# Patient Record
Sex: MALE | Race: WHITE | NOT HISPANIC OR LATINO
[De-identification: names, ages, dates, MRNs, and addresses within clinical notes are randomized per-mention and may not be internally consistent; named-entity substitution may affect disease eponyms.]

---

## 2017-02-04 ENCOUNTER — APPOINTMENT (OUTPATIENT)
Dept: MRI IMAGING | Facility: HOSPITAL | Age: 52
End: 2017-02-04

## 2017-02-04 ENCOUNTER — OUTPATIENT (OUTPATIENT)
Dept: OUTPATIENT SERVICES | Facility: HOSPITAL | Age: 52
LOS: 1 days | End: 2017-02-04
Payer: COMMERCIAL

## 2017-02-04 DIAGNOSIS — M75.122 COMPLETE ROTATOR CUFF TEAR OR RUPTURE OF LEFT SHOULDER, NOT SPECIFIED AS TRAUMATIC: ICD-10-CM

## 2017-02-04 PROCEDURE — 73221 MRI JOINT UPR EXTREM W/O DYE: CPT

## 2017-02-24 ENCOUNTER — OUTPATIENT (OUTPATIENT)
Dept: OUTPATIENT SERVICES | Facility: HOSPITAL | Age: 52
LOS: 1 days | End: 2017-02-24
Payer: COMMERCIAL

## 2017-02-24 ENCOUNTER — APPOINTMENT (OUTPATIENT)
Dept: ULTRASOUND IMAGING | Facility: HOSPITAL | Age: 52
End: 2017-02-24

## 2017-02-24 DIAGNOSIS — R16.2 HEPATOMEGALY WITH SPLENOMEGALY, NOT ELSEWHERE CLASSIFIED: ICD-10-CM

## 2017-02-24 DIAGNOSIS — K76.0 FATTY (CHANGE OF) LIVER, NOT ELSEWHERE CLASSIFIED: ICD-10-CM

## 2017-02-24 PROCEDURE — 76700 US EXAM ABDOM COMPLETE: CPT

## 2018-07-23 ENCOUNTER — APPOINTMENT (OUTPATIENT)
Dept: ORTHOPEDIC SURGERY | Facility: CLINIC | Age: 53
End: 2018-07-23
Payer: COMMERCIAL

## 2018-07-23 VITALS — HEIGHT: 68 IN | BODY MASS INDEX: 32.43 KG/M2 | WEIGHT: 214 LBS

## 2018-07-23 PROCEDURE — 99214 OFFICE O/P EST MOD 30 MIN: CPT | Mod: 25

## 2018-07-23 PROCEDURE — 20610 DRAIN/INJ JOINT/BURSA W/O US: CPT | Mod: LT

## 2018-09-18 ENCOUNTER — APPOINTMENT (OUTPATIENT)
Dept: ORTHOPEDIC SURGERY | Facility: CLINIC | Age: 53
End: 2018-09-18

## 2018-11-06 ENCOUNTER — APPOINTMENT (OUTPATIENT)
Dept: ORTHOPEDIC SURGERY | Facility: CLINIC | Age: 53
End: 2018-11-06
Payer: COMMERCIAL

## 2018-11-06 VITALS — BODY MASS INDEX: 32.43 KG/M2 | HEIGHT: 68 IN | WEIGHT: 214 LBS

## 2018-11-06 PROCEDURE — 20610 DRAIN/INJ JOINT/BURSA W/O US: CPT | Mod: LT

## 2019-03-23 ENCOUNTER — OUTPATIENT (OUTPATIENT)
Dept: OUTPATIENT SERVICES | Facility: HOSPITAL | Age: 54
LOS: 1 days | End: 2019-03-23
Payer: COMMERCIAL

## 2019-03-23 ENCOUNTER — APPOINTMENT (OUTPATIENT)
Dept: ULTRASOUND IMAGING | Facility: HOSPITAL | Age: 54
End: 2019-03-23
Payer: COMMERCIAL

## 2019-03-23 DIAGNOSIS — Z00.8 ENCOUNTER FOR OTHER GENERAL EXAMINATION: ICD-10-CM

## 2019-03-23 PROCEDURE — 76700 US EXAM ABDOM COMPLETE: CPT | Mod: 26

## 2019-03-23 PROCEDURE — 76700 US EXAM ABDOM COMPLETE: CPT

## 2019-04-09 ENCOUNTER — APPOINTMENT (OUTPATIENT)
Dept: ORTHOPEDIC SURGERY | Facility: CLINIC | Age: 54
End: 2019-04-09
Payer: COMMERCIAL

## 2019-04-09 VITALS — BODY MASS INDEX: 32.43 KG/M2 | WEIGHT: 214 LBS | HEIGHT: 68 IN

## 2019-04-09 PROCEDURE — 73564 X-RAY EXAM KNEE 4 OR MORE: CPT | Mod: LT

## 2019-04-09 PROCEDURE — 99213 OFFICE O/P EST LOW 20 MIN: CPT

## 2019-04-10 NOTE — END OF VISIT
[FreeTextEntry3] : All medical record entries made by the Isabel[facility]lenin were at my, Dr. Kolby Young, direction and personally dictated by me on 04/09/2019. I have reviewed the chart and agree that the record accurately reflects my personal performance of the history, physical exam, assessment and plan. I have also personally directed, reviewed, and agreed with the chart.

## 2019-04-10 NOTE — ADDENDUM
[FreeTextEntry1] : I, Abigail Beth, acted solely as a scribe for Dr. Kolby Young on this date 04/09/2019.

## 2019-04-10 NOTE — PHYSICAL EXAM
[Normal RLE] : Right Lower Extremity: No scars, rashes, lesions, ulcers, skin intact [Normal LLE] : Left Lower Extremity: No scars, rashes, lesions, ulcers, skin intact [Normal Touch] : sensation intact for touch [Normal] : No swelling, no edema, normal pedal pulses and normal temperature [Obese] : obese [Poor Appearance] : well-appearing [Acute Distress] : not in acute distress [de-identified] : Left Lower Extremity \par o Knee :\par ¦ Inspection/Palpation : medial joint line tenderness, no swelling, mild varus alignment\par ¦ Range of Motion : 0 - 120 degrees, no crepitus\par ¦ Stability : no valgus or varus instability present on provocative testing\par ¦ Strength : quadriceps strength 5-/5\par o Muscle Bulk : normal muscle bulk present \par o Skin : no erythema or ecchymosis present \par o Sensation : sensation to pin intact\par o Vascular Exam : no edema, no cyanosis, dorsalis pedis artery pulse 2+, posterior tibial artery pulse 2+ [de-identified] : o Left Knee : AP, lateral, sunrise, and Mcconnell views of the knee were obtained, there are no soft tissue abnormalities, no fractures, advanced medial compartment osteoarthritis, varus alignment

## 2019-04-10 NOTE — DISCUSSION/SUMMARY
[de-identified] : The underlying pathophysiology was reviewed in great detail with the patient as well as the various treatment options, including ice, analgesics, NSAIDs, Physical therapy, steroid injections.\par \par A prescription for a medial  brace was provided.\par \par A prescription was provided for Diclofenac. \par \par FU PRN.

## 2019-04-10 NOTE — HISTORY OF PRESENT ILLNESS
[de-identified] : 53 year old male presents for an evaluation of chronic left knee pain s/p left knee arthroscopy on 1/20/2016. At his last visit on 11/6/2018 he received a Monovisc injection of his left knee, he stated that it provided him with relief until February 2019. His pain has since returned and describes a pain that is constant in nature and located along the medial aspect of his left knee. His pain has caused him difficulty with walking, bending, and climbing stairs. He is interested in discussing treatment options for his pain.\par

## 2019-06-13 ENCOUNTER — INPATIENT (INPATIENT)
Facility: HOSPITAL | Age: 54
LOS: 14 days | Discharge: HOME | DRG: 885 | End: 2019-06-28
Attending: PSYCHIATRY & NEUROLOGY | Admitting: PSYCHIATRY & NEUROLOGY
Payer: COMMERCIAL

## 2019-06-13 VITALS
OXYGEN SATURATION: 96 % | HEART RATE: 91 BPM | RESPIRATION RATE: 18 BRPM | WEIGHT: 292.99 LBS | SYSTOLIC BLOOD PRESSURE: 162 MMHG | DIASTOLIC BLOOD PRESSURE: 87 MMHG | TEMPERATURE: 98 F | HEIGHT: 71 IN

## 2019-06-13 DIAGNOSIS — F32.9 MAJOR DEPRESSIVE DISORDER, SINGLE EPISODE, UNSPECIFIED: ICD-10-CM

## 2019-06-13 DIAGNOSIS — F33.2 MAJOR DEPRESSIVE DISORDER, RECURRENT SEVERE WITHOUT PSYCHOTIC FEATURES: ICD-10-CM

## 2019-06-13 LAB
ALBUMIN SERPL ELPH-MCNC: 4.1 G/DL — SIGNIFICANT CHANGE UP (ref 3.3–5)
ALBUMIN SERPL ELPH-MCNC: 4.2 G/DL — SIGNIFICANT CHANGE UP (ref 3.3–5)
ALP SERPL-CCNC: 63 U/L — SIGNIFICANT CHANGE UP (ref 30–120)
ALP SERPL-CCNC: 63 U/L — SIGNIFICANT CHANGE UP (ref 30–120)
ALT FLD-CCNC: 168 U/L DA — HIGH (ref 10–60)
ALT FLD-CCNC: 172 U/L DA — HIGH (ref 10–60)
ANION GAP SERPL CALC-SCNC: 9 MMOL/L — SIGNIFICANT CHANGE UP (ref 5–17)
APAP SERPL-MCNC: <1 UG/ML — LOW (ref 10–30)
APPEARANCE UR: CLEAR — SIGNIFICANT CHANGE UP
AST SERPL-CCNC: 66 U/L — HIGH (ref 10–40)
AST SERPL-CCNC: 81 U/L — HIGH (ref 10–40)
BASOPHILS # BLD AUTO: 0.02 K/UL — SIGNIFICANT CHANGE UP (ref 0–0.2)
BASOPHILS NFR BLD AUTO: 0.4 % — SIGNIFICANT CHANGE UP (ref 0–2)
BILIRUB DIRECT SERPL-MCNC: 0.1 MG/DL — SIGNIFICANT CHANGE UP (ref 0–0.2)
BILIRUB INDIRECT FLD-MCNC: 0.3 MG/DL — SIGNIFICANT CHANGE UP (ref 0.2–1)
BILIRUB SERPL-MCNC: 0.4 MG/DL — SIGNIFICANT CHANGE UP (ref 0.2–1.2)
BILIRUB SERPL-MCNC: 0.5 MG/DL — SIGNIFICANT CHANGE UP (ref 0.2–1.2)
BILIRUB UR-MCNC: ABNORMAL
BUN SERPL-MCNC: 13 MG/DL — SIGNIFICANT CHANGE UP (ref 7–23)
CALCIUM SERPL-MCNC: 9.6 MG/DL — SIGNIFICANT CHANGE UP (ref 8.4–10.5)
CHLORIDE SERPL-SCNC: 105 MMOL/L — SIGNIFICANT CHANGE UP (ref 96–108)
CO2 SERPL-SCNC: 24 MMOL/L — SIGNIFICANT CHANGE UP (ref 22–31)
COLOR SPEC: YELLOW — SIGNIFICANT CHANGE UP
CREAT SERPL-MCNC: 0.8 MG/DL — SIGNIFICANT CHANGE UP (ref 0.5–1.3)
DIFF PNL FLD: NEGATIVE — SIGNIFICANT CHANGE UP
EOSINOPHIL # BLD AUTO: 0.11 K/UL — SIGNIFICANT CHANGE UP (ref 0–0.5)
EOSINOPHIL NFR BLD AUTO: 1.9 % — SIGNIFICANT CHANGE UP (ref 0–6)
ETHANOL SERPL-MCNC: <3 MG/DL — SIGNIFICANT CHANGE UP (ref 0–3)
GLUCOSE SERPL-MCNC: 113 MG/DL — HIGH (ref 70–99)
GLUCOSE UR QL: NEGATIVE MG/DL — SIGNIFICANT CHANGE UP
HCT VFR BLD CALC: 43.5 % — SIGNIFICANT CHANGE UP (ref 39–50)
HGB BLD-MCNC: 15.3 G/DL — SIGNIFICANT CHANGE UP (ref 13–17)
IMM GRANULOCYTES NFR BLD AUTO: 0.7 % — SIGNIFICANT CHANGE UP (ref 0–1.5)
KETONES UR-MCNC: NEGATIVE — SIGNIFICANT CHANGE UP
LEUKOCYTE ESTERASE UR-ACNC: ABNORMAL
LIDOCAIN IGE QN: 143 U/L — SIGNIFICANT CHANGE UP (ref 73–393)
LYMPHOCYTES # BLD AUTO: 0.92 K/UL — LOW (ref 1–3.3)
LYMPHOCYTES # BLD AUTO: 16.1 % — SIGNIFICANT CHANGE UP (ref 13–44)
MCHC RBC-ENTMCNC: 32.9 PG — SIGNIFICANT CHANGE UP (ref 27–34)
MCHC RBC-ENTMCNC: 35.2 GM/DL — SIGNIFICANT CHANGE UP (ref 32–36)
MCV RBC AUTO: 93.5 FL — SIGNIFICANT CHANGE UP (ref 80–100)
MONOCYTES # BLD AUTO: 0.39 K/UL — SIGNIFICANT CHANGE UP (ref 0–0.9)
MONOCYTES NFR BLD AUTO: 6.8 % — SIGNIFICANT CHANGE UP (ref 2–14)
NEUTROPHILS # BLD AUTO: 4.23 K/UL — SIGNIFICANT CHANGE UP (ref 1.8–7.4)
NEUTROPHILS NFR BLD AUTO: 74.1 % — SIGNIFICANT CHANGE UP (ref 43–77)
NITRITE UR-MCNC: NEGATIVE — SIGNIFICANT CHANGE UP
NRBC # BLD: 0 /100 WBCS — SIGNIFICANT CHANGE UP (ref 0–0)
PCP SPEC-MCNC: SIGNIFICANT CHANGE UP
PH UR: 5 — SIGNIFICANT CHANGE UP (ref 5–8)
PLATELET # BLD AUTO: 186 K/UL — SIGNIFICANT CHANGE UP (ref 150–400)
POTASSIUM SERPL-MCNC: 4.4 MMOL/L — SIGNIFICANT CHANGE UP (ref 3.5–5.3)
POTASSIUM SERPL-SCNC: 4.4 MMOL/L — SIGNIFICANT CHANGE UP (ref 3.5–5.3)
PROT SERPL-MCNC: 7.1 G/DL — SIGNIFICANT CHANGE UP (ref 6–8.3)
PROT SERPL-MCNC: 7.4 G/DL — SIGNIFICANT CHANGE UP (ref 6–8.3)
PROT UR-MCNC: 15 MG/DL
RBC # BLD: 4.65 M/UL — SIGNIFICANT CHANGE UP (ref 4.2–5.8)
RBC # FLD: 12.4 % — SIGNIFICANT CHANGE UP (ref 10.3–14.5)
SALICYLATES SERPL-MCNC: <3 MG/DL — SIGNIFICANT CHANGE UP (ref 2.8–20)
SODIUM SERPL-SCNC: 138 MMOL/L — SIGNIFICANT CHANGE UP (ref 135–145)
SP GR SPEC: 1.02 — SIGNIFICANT CHANGE UP (ref 1.01–1.02)
UROBILINOGEN FLD QL: NEGATIVE MG/DL — SIGNIFICANT CHANGE UP
WBC # BLD: 5.71 K/UL — SIGNIFICANT CHANGE UP (ref 3.8–10.5)
WBC # FLD AUTO: 5.71 K/UL — SIGNIFICANT CHANGE UP (ref 3.8–10.5)

## 2019-06-13 PROCEDURE — 93010 ELECTROCARDIOGRAM REPORT: CPT

## 2019-06-13 PROCEDURE — 99285 EMERGENCY DEPT VISIT HI MDM: CPT

## 2019-06-13 RX ORDER — VENLAFAXINE HCL 75 MG
150 CAPSULE, EXT RELEASE 24 HR ORAL DAILY
Refills: 0 | Status: DISCONTINUED | OUTPATIENT
Start: 2019-06-14 | End: 2019-06-28

## 2019-06-13 RX ORDER — MIRTAZAPINE 45 MG/1
30 TABLET, ORALLY DISINTEGRATING ORAL AT BEDTIME
Refills: 0 | Status: DISCONTINUED | OUTPATIENT
Start: 2019-06-13 | End: 2019-06-18

## 2019-06-13 RX ORDER — QUETIAPINE FUMARATE 200 MG/1
400 TABLET, FILM COATED ORAL AT BEDTIME
Refills: 0 | Status: DISCONTINUED | OUTPATIENT
Start: 2019-06-13 | End: 2019-06-28

## 2019-06-13 RX ORDER — HALOPERIDOL DECANOATE 100 MG/ML
2 INJECTION INTRAMUSCULAR EVERY 6 HOURS
Refills: 0 | Status: DISCONTINUED | OUTPATIENT
Start: 2019-06-13 | End: 2019-06-28

## 2019-06-13 RX ORDER — CLONAZEPAM 1 MG
1 TABLET ORAL THREE TIMES A DAY
Refills: 0 | Status: DISCONTINUED | OUTPATIENT
Start: 2019-06-13 | End: 2019-06-19

## 2019-06-13 RX ORDER — ZOLPIDEM TARTRATE 10 MG/1
5 TABLET ORAL AT BEDTIME
Refills: 0 | Status: DISCONTINUED | OUTPATIENT
Start: 2019-06-13 | End: 2019-06-19

## 2019-06-13 RX ADMIN — Medication 1 MILLIGRAM(S): at 20:11

## 2019-06-13 RX ADMIN — QUETIAPINE FUMARATE 400 MILLIGRAM(S): 200 TABLET, FILM COATED ORAL at 20:11

## 2019-06-13 RX ADMIN — Medication 1 MILLIGRAM(S): at 14:46

## 2019-06-13 RX ADMIN — MIRTAZAPINE 30 MILLIGRAM(S): 45 TABLET, ORALLY DISINTEGRATING ORAL at 20:11

## 2019-06-13 NOTE — ED BEHAVIORAL HEALTH ASSESSMENT NOTE - SUMMARY
Patient is a 54 year old  Salvadorean male domiciled with spouse employed as a  non-caregiver with a history of depression and anxiety, 2 past hospitalizations most recent [facility] around 2007, no past suicide attempts, no known violence/arrests, no substance abuse, PMH of hepatitis in childhood and left knee pain brought in by self accompanied by spouse due to worsening depression.  Patient is severely depressed, not sleeping, anxious, with passive suicidal ideation, and significant symptoms that are greatly impairing his life and making him unable to care for himself. Patient requires inpatient hosptialization for safety and stabilization.

## 2019-06-13 NOTE — ED BEHAVIORAL HEALTH ASSESSMENT NOTE - HPI (INCLUDE ILLNESS QUALITY, SEVERITY, DURATION, TIMING, CONTEXT, MODIFYING FACTORS, ASSOCIATED SIGNS AND SYMPTOMS)
Patient is a 54 year old  Serbian male domiciled with spouse employed as a  non-caregiver with a history of depression and anxiety, 2 past hospitalizations most recent [facility] around 2007, no past suicide attempts, no known violence/arrests, no substance abuse, PMH of hepatitis in childhood and left knee pain brought in by self accompanied by spouse due to worsening depression.   Per collateral the HPI begins a few months ago. Per spouse at baseline patient resides with her in a home, they have a 28 year old daughter. Patient works as a  at a school district. Per collateral patient’s baseline symptoms consist of on and off depressed mood usually seasonal in winter and that patient is often worrying about things especially finances. Collateral states patient attends treatment with Dr. Cline and is compliant with medication, recently sessions with psychiatrist were increased to weekly or biweekly.   Collateral states a few months ago patient got his seasonal depression but it is not resolving. For a few months patient has decreased motivation, a loss of interest in activities, poor appetite, and poor sleep. Collateral states the past few days patient hasn’t slept at all. Collateral states outpatient psychiatrist has increased sessions and tried adjusting medications but it has not been successful. Collateral denies patient has voiced SI/HI, no past self-harm, no report of AH/VH, no deysi, no past violence, no access to guns. Spouse is concerned as patient is not functioning at his baseline and is not responding to outpatient treatment. Spouse feels patient would benefit from inpatient admission for stabilization and safety at this time.  On interview, pt appears depressed and endorses persistent sad, anhedonic depressed mood, crying, passive suicidal ideations, poor sleep for months, decreased appetite with 10 lb weight loss, decreased energy, helplessness and hopelessness. Is racked with anxiety at night over his job, and the fact that he owes people money. States he "is always suffering, always stressed out." denies manic/psychotic sx's , denies subst use.  has been seeing psychiatrist weekly who has made several changes to medication regimen with no improvement.

## 2019-06-13 NOTE — ED ADULT NURSE NOTE - ED STAT RN HANDOFF DETAILS
Patient admitted to psych voluntary ,A&OX3 assigned to [address] [place] [address] report given to IGOR Fowler . Patient to be transported via wheelchair stable in no acute distress saftey maintained.

## 2019-06-13 NOTE — ED BEHAVIORAL HEALTH ASSESSMENT NOTE - DESCRIPTION
:  Patient arrived to the ED approximately 3 hours prior to consultation. Per ED RN and documentation patient arrived alert and oriented x3, patient had normal grooming and hygiene, patient was cooperative with ED medical and safety protocols. Patient reported depressed mood and his affect appeared to be congruent. Patient denied any suicidal/homicidal ideation, patient did not report or exhibit symptoms of psychosis or deysi. Patient had linear thought process and normal speech. Patient remained in good behavioral control while in the ED, did not require PRN psychiatric medication prior to assessment. Patient not observed to eat or sleep at time of contact. Patient’s spouse is at bedside and appears supportive. none see hpi

## 2019-06-13 NOTE — ED PROVIDER NOTE - CLINICAL SUMMARY MEDICAL DECISION MAKING FREE TEXT BOX
Pt is a 53 yo male who presents to the ED with a cc of depression.  PMHx of hepatitis, depression.  Concern for worsening depression.

## 2019-06-13 NOTE — ED BEHAVIORAL HEALTH ASSESSMENT NOTE - CURRENT MEDICATION
remeron 30mg qhs, olanzapine 5mg qhs, klonopin 1mg qid, seroquel 400mg qhs, effexor 150mg qd, ambien 12mg qd

## 2019-06-13 NOTE — ED ADULT TRIAGE NOTE - BMI (KG/M2)
Patient returned voice mail left last week by Migdalia. Patient would rather wait until test results are available before she starts new medication (Metoprolol).     Patient did not want to wait longer to reach nurse to discuss, had a meeting.   40.9

## 2019-06-13 NOTE — ED ADULT NURSE NOTE - OBJECTIVE STATEMENT
Patient present to ED BIB wife with c/o generalized body pain, decreased appetite, N/V and shaking and inability to sleep for days. As per patient and wife he suffers from depression takes meds but hasn't been helping. Patient last saw psychiatrist on May22. as per wife and patient he has been suffering from depression since 2005 and has seen his MD meds adjusted but symptoms change.

## 2019-06-13 NOTE — ED BEHAVIORAL HEALTH ASSESSMENT NOTE - NS ED BHA HEROIN OPIATES
[email or web address] - \"Need for Clarification\" with note:    \"Notes to Prescriber:  Drug not covered $260 could we get a alternative. Thank you.\"   None known

## 2019-06-13 NOTE — ED PROVIDER NOTE - OBJECTIVE STATEMENT
Pt is a 53 yo male who presents to the ED with a cc of depression.  PMHx of hepatitis, depression.  Pt reports that he has suffered from depression for many years.  He was hospitalized on 2 previous occasions at [facility] once around 2005 and then 2 years later. He has had no hospitalizations since.  Pt reports that he had been doing well up until this year when his depression began to worsen again.  He reports that he has been following with his PMD Dr. Cline for this but symptoms have continued to worsen.  He reports that over the last several weeks he has been suffering from insomnia and has little to no appetite. He has been seeing his physician weekly with medication adjustments but symptoms continue to worsen.  Denies SI/HI.  Denies fever, chills, N/V, CP, SOB, abd pain.  Pt does report diffuse myalgias and states that his "whole body is burning."

## 2019-06-13 NOTE — ED BEHAVIORAL HEALTH ASSESSMENT NOTE - RISK ASSESSMENT
Acute Suicide Risk  (x  ) High   (  ) Moderate   (  ) Low   (  ) Unable to determine   Rationale _____severely depressed with passive suicidal ideation ______    Elevated Chronic Risk   ( x ) Yes ___________  Details ___________hx of depression and hospitalizations   (  ) No   ___________

## 2019-06-14 LAB
CHOLEST SERPL-MCNC: 269 MG/DL — HIGH (ref 10–199)
CULTURE RESULTS: SIGNIFICANT CHANGE UP
HAV IGM SER-ACNC: SIGNIFICANT CHANGE UP
HBA1C BLD-MCNC: 5.2 % — SIGNIFICANT CHANGE UP (ref 4–5.6)
HBV CORE IGM SER-ACNC: SIGNIFICANT CHANGE UP
HBV SURFACE AG SER-ACNC: SIGNIFICANT CHANGE UP
HCV AB S/CO SERPL IA: 0.04 S/CO — SIGNIFICANT CHANGE UP (ref 0–0.99)
HCV AB SERPL-IMP: SIGNIFICANT CHANGE UP
HDLC SERPL-MCNC: 77 MG/DL — SIGNIFICANT CHANGE UP
LIPID PNL WITH DIRECT LDL SERPL: 163 MG/DL — HIGH
SPECIMEN SOURCE: SIGNIFICANT CHANGE UP
T PALLIDUM AB TITR SER: NEGATIVE — SIGNIFICANT CHANGE UP
TOTAL CHOLESTEROL/HDL RATIO MEASUREMENT: 3.5 RATIO — SIGNIFICANT CHANGE UP (ref 3.4–9.6)
TRIGL SERPL-MCNC: 147 MG/DL — SIGNIFICANT CHANGE UP (ref 10–149)
TSH SERPL-MCNC: 1.18 UIU/ML — SIGNIFICANT CHANGE UP (ref 0.27–4.2)

## 2019-06-14 PROCEDURE — 99222 1ST HOSP IP/OBS MODERATE 55: CPT

## 2019-06-14 RX ORDER — METOPROLOL TARTRATE 50 MG
25 TABLET ORAL DAILY
Refills: 0 | Status: DISCONTINUED | OUTPATIENT
Start: 2019-06-14 | End: 2019-06-17

## 2019-06-14 RX ORDER — GABAPENTIN 400 MG/1
100 CAPSULE ORAL THREE TIMES A DAY
Refills: 0 | Status: DISCONTINUED | OUTPATIENT
Start: 2019-06-14 | End: 2019-06-18

## 2019-06-14 RX ORDER — ATORVASTATIN CALCIUM 80 MG/1
10 TABLET, FILM COATED ORAL AT BEDTIME
Refills: 0 | Status: DISCONTINUED | OUTPATIENT
Start: 2019-06-14 | End: 2019-06-28

## 2019-06-14 RX ORDER — TRAZODONE HCL 50 MG
100 TABLET ORAL AT BEDTIME
Refills: 0 | Status: DISCONTINUED | OUTPATIENT
Start: 2019-06-14 | End: 2019-06-28

## 2019-06-14 RX ADMIN — ATORVASTATIN CALCIUM 10 MILLIGRAM(S): 80 TABLET, FILM COATED ORAL at 20:19

## 2019-06-14 RX ADMIN — Medication 1 MILLIGRAM(S): at 20:19

## 2019-06-14 RX ADMIN — Medication 150 MILLIGRAM(S): at 08:30

## 2019-06-14 RX ADMIN — MIRTAZAPINE 30 MILLIGRAM(S): 45 TABLET, ORALLY DISINTEGRATING ORAL at 20:20

## 2019-06-14 RX ADMIN — Medication 1 MILLIGRAM(S): at 08:30

## 2019-06-14 RX ADMIN — Medication 100 MILLIGRAM(S): at 20:20

## 2019-06-14 RX ADMIN — Medication 0.5 MILLIGRAM(S): at 12:01

## 2019-06-14 RX ADMIN — ZOLPIDEM TARTRATE 5 MILLIGRAM(S): 10 TABLET ORAL at 22:09

## 2019-06-14 RX ADMIN — Medication 1 MILLIGRAM(S): at 12:58

## 2019-06-14 RX ADMIN — GABAPENTIN 100 MILLIGRAM(S): 400 CAPSULE ORAL at 20:19

## 2019-06-14 RX ADMIN — QUETIAPINE FUMARATE 400 MILLIGRAM(S): 200 TABLET, FILM COATED ORAL at 20:20

## 2019-06-14 RX ADMIN — ZOLPIDEM TARTRATE 5 MILLIGRAM(S): 10 TABLET ORAL at 20:20

## 2019-06-14 NOTE — PROGRESS NOTE BEHAVIORAL HEALTH - NSBHFUPIPCHARTREVFT_PSY_A_CORE
Patient is a 54 year old  Bhutanese male domiciled with spouse employed as a  non-caregiver with a history of depression and anxiety, 2 past hospitalizations most recent [facility] around 2007, no past suicide attempts, no known violence/arrests, no substance abuse, PMH of hepatitis in childhood and left knee pain brought in by self accompanied by spouse due to worsening depression.     Per collateral the HPI begins a few months ago. Per spouse at baseline patient resides with her in a home, they have a 28 year old daughter. Patient works as a  at a school district. Per collateral patient’s baseline symptoms consist of on and off depressed mood usually seasonal in winter and that patient is often worrying about things especially finances. Collateral states patient attends treatment with Dr. Cline and is compliant with medication, recently sessions with psychiatrist were increased to weekly or biweekly.   Collateral states a few months ago patient got his seasonal depression but it is not resolving. For a few months patient has decreased motivation, a loss of interest in activities, poor appetite, and poor sleep. Collateral states the past few days patient hasn’t slept at all. Collateral states outpatient psychiatrist has increased sessions and tried adjusting medications but it has not been successful. Collateral denies patient has voiced SI/HI, no past self-harm, no report of AH/VH, no deysi, no past violence, no access to guns. Spouse is concerned as patient is not functioning at his baseline and is not responding to outpatient treatment. Spouse feels patient would benefit from inpatient admission for stabilization and safety at this time.  On interview, pt appears depressed and endorses persistent sad, anhedonic depressed mood, crying, passive suicidal ideations, poor sleep for months, decreased appetite with 10 lb weight loss, decreased energy, helplessness and hopelessness. Is racked with anxiety at night over his job, and the fact that he owes people money. States he "is always suffering, always stressed out." denies manic/psychotic sx's , denies subst use.  has been seeing psychiatrist weekly who has made several changes to medication regimen with no improvement.

## 2019-06-14 NOTE — PROGRESS NOTE BEHAVIORAL HEALTH - SUMMARY
Patient is a 54 year old  Greenlandic male domiciled with spouse employed as a  non-caregiver with a history of depression and anxiety, 2 past hospitalizations most recent [facility] around 2007, no past suicide attempts, no known violence/arrests, no substance abuse, PMH of hepatitis in childhood and left knee pain brought in by self accompanied by spouse due to worsening depression.  Patient is severely depressed, not sleeping, anxious, with passive suicidal ideation, and significant symptoms that are greatly impairing his life and making him unable to care for himself. Patient requires inpatient hosptialization for safety and stabilization.

## 2019-06-14 NOTE — OCCUPATIONAL THERAPY INITIAL EVALUATION ADULT - PERTINENT HX OF CURRENT PROBLEM, REHAB EVAL
This is a 54 y.o. male who presents with worsening depression. He has had 2 prior psych admissions, complains of poor sleep, weight loss, financial issues, etc.

## 2019-06-15 LAB
HAV IGM SER-ACNC: SIGNIFICANT CHANGE UP
HBV CORE IGM SER-ACNC: SIGNIFICANT CHANGE UP
HBV SURFACE AG SER-ACNC: SIGNIFICANT CHANGE UP
HCV AB S/CO SERPL IA: 0.06 S/CO — SIGNIFICANT CHANGE UP (ref 0–0.99)
HCV AB SERPL-IMP: SIGNIFICANT CHANGE UP

## 2019-06-15 RX ADMIN — QUETIAPINE FUMARATE 400 MILLIGRAM(S): 200 TABLET, FILM COATED ORAL at 20:40

## 2019-06-15 RX ADMIN — GABAPENTIN 100 MILLIGRAM(S): 400 CAPSULE ORAL at 08:10

## 2019-06-15 RX ADMIN — MIRTAZAPINE 30 MILLIGRAM(S): 45 TABLET, ORALLY DISINTEGRATING ORAL at 20:40

## 2019-06-15 RX ADMIN — ATORVASTATIN CALCIUM 10 MILLIGRAM(S): 80 TABLET, FILM COATED ORAL at 20:40

## 2019-06-15 RX ADMIN — ZOLPIDEM TARTRATE 5 MILLIGRAM(S): 10 TABLET ORAL at 20:41

## 2019-06-15 RX ADMIN — Medication 25 MILLIGRAM(S): at 08:10

## 2019-06-15 RX ADMIN — GABAPENTIN 100 MILLIGRAM(S): 400 CAPSULE ORAL at 12:52

## 2019-06-15 RX ADMIN — Medication 1 MILLIGRAM(S): at 20:40

## 2019-06-15 RX ADMIN — Medication 100 MILLIGRAM(S): at 20:40

## 2019-06-15 RX ADMIN — Medication 1 MILLIGRAM(S): at 08:10

## 2019-06-15 RX ADMIN — Medication 1 MILLIGRAM(S): at 12:52

## 2019-06-15 RX ADMIN — GABAPENTIN 100 MILLIGRAM(S): 400 CAPSULE ORAL at 20:40

## 2019-06-15 RX ADMIN — Medication 150 MILLIGRAM(S): at 08:10

## 2019-06-15 NOTE — PROGRESS NOTE BEHAVIORAL HEALTH - SUMMARY
Patient is a 54 year old  [place]ank[place] male domiciled with spouse employed as a  non-caregiver with a history of depression and anxiety, 2 past hospitalizations most recent [facility] around 2007, no past suicide attempts, no known violence/arrests, no substance abuse, PMH of hepatitis in childhood and left knee pain brought in by self accompanied by spouse due to worsening depression.  Patient was admitted for MDD with psychosis. Today somewhat better in the context of sleeping better on meds last night. Ongoing burning sensation but no acute issues. Denies SI/HI/AVH Patient is a 54 year old  Kyrgy[place] male domiciled with spouse employed as a  non-caregiver with a history of depression and anxiety, 2 past hospitalizations most recent [facility] around 2007, no past suicide attempts, no known violence/arrests, no substance abuse, PMH of hepatitis in childhood and left knee pain brought in by self accompanied by spouse due to worsening depression.  Patient was admitted for MDD with psychosis. UTox positive for barbiturates and benzos upon admission. Today somewhat better in the context of sleeping better on meds last night. Ongoing burning sensation but no acute issues. Denies SI/HI/AVH. High cholesterol/triglycerides on labs

## 2019-06-16 RX ADMIN — Medication 1 MILLIGRAM(S): at 15:55

## 2019-06-16 RX ADMIN — MIRTAZAPINE 30 MILLIGRAM(S): 45 TABLET, ORALLY DISINTEGRATING ORAL at 20:35

## 2019-06-16 RX ADMIN — Medication 25 MILLIGRAM(S): at 10:17

## 2019-06-16 RX ADMIN — Medication 1 MILLIGRAM(S): at 08:20

## 2019-06-16 RX ADMIN — GABAPENTIN 100 MILLIGRAM(S): 400 CAPSULE ORAL at 12:57

## 2019-06-16 RX ADMIN — GABAPENTIN 100 MILLIGRAM(S): 400 CAPSULE ORAL at 20:35

## 2019-06-16 RX ADMIN — Medication 1 MILLIGRAM(S): at 20:35

## 2019-06-16 RX ADMIN — QUETIAPINE FUMARATE 400 MILLIGRAM(S): 200 TABLET, FILM COATED ORAL at 20:36

## 2019-06-16 RX ADMIN — Medication 100 MILLIGRAM(S): at 20:36

## 2019-06-16 RX ADMIN — GABAPENTIN 100 MILLIGRAM(S): 400 CAPSULE ORAL at 08:20

## 2019-06-16 RX ADMIN — Medication 1 MILLIGRAM(S): at 12:56

## 2019-06-16 RX ADMIN — Medication 150 MILLIGRAM(S): at 08:21

## 2019-06-16 RX ADMIN — ATORVASTATIN CALCIUM 10 MILLIGRAM(S): 80 TABLET, FILM COATED ORAL at 20:35

## 2019-06-16 RX ADMIN — ZOLPIDEM TARTRATE 5 MILLIGRAM(S): 10 TABLET ORAL at 20:36

## 2019-06-16 NOTE — PROGRESS NOTE BEHAVIORAL HEALTH - SUMMARY
Patient is a 54 year old  Tongan male domiciled with spouse employed as a  non-caregiver with a history of depression and anxiety, 2 past hospitalizations most recent [facility] around 2007, no past suicide attempts, no known violence/arrests, no substance abuse, PMH of hepatitis in childhood and left knee pain a/w worsening depression.  Patient was admitted for MDD with psychosis. UTox positive for barbiturates and benzos upon admission. High cholesterol/triglycerides on labs. Endorses improved mood - denies SI/HI/AVH. sleep continues to be disturbed. Needs full 10mg dose of Ambien

## 2019-06-17 PROCEDURE — 99231 SBSQ HOSP IP/OBS SF/LOW 25: CPT

## 2019-06-17 RX ORDER — METOPROLOL TARTRATE 50 MG
50 TABLET ORAL DAILY
Refills: 0 | Status: DISCONTINUED | OUTPATIENT
Start: 2019-06-17 | End: 2019-06-28

## 2019-06-17 RX ADMIN — GABAPENTIN 100 MILLIGRAM(S): 400 CAPSULE ORAL at 12:52

## 2019-06-17 RX ADMIN — Medication 1 MILLIGRAM(S): at 12:52

## 2019-06-17 RX ADMIN — GABAPENTIN 100 MILLIGRAM(S): 400 CAPSULE ORAL at 20:32

## 2019-06-17 RX ADMIN — GABAPENTIN 100 MILLIGRAM(S): 400 CAPSULE ORAL at 08:25

## 2019-06-17 RX ADMIN — Medication 25 MILLIGRAM(S): at 08:26

## 2019-06-17 RX ADMIN — ATORVASTATIN CALCIUM 10 MILLIGRAM(S): 80 TABLET, FILM COATED ORAL at 20:32

## 2019-06-17 RX ADMIN — Medication 1 MILLIGRAM(S): at 20:32

## 2019-06-17 RX ADMIN — Medication 1 MILLIGRAM(S): at 08:25

## 2019-06-17 RX ADMIN — Medication 150 MILLIGRAM(S): at 08:26

## 2019-06-17 RX ADMIN — MIRTAZAPINE 30 MILLIGRAM(S): 45 TABLET, ORALLY DISINTEGRATING ORAL at 20:33

## 2019-06-17 RX ADMIN — Medication 100 MILLIGRAM(S): at 20:32

## 2019-06-17 RX ADMIN — ZOLPIDEM TARTRATE 5 MILLIGRAM(S): 10 TABLET ORAL at 20:32

## 2019-06-17 RX ADMIN — QUETIAPINE FUMARATE 400 MILLIGRAM(S): 200 TABLET, FILM COATED ORAL at 20:32

## 2019-06-17 NOTE — PROGRESS NOTE BEHAVIORAL HEALTH - SUMMARY
Patient is a 54 year old  Montenegrin male domiciled with spouse employed as a  non-caregiver with a history of depression and anxiety, 2 past hospitalizations most recent [facility] around 2007, no past suicide attempts, no known violence/arrests, no substance abuse, PMH of hepatitis in childhood and left knee pain a/w worsening depression.  Patient was admitted for MDD with psychosis. UTox positive for barbiturates and benzos upon admission. High cholesterol/triglycerides on labs. Endorses improved mood - denies SI/HI/AVH. sleep continues to be disturbed. Needs full 10mg dose of Ambien

## 2019-06-18 PROCEDURE — 99231 SBSQ HOSP IP/OBS SF/LOW 25: CPT

## 2019-06-18 RX ORDER — MIRTAZAPINE 45 MG/1
30 TABLET, ORALLY DISINTEGRATING ORAL AT BEDTIME
Refills: 0 | Status: COMPLETED | OUTPATIENT
Start: 2019-06-18 | End: 2019-06-18

## 2019-06-18 RX ORDER — GABAPENTIN 400 MG/1
300 CAPSULE ORAL
Refills: 0 | Status: DISCONTINUED | OUTPATIENT
Start: 2019-06-18 | End: 2019-06-28

## 2019-06-18 RX ORDER — BUPROPION HYDROCHLORIDE 150 MG/1
75 TABLET, EXTENDED RELEASE ORAL ONCE
Refills: 0 | Status: COMPLETED | OUTPATIENT
Start: 2019-06-19 | End: 2019-06-18

## 2019-06-18 RX ADMIN — MIRTAZAPINE 30 MILLIGRAM(S): 45 TABLET, ORALLY DISINTEGRATING ORAL at 20:38

## 2019-06-18 RX ADMIN — Medication 1 MILLIGRAM(S): at 20:28

## 2019-06-18 RX ADMIN — Medication 100 MILLIGRAM(S): at 20:28

## 2019-06-18 RX ADMIN — Medication 1 MILLIGRAM(S): at 12:55

## 2019-06-18 RX ADMIN — Medication 150 MILLIGRAM(S): at 08:29

## 2019-06-18 RX ADMIN — GABAPENTIN 300 MILLIGRAM(S): 400 CAPSULE ORAL at 20:28

## 2019-06-18 RX ADMIN — QUETIAPINE FUMARATE 400 MILLIGRAM(S): 200 TABLET, FILM COATED ORAL at 20:28

## 2019-06-18 RX ADMIN — GABAPENTIN 100 MILLIGRAM(S): 400 CAPSULE ORAL at 08:29

## 2019-06-18 RX ADMIN — Medication 1 MILLIGRAM(S): at 12:04

## 2019-06-18 RX ADMIN — Medication 1 MILLIGRAM(S): at 08:29

## 2019-06-18 RX ADMIN — ZOLPIDEM TARTRATE 5 MILLIGRAM(S): 10 TABLET ORAL at 20:28

## 2019-06-18 RX ADMIN — ATORVASTATIN CALCIUM 10 MILLIGRAM(S): 80 TABLET, FILM COATED ORAL at 20:24

## 2019-06-18 RX ADMIN — Medication 50 MILLIGRAM(S): at 08:29

## 2019-06-18 RX ADMIN — BUPROPION HYDROCHLORIDE 75 MILLIGRAM(S): 150 TABLET, EXTENDED RELEASE ORAL at 22:21

## 2019-06-18 RX ADMIN — GABAPENTIN 100 MILLIGRAM(S): 400 CAPSULE ORAL at 12:55

## 2019-06-18 NOTE — PROGRESS NOTE BEHAVIORAL HEALTH - SUMMARY
Patient is a 54 year old  Malaysian male domiciled with spouse employed as a  non-caregiver with a history of depression and anxiety, 2 past hospitalizations most recent [facility] around 2007, no past suicide attempts, no known violence/arrests, no substance abuse, PMH of hepatitis in childhood and left knee pain a/w worsening depression.  Patient was admitted for MDD with psychosis. UTox positive for barbiturates and benzos upon admission. High cholesterol/triglycerides on labs. Endorses improved mood - denies SI/HI/AVH. sleep continues to be disturbed. Needs full 10mg dose of Ambien

## 2019-06-19 PROCEDURE — 90870 ELECTROCONVULSIVE THERAPY: CPT

## 2019-06-19 PROCEDURE — 99231 SBSQ HOSP IP/OBS SF/LOW 25: CPT

## 2019-06-19 RX ORDER — ZOLPIDEM TARTRATE 10 MG/1
5 TABLET ORAL AT BEDTIME
Refills: 0 | Status: DISCONTINUED | OUTPATIENT
Start: 2019-06-19 | End: 2019-06-25

## 2019-06-19 RX ORDER — CLONAZEPAM 1 MG
1 TABLET ORAL THREE TIMES A DAY
Refills: 0 | Status: DISCONTINUED | OUTPATIENT
Start: 2019-06-19 | End: 2019-06-25

## 2019-06-19 RX ORDER — BUPROPION HYDROCHLORIDE 150 MG/1
100 TABLET, EXTENDED RELEASE ORAL DAILY
Refills: 0 | Status: COMPLETED | OUTPATIENT
Start: 2019-06-19 | End: 2019-06-19

## 2019-06-19 RX ORDER — BUPROPION HYDROCHLORIDE 150 MG/1
150 TABLET, EXTENDED RELEASE ORAL DAILY
Refills: 0 | Status: DISCONTINUED | OUTPATIENT
Start: 2019-06-20 | End: 2019-06-21

## 2019-06-19 RX ADMIN — Medication 1 MILLIGRAM(S): at 13:51

## 2019-06-19 RX ADMIN — ATORVASTATIN CALCIUM 10 MILLIGRAM(S): 80 TABLET, FILM COATED ORAL at 20:36

## 2019-06-19 RX ADMIN — GABAPENTIN 300 MILLIGRAM(S): 400 CAPSULE ORAL at 20:36

## 2019-06-19 RX ADMIN — ZOLPIDEM TARTRATE 5 MILLIGRAM(S): 10 TABLET ORAL at 20:36

## 2019-06-19 RX ADMIN — QUETIAPINE FUMARATE 400 MILLIGRAM(S): 200 TABLET, FILM COATED ORAL at 20:36

## 2019-06-19 RX ADMIN — Medication 100 MILLIGRAM(S): at 20:36

## 2019-06-19 RX ADMIN — GABAPENTIN 300 MILLIGRAM(S): 400 CAPSULE ORAL at 13:51

## 2019-06-19 RX ADMIN — Medication 1 MILLIGRAM(S): at 20:36

## 2019-06-19 RX ADMIN — Medication 50 MILLIGRAM(S): at 08:48

## 2019-06-19 RX ADMIN — Medication 150 MILLIGRAM(S): at 13:51

## 2019-06-19 RX ADMIN — BUPROPION HYDROCHLORIDE 100 MILLIGRAM(S): 150 TABLET, EXTENDED RELEASE ORAL at 17:49

## 2019-06-19 NOTE — DIETITIAN INITIAL EVALUATION ADULT. - PERTINENT LABORATORY DATA
(6/13) Hgb 15.3 wnl, Hct 43.5 wnl, Na 138 wnl, K 4.4 wnl, Cl 105 wnl, CO2 24 wnl, BUN 13 wnl, Creat 0.80 wnl, Glu 113 H, HbA1c 5.2 wnl, Cholesterol 269 H,  H

## 2019-06-19 NOTE — PROGRESS NOTE BEHAVIORAL HEALTH - SUMMARY
Patient is a 54 year old  [place]ank[place] male domiciled with spouse employed as a  non-caregiver with a history of depression and anxiety, 2 past hospitalizations most recent [facility] around 2007, no past suicide attempts, no known violence/arrests, no substance abuse, PMH of hepatitis in childhood and left knee pain a/w worsening depression.  Patient was admitted for MDD with psychosis. UTox positive for barbiturates and benzos upon admission. High cholesterol/triglycerides on labs. Endorses improved mood - denies SI/HI/AVH. sleep continues to be disturbed. Needs full 10mg dose of Ambien

## 2019-06-19 NOTE — DIETITIAN INITIAL EVALUATION ADULT. - OTHER INFO
53 y/o M admitted for worsening depression, MDD w/ psychosis; w/ PMH of MDD, hepatitis in childhood. Pt tolerating regular diet w/ good appetite/PO intakes. Pt reported mild decreased appetite PTA; diet recall revealed pt consuming 3 meals/day (typically eggs for breakfast, soup/cabbage salad/chicken for lunch, pierogies & veg for dinner). Pt's admission wt was 293#; pt reports UBW of 285 lbs; wt today (6/19) was 289#. Discussed healthy eating w/ pt. Noted w/ elevated cholesterol upon admission (total cholesterol 269 H,  H). D/t pt' wt status & elevated lipids, recommend changing diet to DASH/TLC. Denies n/v/d/c. Skin intact of PU per chart review; no edema noted per chart review.

## 2019-06-19 NOTE — DIETITIAN INITIAL EVALUATION ADULT. - PERTINENT MEDS FT
Klonopin, haldol, ativan, seroquel, ambien, lipitor, destryl, effexor xr, gabapentin, atorvastatin, wellbutrin

## 2019-06-20 PROCEDURE — 99231 SBSQ HOSP IP/OBS SF/LOW 25: CPT

## 2019-06-20 RX ORDER — ACETAMINOPHEN 500 MG
650 TABLET ORAL EVERY 6 HOURS
Refills: 0 | Status: DISCONTINUED | OUTPATIENT
Start: 2019-06-20 | End: 2019-06-28

## 2019-06-20 RX ADMIN — Medication 50 MILLIGRAM(S): at 08:11

## 2019-06-20 RX ADMIN — QUETIAPINE FUMARATE 400 MILLIGRAM(S): 200 TABLET, FILM COATED ORAL at 21:13

## 2019-06-20 RX ADMIN — GABAPENTIN 300 MILLIGRAM(S): 400 CAPSULE ORAL at 21:14

## 2019-06-20 RX ADMIN — Medication 150 MILLIGRAM(S): at 08:11

## 2019-06-20 RX ADMIN — ZOLPIDEM TARTRATE 5 MILLIGRAM(S): 10 TABLET ORAL at 21:17

## 2019-06-20 RX ADMIN — Medication 1 MILLIGRAM(S): at 21:13

## 2019-06-20 RX ADMIN — Medication 1 MILLIGRAM(S): at 13:18

## 2019-06-20 RX ADMIN — ATORVASTATIN CALCIUM 10 MILLIGRAM(S): 80 TABLET, FILM COATED ORAL at 21:13

## 2019-06-20 RX ADMIN — BUPROPION HYDROCHLORIDE 150 MILLIGRAM(S): 150 TABLET, EXTENDED RELEASE ORAL at 08:11

## 2019-06-20 RX ADMIN — GABAPENTIN 300 MILLIGRAM(S): 400 CAPSULE ORAL at 08:11

## 2019-06-20 RX ADMIN — Medication 1 MILLIGRAM(S): at 08:11

## 2019-06-20 RX ADMIN — Medication 100 MILLIGRAM(S): at 21:14

## 2019-06-20 NOTE — PROGRESS NOTE BEHAVIORAL HEALTH - SUMMARY
Patient is a 54 year old  [place]otswanan male domiciled with spouse employed as a  non-caregiver with a history of depression and anxiety, 2 past hospitalizations most recent [facility] around 2007, no past suicide attempts, no known violence/arrests, no substance abuse, PMH of hepatitis in childhood and left knee pain a/w worsening depression.  Patient was admitted for MDD with psychosis. UTox positive for barbiturates and benzos upon admission. High cholesterol/triglycerides on labs. Endorses improved mood - denies SI/HI/AVH. sleep continues to be disturbed. Needs full 10mg dose of Ambien

## 2019-06-21 PROCEDURE — 99231 SBSQ HOSP IP/OBS SF/LOW 25: CPT

## 2019-06-21 PROCEDURE — 90870 ELECTROCONVULSIVE THERAPY: CPT

## 2019-06-21 RX ORDER — BUPROPION HYDROCHLORIDE 150 MG/1
150 TABLET, EXTENDED RELEASE ORAL ONCE
Refills: 0 | Status: COMPLETED | OUTPATIENT
Start: 2019-06-21 | End: 2019-06-21

## 2019-06-21 RX ORDER — BUPROPION HYDROCHLORIDE 150 MG/1
300 TABLET, EXTENDED RELEASE ORAL DAILY
Refills: 0 | Status: DISCONTINUED | OUTPATIENT
Start: 2019-06-22 | End: 2019-06-28

## 2019-06-21 RX ADMIN — Medication 150 MILLIGRAM(S): at 15:18

## 2019-06-21 RX ADMIN — QUETIAPINE FUMARATE 400 MILLIGRAM(S): 200 TABLET, FILM COATED ORAL at 20:53

## 2019-06-21 RX ADMIN — Medication 1 MILLIGRAM(S): at 15:18

## 2019-06-21 RX ADMIN — GABAPENTIN 300 MILLIGRAM(S): 400 CAPSULE ORAL at 15:17

## 2019-06-21 RX ADMIN — Medication 100 MILLIGRAM(S): at 20:52

## 2019-06-21 RX ADMIN — ZOLPIDEM TARTRATE 5 MILLIGRAM(S): 10 TABLET ORAL at 20:57

## 2019-06-21 RX ADMIN — GABAPENTIN 300 MILLIGRAM(S): 400 CAPSULE ORAL at 20:52

## 2019-06-21 RX ADMIN — BUPROPION HYDROCHLORIDE 150 MILLIGRAM(S): 150 TABLET, EXTENDED RELEASE ORAL at 15:18

## 2019-06-21 RX ADMIN — Medication 1 MILLIGRAM(S): at 20:52

## 2019-06-21 RX ADMIN — ATORVASTATIN CALCIUM 10 MILLIGRAM(S): 80 TABLET, FILM COATED ORAL at 20:52

## 2019-06-21 NOTE — PROGRESS NOTE BEHAVIORAL HEALTH - SUMMARY
Patient is a 54 year old  Macanese male domiciled with spouse employed as a  non-caregiver with a history of depression and anxiety, 2 past hospitalizations most recent [facility] around 2007, no past suicide attempts, no known violence/arrests, no substance abuse, PMH of hepatitis in childhood and left knee pain a/w worsening depression.  Patient was admitted for MDD with psychosis. UTox positive for barbiturates and benzos upon admission. High cholesterol/triglycerides on labs. Endorses improved mood - denies SI/HI/AVH. sleep continues to be disturbed. Needs full 10mg dose of Ambien

## 2019-06-22 RX ADMIN — Medication 1 MILLIGRAM(S): at 08:31

## 2019-06-22 RX ADMIN — Medication 1 MILLIGRAM(S): at 21:00

## 2019-06-22 RX ADMIN — QUETIAPINE FUMARATE 400 MILLIGRAM(S): 200 TABLET, FILM COATED ORAL at 21:00

## 2019-06-22 RX ADMIN — Medication 150 MILLIGRAM(S): at 08:31

## 2019-06-22 RX ADMIN — BUPROPION HYDROCHLORIDE 300 MILLIGRAM(S): 150 TABLET, EXTENDED RELEASE ORAL at 08:31

## 2019-06-22 RX ADMIN — Medication 1 MILLIGRAM(S): at 12:35

## 2019-06-22 RX ADMIN — ZOLPIDEM TARTRATE 5 MILLIGRAM(S): 10 TABLET ORAL at 21:00

## 2019-06-22 RX ADMIN — ATORVASTATIN CALCIUM 10 MILLIGRAM(S): 80 TABLET, FILM COATED ORAL at 20:56

## 2019-06-22 RX ADMIN — Medication 100 MILLIGRAM(S): at 20:56

## 2019-06-22 RX ADMIN — GABAPENTIN 300 MILLIGRAM(S): 400 CAPSULE ORAL at 08:31

## 2019-06-22 RX ADMIN — GABAPENTIN 300 MILLIGRAM(S): 400 CAPSULE ORAL at 21:00

## 2019-06-22 RX ADMIN — Medication 50 MILLIGRAM(S): at 08:31

## 2019-06-23 RX ADMIN — Medication 1 MILLIGRAM(S): at 08:46

## 2019-06-23 RX ADMIN — Medication 100 MILLIGRAM(S): at 20:37

## 2019-06-23 RX ADMIN — ZOLPIDEM TARTRATE 5 MILLIGRAM(S): 10 TABLET ORAL at 20:38

## 2019-06-23 RX ADMIN — Medication 1 MILLIGRAM(S): at 12:20

## 2019-06-23 RX ADMIN — ATORVASTATIN CALCIUM 10 MILLIGRAM(S): 80 TABLET, FILM COATED ORAL at 20:37

## 2019-06-23 RX ADMIN — GABAPENTIN 300 MILLIGRAM(S): 400 CAPSULE ORAL at 08:46

## 2019-06-23 RX ADMIN — Medication 150 MILLIGRAM(S): at 08:46

## 2019-06-23 RX ADMIN — QUETIAPINE FUMARATE 400 MILLIGRAM(S): 200 TABLET, FILM COATED ORAL at 20:38

## 2019-06-23 RX ADMIN — Medication 50 MILLIGRAM(S): at 08:46

## 2019-06-23 RX ADMIN — BUPROPION HYDROCHLORIDE 300 MILLIGRAM(S): 150 TABLET, EXTENDED RELEASE ORAL at 08:46

## 2019-06-23 RX ADMIN — GABAPENTIN 300 MILLIGRAM(S): 400 CAPSULE ORAL at 20:37

## 2019-06-24 PROCEDURE — 90870 ELECTROCONVULSIVE THERAPY: CPT

## 2019-06-24 PROCEDURE — 99231 SBSQ HOSP IP/OBS SF/LOW 25: CPT

## 2019-06-24 RX ADMIN — Medication 100 MILLIGRAM(S): at 20:32

## 2019-06-24 RX ADMIN — QUETIAPINE FUMARATE 400 MILLIGRAM(S): 200 TABLET, FILM COATED ORAL at 20:32

## 2019-06-24 RX ADMIN — Medication 150 MILLIGRAM(S): at 15:02

## 2019-06-24 RX ADMIN — ZOLPIDEM TARTRATE 5 MILLIGRAM(S): 10 TABLET ORAL at 20:32

## 2019-06-24 RX ADMIN — BUPROPION HYDROCHLORIDE 300 MILLIGRAM(S): 150 TABLET, EXTENDED RELEASE ORAL at 15:01

## 2019-06-24 RX ADMIN — ATORVASTATIN CALCIUM 10 MILLIGRAM(S): 80 TABLET, FILM COATED ORAL at 20:33

## 2019-06-24 RX ADMIN — GABAPENTIN 300 MILLIGRAM(S): 400 CAPSULE ORAL at 15:01

## 2019-06-24 RX ADMIN — Medication 50 MILLIGRAM(S): at 08:49

## 2019-06-24 RX ADMIN — Medication 1 MILLIGRAM(S): at 15:00

## 2019-06-24 RX ADMIN — Medication 1 MILLIGRAM(S): at 20:32

## 2019-06-24 RX ADMIN — GABAPENTIN 300 MILLIGRAM(S): 400 CAPSULE ORAL at 20:32

## 2019-06-24 NOTE — PROGRESS NOTE BEHAVIORAL HEALTH - SUMMARY
Patient is a 54 year old  Gibraltarian male domiciled with spouse employed as a  non-caregiver with a history of depression and anxiety, 2 past hospitalizations most recent [facility] around 2007, no past suicide attempts, no known violence/arrests, no substance abuse, PMH of hepatitis in childhood and left knee pain a/w worsening depression.  Patient was admitted for MDD with psychosis. UTox positive for barbiturates and benzos upon admission. High cholesterol/triglycerides on labs. Endorses improved mood - denies SI/HI/AVH. sleep continues to be disturbed. Needs full 10mg dose of Ambien

## 2019-06-25 PROCEDURE — 99231 SBSQ HOSP IP/OBS SF/LOW 25: CPT

## 2019-06-25 RX ORDER — ZOLPIDEM TARTRATE 10 MG/1
5 TABLET ORAL AT BEDTIME
Refills: 0 | Status: DISCONTINUED | OUTPATIENT
Start: 2019-06-25 | End: 2019-06-28

## 2019-06-25 RX ORDER — CLONAZEPAM 1 MG
1 TABLET ORAL THREE TIMES A DAY
Refills: 0 | Status: DISCONTINUED | OUTPATIENT
Start: 2019-06-25 | End: 2019-06-28

## 2019-06-25 RX ADMIN — QUETIAPINE FUMARATE 400 MILLIGRAM(S): 200 TABLET, FILM COATED ORAL at 20:30

## 2019-06-25 RX ADMIN — Medication 150 MILLIGRAM(S): at 08:11

## 2019-06-25 RX ADMIN — Medication 1 MILLIGRAM(S): at 08:11

## 2019-06-25 RX ADMIN — ZOLPIDEM TARTRATE 5 MILLIGRAM(S): 10 TABLET ORAL at 20:31

## 2019-06-25 RX ADMIN — BUPROPION HYDROCHLORIDE 300 MILLIGRAM(S): 150 TABLET, EXTENDED RELEASE ORAL at 08:12

## 2019-06-25 RX ADMIN — Medication 50 MILLIGRAM(S): at 08:11

## 2019-06-25 RX ADMIN — Medication 1 MILLIGRAM(S): at 12:46

## 2019-06-25 RX ADMIN — ATORVASTATIN CALCIUM 10 MILLIGRAM(S): 80 TABLET, FILM COATED ORAL at 20:31

## 2019-06-25 RX ADMIN — Medication 100 MILLIGRAM(S): at 20:31

## 2019-06-25 RX ADMIN — GABAPENTIN 300 MILLIGRAM(S): 400 CAPSULE ORAL at 08:11

## 2019-06-25 RX ADMIN — GABAPENTIN 300 MILLIGRAM(S): 400 CAPSULE ORAL at 20:31

## 2019-06-25 NOTE — PROGRESS NOTE BEHAVIORAL HEALTH - SUMMARY
Patient is a 54 year old  Indonesian male domiciled with spouse employed as a  non-caregiver with a history of depression and anxiety, 2 past hospitalizations most recent [facility] around 2007, no past suicide attempts, no known violence/arrests, no substance abuse, PMH of hepatitis in childhood and left knee pain a/w worsening depression.  Patient was admitted for MDD with psychosis. UTox positive for barbiturates and benzos upon admission. High cholesterol/triglycerides on labs. Endorses improved mood - denies SI/HI/AVH. sleep continues to be disturbed. Needs full 10mg dose of Ambien

## 2019-06-26 PROCEDURE — 90870 ELECTROCONVULSIVE THERAPY: CPT

## 2019-06-26 PROCEDURE — 99231 SBSQ HOSP IP/OBS SF/LOW 25: CPT

## 2019-06-26 RX ADMIN — Medication 100 MILLIGRAM(S): at 20:46

## 2019-06-26 RX ADMIN — Medication 50 MILLIGRAM(S): at 08:30

## 2019-06-26 RX ADMIN — GABAPENTIN 300 MILLIGRAM(S): 400 CAPSULE ORAL at 20:45

## 2019-06-26 RX ADMIN — Medication 1 MILLIGRAM(S): at 20:45

## 2019-06-26 RX ADMIN — GABAPENTIN 300 MILLIGRAM(S): 400 CAPSULE ORAL at 14:05

## 2019-06-26 RX ADMIN — BUPROPION HYDROCHLORIDE 300 MILLIGRAM(S): 150 TABLET, EXTENDED RELEASE ORAL at 14:04

## 2019-06-26 RX ADMIN — Medication 1 MILLIGRAM(S): at 14:04

## 2019-06-26 RX ADMIN — ZOLPIDEM TARTRATE 5 MILLIGRAM(S): 10 TABLET ORAL at 20:45

## 2019-06-26 RX ADMIN — Medication 150 MILLIGRAM(S): at 14:05

## 2019-06-26 RX ADMIN — QUETIAPINE FUMARATE 400 MILLIGRAM(S): 200 TABLET, FILM COATED ORAL at 20:46

## 2019-06-26 RX ADMIN — ATORVASTATIN CALCIUM 10 MILLIGRAM(S): 80 TABLET, FILM COATED ORAL at 20:46

## 2019-06-26 NOTE — PROGRESS NOTE BEHAVIORAL HEALTH - SUMMARY
Patient is a 54 year old  [place] Ver[place] male domiciled with spouse employed as a  non-caregiver with a history of depression and anxiety, 2 past hospitalizations most recent [facility] around 2007, no past suicide attempts, no known violence/arrests, no substance abuse, PMH of hepatitis in childhood and left knee pain a/w worsening depression.  Patient was admitted for MDD with psychosis. UTox positive for barbiturates and benzos upon admission. High cholesterol/triglycerides on labs. Endorses improved mood - denies SI/HI/AVH. sleep continues to be disturbed. Needs full 10mg dose of Ambien

## 2019-06-27 PROCEDURE — 99231 SBSQ HOSP IP/OBS SF/LOW 25: CPT

## 2019-06-27 RX ADMIN — Medication 1 MILLIGRAM(S): at 12:56

## 2019-06-27 RX ADMIN — ATORVASTATIN CALCIUM 10 MILLIGRAM(S): 80 TABLET, FILM COATED ORAL at 20:42

## 2019-06-27 RX ADMIN — GABAPENTIN 300 MILLIGRAM(S): 400 CAPSULE ORAL at 20:42

## 2019-06-27 RX ADMIN — GABAPENTIN 300 MILLIGRAM(S): 400 CAPSULE ORAL at 08:25

## 2019-06-27 RX ADMIN — BUPROPION HYDROCHLORIDE 300 MILLIGRAM(S): 150 TABLET, EXTENDED RELEASE ORAL at 08:25

## 2019-06-27 RX ADMIN — Medication 100 MILLIGRAM(S): at 20:42

## 2019-06-27 RX ADMIN — QUETIAPINE FUMARATE 400 MILLIGRAM(S): 200 TABLET, FILM COATED ORAL at 20:42

## 2019-06-27 RX ADMIN — Medication 150 MILLIGRAM(S): at 08:25

## 2019-06-27 RX ADMIN — Medication 50 MILLIGRAM(S): at 08:25

## 2019-06-27 RX ADMIN — Medication 1 MILLIGRAM(S): at 20:42

## 2019-06-27 RX ADMIN — Medication 1 MILLIGRAM(S): at 08:25

## 2019-06-27 NOTE — PROGRESS NOTE BEHAVIORAL HEALTH - NSBHADMITIPOBSFT_PSY_A_CORE
Routine checks

## 2019-06-27 NOTE — PROGRESS NOTE BEHAVIORAL HEALTH - RISK ASSESSMENT
Moderate
Acute Suicide Risk  (x  ) High   (  ) Moderate   (  ) Low   (  ) Unable to determine   Rationale _____severely depressed with passive suicidal ideation ______    Elevated Chronic Risk   ( x ) Yes ___________  Details ___________hx of depression and hospitalizations   (  ) No   ___________

## 2019-06-27 NOTE — PROGRESS NOTE BEHAVIORAL HEALTH - NSBHFUPTYPE_PSY_A_CORE
Inpatient
Inpatient-On Service Note
Inpatient

## 2019-06-27 NOTE — PROGRESS NOTE BEHAVIORAL HEALTH - AFFECT RANGE
Constricted

## 2019-06-27 NOTE — PROGRESS NOTE BEHAVIORAL HEALTH - NSBHCHARTREVIEWVS_PSY_A_CORE FT
ICU Vital Signs Last 24 Hrs  T(C): 36.4 (16 Jun 2019 08:09), Max: 36.4 (16 Jun 2019 08:09)  T(F): 97.5 (16 Jun 2019 08:09), Max: 97.5 (16 Jun 2019 08:09)  HR: 98 (16 Jun 2019 08:09) (88 - 98)  BP: 158/86 (16 Jun 2019 08:09) (140/95 - 158/86)  BP(mean): --  ABP: --  ABP(mean): --  RR: 18 (16 Jun 2019 08:09) (18 - 18)  SpO2: --
ICU Vital Signs Last 24 Hrs  T(C): 36.2 (15 Margarito 2019 08:35), Max: 36.6 (14 Jun 2019 09:20)  T(F): 97.2 (15 Margarito 2019 08:35), Max: 97.9 (14 Jun 2019 09:20)  HR: 91 (15 Margarito 2019 08:35) (91 - 94)  BP: 135/79 (15 Margarito 2019 08:35) (135/79 - 143/103)  BP(mean): --  ABP: --  ABP(mean): --  RR: 18 (15 Margarito 2019 08:35) (18 - 18)  SpO2: --
ICU Vital Signs Last 24 Hrs  T(C): 36.4 (16 Jun 2019 08:09), Max: 36.4 (16 Jun 2019 08:09)  T(F): 97.5 (16 Jun 2019 08:09), Max: 97.5 (16 Jun 2019 08:09)  HR: 98 (16 Jun 2019 08:09) (88 - 98)  BP: 158/86 (16 Jun 2019 08:09) (140/95 - 158/86)  BP(mean): --  ABP: --  ABP(mean): --  RR: 18 (16 Jun 2019 08:09) (18 - 18)  SpO2: --
Vital Signs Last 24 Hrs  T(C): 36.7 (13 Jun 2019 13:11), Max: 36.7 (13 Jun 2019 13:11)  T(F): 98.1 (13 Jun 2019 13:11), Max: 98.1 (13 Jun 2019 13:11)  HR: 90 (13 Jun 2019 13:11) (90 - 91)  BP: 150/95 (13 Jun 2019 13:11) (150/95 - 162/87)  BP(mean): --  RR: 18 (13 Jun 2019 13:11) (18 - 18)  SpO2: 95% (13 Jun 2019 13:11) (95% - 96%)
ICU Vital Signs Last 24 Hrs  T(C): 36.4 (16 Jun 2019 08:09), Max: 36.4 (16 Jun 2019 08:09)  T(F): 97.5 (16 Jun 2019 08:09), Max: 97.5 (16 Jun 2019 08:09)  HR: 98 (16 Jun 2019 08:09) (88 - 98)  BP: 158/86 (16 Jun 2019 08:09) (140/95 - 158/86)  BP(mean): --  ABP: --  ABP(mean): --  RR: 18 (16 Jun 2019 08:09) (18 - 18)  SpO2: --
ICU Vital Signs Last 24 Hrs  T(C): 36.4 (16 Jun 2019 08:09), Max: 36.4 (16 Jun 2019 08:09)  T(F): 97.5 (16 Jun 2019 08:09), Max: 97.5 (16 Jun 2019 08:09)  HR: 98 (16 Jun 2019 08:09) (88 - 98)  BP: 158/86 (16 Jun 2019 08:09) (140/95 - 158/86)  BP(mean): --  ABP: --  ABP(mean): --  RR: 18 (16 Jun 2019 08:09) (18 - 18)  SpO2: --
Vital Signs Last 24 Hrs  T(C): 36.6 (14 Jun 2019 09:20), Max: 36.6 (14 Jun 2019 09:20)  T(F): 97.9 (14 Jun 2019 09:20), Max: 97.9 (14 Jun 2019 09:20)  HR: 91 (14 Jun 2019 09:20) (91 - 105)  BP: 143/103 (14 Jun 2019 09:20) (134/92 - 143/103)  BP(mean): --  RR: 18 (14 Jun 2019 09:20) (18 - 18)  SpO2: --

## 2019-06-27 NOTE — PROGRESS NOTE BEHAVIORAL HEALTH - NS ED [facility] MED ROS CARDIOVASCULAR
Yes
No complaints
Yes
No complaints
No complaints
Yes
No complaints

## 2019-06-27 NOTE — PROGRESS NOTE BEHAVIORAL HEALTH - NSBHADMITMEDEDUDETAILS_A_CORE FT
Discussed risks/benefits s-e
Discussed risks/benefits/s-e

## 2019-06-27 NOTE — PROGRESS NOTE BEHAVIORAL HEALTH - DETAILS
HTN--Toprol XL 50 mg Daily

## 2019-06-27 NOTE — PROGRESS NOTE BEHAVIORAL HEALTH - THOUGHT CONTENT
Suicidality/Hopelessness
Hopelessness/Suicidality
Suicidality/Hopelessness
Suicidality/Hopelessness
Hopelessness/Suicidality

## 2019-06-27 NOTE — PROGRESS NOTE BEHAVIORAL HEALTH - NSBHCHARTREVIEWLAB_PSY_A_CORE FT
15.3   5.71  )-----------( 186      ( 13 Jun 2019 10:03 )             43.5   06-13    138  |  105  |  13  ----------------------------<  113<H>  4.4   |  24  |  0.80    Ca    9.6      13 Jun 2019 10:03    TPro  7.1  /  Alb  4.2  /  TBili  0.4  /  DBili  0.1  /  AST  66<H>  /  ALT  168<H>  /  AlkPhos  63  06-13
15.3   5.71  )-----------( 186      ( 13 Jun 2019 10:03 )             43.5   06-13    138  |  105  |  13  ----------------------------<  113<H>  4.4   |  24  |  0.80    Ca    9.6      13 Jun 2019 10:03    TPro  7.4  /  Alb  4.1  /  TBili  0.5  /  DBili  x   /  AST  81<H>  /  ALT  172<H>  /  AlkPhos  63  06-13
15.3   5.71  )-----------( 186      ( 13 Jun 2019 10:03 )             43.5   06-13    138  |  105  |  13  ----------------------------<  113<H>  4.4   |  24  |  0.80    Ca    9.6      13 Jun 2019 10:03    TPro  7.1  /  Alb  4.2  /  TBili  0.4  /  DBili  0.1  /  AST  66<H>  /  ALT  168<H>  /  AlkPhos  63  06-13

## 2019-06-27 NOTE — PROGRESS NOTE BEHAVIORAL HEALTH - SUMMARY
Patient is a 54 year old  [place] male domiciled with spouse employed as a  non-caregiver with a history of depression and anxiety, 2 past hospitalizations most recent [facility] around 2007, no past suicide attempts, no known violence/arrests, no substance abuse, PMH of hepatitis in childhood and left knee pain a/w worsening depression.  Patient was admitted for MDD with psychosis. UTox positive for barbiturates and benzos upon admission. High cholesterol/triglycerides on labs. Endorses improved mood - denies SI/HI/AVH. sleep continues to be disturbed. Needs full 10mg dose of Ambien

## 2019-06-27 NOTE — PROGRESS NOTE BEHAVIORAL HEALTH - PRN MEDS
Ambien 5mg
Ambien 5mgx2
Ambien 5mg

## 2019-06-27 NOTE — PROGRESS NOTE BEHAVIORAL HEALTH - PRIMARY DX
Severe episode of recurrent major depressive disorder, without psychotic features

## 2019-06-27 NOTE — PROGRESS NOTE BEHAVIORAL HEALTH - NSBHFUPINTERVALCCFT_PSY_A_CORE
" Patient is improving and to be discharged tomorrow"
" Patient is improving and to continue ECT as out-patient once discharged "
" To receive ECT # 3 today "
"I am not sleeping well, depressed"
"I didn't sleep"
"I had ECT # 1 only "
"I had ECT # 2 only "
"a little better today"
' I'm depressed and suicidal "
"I slept on 1-2 hours"
" Patient is improving and to continue ECT, discharge on Friday after ECT "
I'm not sleeping, needs help with sleeping "
" To receive ECT # 2 today "

## 2019-06-27 NOTE — PROGRESS NOTE BEHAVIORAL HEALTH - NSBHCHARTREVIEWINVESTIGATE_PSY_A_CORE FT
< from: 12 Lead ECG (06.13.19 @ 10:29) >    Ventricular Rate 82 BPM    Atrial Rate 82 BPM    P-R Interval 158 ms    QRS Duration 98 ms    Q-T Interval 374 ms    QTC Calculation(Bezet) 436 ms    P Axis 6 degrees    R Axis 17 degrees    T Axis 30 degrees    Diagnosis Line *** Poor data quality, interpretation may be adversely affected  Normal sinus rhythm  Normal ECG

## 2019-06-27 NOTE — PROGRESS NOTE BEHAVIORAL HEALTH - NSBHFUPINTERVALHXFT_PSY_A_CORE
Patient was seen today AM, endorsing that he has troubled sleep since he came in , but as per nursing he always has good to fair sleep. he continues to remain isolative, a quiet person, prefers to stay by himself, not on any chaos or confusion. Prefers to stay out of trouble and no participation in unit activities. Tolerating Wellbutrin  mg with Effexor  mg well. He also received ECT  # 1 on Wednesday, today 06/21/2019 to receive ECT # 2 , and to continue with meds as ordered. In view of his isolation and less involvement in activities, Wellbutrin  mg was increased to Wellbutrin  mg daily starting 06/22/2019. To continue with ECT next week and to consider discharge planning after 5th ECT or on Friday 06/28/2019. To have maintenance ECT from out-patient. Patient is still depressed, and symptomatic to have out-patient ECT at this time.
Patient a 55 y/o   male, domiciled with wife and only daughter, past psychiatric hx of M. Depressive Disorder, recurrent , no prior SI/SA, denied all drug abuse, no hx of aggression/agitation, no SIB, no legal issues, medically has HTN, was BIB/self due to worsening depression. Pt reports feeling somewhat better today. Denies AVH. Feels safe. Was able to sleep on meds - took PRN Ambien. Anxiety is improved, mood is improved. Burning sensation still there - he attributes it stress. Finds it boring to be here but participates in groups. Getting along with peers.
Patient was seen today AM, endorsing that he has troubled sleep since he came in , but as per nursing he always has good to fair sleep. He is improving, smiles on approach, limited socialization. Sleep improving with current meds and ECT. To continue ECT for now, with no meds changes
Patient was seen today AM, endorsing that he has troubled sleep since he came in , but as per nursing he always has good to fair sleep. He is improving, smiles on approach, limited socialization. Sleep improving with current meds and ECT. To continue ECT for now, with no meds changes.    Patient was seen, tolerating ECT well, endorsed that he feels better, and was advised to have 5 more ECT as out-patient. He is unsure as his wife works and his daughter may not be able to drop him and take him back for ECT. To continue meds for now, patient agreed to confirm later for out-patient ECT
Patient was seen today AM, endorsing that he has troubled sleep since he came in , but as per nursing he always has good to fair sleep. He is still isolative, superficial and limited. Mood is OK, limited conversation, sleep improving, no complaints about sleep. ECT # 3 received today. To continue with ECT and other meds for now. No meds changes for now.  Wellbutrin  mg with Effexor  mg and Trazodone 100 mg HS.
Patient was seen today AM, endorsing that he has troubled sleep, and was started on Wellbutrin  mg starting tomorrow 06/20/2019 and to start Wellbutrin  mg daily. Patient is compliant with meds, he is visible in unit, sort of isolative, limited cooperation with peers or staffs. Meds compliant ,and plans to increase Wellbutrin to  mg soon. He received ECT # 1 yesterday 06/19/2019 and to get ECT # 2 on Friday 06/21/2019.  To prepare ECT for 06/21/2019, Patient was informed. Discussed the option of Out-patient ECT, and to go for out-patient ECT once stable. No meds changes today. Tomorrow to have Wellbutrin  mg BID.
Patient was seen today AM, endorsing that he has troubled sleep, and was started on Wellbutrin Xl 150 mg starting tomorrow and to start Wellbutrin  mg daily. Hew as not sure what meds was tried in the past. He is compliant , isolative, and limited self care and thus started him on ECT rather than augment with different meds trial. Not S/H , but has passive SI, and is more fixated on ECT rather than meds management. To continue wituy further ECT before beid
Patient was seen today AM, endorsing that he has troubled sleep, slept for 1-2 hours, and is compliant with meds. He was seen and endorsed that he has been tried on different anti-[id number] in the past, but does not remember the previous meds trial. Mood depressed, isolative with limited participation in unit activities. Remeron discontinued after today PM, started him on Wellbutrin 75 mg daily starting 06/19/2019 and to titrate as needed the Wellbutrin. ECT # 2 is scheduled for Wednesday.
c/o not sleeping last night - partially due to hospital noises  took Ambien 5mg only  denies other acute issues - mood is improved, no SI/HI
Patient a 53 y/o   male, employed, domiciled with wife and daughter was BIB/Spouse due to ongoing depression.    Patient  is depressed, and has passive SI, with it responding to meds as ordered, and has been seeing his Psychiatrist /Therapist regularly. He feels that he needs higher level of care so he brought himself into ED for stability.  To continue meds as ordered. F/U in AM    meds includes..  Klonopin 1 mg QID  Seroquel 400 mg HS  Zyprexa 5 mg HS  Ambien 10 mg HS  Effexor  mg daily  Remeron 30 mg HS
Patient was seen today AM, endorsing that he has troubled sleep, slept for 1-2 hours, and is compliant with meds. He continues to have trouble and endorsed that he needs ECT for stability as he received ECT in the past at [facility]. Not S/h, but feels limited functioning due to lack of sleep and not responding to meds. He is medically cleared for ECT on Wednesday AM.
Patient was seen today AM, endorsing that he has troubled sleep since he came in , but as per nursing he always has good to fair sleep. He is improving, smiles on approach, limited socialization. Sleep improving with current meds and ECT. To continue ECT for now, with no meds changes.    Tolerating ECT well, and was asked to continued with out-patient ECT. Still unsure about his help to take him back to residence after ECT, will answer on Friday.
Patient a 55 y/o   male, domiciled with wife and only daughter, past psychiatric hx of M. Depressive Disorder, recurrent , no prior SI/SA, denied all drug abuse, no hx of aggression/agitation, no SIB, no legal issues, medically has HTN, was BIB/self due to worsening depression.     As per Patient he is under care of his Psychiatrist Dr. Villanueva and is compliant with meds. He feels that he is not responding to meds, he continues to remain depressed, with poor sleep/appetite, and have trouble with functioning. He knows some meds, endorses that he is depressed and anxious for many years now, and need help with sleep. he feels that he is not sleeping for months and definitely needs something to help him to function. he takes Ambien 12.5 mg HS for sleep, and has 3-4 hours sleep only, and is fatigued/tired in AM. he denied A/V/H or paranoid beliefs. He never tried to commit suicide, works as a  in a school for 19-20 years. He denied being helpless/hopeless, but feels guilty of his depression as he is not able to help the family.    He was hospitalized at [facility] in 2005 for 3 months, and was also hospitalized at [facility] in 2009 for 2 weeks. He added that he was given ECT during his first admission and willing to take ECT if needed for stability and help. He added that he feels burning inside    Plan: To start meds as ordered          Trazodone 100 mg HS          Neurontin 100 mg TID          F/U daily till discharge

## 2019-06-27 NOTE — PROGRESS NOTE BEHAVIORAL HEALTH - MOOD
Depressed

## 2019-06-28 VITALS — HEART RATE: 97 BPM | SYSTOLIC BLOOD PRESSURE: 119 MMHG | DIASTOLIC BLOOD PRESSURE: 79 MMHG | TEMPERATURE: 98 F

## 2019-06-28 PROCEDURE — 99239 HOSP IP/OBS DSCHRG MGMT >30: CPT

## 2019-06-28 PROCEDURE — 80074 ACUTE HEPATITIS PANEL: CPT

## 2019-06-28 PROCEDURE — 90870 ELECTROCONVULSIVE THERAPY: CPT

## 2019-06-28 PROCEDURE — 81001 URINALYSIS AUTO W/SCOPE: CPT

## 2019-06-28 PROCEDURE — 80053 COMPREHEN METABOLIC PANEL: CPT

## 2019-06-28 PROCEDURE — 83036 HEMOGLOBIN GLYCOSYLATED A1C: CPT

## 2019-06-28 PROCEDURE — 83690 ASSAY OF LIPASE: CPT

## 2019-06-28 PROCEDURE — 80076 HEPATIC FUNCTION PANEL: CPT

## 2019-06-28 PROCEDURE — 84443 ASSAY THYROID STIM HORMONE: CPT

## 2019-06-28 PROCEDURE — 85027 COMPLETE CBC AUTOMATED: CPT

## 2019-06-28 PROCEDURE — 87086 URINE CULTURE/COLONY COUNT: CPT

## 2019-06-28 PROCEDURE — 80061 LIPID PANEL: CPT

## 2019-06-28 PROCEDURE — 99285 EMERGENCY DEPT VISIT HI MDM: CPT | Mod: 25

## 2019-06-28 PROCEDURE — 86780 TREPONEMA PALLIDUM: CPT

## 2019-06-28 PROCEDURE — 93005 ELECTROCARDIOGRAM TRACING: CPT

## 2019-06-28 PROCEDURE — 36415 COLL VENOUS BLD VENIPUNCTURE: CPT

## 2019-06-28 PROCEDURE — 80307 DRUG TEST PRSMV CHEM ANLYZR: CPT

## 2019-06-28 RX ORDER — ATORVASTATIN CALCIUM 80 MG/1
1 TABLET, FILM COATED ORAL
Qty: 30 | Refills: 0
Start: 2019-06-28 | End: 2019-07-27

## 2019-06-28 RX ORDER — GABAPENTIN 400 MG/1
1 CAPSULE ORAL
Qty: 60 | Refills: 0
Start: 2019-06-28 | End: 2019-07-27

## 2019-06-28 RX ORDER — VENLAFAXINE HCL 75 MG
1 CAPSULE, EXT RELEASE 24 HR ORAL
Qty: 30 | Refills: 0
Start: 2019-06-28 | End: 2019-07-27

## 2019-06-28 RX ORDER — QUETIAPINE FUMARATE 200 MG/1
1 TABLET, FILM COATED ORAL
Qty: 30 | Refills: 0
Start: 2019-06-28 | End: 2019-07-27

## 2019-06-28 RX ORDER — CLONAZEPAM 1 MG
1 TABLET ORAL
Qty: 0 | Refills: 0 | DISCHARGE

## 2019-06-28 RX ORDER — BUPROPION HYDROCHLORIDE 150 MG/1
1 TABLET, EXTENDED RELEASE ORAL
Qty: 30 | Refills: 0
Start: 2019-06-28 | End: 2019-07-27

## 2019-06-28 RX ORDER — QUETIAPINE FUMARATE 200 MG/1
400 TABLET, FILM COATED ORAL
Qty: 0 | Refills: 0 | DISCHARGE

## 2019-06-28 RX ORDER — CLONAZEPAM 1 MG
1 TABLET ORAL
Qty: 45 | Refills: 0
Start: 2019-06-28 | End: 2019-07-12

## 2019-06-28 RX ORDER — OLANZAPINE 15 MG/1
1 TABLET, FILM COATED ORAL
Qty: 0 | Refills: 0 | DISCHARGE

## 2019-06-28 RX ORDER — ZOLPIDEM TARTRATE 10 MG/1
12 TABLET ORAL
Qty: 0 | Refills: 0 | DISCHARGE

## 2019-06-28 RX ORDER — TRAZODONE HCL 50 MG
1 TABLET ORAL
Qty: 30 | Refills: 0
Start: 2019-06-28 | End: 2019-07-27

## 2019-06-28 RX ORDER — MIRTAZAPINE 45 MG/1
1 TABLET, ORALLY DISINTEGRATING ORAL
Qty: 0 | Refills: 0 | DISCHARGE

## 2019-06-28 RX ORDER — VENLAFAXINE HCL 75 MG
1 CAPSULE, EXT RELEASE 24 HR ORAL
Qty: 0 | Refills: 0 | DISCHARGE

## 2019-06-28 RX ORDER — ZOLPIDEM TARTRATE 10 MG/1
1 TABLET ORAL
Qty: 30 | Refills: 0
Start: 2019-06-28 | End: 2019-07-27

## 2019-06-28 RX ADMIN — GABAPENTIN 300 MILLIGRAM(S): 400 CAPSULE ORAL at 15:10

## 2019-06-28 RX ADMIN — Medication 50 MILLIGRAM(S): at 08:13

## 2019-06-28 RX ADMIN — Medication 150 MILLIGRAM(S): at 15:10

## 2019-06-28 RX ADMIN — BUPROPION HYDROCHLORIDE 300 MILLIGRAM(S): 150 TABLET, EXTENDED RELEASE ORAL at 15:09

## 2019-06-28 RX ADMIN — Medication 1 MILLIGRAM(S): at 15:10

## 2019-06-28 NOTE — DISCHARGE NOTE BEHAVIORAL HEALTH - MEDICATION SUMMARY - MEDICATIONS TO TAKE
I will START or STAY ON the medications listed below when I get home from the hospital:    gabapentin 300 mg oral capsule  -- 1 cap(s) by mouth 2 times a day x 30 days   -- Indication: For Mood    clonazePAM 1 mg oral tablet  -- 1 tab(s) by mouth 3 times a day MDD:3 mg  -- Indication: For Anxiety    traZODone 100 mg oral tablet  -- 1 tab(s) by mouth once a day (at bedtime) x 30 days   -- Indication: For Sleep    venlafaxine 150 mg oral capsule, extended release  -- 1 cap(s) by mouth once a day x 30 days   -- Indication: For Mood    atorvastatin 10 mg oral tablet  -- 1 tab(s) by mouth once a day (at bedtime) x 30 days   -- Indication: For HLD    QUEtiapine 400 mg oral tablet  -- 1 tab(s) by mouth once a day (at bedtime) x 30 days   -- Indication: For Mood    Ambien 10 mg oral tablet  -- 1 tab(s) by mouth once a day (at bedtime) x 30 days MDD:10 mg   -- Caution federal law prohibits the transfer of this drug to any person other  than the person for whom it was prescribed.  May cause drowsiness.  Alcohol may intensify this effect.  Use care when operating dangerous machinery.    -- Indication: For Sleep    buPROPion 300 mg/24 hours (XL) oral tablet, extended release  -- 1 tab(s) by mouth once a day x 30 days   -- Indication: For Mood

## 2019-06-28 NOTE — DISCHARGE NOTE BEHAVIORAL HEALTH - NSBHDCMEDSFT_PSY_A_CORE
Wellbutrin  mg daily/Effexor  mg daily/Trazodone 100 mg HS/Seroquel 400 mg HS./Klonopin 1 mg TID/Ambien 10 mg HS--Good response to meds

## 2019-06-28 NOTE — DISCHARGE NOTE BEHAVIORAL HEALTH - MEDICATION SUMMARY - MEDICATIONS TO STOP TAKING
I will STOP taking the medications listed below when I get home from the hospital:    mirtazapine 30 mg oral tablet  -- 1 tab(s) by mouth once a day (at bedtime)    OLANZapine 5 mg oral tablet  -- 1 tab(s) by mouth once a day    clonazePAM 1 mg oral tablet  -- 1 milligram(s) by mouth 4 times a day    Ambien  -- 12 milligram(s) by mouth once a day (at bedtime)

## 2019-06-28 NOTE — DISCHARGE NOTE BEHAVIORAL HEALTH - HPI (INCLUDE ILLNESS QUALITY, SEVERITY, DURATION, TIMING, CONTEXT, MODIFYING FACTORS, ASSOCIATED SIGNS AND SYMPTOMS)
Chart Review Details	Patient is a 54 year old  [place] male domiciled with spouse employed as a  non-caregiver with a history of depression and anxiety, 2 past hospitalizations most recent [facility] around 2007, no past suicide attempts, no known violence/arrests, no substance abuse, PMH of hepatitis in childhood and left knee pain brought in by self accompanied by spouse due to worsening depression.     Per collateral the HPI begins a few months ago. Per spouse at baseline patient resides with her in a home, they have a 28 year old daughter. Patient works as a  at a school district. Per collateral patient’s baseline symptoms consist of on and off depressed mood usually seasonal in winter and that patient is often worrying about things especially finances. Collateral states patient attends treatment with Dr. Cline and is compliant with medication, recently sessions with psychiatrist were increased to weekly or biweekly.   Collateral states a few months ago patient got his seasonal depression but it is not resolving. For a few months patient has decreased motivation, a loss of interest in activities, poor appetite, and poor sleep. Collateral states the past few days patient hasn’t slept at all. Collateral states outpatient psychiatrist has increased sessions and tried adjusting medications but it has not been successful. Collateral denies patient has voiced SI/HI, no past self-harm, no report of AH/VH, no deysi, no past violence, no access to guns. Spouse is concerned as patient is not functioning at his baseline and is not responding to outpatient treatment. Spouse feels patient would benefit from inpatient admission for stabilization and safety at this time.  On interview, pt appears depressed and endorses persistent sad, anhedonic depressed mood, crying, passive suicidal ideations, poor sleep for months, decreased appetite with 10 lb weight loss, decreased energy, helplessness and hopelessness. Is racked with anxiety at night over his job, and the fact that he owes people money. States he "is always suffering, always stressed out." denies manic/psychotic sx's , denies substance use. He has been seeing psychiatrist weekly who has made several changes to medication regimen with no improvement.    Patient was started initially on the same meds as previously received. He was on Effexor  mg daily with Remeron 45 mg HS and Seroquel 400 mg HS and Klonopin 1  mg TID. He was unable to mention what meds were helpful in the past, and was not able to have a reasonable conversation why he was depressed and what are his triggers. Later I called his Psychiatrist Dr. Cline who was also not able to give a reasonable conversation for meds helpful in the past and present. He was more inclined to have ECT as ECT was helpful in the past. Remeron was discontinued and Wellbutrin started with titration to Wellbutrin  mg daily. He also received Ambien 5 mg with additional 5 mg if no sleep and was also on Klonopin 1 mg TID. ECT started and he received 5 ECT treatments till now and continued to improve. He feels that he is doing so better so he opted not to have out-patient ECT. He never tried to commit suicide, and there were no drug abuse hx. He was compliant with meds and meds were sent to Pharmacy for a 30 days supply.

## 2019-07-25 ENCOUNTER — APPOINTMENT (OUTPATIENT)
Dept: ORTHOPEDIC SURGERY | Facility: CLINIC | Age: 54
End: 2019-07-25
Payer: COMMERCIAL

## 2019-07-25 VITALS — WEIGHT: 214 LBS | BODY MASS INDEX: 32.43 KG/M2 | HEIGHT: 68 IN

## 2019-07-25 PROCEDURE — 20610 DRAIN/INJ JOINT/BURSA W/O US: CPT | Mod: LT

## 2019-07-25 PROCEDURE — 99213 OFFICE O/P EST LOW 20 MIN: CPT | Mod: 25

## 2019-07-25 NOTE — DISCUSSION/SUMMARY
[de-identified] : The underlying pathophysiology was reviewed in great detail with the patient as well as the various treatment options, including ice, analgesics, NSAIDs, Physical therapy, steroid injections.\par \par The patient wishes to proceed with an INJECTION of the left knee.\par \par I discussed the importance of weight loss to alleviate his knee pain. \par \par Monovisc injection was ordered for the left knee.\par \par FU after injection is obtained.

## 2019-07-25 NOTE — ADDENDUM
[FreeTextEntry1] : I, Abigail Beth, acted solely as a scribe for Dr. Kolby Young on this date 07/25/2019.

## 2019-07-25 NOTE — HISTORY OF PRESENT ILLNESS
[de-identified] : 54 year old male presents for an evaluation of chronic left knee pain s/p left knee arthroscopy on 1/20/2016 and has since been diagnosed with advanced medial compartment osteoarthritis of her left knee. Since his last visit he has obtained a medial  brace and reports that it has helped alleviate his pain while walking. Today he describes a moderate aching pain located along the medial aspect of his left knee and that is intermittent in nature. His pain is exacerbated with walking, bending, kneeling, and climbing stairs. His symptoms have been well managed with hyaluronic acid injections and corticosteroid injections, he would like to proceed with a corticosteroid injection at this time.

## 2019-07-25 NOTE — END OF VISIT
[FreeTextEntry3] : All medical record entries made by the Isabeliblenin were at my, Dr. Kolby Young, direction and personally dictated by me on 07/25/2019. I have reviewed the chart and agree that the record accurately reflects my personal performance of the history, physical exam, assessment and plan. I have also personally directed, reviewed, and agreed with the chart.

## 2019-07-25 NOTE — PROCEDURE
[de-identified] : At this point I recommended a therapeutic injection and under sterile precautions an injection of 5 cc 1% lidocaine with 0.5 cc of Kenalog and 0.5 cc of Dexamethasone - was placed into the joint of the left knee without complication, and after several minutes, the patient felt significant relief.

## 2019-07-25 NOTE — PHYSICAL EXAM
[Normal RLE] : Right Lower Extremity: No scars, rashes, lesions, ulcers, skin intact [Normal LLE] : Left Lower Extremity: No scars, rashes, lesions, ulcers, skin intact [Normal Touch] : sensation intact for touch [Normal] : No swelling, no edema, normal pedal pulses and normal temperature [Obese] : obese [Poor Appearance] : well-appearing [Acute Distress] : not in acute distress [de-identified] : Left Lower Extremity \par o Knee :\par ¦ Inspection/Palpation : no tenderness along the joint lines, no swelling, mild varus alignment\par ¦ Range of Motion : 0 - 120 degrees\par ¦ Stability : no valgus or varus instability present on provocative testing\par ¦ Strength : quadriceps strength 4+/5\par o Muscle Bulk : normal muscle bulk present \par o Skin : no erythema or ecchymosis present \par o Sensation : sensation to pin intact\par o Vascular Exam : no edema, no cyanosis, dorsalis pedis artery pulse 2+, posterior tibial artery pulse 2+

## 2019-08-21 NOTE — ED ADULT NURSE NOTE - PRIMARY CARE PROVIDER
Ammonia salts used to assess patient's unresponsiveness.  Pt able to flip himself over in bed, however continues to not follow commands.  Officers and EDMD remain at bedside.  Pt informed we were going to start an iv and draw his blood, patient sat straight up in bed, opened eyes, and stated, \"I don't give you permission to draw my blood.\"  Pt then closed eyes and layed back down on bed.  Multiple scabs noted on patient's bilateral sides of neck.    
Dr. Bill

## 2019-08-27 ENCOUNTER — RX RENEWAL (OUTPATIENT)
Age: 54
End: 2019-08-27

## 2019-10-27 ENCOUNTER — INPATIENT (INPATIENT)
Facility: HOSPITAL | Age: 54
LOS: 2 days | Discharge: ANOTHER HOSPITAL | DRG: 917 | End: 2019-10-30
Attending: INTERNAL MEDICINE | Admitting: INTERNAL MEDICINE
Payer: COMMERCIAL

## 2019-10-27 VITALS
WEIGHT: 274.92 LBS | DIASTOLIC BLOOD PRESSURE: 54 MMHG | SYSTOLIC BLOOD PRESSURE: 80 MMHG | RESPIRATION RATE: 29 BRPM | HEART RATE: 82 BPM | OXYGEN SATURATION: 87 %

## 2019-10-27 LAB
ALBUMIN SERPL ELPH-MCNC: 4 G/DL — SIGNIFICANT CHANGE UP (ref 3.3–5)
ALP SERPL-CCNC: 57 U/L — SIGNIFICANT CHANGE UP (ref 40–120)
ALT FLD-CCNC: 55 U/L — HIGH (ref 10–45)
ANION GAP SERPL CALC-SCNC: 13 MMOL/L — SIGNIFICANT CHANGE UP (ref 5–17)
APAP SERPL-MCNC: <1 UG/ML — LOW (ref 10–30)
APTT BLD: 28.2 SEC — SIGNIFICANT CHANGE UP (ref 27.5–36.3)
AST SERPL-CCNC: 26 U/L — SIGNIFICANT CHANGE UP (ref 10–40)
BILIRUB SERPL-MCNC: 0.2 MG/DL — SIGNIFICANT CHANGE UP (ref 0.2–1.2)
BUN SERPL-MCNC: 15 MG/DL — SIGNIFICANT CHANGE UP (ref 7–23)
CALCIUM SERPL-MCNC: 9.5 MG/DL — SIGNIFICANT CHANGE UP (ref 8.4–10.5)
CHLORIDE SERPL-SCNC: 106 MMOL/L — SIGNIFICANT CHANGE UP (ref 96–108)
CO2 SERPL-SCNC: 23 MMOL/L — SIGNIFICANT CHANGE UP (ref 22–31)
CREAT SERPL-MCNC: 1.55 MG/DL — HIGH (ref 0.5–1.3)
ETHANOL SERPL-MCNC: <3 MG/DL — SIGNIFICANT CHANGE UP (ref 0–3)
GLUCOSE SERPL-MCNC: 105 MG/DL — HIGH (ref 70–99)
HCT VFR BLD CALC: 43.2 % — SIGNIFICANT CHANGE UP (ref 39–50)
HGB BLD-MCNC: 14.5 G/DL — SIGNIFICANT CHANGE UP (ref 13–17)
INR BLD: 1.21 RATIO — HIGH (ref 0.88–1.16)
MAGNESIUM SERPL-MCNC: 2.1 MG/DL — SIGNIFICANT CHANGE UP (ref 1.6–2.6)
MCHC RBC-ENTMCNC: 31.9 PG — SIGNIFICANT CHANGE UP (ref 27–34)
MCHC RBC-ENTMCNC: 33.6 GM/DL — SIGNIFICANT CHANGE UP (ref 32–36)
MCV RBC AUTO: 95.2 FL — SIGNIFICANT CHANGE UP (ref 80–100)
NRBC # BLD: 0 /100 WBCS — SIGNIFICANT CHANGE UP (ref 0–0)
NT-PROBNP SERPL-SCNC: 25 PG/ML — SIGNIFICANT CHANGE UP (ref 0–300)
PLATELET # BLD AUTO: 209 K/UL — SIGNIFICANT CHANGE UP (ref 150–400)
POTASSIUM SERPL-MCNC: 4.5 MMOL/L — SIGNIFICANT CHANGE UP (ref 3.5–5.3)
POTASSIUM SERPL-SCNC: 4.5 MMOL/L — SIGNIFICANT CHANGE UP (ref 3.5–5.3)
PROT SERPL-MCNC: 7.5 G/DL — SIGNIFICANT CHANGE UP (ref 6–8.3)
PROTHROM AB SERPL-ACNC: 13.6 SEC — HIGH (ref 10–12.9)
RBC # BLD: 4.54 M/UL — SIGNIFICANT CHANGE UP (ref 4.2–5.8)
RBC # FLD: 12.4 % — SIGNIFICANT CHANGE UP (ref 10.3–14.5)
SALICYLATES SERPL-MCNC: 0.7 MG/DL — LOW (ref 3–30)
SODIUM SERPL-SCNC: 142 MMOL/L — SIGNIFICANT CHANGE UP (ref 135–145)
TROPONIN I SERPL-MCNC: <.017 NG/ML — LOW (ref 0.02–0.06)
WBC # BLD: 9.93 K/UL — SIGNIFICANT CHANGE UP (ref 3.8–10.5)
WBC # FLD AUTO: 9.93 K/UL — SIGNIFICANT CHANGE UP (ref 3.8–10.5)

## 2019-10-27 PROCEDURE — 71045 X-RAY EXAM CHEST 1 VIEW: CPT | Mod: 26

## 2019-10-27 PROCEDURE — 93010 ELECTROCARDIOGRAM REPORT: CPT

## 2019-10-27 PROCEDURE — 99291 CRITICAL CARE FIRST HOUR: CPT

## 2019-10-27 PROCEDURE — 70450 CT HEAD/BRAIN W/O DYE: CPT | Mod: 26

## 2019-10-27 RX ADMIN — SODIUM CHLORIDE 2000 MILLILITER(S): 9 INJECTION INTRAMUSCULAR; INTRAVENOUS; SUBCUTANEOUS at 22:35

## 2019-10-27 NOTE — ED PROVIDER NOTE - CRITICAL CARE PROVIDED
interpretation of diagnostic studies/documentation/direct patient care (not related to procedure)/consult w/ pt's family directly relating to pts condition/additional history taking/consultation with other physicians

## 2019-10-27 NOTE — ED ADULT NURSE NOTE - OBJECTIVE STATEMENT
Pt BIBA from home for overdose. As per pt wife, pt has PMH depression and has been in bed all day. Tonight she heard a loud noise approx 10pm in house, and found pt on floor next to bed and pt was unresponsive. Pt takes various psych medications, unknown what pt ingested or how much. Pt presents to ED unresponsive with a pulse, tachypneic with retractions, pupils dilated. Pt opened eyes to painful stimuli but does not respond to name. Airway is patent O2 sat between 92-96% on NRB. Pt BIBA from home for overdose. As per pt wife, pt has PMH depression and has been in bed all day. Tonight she heard a loud noise approx 10pm in house, and found pt on floor next to bed and pt was unresponsive. Pt takes various psych medications, unknown what pt ingested or how much. Pt presents to ED unresponsive with a pulse, hypotensive, tachypneic with retractions, pupils dilated. Pt opened eyes to painful stimuli but does not respond to name. Airway is patent O2 sat between 92-96% on NRB.

## 2019-10-27 NOTE — ED ADULT NURSE NOTE - NSIMPLEMENTINTERV_GEN_ALL_ED
Implemented All Fall with Harm Risk Interventions:  [place] to call system. Call bell, personal items and telephone within reach. Instruct patient to call for assistance. Room bathroom lighting operational. Non-slip footwear when patient is off stretcher. Physically safe environment: no spills, clutter or unnecessary equipment. Stretcher in lowest position, wheels locked, appropriate side rails in place. Provide visual cue, wrist band, yellow gown, etc. Monitor gait and stability. Monitor for mental status changes and reorient to person, place, and time. Review medications for side effects contributing to fall risk. Reinforce activity limits and safety measures with patient and family. Provide visual clues: red socks.

## 2019-10-27 NOTE — ED PROVIDER NOTE - CLINICAL SUMMARY MEDICAL DECISION MAKING FREE TEXT BOX
pt with ams and hypotension alleged overdose on zyprexa possible will get labs, hydrate, pt ox sat is 100 on 100% nrb airway intact no need to intubate at time, tba probably to icu will get critical care and toxicology consult

## 2019-10-27 NOTE — ED PROVIDER NOTE - OBJECTIVE STATEMENT
in bed since 10 pm last night, depressed, told wife he did not want to live anymore yesterday. Pt got up around 230 to go to bathroom today, then ronak santana went back to bed, slept all day, at 10 pm tonight wife was down stairs heard loud noise, wife went upstairs and found pt on the ground apparently had fallen out of bed, was unresponsive. she then called 911.

## 2019-10-27 NOTE — ED ADULT TRIAGE NOTE - CHIEF COMPLAINT QUOTE
pt BIBA for evaluation of unresponsiveness s/p falling out of bed at home. as per EMS, the pts wife said the pt takes several psych meds, and he took his meds tonight and this happen afterwards. Pt is on trazadone, ambien and other drugs.

## 2019-10-27 NOTE — ED ADULT NURSE REASSESSMENT NOTE - NS ED NURSE REASSESS COMMENT FT1
Pt remains hypotensive. MD Swenson aware. Pt remains on bedside monitor with family at bedside. Plan for admission. Will continue to monitor.

## 2019-10-27 NOTE — ED PROVIDER NOTE - PROGRESS NOTE DETAILS
pt is opening eyes to name. wife at bedside KAITLIN Swenson DO spoke with tox fellow, Dr. Rodriguez, continue to treat symtomatically, he will get back to me KAITLIN Swenson DO pt now obtunded, family at bedside, abg shows co2 55 and ph of 7.2 pt will be intubated by icu pa at bedside and taken to icu B Leela [facility]

## 2019-10-28 DIAGNOSIS — K75.9 INFLAMMATORY LIVER DISEASE, UNSPECIFIED: ICD-10-CM

## 2019-10-28 DIAGNOSIS — T50.901A POISONING BY UNSPECIFIED DRUGS, MEDICAMENTS AND BIOLOGICAL SUBSTANCES, ACCIDENTAL (UNINTENTIONAL), INITIAL ENCOUNTER: ICD-10-CM

## 2019-10-28 DIAGNOSIS — J96.11 CHRONIC RESPIRATORY FAILURE WITH HYPOXIA: ICD-10-CM

## 2019-10-28 DIAGNOSIS — J96.92 RESPIRATORY FAILURE, UNSPECIFIED WITH HYPERCAPNIA: ICD-10-CM

## 2019-10-28 DIAGNOSIS — F32.9 MAJOR DEPRESSIVE DISORDER, SINGLE EPISODE, UNSPECIFIED: ICD-10-CM

## 2019-10-28 LAB
AMMONIA BLD-MCNC: 25 UMOL/L — SIGNIFICANT CHANGE UP (ref 11–55)
AMPHET UR-MCNC: NEGATIVE — SIGNIFICANT CHANGE UP
ANION GAP SERPL CALC-SCNC: 7 MMOL/L — SIGNIFICANT CHANGE UP (ref 5–17)
APPEARANCE UR: CLEAR — SIGNIFICANT CHANGE UP
BACTERIA # UR AUTO: NEGATIVE /HPF — SIGNIFICANT CHANGE UP
BARBITURATES UR SCN-MCNC: POSITIVE
BENZODIAZ UR-MCNC: NEGATIVE — SIGNIFICANT CHANGE UP
BILIRUB UR-MCNC: NEGATIVE — SIGNIFICANT CHANGE UP
BUN SERPL-MCNC: 14 MG/DL — SIGNIFICANT CHANGE UP (ref 7–23)
CALCIUM SERPL-MCNC: 8.2 MG/DL — LOW (ref 8.4–10.5)
CHLORIDE SERPL-SCNC: 112 MMOL/L — HIGH (ref 96–108)
CK SERPL-CCNC: 507 U/L — HIGH (ref 30–200)
CO2 BLDA-SCNC: 22 MMOL/L — SIGNIFICANT CHANGE UP (ref 22–30)
CO2 BLDA-SCNC: 23 MMOL/L — SIGNIFICANT CHANGE UP (ref 22–30)
CO2 BLDA-SCNC: 24 MMOL/L — SIGNIFICANT CHANGE UP (ref 22–30)
CO2 BLDA-SCNC: 24 MMOL/L — SIGNIFICANT CHANGE UP (ref 22–30)
CO2 BLDA-SCNC: 27 MMOL/L — SIGNIFICANT CHANGE UP (ref 22–30)
CO2 SERPL-SCNC: 25 MMOL/L — SIGNIFICANT CHANGE UP (ref 22–31)
COCAINE METAB.OTHER UR-MCNC: NEGATIVE — SIGNIFICANT CHANGE UP
COLOR SPEC: YELLOW — SIGNIFICANT CHANGE UP
CREAT SERPL-MCNC: 1.12 MG/DL — SIGNIFICANT CHANGE UP (ref 0.5–1.3)
DIFF PNL FLD: NEGATIVE — SIGNIFICANT CHANGE UP
EPI CELLS # UR: SIGNIFICANT CHANGE UP
GAS PNL BLDA: SIGNIFICANT CHANGE UP
GLUCOSE SERPL-MCNC: 90 MG/DL — SIGNIFICANT CHANGE UP (ref 70–99)
GLUCOSE UR QL: NEGATIVE — SIGNIFICANT CHANGE UP
HCV AB S/CO SERPL IA: 0.07 S/CO — SIGNIFICANT CHANGE UP (ref 0–0.99)
HCV AB SERPL-IMP: SIGNIFICANT CHANGE UP
HOROWITZ INDEX BLDA+IHG-RTO: SIGNIFICANT CHANGE UP
KETONES UR-MCNC: NEGATIVE — SIGNIFICANT CHANGE UP
LACTATE SERPL-SCNC: 3.7 MMOL/L — HIGH (ref 0.7–2)
LEUKOCYTE ESTERASE UR-ACNC: NEGATIVE — SIGNIFICANT CHANGE UP
METHADONE UR-MCNC: NEGATIVE — SIGNIFICANT CHANGE UP
NITRITE UR-MCNC: NEGATIVE — SIGNIFICANT CHANGE UP
OPIATES UR-MCNC: NEGATIVE — SIGNIFICANT CHANGE UP
PCO2 BLDA: 36 MMHG — SIGNIFICANT CHANGE UP (ref 32–46)
PCO2 BLDA: 37 MMHG — SIGNIFICANT CHANGE UP (ref 32–46)
PCO2 BLDA: 39 MMHG — SIGNIFICANT CHANGE UP (ref 32–46)
PCO2 BLDA: 51 MMHG — HIGH (ref 32–46)
PCO2 BLDA: 55 MMHG — HIGH (ref 32–46)
PCP SPEC-MCNC: SIGNIFICANT CHANGE UP
PCP UR-MCNC: NEGATIVE — SIGNIFICANT CHANGE UP
PH BLDA: 7.24 — LOW (ref 7.35–7.45)
PH BLDA: 7.29 — LOW (ref 7.35–7.45)
PH BLDA: 7.37 — SIGNIFICANT CHANGE UP (ref 7.35–7.45)
PH BLDA: 7.41 — SIGNIFICANT CHANGE UP (ref 7.35–7.45)
PH BLDA: 7.42 — SIGNIFICANT CHANGE UP (ref 7.35–7.45)
PH UR: 6 — SIGNIFICANT CHANGE UP (ref 5–8)
PHENOBARB SERPL-MCNC: 18.6 UG/ML — SIGNIFICANT CHANGE UP (ref 15–40)
PO2 BLDA: 198 MMHG — HIGH (ref 74–108)
PO2 BLDA: 61 MMHG — LOW (ref 74–108)
PO2 BLDA: 73 MMHG — LOW (ref 74–108)
PO2 BLDA: 80 MMHG — SIGNIFICANT CHANGE UP (ref 74–108)
PO2 BLDA: 82 MMHG — SIGNIFICANT CHANGE UP (ref 74–108)
POTASSIUM SERPL-MCNC: 4.7 MMOL/L — SIGNIFICANT CHANGE UP (ref 3.5–5.3)
POTASSIUM SERPL-SCNC: 4.7 MMOL/L — SIGNIFICANT CHANGE UP (ref 3.5–5.3)
PROLACTIN SERPL-MCNC: 10 NG/ML — SIGNIFICANT CHANGE UP (ref 4.1–18.4)
PROT UR-MCNC: 15
RBC CASTS # UR COMP ASSIST: NEGATIVE /HPF — SIGNIFICANT CHANGE UP (ref 0–4)
SAO2 % BLDA: 90 % — LOW (ref 92–96)
SAO2 % BLDA: 93 % — SIGNIFICANT CHANGE UP (ref 92–96)
SAO2 % BLDA: 94 % — SIGNIFICANT CHANGE UP (ref 92–96)
SAO2 % BLDA: 95 % — SIGNIFICANT CHANGE UP (ref 92–96)
SAO2 % BLDA: 98 % — HIGH (ref 92–96)
SODIUM SERPL-SCNC: 144 MMOL/L — SIGNIFICANT CHANGE UP (ref 135–145)
SP GR SPEC: 1.01 — SIGNIFICANT CHANGE UP (ref 1.01–1.02)
THC UR QL: NEGATIVE — SIGNIFICANT CHANGE UP
TSH SERPL-MCNC: 2.15 UIU/ML — SIGNIFICANT CHANGE UP (ref 0.27–4.2)
UROBILINOGEN FLD QL: NEGATIVE — SIGNIFICANT CHANGE UP
VALPROATE SERPL-MCNC: <3 UG/ML — LOW (ref 50–100)
VALPROATE SERPL-MCNC: <3 UG/ML — LOW (ref 50–100)
WBC UR QL: NEGATIVE /HPF — SIGNIFICANT CHANGE UP (ref 0–5)

## 2019-10-28 PROCEDURE — 93970 EXTREMITY STUDY: CPT | Mod: 26

## 2019-10-28 PROCEDURE — 72125 CT NECK SPINE W/O DYE: CPT | Mod: 26

## 2019-10-28 PROCEDURE — 90792 PSYCH DIAG EVAL W/MED SRVCS: CPT

## 2019-10-28 PROCEDURE — 74176 CT ABD & PELVIS W/O CONTRAST: CPT | Mod: 26

## 2019-10-28 PROCEDURE — 71250 CT THORAX DX C-: CPT | Mod: 26

## 2019-10-28 RX ORDER — AZITHROMYCIN 500 MG/1
500 TABLET, FILM COATED ORAL ONCE
Refills: 0 | Status: COMPLETED | OUTPATIENT
Start: 2019-10-28 | End: 2019-10-28

## 2019-10-28 RX ORDER — SODIUM CHLORIDE 9 MG/ML
1000 INJECTION, SOLUTION INTRAVENOUS
Refills: 0 | Status: DISCONTINUED | OUTPATIENT
Start: 2019-10-28 | End: 2019-10-29

## 2019-10-28 RX ORDER — ETOMIDATE 2 MG/ML
40 INJECTION INTRAVENOUS ONCE
Refills: 0 | Status: COMPLETED | OUTPATIENT
Start: 2019-10-28 | End: 2019-10-28

## 2019-10-28 RX ORDER — PROPOFOL 10 MG/ML
20 INJECTION, EMULSION INTRAVENOUS
Qty: 1000 | Refills: 0 | Status: DISCONTINUED | OUTPATIENT
Start: 2019-10-28 | End: 2019-10-29

## 2019-10-28 RX ORDER — HEPARIN SODIUM 5000 [USP'U]/ML
5000 INJECTION INTRAVENOUS; SUBCUTANEOUS EVERY 8 HOURS
Refills: 0 | Status: DISCONTINUED | OUTPATIENT
Start: 2019-10-28 | End: 2019-10-30

## 2019-10-28 RX ORDER — SODIUM BICARBONATE 1 MEQ/ML
50 SYRINGE (ML) INTRAVENOUS ONCE
Refills: 0 | Status: COMPLETED | OUTPATIENT
Start: 2019-10-28 | End: 2019-10-28

## 2019-10-28 RX ORDER — CEFTRIAXONE 500 MG/1
1000 INJECTION, POWDER, FOR SOLUTION INTRAMUSCULAR; INTRAVENOUS EVERY 24 HOURS
Refills: 0 | Status: DISCONTINUED | OUTPATIENT
Start: 2019-10-28 | End: 2019-10-29

## 2019-10-28 RX ORDER — CHLORHEXIDINE GLUCONATE 213 G/1000ML
1 SOLUTION TOPICAL
Refills: 0 | Status: DISCONTINUED | OUTPATIENT
Start: 2019-10-28 | End: 2019-10-30

## 2019-10-28 RX ORDER — CHLORHEXIDINE GLUCONATE 213 G/1000ML
15 SOLUTION TOPICAL EVERY 12 HOURS
Refills: 0 | Status: DISCONTINUED | OUTPATIENT
Start: 2019-10-28 | End: 2019-10-28

## 2019-10-28 RX ORDER — CEFTRIAXONE 500 MG/1
1000 INJECTION, POWDER, FOR SOLUTION INTRAMUSCULAR; INTRAVENOUS ONCE
Refills: 0 | Status: COMPLETED | OUTPATIENT
Start: 2019-10-28 | End: 2019-10-28

## 2019-10-28 RX ORDER — PANTOPRAZOLE SODIUM 20 MG/1
40 TABLET, DELAYED RELEASE ORAL DAILY
Refills: 0 | Status: DISCONTINUED | OUTPATIENT
Start: 2019-10-28 | End: 2019-10-30

## 2019-10-28 RX ORDER — LEVETIRACETAM 250 MG/1
750 TABLET, FILM COATED ORAL EVERY 12 HOURS
Refills: 0 | Status: DISCONTINUED | OUTPATIENT
Start: 2019-10-28 | End: 2019-10-30

## 2019-10-28 RX ORDER — SODIUM CHLORIDE 9 MG/ML
2000 INJECTION INTRAMUSCULAR; INTRAVENOUS; SUBCUTANEOUS ONCE
Refills: 0 | Status: COMPLETED | OUTPATIENT
Start: 2019-10-28 | End: 2019-10-27

## 2019-10-28 RX ADMIN — LEVETIRACETAM 400 MILLIGRAM(S): 250 TABLET, FILM COATED ORAL at 06:47

## 2019-10-28 RX ADMIN — HEPARIN SODIUM 5000 UNIT(S): 5000 INJECTION INTRAVENOUS; SUBCUTANEOUS at 06:29

## 2019-10-28 RX ADMIN — AZITHROMYCIN 255 MILLIGRAM(S): 500 TABLET, FILM COATED ORAL at 02:08

## 2019-10-28 RX ADMIN — LEVETIRACETAM 400 MILLIGRAM(S): 250 TABLET, FILM COATED ORAL at 17:04

## 2019-10-28 RX ADMIN — PROPOFOL 14.96 MICROGRAM(S)/KG/MIN: 10 INJECTION, EMULSION INTRAVENOUS at 08:20

## 2019-10-28 RX ADMIN — HEPARIN SODIUM 5000 UNIT(S): 5000 INJECTION INTRAVENOUS; SUBCUTANEOUS at 14:33

## 2019-10-28 RX ADMIN — CHLORHEXIDINE GLUCONATE 15 MILLILITER(S): 213 SOLUTION TOPICAL at 06:28

## 2019-10-28 RX ADMIN — CEFTRIAXONE 100 MILLIGRAM(S): 500 INJECTION, POWDER, FOR SOLUTION INTRAMUSCULAR; INTRAVENOUS at 11:58

## 2019-10-28 RX ADMIN — Medication 2 MILLIGRAM(S): at 04:30

## 2019-10-28 RX ADMIN — Medication 50 MILLIEQUIVALENT(S): at 01:07

## 2019-10-28 RX ADMIN — PANTOPRAZOLE SODIUM 40 MILLIGRAM(S): 20 TABLET, DELAYED RELEASE ORAL at 11:58

## 2019-10-28 RX ADMIN — PROPOFOL 14.96 MICROGRAM(S)/KG/MIN: 10 INJECTION, EMULSION INTRAVENOUS at 04:30

## 2019-10-28 RX ADMIN — CEFTRIAXONE 100 MILLIGRAM(S): 500 INJECTION, POWDER, FOR SOLUTION INTRAMUSCULAR; INTRAVENOUS at 01:07

## 2019-10-28 RX ADMIN — CEFTRIAXONE 1000 MILLIGRAM(S): 500 INJECTION, POWDER, FOR SOLUTION INTRAMUSCULAR; INTRAVENOUS at 01:30

## 2019-10-28 RX ADMIN — Medication 1 MILLIGRAM(S): at 17:03

## 2019-10-28 RX ADMIN — PROPOFOL 14.96 MICROGRAM(S)/KG/MIN: 10 INJECTION, EMULSION INTRAVENOUS at 06:30

## 2019-10-28 RX ADMIN — SODIUM CHLORIDE 75 MILLILITER(S): 9 INJECTION, SOLUTION INTRAVENOUS at 07:28

## 2019-10-28 RX ADMIN — ETOMIDATE 40 MILLIGRAM(S): 2 INJECTION INTRAVENOUS at 01:38

## 2019-10-28 RX ADMIN — HEPARIN SODIUM 5000 UNIT(S): 5000 INJECTION INTRAVENOUS; SUBCUTANEOUS at 21:29

## 2019-10-28 RX ADMIN — SODIUM CHLORIDE 2000 MILLILITER(S): 9 INJECTION INTRAMUSCULAR; INTRAVENOUS; SUBCUTANEOUS at 00:00

## 2019-10-28 NOTE — PROGRESS NOTE ADULT - ASSESSMENT
Physical Examination:  GENERAL:                Intubated sedated  HEENT:                   Dilated Pupils equal, reactive to light.  Symmetric. No JVD, dry MM  PULM:                     Bilateral air entry, Clear to auscultation bilaterally, no significant sputum production, No Rales, No Rhonchi, No Wheezing  CVS:                         S1, S2,  No Murmur  ABD:                        Soft, distended, nontender, normoactive bowel sounds, Obese  EXT:                         + edema, nontender, No Cyanosis or Clubbing   Vascular:                 Warm Extremities, Normal Capillary refill, Normal Distal Pulses  SKIN:                       Warm and well perfused, no rashes noted.   NEURO:                  Alert, oriented, interactive, nonfocal, follows commands  PSYC:                      Calm, no Insight.        Assessment  Overdose  Possible seizure  Acute respiratory failure - hypoxic/hypercarbic  Aspiration pneumonitis  JESSIE  with normal baseline - Resolve  Underlying severe depression       Plan  CPAP, wean from mechanical ventilation  EEG  keppra  Neuro consult  empiric abx  psyc consult called  home psyc meds held at this time  may require 2 PC in patient psyc and 1:1         PMD:		Dr. Bill 		                   Notified(Date): 10/28/19                           Dr. Cline - Psyc  Family Updated: 	wife 	                                 Date: 10/28/19      Sedation & Analgesia:	as above  Diet/Nutrition:		advance as tolerated  GI PPx:			PPI    DVT Ppx:		Heparin sq  	RISK                                                          Points  	[ ] Previous VTE                                           	3  	[ ] Thrombophilia                                        	2  	[ ] Lower limb paralysis                              	2   	[ ] Current Cancer                                       	2   	[ ] Immobilization > 24 hrs                        	1  	[ x ICU/CCU stay > 24 hours                       	1  	[ ] Age > 60                                                   	1  		Total:[1]      Activity:		  as tolerated   Head of Bed:               35-45  Glycemic Control:        n/a    Lines: PIV   Restraints were deemed necessary to prevent removal of life-sustaining devices [x YES   [    ]  NO    Disposition: ICU monitoring    Goals of Care: Full code.

## 2019-10-28 NOTE — ED ADULT NURSE REASSESSMENT NOTE - NS ED NURSE REASSESS COMMENT FT1
Pt has been hypotensive since arrival to ED. Due to pt condition/deterioration, blood cultures obtained,  IV abx given as per MD order. ICU NARCISO Ventura called down to ED for evaluation.  Pt was no longer responding to painful stimuli upon evaluation by PA. As per ICU NARCISO Ventura, plan to intubate pt and bring to CT, then ICU. Family made aware as to plan of care and rationale and verbalized understanding.     Etomidate given @ 01:38   Pt intubated at 01:39   ET tube size 7.5, 23 @ upper lip line  OG inserted at 01:45

## 2019-10-28 NOTE — PROGRESS NOTE ADULT - SUBJECTIVE AND OBJECTIVE BOX
Episode of generalized tonic clonic sz activity of 30 seconds duration  No hx of sz disorder  On valproate for mood.      Ativan 2 mg IVP given  Starting propofol  EEG ordered  ABG ordered to assess for alkalemia   '  D/W ECIU suggested Keppra loading.  Did not feel that cEEG was needed at this point.

## 2019-10-28 NOTE — PROVIDER CONTACT NOTE (EICU) - BACKGROUND
54M hx HTN HLD severe anxiety and depression with previous psych hospitalizations Electroconvulsive therapy in the past  multiple psych meds adjustments with refractory depressive symptoms  presents with  AMS suspected Drug OD.  At this point I have a list of meds from his last discharge from [facility] in the summer as possible toxidromes including ambien/buspar/klonopin/neurontin/seroquel/trazadone/valproic acid/effexor  His Utox is + for barbiturates (no hx of barbiturate rx) he may be taking any number of sustances not prescribed to him.    Hypercapnic hypoxemic resp failure with obtundation requiring intubation   JESSIE from baseline.  Infiltrate on CXR  CT with B/L lower lobe infiltrates consistent with aspiration PNA. Seized x 1, started on propofol and loaded with keppra  Seen on camera in MICU, hemodynamically stable off pressors

## 2019-10-28 NOTE — H&P ADULT - PROBLEM SELECTOR PLAN 5
As a child mild elevation of LFT's as was seen in previous LFT's   fatty  liver notes on prior imaging

## 2019-10-28 NOTE — PROVIDER CONTACT NOTE (EICU) - ASSESSMENT
54 M extencive psych history Suicide attempt/barbiturate OD/Hypercapnic respiratory failure/aspiration PNA/ Sz  Head CT negative

## 2019-10-28 NOTE — CONSULT NOTE ADULT - ASSESSMENT
Suspect a toxic metabolic encephalopathy due to polydrug overdose.  R/O a seizure but this is not definite.  EEG is only mildly abnormal with diffuse dysfunction and no seizure activity.  Neuro exam is non focal.  Brain CT is negative.    REC:  observe neuro status in CCU, continue Keppra for the present but unlikely to require long term, monitor for sedative hypnotic drug withdrawal, ultimately psych consult.

## 2019-10-28 NOTE — ED ADULT NURSE REASSESSMENT NOTE - NS ED NURSE REASSESS COMMENT FT1
As per MD Swenson, plan to give bicarb prior to ABG draw. Order verified with MD. Will continue monitor.

## 2019-10-28 NOTE — H&P ADULT - ASSESSMENT
54M hx HTN HLD severe anxiety and depression with previous psych hospitalizations ECT multiple meds adjustments with refractory depressive symptoms  presents with suspected Drug OD.  At this point I have a list of meds from his last discharge from [facility] in the summer as possible toxidromes including ambien/buspar/klonopin/neurontin/seroquel/trazadone/valproic acid     His Utox is + for barbiturates (no hx of barbiturate rx)      Hypercapnic hypoxemic resp failure with obtundation requiring intubation   JESSIE from baseline.        54M hx HTN HLD severe anxiety and depression with previous psych hospitalizations Electroconvulsive therapy in the past  multiple psych meds adjustments with refractory depressive symptoms  presents with  AMS suspected Drug OD.  At this point I have a list of meds from his last discharge from [facility] in the summer as possible toxidromes including ambien/buspar/klonopin/neurontin/seroquel/trazadone/valproic acid/effexor    His Utox is + for barbiturates (no hx of barbiturate rx) he may be taking any number of sustances not prescribed to him.        Hypercapnic hypoxemic resp failure with obtundation requiring intubation   JESSIE from baseline.  Infiltrate on CXR CT  ? aspiration     Neuro   Keep off sedation to assess MS  Head CT neg awaiting off read of neck C  Cor   BP low given fluid bolus'  QRS was >  .12  sodium bicarb given by rec from poison control   Pulm Atelectasis/airspace process  on CT  peep inc to 10 LLTV   repeat ABG  Gi  PPI    Renal  JESSIE from baseline repeat now after hydration    Heme/DVT  S1 heparin   ID Rx as potential CAP.  Hospitalization at [place] > 4 months ago    Endo no issues  thyroid checked in June normal    Lines/tubes  PIV    Case discussed in detail with Dr Villalobos   E-ICU

## 2019-10-28 NOTE — H&P ADULT - ATTENDING COMMENTS
pt seen and examined on 10/28  this note has not been modified by me  please refer to MD addendum 10/28

## 2019-10-28 NOTE — PROVIDER CONTACT NOTE (EICU) - RECOMMENDATIONS
-c/w Ac ventilation for now,propofol for sedation  - ceftriaxone/zothromax for CAP/aspiration PNA, check Cx  -For Sz- on propofol, keppra 750 Q12 H  - trend Qtc  -d/w NARCISO mccann

## 2019-10-28 NOTE — H&P ADULT - HISTORY OF PRESENT ILLNESS
54M  hx HTN HLD not on meds, severe depression anxiety on multiple meds ECT in the past and a recent hospitalization over the summer for approx 2 weeks for med adjustments and ECT.  Still deals with terrible depressive symptoms and severe insomnia.  Over this past weekend, he told his wife that he was going to kill himself.  He has had suicidal ideation in the past.  All day Sunday he was sleeping not eating.  In the evening she heard a thump and he was on the floor.   His wife suspects that he took additional meds.     Bought to ED with hypotension hypoxemia and lethargy.  Intitally arousable, but became obtunded  ABG revealed hypercapnic resp failure and he was intubated .  There was an airspace infiltrate on CXR on the right.  He was given IVF and ABX.  Poison control suggested sodium bicarb owing to the number of psyche meds and a mildly widened QRS complex.     His wife can not give me a full account of which meds he is currently taking, she said she does not know.  I asked her to bring them to the hospital and I have asked the E-ICU to help with Rx's filled at the [facility] listed as his pharmacy. 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  Still deals with terrible depressive symptoms and severe insomnia.  Over this past weekend, he told his wife that he was going to kill himself.  He has had suicidal ideation in the past.  All day Sunday he was sleeping not eating.  In the evening she heard a thump and he was on the floor.   His wife suspects that he took additional meds.   Per the wife, he takes [place] supplements to help with sleep     Bought to ED with hypotension hypoxemia and lethargy.  Intitally arousable, but became obtunded  ABG revealed hypercapnic resp failure and he was intubated .  There was an airspace infiltrate on CXR on the right.  He was given IVF and ABX.  Poison control suggested sodium bicarb owing to the number of psyche meds and a mildly widened QRS complex.     His wife can not give me a full account of which meds he is currently taking, she said she does not know.  I asked her to bring them to the hospital and I have asked the E-ICU to help with Rx's filled at the [facility] listed as his pharmacy.

## 2019-10-28 NOTE — PROGRESS NOTE ADULT - SUBJECTIVE AND OBJECTIVE BOX
Addendum to H&P/ICU Consult note  - Patient seen and examined today    ICU CONSULT  Location of Patient :  [facility] 0010 06 ( [facility])  Attending requesting Consult:Cornel Rodriguez  Chief Complaint :  Overdose  Reason For consult :       Initial HPI on admission:  HPI:  54 year old male with h/o Severe depression on ECT with hospitalizations, HTN, High chol, who p/w increasing lethargy . with recent suicidal ideations as per wife.   Believed to take supplements to fall as sleep.    Bought to ED with hypotension hypoxemia and lethargy.  Intitally arousable, but became obtunded  ABG revealed hypercapnic resp failure and he was intubated .  There was an airspace infiltrate on CXR on the right.  He was given IVF and ABX.  Poison control suggested sodium bicarb owing to the number of psyche meds and a mildly widened QRS complex.     BRIEF HOSPITAL COURSE:     pt was intubated for airway protection for ? seizure event overnight.  pt was loaded with Keppra   EEG is ordered.   this am sedation vacation given and pt clinically arousable and starting to follow commands.         PAST MEDICAL & SURGICAL HISTORY:  Depression  Hepatitis: unsure of type  Left knee pain  No significant past surgical history    Allergies    No Known Allergies    Intolerances    Due to current medical status patient unable to provide medical, social, family history.  History obtained from available medical record.     Medications:  MEDICATIONS  (STANDING):  cefTRIAXone   IVPB 1000 milliGRAM(s) IV Intermittent every 24 hours  chlorhexidine 0.12% Liquid 15 milliLiter(s) Oral Mucosa every 12 hours  chlorhexidine 4% Liquid 1 Application(s) Topical <User Schedule>  heparin  Injectable 5000 Unit(s) SubCutaneous every 8 hours  lactated ringers. 1000 milliLiter(s) (75 mL/Hr) IV Continuous <Continuous>  levETIRAcetam  IVPB 750 milliGRAM(s) IV Intermittent every 12 hours  pantoprazole  Injectable 40 milliGRAM(s) IV Push daily  propofol Infusion 20 MICROgram(s)/kG/Min (14.963 mL/Hr) IV Continuous <Continuous>    MEDICATIONS  (PRN):      Home Medications:  Last Order Reconciliation Date: Not Done  Ambien 10 mg oral tablet: 1 tab(s) orally once a day (at bedtime) x 30 days MDD:10 mg  (10-28-)  atorvastatin 10 mg oral tablet: 1 tab(s) orally once a day (at bedtime) x 30 days  (10-28-19)  buPROPion 300 mg/24 hours (XL) oral tablet, extended release: 1 tab(s) orally once a day x 30 days  (10-28-19)  clonazePAM 1 mg oral tablet: 1 tab(s) orally 3 times a day MDD:3 mg (10-28-19)  gabapentin 300 mg oral capsule: 1 cap(s) orally 2 times a day x 30 days  (10-28-19)  OLANZapine 5 mg oral tablet: 1 tab(s) orally once a day (10-28-19)  QUEtiapine 400 mg oral tablet: 1 tab(s) orally once a day (at bedtime) x 30 days  (10-28-19)  traZODone 100 mg oral tablet: 1 tab(s) orally once a day (at bedtime) x 30 days  (10-28-19)  venlafaxine 150 mg oral capsule, extended release: 1 cap(s) orally once a day x 30 days  (10-28-19)        LABS:                        14.5   9.93  )-----------( 209      ( 27 Oct 2019 22:30 )             43.2     10-28    144  |  112<H>  |  14  ----------------------------<  90  4.7   |  25  |  1.12    Ca    8.2<L>      28 Oct 2019 05:40  Mg     2.1     10-27    TPro  7.5  /  Alb  4.0  /  TBili  0.2  /  DBili  x   /  AST  26  /  ALT  55<H>  /  AlkPhos  57  10-27      CARDIAC MARKERS ( 28 Oct 2019 05:40 )  x     / x     / 507 U/L / x     / x      CARDIAC MARKERS ( 27 Oct 2019 22:30 )  <.017 ng/mL / x     / x     / x     / x          Trend Bun/Cr  10-28-19 @ 05:40  BUN/CR -  14 / 1.12  10-27-19 @ 22:30  BUN/CR -  15 / 1.55<H>  19 @ 10:03  BUN/CR -  13 / 0.80    PT/INR - ( 27 Oct 2019 22:30 )   PT: 13.6 sec;   INR: 1.21 ratio         PTT - ( 27 Oct 2019 22:30 )  PTT:28.2 sec  Urinalysis Basic - ( 27 Oct 2019 23:52 )    Color: Yellow / Appearance: Clear / S.010 / pH: x  Gluc: x / Ketone: Negative  / Bili: Negative / Urobili: Negative   Blood: x / Protein: 15 / Nitrite: Negative   Leuk Esterase: Negative / RBC: Negative /HPF / WBC Negative /HPF   Sq Epi: x / Non Sq Epi: Neg.-Few / Bacteria: Negative /HPF        Serum Pro-Brain Natriuretic Peptide: 25 pg/mL (10-27-19 @ 22:30)    Phenobarbital Level, Serum: 18.6 ug/mL (10.28.19 @ 07:30)  Valproic Acid Level, Serum (10.27.19 @ 22:30)    Valproic Acid Level, Serum: <3: Test Repeated ug/mL    Ammonia, Serum (10.28.19 @ 07:30)    Ammonia, Serum: 25 umol/L          CULTURES: (if applicable)        ABG - ( 28 Oct 2019 04:06 )  pH, Arterial: 7.24  pH, Blood: x     /  pCO2: 51    /  pO2: 198   / HCO3: x     / Base Excess: x     /  SaO2: 98                CAPILLARY BLOOD GLUCOSE          RADIOLOGY  CXR:      CT:  < from: CT Head No Cont (10.27.19 @ 22:52) >  Impression: No gross intracranial abnormality.    < end of copied text >    < from: CT Chest No Cont (10.28.19 @ 02:52) >  IMPRESSION:     Lower lobe consolidations, which may represent atelectasis and/or pneumonia.  No findings suspicious for acute intrathoracic or intra-abdominal visceral injury.    < end of copied text >    < from: CT Abdomen and Pelvis No Cont (10.28.19 @ 02:53) >  IMPRESSION:     Lower lobe consolidations, which may represent atelectasis and/or pneumonia.  No findings suspicious for acute intrathoracic or intra-abdominal visceral injury.      < end of copied text >      ECHO:      VITALS:  T(C): 36.9 (10-28-19 @ 05:00), Max: 37.1 (10-27-19 @ 22:52)  T(F): 98.5 (10-28-19 @ 05:00), Max: 98.7 (10-27-19 @ 22:52)  HR: 61 (10-28-19 @ 09:00) (57 - 82)  BP: 93/68 (10-28-19 @ 09:00) (80/54 - 107/81)  BP(mean): 77 (10-28-19 @ 09:00) (65 - 90)  ABP: --  ABP(mean): --  RR: 20 (10-28-19 @ 09:00) (14 - 29)  SpO2: 100% (10-28-19 @ 09:00) (87% - 100%)  CVP(mm Hg): --  CVP(cm H2O): --    Ins and Outs     10-27-19 @ 07:01  -  10-28-19 @ 07:00  --------------------------------------------------------  IN: 128.3 mL / OUT: 950 mL / NET: -821.7 mL    10-28-19 @ 07:01  -  10-28-19 @ 09:53  --------------------------------------------------------  IN: 183.6 mL / OUT: 50 mL / NET: 133.6 mL          Weight (kg): 124.94781090507712 (10-27-19 @ 22:44)    Device: 840, Mode: AC/VC+, RR (machine): 20, RR (patient): 20, TV (machine): 450, TV (patient): 450, FiO2: 40, PEEP: 10, ITime: 1, PIP: 20    I&O's Detail    27 Oct 2019 07:01  -  28 Oct 2019 07:00  --------------------------------------------------------  IN:    lactated ringers.: 75 mL    propofol Infusion: 53.3 mL  Total IN: 128.3 mL    OUT:    Indwelling Catheter - Urethral: 950 mL  Total OUT: 950 mL    Total NET: -821.7 mL      28 Oct 2019 07:  -  28 Oct 2019 09:53  --------------------------------------------------------  IN:    lactated ringers.: 150 mL    propofol Infusion: 33.6 mL  Total IN: 183.6 mL    OUT:    Indwelling Catheter - Urethral: 50 mL  Total OUT: 50 mL    Total NET: 133.6 mL          Physical Examination:  GENERAL:               Alert, Oriented, No acute distress.    HEENT:                    Pupils equal, reactive to light.  Symmetric. No JVD, Moist MM  PULM:                     Bilateral air entry, Clear to auscultation bilaterally, no significant sputum production, No Rales, No Rhonchi, No Wheezing  CVS:                         S1, S2,  No Murmur  ABD:                        Soft, nondistended, nontender, normoactive bowel sounds,   EXT:                         No edema, nontender, No Cyanosis or Clubbing   Vascular:                Warm Extremities, Normal Capillary refill, Normal Distal Pulses  SKIN:                       Warm and well perfused, no rashes noted.   NEURO:                  Alert, oriented, interactive, nonfocal, follows commands  PSYC:                      Calm, + Insight. Addendum to H&P/ICU Consult note  - Patient seen and examined today    ICU CONSULT  Location of Patient :  [facility] [id number] 06 ( [facility])  Attending requesting Consult:Cornel Rodriguez  Chief Complaint :  Overdose  Reason For consult :       Initial HPI on admission:  HPI:  54 year old male with h/o Severe depression on ECT with hospitalizations, HTN, High chol, who p/w increasing lethargy . with recent suicidal ideations as per wife.   Believed to take supplements to fall as sleep.    Bought to ED with hypotension hypoxemia and lethargy.  Intitally arousable, but became obtunded  ABG revealed hypercapnic resp failure and he was intubated .  There was an airspace infiltrate on CXR on the right.  He was given IVF and ABX.  Poison control suggested sodium bicarb owing to the number of psyche meds and a mildly widened QRS complex.     BRIEF HOSPITAL COURSE:     pt was intubated for airway protection for ? seizure event overnight.  pt was loaded with Keppra   EEG is ordered.   this am sedation vacation given and pt clinically arousable and starting to follow commands.         PAST MEDICAL & SURGICAL HISTORY:  Depression  Hepatitis: unsure of type  Left knee pain  No significant past surgical history    Allergies    No Known Allergies    Intolerances    Due to current medical status patient unable to provide medical, social, family history.  History obtained from available medical record.     Medications:  MEDICATIONS  (STANDING):  cefTRIAXone   IVPB 1000 milliGRAM(s) IV Intermittent every 24 hours  chlorhexidine 0.12% Liquid 15 milliLiter(s) Oral Mucosa every 12 hours  chlorhexidine 4% Liquid 1 Application(s) Topical <User Schedule>  heparin  Injectable 5000 Unit(s) SubCutaneous every 8 hours  lactated ringers. 1000 milliLiter(s) (75 mL/Hr) IV Continuous <Continuous>  levETIRAcetam  IVPB 750 milliGRAM(s) IV Intermittent every 12 hours  pantoprazole  Injectable 40 milliGRAM(s) IV Push daily  propofol Infusion 20 MICROgram(s)/kG/Min (14.963 mL/Hr) IV Continuous <Continuous>    MEDICATIONS  (PRN):      Home Medications:  Last Order Reconciliation Date: Not Done  Ambien 10 mg oral tablet: 1 tab(s) orally once a day (at bedtime) x 30 days MDD:10 mg  (10-28-)  atorvastatin 10 mg oral tablet: 1 tab(s) orally once a day (at bedtime) x 30 days  (10-28-19)  buPROPion 300 mg/24 hours (XL) oral tablet, extended release: 1 tab(s) orally once a day x 30 days  (10-28-19)  clonazePAM 1 mg oral tablet: 1 tab(s) orally 3 times a day MDD:3 mg (10-28-19)  gabapentin 300 mg oral capsule: 1 cap(s) orally 2 times a day x 30 days  (10-28-19)  OLANZapine 5 mg oral tablet: 1 tab(s) orally once a day (10-28-19)  QUEtiapine 400 mg oral tablet: 1 tab(s) orally once a day (at bedtime) x 30 days  (10-28-19)  traZODone 100 mg oral tablet: 1 tab(s) orally once a day (at bedtime) x 30 days  (10-28-19)  venlafaxine 150 mg oral capsule, extended release: 1 cap(s) orally once a day x 30 days  (10-28-19)        LABS:                        14.5   9.93  )-----------( 209      ( 27 Oct 2019 22:30 )             43.2     10-28    144  |  112<H>  |  14  ----------------------------<  90  4.7   |  25  |  1.12    Ca    8.2<L>      28 Oct 2019 05:40  Mg     2.1     10-27    TPro  7.5  /  Alb  4.0  /  TBili  0.2  /  DBili  x   /  AST  26  /  ALT  55<H>  /  AlkPhos  57  10-27      CARDIAC MARKERS ( 28 Oct 2019 05:40 )  x     / x     / 507 U/L / x     / x      CARDIAC MARKERS ( 27 Oct 2019 22:30 )  <.017 ng/mL / x     / x     / x     / x          Trend Bun/Cr  10-28-19 @ 05:40  BUN/CR -  14 / 1.12  10-27-19 @ 22:30  BUN/CR -  15 / 1.55<H>  19 @ 10:03  BUN/CR -  13 / 0.80    PT/INR - ( 27 Oct 2019 22:30 )   PT: 13.6 sec;   INR: 1.21 ratio         PTT - ( 27 Oct 2019 22:30 )  PTT:28.2 sec  Urinalysis Basic - ( 27 Oct 2019 23:52 )    Color: Yellow / Appearance: Clear / S.010 / pH: x  Gluc: x / Ketone: Negative  / Bili: Negative / Urobili: Negative   Blood: x / Protein: 15 / Nitrite: Negative   Leuk Esterase: Negative / RBC: Negative /HPF / WBC Negative /HPF   Sq Epi: x / Non Sq Epi: Neg.-Few / Bacteria: Negative /HPF        Serum Pro-Brain Natriuretic Peptide: 25 pg/mL (10-27-19 @ 22:30)    Phenobarbital Level, Serum: 18.6 ug/mL (10.28.19 @ 07:30)  Valproic Acid Level, Serum (10.27.19 @ 22:30)    Valproic Acid Level, Serum: <3: Test Repeated ug/mL    Ammonia, Serum (10.28.19 @ 07:30)    Ammonia, Serum: 25 umol/L          CULTURES: (if applicable)        ABG - ( 28 Oct 2019 04:06 )  pH, Arterial: 7.24  pH, Blood: x     /  pCO2: 51    /  pO2: 198   / HCO3: x     / Base Excess: x     /  SaO2: 98                CAPILLARY BLOOD GLUCOSE          RADIOLOGY  CXR:      CT:  < from: CT Head No Cont (10.27.19 @ 22:52) >  Impression: No gross intracranial abnormality.    < end of copied text >    < from: CT Chest No Cont (10.28.19 @ 02:52) >  IMPRESSION:     Lower lobe consolidations, which may represent atelectasis and/or pneumonia.  No findings suspicious for acute intrathoracic or intra-abdominal visceral injury.    < end of copied text >    < from: CT Abdomen and Pelvis No Cont (10.28.19 @ 02:53) >  IMPRESSION:     Lower lobe consolidations, which may represent atelectasis and/or pneumonia.  No findings suspicious for acute intrathoracic or intra-abdominal visceral injury.      < end of copied text >      ECHO:      VITALS:  T(C): 36.9 (10-28-19 @ 05:00), Max: 37.1 (10-27-19 @ 22:52)  T(F): 98.5 (10-28-19 @ 05:00), Max: 98.7 (10-27-19 @ 22:52)  HR: 61 (10-28-19 @ 09:00) (57 - 82)  BP: 93/68 (10-28-19 @ 09:00) (80/54 - 107/81)  BP(mean): 77 (10-28-19 @ 09:00) (65 - 90)  ABP: --  ABP(mean): --  RR: 20 (10-28-19 @ 09:00) (14 - 29)  SpO2: 100% (10-28-19 @ 09:00) (87% - 100%)  CVP(mm Hg): --  CVP(cm H2O): --    Ins and Outs     10-27-19 @ 07:01  -  10-28-19 @ 07:00  --------------------------------------------------------  IN: 128.3 mL / OUT: 950 mL / NET: -821.7 mL    10-28-19 @ 07:01  -  10-28-19 @ 09:53  --------------------------------------------------------  IN: 183.6 mL / OUT: 50 mL / NET: 133.6 mL          Weight (kg): [phone] (10-27-19 @ 22:44)    Device: 840, Mode: AC/VC+, RR (machine): 20, RR (patient): 20, TV (machine): 450, TV (patient): 450, FiO2: 40, PEEP: 10, ITime: 1, PIP: 20    I&O's Detail    27 Oct 2019 07:01  -  28 Oct 2019 07:00  --------------------------------------------------------  IN:    lactated ringers.: 75 mL    propofol Infusion: 53.3 mL  Total IN: 128.3 mL    OUT:    Indwelling Catheter - Urethral: 950 mL  Total OUT: 950 mL    Total NET: -821.7 mL      28 Oct 2019 07:01  -  28 Oct 2019 09:53  --------------------------------------------------------  IN:    lactated ringers.: 150 mL    propofol Infusion: 33.6 mL  Total IN: 183.6 mL    OUT:    Indwelling Catheter - Urethral: 50 mL  Total OUT: 50 mL    Total NET: 133.6 mL

## 2019-10-28 NOTE — H&P ADULT - NSHPPHYSICALEXAM_GEN_ALL_CORE
obtunded    PERRL  4mm   bilateral BS rhonchi on the right  s1 s2 reg  abd soft  + BS  no edema     moved 4 to noxious stimuli only.

## 2019-10-29 ENCOUNTER — TRANSCRIPTION ENCOUNTER (OUTPATIENT)
Age: 54
End: 2019-10-29

## 2019-10-29 LAB
ANION GAP SERPL CALC-SCNC: 8 MMOL/L — SIGNIFICANT CHANGE UP (ref 5–17)
BLOOD GAS COMMENTS ARTERIAL: SIGNIFICANT CHANGE UP
BUN SERPL-MCNC: 9 MG/DL — SIGNIFICANT CHANGE UP (ref 7–23)
CALCIUM SERPL-MCNC: 9.1 MG/DL — SIGNIFICANT CHANGE UP (ref 8.4–10.5)
CHLORIDE SERPL-SCNC: 112 MMOL/L — HIGH (ref 96–108)
CO2 BLDA-SCNC: 20 MMOL/L — LOW (ref 22–30)
CO2 BLDA-SCNC: 23 MMOL/L — SIGNIFICANT CHANGE UP (ref 22–30)
CO2 SERPL-SCNC: 25 MMOL/L — SIGNIFICANT CHANGE UP (ref 22–31)
CREAT SERPL-MCNC: 0.77 MG/DL — SIGNIFICANT CHANGE UP (ref 0.5–1.3)
GAS PNL BLDA: SIGNIFICANT CHANGE UP
GAS PNL BLDA: SIGNIFICANT CHANGE UP
GLUCOSE BLDC GLUCOMTR-MCNC: 132 MG/DL — HIGH (ref 70–99)
GLUCOSE BLDC GLUCOMTR-MCNC: 52 MG/DL — LOW (ref 70–99)
GLUCOSE SERPL-MCNC: 48 MG/DL — LOW (ref 70–99)
HBA1C BLD-MCNC: 5.2 % — SIGNIFICANT CHANGE UP (ref 4–5.6)
HCT VFR BLD CALC: 36.3 % — LOW (ref 39–50)
HGB BLD-MCNC: 11.9 G/DL — LOW (ref 13–17)
HOROWITZ INDEX BLDA+IHG-RTO: SIGNIFICANT CHANGE UP
HOROWITZ INDEX BLDA+IHG-RTO: SIGNIFICANT CHANGE UP
MAGNESIUM SERPL-MCNC: 2 MG/DL — SIGNIFICANT CHANGE UP (ref 1.6–2.6)
MCHC RBC-ENTMCNC: 31.6 PG — SIGNIFICANT CHANGE UP (ref 27–34)
MCHC RBC-ENTMCNC: 32.8 GM/DL — SIGNIFICANT CHANGE UP (ref 32–36)
MCV RBC AUTO: 96.3 FL — SIGNIFICANT CHANGE UP (ref 80–100)
NRBC # BLD: 0 /100 WBCS — SIGNIFICANT CHANGE UP (ref 0–0)
PCO2 BLDA: 27 MMHG — LOW (ref 32–46)
PCO2 BLDA: 32 MMHG — SIGNIFICANT CHANGE UP (ref 32–46)
PH BLDA: 7.45 — SIGNIFICANT CHANGE UP (ref 7.35–7.45)
PH BLDA: 7.48 — HIGH (ref 7.35–7.45)
PHOSPHATE SERPL-MCNC: 3.5 MG/DL — SIGNIFICANT CHANGE UP (ref 2.5–4.5)
PLATELET # BLD AUTO: 151 K/UL — SIGNIFICANT CHANGE UP (ref 150–400)
PO2 BLDA: 55 MMHG — LOW (ref 74–108)
PO2 BLDA: 81 MMHG — SIGNIFICANT CHANGE UP (ref 74–108)
POTASSIUM SERPL-MCNC: 4.1 MMOL/L — SIGNIFICANT CHANGE UP (ref 3.5–5.3)
POTASSIUM SERPL-SCNC: 4.1 MMOL/L — SIGNIFICANT CHANGE UP (ref 3.5–5.3)
RBC # BLD: 3.77 M/UL — LOW (ref 4.2–5.8)
RBC # FLD: 12.9 % — SIGNIFICANT CHANGE UP (ref 10.3–14.5)
SAO2 % BLDA: 88 % — LOW (ref 92–96)
SAO2 % BLDA: 95 % — SIGNIFICANT CHANGE UP (ref 92–96)
SODIUM SERPL-SCNC: 145 MMOL/L — SIGNIFICANT CHANGE UP (ref 135–145)
WBC # BLD: 8.43 K/UL — SIGNIFICANT CHANGE UP (ref 3.8–10.5)
WBC # FLD AUTO: 8.43 K/UL — SIGNIFICANT CHANGE UP (ref 3.8–10.5)

## 2019-10-29 PROCEDURE — 99232 SBSQ HOSP IP/OBS MODERATE 35: CPT

## 2019-10-29 PROCEDURE — 71045 X-RAY EXAM CHEST 1 VIEW: CPT | Mod: 26

## 2019-10-29 PROCEDURE — 93306 TTE W/DOPPLER COMPLETE: CPT | Mod: 26

## 2019-10-29 RX ORDER — ACETAMINOPHEN 500 MG
1000 TABLET ORAL ONCE
Refills: 0 | Status: COMPLETED | OUTPATIENT
Start: 2019-10-29 | End: 2019-10-29

## 2019-10-29 RX ORDER — SODIUM CHLORIDE 9 MG/ML
1000 INJECTION, SOLUTION INTRAVENOUS
Refills: 0 | Status: DISCONTINUED | OUTPATIENT
Start: 2019-10-29 | End: 2019-10-30

## 2019-10-29 RX ORDER — IPRATROPIUM/ALBUTEROL SULFATE 18-103MCG
3 AEROSOL WITH ADAPTER (GRAM) INHALATION EVERY 6 HOURS
Refills: 0 | Status: DISCONTINUED | OUTPATIENT
Start: 2019-10-29 | End: 2019-10-30

## 2019-10-29 RX ORDER — DEXMEDETOMIDINE HYDROCHLORIDE IN 0.9% SODIUM CHLORIDE 4 UG/ML
0.3 INJECTION INTRAVENOUS
Qty: 200 | Refills: 0 | Status: DISCONTINUED | OUTPATIENT
Start: 2019-10-29 | End: 2019-10-30

## 2019-10-29 RX ORDER — CEFEPIME 1 G/1
1000 INJECTION, POWDER, FOR SOLUTION INTRAMUSCULAR; INTRAVENOUS EVERY 12 HOURS
Refills: 0 | Status: DISCONTINUED | OUTPATIENT
Start: 2019-10-29 | End: 2019-10-30

## 2019-10-29 RX ORDER — DEXTROSE 50 % IN WATER 50 %
50 SYRINGE (ML) INTRAVENOUS ONCE
Refills: 0 | Status: COMPLETED | OUTPATIENT
Start: 2019-10-29 | End: 2019-10-29

## 2019-10-29 RX ORDER — ACETAMINOPHEN 500 MG
650 TABLET ORAL ONCE
Refills: 0 | Status: COMPLETED | OUTPATIENT
Start: 2019-10-29 | End: 2019-10-29

## 2019-10-29 RX ADMIN — Medication 400 MILLIGRAM(S): at 21:46

## 2019-10-29 RX ADMIN — PANTOPRAZOLE SODIUM 40 MILLIGRAM(S): 20 TABLET, DELAYED RELEASE ORAL at 11:59

## 2019-10-29 RX ADMIN — LEVETIRACETAM 400 MILLIGRAM(S): 250 TABLET, FILM COATED ORAL at 06:04

## 2019-10-29 RX ADMIN — DEXMEDETOMIDINE HYDROCHLORIDE IN 0.9% SODIUM CHLORIDE 9.35 MICROGRAM(S)/KG/HR: 4 INJECTION INTRAVENOUS at 18:36

## 2019-10-29 RX ADMIN — Medication 3 MILLILITER(S): at 10:00

## 2019-10-29 RX ADMIN — DEXMEDETOMIDINE HYDROCHLORIDE IN 0.9% SODIUM CHLORIDE 9.35 MICROGRAM(S)/KG/HR: 4 INJECTION INTRAVENOUS at 06:36

## 2019-10-29 RX ADMIN — Medication 650 MILLIGRAM(S): at 20:11

## 2019-10-29 RX ADMIN — DEXMEDETOMIDINE HYDROCHLORIDE IN 0.9% SODIUM CHLORIDE 9.35 MICROGRAM(S)/KG/HR: 4 INJECTION INTRAVENOUS at 01:07

## 2019-10-29 RX ADMIN — DEXMEDETOMIDINE HYDROCHLORIDE IN 0.9% SODIUM CHLORIDE 9.35 MICROGRAM(S)/KG/HR: 4 INJECTION INTRAVENOUS at 21:46

## 2019-10-29 RX ADMIN — HEPARIN SODIUM 5000 UNIT(S): 5000 INJECTION INTRAVENOUS; SUBCUTANEOUS at 14:41

## 2019-10-29 RX ADMIN — CEFEPIME 100 MILLIGRAM(S): 1 INJECTION, POWDER, FOR SOLUTION INTRAMUSCULAR; INTRAVENOUS at 18:22

## 2019-10-29 RX ADMIN — HEPARIN SODIUM 5000 UNIT(S): 5000 INJECTION INTRAVENOUS; SUBCUTANEOUS at 06:04

## 2019-10-29 RX ADMIN — Medication 650 MILLIGRAM(S): at 18:31

## 2019-10-29 RX ADMIN — SODIUM CHLORIDE 75 MILLILITER(S): 9 INJECTION, SOLUTION INTRAVENOUS at 10:25

## 2019-10-29 RX ADMIN — DEXMEDETOMIDINE HYDROCHLORIDE IN 0.9% SODIUM CHLORIDE 9.35 MICROGRAM(S)/KG/HR: 4 INJECTION INTRAVENOUS at 09:43

## 2019-10-29 RX ADMIN — DEXMEDETOMIDINE HYDROCHLORIDE IN 0.9% SODIUM CHLORIDE 9.35 MICROGRAM(S)/KG/HR: 4 INJECTION INTRAVENOUS at 03:41

## 2019-10-29 RX ADMIN — HEPARIN SODIUM 5000 UNIT(S): 5000 INJECTION INTRAVENOUS; SUBCUTANEOUS at 21:46

## 2019-10-29 RX ADMIN — Medication 3 MILLILITER(S): at 21:43

## 2019-10-29 RX ADMIN — Medication 1000 MILLIGRAM(S): at 22:10

## 2019-10-29 RX ADMIN — LEVETIRACETAM 400 MILLIGRAM(S): 250 TABLET, FILM COATED ORAL at 18:22

## 2019-10-29 RX ADMIN — Medication 50 MILLILITER(S): at 06:43

## 2019-10-29 RX ADMIN — DEXMEDETOMIDINE HYDROCHLORIDE IN 0.9% SODIUM CHLORIDE 9.35 MICROGRAM(S)/KG/HR: 4 INJECTION INTRAVENOUS at 12:00

## 2019-10-29 RX ADMIN — DEXMEDETOMIDINE HYDROCHLORIDE IN 0.9% SODIUM CHLORIDE 9.35 MICROGRAM(S)/KG/HR: 4 INJECTION INTRAVENOUS at 14:41

## 2019-10-29 RX ADMIN — Medication 3 MILLILITER(S): at 16:00

## 2019-10-29 NOTE — DIETITIAN INITIAL EVALUATION ADULT. - PERTINENT MEDS FT
heparin, cefepime IVPB, lactated ringers, dextrose 5% + NaCl .45%, keppra IVPB, ativan, pantoprazole, precedex

## 2019-10-29 NOTE — PROVIDER CONTACT NOTE (CRITICAL VALUE NOTIFICATION) - ACTION/TREATMENT ORDERED:
Stat fingerstick done at 0622a result 52.  1 amp D50 ordered and given at 0643a.  Repeat fingerstick done at 0701 result 132.  Continue to monitor.  Report to oncoming shift

## 2019-10-29 NOTE — CONSULT NOTE ADULT - MINUTES
----- Message from Gayla Peraza MD sent at 3/28/2017 12:54 PM CDT -----  .  Will discuss at next office visit.bone density results show mild bone loss   45

## 2019-10-29 NOTE — PROGRESS NOTE ADULT - ASSESSMENT
Patient is s/p toxic metabolic encephalopathy due to polydrug overdose.  He is improving.    REC:  I'd discontinue Keppra.

## 2019-10-29 NOTE — DIETITIAN INITIAL EVALUATION ADULT. - PERTINENT LABORATORY DATA
Hgb 11.9 L, Hct 36 L, Na 145 wnl, K 4.1 wnl, Cl 112 H, CO2 25 wnl, BUN 9 wnl, Creat 0.77 wnl, Glu 48 L, eGFR 101 wnl, eGFR 132 H

## 2019-10-29 NOTE — PROGRESS NOTE ADULT - ASSESSMENT
Physical Examination:  GENERAL:               Alert Confused, on NIV  HEENT:                    No JVD, dry MM  PULM:                     Bilateral air entry, Clear to auscultation bilaterally, no significant sputum production, + Rales, No Rhonchi, + Wheezing  CVS:                         S1, S2,  No Murmur  ABD:                        Soft, distended, nontender, normoactive bowel sounds, Obese  EXT:                         + edema, nontender, No Cyanosis or Clubbing   Vascular:                 Warm Extremities, Normal Capillary refill, Normal Distal Pulses  SKIN:                       Warm and well perfused, no rashes noted.   NEURO:                  Alert, confused interactive, nonfocal, follows commands  PSYC:                      Calm, no Insight.        Assessment  Overdose  Acute hypoxic respiratory failure.   Possible seizure / Withdrawal   Acute respiratory failure - hypoxic/hypercarbic  Aspiration pneumonitis / PNA with worsening cxr    JESSIE  with normal baseline - Resolved  Underlying severe depression       Plan  NIV  Taper fio2 as tolerated  ABG  nebs  broaden abx from ceftriaxone to cefepime.   Precedex for comfort  keppra- neuro f/u   empiric abx  psyc f.u - d/w Dr Coleman today  home psyc meds held at this time  1:1         PMD:		Dr. Bill 		                   Notified(Date): 10/28/19                           Dr. Cline - Psyc  Family Updated: 	wife 	                                 Date: 10/29/19      Sedation & Analgesia:	as above  Diet/Nutrition:		advance as tolerated  GI PPx:			PPI    DVT Ppx:		Heparin sq  	RISK                                                          Points  	[ ] Previous VTE                                           	3  	[ ] Thrombophilia                                        	2  	[ ] Lower limb paralysis                              	2   	[ ] Current Cancer                                       	2   	[ ] Immobilization > 24 hrs                        	1  	[ x ICU/CCU stay > 24 hours                       	1  	[ ] Age > 60                                                   	1  		Total:[1]      Activity:		  as tolerated   Head of Bed:               35-45  Glycemic Control:        n/a    Lines: PIV   Restraints were deemed necessary to prevent removal of life-sustaining devices [x YES   [    ]  NO    Disposition: ICU monitoring    Goals of Care: Full code.

## 2019-10-29 NOTE — CONSULT NOTE ADULT - CONSULT REASON
Drug OD
Overdose
overdose
Gestational Age  26.3 (2018 22:15)            Current Age:  2d        Corrected Gestational Age:    ADMISSION DIAGNOSIS:  prematurity      INTERVAL HISTORY: Last 24 hours significant for decompensation    VITAL SIGNS:  T(C): 37 (07-15-18 @ 12:20), Max: 37 (07-15-18 @ 12:20)  HR: 162 (07-15-18 @ 12:20)  BP: 70/47 (07-15-18 @ 13:30)  BP(mean): 55 (07-15-18 @ 13:30)  RR: 75 (07-15-18 @ 13:30) (59 - 75)  SpO2: 85% (07-15-18 @ 15:00) (80% - 98%)  Wt(kg): 0.88  CAPILLARY BLOOD GLUCOSE      POCT Blood Glucose.: 144 mg/dL (15 Jul 2018 15:17)  POCT Blood Glucose.: 152 mg/dL (15 Jul 2018 13:51)  POCT Blood Glucose.: 78 mg/dL (15 Jul 2018 07:40)  POCT Blood Glucose.: 130 mg/dL (15 Jul 2018 03:24)  POCT Blood Glucose.: 171 mg/dL (15 Jul 2018 00:34)  POCT Blood Glucose.: 167 mg/dL (2018 21:54)  POCT Blood Glucose.: 162 mg/dL (2018 20:25)  POCT Blood Glucose.: 160 mg/dL (2018 18:49)      PHYSICAL EXAM:  General: Non-responsive, without spontaneous movment  Head: AF full  Eyes: Normal size, shape, slaant and placement  Ears: Patent bilaterally, no deformities  Nose: Nares patent  Neck: No masses, intact clavicles  Chest: Breath sounds equal to auscultation. Mild retractions  CV: No murmurs appreciated, normal pulses distally  Abdomen: Soft nontender nondistended, no masses, no bowel sounds appreciated  : Normal for gestational age  Spine: Intact, no sacral dimples or tags  Anus: Grossly patent  Extremities: FROM, no hip clicks  Skin: pink, bruising       RESPIRATORY:  Ventilatory Support:  Mode: SIMV (Synchronized Intermittent Mandatory Ventilation)  RR (machine): 45  FiO2: 98  PEEP: 5  PS: 5  PC: 30      Blood Gases:  Blood Gas Profile - Mixed (07.15.18 @ 15:19)    pH, Mixed: 7.15    pCO2, Mixed: 70 mmHg    pO2, Mixed: 34 mmHg    HCO3, Mixed: 24 mmol/L    Base Excess Mixed: -6.0 mmol/L    Oxygen Saturation, Mixed: 74 %    Blood Gas Source, Mixed: BLDC    Chest X-Ray results:     FINDINGS: The endotracheal tube and umbilical artery catheter probably in   location. The umbilical venous catheter is high overlying the right   atrium. There is severe respiratory distress syndrome. There is no   pneumothorax or pleural effusion.    There is suggestion pneumatosis in the right lower quadrant. There is   mild separation of bowel in the right lower hemiabdomen. There is no free   air.    IMPRESSION:   1.  Intubated with severe respiratory distress syndrome, but pulmonary   hemorrhage is also in the differential diagnosis.   2.  High umbilical venous catheter  3.  Pneumatosis in the right hemiabdomen with separation of bowel loops.      MEDICATIONS:    caffeine citrate 8 mg/kg/day  nitric oxide 20 ppm    INFECTIOUS DISEASE:                        8.1    15.2  )-----------( 253  N 35, B9     ( 15 Jul 2018 10:36 )             26.0       Cultures: Culture - Blood (18 @ 22:52)    Specimen Source: .Blood Blood-Catheter    Culture Results:   No growth at 1 day.          Medications:  ampicillin IV Intermittent - NICU IV Intermittent every 12 hours  clindamycin IV Intermittent - Peds IV Intermittent every 12 hours  gentamicin  IV Intermittent - Peds IV Intermittent every 48 hours            HEMATOLOGY:                        8.1    15.2  )-----------( 253      ( 15 Jul 2018 10:36 )             26.0     Bilirubin Total, Serum: 4.0 mg/dL (07-15 @ 06:21)  Bilirubin Direct, Serum: 0.4 mg/dL (07-15 @ 06:21)  Bilirubin Total, Serum: 2.9 mg/dL ( @ 05:58)  Bilirubin Direct, Serum: 0.2 mg/dL ( @ 05:58)  ABO Interpretation: B+ ( @ 21:06)  ABO Interpretation: B+ ( @ 20:58)    Blood products: PRBC 20 ml  FFP 15 ml  Platelets 15 ml    METABOLIC:  Total Fluid Goal: 100   mL/kG/day  I&O's Detail    2018 07:01  -  15 Jul 2018 07:00  --------------------------------------------------------  IN:    dextrose 5% with heparin 0.5 Unit(s)/mL - : 14.5 mL    fat emulsion (Fish Oil and Plant Based) 20% Infusion - Peds: 0.7 mL    sodium chloride 0.45% - : 10.5 mL    TPN (Total Parenteral Nutrition): 54.2 mL  Total IN: 79.9 mL    OUT:    Voided: 119 mL  Total OUT: 119 mL    Total NET: -39.1 mL      15 Jul 2018 07:01  -  15 2018 16:48  --------------------------------------------------------  IN:    fat emulsion (Fish Oil and Plant Based) 20% Infusion - Peds: 0.8 mL    Platelets - Single Donor - Pediatric - Partial Unit: 26.4 mL    PRBCs - Pediatric - Partial Unit: 15 mL    sodium chloride 0.45% - : 7.5 mL    TPN (Total Parenteral Nutrition): 16 mL  Total IN: 65.7 mL    OUT:    Voided: 32 mL  Total OUT: 32 mL    Total NET: 33.7 mL        Parenteral:    [] UVC: TPN and IL   [] UAC :  1/2 Ns but changing back to D5W        Medications:  fat emulsion (Fish Oil and Plant Based) 20% Infusion - Peds IV Continuous <Continuous>  Parenteral Nutrition -  TPN Continuous <Continuous>  sodium chloride 0.45% -  IV Continuous <Continuous>      07-15    142  |  107  |  33<H>  ----------------------------<  80  5.1   |  17<L>  |  0.83<H>    Ca    8.9      15 Jul 2018 06:21  Mg     2.3         TPro  x   /  Alb  x   /  TBili  4.0  /  DBili  0.4<H>  /  AST  x   /  ALT  x   /  AlkPhos  x   07-15        NEUROLOGY:  Test Results: HUS to be done      Medications:      OTHER ACTIVE MEDICAL ISSUES:  CONSULTS:  SURGERY:  Abdominal xray with pneumatosis, no free air.  Infant had blood coming up esophagus.  Penrose drain placed by Dr. Garcia       DISCHARGE PLANNING: in progress  Follow Up Program:  Other Follow Up Appointments:

## 2019-10-29 NOTE — DIETITIAN INITIAL EVALUATION ADULT. - ENTERAL
If pt requires enteral nutrition, recommend initiating Jevity 1.5 @ 30 ml/hr increase by 10 ml every 2 hours to reach goal of 80 ml/hr x 18 hours (provides 2160 kcal, 91.9 g protein).
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
## 2019-10-29 NOTE — DIETITIAN INITIAL EVALUATION ADULT. - OTHER INFO
54 M w/ PMHx of MDD (pt familiar to medardo yifan @ [facility] in June), hepatitis, HLD, admitted w/ overdose, acute hypoxic respiratory failure, possible seizure/withdrawal, aspiration pneumonitis/PNA w/ worsening cxr. PTA, had a good appetite per pt's wife, diet was high in saturated fat/cholesterol based on typical intake per pt's wife. Provided nutrition education on healthy eating/diet modifications to pt's wife as she cooks the majority of pt's meals. UBW approx. 290#. Weight today (10/29) 299 lbs. Skin intact; no edema per flowsheets. Last BM 10/28. Pt extubated, on bipap currently. Per discussion w/ MD + PA, pt likely to remain NPO today, no planned NGT placement for today. If pt requires enteral nutrition, recommend initiating Jevity 1.5 @ 30 ml/hr increase by 10 ml every 2 hours to reach goal of 80 ml/hr x 18 hours (provides 2160 kcal, 91.9 g protein). Will follow clinical course. If pt able to tolerate PO diet, recommend DASH/TLC.

## 2019-10-29 NOTE — PROVIDER CONTACT NOTE (OTHER) - RECOMMENDATIONS
NARCISO Samuels, attempted to be reached regarding new onset temp, but unable to reach sec to him being with ER pt, and unable to come to phone

## 2019-10-29 NOTE — PROGRESS NOTE ADULT - SUBJECTIVE AND OBJECTIVE BOX
Neurology Progress Note    Subjective & Objective:  Improving neuro status.  No seizures.  Psych note appreciated.    On exam he is more alert and attentive.  Cranial nerves intact.  No motor focality or abnormal movements.  DTRs equal and toes flexor.        Lab:  10-29    145  |  112<H>  |  9   ----------------------------<  48<L>  4.1   |  25  |  0.77    Ca    9.1      29 Oct 2019 05:30  Phos  3.5     10-29  Mg     2.0     10-29    TPro  7.5  /  Alb  4.0  /  TBili  0.2  /  DBili  x   /  AST  26  /  ALT  55<H>  /  AlkPhos  57  10-27    LIVER FUNCTIONS - ( 27 Oct 2019 22:30 )  Alb: 4.0 g/dL / Pro: 7.5 g/dL / ALK PHOS: 57 U/L / ALT: 55 U/L / AST: 26 U/L / GGT: x                                   11.9   8.43  )-----------( 151      ( 29 Oct 2019 05:30 )             36.3               MEDICATIONS  (STANDING):  albuterol/ipratropium for Nebulization 3 milliLiter(s) Nebulizer every 6 hours  cefepime   IVPB 1000 milliGRAM(s) IV Intermittent every 12 hours  chlorhexidine 4% Liquid 1 Application(s) Topical <User Schedule>  dexMEDEtomidine Infusion 0.3 MICROgram(s)/kG/Hr (9.352 mL/Hr) IV Continuous <Continuous>  dextrose 5% + sodium chloride 0.45%. 1000 milliLiter(s) (75 mL/Hr) IV Continuous <Continuous>  heparin  Injectable 5000 Unit(s) SubCutaneous every 8 hours  lactated ringers. 1000 milliLiter(s) (75 mL/Hr) IV Continuous <Continuous>  levETIRAcetam  IVPB 750 milliGRAM(s) IV Intermittent every 12 hours  pantoprazole  Injectable 40 milliGRAM(s) IV Push daily    MEDICATIONS  (PRN):  LORazepam   Injectable 1 milliGRAM(s) IV Push three times a day PRN Agitation

## 2019-10-29 NOTE — PROGRESS NOTE ADULT - SUBJECTIVE AND OBJECTIVE BOX
Follow-up Critical Care Progress Note  Chief Complaint : Poisoning by unspecified drugs, medicaments and biological substances, accidental (unintentional), initial encounter      overnight events reviewed  pt with increasing fio2 requirments  now on  %, tapered down to 60%  pt confused, does not know in hospital or able to state events leading to hospitalization  started on precedex for agitation.   noted hypoglycemia       Allergies :No Known Allergies      PAST MEDICAL & SURGICAL HISTORY:  Depression  Hepatitis:   Left knee pain  No significant past surgical history      Medications:  MEDICATIONS  (STANDING):  cefTRIAXone   IVPB 1000 milliGRAM(s) IV Intermittent every 24 hours  chlorhexidine 4% Liquid 1 Application(s) Topical <User Schedule>  dexMEDEtomidine Infusion 0.3 MICROgram(s)/kG/Hr (9.352 mL/Hr) IV Continuous <Continuous>  heparin  Injectable 5000 Unit(s) SubCutaneous every 8 hours  lactated ringers. 1000 milliLiter(s) (75 mL/Hr) IV Continuous <Continuous>  levETIRAcetam  IVPB 750 milliGRAM(s) IV Intermittent every 12 hours  pantoprazole  Injectable 40 milliGRAM(s) IV Push daily  propofol Infusion 20 MICROgram(s)/kG/Min (14.963 mL/Hr) IV Continuous <Continuous>    MEDICATIONS  (PRN):  LORazepam   Injectable 1 milliGRAM(s) IV Push three times a day PRN Agitation      LABS:                        11.9   8.43  )-----------( 151      ( 29 Oct 2019 05:30 )             36.3     10-    145  |  112<H>  |  9   ----------------------------<  48<L>  4.1   |  25  |  0.77    Ca    9.1      29 Oct 2019 05:30  Phos  3.5     10-  Mg     2.0     10-29    TPro  7.5  /  Alb  4.0  /  TBili  0.2  /  DBili  x   /  AST  26  /  ALT  55<H>  /  AlkPhos  57  10      CARDIAC MARKERS ( 28 Oct 2019 05:40 )  x     / x     / 507 U/L / x     / x      CARDIAC MARKERS ( 27 Oct 2019 22:30 )  <.017 ng/mL / x     / x     / x     / x            PT/INR - ( 27 Oct 2019 22:30 )   PT: 13.6 sec;   INR: 1.21 ratio         PTT - ( 27 Oct 2019 22:30 )  PTT:28.2 sec  Urinalysis Basic - ( 27 Oct 2019 23:52 )    Color: Yellow / Appearance: Clear / S.010 / pH: x  Gluc: x / Ketone: Negative  / Bili: Negative / Urobili: Negative   Blood: x / Protein: 15 / Nitrite: Negative   Leuk Esterase: Negative / RBC: Negative /HPF / WBC Negative /HPF   Sq Epi: x / Non Sq Epi: Neg.-Few / Bacteria: Negative /HPF        Serum Pro-Brain Natriuretic Peptide: 25 pg/mL (10-27-19 @ 22:30)  Trend Bun/Cr  10-29-19 @ 05:30  BUN/CR -  9 / 0.77  10-28-19 @ 05:40  BUN/CR -  14 / 1.12  10-27-19 @ 22:30  BUN/CR -  15 / 1.55<H>  19 @ 10:03  BUN/CR -  13 / 0.80    Glucose Trend  10-29-19 @ 07:01   -  -- -- 132<H>  10-29-19 @ 06:22   -  -- -- 52<L>  10-29-19 @ 05:30   -  -- 48<L> --  10-28-19 @ 05:40   -  -- 90 --  10-27-19 @ 22:30   -  -- 105<H> --          CULTURES: (if applicable)        ABG - ( 29 Oct 2019 06:10 )  pH, Arterial: 7.45  pH, Blood: x     /  pCO2: 32    /  pO2: 81    / HCO3: x     / Base Excess: x     /  SaO2: 95                CAPILLARY BLOOD GLUCOSE      POCT Blood Glucose.: 132 mg/dL (29 Oct 2019 07:01)      RADIOLOGY  CXR:    < from: Xray Chest 1 View AP/PA (10.29.19 @ 06:46) >  INTERPRETATION:  AP erect chest on 2019 at 6:16 AM. Patient   is short of breath.    Heart is magnified by technique.    Bilateral infiltrates left greater than right are noted. These   infiltrates are increased from .    Pneumonia versus CHF are possibilities.    IMPRESSION: Increasing infiltration, particularly on the left.    < end of copied text >    EEG  Impression: Mildly abnormal record on the basis of  generalized   background disorganization and bilateral slowing. Recording suggests a   nonspecific diffuse cerebral dysfunction.The record is without   epileptiform discharges. Suggest clinical correlation.      VITALS:  T(C): 36.7 (10-28-19 @ 23:00), Max: 36.9 (10-28-19 @ 19:00)  T(F): 98 (10-28-19 @ 23:00), Max: 98.4 (10-28-19 @ 19:00)  HR: 72 (10-29-19 @ 09:00) (68 - 85)  BP: 114/86 (10-29-19 @ 09:00) (68/56 - 117/90)  BP(mean): 96 (10-29-19 @ 09:) (61 - 99)  ABP: --  ABP(mean): --  RR: 30 (10-29-19 @ 09:00) (19 - 42)  SpO2: 98% (10-29-19 @ 09:) (89% - 99%)  CVP(mm Hg): --  CVP(cm H2O): --    Ins and Outs     10-28-19 @ 07:01  -  10-29-19 @ 07:00  --------------------------------------------------------  IN: 2106.1 mL / OUT: 1790 mL / NET: 316.1 mL    10-29-19 @ 07:01  -  10-29-19 @ 09:37  --------------------------------------------------------  IN: 183 mL / OUT: 0 mL / NET: 183 mL        Height (cm): 182.9 (10-29-19 @ 00:00)  Weight (kg): 135.7 (10-29-19 @ 00:00)  BMI (kg/m2): 40.6 (10-29-19 @ 00:00)    Device: 840, Mode: CPAP with PS, RR (patient): 16, FiO2: 40, PEEP: 5, PS: 5    I&O's Detail    28 Oct 2019 07:01  -  29 Oct 2019 07:00  --------------------------------------------------------  IN:    dexmedetomidine Infusion: 82.5 mL    lactated ringers.: 1725 mL    propofol Infusion: 48.6 mL    Solution: 50 mL    Solution: 200 mL  Total IN: 2106.1 mL    OUT:    Indwelling Catheter - Urethral: 1790 mL  Total OUT: 1790 mL    Total NET: 316.1 mL      29 Oct 2019 07:  -  29 Oct 2019 09:37  --------------------------------------------------------  IN:    dexmedetomidine Infusion: 33 mL    lactated ringers.: 150 mL  Total IN: 183 mL    OUT:  Total OUT: 0 mL    Total NET: 183 mL

## 2019-10-29 NOTE — CONSULT NOTE ADULT - REASON FOR ADMISSION
AMS hypoxemia possible drug overdose

## 2019-10-30 ENCOUNTER — INPATIENT (INPATIENT)
Facility: HOSPITAL | Age: 54
LOS: 18 days | Discharge: NURSING, REHAB OR LONG-TERM CARE | End: 2019-11-18
Attending: INTERNAL MEDICINE | Admitting: INTERNAL MEDICINE
Payer: COMMERCIAL

## 2019-10-30 ENCOUNTER — RESULT REVIEW (OUTPATIENT)
Age: 54
End: 2019-10-30

## 2019-10-30 VITALS
OXYGEN SATURATION: 92 % | HEART RATE: 86 BPM | SYSTOLIC BLOOD PRESSURE: 108 MMHG | DIASTOLIC BLOOD PRESSURE: 85 MMHG | RESPIRATION RATE: 28 BRPM

## 2019-10-30 VITALS
RESPIRATION RATE: 19 BRPM | SYSTOLIC BLOOD PRESSURE: 102 MMHG | OXYGEN SATURATION: 95 % | HEART RATE: 82 BPM | DIASTOLIC BLOOD PRESSURE: 77 MMHG | TEMPERATURE: 101 F

## 2019-10-30 DIAGNOSIS — R53.2 FUNCTIONAL QUADRIPLEGIA: ICD-10-CM

## 2019-10-30 DIAGNOSIS — Z51.5 ENCOUNTER FOR PALLIATIVE CARE: ICD-10-CM

## 2019-10-30 DIAGNOSIS — J96.90 RESPIRATORY FAILURE, UNSPECIFIED, UNSPECIFIED WHETHER WITH HYPOXIA OR HYPERCAPNIA: ICD-10-CM

## 2019-10-30 DIAGNOSIS — F32.9 MAJOR DEPRESSIVE DISORDER, SINGLE EPISODE, UNSPECIFIED: ICD-10-CM

## 2019-10-30 DIAGNOSIS — R06.03 ACUTE RESPIRATORY DISTRESS: ICD-10-CM

## 2019-10-30 LAB
ALBUMIN SERPL ELPH-MCNC: 3 G/DL — LOW (ref 3.3–5)
ALBUMIN SERPL ELPH-MCNC: 3.2 G/DL — LOW (ref 3.3–5)
ALP SERPL-CCNC: 38 U/L — LOW (ref 40–120)
ALP SERPL-CCNC: 39 U/L — LOW (ref 40–120)
ALT FLD-CCNC: 40 U/L — SIGNIFICANT CHANGE UP (ref 4–41)
ALT FLD-CCNC: 40 U/L — SIGNIFICANT CHANGE UP (ref 4–41)
ANION GAP SERPL CALC-SCNC: 10 MMO/L — SIGNIFICANT CHANGE UP (ref 7–14)
ANION GAP SERPL CALC-SCNC: 10 MMOL/L — SIGNIFICANT CHANGE UP (ref 5–17)
ANION GAP SERPL CALC-SCNC: 11 MMO/L — SIGNIFICANT CHANGE UP (ref 7–14)
ANION GAP SERPL CALC-SCNC: 9 MMO/L — SIGNIFICANT CHANGE UP (ref 7–14)
APTT BLD: 28.2 SEC — SIGNIFICANT CHANGE UP (ref 27.5–36.3)
APTT BLD: 33 SEC — SIGNIFICANT CHANGE UP (ref 27.5–36.3)
APTT BLD: 39.8 SEC — HIGH (ref 27.5–36.3)
APTT BLD: 97.5 SEC — HIGH (ref 27.5–36.3)
AST SERPL-CCNC: 31 U/L — SIGNIFICANT CHANGE UP (ref 4–40)
AST SERPL-CCNC: 39 U/L — SIGNIFICANT CHANGE UP (ref 4–40)
AT III ACT/NOR PPP CHRO: 80 % — SIGNIFICANT CHANGE UP (ref 76–140)
BASE EXCESS BLDA CALC-SCNC: -0.1 MMOL/L — SIGNIFICANT CHANGE UP
BASE EXCESS BLDA CALC-SCNC: -0.2 MMOL/L — SIGNIFICANT CHANGE UP
BASE EXCESS BLDA CALC-SCNC: -0.5 MMOL/L — SIGNIFICANT CHANGE UP
BASE EXCESS BLDA CALC-SCNC: -0.8 MMOL/L — SIGNIFICANT CHANGE UP
BASE EXCESS BLDA CALC-SCNC: -0.8 MMOL/L — SIGNIFICANT CHANGE UP
BASE EXCESS BLDA CALC-SCNC: -1.9 MMOL/L — SIGNIFICANT CHANGE UP
BASE EXCESS BLDA CALC-SCNC: 1 MMOL/L — SIGNIFICANT CHANGE UP
BASE EXCESS BLDCOA CALC-SCNC: -2 MMOL/L — SIGNIFICANT CHANGE UP
BASE EXCESS BLDCOA CALC-SCNC: -2.6 MMOL/L — SIGNIFICANT CHANGE UP
BASE EXCESS BLDCOV CALC-SCNC: -0.5 MMOL/L — SIGNIFICANT CHANGE UP
BASE EXCESS BLDCOV CALC-SCNC: -1.9 MMOL/L — SIGNIFICANT CHANGE UP
BASOPHILS # BLD AUTO: 0.02 K/UL — SIGNIFICANT CHANGE UP (ref 0–0.2)
BASOPHILS # BLD AUTO: 0.03 K/UL — SIGNIFICANT CHANGE UP (ref 0–0.2)
BASOPHILS NFR BLD AUTO: 0.1 % — SIGNIFICANT CHANGE UP (ref 0–2)
BASOPHILS NFR BLD AUTO: 0.3 % — SIGNIFICANT CHANGE UP (ref 0–2)
BILIRUB SERPL-MCNC: 0.7 MG/DL — SIGNIFICANT CHANGE UP (ref 0.2–1.2)
BILIRUB SERPL-MCNC: 0.8 MG/DL — SIGNIFICANT CHANGE UP (ref 0.2–1.2)
BLD GP AB SCN SERPL QL: NEGATIVE — SIGNIFICANT CHANGE UP
BLD GP AB SCN SERPL QL: SIGNIFICANT CHANGE UP
BLOOD GAS ARTERIAL - FIO2: 100 — SIGNIFICANT CHANGE UP
BLOOD GAS ARTERIAL - FIO2: 100 — SIGNIFICANT CHANGE UP
BLOOD GAS ARTERIAL - FIO2: 30 — SIGNIFICANT CHANGE UP
BLOOD GAS COMMENTS ARTERIAL: SIGNIFICANT CHANGE UP
BLOOD GAS POST MEMBRANE - GLUCOSE: 176 MG/DL — HIGH (ref 70–99)
BLOOD GAS POST MEMBRANE - GLUCOSE: 182 MG/DL — HIGH (ref 70–99)
BLOOD GAS POST MEMBRANE - ICALCIUM: 1.09 MMOL/L — SIGNIFICANT CHANGE UP (ref 1.03–1.23)
BLOOD GAS POST MEMBRANE - ICALCIUM: 1.11 MMOL/L — SIGNIFICANT CHANGE UP (ref 1.03–1.23)
BLOOD GAS POST MEMBRANE - POTASSIUM: 2.8 MMOL/L — LOW (ref 3.4–4.5)
BLOOD GAS POST MEMBRANE - POTASSIUM: 3.5 MMOL/L — SIGNIFICANT CHANGE UP (ref 3.4–4.5)
BLOOD GAS POST MEMBRANE - SODIUM: 141 MMOL/L — SIGNIFICANT CHANGE UP (ref 136–146)
BLOOD GAS POST MEMBRANE - SODIUM: 141 MMOL/L — SIGNIFICANT CHANGE UP (ref 136–146)
BLOOD GAS PRE MEMBRANE - GLUCOSE: 161 MG/DL — HIGH (ref 70–99)
BLOOD GAS PRE MEMBRANE - GLUCOSE: 172 MG/DL — HIGH (ref 70–99)
BLOOD GAS PRE MEMBRANE - ICALCIUM: 1.07 MMOL/L — SIGNIFICANT CHANGE UP (ref 1.03–1.23)
BLOOD GAS PRE MEMBRANE - ICALCIUM: 1.1 MMOL/L — SIGNIFICANT CHANGE UP (ref 1.03–1.23)
BLOOD GAS PRE MEMBRANE - POTASSIUM: 2.9 MMOL/L — LOW (ref 3.4–4.5)
BLOOD GAS PRE MEMBRANE - POTASSIUM: 3.4 MMOL/L — SIGNIFICANT CHANGE UP (ref 3.4–4.5)
BLOOD GAS PRE MEMBRANE - SODIUM: 141 MMOL/L — SIGNIFICANT CHANGE UP (ref 136–146)
BLOOD GAS PRE MEMBRANE - SODIUM: 141 MMOL/L — SIGNIFICANT CHANGE UP (ref 136–146)
BLOOD GAS VENOUS - FIO2: 21 — SIGNIFICANT CHANGE UP
BODY FLUID TYPE: SIGNIFICANT CHANGE UP
BUN SERPL-MCNC: 12 MG/DL — SIGNIFICANT CHANGE UP (ref 7–23)
BUN SERPL-MCNC: 12 MG/DL — SIGNIFICANT CHANGE UP (ref 7–23)
BUN SERPL-MCNC: 13 MG/DL — SIGNIFICANT CHANGE UP (ref 7–23)
BUN SERPL-MCNC: 9 MG/DL — SIGNIFICANT CHANGE UP (ref 7–23)
CALCIUM SERPL-MCNC: 8.1 MG/DL — LOW (ref 8.4–10.5)
CALCIUM SERPL-MCNC: 8.3 MG/DL — LOW (ref 8.4–10.5)
CALCIUM SERPL-MCNC: 8.3 MG/DL — LOW (ref 8.4–10.5)
CALCIUM SERPL-MCNC: 8.5 MG/DL — SIGNIFICANT CHANGE UP (ref 8.4–10.5)
CHLORIDE BLDA-SCNC: 115 MMOL/L — HIGH (ref 96–108)
CHLORIDE BLDA-SCNC: SIGNIFICANT CHANGE UP MMOL/L (ref 96–108)
CHLORIDE SERPL-SCNC: 108 MMOL/L — HIGH (ref 98–107)
CHLORIDE SERPL-SCNC: 108 MMOL/L — SIGNIFICANT CHANGE UP (ref 96–108)
CHLORIDE SERPL-SCNC: 111 MMOL/L — HIGH (ref 98–107)
CHLORIDE SERPL-SCNC: 111 MMOL/L — HIGH (ref 98–107)
CK MB BLD-MCNC: 0.7 — SIGNIFICANT CHANGE UP (ref 0–2.5)
CK MB BLD-MCNC: 4.19 NG/ML — SIGNIFICANT CHANGE UP (ref 1–6.6)
CK SERPL-CCNC: 628 U/L — HIGH (ref 30–200)
CK SERPL-CCNC: 639 U/L — HIGH (ref 30–200)
CLARITY SPEC: SIGNIFICANT CHANGE UP
CO2 BLDA-SCNC: 20 MMOL/L — LOW (ref 22–30)
CO2 BLDA-SCNC: 21 MMOL/L — LOW (ref 22–30)
CO2 BLDA-SCNC: 22 MMOL/L — SIGNIFICANT CHANGE UP (ref 22–30)
CO2 BLDA-SCNC: 25 MMOL/L — SIGNIFICANT CHANGE UP (ref 22–30)
CO2 SERPL-SCNC: 21 MMOL/L — LOW (ref 22–31)
CO2 SERPL-SCNC: 22 MMOL/L — SIGNIFICANT CHANGE UP (ref 22–31)
CO2 SERPL-SCNC: 22 MMOL/L — SIGNIFICANT CHANGE UP (ref 22–31)
CO2 SERPL-SCNC: 25 MMOL/L — SIGNIFICANT CHANGE UP (ref 22–31)
COHGB MFR BLDA: 1 % — SIGNIFICANT CHANGE UP (ref 0.5–1.5)
COHGB MFR BLDA: 1.3 % — SIGNIFICANT CHANGE UP (ref 0.5–1.5)
COLOR FLD: SIGNIFICANT CHANGE UP
CREAT SERPL-MCNC: 0.54 MG/DL — SIGNIFICANT CHANGE UP (ref 0.5–1.3)
CREAT SERPL-MCNC: 0.7 MG/DL — SIGNIFICANT CHANGE UP (ref 0.5–1.3)
CREAT SERPL-MCNC: 0.77 MG/DL — SIGNIFICANT CHANGE UP (ref 0.5–1.3)
CREAT SERPL-MCNC: 1.05 MG/DL — SIGNIFICANT CHANGE UP (ref 0.5–1.3)
EOSINOPHIL # BLD AUTO: 0.03 K/UL — SIGNIFICANT CHANGE UP (ref 0–0.5)
EOSINOPHIL # BLD AUTO: 0.09 K/UL — SIGNIFICANT CHANGE UP (ref 0–0.5)
EOSINOPHIL NFR BLD AUTO: 0.2 % — SIGNIFICANT CHANGE UP (ref 0–6)
EOSINOPHIL NFR BLD AUTO: 0.9 % — SIGNIFICANT CHANGE UP (ref 0–6)
GAS PNL BLDA: SIGNIFICANT CHANGE UP
GLUCOSE BLDA-MCNC: 173 MG/DL — HIGH (ref 70–99)
GLUCOSE BLDA-MCNC: 189 MG/DL — HIGH (ref 70–99)
GLUCOSE BLDA-MCNC: 195 MG/DL — HIGH (ref 70–99)
GLUCOSE BLDA-MCNC: 202 MG/DL — HIGH (ref 70–99)
GLUCOSE BLDA-MCNC: 205 MG/DL — HIGH (ref 70–99)
GLUCOSE BLDA-MCNC: 212 MG/DL — HIGH (ref 70–99)
GLUCOSE BLDA-MCNC: 248 MG/DL — HIGH (ref 70–99)
GLUCOSE BLDC GLUCOMTR-MCNC: 188 MG/DL — HIGH (ref 70–99)
GLUCOSE SERPL-MCNC: 161 MG/DL — HIGH (ref 70–99)
GLUCOSE SERPL-MCNC: 186 MG/DL — HIGH (ref 70–99)
GLUCOSE SERPL-MCNC: 200 MG/DL — HIGH (ref 70–99)
GLUCOSE SERPL-MCNC: 204 MG/DL — HIGH (ref 70–99)
GRAM STN SPT: SIGNIFICANT CHANGE UP
HAPTOGLOB SERPL-MCNC: 54 MG/DL — SIGNIFICANT CHANGE UP (ref 34–200)
HCO3 BLDA-SCNC: 23 MMOL/L — SIGNIFICANT CHANGE UP (ref 22–26)
HCO3 BLDA-SCNC: 24 MMOL/L — SIGNIFICANT CHANGE UP (ref 22–26)
HCO3 BLDA-SCNC: 24 MMOL/L — SIGNIFICANT CHANGE UP (ref 22–26)
HCO3 BLDA-SCNC: 25 MMOL/L — SIGNIFICANT CHANGE UP (ref 22–26)
HCO3 BLDA-SCNC: 26 MMOL/L — SIGNIFICANT CHANGE UP (ref 22–26)
HCO3, POST MEMBRANE ARTERIAL: 23 MMOL/L — SIGNIFICANT CHANGE UP
HCO3, POST MEMBRANE ARTERIAL: 23 MMOL/L — SIGNIFICANT CHANGE UP
HCO3, PRE MEMBRANE VENOUS: 23 MMOL/L — SIGNIFICANT CHANGE UP (ref 20–27)
HCO3, PRE MEMBRANE VENOUS: 24 MMOL/L — SIGNIFICANT CHANGE UP (ref 20–27)
HCT VFR BLD CALC: 32.1 % — LOW (ref 39–50)
HCT VFR BLD CALC: 35.6 % — LOW (ref 39–50)
HCT VFR BLD CALC: 39.7 % — SIGNIFICANT CHANGE UP (ref 39–50)
HCT VFR BLDA CALC: 31.7 % — LOW (ref 39–51)
HCT VFR BLDA CALC: 34.4 % — LOW (ref 39–51)
HCT VFR BLDA CALC: 34.5 % — LOW (ref 39–51)
HCT VFR BLDA CALC: 34.7 % — LOW (ref 39–51)
HCT VFR BLDA CALC: 34.9 % — LOW (ref 39–51)
HCT VFR BLDA CALC: 35.9 % — LOW (ref 39–51)
HCT VFR BLDA CALC: 36.5 % — LOW (ref 39–51)
HGB BLD-MCNC: 10.4 G/DL — LOW (ref 13–17)
HGB BLD-MCNC: 11.3 G/DL — LOW (ref 13–17)
HGB BLD-MCNC: 13.1 G/DL — SIGNIFICANT CHANGE UP (ref 13–17)
HGB BLDA-MCNC: 10.3 G/DL — LOW (ref 13–17)
HGB BLDA-MCNC: 11.2 G/DL — LOW (ref 13–17)
HGB BLDA-MCNC: 11.3 G/DL — LOW (ref 13–17)
HGB BLDA-MCNC: 11.7 G/DL — LOW (ref 13–17)
HGB BLDA-MCNC: 11.9 G/DL — LOW (ref 13–17)
HOROWITZ INDEX BLDA+IHG-RTO: SIGNIFICANT CHANGE UP
IMM GRANULOCYTES NFR BLD AUTO: 0.9 % — SIGNIFICANT CHANGE UP (ref 0–1.5)
IMM GRANULOCYTES NFR BLD AUTO: 1 % — SIGNIFICANT CHANGE UP (ref 0–1.5)
INR BLD: 1.43 RATIO — HIGH (ref 0.88–1.16)
INR BLD: 1.47 — HIGH (ref 0.88–1.17)
INR BLD: 1.48 — HIGH (ref 0.88–1.17)
LACTATE BLDA-SCNC: 1.3 MMOL/L — SIGNIFICANT CHANGE UP (ref 0.5–2)
LACTATE BLDA-SCNC: 1.4 MMOL/L — SIGNIFICANT CHANGE UP (ref 0.5–2)
LACTATE BLDA-SCNC: 1.7 MMOL/L — SIGNIFICANT CHANGE UP (ref 0.5–2)
LACTATE BLDA-SCNC: 1.7 MMOL/L — SIGNIFICANT CHANGE UP (ref 0.5–2)
LACTATE BLDA-SCNC: 1.8 MMOL/L — SIGNIFICANT CHANGE UP (ref 0.5–2)
LACTATE BLDA-SCNC: 1.9 MMOL/L — SIGNIFICANT CHANGE UP (ref 0.5–2)
LACTATE BLDA-SCNC: 2 MMOL/L — SIGNIFICANT CHANGE UP (ref 0.5–2)
LACTATE, POST MEMBRANE ARTERIAL: 1.2 MMOL/L — SIGNIFICANT CHANGE UP (ref 0.5–2.2)
LACTATE, POST MEMBRANE ARTERIAL: 1.7 MMOL/L — SIGNIFICANT CHANGE UP (ref 0.5–2.2)
LACTATE, PRE MEMBRANE VENOUS: 1.1 MMOL/L — SIGNIFICANT CHANGE UP (ref 0.5–2.2)
LACTATE, PRE MEMBRANE VENOUS: 1.6 MMOL/L — SIGNIFICANT CHANGE UP (ref 0.5–2.2)
LDH SERPL L TO P-CCNC: 248 U/L — HIGH (ref 135–225)
LYMPHOCYTES # BLD AUTO: 1.01 K/UL — SIGNIFICANT CHANGE UP (ref 1–3.3)
LYMPHOCYTES # BLD AUTO: 1.25 K/UL — SIGNIFICANT CHANGE UP (ref 1–3.3)
LYMPHOCYTES # BLD AUTO: 10.1 % — LOW (ref 13–44)
LYMPHOCYTES # BLD AUTO: 8.7 % — LOW (ref 13–44)
LYMPHOCYTES NFR FLD: 9 % — SIGNIFICANT CHANGE UP
MACROPHAGES # FLD: 2 % — SIGNIFICANT CHANGE UP
MAGNESIUM SERPL-MCNC: 1.6 MG/DL — SIGNIFICANT CHANGE UP (ref 1.6–2.6)
MAGNESIUM SERPL-MCNC: 1.8 MG/DL — SIGNIFICANT CHANGE UP (ref 1.6–2.6)
MAGNESIUM SERPL-MCNC: 1.9 MG/DL — SIGNIFICANT CHANGE UP (ref 1.6–2.6)
MCHC RBC-ENTMCNC: 30.8 PG — SIGNIFICANT CHANGE UP (ref 27–34)
MCHC RBC-ENTMCNC: 31 PG — SIGNIFICANT CHANGE UP (ref 27–34)
MCHC RBC-ENTMCNC: 31.7 % — LOW (ref 32–36)
MCHC RBC-ENTMCNC: 32.4 % — SIGNIFICANT CHANGE UP (ref 32–36)
MCHC RBC-ENTMCNC: 32.6 PG — SIGNIFICANT CHANGE UP (ref 27–34)
MCHC RBC-ENTMCNC: 33 GM/DL — SIGNIFICANT CHANGE UP (ref 32–36)
MCV RBC AUTO: 95.5 FL — SIGNIFICANT CHANGE UP (ref 80–100)
MCV RBC AUTO: 97 FL — SIGNIFICANT CHANGE UP (ref 80–100)
MCV RBC AUTO: 98.8 FL — SIGNIFICANT CHANGE UP (ref 80–100)
MESOTHL CELL # FLD: 1 % — SIGNIFICANT CHANGE UP
METHGB MFR BLDMV: 1.2 % — SIGNIFICANT CHANGE UP (ref 0–1.5)
METHGB MFR BLDMV: 1.6 % — HIGH (ref 0–1.5)
MONOCYTES # BLD AUTO: 0.51 K/UL — SIGNIFICANT CHANGE UP (ref 0–0.9)
MONOCYTES # BLD AUTO: 0.75 K/UL — SIGNIFICANT CHANGE UP (ref 0–0.9)
MONOCYTES # FLD: 5 % — SIGNIFICANT CHANGE UP
MONOCYTES NFR BLD AUTO: 5.1 % — SIGNIFICANT CHANGE UP (ref 2–14)
MONOCYTES NFR BLD AUTO: 5.2 % — SIGNIFICANT CHANGE UP (ref 2–14)
NEUTROPHILS # BLD AUTO: 12.23 K/UL — HIGH (ref 1.8–7.4)
NEUTROPHILS # BLD AUTO: 8.28 K/UL — HIGH (ref 1.8–7.4)
NEUTROPHILS NFR BLD AUTO: 82.7 % — HIGH (ref 43–77)
NEUTROPHILS NFR BLD AUTO: 84.8 % — HIGH (ref 43–77)
NEUTS SEG NFR FLD MANUAL: 70 % — SIGNIFICANT CHANGE UP
NRBC # BLD: 0 /100 WBCS — SIGNIFICANT CHANGE UP (ref 0–0)
NRBC # FLD: 0 K/UL — SIGNIFICANT CHANGE UP (ref 0–0)
NRBC # FLD: 0 K/UL — SIGNIFICANT CHANGE UP (ref 0–0)
NT-PROBNP SERPL-SCNC: 5944 PG/ML — HIGH (ref 0–300)
OSMOLALITY SERPL: 307 MOSMO/KG — HIGH (ref 275–295)
OTHER CELLS FLD MANUAL: 13 % — SIGNIFICANT CHANGE UP
OXYGEN SATURATION, PRE MEMBRANE VENOUS: 76 % — LOW (ref 95–99)
OXYGEN SATURATION, PRE MEMBRANE VENOUS: 78.5 % — LOW (ref 95–99)
OXYGEN SATURATION,POST MEMBRANE ARTERIAL: 98.5 % — SIGNIFICANT CHANGE UP (ref 95–99)
OXYGEN SATURATION,POST MEMBRANE ARTERIAL: 99.4 % — HIGH (ref 95–99)
OXYHEMOGLOBIN, PRE MEMBRANE VENOUS: 74.4 % — LOW (ref 94–98)
OXYHEMOGLOBIN, PRE MEMBRANE VENOUS: 76.2 % — LOW (ref 94–98)
PCO2 BLDA: 30 MMHG — LOW (ref 32–46)
PCO2 BLDA: 30 MMHG — LOW (ref 35–48)
PCO2 BLDA: 30 MMHG — LOW (ref 35–48)
PCO2 BLDA: 31 MMHG — LOW (ref 32–46)
PCO2 BLDA: 31 MMHG — LOW (ref 35–48)
PCO2 BLDA: 35 MMHG — SIGNIFICANT CHANGE UP (ref 35–48)
PCO2 BLDA: 36 MMHG — SIGNIFICANT CHANGE UP (ref 35–48)
PCO2 BLDA: 36 MMHG — SIGNIFICANT CHANGE UP (ref 35–48)
PCO2 BLDA: 41 MMHG — SIGNIFICANT CHANGE UP (ref 32–46)
PCO2 BLDA: 49 MMHG — HIGH (ref 35–48)
PCO2 BLDA: 60 MMHG — HIGH (ref 32–46)
PCO2, POST MEMBRANE ARTERIAL: 33 MMHG — LOW (ref 35–48)
PCO2, POST MEMBRANE ARTERIAL: 36 MMHG — SIGNIFICANT CHANGE UP (ref 35–48)
PCO2, PRE MEMBRANE VENOUS: 36 MMHG — SIGNIFICANT CHANGE UP (ref 32–48)
PCO2, PRE MEMBRANE VENOUS: 40 MMHG — SIGNIFICANT CHANGE UP (ref 32–48)
PH BLDA: 7.21 — LOW (ref 7.35–7.45)
PH BLDA: 7.31 PH — LOW (ref 7.35–7.45)
PH BLDA: 7.33 — LOW (ref 7.35–7.45)
PH BLDA: 7.4 — SIGNIFICANT CHANGE UP (ref 7.35–7.45)
PH BLDA: 7.42 PH — SIGNIFICANT CHANGE UP (ref 7.35–7.45)
PH BLDA: 7.43 PH — SIGNIFICANT CHANGE UP (ref 7.35–7.45)
PH BLDA: 7.44 — SIGNIFICANT CHANGE UP (ref 7.35–7.45)
PH BLDA: 7.47 PH — HIGH (ref 7.35–7.45)
PH BLDA: 7.48 PH — HIGH (ref 7.35–7.45)
PH BLDA: 7.49 PH — HIGH (ref 7.35–7.45)
PH BLDA: 7.49 PH — HIGH (ref 7.35–7.45)
PH, POST MEMBRANE ARTERIAL: 7.41 PH — SIGNIFICANT CHANGE UP (ref 7.35–7.45)
PH, POST MEMBRANE ARTERIAL: 7.42 PH — SIGNIFICANT CHANGE UP (ref 7.35–7.45)
PH, PRE MEMBRANE VENOUS: 7.37 PH — SIGNIFICANT CHANGE UP (ref 7.32–7.43)
PH, PRE MEMBRANE VENOUS: 7.43 PH — SIGNIFICANT CHANGE UP (ref 7.32–7.43)
PHOSPHATE SERPL-MCNC: 1.5 MG/DL — LOW (ref 2.5–4.5)
PHOSPHATE SERPL-MCNC: 2.2 MG/DL — LOW (ref 2.5–4.5)
PHOSPHATE SERPL-MCNC: 4.6 MG/DL — HIGH (ref 2.5–4.5)
PLATELET # BLD AUTO: 134 K/UL — LOW (ref 150–400)
PLATELET # BLD AUTO: 162 K/UL — SIGNIFICANT CHANGE UP (ref 150–400)
PLATELET # BLD AUTO: 172 K/UL — SIGNIFICANT CHANGE UP (ref 150–400)
PMV BLD: 9.7 FL — SIGNIFICANT CHANGE UP (ref 7–13)
PMV BLD: 9.9 FL — SIGNIFICANT CHANGE UP (ref 7–13)
PO2 BLDA: 100 MMHG — SIGNIFICANT CHANGE UP (ref 83–108)
PO2 BLDA: 119 MMHG — HIGH (ref 83–108)
PO2 BLDA: 119 MMHG — HIGH (ref 83–108)
PO2 BLDA: 127 MMHG — HIGH (ref 83–108)
PO2 BLDA: 257 MMHG — HIGH (ref 74–108)
PO2 BLDA: 55 MMHG — LOW (ref 74–108)
PO2 BLDA: 67 MMHG — LOW (ref 83–108)
PO2 BLDA: 69 MMHG — LOW (ref 74–108)
PO2 BLDA: 82 MMHG — SIGNIFICANT CHANGE UP (ref 74–108)
PO2 BLDA: 84 MMHG — SIGNIFICANT CHANGE UP (ref 83–108)
PO2 BLDA: 90 MMHG — SIGNIFICANT CHANGE UP (ref 83–108)
PO2, POST MEMBRANE ARTERIAL: 332 MMHG — HIGH (ref 83–108)
PO2, POST MEMBRANE ARTERIAL: 419 MMHG — HIGH (ref 83–108)
PO2, PRE MEMBRANE VENOUS: 40.3 MMHG — HIGH (ref 35–40)
PO2, PRE MEMBRANE VENOUS: 43.3 MMHG — HIGH (ref 35–40)
POTASSIUM BLDA-SCNC: 3.3 MMOL/L — LOW (ref 3.4–4.5)
POTASSIUM BLDA-SCNC: 3.6 MMOL/L — SIGNIFICANT CHANGE UP (ref 3.4–4.5)
POTASSIUM BLDA-SCNC: 3.7 MMOL/L — SIGNIFICANT CHANGE UP (ref 3.4–4.5)
POTASSIUM BLDA-SCNC: 3.8 MMOL/L — SIGNIFICANT CHANGE UP (ref 3.4–4.5)
POTASSIUM BLDA-SCNC: 3.8 MMOL/L — SIGNIFICANT CHANGE UP (ref 3.4–4.5)
POTASSIUM BLDA-SCNC: 3.9 MMOL/L — SIGNIFICANT CHANGE UP (ref 3.4–4.5)
POTASSIUM BLDA-SCNC: 4.2 MMOL/L — SIGNIFICANT CHANGE UP (ref 3.4–4.5)
POTASSIUM SERPL-MCNC: 3.5 MMOL/L — SIGNIFICANT CHANGE UP (ref 3.5–5.3)
POTASSIUM SERPL-MCNC: 4 MMOL/L — SIGNIFICANT CHANGE UP (ref 3.5–5.3)
POTASSIUM SERPL-MCNC: 4.2 MMOL/L — SIGNIFICANT CHANGE UP (ref 3.5–5.3)
POTASSIUM SERPL-MCNC: 4.3 MMOL/L — SIGNIFICANT CHANGE UP (ref 3.5–5.3)
POTASSIUM SERPL-SCNC: 3.5 MMOL/L — SIGNIFICANT CHANGE UP (ref 3.5–5.3)
POTASSIUM SERPL-SCNC: 4 MMOL/L — SIGNIFICANT CHANGE UP (ref 3.5–5.3)
POTASSIUM SERPL-SCNC: 4.2 MMOL/L — SIGNIFICANT CHANGE UP (ref 3.5–5.3)
POTASSIUM SERPL-SCNC: 4.3 MMOL/L — SIGNIFICANT CHANGE UP (ref 3.5–5.3)
PROCALCITONIN SERPL-MCNC: 0.1 NG/ML — HIGH
PROT SERPL-MCNC: 5.7 G/DL — LOW (ref 6–8.3)
PROT SERPL-MCNC: 5.8 G/DL — LOW (ref 6–8.3)
PROTHROM AB SERPL-ACNC: 16.2 SEC — HIGH (ref 10–12.9)
PROTHROM AB SERPL-ACNC: 16.9 SEC — HIGH (ref 9.8–13.1)
PROTHROM AB SERPL-ACNC: 17.1 SEC — HIGH (ref 9.8–13.1)
RBC # BLD: 3.36 M/UL — LOW (ref 4.2–5.8)
RBC # BLD: 3.67 M/UL — LOW (ref 4.2–5.8)
RBC # BLD: 4.02 M/UL — LOW (ref 4.2–5.8)
RBC # FLD: 12.6 % — SIGNIFICANT CHANGE UP (ref 10.3–14.5)
RCV VOL RI: SIGNIFICANT CHANGE UP CELL/UL (ref 0–5)
RH IG SCN BLD-IMP: POSITIVE — SIGNIFICANT CHANGE UP
RH IG SCN BLD-IMP: POSITIVE — SIGNIFICANT CHANGE UP
SAO2 % BLDA: 82 % — LOW (ref 92–96)
SAO2 % BLDA: 90.9 % — LOW (ref 95–99)
SAO2 % BLDA: 91 % — LOW (ref 92–96)
SAO2 % BLDA: 92 % — SIGNIFICANT CHANGE UP (ref 92–96)
SAO2 % BLDA: 96.8 % — SIGNIFICANT CHANGE UP (ref 95–99)
SAO2 % BLDA: 96.9 % — SIGNIFICANT CHANGE UP (ref 95–99)
SAO2 % BLDA: 97.8 % — SIGNIFICANT CHANGE UP (ref 95–99)
SAO2 % BLDA: 98 % — HIGH (ref 92–96)
SAO2 % BLDA: 98.2 % — SIGNIFICANT CHANGE UP (ref 95–99)
SAO2 % BLDA: 98.5 % — SIGNIFICANT CHANGE UP (ref 95–99)
SAO2 % BLDA: 98.6 % — SIGNIFICANT CHANGE UP (ref 95–99)
SODIUM BLDA-SCNC: 139 MMOL/L — SIGNIFICANT CHANGE UP (ref 136–146)
SODIUM BLDA-SCNC: 139 MMOL/L — SIGNIFICANT CHANGE UP (ref 136–146)
SODIUM BLDA-SCNC: 140 MMOL/L — SIGNIFICANT CHANGE UP (ref 136–146)
SODIUM BLDA-SCNC: 141 MMOL/L — SIGNIFICANT CHANGE UP (ref 136–146)
SODIUM BLDA-SCNC: 141 MMOL/L — SIGNIFICANT CHANGE UP (ref 136–146)
SODIUM BLDA-SCNC: 142 MMOL/L — SIGNIFICANT CHANGE UP (ref 136–146)
SODIUM BLDA-SCNC: 144 MMOL/L — SIGNIFICANT CHANGE UP (ref 136–146)
SODIUM SERPL-SCNC: 139 MMOL/L — SIGNIFICANT CHANGE UP (ref 135–145)
SODIUM SERPL-SCNC: 143 MMOL/L — SIGNIFICANT CHANGE UP (ref 135–145)
SPECIMEN SOURCE: SIGNIFICANT CHANGE UP
TOTAL CELLS COUNTED, BODY FLUID: 100 CELLS — SIGNIFICANT CHANGE UP
TOTAL HEMOGLOBIN, PRE MEMBRANE VENOUS: 11.1 G/DL — LOW (ref 13–17)
TOTAL HEMOGLOBIN, PRE MEMBRANE VENOUS: 11.8 G/DL — LOW (ref 13–17)
TOTAL NUCLEATED CELL COUNT, BODY FLUID: SIGNIFICANT CHANGE UP CELL/UL (ref 0–5)
TROPONIN T, HIGH SENSITIVITY: 119 NG/L — CRITICAL HIGH (ref ?–14)
TROPONIN T, HIGH SENSITIVITY: 77 NG/L — CRITICAL HIGH (ref ?–14)
VANCOMYCIN FLD-MCNC: < 1.3 UG/ML — SIGNIFICANT CHANGE UP
WBC # BLD: 10.01 K/UL — SIGNIFICANT CHANGE UP (ref 3.8–10.5)
WBC # BLD: 14.43 K/UL — HIGH (ref 3.8–10.5)
WBC # BLD: 15.11 K/UL — HIGH (ref 3.8–10.5)
WBC # FLD AUTO: 10.01 K/UL — SIGNIFICANT CHANGE UP (ref 3.8–10.5)
WBC # FLD AUTO: 14.43 K/UL — HIGH (ref 3.8–10.5)
WBC # FLD AUTO: 15.11 K/UL — HIGH (ref 3.8–10.5)

## 2019-10-30 PROCEDURE — 76604 US EXAM CHEST: CPT | Mod: 26

## 2019-10-30 PROCEDURE — 93312 ECHO TRANSESOPHAGEAL: CPT | Mod: 26

## 2019-10-30 PROCEDURE — 94002 VENT MGMT INPAT INIT DAY: CPT

## 2019-10-30 PROCEDURE — 51702 INSERT TEMP BLADDER CATH: CPT

## 2019-10-30 PROCEDURE — 86900 BLOOD TYPING SEROLOGIC ABO: CPT

## 2019-10-30 PROCEDURE — 36556 INSERT NON-TUNNEL CV CATH: CPT

## 2019-10-30 PROCEDURE — 36000 PLACE NEEDLE IN VEIN: CPT

## 2019-10-30 PROCEDURE — 83880 ASSAY OF NATRIURETIC PEPTIDE: CPT

## 2019-10-30 PROCEDURE — 94640 AIRWAY INHALATION TREATMENT: CPT

## 2019-10-30 PROCEDURE — 86901 BLOOD TYPING SEROLOGIC RH(D): CPT

## 2019-10-30 PROCEDURE — 83735 ASSAY OF MAGNESIUM: CPT

## 2019-10-30 PROCEDURE — 99285 EMERGENCY DEPT VISIT HI MDM: CPT | Mod: 25

## 2019-10-30 PROCEDURE — 93005 ELECTROCARDIOGRAM TRACING: CPT

## 2019-10-30 PROCEDURE — 74176 CT ABD & PELVIS W/O CONTRAST: CPT

## 2019-10-30 PROCEDURE — 99291 CRITICAL CARE FIRST HOUR: CPT | Mod: 25

## 2019-10-30 PROCEDURE — 80048 BASIC METABOLIC PNL TOTAL CA: CPT

## 2019-10-30 PROCEDURE — 93306 TTE W/DOPPLER COMPLETE: CPT

## 2019-10-30 PROCEDURE — 71045 X-RAY EXAM CHEST 1 VIEW: CPT

## 2019-10-30 PROCEDURE — 36600 WITHDRAWAL OF ARTERIAL BLOOD: CPT

## 2019-10-30 PROCEDURE — 84145 PROCALCITONIN (PCT): CPT

## 2019-10-30 PROCEDURE — 71250 CT THORAX DX C-: CPT

## 2019-10-30 PROCEDURE — 99292 CRITICAL CARE ADDL 30 MIN: CPT

## 2019-10-30 PROCEDURE — 94660 CPAP INITIATION&MGMT: CPT

## 2019-10-30 PROCEDURE — 85610 PROTHROMBIN TIME: CPT

## 2019-10-30 PROCEDURE — 99292 CRITICAL CARE ADDL 30 MIN: CPT | Mod: 25

## 2019-10-30 PROCEDURE — 88112 CYTOPATH CELL ENHANCE TECH: CPT | Mod: 26

## 2019-10-30 PROCEDURE — 82803 BLOOD GASES ANY COMBINATION: CPT

## 2019-10-30 PROCEDURE — 87040 BLOOD CULTURE FOR BACTERIA: CPT

## 2019-10-30 PROCEDURE — 31624 DX BRONCHOSCOPE/LAVAGE: CPT

## 2019-10-30 PROCEDURE — 36415 COLL VENOUS BLD VENIPUNCTURE: CPT

## 2019-10-30 PROCEDURE — 93970 EXTREMITY STUDY: CPT

## 2019-10-30 PROCEDURE — 88313 SPECIAL STAINS GROUP 2: CPT | Mod: 26

## 2019-10-30 PROCEDURE — 99223 1ST HOSP IP/OBS HIGH 75: CPT | Mod: GC

## 2019-10-30 PROCEDURE — 84443 ASSAY THYROID STIM HORMONE: CPT

## 2019-10-30 PROCEDURE — 71045 X-RAY EXAM CHEST 1 VIEW: CPT | Mod: 26,76

## 2019-10-30 PROCEDURE — 80184 ASSAY OF PHENOBARBITAL: CPT

## 2019-10-30 PROCEDURE — 83605 ASSAY OF LACTIC ACID: CPT

## 2019-10-30 PROCEDURE — 82550 ASSAY OF CK (CPK): CPT

## 2019-10-30 PROCEDURE — 33952 ECMO/ECLS INSJ PRPH CANNULA: CPT

## 2019-10-30 PROCEDURE — 95812 EEG 41-60 MINUTES: CPT

## 2019-10-30 PROCEDURE — 82962 GLUCOSE BLOOD TEST: CPT

## 2019-10-30 PROCEDURE — 84100 ASSAY OF PHOSPHORUS: CPT

## 2019-10-30 PROCEDURE — 80164 ASSAY DIPROPYLACETIC ACD TOT: CPT

## 2019-10-30 PROCEDURE — 84484 ASSAY OF TROPONIN QUANT: CPT

## 2019-10-30 PROCEDURE — 33946 ECMO/ECLS INITIATION VENOUS: CPT

## 2019-10-30 PROCEDURE — 88108 CYTOPATH CONCENTRATE TECH: CPT | Mod: 26

## 2019-10-30 PROCEDURE — 86850 RBC ANTIBODY SCREEN: CPT

## 2019-10-30 PROCEDURE — 72125 CT NECK SPINE W/O DYE: CPT

## 2019-10-30 PROCEDURE — 81001 URINALYSIS AUTO W/SCOPE: CPT

## 2019-10-30 PROCEDURE — 84146 ASSAY OF PROLACTIN: CPT

## 2019-10-30 PROCEDURE — 85730 THROMBOPLASTIN TIME PARTIAL: CPT

## 2019-10-30 PROCEDURE — 70450 CT HEAD/BRAIN W/O DYE: CPT

## 2019-10-30 PROCEDURE — 88305 TISSUE EXAM BY PATHOLOGIST: CPT | Mod: 26

## 2019-10-30 PROCEDURE — 80307 DRUG TEST PRSMV CHEM ANLYZR: CPT

## 2019-10-30 PROCEDURE — 85027 COMPLETE CBC AUTOMATED: CPT

## 2019-10-30 PROCEDURE — 80053 COMPREHEN METABOLIC PANEL: CPT

## 2019-10-30 PROCEDURE — 31500 INSERT EMERGENCY AIRWAY: CPT

## 2019-10-30 PROCEDURE — 83036 HEMOGLOBIN GLYCOSYLATED A1C: CPT

## 2019-10-30 PROCEDURE — 88312 SPECIAL STAINS GROUP 1: CPT | Mod: 26

## 2019-10-30 PROCEDURE — 86803 HEPATITIS C AB TEST: CPT

## 2019-10-30 PROCEDURE — 82140 ASSAY OF AMMONIA: CPT

## 2019-10-30 RX ORDER — ROCURONIUM BROMIDE 10 MG/ML
50 VIAL (ML) INTRAVENOUS ONCE
Refills: 0 | Status: DISCONTINUED | OUTPATIENT
Start: 2019-10-30 | End: 2019-10-30

## 2019-10-30 RX ORDER — SODIUM CHLORIDE 9 MG/ML
1000 INJECTION INTRAMUSCULAR; INTRAVENOUS; SUBCUTANEOUS ONCE
Refills: 0 | Status: DISCONTINUED | OUTPATIENT
Start: 2019-10-30 | End: 2019-10-30

## 2019-10-30 RX ORDER — NOREPINEPHRINE BITARTRATE/D5W 8 MG/250ML
0.05 PLASTIC BAG, INJECTION (ML) INTRAVENOUS
Qty: 16 | Refills: 0 | Status: DISCONTINUED | OUTPATIENT
Start: 2019-10-30 | End: 2019-11-03

## 2019-10-30 RX ORDER — VANCOMYCIN HCL 1 G
1500 VIAL (EA) INTRAVENOUS EVERY 8 HOURS
Refills: 0 | Status: DISCONTINUED | OUTPATIENT
Start: 2019-10-30 | End: 2019-11-05

## 2019-10-30 RX ORDER — POLYETHYLENE GLYCOL 3350 17 G/17G
17 POWDER, FOR SOLUTION ORAL DAILY
Refills: 0 | Status: DISCONTINUED | OUTPATIENT
Start: 2019-10-30 | End: 2019-10-31

## 2019-10-30 RX ORDER — CHLORHEXIDINE GLUCONATE 213 G/1000ML
15 SOLUTION TOPICAL EVERY 12 HOURS
Refills: 0 | Status: DISCONTINUED | OUTPATIENT
Start: 2019-10-30 | End: 2019-11-02

## 2019-10-30 RX ORDER — FUROSEMIDE 40 MG
20 TABLET ORAL ONCE
Refills: 0 | Status: COMPLETED | OUTPATIENT
Start: 2019-10-30 | End: 2019-10-30

## 2019-10-30 RX ORDER — HEPARIN SODIUM 5000 [USP'U]/ML
1100 INJECTION INTRAVENOUS; SUBCUTANEOUS
Qty: 25000 | Refills: 0 | Status: DISCONTINUED | OUTPATIENT
Start: 2019-10-30 | End: 2019-11-02

## 2019-10-30 RX ORDER — PROPOFOL 10 MG/ML
5 INJECTION, EMULSION INTRAVENOUS
Qty: 1000 | Refills: 0 | Status: DISCONTINUED | OUTPATIENT
Start: 2019-10-30 | End: 2019-10-30

## 2019-10-30 RX ORDER — SENNA PLUS 8.6 MG/1
15 TABLET ORAL
Refills: 0 | Status: DISCONTINUED | OUTPATIENT
Start: 2019-10-30 | End: 2019-11-07

## 2019-10-30 RX ORDER — POTASSIUM PHOSPHATE, MONOBASIC POTASSIUM PHOSPHATE, DIBASIC 236; 224 MG/ML; MG/ML
30 INJECTION, SOLUTION INTRAVENOUS ONCE
Refills: 0 | Status: COMPLETED | OUTPATIENT
Start: 2019-10-30 | End: 2019-10-30

## 2019-10-30 RX ORDER — CISATRACURIUM BESYLATE 2 MG/ML
3 INJECTION INTRAVENOUS
Qty: 200 | Refills: 0 | Status: DISCONTINUED | OUTPATIENT
Start: 2019-10-30 | End: 2019-10-31

## 2019-10-30 RX ORDER — PROPOFOL 10 MG/ML
10 INJECTION, EMULSION INTRAVENOUS
Qty: 1000 | Refills: 0 | Status: DISCONTINUED | OUTPATIENT
Start: 2019-10-30 | End: 2019-11-06

## 2019-10-30 RX ORDER — FUROSEMIDE 40 MG
40 TABLET ORAL ONCE
Refills: 0 | Status: COMPLETED | OUTPATIENT
Start: 2019-10-30 | End: 2019-10-30

## 2019-10-30 RX ORDER — FENTANYL CITRATE 50 UG/ML
50 INJECTION INTRAVENOUS ONCE
Refills: 0 | Status: DISCONTINUED | OUTPATIENT
Start: 2019-10-30 | End: 2019-10-30

## 2019-10-30 RX ORDER — HEPARIN SODIUM 5000 [USP'U]/ML
10 INJECTION INTRAVENOUS; SUBCUTANEOUS
Qty: 25000 | Refills: 0 | Status: DISCONTINUED | OUTPATIENT
Start: 2019-10-30 | End: 2019-10-30

## 2019-10-30 RX ORDER — VANCOMYCIN HCL 1 G
VIAL (EA) INTRAVENOUS
Refills: 0 | Status: DISCONTINUED | OUTPATIENT
Start: 2019-10-30 | End: 2019-10-30

## 2019-10-30 RX ORDER — POTASSIUM CHLORIDE 20 MEQ
40 PACKET (EA) ORAL ONCE
Refills: 0 | Status: COMPLETED | OUTPATIENT
Start: 2019-10-30 | End: 2019-10-30

## 2019-10-30 RX ORDER — NOREPINEPHRINE BITARTRATE/D5W 8 MG/250ML
0.04 PLASTIC BAG, INJECTION (ML) INTRAVENOUS
Qty: 8 | Refills: 0 | Status: DISCONTINUED | OUTPATIENT
Start: 2019-10-30 | End: 2019-10-30

## 2019-10-30 RX ORDER — ROCURONIUM BROMIDE 10 MG/ML
100 VIAL (ML) INTRAVENOUS ONCE
Refills: 0 | Status: COMPLETED | OUTPATIENT
Start: 2019-10-30 | End: 2019-10-30

## 2019-10-30 RX ORDER — ROCURONIUM BROMIDE 10 MG/ML
20 VIAL (ML) INTRAVENOUS ONCE
Refills: 0 | Status: DISCONTINUED | OUTPATIENT
Start: 2019-10-30 | End: 2019-10-30

## 2019-10-30 RX ORDER — MIDAZOLAM HYDROCHLORIDE 1 MG/ML
0.02 INJECTION, SOLUTION INTRAMUSCULAR; INTRAVENOUS
Qty: 100 | Refills: 0 | Status: DISCONTINUED | OUTPATIENT
Start: 2019-10-30 | End: 2019-11-06

## 2019-10-30 RX ORDER — VASOPRESSIN 20 [USP'U]/ML
0.04 INJECTION INTRAVENOUS
Qty: 50 | Refills: 0 | Status: DISCONTINUED | OUTPATIENT
Start: 2019-10-30 | End: 2019-10-31

## 2019-10-30 RX ORDER — PIPERACILLIN AND TAZOBACTAM 4; .5 G/20ML; G/20ML
4.5 INJECTION, POWDER, LYOPHILIZED, FOR SOLUTION INTRAVENOUS EVERY 8 HOURS
Refills: 0 | Status: DISCONTINUED | OUTPATIENT
Start: 2019-10-30 | End: 2019-11-05

## 2019-10-30 RX ORDER — HEPARIN SODIUM 5000 [USP'U]/ML
1000 INJECTION INTRAVENOUS; SUBCUTANEOUS
Qty: 25000 | Refills: 0 | Status: DISCONTINUED | OUTPATIENT
Start: 2019-10-30 | End: 2019-10-30

## 2019-10-30 RX ORDER — CISATRACURIUM BESYLATE 2 MG/ML
3 INJECTION INTRAVENOUS
Qty: 200 | Refills: 0 | Status: DISCONTINUED | OUTPATIENT
Start: 2019-10-30 | End: 2019-10-30

## 2019-10-30 RX ORDER — VANCOMYCIN HCL 1 G
VIAL (EA) INTRAVENOUS
Refills: 0 | Status: DISCONTINUED | OUTPATIENT
Start: 2019-10-30 | End: 2019-11-05

## 2019-10-30 RX ORDER — DEXMEDETOMIDINE HYDROCHLORIDE IN 0.9% SODIUM CHLORIDE 4 UG/ML
0.2 INJECTION INTRAVENOUS
Qty: 200 | Refills: 0 | Status: DISCONTINUED | OUTPATIENT
Start: 2019-10-30 | End: 2019-10-30

## 2019-10-30 RX ORDER — VANCOMYCIN HCL 1 G
1500 VIAL (EA) INTRAVENOUS ONCE
Refills: 0 | Status: COMPLETED | OUTPATIENT
Start: 2019-10-30 | End: 2019-10-30

## 2019-10-30 RX ORDER — ETOMIDATE 2 MG/ML
20 INJECTION INTRAVENOUS ONCE
Refills: 0 | Status: COMPLETED | OUTPATIENT
Start: 2019-10-30 | End: 2019-10-30

## 2019-10-30 RX ORDER — HEPARIN SODIUM 5000 [USP'U]/ML
5000 INJECTION INTRAVENOUS; SUBCUTANEOUS ONCE
Refills: 0 | Status: DISCONTINUED | OUTPATIENT
Start: 2019-10-30 | End: 2019-10-30

## 2019-10-30 RX ORDER — ACETAMINOPHEN 500 MG
1000 TABLET ORAL ONCE
Refills: 0 | Status: DISCONTINUED | OUTPATIENT
Start: 2019-10-30 | End: 2019-10-30

## 2019-10-30 RX ORDER — LEVETIRACETAM 250 MG/1
750 TABLET, FILM COATED ORAL EVERY 12 HOURS
Refills: 0 | Status: DISCONTINUED | OUTPATIENT
Start: 2019-10-30 | End: 2019-11-02

## 2019-10-30 RX ORDER — AZITHROMYCIN 500 MG/1
500 TABLET, FILM COATED ORAL EVERY 24 HOURS
Refills: 0 | Status: DISCONTINUED | OUTPATIENT
Start: 2019-10-30 | End: 2019-11-02

## 2019-10-30 RX ORDER — PIPERACILLIN AND TAZOBACTAM 4; .5 G/20ML; G/20ML
4.5 INJECTION, POWDER, LYOPHILIZED, FOR SOLUTION INTRAVENOUS ONCE
Refills: 0 | Status: COMPLETED | OUTPATIENT
Start: 2019-10-30 | End: 2019-10-30

## 2019-10-30 RX ORDER — IPRATROPIUM/ALBUTEROL SULFATE 18-103MCG
3 AEROSOL WITH ADAPTER (GRAM) INHALATION EVERY 6 HOURS
Refills: 0 | Status: DISCONTINUED | OUTPATIENT
Start: 2019-10-30 | End: 2019-11-02

## 2019-10-30 RX ORDER — MIDAZOLAM HYDROCHLORIDE 1 MG/ML
4 INJECTION, SOLUTION INTRAMUSCULAR; INTRAVENOUS ONCE
Refills: 0 | Status: DISCONTINUED | OUTPATIENT
Start: 2019-10-30 | End: 2019-10-30

## 2019-10-30 RX ORDER — HEPARIN SODIUM 5000 [USP'U]/ML
INJECTION INTRAVENOUS; SUBCUTANEOUS
Qty: 25000 | Refills: 0 | Status: DISCONTINUED | OUTPATIENT
Start: 2019-10-30 | End: 2019-10-30

## 2019-10-30 RX ORDER — NOREPINEPHRINE BITARTRATE/D5W 8 MG/250ML
0.05 PLASTIC BAG, INJECTION (ML) INTRAVENOUS
Qty: 8 | Refills: 0 | Status: DISCONTINUED | OUTPATIENT
Start: 2019-10-30 | End: 2019-10-30

## 2019-10-30 RX ORDER — VANCOMYCIN HCL 1 G
1000 VIAL (EA) INTRAVENOUS ONCE
Refills: 0 | Status: DISCONTINUED | OUTPATIENT
Start: 2019-10-30 | End: 2019-10-30

## 2019-10-30 RX ORDER — FENTANYL CITRATE 50 UG/ML
100 INJECTION INTRAVENOUS ONCE
Refills: 0 | Status: DISCONTINUED | OUTPATIENT
Start: 2019-10-30 | End: 2019-10-30

## 2019-10-30 RX ORDER — CHLORHEXIDINE GLUCONATE 213 G/1000ML
15 SOLUTION TOPICAL EVERY 12 HOURS
Refills: 0 | Status: DISCONTINUED | OUTPATIENT
Start: 2019-10-30 | End: 2019-10-30

## 2019-10-30 RX ADMIN — Medication 300 MILLIGRAM(S): at 16:43

## 2019-10-30 RX ADMIN — POLYETHYLENE GLYCOL 3350 17 GRAM(S): 17 POWDER, FOR SOLUTION ORAL at 18:06

## 2019-10-30 RX ADMIN — HEPARIN SODIUM 10 UNIT(S)/HR: 5000 INJECTION INTRAVENOUS; SUBCUTANEOUS at 12:49

## 2019-10-30 RX ADMIN — Medication 6.33 MICROGRAM(S)/KG/MIN: at 19:35

## 2019-10-30 RX ADMIN — Medication 6.33 MICROGRAM(S)/KG/MIN: at 10:33

## 2019-10-30 RX ADMIN — PIPERACILLIN AND TAZOBACTAM 25 GRAM(S): 4; .5 INJECTION, POWDER, LYOPHILIZED, FOR SOLUTION INTRAVENOUS at 21:32

## 2019-10-30 RX ADMIN — ETOMIDATE 20 MILLIGRAM(S): 2 INJECTION INTRAVENOUS at 02:30

## 2019-10-30 RX ADMIN — HEPARIN SODIUM 11 UNIT(S)/HR: 5000 INJECTION INTRAVENOUS; SUBCUTANEOUS at 19:34

## 2019-10-30 RX ADMIN — PROPOFOL 8.1 MICROGRAM(S)/KG/MIN: 10 INJECTION, EMULSION INTRAVENOUS at 09:47

## 2019-10-30 RX ADMIN — LEVETIRACETAM 400 MILLIGRAM(S): 250 TABLET, FILM COATED ORAL at 18:07

## 2019-10-30 RX ADMIN — MIDAZOLAM HYDROCHLORIDE 2.7 MG/KG/HR: 1 INJECTION, SOLUTION INTRAMUSCULAR; INTRAVENOUS at 10:32

## 2019-10-30 RX ADMIN — Medication 100 MILLIGRAM(S): at 02:50

## 2019-10-30 RX ADMIN — CISATRACURIUM BESYLATE 24.3 MICROGRAM(S)/KG/MIN: 2 INJECTION INTRAVENOUS at 09:48

## 2019-10-30 RX ADMIN — CISATRACURIUM BESYLATE 24.3 MICROGRAM(S)/KG/MIN: 2 INJECTION INTRAVENOUS at 19:35

## 2019-10-30 RX ADMIN — AZITHROMYCIN 255 MILLIGRAM(S): 500 TABLET, FILM COATED ORAL at 15:33

## 2019-10-30 RX ADMIN — DEXMEDETOMIDINE HYDROCHLORIDE IN 0.9% SODIUM CHLORIDE 6.75 MICROGRAM(S)/KG/HR: 4 INJECTION INTRAVENOUS at 09:49

## 2019-10-30 RX ADMIN — CHLORHEXIDINE GLUCONATE 15 MILLILITER(S): 213 SOLUTION TOPICAL at 18:05

## 2019-10-30 RX ADMIN — PROPOFOL 8.1 MICROGRAM(S)/KG/MIN: 10 INJECTION, EMULSION INTRAVENOUS at 19:35

## 2019-10-30 RX ADMIN — CISATRACURIUM BESYLATE 24.43 MICROGRAM(S)/KG/MIN: 2 INJECTION INTRAVENOUS at 03:05

## 2019-10-30 RX ADMIN — MIDAZOLAM HYDROCHLORIDE 4 MILLIGRAM(S): 1 INJECTION, SOLUTION INTRAMUSCULAR; INTRAVENOUS at 12:47

## 2019-10-30 RX ADMIN — MIDAZOLAM HYDROCHLORIDE 2.7 MG/KG/HR: 1 INJECTION, SOLUTION INTRAMUSCULAR; INTRAVENOUS at 19:35

## 2019-10-30 RX ADMIN — POTASSIUM PHOSPHATE, MONOBASIC POTASSIUM PHOSPHATE, DIBASIC 83.33 MILLIMOLE(S): 236; 224 INJECTION, SOLUTION INTRAVENOUS at 23:48

## 2019-10-30 RX ADMIN — Medication 40 MILLIEQUIVALENT(S): at 23:36

## 2019-10-30 RX ADMIN — Medication 1 APPLICATION(S): at 18:46

## 2019-10-30 RX ADMIN — Medication 125 MILLIGRAM(S): at 03:12

## 2019-10-30 RX ADMIN — Medication 40 MILLIGRAM(S): at 03:11

## 2019-10-30 RX ADMIN — Medication 20 MILLIGRAM(S): at 03:12

## 2019-10-30 RX ADMIN — Medication 3 MILLILITER(S): at 15:20

## 2019-10-30 RX ADMIN — Medication 3 MILLILITER(S): at 21:25

## 2019-10-30 RX ADMIN — SENNA PLUS 15 MILLILITER(S): 8.6 TABLET ORAL at 18:07

## 2019-10-30 RX ADMIN — HEPARIN SODIUM 11 UNIT(S)/HR: 5000 INJECTION INTRAVENOUS; SUBCUTANEOUS at 18:05

## 2019-10-30 RX ADMIN — PIPERACILLIN AND TAZOBACTAM 200 GRAM(S): 4; .5 INJECTION, POWDER, LYOPHILIZED, FOR SOLUTION INTRAVENOUS at 14:38

## 2019-10-30 NOTE — PROGRESS NOTE ADULT - SUBJECTIVE AND OBJECTIVE BOX
Follow-up Critical Care Progress Note  Chief Complaint : Poisoning by unspecified drugs, medicaments and biological substances, accidental (unintentional), initial encounter      Was contacted by eICU team patient with worsening hypoxia and respiratory distress  required re intubation  post intubation pt on high peep/fio2 difficult to increase fio2 sat.  Paralytics, sedatives, lasix, steroids given.  CXR showing diffuse congestive changes  they have contacted ECMO from [facility] to come canulate patient bedside.    Came bedside and evaluated patient.  currently pt on peep 18 fio2 100%, sedated sat 88-89%  Vent settings changed to VC+ Same peep/fio2 and sat slowly improving.  post lasix pt has drained over 1 L of urine.    Wife called [phone] no answer , unable to leave message    PA currently bedside placing Emergent CVP/A line for Transfer.    of note pt becomes hypoxic when laying flat.     Allergies :No Known Allergies      PAST MEDICAL & SURGICAL HISTORY:  Depression  Hepatitis: unsure of type  Left knee pain  No significant past surgical history      Medications:  MEDICATIONS  (STANDING):  albuterol/ipratropium for Nebulization 3 milliLiter(s) Nebulizer every 6 hours  cefepime   IVPB 1000 milliGRAM(s) IV Intermittent every 12 hours  chlorhexidine 0.12% Liquid 15 milliLiter(s) Oral Mucosa every 12 hours  chlorhexidine 4% Liquid 1 Application(s) Topical <User Schedule>  cisatracurium Infusion 3 MICROgram(s)/kG/Min (24.426 mL/Hr) IV Continuous <Continuous>  dexMEDEtomidine Infusion 0.3 MICROgram(s)/kG/Hr (9.352 mL/Hr) IV Continuous <Continuous>  dextrose 5% + sodium chloride 0.45%. 1000 milliLiter(s) (75 mL/Hr) IV Continuous <Continuous>  etomidate Injectable 20 milliGRAM(s) IV Push once  fentaNYL    Injectable 50 MICROGram(s) IV Push once  fentaNYL    Injectable 100 MICROGram(s) IV Push once  furosemide   Injectable 40 milliGRAM(s) IV Push once  furosemide   Injectable 20 milliGRAM(s) IV Push once  heparin  Injectable 5000 Unit(s) IV Push once  heparin  Injectable 5000 Unit(s) SubCutaneous every 8 hours  levETIRAcetam  IVPB 750 milliGRAM(s) IV Intermittent every 12 hours  methylPREDNISolone sodium succinate Injectable 125 milliGRAM(s) IV Push once  methylPREDNISolone sodium succinate Injectable 60 milliGRAM(s) IV Push every 6 hours  norepinephrine Infusion 0.05 MICROgram(s)/kG/Min (12.722 mL/Hr) IV Continuous <Continuous>  pantoprazole  Injectable 40 milliGRAM(s) IV Push daily  petrolatum Ophthalmic Ointment 1 Application(s) Both EYES every 6 hours  propofol Infusion 5 MICROgram(s)/kG/Min (4.071 mL/Hr) IV Continuous <Continuous>  rocuronium Injectable 50 milliGRAM(s) IV Push once  rocuronium Injectable 100 milliGRAM(s) IV Push once  rocuronium Injectable 20 milliGRAM(s) IV Push once  vancomycin  IVPB 1000 milliGRAM(s) IV Intermittent once    MEDICATIONS  (PRN):  LORazepam   Injectable 1 milliGRAM(s) IV Push three times a day PRN Agitation      LABS:                        11.9   8.43  )-----------( 151      ( 29 Oct 2019 05:30 )             36.3     10-29    145  |  112<H>  |  9   ----------------------------<  48<L>  4.1   |  25  |  0.77    Ca    9.1      29 Oct 2019 05:30  Phos  3.5     10-29  Mg     2.0     10-29        CARDIAC MARKERS ( 28 Oct 2019 05:40 )  x     / x     / 507 U/L / x     / x              Serum Pro-Brain Natriuretic Peptide: 25 pg/mL (10-27-19 @ 22:30)        CULTURES: (if applicable)    Culture - Blood (collected 10-27-19 @ 22:32)  Source: .Blood Blood-Peripheral  Preliminary Report (10-29-19 @ 10:01):    No growth to date.    Culture - Blood (collected 10-27-19 @ 22:32)  Source: .Blood Blood-Peripheral  Preliminary Report (10-29-19 @ 10:01):    No growth to date.          ABG - ( 30 Oct 2019 02:35 )  pH, Arterial: 7.33  pH, Blood: x     /  pCO2: 41    /  pO2: 55    / HCO3: x     / Base Excess: x     /  SaO2: 82                CAPILLARY BLOOD GLUCOSE      POCT Blood Glucose.: 132 mg/dL (29 Oct 2019 07:01)      RADIOLOGY  CXR:  b/l worsening intersitial markings       ECHO:  < from: TTE Echo Complete w/Doppler (10.29.19 @ 11:41) >  Summary:   1. Left ventricular ejection fraction, by visual estimation, is 40%.   2. Mildly decreased global left ventricular systolic function.   3. Spectral Doppler shows restrictive pattern of left ventricular myocardial filling (Grade III diastolic dysfunction).   4. There is no evidence of pericardial effusion.   5. Sclerotic aortic valve with normal opening.   6. Mild to moderate pulmonic valve regurgitation.   7. Estimated pulmonary artery systolic pressure is 38.7 mmHg assuming a right atrial pressure of 10 mmHg, which is consistent with borderline pulmonary hypertension.   8. LA volume Index is 30.8 ml/m² ml/m2.    < end of copied text >      VITALS:  T(C): 39.1 (10-29-19 @ 23:00), Max: 39.2 (10-29-19 @ 20:00)  T(F): 102.4 (10-29-19 @ 23:00), Max: 102.6 (10-29-19 @ 20:00)  HR: 90 (10-30-19 @ 03:00) (68 - 107)  BP: 106/84 (10-30-19 @ 03:00) (106/81 - 135/102)  BP(mean): 93 (10-30-19 @ 03:00) (90 - 113)  ABP: --  ABP(mean): --  RR: 33 (10-30-19 @ 03:00) (17 - 39)  SpO2: 94% (10-30-19 @ 03:00) (61% - 99%)  CVP(mm Hg): --  CVP(cm H2O): --    Ins and Outs     10-28-19 @ 07:01  -  10-29-19 @ 07:00  --------------------------------------------------------  IN: 2106.6 mL / OUT: 1820 mL / NET: 286.6 mL    10-29-19 @ 07:01  -  10-30-19 @ 04:08  --------------------------------------------------------  IN: 1897 mL / OUT: 1100 mL / NET: 797 mL        Height (cm): 182.9 (10-29-19 @ 00:00)  Weight (kg): 135.7 (10-29-19 @ 00:00)  BMI (kg/m2): 40.6 (10-29-19 @ 00:00)        I&O's Detail    28 Oct 2019 07:01  -  29 Oct 2019 07:00  --------------------------------------------------------  IN:    dexmedetomidine Infusion: 83 mL    lactated ringers.: 1725 mL    propofol Infusion: 48.6 mL    Solution: 50 mL    Solution: 200 mL  Total IN: 2106.6 mL    OUT:    Indwelling Catheter - Urethral: 1820 mL  Total OUT: 1820 mL    Total NET: 286.6 mL      29 Oct 2019 07:01  -  30 Oct 2019 04:08  --------------------------------------------------------  IN:    cisatracurium Infusion: 24.4 mL    dexmedetomidine Infusion: 187 mL    dextrose 5% + sodium chloride 0.45%.: 1275 mL    lactated ringers.: 225 mL    propofol Infusion: 35.6 mL    Solution: 50 mL    Solution: 100 mL  Total IN: 1897 mL    OUT:    Indwelling Catheter - Urethral: 650 mL    Voided: 450 mL  Total OUT: 1100 mL    Total NET: 797 mL Follow-up Critical Care Progress Note  Chief Complaint : Poisoning by unspecified drugs, medicaments and biological substances, accidental (unintentional), initial encounter      Was contacted by eICU team patient with worsening hypoxia and respiratory distress  required re intubation  post intubation pt on high peep/fio2 difficult to increase fio2 sat.  Paralytics, sedatives, lasix, steroids given.  CXR showing diffuse congestive changes  they have contacted ECMO from [facility] to come canulate patient bedside.    Came bedside and evaluated patient.  currently pt on peep 18 fio2 100%, sedated sat 88-89%  Vent settings changed to VC+ Same peep/fio2 and sat slowly improving.  post lasix pt has drained over 1 L of urine.    Wife called [phone] no answer , unable to leave message    PA currently bedside placing Emergent CVP/A line for Transfer.    of note pt becomes hypoxic when laying flat.     Allergies :No Known Allergies      PAST MEDICAL & SURGICAL HISTORY:  Depression  Hepatitis: unsure of type  Left knee pain  No significant past surgical history      Medications:  MEDICATIONS  (STANDING):  albuterol/ipratropium for Nebulization 3 milliLiter(s) Nebulizer every 6 hours  cefepime   IVPB 1000 milliGRAM(s) IV Intermittent every 12 hours  chlorhexidine 0.12% Liquid 15 milliLiter(s) Oral Mucosa every 12 hours  chlorhexidine 4% Liquid 1 Application(s) Topical <User Schedule>  cisatracurium Infusion 3 MICROgram(s)/kG/Min (24.426 mL/Hr) IV Continuous <Continuous>  dexMEDEtomidine Infusion 0.3 MICROgram(s)/kG/Hr (9.352 mL/Hr) IV Continuous <Continuous>  dextrose 5% + sodium chloride 0.45%. 1000 milliLiter(s) (75 mL/Hr) IV Continuous <Continuous>  etomidate Injectable 20 milliGRAM(s) IV Push once  fentaNYL    Injectable 50 MICROGram(s) IV Push once  fentaNYL    Injectable 100 MICROGram(s) IV Push once  furosemide   Injectable 40 milliGRAM(s) IV Push once  furosemide   Injectable 20 milliGRAM(s) IV Push once  heparin  Injectable 5000 Unit(s) IV Push once  heparin  Injectable 5000 Unit(s) SubCutaneous every 8 hours  levETIRAcetam  IVPB 750 milliGRAM(s) IV Intermittent every 12 hours  methylPREDNISolone sodium succinate Injectable 125 milliGRAM(s) IV Push once  methylPREDNISolone sodium succinate Injectable 60 milliGRAM(s) IV Push every 6 hours  norepinephrine Infusion 0.05 MICROgram(s)/kG/Min (12.722 mL/Hr) IV Continuous <Continuous>  pantoprazole  Injectable 40 milliGRAM(s) IV Push daily  petrolatum Ophthalmic Ointment 1 Application(s) Both EYES every 6 hours  propofol Infusion 5 MICROgram(s)/kG/Min (4.071 mL/Hr) IV Continuous <Continuous>  rocuronium Injectable 50 milliGRAM(s) IV Push once  rocuronium Injectable 100 milliGRAM(s) IV Push once  rocuronium Injectable 20 milliGRAM(s) IV Push once  vancomycin  IVPB 1000 milliGRAM(s) IV Intermittent once    MEDICATIONS  (PRN):  LORazepam   Injectable 1 milliGRAM(s) IV Push three times a day PRN Agitation      LABS:                        11.9   8.43  )-----------( 151      ( 29 Oct 2019 05:30 )             36.3     10-29    145  |  112<H>  |  9   ----------------------------<  48<L>  4.1   |  25  |  0.77    Ca    9.1      29 Oct 2019 05:30  Phos  3.5     10-29  Mg     2.0     10-29        CARDIAC MARKERS ( 28 Oct 2019 05:40 )  x     / x     / 507 U/L / x     / x              Serum Pro-Brain Natriuretic Peptide: 25 pg/mL (10-27-19 @ 22:30)    Trend Bun/Cr  10-29-19 @ 05:30  BUN/CR -  9 / 0.77  10-28-19 @ 05:40  BUN/CR -  14 / 1.12  10-27-19 @ 22:30  BUN/CR -  15 / 1.55<H>  06-13-19 @ 10:03  BUN/CR -  13 / 0.80    ABG Trend  10-30-19 @ 02:35   - 7.33<L>/41/55<L>/82<L>  10-30-19 @ 00:25   - 7.44/30<L>/69<L>/92  10-29-19 @ 14:15   - 7.48<H>/27<L>/55<L>/88<L>  10-29-19 @ 06:10   - 7.45/32/81/95  10-28-19 @ 21:01   - 7.42/37/73<L>/94  10-28-19 @ 18:40   - 7.41/36/61<L>/90<L>      CULTURES: (if applicable)    Culture - Blood (collected 10-27-19 @ 22:32)  Source: .Blood Blood-Peripheral  Preliminary Report (10-29-19 @ 10:01):    No growth to date.    Culture - Blood (collected 10-27-19 @ 22:32)  Source: .Blood Blood-Peripheral  Preliminary Report (10-29-19 @ 10:01):    No growth to date.          ABG - ( 30 Oct 2019 02:35 )  pH, Arterial: 7.33  pH, Blood: x     /  pCO2: 41    /  pO2: 55    / HCO3: x     / Base Excess: x     /  SaO2: 82                CAPILLARY BLOOD GLUCOSE      POCT Blood Glucose.: 132 mg/dL (29 Oct 2019 07:01)      RADIOLOGY  CXR:  b/l worsening intersitial markings       ECHO:  < from: TTE Echo Complete w/Doppler (10.29.19 @ 11:41) >  Summary:   1. Left ventricular ejection fraction, by visual estimation, is 40%.   2. Mildly decreased global left ventricular systolic function.   3. Spectral Doppler shows restrictive pattern of left ventricular myocardial filling (Grade III diastolic dysfunction).   4. There is no evidence of pericardial effusion.   5. Sclerotic aortic valve with normal opening.   6. Mild to moderate pulmonic valve regurgitation.   7. Estimated pulmonary artery systolic pressure is 38.7 mmHg assuming a right atrial pressure of 10 mmHg, which is consistent with borderline pulmonary hypertension.   8. LA volume Index is 30.8 ml/m² ml/m2.    < end of copied text >      VITALS:  T(C): 39.1 (10-29-19 @ 23:00), Max: 39.2 (10-29-19 @ 20:00)  T(F): 102.4 (10-29-19 @ 23:00), Max: 102.6 (10-29-19 @ 20:00)  HR: 90 (10-30-19 @ 03:00) (68 - 107)  BP: 106/84 (10-30-19 @ 03:00) (106/81 - 135/102)  BP(mean): 93 (10-30-19 @ 03:00) (90 - 113)  ABP: --  ABP(mean): --  RR: 33 (10-30-19 @ 03:00) (17 - 39)  SpO2: 94% (10-30-19 @ 03:00) (61% - 99%)  CVP(mm Hg): --  CVP(cm H2O): --    Ins and Outs     10-28-19 @ 07:01  -  10-29-19 @ 07:00  --------------------------------------------------------  IN: 2106.6 mL / OUT: 1820 mL / NET: 286.6 mL    10-29-19 @ 07:01  -  10-30-19 @ 04:08  --------------------------------------------------------  IN: 1897 mL / OUT: 1100 mL / NET: 797 mL        Height (cm): 182.9 (10-29-19 @ 00:00)  Weight (kg): 135.7 (10-29-19 @ 00:00)  BMI (kg/m2): 40.6 (10-29-19 @ 00:00)        I&O's Detail    28 Oct 2019 07:01  -  29 Oct 2019 07:00  --------------------------------------------------------  IN:    dexmedetomidine Infusion: 83 mL    lactated ringers.: 1725 mL    propofol Infusion: 48.6 mL    Solution: 50 mL    Solution: 200 mL  Total IN: 2106.6 mL    OUT:    Indwelling Catheter - Urethral: 1820 mL  Total OUT: 1820 mL    Total NET: 286.6 mL      29 Oct 2019 07:01  -  30 Oct 2019 04:08  --------------------------------------------------------  IN:    cisatracurium Infusion: 24.4 mL    dexmedetomidine Infusion: 187 mL    dextrose 5% + sodium chloride 0.45%.: 1275 mL    lactated ringers.: 225 mL    propofol Infusion: 35.6 mL    Solution: 50 mL    Solution: 100 mL  Total IN: 1897 mL    OUT:    Indwelling Catheter - Urethral: 650 mL    Voided: 450 mL  Total OUT: 1100 mL    Total NET: 797 mL

## 2019-10-30 NOTE — CONSULT NOTE ADULT - ASSESSMENT
54 year old man with Respiratory failure, fucntional quadriplegia, Depression and encounter for palliative care.

## 2019-10-30 NOTE — PROCEDURE NOTE - NSPOSTPRCRAD_GEN_A_CORE
post procedure radiography not performed
central line located in the/post procedure radiography not performed

## 2019-10-30 NOTE — DIETITIAN INITIAL EVALUATION ADULT. - OTHER INFO
53 y/o male brought from  with dx of ARDS/PNA/Sepsis, placed on VV-ECMO and sedated with midazolam and propofol. Family unavailable to interview for nutrition hx. Spoke with RN who stated that pt is tolerating feeding at ordered rate. Jevity 1.2 @ 60 ml/hr x 24 hrs providing 1728 kcals, 80 gms protein, 1162 ml free H2O in a total volume of 1440 ml/day. Enteral feeding at ordered rate is meeting pt's caloric needs based on Critical Care Guidelines for Enteral Feeding, however is deficient in protein. Recommend adding Prosource protein pkg x 4/day (15 gms protein/pkg) to provide an additional 60 gms protein, for a daily amount of 140 gms/day. Propofol is providing an additional 214 kcals/day. No edema or pressure injuries noted.

## 2019-10-30 NOTE — DIETITIAN INITIAL EVALUATION ADULT. - PERTINENT LABORATORY DATA
10-30 Na 143 mmol/L Glu 186 mg/dL<H> K+ 4.0 mmol/L Cr 0.77 mg/dL BUN 12 mg/dL Phos 2.2 mg/dL<L>  10-29-19 HbA1c 5.2 %

## 2019-10-30 NOTE — PROCEDURE NOTE - NSINFORMCONSENT_GEN_A_CORE
This was an emergent procedure.

## 2019-10-30 NOTE — CONSULT NOTE ADULT - PROBLEM SELECTOR RECOMMENDATION 3
Patient has a long history of depression and was on antidepressants, and as per chart review stated that he wanted to commit suicide. Appreciate psychiatry involvement.

## 2019-10-30 NOTE — PROGRESS NOTE ADULT - ATTENDING COMMENTS
Critical ill patient on VV-ECMO with ARDS/PNA/SEPSIS  Patient with severe LV systolic dysfunction on FREDI with reduced VTI and bilateral dense trans-lobar consolidation.  ECMO flow rate adjusted to account for reduced cardiac index.  Critical care time spent at patient bedside was 50 minutes on 10/30/2019.

## 2019-10-30 NOTE — PATIENT PROFILE ADULT - LANGUAGE ASSISTANCE NEEDED
No-Patient/Caregiver offered and refused free interpretation services./Wife is source of information as patient is intubated and sedated. Wife speaks English

## 2019-10-30 NOTE — H&P ADULT - NSHPPHYSICALEXAM_GEN_ALL_CORE
GENERAL: Sedated, paralyzed  HEAD:  Atraumatic, Normocephalic  EYES: Pupils reactive to light  ENT: + ET tube  NECK: RIJ, No JVD  CHEST/LUNG: Decreased breath sounds; clear anteriorly  HEART: Regular rate and rhythm; No murmurs, rubs, or gallops  ABDOMEN: Hypoactive bowel sounds; Soft, Nontender, Nondistended  EXTREMITIES:  1+ Peripheral Pulses, brisk capillary refill. No clubbing,or edema  NERVOUS SYSTEM:  Sedated, paralyzed, limited exam  SKIN: Intact  Lines: Right radial A-line, Femoral TLC, RFV and RIJ ECMO cannula

## 2019-10-30 NOTE — PROGRESS NOTE ADULT - SUBJECTIVE AND OBJECTIVE BOX
This is a follow up progress note to earlier with Attending at bedside.    critical ill patient on VV-ECMO on VDR with ARDS/PNA/SEPSIS  Patient with severe LV systolic dysfunction on FREDI with reduced VTI and bilateral dense trans-lobar consolidation.  ECMO flow rate adjusted to account for reduced cardiac index.  Critical care time spent at patient bedside was 50 minutes on 10/30/2019. This is a follow up progress note to earlier with Attending at bedside.    critical ill patient on VV-ECMO with ARDS/PNA/SEPSIS  Patient with severe LV systolic dysfunction on FREDI with reduced VTI and bilateral dense trans-lobar consolidation.  ECMO flow rate adjusted to account for reduced cardiac index.  Critical care time spent at patient bedside was 50 minutes on 10/30/2019.

## 2019-10-30 NOTE — CONSULT NOTE ADULT - SUBJECTIVE AND OBJECTIVE BOX
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  Still deals with terrible depressive symptoms and severe insomnia.  Over this past weekend, he told his wife that he was going to kill himself.  He has had suicidal ideation in the past.  All day Sunday he was sleeping not eating.  In the evening she heard a thump and he was on the floor.   His wife suspects that he took additional meds.   Per the wife, he takes [place] supplements to help with sleep.    Bought to ED with hypotension hypoxemia and lethargy.  Initially arousable, but became obtunded  ABG revealed hypercapnic resp failure and he was intubated .  There was an airspace infiltrate on CXR on the right.  He was given IVF and ABX.  Poison control suggested sodium bicarb owing to the number of psyche meds and a mildly widened QRS complex.     Patient tranferred to the [facility] for cannulation of ECMO. Wife not present at bedside at this time.     PERTINENT PM/SXH:   Depression  Hepatitis  Left knee pain    No significant past surgical history    FAMILY HISTORY:      SOCIAL HISTORY:   Significant other/partner: Yes [x ]  No [ ] Children:  Yes [ ]  No [ ] Hindu/Spirituality:  Substance hx: Yes[ ]  No [ ]   Tobacco hx:  Yes [ ] No [x ]   Alcohol hx: Yes [ ] No [x ]   Home Opioid hx:  [ ] I-Stop Reference No:  Living Situation: [x ]Home  [ ]Long term care  [ ]Rehab [ ]Other    ADVANCE DIRECTIVES:    Full code]   DECISION MAKER(s):  [ ] Health Care Proxy(s)  [ x] Surrogate(s)  [ ] Guardian           Name(s): Phone Number(s): Coleen Barth [phone]     BASELINE (I)ADL(s) (prior to admission):  [place]: [ x]Total  [ ] Moderate [ ]Dependent    Allergies    No Known Allergies    Intolerances    MEDICATIONS  (STANDING):  chlorhexidine 0.12% Liquid 15 milliLiter(s) Oral Mucosa every 12 hours  cisatracurium Infusion 3 MICROgram(s)/kG/Min (24.3 mL/Hr) IV Continuous <Continuous>  dexMEDEtomidine Infusion 0.2 MICROgram(s)/kG/Hr (6.75 mL/Hr) IV Continuous <Continuous>  heparin  Infusion.  Unit(s)/Hr (24 mL/Hr) IV Continuous <Continuous>  midazolam Infusion 0.02 mG/kG/Hr (2.7 mL/Hr) IV Continuous <Continuous>  midazolam Injectable 4 milliGRAM(s) IV Push once  norepinephrine Infusion 0.05 MICROgram(s)/kG/Min (6.328 mL/Hr) IV Continuous <Continuous>  propofol Infusion 10 MICROgram(s)/kG/Min (8.1 mL/Hr) IV Continuous <Continuous>  vasopressin Infusion 0.04 Unit(s)/Min (2.4 mL/Hr) IV Continuous <Continuous>    MEDICATIONS  (PRN):    PRESENT SYMPTOMS: [x ]Unable to obtain due to sedation and paralytics   Source if other than patient:  [ ]Family   [ ]Team     Pain (Impact on QOL):    Location -   Severity -        Minimal acceptable level (0-10 scale):  Quality:   Onset:   Duration:                 Aggravating factors -  Relieving factors -  Radiation -    PAIN AD Score:     [email or web address] (press ctrl +  left click to view)    Dyspnea:  Yes [ ] No [ ] - [ ]Mild [ ]Moderate [ ]Severe  Anxiety:    Yes [ ] No [ ] - [ ]Mild [ ]Moderate [ ]Severe  Fatigue:    Yes [ ] No [ ] - [ ]Mild [ ]Moderate [ ]Severe  Nausea:    Yes [ ] No [ ] - [ ]Mild [ ]Moderate [ ]Severe                         Loss of appetite: Yes [ ] No [ ] - [ ]Mild [ ]Moderate [ ]Severe             Constipation:  Yes [ ] No [ ] - [ ]Mild [ ]Moderate [ ]Severe  Grief:  Yes [ ] No [ ]     Other Symptoms:  [ ]All other review of systems negative     Karnofsky Performance Score/Palliative Performance Status Version 2:       10  %    http://palliative.info/resource_material/[email or web address].pdf  PHYSICAL EXAM:  Vital Signs Last 24 Hrs  T(C): 38.5 (30 Oct 2019 09:00), Max: 39.2 (29 Oct 2019 20:00)  T(F): 101.3 (30 Oct 2019 09:00), Max: 102.6 (29 Oct 2019 20:00)  HR: 85 (30 Oct 2019 11:00) (70 - 107)  BP: 102/77 (30 Oct 2019 09:00) (102/77 - 135/102)  BP(mean): 83 (30 Oct 2019 09:00) (83 - 113)  RR: 10 (30 Oct 2019 11:00) (10 - 39)  SpO2: 100% (30 Oct 2019 11:00) (61% - 100%) I&O's Summary    30 Oct 2019 07:01  -  30 Oct 2019 12:14  --------------------------------------------------------  IN: 266.3 mL / OUT: 365 mL / NET: -98.7 mL    GENERAL:  [ ]Alert  [ ]Oriented x   [ ]Lethargic  [ ]Cachexia  [x ]Unarousable  [ ]Verbal  [ ]Non-Verbal  Behavioral:   [ ] Anxiety  [ ] Delirium [ ] Agitation [ ] Other  HEENT:  [ ]Normal   [ ]Dry mouth   [ x]ET Tube/Trach  [ ]Oral lesions  PULMONARY:   [ ]Clear  [ ]Tachypnea  [ ]Audible excessive secretions   [x ]Rhonchi        [ ]Right [ ]Left [x ]Bilateral  [ ]Crackles        [ ]Right [ ]Left [ ]Bilateral  [ ]Wheezing     [ ]Right [ ]Left [ ]Bilateral  CARDIOVASCULAR:    [x ]Regular [ ]Irregular [ ]Tachy  [ ]Luis Fernando [ ]Murmur [ ]Other  GASTROINTESTINAL:  [x ]Soft  [ ]Distended   [ ]+BS  [x ]Non tender [ ]Tender  [ ]PEG [ ]OGT/ NGT  Last BM:     GENITOURINARY:  [ ]Normal [ ] Incontinent   [ ]Oliguria/Anuria   [ x]Meredith  MUSCULOSKELETAL:   [ ]Normal   [x ]Weakness  [ ]Bed/Wheelchair bound [ ]Edema  NEUROLOGIC:   [ ]No focal deficits  [ ] Cognitive impairment  [ ] Dysphagia [ ]Dysarthria [ ] Paresis [x ]Other - sedated and paralyzed   SKIN:   [ x]Normal   [ ]Pressure ulcer(s)  [ ]Rash    LABS:                        11.3   14.43 )-----------( 162      ( 30 Oct 2019 09:30 )             35.6   10-30    143  |  111<H>  |  12  ----------------------------<  186<H>  4.0   |  21<L>  |  0.77    Ca    8.3<L>      30 Oct 2019 09:30  Phos  2.2     10-30  Mg     1.8     10-30    TPro  5.8<L>  /  Alb  3.2<L>  /  TBili  0.7  /  DBili  x   /  AST  39  /  ALT  40  /  AlkPhos  39<L>  10-30  PT/INR - ( 30 Oct 2019 09:30 )   PT: 17.1 SEC;   INR: 1.48          PTT - ( 30 Oct 2019 09:30 )  PTT:97.5 SEC      RADIOLOGY & ADDITIONAL STUDIES:    < from: CT Cervical Spine No Cont (10.28.19 @ 02:55) >  EXAM:  CT CERVICAL SPINE      PROCEDURE DATE:  10/28/2019    IMPRESSION:   1. Cervical spondylosis without acute fracture cervical spine.   2. Endotracheal tube in place with tip above lg. Abnormal opacity is   noted   throughout the partially visualized left upper lobe as well as bilateral   pleural effusions could be further evaluated with CT scan thorax.   3. Partial visualization nasogastric tube.    < end of copied text >    < from: Xray Chest 1 View- PORTABLE-Urgent (10.30.19 @ 06:19) >  EXAM:  XR CHEST PORTABLE URGENT 1V      PROCEDURE DATE:  10/30/2019  IMPRESSION: No evidence of pneumothorax status post ECMO line placement.   See above discussion. Results discussed with Dr. Rodriguez by telephone   (patient has been transferred to another acute care setting).    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  Still deals with terrible depressive symptoms and severe insomnia.  Over this past weekend, he told his wife that he was going to kill himself.  He has had suicidal ideation in the past.  All day Sunday he was sleeping not eating.  In the evening she heard a thump and he was on the floor.   His wife suspects that he took additional meds.   Per the wife, he takes [place] supplements to help with sleep.    Bought to ED with hypotension hypoxemia and lethargy.  Initially arousable, but became obtunded  ABG revealed hypercapnic resp failure and he was intubated .  There was an airspace infiltrate on CXR on the right.  He was given IVF and ABX.  Poison control suggested sodium bicarb owing to the number of psyche meds and a mildly widened QRS complex.     Patient tranferred to the [facility] for cannulation of ECMO. Wife not present at bedside at this time.     PERTINENT PM/SXH:   Depression  Hepatitis  Left knee pain    No significant past surgical history    FAMILY HISTORY:      SOCIAL HISTORY:   Significant other/partner: Yes [x ]  No [ ] Children:  Yes [ ]  No [ ] Sabianism/Spirituality:  Substance hx: Yes[ ]  No [x ]   Tobacco hx:  Yes [ ] No [x ]   Alcohol hx: Yes [ ] No [x ]   Home Opioid hx:  [ ] I-Stop Reference No:  Living Situation: [x ]Home  [ ]Long term care  [ ]Rehab [ ]Other    ADVANCE DIRECTIVES:    Full code]   DECISION MAKER(s):  [ ] Health Care Proxy(s)  [ x] Surrogate(s)  [ ] Guardian           Name(s): Phone Number(s): Coleen Barth [phone]     BASELINE (I)ADL(s) (prior to admission):  [place]: [ x]Total  [ ] Moderate [ ]Dependent    Allergies    No Known Allergies    Intolerances    MEDICATIONS  (STANDING):  chlorhexidine 0.12% Liquid 15 milliLiter(s) Oral Mucosa every 12 hours  cisatracurium Infusion 3 MICROgram(s)/kG/Min (24.3 mL/Hr) IV Continuous <Continuous>  dexMEDEtomidine Infusion 0.2 MICROgram(s)/kG/Hr (6.75 mL/Hr) IV Continuous <Continuous>  heparin  Infusion.  Unit(s)/Hr (24 mL/Hr) IV Continuous <Continuous>  midazolam Infusion 0.02 mG/kG/Hr (2.7 mL/Hr) IV Continuous <Continuous>  midazolam Injectable 4 milliGRAM(s) IV Push once  norepinephrine Infusion 0.05 MICROgram(s)/kG/Min (6.328 mL/Hr) IV Continuous <Continuous>  propofol Infusion 10 MICROgram(s)/kG/Min (8.1 mL/Hr) IV Continuous <Continuous>  vasopressin Infusion 0.04 Unit(s)/Min (2.4 mL/Hr) IV Continuous <Continuous>    MEDICATIONS  (PRN):    PRESENT SYMPTOMS: [x ]Unable to obtain due to sedation and paralytics   Source if other than patient:  [ ]Family   [ ]Team     Pain (Impact on QOL):    Location -   Severity -        Minimal acceptable level (0-10 scale):  Quality:   Onset:   Duration:                 Aggravating factors -  Relieving factors -  Radiation -    PAIN AD Score:     [email or web address]/[email or web address].pdf (press ctrl +  left click to view)    Dyspnea:  Yes [ ] No [ ] - [ ]Mild [ ]Moderate [ ]Severe  Anxiety:    Yes [ ] No [ ] - [ ]Mild [ ]Moderate [ ]Severe  Fatigue:    Yes [ ] No [ ] - [ ]Mild [ ]Moderate [ ]Severe  Nausea:    Yes [ ] No [ ] - [ ]Mild [ ]Moderate [ ]Severe                         Loss of appetite: Yes [ ] No [ ] - [ ]Mild [ ]Moderate [ ]Severe             Constipation:  Yes [ ] No [ ] - [ ]Mild [ ]Moderate [ ]Severe  Grief:  Yes [ ] No [ ]     Other Symptoms:  [ ]All other review of systems negative     Karnofsky Performance Score/Palliative Performance Status Version 2:       10  %    http://palliative.info/resource_material/PPSv2.pdf  PHYSICAL EXAM:  Vital Signs Last 24 Hrs  T(C): 38.5 (30 Oct 2019 09:00), Max: 39.2 (29 Oct 2019 20:00)  T(F): 101.3 (30 Oct 2019 09:00), Max: 102.6 (29 Oct 2019 20:00)  HR: 85 (30 Oct 2019 11:00) (70 - 107)  BP: 102/77 (30 Oct 2019 09:00) (102/77 - 135/102)  BP(mean): 83 (30 Oct 2019 09:00) (83 - 113)  RR: 10 (30 Oct 2019 11:00) (10 - 39)  SpO2: 100% (30 Oct 2019 11:00) (61% - 100%) I&O's Summary    30 Oct 2019 07:01  -  30 Oct 2019 12:14  --------------------------------------------------------  IN: 266.3 mL / OUT: 365 mL / NET: -98.7 mL    GENERAL:  [ ]Alert  [ ]Oriented x   [ ]Lethargic  [ ]Cachexia  [x ]Unarousable  [ ]Verbal  [ ]Non-Verbal  Behavioral:   [ ] Anxiety  [ ] Delirium [ ] Agitation [ x] Other- paralyzed   HEENT:  [ ]Normal   [ ]Dry mouth   [ x]ET Tube/Trach  [ ]Oral lesions  PULMONARY:   [ ]Clear  [ ]Tachypnea  [ ]Audible excessive secretions   [x ]Rhonchi        [ ]Right [ ]Left [x ]Bilateral  [ ]Crackles        [ ]Right [ ]Left [ ]Bilateral  [ ]Wheezing     [ ]Right [ ]Left [ ]Bilateral  CARDIOVASCULAR:    [x ]Regular [ ]Irregular [ ]Tachy  [ ]Luis Fernando [ ]Murmur [ ]Other  GASTROINTESTINAL:  [x ]Soft  [ ]Distended   [ ]+BS  [x ]Non tender [ ]Tender  [ ]PEG [ ]OGT/ NGT  Last BM:   GENITOURINARY:  [ ]Normal [ ] Incontinent   [ ]Oliguria/Anuria   [ x]Meredith  MUSCULOSKELETAL:   [ ]Normal   [x ]Weakness  [ ]Bed/Wheelchair bound [ ]Edema  NEUROLOGIC:   [ ]No focal deficits  [ ] Cognitive impairment  [ ] Dysphagia [ ]Dysarthria [ ] Paresis [x ]Other - sedated and paralyzed   SKIN:   [ x]Normal   [ ]Pressure ulcer(s)  [ ]Rash    LABS:                        11.3   14.43 )-----------( 162      ( 30 Oct 2019 09:30 )             35.6   10-30    143  |  111<H>  |  12  ----------------------------<  186<H>  4.0   |  21<L>  |  0.77    Ca    8.3<L>      30 Oct 2019 09:30  Phos  2.2     10-30  Mg     1.8     10-30    TPro  5.8<L>  /  Alb  3.2<L>  /  TBili  0.7  /  DBili  x   /  AST  39  /  ALT  40  /  AlkPhos  39<L>  10-30  PT/INR - ( 30 Oct 2019 09:30 )   PT: 17.1 SEC;   INR: 1.48          PTT - ( 30 Oct 2019 09:30 )  PTT:97.5 SEC      RADIOLOGY & ADDITIONAL STUDIES:    < from: CT Cervical Spine No Cont (10.28.19 @ 02:55) >  EXAM:  CT CERVICAL SPINE      PROCEDURE DATE:  10/28/2019    IMPRESSION:   1. Cervical spondylosis without acute fracture cervical spine.   2. Endotracheal tube in place with tip above lg. Abnormal opacity is   noted   throughout the partially visualized left upper lobe as well as bilateral   pleural effusions could be further evaluated with CT scan thorax.   3. Partial visualization nasogastric tube.    < end of copied text >    < from: Xray Chest 1 View- PORTABLE-Urgent (10.30.19 @ 06:19) >  EXAM:  XR CHEST PORTABLE URGENT 1V      PROCEDURE DATE:  10/30/2019  IMPRESSION: No evidence of pneumothorax status post ECMO line placement.   See above discussion. Results discussed with Dr. Rodriguez by telephone   (patient has been transferred to another acute care setting).    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  Still deals with terrible depressive symptoms and severe insomnia.  Over this past weekend, he told his wife that he was going to kill himself.  He has had suicidal ideation in the past.  All day Sunday he was sleeping not eating.  In the evening she heard a thump and he was on the floor.   His wife suspects that he took additional meds.   Per the wife, he takes [place] supplements to help with sleep.    Bought to ED with hypotension hypoxemia and lethargy.  Initially arousable, but became obtunded  ABG revealed hypercapnic resp failure and he was intubated .  There was an airspace infiltrate on CXR on the right.  He was given IVF and ABX.  Poison control suggested sodium bicarb owing to the number of psyche meds and a mildly widened QRS complex.     Patient tranferred to the [facility] for cannulation of ECMO. Wife not present at bedside at this time.     PERTINENT PM/SXH:   Depression  Hepatitis  Left knee pain    No significant past surgical history    FAMILY HISTORY: no known history in first degree relatives.     SOCIAL HISTORY:   Significant other/partner: Yes [x ]  No [ ] Children:  Yes [ ]  No [ ] Samaritan/Spirituality:  Substance hx: Yes[ ]  No [x ]   Tobacco hx:  Yes [ ] No [x ]   Alcohol hx: Yes [ ] No [x ]   Home Opioid hx:  [ ] I-Stop Reference No:  Living Situation: [x ]Home  [ ]Long term care  [ ]Rehab [ ]Other    ADVANCE DIRECTIVES:    Full code]     DECISION MAKER(s):  [ ] Health Care Proxy(s)  [ x] Surrogate(s)  [ ] Guardian           Name(s): Phone Number(s): Coleen Barth (wife) [phone]     BASELINE (I)ADL(s) (prior to admission):  [place]: [ x]Total  [ ] Moderate [ ]Dependent    Allergies    No Known Allergies    Intolerances    MEDICATIONS  (STANDING):  chlorhexidine 0.12% Liquid 15 milliLiter(s) Oral Mucosa every 12 hours  cisatracurium Infusion 3 MICROgram(s)/kG/Min (24.3 mL/Hr) IV Continuous <Continuous>  dexMEDEtomidine Infusion 0.2 MICROgram(s)/kG/Hr (6.75 mL/Hr) IV Continuous <Continuous>  heparin  Infusion.  Unit(s)/Hr (24 mL/Hr) IV Continuous <Continuous>  midazolam Infusion 0.02 mG/kG/Hr (2.7 mL/Hr) IV Continuous <Continuous>  midazolam Injectable 4 milliGRAM(s) IV Push once  norepinephrine Infusion 0.05 MICROgram(s)/kG/Min (6.328 mL/Hr) IV Continuous <Continuous>  propofol Infusion 10 MICROgram(s)/kG/Min (8.1 mL/Hr) IV Continuous <Continuous>  vasopressin Infusion 0.04 Unit(s)/Min (2.4 mL/Hr) IV Continuous <Continuous>    MEDICATIONS  (PRN):    PRESENT SYMPTOMS: [x ]Unable to obtain due to sedation and paralytics   Source if other than patient:  [ ]Family   [ ]Team     Pain (Impact on QOL):    Location -   Severity -        Minimal acceptable level (0-10 scale):  Quality:   Onset:   Duration:                 Aggravating factors -  Relieving factors -  Radiation -    PAIN AD Score: 0    [email or web address]/[email or web address].pdf (press ctrl +  left click to view)    Dyspnea:  Yes [ ] No [ ] - [ ]Mild [ ]Moderate [ ]Severe  Anxiety:    Yes [ ] No [ ] - [ ]Mild [ ]Moderate [ ]Severe  Fatigue:    Yes [ ] No [ ] - [ ]Mild [ ]Moderate [ ]Severe  Nausea:    Yes [ ] No [ ] - [ ]Mild [ ]Moderate [ ]Severe                         Loss of appetite: Yes [ ] No [ ] - [ ]Mild [ ]Moderate [ ]Severe             Constipation:  Yes [ ] No [ ] - [ ]Mild [ ]Moderate [ ]Severe  Grief:  Yes [ ] No [ ]     Other Symptoms:  [ ]All other review of systems negative     Karnofsky Performance Score/Palliative Performance Status Version 2:       10  %    http://palliative.info/resource_material/[email or web address].pdf    PHYSICAL EXAM:  Vital Signs Last 24 Hrs  T(C): 38.5 (30 Oct 2019 09:00), Max: 39.2 (29 Oct 2019 20:00)  T(F): 101.3 (30 Oct 2019 09:00), Max: 102.6 (29 Oct 2019 20:00)  HR: 85 (30 Oct 2019 11:00) (70 - 107)  BP: 102/77 (30 Oct 2019 09:00) (102/77 - 135/102)  BP(mean): 83 (30 Oct 2019 09:00) (83 - 113)  RR: 10 (30 Oct 2019 11:00) (10 - 39)  SpO2: 100% (30 Oct 2019 11:00) (61% - 100%) I&O's Summary    30 Oct 2019 07:01  -  30 Oct 2019 12:14  --------------------------------------------------------  IN: 266.3 mL / OUT: 365 mL / NET: -98.7 mL    GENERAL:  [ ]Alert  [ ]Oriented x   [ ]Lethargic  [ ]Cachexia  [x ]Unarousable  [ ]Verbal  [ ]Non-Verbal  Behavioral:   [ ] Anxiety  [ ] Delirium [ ] Agitation [ x] Other- paralyzed   HEENT:  [ ]Normal   [ ]Dry mouth   [ x]ET Tube/Trach  [ ]Oral lesions  PULMONARY:   [ ]Clear  [ ]Tachypnea  [ ]Audible excessive secretions   [x ]Rhonchi        [ ]Right [ ]Left [x ]Bilateral  [ ]Crackles        [ ]Right [ ]Left [ ]Bilateral  [ ]Wheezing     [ ]Right [ ]Left [ ]Bilateral  CARDIOVASCULAR:    [x ]Regular [ ]Irregular [ ]Tachy  [ ]Luis Fernando [ ]Murmur [ ]Other  GASTROINTESTINAL:  [x ]Soft  [ ]Distended   [x ]+BS  [x ]Non tender [ ]Tender  [ ]PEG [ ]OGT/ NGT  Last BM:   GENITOURINARY:  [ ]Normal [ ] Incontinent   [ ]Oliguria/Anuria   [ x]Meredith  MUSCULOSKELETAL:   [ ]Normal   [x ]Weakness  [ ]Bed/Wheelchair bound [ ]Edema  NEUROLOGIC:   [ ]No focal deficits  [ ] Cognitive impairment  [ ] Dysphagia [ ]Dysarthria [ ] Paresis [x ]Other - sedated and paralyzed   SKIN:   [ x]Normal   [ ]Pressure ulcer(s)  [ ]Rash    LABS:                        11.3   14.43 )-----------( 162      ( 30 Oct 2019 09:30 )             35.6   10-30    143  |  111<H>  |  12  ----------------------------<  186<H>  4.0   |  21<L>  |  0.77    Ca    8.3<L>      30 Oct 2019 09:30  Phos  2.2     10-30  Mg     1.8     10-30    TPro  5.8<L>  /  Alb  3.2<L>  /  TBili  0.7  /  DBili  x   /  AST  39  /  ALT  40  /  AlkPhos  39<L>  10-30  PT/INR - ( 30 Oct 2019 09:30 )   PT: 17.1 SEC;   INR: 1.48        PTT - ( 30 Oct 2019 09:30 )  PTT:97.5 SEC    RADIOLOGY & ADDITIONAL STUDIES:    < from: CT Cervical Spine No Cont (10.28.19 @ 02:55) >    EXAM:  CT CERVICAL SPINE      PROCEDURE DATE:  10/28/2019      IMPRESSION:   1. Cervical spondylosis without acute fracture cervical spine.   2. Endotracheal tube in place with tip above lg. Abnormal opacity is   noted   throughout the partially visualized left upper lobe as well as bilateral   pleural effusions could be further evaluated with CT scan thorax.   3. Partial visualization nasogastric tube.    < from: Xray Chest 1 View- PORTABLE-Urgent (10.30.19 @ 06:19) >    EXAM:  XR CHEST PORTABLE URGENT 1V      PROCEDURE DATE:  10/30/2019  IMPRESSION: No evidence of pneumothorax status post ECMO line placement.   See above discussion. Results discussed with Dr. Rodriguez by telephone   (patient has been transferred to another acute care setting).    PROTEIN CALORIE MALNUTRITION PRESENT: [ ] Yes [x ] No  [x ] PPSV2 < or = to 30% [ ] significant weight loss  [ ] poor nutritional intake [ ] catabolic state [ ] anasarca     Albumin, Serum: 3.2 g/dL (10-30-19 @ 09:30)    REFERRALS:   [ ]Chaplaincy  [ ] Hospice  [ ]Child Life  [ ]Social Work  [ ]Case management [ ]Holistic Therapy   Goals of Care Discussion Document:

## 2019-10-30 NOTE — AIRWAY PLACEMENT NOTE ADULT - AIRWAY COMMENTS:
Pt intubated by NARCISO Rivas.
pt intubated by NARCISO Armenta with no complications x 1 attempt placed on mechanical ventilation and transferred to CCU 6 from TR 1

## 2019-10-30 NOTE — PROVIDER CONTACT NOTE (EICU) - BACKGROUND
Discussed again with Dr Corbin.  Patient will be cannulated at , Dr Scott and perfusionist are on the way.

## 2019-10-30 NOTE — PROGRESS NOTE ADULT - SUBJECTIVE AND OBJECTIVE BOX
EBONI CARRASCO            MRN-[id number]         No Known Allergies                 HPI:  54M with severe MDD with prior psych admissions/ECT who was initially admitted to  10/28 after presenting with lethargy and hypoxemia. Patient reportedly was dealing with severe depression and expressed SI to his wife. His wife reported he was sleeping throughout the day and in the evening she heard a thump and found him on the floor. At  he became obtunded with hypercapnic respiratory failure and was intubated and admitted to the CCU. Initial imaging showed lower lobe consolidations and he was treated with CTX/Azithromycin. Poison control was contacted and he was received NaHCO3 in the setting of mild QRS widening on ECG and suspected overdose. His Utox returned positive for barbiturates; he reportedly had positive testing for barbiturates in the past thought secondary to OTC supplementations he takes from a [employer] for sleep. On the morning of admission, he was noted to have a 30 second tonic clonic seizure and received IV benzo and keppra load. Subsequent EEG did not reveal epileptiform discharges. He was extubated in the morning of 10/28 but required BiPAP in the setting of increased oxygenation needs. On 10/29 he was agitated and was started on Precedex. He developed fevers and was broadened to cefepime. Overnight (10/30), he had a Tmax of 102.6 and worsening hypoxemia requiring reintubation. Was started on propofol, precedex and paralyzed but remained difficult to oxygenate with traditional mechanical ventilation requiring frequent bagging and ALI consult for ECMO was placed. He was steroids, nimbex gtt, vancomycin and received Lasix 40mg IVP. His P/F was 60 at time of consult; improved somewhat to 82 at 4:30 this morning on AC/28/460/18/100. The patient was cannulated for VV-ECMO and transfer to [facility] (30 Oct 2019 12:10)      Procedure:  Percutaneous insertion of cannula for extracorporeal membrane oxygenation (ECMO) / R-IJ 20F & R-Femoral 25F       Issues:   ARDS / ECMO support                Acute hypoxemic and hypercarbic respiratory failure  Aspiration pneumonia  Hypotension                         Home Medications:  OLANZapine 5 mg oral tablet: 1 tab(s) orally once a day (28 Oct 2019 01:19)      PAST MEDICAL & SURGICAL HISTORY:  Depression  Hepatitis: &quot;as a child&quot; unsure of type  Left knee pain  No significant past surgical history        ICU Vital Signs Last 24 Hrs  T(C): 36.6 (30 Oct 2019 20:00), Max: 39.1 (29 Oct 2019 23:00)  T(F): 97.9 (30 Oct 2019 20:00), Max: 102.4 (29 Oct 2019 23:00)  HR: 80 (30 Oct 2019 20:00) (70 - 107)  BP: 102/77 (30 Oct 2019 09:00) (102/77 - 135/102)  BP(mean): 83 (30 Oct 2019 09:00) (83 - 113)  ABP: 96/59 (30 Oct 2019 20:00) (83/56 - 142/90)  ABP(mean): 69 (30 Oct 2019 20:00) (64 - 106)  RR: 14 (30 Oct 2019 20:00) (10 - 38)  SpO2: 100% (30 Oct 2019 20:00) (61% - 100%)    I&O's Detail    30 Oct 2019 07:01  -  30 Oct 2019 20:33  --------------------------------------------------------  IN:    cisatracurium Infusion: 178.7 mL    dexmedetomidine Infusion: 170 mL    Enteral Tube Flush: 90 mL    heparin  Infusion.: 20 mL    heparin Infusion: 61 mL    heparin Infusion: 22 mL    IV PiggyBack: 950 mL    midazolam Infusion: 29.7 mL    norepinephrine Infusion: 33.7 mL    ns in tub fed  sdqtrd23: 40 mL    propofol Infusion: 402.6 mL  Total IN: 1997.7 mL    OUT:    Indwelling Catheter - Urethral: 965 mL  Total OUT: 965 mL    Total NET: 1032.7 mL        CAPILLARY BLOOD GLUCOSE      POCT Blood Glucose.: 188 mg/dL (30 Oct 2019 17:26)        MEDICATIONS  (STANDING):  albuterol/ipratropium for Nebulization 3 milliLiter(s) Nebulizer every 6 hours  azithromycin  IVPB 500 milliGRAM(s) IV Intermittent every 24 hours  chlorhexidine 0.12% Liquid 15 milliLiter(s) Oral Mucosa every 12 hours  cisatracurium Infusion 3 MICROgram(s)/kG/Min (24.3 mL/Hr) IV Continuous <Continuous>  heparin  Infusion 1100 Unit(s)/Hr (11 mL/Hr) IV Continuous <Continuous>  levETIRAcetam  IVPB 750 milliGRAM(s) IV Intermittent every 12 hours  midazolam Infusion 0.02 mG/kG/Hr (2.7 mL/Hr) IV Continuous <Continuous>  norepinephrine Infusion 0.05 MICROgram(s)/kG/Min (6.328 mL/Hr) IV Continuous <Continuous>  petrolatum Ophthalmic Ointment 1 Application(s) Both EYES two times a day  piperacillin/tazobactam IVPB.. 4.5 Gram(s) IV Intermittent every 8 hours  polyethylene glycol 3350 17 Gram(s) Oral daily  propofol Infusion 10 MICROgram(s)/kG/Min (8.1 mL/Hr) IV Continuous <Continuous>  senna Syrup 15 milliLiter(s) Oral two times a day  vancomycin  IVPB 1500 milliGRAM(s) IV Intermittent every 8 hours  vancomycin  IVPB      vasopressin Infusion 0.04 Unit(s)/Min (2.4 mL/Hr) IV Continuous <Continuous>    MEDICATIONS  (PRN):      Mode: VDR4  RR (machine): 15  FiO2: 40  PEEP: 14  ITime: 1.5  MAP: 19  PC: 26  PIP: 26  Frequency: 500    ECMO Settings:  Flow 3.5 RPM 2700 Fio2 100%, Sweep 0.5     Physical exam:                             General:               Pt is sedated                                                  Neuro:                  Sedated                            Cardiovascular:   S1 & S2, regular                           Respiratory:         Air entry is decreased on right side, bilateral conducted sounds                          GI:                          Soft, nondistended, Bowel sounds active                            Ext:                        No cyanosis or edema     Labs:                                                                           11.3   14.43 )-----------( 162      ( 30 Oct 2019 09:30 )             35.6             10-30    143  |  111<H>  |  12  ----------------------------<  200<H>  4.2   |  22  |  0.70    Ca    8.1<L>      30 Oct 2019 13:00  Phos  2.2     10-30  Mg     1.8     10-30    TPro  5.8<L>  /  Alb  3.2<L>  /  TBili  0.7  /  DBili  x   /  AST  39  /  ALT  40  /  AlkPhos  39<L>  10-30                  PT/INR - ( 30 Oct 2019 09:30 )   PT: 17.1 SEC;   INR: 1.48          PTT - ( 30 Oct 2019 16:30 )  PTT:39.8 SEC  LIVER FUNCTIONS - ( 30 Oct 2019 09:30 )  Alb: 3.2 g/dL / Pro: 5.8 g/dL / ALK PHOS: 39 u/L / ALT: 40 u/L / AST: 39 u/L / GGT: x               Culture - Respiratory with Gram Stain (collected 10-30-19 @ 18:48)  Source: BRONCHIAL LAVAGE        Radiology:    < from: Xray Chest 1 View- PORTABLE-Urgent (10.30.19 @ 15:23) >  Theendotracheal and enteric tube in addition to the right IJ ECMO   catheter are unchanged. Increasing opacity in the left midlung field may   represent aspiration pneumonia. The heart is not enlarged. Right lung   shows no focal consolidations. No effusions or pneumothorax. Left IJ line   with its tip in the SVC unchanged.      COMPARISON:  October 30      IMPRESSION:  Follow-up study with increasing left lung opacity may   represent worsening pneumonia.      Plan:    General: 54yMale with acute respiratory failure s/p  POD#  .  Remains on ECMO / ventilator support, sedated                             Neuro:                                         Pain control with Fentanyl drip                                        Continue Propofol / Versed drips for sedation                                        Keep patient paralyzed with Nimbex    Seizures ? - Continue Keppra    Psych and Neurology eval                             Cardiovascular:                                          Hemodynamic monitoring.                                        Hypotension most likely due to sedatives vs  sepsis.  Continue Levophed, titrate to MAP>65    Preserved RV but severe LV dysfunction                            Respiratory:                                         Pt is  on VDR 15-40-     ECMO: Flow 3.5 RPM 2700 Fio2 100%, Sweep 0.5                                          Bronched today     Pulmonary toilet                                                                  Continue Heparin drip, titrate to PTT 50-60                            GI                                         NGT feeds                                                                                                        Renal:                                         Normal renal function with good urine output                                         Monitor electrolyte                                                 Hem/ Onc:                                         Continue Heparin drip for systemic anticoagulation - Target PTT 50-60                                         Monitor for signs of bleeding.                                          Monitor Hb /Hct                                         Transfuse PRN to maintain Hb>8 and Platelets>50k     Monitor LDH /  Haptoglobin                           Infectious disease:                                          Aspiration pneumonia - Continue antibiotics Vanco / Zosyn / Azithro                                          Follow cultures                            Endocrine                                             Glycemic monitoring        Pertinent clinical, laboratory, radiographic, hemodynamic, echocardiographic, respiratory data, microbiologic data and chart were reviewed and analyzed frequently throughout the course of the day and night  Patient seen, examined and plan discussed with CT Surgeon / CTICU / MICU team during rounds.    Pt's status discussed with family at bedside, updated status    I have spent  45   minutes of critical care time with this pt between  7 pm   and  11.59pm         Mario Headley MD

## 2019-10-30 NOTE — PROGRESS NOTE ADULT - ASSESSMENT
Physical Examination:  GENERAL:                Intubated, sedated.   HEENT:                    No JVD, dry MM  PULM:                     Bilateral air entry,  no significant sputum production, + Rales, No Rhonchi, no  Wheezing  CVS:                         S1, S2,  No Murmur  ABD:                        Soft, distended, nontender, normoactive bowel sounds, Obese  EXT:                         + edema, nontender, No Cyanosis or Clubbing   Vascular:                 Warm Extremities, Normal Capillary refill, Normal Distal Pulses   NEURO:                  intubated, sedated, paralyzed   PSYC:                      Calm, no Insight.        Assessment  Acute hypoxic respiratory failure s/p reintubation overnight. with severe hypoxia - DDX includes ? ARDS  ? CHF exacerbation ? PNA ? Drug reaction   s/p Overdose  Possible seizure / Withdrawal   Aspiration pneumonitis / PNA with worsening cxr  JESSIE  with normal baseline - Resolved  Underlying severe depression       Plan  Mechanical ventialtion high peep/fio2  PRVC   ECMO eval, possible bedside Canluation  Emergent CVP line / A line being placed now.  Continue broad spectrum abx  keppra- neuro f/u   when stable will need Psyc f/u for  likely 2 PC         PMD:		Dr. Bill 		                   Notified(Date): 10/28/19                           Dr. Cline - Psyc  Family Updated: 	wife 	                                 Date: 10/30 Called no answer.       Sedation & Analgesia:	as above  Diet/Nutrition:		advance as tolerated  GI PPx:			PPI    DVT Ppx:		Heparin sq  	  Activity:		  bedrest  Head of Bed:               35-45  Glycemic Control:        n/a    Lines: PIV   Restraints were deemed necessary to prevent removal of life-sustaining devices [x] YES   [    ]  NO    Disposition: ICU monitoring, Transfer to [facility]    Goals of Care: Full code. Physical Examination:  GENERAL:                Intubated, sedated.   HEENT:                    No JVD, dry MM  PULM:                     Bilateral air entry,  no significant sputum production, + Rales, No Rhonchi, no  Wheezing  CVS:                         S1, S2,  No Murmur  ABD:                        Soft, distended, nontender, normoactive bowel sounds, Obese  EXT:                         + edema, nontender, No Cyanosis or Clubbing   Vascular:                 Warm Extremities, Normal Capillary refill, Normal Distal Pulses   NEURO:                  intubated, sedated, paralyzed   PSYC:                      Calm, no Insight.        Assessment  Acute hypoxic respiratory failure s/p reintubation overnight. with severe hypoxia - DDX includes ? ARDS  ? CHF exacerbation ? PNA ? Drug reaction   s/p Overdose  Possible seizure / Withdrawal   Aspiration pneumonitis / PNA with worsening cxr  JESSIE  with normal baseline - Resolved  Underlying severe depression       Plan  Mechanical ventialtion high peep/fio2  PRVC   ECMO eval, possible bedside Canluation  Emergent CVP line / A line being placed now.  Continue broad spectrum abx  stat labs  keppra- neuro f/u   when stable will need Psyc f/u for  likely 2 PC         PMD:		Dr. Bill 		                   Notified(Date): 10/28/19                           Dr. Cline - Psyc  Family Updated: 	wife 	                                 Date: 10/30 Called no answer.       Sedation & Analgesia:	as above  Diet/Nutrition:		advance as tolerated  GI PPx:			PPI    DVT Ppx:		Heparin sq  	  Activity:		  bedrest  Head of Bed:               35-45  Glycemic Control:        n/a    Lines: PIV   Restraints were deemed necessary to prevent removal of life-sustaining devices [x] YES   [    ]  NO    Disposition: ICU monitoring, Transfer to [facility]    Goals of Care: Full code.

## 2019-10-30 NOTE — H&P ADULT - NSHPLABSRESULTS_GEN_ALL_CORE
LABS:                        11.3   14.43 )-----------( 162      ( 30 Oct 2019 09:30 )             35.6     10-30    143  |  111<H>  |  12  ----------------------------<  186<H>  4.0   |  21<L>  |  0.77    Ca    8.3<L>      30 Oct 2019 09:30  Phos  2.2     10-30  Mg     1.8     10-30    TPro  5.8<L>  /  Alb  3.2<L>  /  TBili  0.7  /  DBili  x   /  AST  39  /  ALT  40  /  AlkPhos  39<L>  10-30    PT/INR - ( 30 Oct 2019 09:30 )   PT: 17.1 SEC;   INR: 1.48          PTT - ( 30 Oct 2019 09:30 )  PTT:97.5 SEC    Arterial Blood Gas:  10-30 @ 13:26  7.31/49/67/23/90.9/-1.9  ABG lactate: 1.4  Arterial Blood Gas:  10-30 @ 09:30  7.42/36/90/24/96.8/-0.8  ABG lactate: 2.0  Arterial Blood Gas:  10-30 @ 06:47  7.40/31/257/--/98/--  ABG lactate: --  Arterial Blood Gas:  10-30 @ 04:35  7.21/60/82/--/91/--  ABG lactate: --  Arterial Blood Gas:  10-30 @ 02:35  7.33/41/55/--/82/--  ABG lactate: --  Arterial Blood Gas:  10-30 @ 00:25  7.44/30/69/--/92/--  ABG lactate: --  Arterial Blood Gas:  10-29 @ 14:15  7.48/27/55/--/88/--  ABG lactate: --  Arterial Blood Gas:  10-29 @ 06:10  7.45/32/81/--/95/--  ABG lactate: --  Arterial Blood Gas:  10-28 @ 21:01  7.42/37/73/--/94/--  ABG lactate: --  Arterial Blood Gas:  10-28 @ 18:40  7.41/36/61/--/90/--  ABG lactate: --        MICROBIOLOGY:     RADIOLOGY:  [x ] Reviewed and interpreted by me  FINDINGS: Since prior evaluation there is no significant interval change   in position of the indwelling ETT. The patient is status post ECMO line   placement with the right IJ component terminating at the expected level   of the SVC; the distal tip of the second component advanced from   inferiorly also overlies the expected region of the SVC. Clinical   correlation is suggested. Bilateral lung parenchymal airspace opacities   are redemonstrated, unchanged. There is no evidence for pneumothorax. No   mediastinal shift is noted. No osseous fractures are identified.    IMPRESSION: No evidence of pneumothorax status post ECMO line placement.   See above discussion. Results discussed with Dr. Rodriguez by telephone   (patient has been transferred to another acute care setting).    < end of copied text >

## 2019-10-30 NOTE — PATIENT PROFILE ADULT - OVER THE PAST TWO WEEKS HAVE YOU FELT DOWN, DEPRESSED OR HOPELESS?
unable to ask patient as he is intubated and sedated. Patient was being treated as an outpatient for depression

## 2019-10-30 NOTE — CHART NOTE - NSCHARTNOTEFT_GEN_A_CORE
Pre-Bronchoscopy Tuberculosis Risk Screening Tool  To reduce the risk for airborne transmission of Mycobacterium tuberculosis, this assessment form must be completed prior to bronchoscopy procedures being performed outside of a negative pressure environment.    Procedure Date: 10/30/19  Health Care Provider Name: Tessy Guerin  Reason for the Bronchoscopy: hypoxia, secretions    Planned Location for the Procedure:  [ ] Emergency Department     [ x] Intensive Care Unit     [ ] Operating Room     Other: ___________________    Risk Assessment  I. I have personally evaluated this patient for Mycobacterium tuberculosis and I determined the following risk:  [x ] Low risk for TB     [ ] Significant risk for TB    II. Additional Findings: n/a    III. Based on the Determined Risk for TB the following Action(s) are Suggested:  If there is a significant risk for tuberculosis infection, the following recommendations should be followed:            a. Perform the procedure in a negative pressure room, with N95 respirator.            b. If not feasible to move the patient or defer the procedure:                    i. Use a single-bedded room low traffic area to perform the bronchoscopy procedure.                   ii. Place a portable high-efficiency particulate air (HEPA) filter in the space prior to starting the procedure and keep closed.                       Refer to the INF.1132 titled “Tuberculosis Control Strategy Plan” for additional information.                  iii. All Health Care Providers within the procedure room shall wear an N95 respirator.            c. Documentation of the tuberculosis risk assessment should be included within the patient’s medical record.      Bronchoscopy Procedure Note:  Indication: hypoxia, secretions    Operators: Tessy Guerin    Medications: propofol, versed drips, nimbex     Findings:  Bronchoscope inserted through ETT. ETT noted to be in good position above main lg.  No endobronchial lesions noted in trachea and right and left bronchial trees.  Airway evaluation revealed thin, bilious secretions bilaterally, most prominent in RML, RLL, and LLL.  Secretions were aspirated with BAL from RML and RLL and sent for testing below.  Bronchoscope then withdrawn from ETT.    Specimens:  cell count  cultures  legionella pcr  cytology    Attending Attestation:  I was present during the key portions of the procedure and immediately available during the entire procedure.  Tessy Corbin MD  Attending  Pulmonary & Critical Care Medicine

## 2019-10-30 NOTE — AIRWAY PLACEMENT NOTE ADULT - POST AIRWAY PLACEMENT ASSESSMENT:
breath sounds bilateral/positive end tidal CO2 noted/CXR pending
CXR pending/breath sounds bilateral/breath sounds equal/positive end tidal CO2 noted/chest excursion noted

## 2019-10-30 NOTE — CHART NOTE - NSCHARTNOTEFT_GEN_A_CORE
Procedure: Transesophageal Echocardiogram  Indication:  ARDS, HFrEF, VV-ECMO  Consent: Verbal from family  Operators: Jostin Alfonso MD. Paddy Butler MD  Anesthesia:  General--versed, propofol, nimbex  Preparation: Patient positioned supine in bed  Technique: FREDI probe advanced into esophagus under direct visualization with Glidescope  Findings:     AV:  Normal trileaflet valve, Normal morphology    MV: Normal morphology    PV:  Normal     TV: Normal morphology, trace regurgitation    LV: Severe global LV dysfunction    RV:  Normal size and function    LA:  Normal    RA: Normal    SVC:  Normal; ECMO pull catheter noted traversing from the RA into the SVC (subsequently withdrawn under FREDI guidance)     Aorta: Normal    PA: Poorly visualized    Doppler:  Note:   Grade I diastolic dysfunction (E 41.1; A 25; e' 6.39)  TRmax 84.1  LVOT VTI 10.1    Other:     TELAS: Right upper and lower lobe consolidations with small pleural effusion. Left lower lobe consolidation      Assessment and Plan:   Severe LV dysfunction with grade I diastolic dysfunction  Probable mild pulmonary hypertension  Bilateral lobe consolidations    Jostin Alfonso MD, PGY5  Pulmonary and Critical Care Fellow  [id number]/[phone]      Images stored in [employer] 23-Feb-2018 10:21 23-Feb-2018 10:24

## 2019-10-30 NOTE — PROVIDER CONTACT NOTE (EICU) - BACKGROUND
Oxygenation worsening, on bipap, decided to intubate patient. etomidate, prop, Rancho, intubated on first attempt. Extremely difficult to oxygenate, on prop/dex paralyzed with Rancho placed on 460/22/18/100 ABG is 7.33/41/55/22/82, requiring freqquent bagging. Added vanco to cefipime, solumedrol 125, solumedrol 60 Q 6 H.  Will add nimbex. Gave lasix 40 mg x 1 stat , 300 out of guajardo but no improvement in oxygenation.   Called Dr Corbin of [facility].  In my opinion he cannot be transported needs to be cannulated at .  D.W bedside attending as well.

## 2019-10-30 NOTE — PROGRESS NOTE ADULT - REASON FOR ADMISSION
AMS hypoxemia possible drug overdose

## 2019-10-30 NOTE — PROCEDURE NOTE - NSTRACHPOSTINTU_RESP_A_CORE
Appropriate capnography/Breath sounds bilateral/Breath sounds equal/Chest X-Ray/Positive end tidal Co2 noted
Breath sounds bilateral/Appropriate capnography

## 2019-10-30 NOTE — CHART NOTE - NSCHARTNOTEFT_GEN_A_CORE
ISTOP  [id number]    08/13/2019	10/23/2019	zolpidem tart er 12.5 mg tab	30	30	Marlyn, Hugh ALCARAZ MD  10/10/2019	10/12/2019	clonazepam 1 mg tablet	120	30	Marlyn, Hugh ALCARAZ MD  08/13/2019	09/24/2019	zolpidem tart er 12.5 mg tab	30	30	Marlyn, Hugh ALCARAZ MD  09/12/2019	09/13/2019	clonazepam 1 mg tablet	120	30	Marlyn, Hugh ALCARAZ MD  08/13/2019	08/25/2019	zolpidem tart er 12.5 mg tab	30	30	Marlyn, Hugh ALCARAZ MD  08/13/2019	08/14/2019	clonazepam 1 mg tablet	120	30	Marlyn, Hugh ALCARAZ MD  02/14/2019	07/28/2019	zolpidem tart er 12.5 mg tab	30	30	Marlyn, Hugh ALCARAZ MD  07/17/2019	07/18/2019	clonazepam 1 mg tablet	120	30	Marlyn, Hugh ALCARAZ MD  06/28/2019	06/28/2019	clonazepam 1 mg tablet	45	15	RowanAlina MD  06/28/2019	06/28/2019	zolpidem tartrate 10 mg tablet	30	30	RowanAlina MD  02/14/2019	06/16/2019	zolpidem tart er 12.5 mg tab	30	30	Marlyn, Hugh ALCARAZ MD  02/14/2019	05/15/2019	zolpidem tart er 12.5 mg tab	30	30	Marlyn, Hugh ALCARAZ MD  05/14/2019	05/14/2019	clonazepam 1 mg tablet	120	30	Marlyn, Hugh ALCARAZ MD  02/14/2019	04/15/2019	zolpidem tart er 12.5 mg tab	30	30	Marlyn, Hugh ALCARAZ MD  04/13/2019	04/14/2019	clonazepam 1 mg tablet	120	30	Marlyn, Hugh ALCARAZ MD  02/14/2019	03/16/2019	zolpidem tart er 12.5 mg tab	30	30	Marlyn, Hugh ALCARAZ MD  03/13/2019	03/16/2019	clonazepam 1 mg tablet	120	30	Marlyn, Hugh ALCARAZ MD  02/14/2019	02/15/2019	clonazepam 1 mg tablet	120	30	Marlyn, Hugh ALCARAZ MD  12/17/2018	02/14/2019	zolpidem tart er 12.5 mg tab	30	30	Marlyn, Hugh L MD  12/17/2018	01/18/2019	zolpidem tart er 12.5 mg tab	30	30	Hugh Cline MD  01/14/2019	01/15/2019	clonazepam 1 mg tablet	120	30	Hugh Cline MD  12/17/2018	12/19/2018	zolpidem tart er 12.5 mg tab	30	30	Hugh Cline MD  12/17/2018	12/17/2018	clonazepam 1 mg tablet	120	30	Hugh Cline MD  11/14/2018	11/20/2018	zolpidem tart er 12.5 mg tab	30	30	MarlynHugh MD  11/14/2018	11/14/2018	clonazepam 1 mg tablet	120	30	Hugh Cline MD

## 2019-10-30 NOTE — DIETITIAN INITIAL EVALUATION ADULT. - ENERGY NEEDS
IBW: 77 kg  Estimated Calorie Need (11-14kcals/kg ABW): 1485 - 1890/day  Estimated Protein Need (2.0 gms/kg IBW): 154 gms/day

## 2019-10-30 NOTE — PROCEDURE NOTE - NSPROCNAME_GEN_A_CORE
Arterial Puncture/Cannulation
Arterial Puncture/Cannulation
Central Line Insertion
Feeding Tube Placement
Tracheal Intubation
Tracheal Intubation

## 2019-10-30 NOTE — PROGRESS NOTE ADULT - SUBJECTIVE AND OBJECTIVE BOX
55 y/o male pmhx HTN, HLD severe depression and anxiety admitted on 10/28 53 y/o male pmhx HTN, HLD severe depression and anxiety admitted on 10/28 w/ acute hypoxic hypercapnic respiratory failure related to suspected drug overdose. Was able to be extubated on the morning of 10/28. Hospital course complicated by developing aspiration pneumonia. Received patient on BiPAP 18/6 60%.   Overnight patient w/ increasing FiO2 requirements, titrated him up to 80%. STAT ABG showed 7.33/41/55/22.   Patient then becoming agitated/ diaphoretic w/  RR >40 and O2 sat 90%. . Decision made to emergently intubate. FiO2 Increased to 100% kate intubation. RSI done, intubated on first attempt.  Post intubation difficult to oxygenate patient. 100% FiO2 w/ PEEP 18. O2 sat remained 80-85% w/ good pleth. STAT ABG w/ PO2 55. Given additional push of 100mg rocc and sedated w/ propofol and Precedex. ARDS low TV ventilation strategy.   Started on nimbex infusion. Train of 4 at bedside.   Patient continuing to have periods of hypoxia w/ O2 sat 80's, require ambu bag ventilation to maintain sat 90%.   Given 40mg IV lasix w/ >1.3L urine output. Antibiotics broadened  to Vanc and cefepime. Given 1 dose 60mg  solumedrol   Laying patient down flat to place CVC line he quickly became hypoxic. Sat back up and placed L Femoral CVC and R Radial a line,   [facility] ALI center was contacted. They are en route to cannulate at bedside for ECMO.     Case discussed in detail w/ eICU Dr. Villalobos  and Dr. Rodriguez 53 y/o male pmhx HTN, HLD severe depression and anxiety admitted on 10/28 w/ acute hypoxic hypercapnic respiratory failure related to suspected drug overdose. Was able to be extubated on the morning of 10/28. Hospital course complicated by developing aspiration pneumonia. Received patient on BiPAP 18/6 60%.   Overnight patient w/ increasing FiO2 requirements, titrated him up to 80%. STAT ABG showed 7.33/41/55/22.   Patient then becoming agitated/ diaphoretic w/  RR >40 and O2 sat 90%. . Decision made to emergently intubate. FiO2 Increased to 100% kate intubation. RSI done, intubated on first attempt.  Post intubation difficult to oxygenate patient. 100% FiO2 w/ PEEP 18. O2 sat remained 80-85% w/ good pleth. STAT ABG w/ PO2 55. Given additional push of 100mg rocc and sedated w/ propofol and Precedex. ARDS low TV ventilation strategy.   Started on nimbex infusion. Train of 4 at bedside.   Patient continuing to have periods of hypoxia w/ O2 sat 80's, require ambu bag ventilation to maintain sat 90%.   Given 40mg IV lasix w/ >1.3L urine output. Antibiotics broadened  to Vanc and cefepime. Given 1 dose 60mg  solumedrol   Laying patient down flat to place CVC line he quickly became hypoxic. Sat back up and placed L Femoral CVC and R Radial a line,   [facility] acute lung injury  center was contacted. They are en route to cannulate at bedside for ECMO.     Case discussed in detail w/ eICU Dr. Villalobos  and Dr. Rodriguez    Critical Care time: 110 mins assessing presenting problems of acute illness that poses high probability of life threatening deterioration or end organ damage/dysfunction.  Medical decision making including Initiating plan of care, reviewing data, reviewing radiology, direct patient bedside evaluation and interpretation of vital signs, any necessary ventilator management , discussion with multidisciplinary team, discussing goals of care with patient/family, all non inclusive of procedures

## 2019-10-30 NOTE — CHART NOTE - NSCHARTNOTEFT_GEN_A_CORE
: Tessy Guerin    INDICATION: shock    PROCEDURE:  [ ] LIMITED ECHO  [x ] LIMITED CHEST  [ ] LIMITED RETROPERITONEAL  [ ] LIMITED ABDOMINAL  [ ] LIMITED DVT  [ ] NEEDLE GUIDANCE VASCULAR  [ ] NEEDLE GUIDANCE THORACENTESIS  [ ] NEEDLE GUIDANCE PARACENTESIS  [ ] NEEDLE GUIDANCE PERICARDIOCENTESIS  [ ] OTHER    FINDINGS:  a line pattern bilaterally anterior fields  bibasilar consolidations with no associated pleural effusions    INTERPRETATION:  alveolar consolidations bilaterally    Images stored on [employer].

## 2019-10-30 NOTE — H&P ADULT - ASSESSMENT
54M with severe depression who presented to  10/28 with lethargy and hypoxemic/hypercapnic respiratory failure possibly related to overdose +/- pneumonia extubated but required reintubation 10/30 but with persistent hypoxemia refractory to conventional management and ultimately cannulated for VV ECMO 10/30/2019 for hypoxemic respiratory failure with ARDS and transferred to [facility] for further management    NEUROLOGY  Baseline AOX3. Has severe depression refractory to multiple medications/ECT with prior psych hospitalizations. Had a tonic-clonic seizure at   - Will keep well sedated with propofol, versed  to decrease VO2. Will DC precedex  - Medically paralyzed with cisatracurium, will continue. Train of 4 monitoring  - Neuro checks per ICU routine  - Psych evaluation; was being considered for inpatient admission at ; will also contact his outpatient psychiatrist  - Need to discuss with family current medications to look for possible overdoses  - Seizure like episode at ; ? related overdose vs withdrawal. EEG unremarkable there. Will need repeat EEG but will stabilize today prior to obtaining. CTH unrevealing at   - Continue Keppra started at  for now   -PT and OT evaluation, will continue as tolerated    CARDIOVASCULAR  TTE at  with HFrEF, MR, [place]. Unclear if new diagnosis of HFrEF. Bedside FREDI performed with severe LV dysfunction and grade I diastolic dysfunction, mild MR.   -On NE likely related to sedation; maintain MAP>65 (had very labile BPs on precedex which improved after precedex was discontinued)  -No significant right heart dysfunction on echo  -Maintain net even today  -ECG without ischemic changes; trend troponins/CK    PULMONARY  Patient with severe hypoxemic respiratory failure likely severe ARDS with P/F 82 and now cannulated for VV-ECMO possibly related to aspiration pneumonia.    #VENTILATOR  NEUROLOGY  Baseline AOX3. Has severe depression refractory to multiple medications/ECT with prior psych hospitalizations. Had a tonic-clonic seizure at   - Will keep well sedated with propofol, versed and fentanyl to decrease VO2  - Medically paralyzed with cisatracurium, will continue. Train of 4 monitoring  - Neuro checks per ICU routine  - Will need psych evaluation; was being considered for inpatient admission at   - Seizure like episode at ; ? related overdose vs withdrawal. EEG unremarkable there  - Continue Keppra for now  - Will consider repeat EEG   -PT and OT evaluation, will continue as tolerated  - Toxidrome? Monitor for withdrawal symptoms    CARDIOVASCULAR  TTE at  with HFrEF, MR, [place]. Unclear if new diagnosis of HFrEF  -On NE likely related to sedation; maintain MAP>65  -Right heart?  -Diuresis?  -ECG now (had QRS prolongation initially)  -FREDI?    PULMONARY  Patient with severe hypoxemic respiratory failure likely severe ARDS with P/F 82 and now cannulated for VV-ECMO possibly related to aspiration pneumonia. Bronchoscopy performed on arrival with billious secretions predominantly in the lower lobes    #VENTILATOR  - Vent settings on arrival AC 10/150/15/30; Ppeak 26, Pplat 21, DP 6  - Will transition to VDR to help with secretions and hopefully recruit  -Emergency settings: AC/20/400/15/100    #ECMO  -VV ECMO		Day #1  -Cannulation sites/dates  RIJ 20F return cannula  RFV 25F pull cannula    -Flow		P1		Delta P  -RPM		P2		SVO2:  -Oxygenator	Sweep:   4     L/min		FIO2 100%    Physiologic parameters  CaO2 (pt):  CaO2 (pre-membrane)  CaO2 (post-membrane)  Circuit Flow   Hb     Calculated CO  DO2/VO2    -Clinically perfusing. Lactate is within normal limits  -ECMO sweep sighing per perfusion  - Adjust circuit flow as tolerated with DO2:VO2 goal at least >2. Adjust sweep flow as tolerated for goal PaCO2 ~35-45  -Monitor for hemolysis, chatter, evidence of recirculation, mixing    #ARDS  -ECMO and vent management as above  -Bronch today; Follow up BAL studies  -Nebs to help with secretion mobilization on VDR    INFECTIOUS DISEASE  Patient with fevers/leukocytosis and high suspicion for aspiration pneumonia  -Send UA, panculture including bronch cultures  -Received 1 dose of vanco this morning and was broadened to cefepime yesterday  -Continue vancomycin, zosyn and azithro  -BAL fluid sent for legionella PCR as well  -Follow fever curve/WBCs    RENAL/  No active issues   -Meredith in place for critically ill patient   -Goal net even today    GASTROINTESTINAL  -OGT/Start TFs  -Bowel regimen    HEME  -Leukocytosis noted; Hb slightly downtrending and platelets wnl  -Heparin GTT while on ECMO; trend PTT  -Transfuse PRBCs PRN  -Monitor for hemolysis; follow LDH/haptoglobin  -Follow platelets    ENDOCRINE  Hypoglycemic at GC  -Monitor FS    PROPHYLAXIS  VTE: Systemic AC while on circuit    DISPO/ETHICS/GOC  FULL CODE 54M with severe depression who presented to  10/28 with lethargy and hypoxemic/hypercapnic respiratory failure possibly related to overdose +/- pneumonia extubated but required reintubation 10/30 but with persistent hypoxemia refractory to conventional management and ultimately cannulated for VV ECMO 10/30/2019 for hypoxemic respiratory failure with ARDS and transferred to [facility] for further management    NEUROLOGY  Baseline AOX3. Has severe depression refractory to multiple medications/ECT with prior psych hospitalizations. Had a tonic-clonic seizure at   - Will keep well sedated with propofol, versed  to decrease VO2. Will DC precedex  - Medically paralyzed with cisatracurium, will continue. Train of 4 monitoring  - Neuro checks per ICU routine  - Psych evaluation; was being considered for inpatient admission at ; will also contact his outpatient psychiatrist  - Need to discuss with family current medications to look for possible overdoses  - Seizure like episode at ; ? related overdose vs withdrawal. EEG unremarkable there. Will need repeat EEG but will stabilize today prior to obtaining. CTH unrevealing at   - Continue Keppra started at  for now   -PT and OT evaluation, will continue as tolerated    CARDIOVASCULAR  TTE at  with HFrEF, MR, [place]. Unclear if new diagnosis of HFrEF. Bedside FREDI performed with severe LV dysfunction and grade I diastolic dysfunction, mild MR.   -On NE likely related to sedation; maintain MAP>65 (had very labile BPs on precedex which improved after precedex was discontinued)  -No significant right heart dysfunction on echo  -Maintain net even today  -ECG without ischemic changes; trend troponins/CK    PULMONARY  Patient with severe hypoxemic respiratory failure likely severe ARDS with P/F 82 and now cannulated for VV-ECMO possibly related to aspiration pneumonia.    #VENTILATOR  NEUROLOGY  Baseline AOX3. Has severe depression refractory to multiple medications/ECT with prior psych hospitalizations. Had a tonic-clonic seizure at   - Will keep well sedated with propofol, versed and fentanyl to decrease VO2  - Medically paralyzed with cisatracurium, will continue. Train of 4 monitoring  - Neuro checks per ICU routine  - Will need psych evaluation; was being considered for inpatient admission at   - Seizure like episode at ; ? related overdose vs withdrawal. EEG unremarkable there  - Continue Keppra for now  - Will consider repeat EEG   -PT and OT evaluation, will continue as tolerated  - Toxidrome? Monitor for withdrawal symptoms    CARDIOVASCULAR  TTE at  with HFrEF, MR, [place]. Unclear if new diagnosis of HFrEF  -On NE likely related to sedation; maintain MAP>65  -Right heart?  -Diuresis?  -ECG now (had QRS prolongation initially)  -FREDI?    PULMONARY  Patient with severe hypoxemic respiratory failure likely severe ARDS with P/F 82 and now cannulated for VV-ECMO possibly related to aspiration pneumonia.   Bronchoscopy performed on arrival with billious secretions predominantly in the lower lobes; BAL sent for cyto (with red oil stain), micro, cell count, Legionella PCR    #VENTILATOR  - Vent settings on arrival AC 10/150/15/30; Ppeak 26, Pplat 21, DP 6  - Will transition to VDR to help with secretions and hopefully recruit   -Emergency settings: AC/20/400/15/100    #ECMO  -VV ECMO		Day #1  -Cannulation sites/dates  RIJ 20F return cannula  RFV 25F pull cannula    -Flow    3.5 	                P1  148		Delta P   17        P3 -40  -RPM    2700		P2  131		SVO2:    70  -Oxygenator	Sweep:   4     L/min		FIO2 100%    -Clinically perfusing. Lactate is within normal limits  -ECMO sweep sighing per perfusion  - Adjust circuit flow as tolerated with DO2:VO2 goal at least >2. Adjust sweep flow as tolerated for goal PaCO2 ~35-45  -Monitor for hemolysis, chatter, evidence of recirculation, mixing    #ARDS  -ECMO and vent management as above  -Bronch today; Follow up BAL studies  -Nebs to help with secretion mobilization on VDR    INFECTIOUS DISEASE  Patient with fevers/leukocytosis and high suspicion for aspiration pneumonia  -Send UA, panculture including bronch cultures  -Received 1 dose of vanco this morning and was broadened to cefepime yesterday  -Continue vancomycin, zosyn and azithro  -BAL fluid sent for legionella PCR as well  -Follow fever curve/WBCs    RENAL/  No active issues   -Meredith in place for critically ill patient   -Goal net even today    GASTROINTESTINAL  -OGT/Start TFs  -Bowel regimen    HEME  -Leukocytosis noted; Hb slightly downtrending and platelets wnl  -Heparin GTT while on ECMO; trend PTT  -Transfuse PRBCs PRN  -Monitor for hemolysis; follow LDH/haptoglobin  -Follow platelets    ENDOCRINE  Hypoglycemic at GC  -Monitor FS    PROPHYLAXIS  VTE: Systemic AC while on circuit    DISPO/ETHICS/GOC  FULL CODE 54M with severe depression who presented to  10/28 with lethargy and hypoxemic/hypercapnic respiratory failure possibly related to overdose +/- pneumonia extubated but required reintubation 10/30 but with persistent hypoxemia refractory to conventional management and ultimately cannulated for VV ECMO 10/30/2019 for hypoxemic respiratory failure with ARDS and transferred to [facility] for further management    NEUROLOGY  Baseline AOX3. Has severe depression refractory to multiple medications/ECT with prior psych hospitalizations. Had a tonic-clonic seizure at   - Will keep well sedated with propofol, versed  to decrease VO2. Will DC precedex  - Medically paralyzed with cisatracurium, will continue. Train of 4 monitoring  - Neuro checks per ICU routine  - Psych evaluation; was being considered for inpatient admission at ; will also contact his outpatient psychiatrist  - Need to discuss with family current medications to look for possible overdoses  - Seizure like episode at ; ? related overdose vs withdrawal. EEG unremarkable there. Will need repeat EEG but will stabilize today prior to obtaining. CTH unrevealing at   - Continue Keppra started at  for now   -PT and OT evaluation, will continue as tolerated    CARDIOVASCULAR  TTE at  with HFrEF, MR, [place]. Unclear if new diagnosis of HFrEF. Bedside FREDI performed with severe LV dysfunction and grade I diastolic dysfunction, mild MR.   -On NE likely related to sedation; maintain MAP>65 (had very labile BPs on precedex which improved after precedex was discontinued)  -No significant right heart dysfunction on echo  -Maintain net even today  -ECG without ischemic changes; trend troponins/CK    PULMONARY  Patient with severe hypoxemic respiratory failure likely severe ARDS with P/F 82 and now cannulated for VV-ECMO possibly related to aspiration pneumonia.   Bronchoscopy performed on arrival with billious secretions predominantly in the lower lobes; BAL sent for cyto (with red oil stain), micro, cell count, Legionella PCR    #VENTILATOR  - Vent settings on arrival AC 10/150/15/30; Ppeak 26, Pplat 21, DP 6  - Will transition to VDR to help with secretions and hopefully recruit   -Emergency settings: AC/20/400/15/100    #ECMO  -VV ECMO		Day #1  -Cannulation sites/dates  RIJ 20F return cannula  RFV 25F pull cannula    -Flow    3.5 	                P1  148		Delta P   17        P3 -40  -RPM    2700		P2  131		SVO2:    70  -Oxygenator	Sweep:   4     L/min		FIO2 100%    -Clinically perfusing. Lactate is within normal limits  -ECMO sweep sighing per perfusion  - Adjust circuit flow as tolerated with DO2:VO2 goal at least >2. Adjust sweep flow as tolerated for goal PaCO2 ~35-45  -Monitor for hemolysis, chatter, evidence of recirculation, mixing    #ARDS  -ECMO and vent management as above  -Bronch today; Follow up BAL studies  -Nebs to help with secretion mobilization on VDR    INFECTIOUS DISEASE  Patient with fevers/leukocytosis and high suspicion for aspiration pneumonia  -Send UA, panculture including bronch cultures  -Received 1 dose of vanco this morning and was broadened to cefepime yesterday  -Continue vancomycin, zosyn and azithro  -BAL fluid sent for legionella PCR as well  -Follow fever curve/WBCs    RENAL/  No active issues   -Meredith in place for critically ill patient   -Goal net even today    GASTROINTESTINAL  -OGT/Start TFs  -Bowel regimen    HEME  -Leukocytosis noted; Hb slightly downtrending and platelets wnl  -Heparin GTT while on ECMO; trend PTT  -Transfuse PRBCs PRN  -Monitor for hemolysis; follow LDH/haptoglobin  -Follow platelets    ENDOCRINE  Hypoglycemic at GC  -Monitor FS    PROPHYLAXIS  VTE: Systemic AC while on circuit    DISPO/ETHICS/GOC  FULL CODE

## 2019-10-30 NOTE — PROCEDURE NOTE - ADDITIONAL PROCEDURE DETAILS
utilized US fro artery localization
Getting CT of chest anyway so will use that  to confirm ETT and OGT
Performed independent of critical care time
Attempted L Femoral artery first. Poor anatomy w/ femoral artery laying underneath femoral vein.   Switched to R Radial artery  Procedure performed independently of CC time
Ind Of CC time
Performed independent of CC time

## 2019-10-30 NOTE — BRIEF OPERATIVE NOTE - NSICDXBRIEFPROCEDURE_GEN_ALL_CORE_FT
PROCEDURES:  Percutaneous insertion of cannula for extracorporeal membrane oxygenation (ECMO) in patient 6 years of age or older 30-Oct-2019 09:13:31 VV ECMO Dariela Del Real

## 2019-10-30 NOTE — CHART NOTE - NSCHARTNOTEFT_GEN_A_CORE
10/30/19- [id number]- met with wife Sarai in waiting room, introduced and explained role of ethics. Answered her questions and provided emotional support. Ethics to remain available.         Martha Mario  [phone]

## 2019-10-30 NOTE — PROCEDURE NOTE - PROCEDURE DATE TIME, MLM
28-Oct-2019 02:02
28-Oct-2019 02:32
28-Oct-2019 02:34
30-Oct-2019 02:30
30-Oct-2019 05:56
30-Oct-2019 02:00

## 2019-10-30 NOTE — CONSULT NOTE ADULT - PROBLEM SELECTOR RECOMMENDATION 4
Patient new on ECMO. Will continue to provide support. Patient new on ECMO.  Introduced palliative care to wife. Spoke about family and support system. Daughter lives at home with wife and patient. Emotional support provided. Will continue to follow for support.

## 2019-10-30 NOTE — PROGRESS NOTE ADULT - SUBJECTIVE AND OBJECTIVE BOX
This is a further follow up progress note to earlier with Attending at bedside.    critical ill patient on VV-ECMO on VDR with ARDS/PNA/SEPSIS  Patient with severe LV systolic dysfunction on FREDI with reduced VTI and bilateral dense trans-lobar consolidation.  ECMO flow rate adjusted to account for reduced cardiac index.  Critical care time spent at patient bedside was 35 minutes on 10/30/2019.

## 2019-10-30 NOTE — DIETITIAN INITIAL EVALUATION ADULT. - ADD RECOMMEND
1). Continue with enteral feeding of OG Jevity 1.2 @ 60 ml/hr x 24 hrs plus Prosource protein pkg x 4/day. 2). Monitor tolerance to enteral feeding regimen. 3). Monitor weights, labs, BM's, skin integrity and edema. 4). Follow pt as per protocol.

## 2019-10-30 NOTE — DIETITIAN INITIAL EVALUATION ADULT. - PERTINENT MEDS FT
MEDICATIONS  (STANDING):  albuterol/ipratropium for Nebulization 3 milliLiter(s) Nebulizer every 6 hours  azithromycin  IVPB 500 milliGRAM(s) IV Intermittent every 24 hours  chlorhexidine 0.12% Liquid 15 milliLiter(s) Oral Mucosa every 12 hours  cisatracurium Infusion 3 MICROgram(s)/kG/Min (24.3 mL/Hr) IV Continuous <Continuous>  heparin  Infusion 1000 Unit(s)/Hr (10 mL/Hr) IV Continuous <Continuous>  levETIRAcetam  IVPB 750 milliGRAM(s) IV Intermittent every 12 hours  midazolam Infusion 0.02 mG/kG/Hr (2.7 mL/Hr) IV Continuous <Continuous>  norepinephrine Infusion 0.05 MICROgram(s)/kG/Min (6.328 mL/Hr) IV Continuous <Continuous>  piperacillin/tazobactam IVPB.. 4.5 Gram(s) IV Intermittent every 8 hours  polyethylene glycol 3350 17 Gram(s) Oral daily  propofol Infusion 10 MICROgram(s)/kG/Min (8.1 mL/Hr) IV Continuous <Continuous>  senna Syrup 15 milliLiter(s) Oral two times a day  vancomycin  IVPB 1500 milliGRAM(s) IV Intermittent once  vancomycin  IVPB 1500 milliGRAM(s) IV Intermittent every 8 hours  vancomycin  IVPB      vasopressin Infusion 0.04 Unit(s)/Min (2.4 mL/Hr) IV Continuous <Continuous>

## 2019-10-30 NOTE — H&P ADULT - HISTORY OF PRESENT ILLNESS
54M with severe MDD with prior psych admissions/ECT who was initially admitted to  10/28 after presenting with lethargy and hypoxemia. Patient reportedly was dealing with severe depression and expressed SI to his wife. His wife reported he was sleeping throughout the day and in the evening she heard a thump and found him on the floor. At  he became obtunded with hypercapnic respiratory failure and was intubated and admitted to the CCU. Initial imaging showed lower lobe consolidations and he was treated with CTX/Azithromycin. Poison control was contacted and he was received NaHCO3 in the setting of mild QRS widening on ECG and suspected overdose. His Utox returned positive for barbiturates; he reportedly had positive testing for barbiturates in the past thought secondary to OTC supplementations he takes from a [employer] for sleep. On the morning of admission, he was noted to have a 30 second tonic clonic seizure and received IV benzo and keppra load. Subsequent EEG did not reveal epileptiform discharges. He was extubated in the morning of 10/28 but required BiPAP in the setting of increased oxygenation needs. On 10/29 he was agitated and was started on Precedex. He developed fevers and was broadened to cefepime. Overnight (10/30), he had a Tmax of 102.6 and worsening hypoxemia requiring reintubation. Was started on propofol, precedex and paralyzed but remained difficult to oxygenate with traditional mechanical ventilation requiring frequent bagging and ALI consult for ECMO was placed. He was steroids, nimbex gtt, vancomycin and received Lasix 40mg IVP. His P/F was 60 at time of consult; improved somewhat to 82 at 4:30 this morning on AC/28/460/18/100. The patient was cannulated for VV-ECMO and transfer to [facility]

## 2019-10-30 NOTE — H&P ADULT - ATTENDING COMMENTS
Acute hypoxic respiratory failure and septic shock due to aspiration pneumonia, ARDS due to aspiration pneumonia, requiring VVECMO.  Patient was admitted to [facility] a few days ago with hypoxic respiratory failure, presumably due to aspiration pneumonia after possible drug overdose.  He was extubated in 1 day later, but subsequently became more hypoxic in the interim.  Overnight last night, he was reintubated for worsening hypoxia and wob.  His P/F ratio at the time was less than 80 despite adequate vent settings and paralysis.  Decision was made to cannulate for VVECMO.  Bronchoscopy done this AM shows thin, bilious secretions.  Ultrasound shows bilateral consolidations.  MICU FREDI shows severely reduced LV function, which is a change from prior TTE.  ABGs reviewed, DO2 adequate (especially given normal lactate and creatinine).  Given concern for possible toxic ingestion, will get EKG, toxicology follow up, check osmolar gap.  Continue with keppra for now for possible seizure seen at , continue with propofol/versd/nimbex for now.  Check EKG, cardiac enzymes.  No overt need for dobutamine now.  Will try VDR ventilator to help with secretion mobilization, lung protective strategy.  Nebs atc.  On empiric abx with vanco/zosyn/azithromycin for now, check legionella PCR.  Will continue with diuresis, f/u BMP throughout the day.  Check finger stick glucose.  Will start feeds, bowel regimen.    I spoke to patient's wife at length about his current disease process, what ECMO is, and what it's purpose is.  I explained that his lungs need time to rest and that ECMO allows for that healing to occur.  I also explained that there may come a point when he is not recovering well and that the ECMO circuit will need to be turned off.  She expressed understanding of this.    Palliative, PT/OT, SW, psych and neuro consults appreciated.    This patient is critically ill and required frequent bedside visits with therapy change. I have personally provided 70 minutes of critical care time concurrently with the resident/fellow. This time excludes time spent on separate procedures and time spent teaching. I have reviewed the resident/fellow’s documentation and I agree with the assessment and plan of care.

## 2019-10-30 NOTE — PROGRESS NOTE ADULT - ATTENDING COMMENTS
This is a further follow up progress note to earlier with Attending at bedside.    Critically ill patient on VV-ECMO on VDR with ARDS/PNA/SEPSIS  Patient with severe LV systolic dysfunction on FREDI with reduced VTI and bilateral dense trans-lobar consolidation.  ECMO flow rate adjusted to account for reduced cardiac index.  Critical care time spent at patient bedside was 35 minutes on 10/30/2019.

## 2019-10-30 NOTE — PROCEDURE NOTE - NSPROCDETAILS_GEN_ALL_CORE
patient pre-oxygenated, tube inserted, placement confirmed
patient pre-oxygenated, tube inserted, placement confirmed
sutured in place/hemostasis with direct pressure, dressing applied/Seldinger technique/ultrasound guidance/positive blood return obtained via catheter/connected to a pressurized flush line/location identified, draped/prepped, sterile technique used, needle inserted/introduced/all materials/supplies accounted for at end of procedure
lumen(s) aspirated and flushed/sterile dressing applied/ultrasound guidance/sterile technique, catheter placed/guidewire recovered

## 2019-10-30 NOTE — PROCEDURE NOTE - NSPOSTCAREGUIDE_GEN_A_CORE
Care for catheter as per unit/ICU protocols/Verbal/written post procedure instructions were given to patient/caregiver
Verbal/written post procedure instructions were given to patient/caregiver/Care for catheter as per unit/ICU protocols

## 2019-10-30 NOTE — AIRWAY PLACEMENT NOTE ADULT - AIRWAY COMMENTS:
Patient transferred here with 7.5 ETT while on VVECMO support.  Patient oxygen saturation was 100%.  7.5 ETT was removed under glidescopic guidance and patient was reintubated with 8.5 ETT.  Appropriate placement was confirmed with bronchoscopy immediately thereafter.  Patient's oxygen saturation remained above 95% throughout the procedure.

## 2019-10-30 NOTE — PROCEDURE NOTE - NSINDICATIONS_GEN_A_CORE
arterial puncture to obtain ABG's
airway protection/respiratory failure
feeding tube replacement
critical illness/emergency venous access
monitoring purposes/critical patient/arterial puncture to obtain ABG's
critical patient/airway protection/respiratory failure

## 2019-10-30 NOTE — CONSULT NOTE ADULT - PROBLEM SELECTOR RECOMMENDATION 9
Patient was intubated at [facility], then extubated and then reintubated. Decision was made to start him on ECMO and was brought to [facility] for cannulation. Continue care as per MICU team. Patient was intubated at [place], then extubated and reintubated. Decision was made to start him on ECMO and was brought to [facility] for cannulation. Continue care as per MICU team.

## 2019-10-31 LAB
ALBUMIN SERPL ELPH-MCNC: 2.5 G/DL — LOW (ref 3.3–5)
ALBUMIN SERPL ELPH-MCNC: 2.8 G/DL — LOW (ref 3.3–5)
ALBUMIN SERPL ELPH-MCNC: 3 G/DL — LOW (ref 3.3–5)
ALP SERPL-CCNC: 36 U/L — LOW (ref 40–120)
ALP SERPL-CCNC: 38 U/L — LOW (ref 40–120)
ALP SERPL-CCNC: 38 U/L — LOW (ref 40–120)
ALT FLD-CCNC: 35 U/L — SIGNIFICANT CHANGE UP (ref 4–41)
ALT FLD-CCNC: 41 U/L — SIGNIFICANT CHANGE UP (ref 4–41)
ALT FLD-CCNC: 47 U/L — HIGH (ref 4–41)
ANION GAP SERPL CALC-SCNC: 10 MMO/L — SIGNIFICANT CHANGE UP (ref 7–14)
ANION GAP SERPL CALC-SCNC: 11 MMO/L — SIGNIFICANT CHANGE UP (ref 7–14)
ANION GAP SERPL CALC-SCNC: 11 MMO/L — SIGNIFICANT CHANGE UP (ref 7–14)
APTT BLD: 33.9 SEC — SIGNIFICANT CHANGE UP (ref 27.5–36.3)
APTT BLD: 35.3 SEC — SIGNIFICANT CHANGE UP (ref 27.5–36.3)
APTT BLD: 35.7 SEC — SIGNIFICANT CHANGE UP (ref 27.5–36.3)
APTT BLD: 44.3 SEC — HIGH (ref 27.5–36.3)
AST SERPL-CCNC: 23 U/L — SIGNIFICANT CHANGE UP (ref 4–40)
AST SERPL-CCNC: 34 U/L — SIGNIFICANT CHANGE UP (ref 4–40)
AST SERPL-CCNC: 48 U/L — HIGH (ref 4–40)
AT III ACT/NOR PPP CHRO: 68 % — LOW (ref 76–140)
BASE EXCESS BLDA CALC-SCNC: 0.7 MMOL/L — SIGNIFICANT CHANGE UP
BASE EXCESS BLDA CALC-SCNC: 0.8 MMOL/L — SIGNIFICANT CHANGE UP
BASE EXCESS BLDA CALC-SCNC: 1.8 MMOL/L — SIGNIFICANT CHANGE UP
BASE EXCESS BLDA CALC-SCNC: 1.8 MMOL/L — SIGNIFICANT CHANGE UP
BASE EXCESS BLDA CALC-SCNC: 2 MMOL/L — SIGNIFICANT CHANGE UP
BASE EXCESS BLDA CALC-SCNC: 2.2 MMOL/L — SIGNIFICANT CHANGE UP
BASE EXCESS BLDCOA CALC-SCNC: -1.7 MMOL/L — SIGNIFICANT CHANGE UP
BASE EXCESS BLDCOV CALC-SCNC: -0.9 MMOL/L — SIGNIFICANT CHANGE UP
BASOPHILS # BLD AUTO: 0.02 K/UL — SIGNIFICANT CHANGE UP (ref 0–0.2)
BASOPHILS # BLD AUTO: 0.02 K/UL — SIGNIFICANT CHANGE UP (ref 0–0.2)
BASOPHILS # BLD AUTO: 0.03 K/UL — SIGNIFICANT CHANGE UP (ref 0–0.2)
BASOPHILS NFR BLD AUTO: 0.3 % — SIGNIFICANT CHANGE UP (ref 0–2)
BILIRUB SERPL-MCNC: 0.5 MG/DL — SIGNIFICANT CHANGE UP (ref 0.2–1.2)
BILIRUB SERPL-MCNC: 0.5 MG/DL — SIGNIFICANT CHANGE UP (ref 0.2–1.2)
BILIRUB SERPL-MCNC: 0.6 MG/DL — SIGNIFICANT CHANGE UP (ref 0.2–1.2)
BLOOD GAS ARTERIAL - FIO2: 40 — SIGNIFICANT CHANGE UP
BLOOD GAS POST MEMBRANE - GLUCOSE: 203 MG/DL — HIGH (ref 70–99)
BLOOD GAS POST MEMBRANE - ICALCIUM: 1.07 MMOL/L — SIGNIFICANT CHANGE UP (ref 1.03–1.23)
BLOOD GAS POST MEMBRANE - POTASSIUM: 3 MMOL/L — LOW (ref 3.4–4.5)
BLOOD GAS POST MEMBRANE - SODIUM: 139 MMOL/L — SIGNIFICANT CHANGE UP (ref 136–146)
BLOOD GAS PRE MEMBRANE - GLUCOSE: 202 MG/DL — HIGH (ref 70–99)
BLOOD GAS PRE MEMBRANE - ICALCIUM: 1.08 MMOL/L — SIGNIFICANT CHANGE UP (ref 1.03–1.23)
BLOOD GAS PRE MEMBRANE - POTASSIUM: 3 MMOL/L — LOW (ref 3.4–4.5)
BLOOD GAS PRE MEMBRANE - SODIUM: 140 MMOL/L — SIGNIFICANT CHANGE UP (ref 136–146)
BUN SERPL-MCNC: 7 MG/DL — SIGNIFICANT CHANGE UP (ref 7–23)
CA-I BLDA-SCNC: 1.15 MMOL/L — SIGNIFICANT CHANGE UP (ref 1.15–1.29)
CA-I BLDA-SCNC: 1.17 MMOL/L — SIGNIFICANT CHANGE UP (ref 1.15–1.29)
CA-I BLDA-SCNC: 1.18 MMOL/L — SIGNIFICANT CHANGE UP (ref 1.15–1.29)
CA-I BLDA-SCNC: 1.18 MMOL/L — SIGNIFICANT CHANGE UP (ref 1.15–1.29)
CALCIUM SERPL-MCNC: 7.4 MG/DL — LOW (ref 8.4–10.5)
CALCIUM SERPL-MCNC: 8.1 MG/DL — LOW (ref 8.4–10.5)
CALCIUM SERPL-MCNC: 8.2 MG/DL — LOW (ref 8.4–10.5)
CHLORIDE SERPL-SCNC: 104 MMOL/L — SIGNIFICANT CHANGE UP (ref 98–107)
CHLORIDE SERPL-SCNC: 104 MMOL/L — SIGNIFICANT CHANGE UP (ref 98–107)
CHLORIDE SERPL-SCNC: 108 MMOL/L — HIGH (ref 98–107)
CO2 SERPL-SCNC: 20 MMOL/L — LOW (ref 22–31)
CO2 SERPL-SCNC: 22 MMOL/L — SIGNIFICANT CHANGE UP (ref 22–31)
CO2 SERPL-SCNC: 23 MMOL/L — SIGNIFICANT CHANGE UP (ref 22–31)
COHGB MFR BLDA: 1 % — SIGNIFICANT CHANGE UP (ref 0.5–1.5)
CREAT SERPL-MCNC: 0.56 MG/DL — SIGNIFICANT CHANGE UP (ref 0.5–1.3)
CREAT SERPL-MCNC: 0.64 MG/DL — SIGNIFICANT CHANGE UP (ref 0.5–1.3)
CREAT SERPL-MCNC: 0.69 MG/DL — SIGNIFICANT CHANGE UP (ref 0.5–1.3)
EOSINOPHIL # BLD AUTO: 0.11 K/UL — SIGNIFICANT CHANGE UP (ref 0–0.5)
EOSINOPHIL # BLD AUTO: 0.13 K/UL — SIGNIFICANT CHANGE UP (ref 0–0.5)
EOSINOPHIL # BLD AUTO: 0.13 K/UL — SIGNIFICANT CHANGE UP (ref 0–0.5)
EOSINOPHIL NFR BLD AUTO: 1.4 % — SIGNIFICANT CHANGE UP (ref 0–6)
EOSINOPHIL NFR BLD AUTO: 1.6 % — SIGNIFICANT CHANGE UP (ref 0–6)
EOSINOPHIL NFR BLD AUTO: 1.8 % — SIGNIFICANT CHANGE UP (ref 0–6)
GLUCOSE BLDA-MCNC: 185 MG/DL — HIGH (ref 70–99)
GLUCOSE BLDA-MCNC: 209 MG/DL — HIGH (ref 70–99)
GLUCOSE BLDA-MCNC: 212 MG/DL — HIGH (ref 70–99)
GLUCOSE BLDA-MCNC: 214 MG/DL — HIGH (ref 70–99)
GLUCOSE BLDA-MCNC: 227 MG/DL — HIGH (ref 70–99)
GLUCOSE BLDA-MCNC: 237 MG/DL — HIGH (ref 70–99)
GLUCOSE BLDC GLUCOMTR-MCNC: 118 MG/DL — HIGH (ref 70–99)
GLUCOSE BLDC GLUCOMTR-MCNC: 140 MG/DL — HIGH (ref 70–99)
GLUCOSE BLDC GLUCOMTR-MCNC: 231 MG/DL — HIGH (ref 70–99)
GLUCOSE SERPL-MCNC: 208 MG/DL — HIGH (ref 70–99)
GLUCOSE SERPL-MCNC: 211 MG/DL — HIGH (ref 70–99)
GLUCOSE SERPL-MCNC: 239 MG/DL — HIGH (ref 70–99)
HAPTOGLOB SERPL-MCNC: < 20 MG/DL — LOW (ref 34–200)
HCO3 BLDA-SCNC: 25 MMOL/L — SIGNIFICANT CHANGE UP (ref 22–26)
HCO3 BLDA-SCNC: 26 MMOL/L — SIGNIFICANT CHANGE UP (ref 22–26)
HCO3 BLDA-SCNC: 27 MMOL/L — HIGH (ref 22–26)
HCO3, POST MEMBRANE ARTERIAL: 23 MMOL/L — SIGNIFICANT CHANGE UP
HCO3, PRE MEMBRANE VENOUS: 24 MMOL/L — SIGNIFICANT CHANGE UP (ref 20–27)
HCT VFR BLD CALC: 28.4 % — LOW (ref 39–50)
HCT VFR BLD CALC: 28.5 % — LOW (ref 39–50)
HCT VFR BLD CALC: 29.6 % — LOW (ref 39–50)
HCT VFR BLDA CALC: 18.8 % — CRITICAL LOW (ref 39–51)
HCT VFR BLDA CALC: 30.3 % — LOW (ref 39–51)
HCT VFR BLDA CALC: 31 % — LOW (ref 39–51)
HCT VFR BLDA CALC: 31.1 % — LOW (ref 39–51)
HCT VFR BLDA CALC: 31.7 % — LOW (ref 39–51)
HCT VFR BLDA CALC: 33 % — LOW (ref 39–51)
HGB BLD-MCNC: 10 G/DL — LOW (ref 13–17)
HGB BLD-MCNC: 9.4 G/DL — LOW (ref 13–17)
HGB BLD-MCNC: 9.6 G/DL — LOW (ref 13–17)
HGB BLDA-MCNC: 10.1 G/DL — LOW (ref 13–17)
HGB BLDA-MCNC: 10.1 G/DL — LOW (ref 13–17)
HGB BLDA-MCNC: 10.3 G/DL — LOW (ref 13–17)
HGB BLDA-MCNC: 10.8 G/DL — LOW (ref 13–17)
HGB BLDA-MCNC: 6.1 G/DL — CRITICAL LOW (ref 13–17)
HGB BLDA-MCNC: 9.9 G/DL — LOW (ref 13–17)
IMM GRANULOCYTES NFR BLD AUTO: 1.1 % — SIGNIFICANT CHANGE UP (ref 0–1.5)
IMM GRANULOCYTES NFR BLD AUTO: 1.2 % — SIGNIFICANT CHANGE UP (ref 0–1.5)
IMM GRANULOCYTES NFR BLD AUTO: 1.5 % — SIGNIFICANT CHANGE UP (ref 0–1.5)
INR BLD: 1.41 — HIGH (ref 0.88–1.17)
LACTATE BLDA-SCNC: 1.5 MMOL/L — SIGNIFICANT CHANGE UP (ref 0.5–2)
LACTATE BLDA-SCNC: 1.7 MMOL/L — SIGNIFICANT CHANGE UP (ref 0.5–2)
LACTATE BLDA-SCNC: 2.1 MMOL/L — HIGH (ref 0.5–2)
LACTATE BLDA-SCNC: 2.1 MMOL/L — HIGH (ref 0.5–2)
LACTATE, POST MEMBRANE ARTERIAL: 1.8 MMOL/L — SIGNIFICANT CHANGE UP (ref 0.5–2.2)
LACTATE, PRE MEMBRANE VENOUS: 1.9 MMOL/L — SIGNIFICANT CHANGE UP (ref 0.5–2.2)
LDH SERPL L TO P-CCNC: 268 U/L — HIGH (ref 135–225)
LYMPHOCYTES # BLD AUTO: 0.72 K/UL — LOW (ref 1–3.3)
LYMPHOCYTES # BLD AUTO: 0.88 K/UL — LOW (ref 1–3.3)
LYMPHOCYTES # BLD AUTO: 1.09 K/UL — SIGNIFICANT CHANGE UP (ref 1–3.3)
LYMPHOCYTES # BLD AUTO: 10.2 % — LOW (ref 13–44)
LYMPHOCYTES # BLD AUTO: 12 % — LOW (ref 13–44)
LYMPHOCYTES # BLD AUTO: 12.3 % — LOW (ref 13–44)
MAGNESIUM SERPL-MCNC: 1.8 MG/DL — SIGNIFICANT CHANGE UP (ref 1.6–2.6)
MAGNESIUM SERPL-MCNC: 1.9 MG/DL — SIGNIFICANT CHANGE UP (ref 1.6–2.6)
MAGNESIUM SERPL-MCNC: 2 MG/DL — SIGNIFICANT CHANGE UP (ref 1.6–2.6)
MCHC RBC-ENTMCNC: 31 PG — SIGNIFICANT CHANGE UP (ref 27–34)
MCHC RBC-ENTMCNC: 31.7 PG — SIGNIFICANT CHANGE UP (ref 27–34)
MCHC RBC-ENTMCNC: 31.9 PG — SIGNIFICANT CHANGE UP (ref 27–34)
MCHC RBC-ENTMCNC: 33 % — SIGNIFICANT CHANGE UP (ref 32–36)
MCHC RBC-ENTMCNC: 33.8 % — SIGNIFICANT CHANGE UP (ref 32–36)
MCHC RBC-ENTMCNC: 33.8 % — SIGNIFICANT CHANGE UP (ref 32–36)
MCV RBC AUTO: 94 FL — SIGNIFICANT CHANGE UP (ref 80–100)
MCV RBC AUTO: 94.1 FL — SIGNIFICANT CHANGE UP (ref 80–100)
MCV RBC AUTO: 94.4 FL — SIGNIFICANT CHANGE UP (ref 80–100)
METHGB MFR BLDMV: 1.6 % — HIGH (ref 0–1.5)
METHOD TYPE: SIGNIFICANT CHANGE UP
MONOCYTES # BLD AUTO: 0.36 K/UL — SIGNIFICANT CHANGE UP (ref 0–0.9)
MONOCYTES # BLD AUTO: 0.36 K/UL — SIGNIFICANT CHANGE UP (ref 0–0.9)
MONOCYTES # BLD AUTO: 0.59 K/UL — SIGNIFICANT CHANGE UP (ref 0–0.9)
MONOCYTES NFR BLD AUTO: 5 % — SIGNIFICANT CHANGE UP (ref 2–14)
MONOCYTES NFR BLD AUTO: 5.1 % — SIGNIFICANT CHANGE UP (ref 2–14)
MONOCYTES NFR BLD AUTO: 6.5 % — SIGNIFICANT CHANGE UP (ref 2–14)
NEUTROPHILS # BLD AUTO: 5.63 K/UL — SIGNIFICANT CHANGE UP (ref 1.8–7.4)
NEUTROPHILS # BLD AUTO: 5.74 K/UL — SIGNIFICANT CHANGE UP (ref 1.8–7.4)
NEUTROPHILS # BLD AUTO: 7.14 K/UL — SIGNIFICANT CHANGE UP (ref 1.8–7.4)
NEUTROPHILS NFR BLD AUTO: 78.6 % — HIGH (ref 43–77)
NEUTROPHILS NFR BLD AUTO: 79.1 % — HIGH (ref 43–77)
NEUTROPHILS NFR BLD AUTO: 81.7 % — HIGH (ref 43–77)
NRBC # FLD: 0 K/UL — SIGNIFICANT CHANGE UP (ref 0–0)
ORGANISM # SPEC MICROSCOPIC CNT: SIGNIFICANT CHANGE UP
ORGANISM # SPEC MICROSCOPIC CNT: SIGNIFICANT CHANGE UP
OXYGEN SATURATION, PRE MEMBRANE VENOUS: 79.3 % — LOW (ref 95–99)
OXYGEN SATURATION,POST MEMBRANE ARTERIAL: 99.3 % — HIGH (ref 95–99)
OXYHEMOGLOBIN, PRE MEMBRANE VENOUS: 77.2 % — LOW (ref 94–98)
PCO2 BLDA: 32 MMHG — LOW (ref 35–48)
PCO2 BLDA: 34 MMHG — LOW (ref 35–48)
PCO2 BLDA: 35 MMHG — SIGNIFICANT CHANGE UP (ref 35–48)
PCO2 BLDA: 40 MMHG — SIGNIFICANT CHANGE UP (ref 35–48)
PCO2, POST MEMBRANE ARTERIAL: 37 MMHG — SIGNIFICANT CHANGE UP (ref 35–48)
PCO2, PRE MEMBRANE VENOUS: 36 MMHG — SIGNIFICANT CHANGE UP (ref 32–48)
PH BLDA: 7.42 PH — SIGNIFICANT CHANGE UP (ref 7.35–7.45)
PH BLDA: 7.46 PH — HIGH (ref 7.35–7.45)
PH BLDA: 7.48 PH — HIGH (ref 7.35–7.45)
PH BLDA: 7.49 PH — HIGH (ref 7.35–7.45)
PH, POST MEMBRANE ARTERIAL: 7.4 PH — SIGNIFICANT CHANGE UP (ref 7.35–7.45)
PH, PRE MEMBRANE VENOUS: 7.43 PH — SIGNIFICANT CHANGE UP (ref 7.32–7.43)
PHOSPHATE SERPL-MCNC: 1.7 MG/DL — LOW (ref 2.5–4.5)
PHOSPHATE SERPL-MCNC: 2.1 MG/DL — LOW (ref 2.5–4.5)
PHOSPHATE SERPL-MCNC: 3 MG/DL — SIGNIFICANT CHANGE UP (ref 2.5–4.5)
PLATELET # BLD AUTO: 116 K/UL — LOW (ref 150–400)
PLATELET # BLD AUTO: 123 K/UL — LOW (ref 150–400)
PLATELET # BLD AUTO: 137 K/UL — LOW (ref 150–400)
PMV BLD: 10.2 FL — SIGNIFICANT CHANGE UP (ref 7–13)
PMV BLD: 9.8 FL — SIGNIFICANT CHANGE UP (ref 7–13)
PMV BLD: 9.8 FL — SIGNIFICANT CHANGE UP (ref 7–13)
PO2 BLDA: 101 MMHG — SIGNIFICANT CHANGE UP (ref 83–108)
PO2 BLDA: 117 MMHG — HIGH (ref 83–108)
PO2 BLDA: 118 MMHG — HIGH (ref 83–108)
PO2 BLDA: 125 MMHG — HIGH (ref 83–108)
PO2 BLDA: 127 MMHG — HIGH (ref 83–108)
PO2 BLDA: 90 MMHG — SIGNIFICANT CHANGE UP (ref 83–108)
PO2, POST MEMBRANE ARTERIAL: 431 MMHG — HIGH (ref 83–108)
PO2, PRE MEMBRANE VENOUS: 43.1 MMHG — HIGH (ref 35–40)
POTASSIUM BLDA-SCNC: 3.3 MMOL/L — LOW (ref 3.4–4.5)
POTASSIUM BLDA-SCNC: 3.4 MMOL/L — SIGNIFICANT CHANGE UP (ref 3.4–4.5)
POTASSIUM BLDA-SCNC: 3.5 MMOL/L — SIGNIFICANT CHANGE UP (ref 3.4–4.5)
POTASSIUM BLDA-SCNC: 4 MMOL/L — SIGNIFICANT CHANGE UP (ref 3.4–4.5)
POTASSIUM SERPL-MCNC: 3.3 MMOL/L — LOW (ref 3.5–5.3)
POTASSIUM SERPL-MCNC: 3.5 MMOL/L — SIGNIFICANT CHANGE UP (ref 3.5–5.3)
POTASSIUM SERPL-MCNC: 3.5 MMOL/L — SIGNIFICANT CHANGE UP (ref 3.5–5.3)
POTASSIUM SERPL-SCNC: 3.3 MMOL/L — LOW (ref 3.5–5.3)
POTASSIUM SERPL-SCNC: 3.5 MMOL/L — SIGNIFICANT CHANGE UP (ref 3.5–5.3)
POTASSIUM SERPL-SCNC: 3.5 MMOL/L — SIGNIFICANT CHANGE UP (ref 3.5–5.3)
PROT SERPL-MCNC: 5.2 G/DL — LOW (ref 6–8.3)
PROT SERPL-MCNC: 5.4 G/DL — LOW (ref 6–8.3)
PROT SERPL-MCNC: 5.6 G/DL — LOW (ref 6–8.3)
PROTHROM AB SERPL-ACNC: 16.2 SEC — HIGH (ref 9.8–13.1)
RBC # BLD: 3.01 M/UL — LOW (ref 4.2–5.8)
RBC # BLD: 3.03 M/UL — LOW (ref 4.2–5.8)
RBC # BLD: 3.15 M/UL — LOW (ref 4.2–5.8)
RBC # FLD: 12.7 % — SIGNIFICANT CHANGE UP (ref 10.3–14.5)
RBC # FLD: 12.8 % — SIGNIFICANT CHANGE UP (ref 10.3–14.5)
RBC # FLD: 12.8 % — SIGNIFICANT CHANGE UP (ref 10.3–14.5)
SAO2 % BLDA: 96.8 % — SIGNIFICANT CHANGE UP (ref 95–99)
SAO2 % BLDA: 98.3 % — SIGNIFICANT CHANGE UP (ref 95–99)
SAO2 % BLDA: 98.3 % — SIGNIFICANT CHANGE UP (ref 95–99)
SAO2 % BLDA: 98.6 % — SIGNIFICANT CHANGE UP (ref 95–99)
SODIUM BLDA-SCNC: 137 MMOL/L — SIGNIFICANT CHANGE UP (ref 136–146)
SODIUM BLDA-SCNC: 138 MMOL/L — SIGNIFICANT CHANGE UP (ref 136–146)
SODIUM SERPL-SCNC: 137 MMOL/L — SIGNIFICANT CHANGE UP (ref 135–145)
SODIUM SERPL-SCNC: 137 MMOL/L — SIGNIFICANT CHANGE UP (ref 135–145)
SODIUM SERPL-SCNC: 139 MMOL/L — SIGNIFICANT CHANGE UP (ref 135–145)
SPECIMEN SOURCE: SIGNIFICANT CHANGE UP
SPECIMEN SOURCE: SIGNIFICANT CHANGE UP
TOTAL HEMOGLOBIN, PRE MEMBRANE VENOUS: 7.3 G/DL — LOW (ref 13–17)
VANCOMYCIN TROUGH SERPL-MCNC: 11.3 UG/ML — SIGNIFICANT CHANGE UP (ref 10–20)
WBC # BLD: 7.03 K/UL — SIGNIFICANT CHANGE UP (ref 3.8–10.5)
WBC # BLD: 7.13 K/UL — SIGNIFICANT CHANGE UP (ref 3.8–10.5)
WBC # BLD: 9.09 K/UL — SIGNIFICANT CHANGE UP (ref 3.8–10.5)
WBC # FLD AUTO: 7.03 K/UL — SIGNIFICANT CHANGE UP (ref 3.8–10.5)
WBC # FLD AUTO: 7.13 K/UL — SIGNIFICANT CHANGE UP (ref 3.8–10.5)
WBC # FLD AUTO: 9.09 K/UL — SIGNIFICANT CHANGE UP (ref 3.8–10.5)

## 2019-10-31 PROCEDURE — 93308 TTE F-UP OR LMTD: CPT | Mod: 26

## 2019-10-31 PROCEDURE — 76604 US EXAM CHEST: CPT | Mod: 26

## 2019-10-31 PROCEDURE — 99291 CRITICAL CARE FIRST HOUR: CPT

## 2019-10-31 PROCEDURE — 71045 X-RAY EXAM CHEST 1 VIEW: CPT | Mod: 26

## 2019-10-31 PROCEDURE — 99292 CRITICAL CARE ADDL 30 MIN: CPT | Mod: 25

## 2019-10-31 PROCEDURE — 99291 CRITICAL CARE FIRST HOUR: CPT | Mod: 25

## 2019-10-31 PROCEDURE — 33948 ECMO/ECLS DAILY MGMT-VENOUS: CPT

## 2019-10-31 RX ORDER — FUROSEMIDE 40 MG
40 TABLET ORAL ONCE
Refills: 0 | Status: COMPLETED | OUTPATIENT
Start: 2019-10-31 | End: 2019-10-31

## 2019-10-31 RX ORDER — SODIUM CHLORIDE 9 MG/ML
1000 INJECTION, SOLUTION INTRAVENOUS
Refills: 0 | Status: DISCONTINUED | OUTPATIENT
Start: 2019-10-31 | End: 2019-11-14

## 2019-10-31 RX ORDER — FENTANYL CITRATE 50 UG/ML
400 INJECTION INTRAVENOUS ONCE
Refills: 0 | Status: DISCONTINUED | OUTPATIENT
Start: 2019-10-31 | End: 2019-10-31

## 2019-10-31 RX ORDER — INSULIN LISPRO 100/ML
VIAL (ML) SUBCUTANEOUS EVERY 6 HOURS
Refills: 0 | Status: DISCONTINUED | OUTPATIENT
Start: 2019-10-31 | End: 2019-11-07

## 2019-10-31 RX ORDER — MAGNESIUM SULFATE 500 MG/ML
1 VIAL (ML) INJECTION ONCE
Refills: 0 | Status: COMPLETED | OUTPATIENT
Start: 2019-10-31 | End: 2019-10-31

## 2019-10-31 RX ORDER — HYDROMORPHONE HYDROCHLORIDE 2 MG/ML
2 INJECTION INTRAMUSCULAR; INTRAVENOUS; SUBCUTANEOUS ONCE
Refills: 0 | Status: DISCONTINUED | OUTPATIENT
Start: 2019-10-31 | End: 2019-10-31

## 2019-10-31 RX ORDER — DEXMEDETOMIDINE HYDROCHLORIDE IN 0.9% SODIUM CHLORIDE 4 UG/ML
0.2 INJECTION INTRAVENOUS
Qty: 200 | Refills: 0 | Status: DISCONTINUED | OUTPATIENT
Start: 2019-10-31 | End: 2019-10-31

## 2019-10-31 RX ORDER — POLYETHYLENE GLYCOL 3350 17 G/17G
17 POWDER, FOR SOLUTION ORAL
Refills: 0 | Status: DISCONTINUED | OUTPATIENT
Start: 2019-10-31 | End: 2019-11-07

## 2019-10-31 RX ORDER — POTASSIUM CHLORIDE 20 MEQ
20 PACKET (EA) ORAL ONCE
Refills: 0 | Status: COMPLETED | OUTPATIENT
Start: 2019-10-31 | End: 2019-10-31

## 2019-10-31 RX ORDER — POTASSIUM CHLORIDE 20 MEQ
40 PACKET (EA) ORAL ONCE
Refills: 0 | Status: COMPLETED | OUTPATIENT
Start: 2019-10-31 | End: 2019-10-31

## 2019-10-31 RX ORDER — FENTANYL CITRATE 50 UG/ML
100 INJECTION INTRAVENOUS ONCE
Refills: 0 | Status: DISCONTINUED | OUTPATIENT
Start: 2019-10-31 | End: 2019-10-31

## 2019-10-31 RX ORDER — FENTANYL CITRATE 50 UG/ML
50 INJECTION INTRAVENOUS ONCE
Refills: 0 | Status: DISCONTINUED | OUTPATIENT
Start: 2019-10-31 | End: 2019-10-31

## 2019-10-31 RX ORDER — FENTANYL CITRATE 50 UG/ML
0.5 INJECTION INTRAVENOUS
Qty: 2500 | Refills: 0 | Status: DISCONTINUED | OUTPATIENT
Start: 2019-10-31 | End: 2019-11-06

## 2019-10-31 RX ORDER — POTASSIUM PHOSPHATE, MONOBASIC POTASSIUM PHOSPHATE, DIBASIC 236; 224 MG/ML; MG/ML
30 INJECTION, SOLUTION INTRAVENOUS ONCE
Refills: 0 | Status: COMPLETED | OUTPATIENT
Start: 2019-10-31 | End: 2019-10-31

## 2019-10-31 RX ORDER — MIDAZOLAM HYDROCHLORIDE 1 MG/ML
4 INJECTION, SOLUTION INTRAMUSCULAR; INTRAVENOUS ONCE
Refills: 0 | Status: DISCONTINUED | OUTPATIENT
Start: 2019-10-31 | End: 2019-11-01

## 2019-10-31 RX ADMIN — POTASSIUM PHOSPHATE, MONOBASIC POTASSIUM PHOSPHATE, DIBASIC 83.33 MILLIMOLE(S): 236; 224 INJECTION, SOLUTION INTRAVENOUS at 13:04

## 2019-10-31 RX ADMIN — Medication 40 MILLIEQUIVALENT(S): at 21:54

## 2019-10-31 RX ADMIN — CHLORHEXIDINE GLUCONATE 15 MILLILITER(S): 213 SOLUTION TOPICAL at 17:33

## 2019-10-31 RX ADMIN — HEPARIN SODIUM 20 UNIT(S)/HR: 5000 INJECTION INTRAVENOUS; SUBCUTANEOUS at 20:17

## 2019-10-31 RX ADMIN — LEVETIRACETAM 400 MILLIGRAM(S): 250 TABLET, FILM COATED ORAL at 05:02

## 2019-10-31 RX ADMIN — PIPERACILLIN AND TAZOBACTAM 25 GRAM(S): 4; .5 INJECTION, POWDER, LYOPHILIZED, FOR SOLUTION INTRAVENOUS at 23:53

## 2019-10-31 RX ADMIN — CHLORHEXIDINE GLUCONATE 15 MILLILITER(S): 213 SOLUTION TOPICAL at 05:06

## 2019-10-31 RX ADMIN — Medication 300 MILLIGRAM(S): at 00:35

## 2019-10-31 RX ADMIN — FENTANYL CITRATE 50 MICROGRAM(S): 50 INJECTION INTRAVENOUS at 16:00

## 2019-10-31 RX ADMIN — FENTANYL CITRATE 50 MICROGRAM(S): 50 INJECTION INTRAVENOUS at 14:30

## 2019-10-31 RX ADMIN — FENTANYL CITRATE 50 MICROGRAM(S): 50 INJECTION INTRAVENOUS at 15:30

## 2019-10-31 RX ADMIN — Medication 1 APPLICATION(S): at 05:02

## 2019-10-31 RX ADMIN — POLYETHYLENE GLYCOL 3350 17 GRAM(S): 17 POWDER, FOR SOLUTION ORAL at 18:00

## 2019-10-31 RX ADMIN — MIDAZOLAM HYDROCHLORIDE 2.7 MG/KG/HR: 1 INJECTION, SOLUTION INTRAMUSCULAR; INTRAVENOUS at 08:41

## 2019-10-31 RX ADMIN — HEPARIN SODIUM 20 UNIT(S)/HR: 5000 INJECTION INTRAVENOUS; SUBCUTANEOUS at 12:15

## 2019-10-31 RX ADMIN — FENTANYL CITRATE 100 MICROGRAM(S): 50 INJECTION INTRAVENOUS at 23:50

## 2019-10-31 RX ADMIN — FENTANYL CITRATE 6.75 MICROGRAM(S)/KG/HR: 50 INJECTION INTRAVENOUS at 20:17

## 2019-10-31 RX ADMIN — Medication 2 MILLIGRAM(S): at 23:50

## 2019-10-31 RX ADMIN — PROPOFOL 8.1 MICROGRAM(S)/KG/MIN: 10 INJECTION, EMULSION INTRAVENOUS at 20:17

## 2019-10-31 RX ADMIN — AZITHROMYCIN 255 MILLIGRAM(S): 500 TABLET, FILM COATED ORAL at 14:33

## 2019-10-31 RX ADMIN — Medication 3 MILLILITER(S): at 03:15

## 2019-10-31 RX ADMIN — LEVETIRACETAM 400 MILLIGRAM(S): 250 TABLET, FILM COATED ORAL at 17:41

## 2019-10-31 RX ADMIN — Medication 40 MILLIGRAM(S): at 23:53

## 2019-10-31 RX ADMIN — Medication 100 GRAM(S): at 00:35

## 2019-10-31 RX ADMIN — PROPOFOL 8.1 MICROGRAM(S)/KG/MIN: 10 INJECTION, EMULSION INTRAVENOUS at 08:40

## 2019-10-31 RX ADMIN — HEPARIN SODIUM 15 UNIT(S)/HR: 5000 INJECTION INTRAVENOUS; SUBCUTANEOUS at 08:40

## 2019-10-31 RX ADMIN — CISATRACURIUM BESYLATE 24.3 MICROGRAM(S)/KG/MIN: 2 INJECTION INTRAVENOUS at 08:00

## 2019-10-31 RX ADMIN — PIPERACILLIN AND TAZOBACTAM 25 GRAM(S): 4; .5 INJECTION, POWDER, LYOPHILIZED, FOR SOLUTION INTRAVENOUS at 16:00

## 2019-10-31 RX ADMIN — HYDROMORPHONE HYDROCHLORIDE 2 MILLIGRAM(S): 2 INJECTION INTRAMUSCULAR; INTRAVENOUS; SUBCUTANEOUS at 23:50

## 2019-10-31 RX ADMIN — Medication 6.33 MICROGRAM(S)/KG/MIN: at 11:00

## 2019-10-31 RX ADMIN — SENNA PLUS 15 MILLILITER(S): 8.6 TABLET ORAL at 18:01

## 2019-10-31 RX ADMIN — Medication 100 MILLIEQUIVALENT(S): at 06:25

## 2019-10-31 RX ADMIN — Medication 3 MILLILITER(S): at 15:23

## 2019-10-31 RX ADMIN — Medication 2: at 12:44

## 2019-10-31 RX ADMIN — MIDAZOLAM HYDROCHLORIDE 2.7 MG/KG/HR: 1 INJECTION, SOLUTION INTRAMUSCULAR; INTRAVENOUS at 20:17

## 2019-10-31 RX ADMIN — FENTANYL CITRATE 50 MICROGRAM(S): 50 INJECTION INTRAVENOUS at 14:00

## 2019-10-31 RX ADMIN — SENNA PLUS 15 MILLILITER(S): 8.6 TABLET ORAL at 05:03

## 2019-10-31 RX ADMIN — FENTANYL CITRATE 6.75 MICROGRAM(S)/KG/HR: 50 INJECTION INTRAVENOUS at 15:50

## 2019-10-31 RX ADMIN — Medication 300 MILLIGRAM(S): at 09:49

## 2019-10-31 RX ADMIN — Medication 300 MILLIGRAM(S): at 18:21

## 2019-10-31 RX ADMIN — DEXMEDETOMIDINE HYDROCHLORIDE IN 0.9% SODIUM CHLORIDE 6.75 MICROGRAM(S)/KG/HR: 4 INJECTION INTRAVENOUS at 10:23

## 2019-10-31 RX ADMIN — Medication 3 MILLILITER(S): at 09:26

## 2019-10-31 RX ADMIN — Medication 1 APPLICATION(S): at 17:33

## 2019-10-31 RX ADMIN — Medication 3 MILLILITER(S): at 21:25

## 2019-10-31 RX ADMIN — PIPERACILLIN AND TAZOBACTAM 25 GRAM(S): 4; .5 INJECTION, POWDER, LYOPHILIZED, FOR SOLUTION INTRAVENOUS at 05:03

## 2019-10-31 RX ADMIN — Medication 40 MILLIGRAM(S): at 00:35

## 2019-10-31 RX ADMIN — HEPARIN SODIUM 15 UNIT(S)/HR: 5000 INJECTION INTRAVENOUS; SUBCUTANEOUS at 06:26

## 2019-10-31 NOTE — PROGRESS NOTE ADULT - ATTENDING COMMENTS
ARDS and Acute hypoxic respiratory failure due to aspiration pneumonia, septic shock due to aspiration pneumonia, on VVECMO for ARDS.  Now with Gram positive cocci bacteremia, awaiting speciation.  He is improving overall from ARDS standpoint - able to come down to 0.5 sweep on ECMO, FIO2 of 40% on circuit, VDR requirements improving too based on ABGs.  Off pressors, waking up now that he is off paralytics.  Will slowly wean sedation.  Psych consulted, awaiting reccs.  His wife spoke to his psychiatrist yesterday; they went through his meds and noticed that he has a bottle of Klonopin at home, but that he had more pills in the bottle than he should have, if he was taking it as prescribed.  He also had a utox that was negative for benzos on admission at [place].  This leads them and us to believe that he stopped taking it, and perhaps he had a withdrawal seizure at , and may have aspirated from that.  Currently on keppra, will f/u neurology and psychiatry reccs.    Wean sedation as tolerated.  Will continue to wean ecmo, possibly decannulate in next 1-2 days.  C/w broad abx for now (f/u urine legionella).  Check vanco level, f/u speciation from blood cultures, f/u bronch cultures.  Troponin were elevated but have since come down, no EKG changes. f/u formal TTE as POCUS shows reduced LV function still, RV smaller than LV.  Will use diuretics to keep net even.

## 2019-10-31 NOTE — PROGRESS NOTE ADULT - ASSESSMENT
54M with severe depression who presented to  10/28 with lethargy and hypoxemic/hypercapnic respiratory failure possibly related to overdose +/- pneumonia extubated but required reintubation 10/30 but with persistent hypoxemia refractory to conventional management and ultimately cannulated for VV ECMO 10/30/2019 for hypoxemic respiratory failure with ARDS and transferred to [facility] for further management    NEUROLOGY  Baseline AOX3. Has severe depression refractory to multiple medications/ECT with prior psych hospitalizations. Had a tonic-clonic seizure at . Was concern for overdose at . Per discussion with wife this morning, strong consideration for initial benzo withdrawal related seizures	  - Sedatedwith propofol, versed  to decrease VO2  - Medically paralyzed with cisatracurium, will try to discontinue Nimbex today.  - Neuro checks per ICU routine  - Psych evaluation; was being considered for inpatient admission at   - Concern for abrupt cessation of Clonazepam causing seizures; initial utox negative for benzos  - Seizure like episode at ; ? related overdose vs withdrawal. EEG unremarkable there. CTH unremarkable at . Consider repeat EEG  - Continue Keppra for now; will monitor closely when able to wean down cessation; will likely need to add back PO clonazepam   -PT and OT evaluation, will continue as tolerated    CARDIOVASCULAR  TTE at  with HFrEF, MR, MN. Unclear if new diagnosis of HFrEF. Bedside FREDI performed with severe LV dysfunction and grade I diastolic dysfunction, mild MR.   -On NE likely related to sedation; maintain MAP>65 (had very labile BPs on precedex which improved after precedex was discontinued)  -No significant right heart dysfunction on echo  -Maintain net even today; received lasix overnight as net positive  -ECG without ischemic changes; troponins peaked; will need to repeat echo. Ischemic cardiomyopathy vs sepsis cardiomyopathy    PULMONARY  Patient with severe hypoxemic respiratory failure likely severe ARDS with P/F 82 and now cannulated for VV-ECMO possibly related to aspiration pneumonia.   Bronchoscopy performed on arrival with billious secretions predominantly in the lower lobes; BAL sent for cyto (with red oil stain), micro, cell count, Legionella PCR    #VENTILATOR  - Vent settings on arrival AC 10/150/15/30; Ppeak 26, Pplat 21, DP 6  - VDR settings PIP 26, PEP 14, , FIO2 40, CR 15, I:E 1:1  -Emergency settings: AC/20/400/15/100    #ECMO  ECMO SETTINGS:  Type:		[x ] Venovenous		[ ] Venoarterial  Cannulation Site(s):   RIJ 20F return cannula  RFV 25F pull cannula    Flow:  	 3.57         P1:      127            Delta P: 15       P3: -43  RPM: 	2700	  P2:         142         SVO2: 85    Oxygenator:	Sweep:  0.5 L/min	FiO2:   100%      Physiologic parameters  CaO2 (pt): 11.5  CaO2 (pre-membrane): 10.7  CaO2 (post-membrane): 14.7  Circuit Flow: 3.6  Hb: 10    Calculated CO: 5.16  DO2/VO2: 4.87    -Clinically perfusing. Lactate is within normal limits  -ECMO sweep sighing per perfusion  -Adjust circuit flow as tolerated with DO2:VO2 goal at least >2. Adjust sweep flow as tolerated for goal PaCO2 ~35-45  -Monitor for hemolysis, chatter, evidence of recirculation, mixing    #ARDS  -ECMO and vent management as above  -Bronch today; Follow up BAL studies  -Nebs to help with secretion mobilization on VDR    INFECTIOUS DISEASE  Patient with fevers/leukocytosis and high suspicion for aspiration pneumonia  -Follow up blood cultures and BAL cultures   -Continue vancomycin, zosyn and azithro  -BAL fluid sent for legionella PCR as well  -Follow fever curve/WBCs    RENAL/  No active issues   -Meredith in place for critically ill patient   -Goal net even today  -May need lasix    GASTROINTESTINAL  -OGT/TFs  -Bowel regimen    HEME  -Heparin GTT while on ECMO; trend PTT  -Transfuse PRBCs PRN  -Monitor for hemolysis; follow LDH/haptoglobin--hapto low but LDH only minimally elevated and Hb relatively stable  -Follow platelets    ENDOCRINE  Hypoglycemic at GC  -Monitor FS    PROPHYLAXIS  VTE: Systemic AC while on circuit    DISPO/ETHICS/GOC  FULL CODE

## 2019-10-31 NOTE — PHYSICAL THERAPY INITIAL EVALUATION ADULT - PERTINENT HX OF CURRENT PROBLEM, REHAB EVAL
54M with severe depression who presented to  10/28 with lethargy and hypoxemic/hypercapnic respiratory failure possibly related to overdose +/- pneumonia extubated but required reintubation 10/30 but with persistent hypoxemia refractory to conventional management and ultimately cannulated for VV ECMO 10/30/2019 for hypoxemic respiratory failure with ARDS and transferred to [facility] for further management

## 2019-10-31 NOTE — PROGRESS NOTE ADULT - SUBJECTIVE AND OBJECTIVE BOX
EBONI CARRASCO                     MRN-[id number]    HPI:  54M with severe MDD with prior psych admissions/ECT who was initially admitted to  10/28 after presenting with lethargy and hypoxemia. Patient reportedly was dealing with severe depression and expressed SI to his wife. His wife reported he was sleeping throughout the day and in the evening she heard a thump and found him on the floor. At  he became obtunded with hypercapnic respiratory failure and was intubated and admitted to the CCU. Initial imaging showed lower lobe consolidations and he was treated with CTX/Azithromycin. Poison control was contacted and he was received NaHCO3 in the setting of mild QRS widening on ECG and suspected overdose. His Utox returned positive for barbiturates; he reportedly had positive testing for barbiturates in the past thought secondary to OTC supplementations he takes from a [employer] for sleep. On the morning of admission, he was noted to have a 30 second tonic clonic seizure and received IV benzo and keppra load. Subsequent EEG did not reveal epileptiform discharges. He was extubated in the morning of 10/28 but required BiPAP in the setting of increased oxygenation needs. On 10/29 he was agitated and was started on Precedex. He developed fevers and was broadened to cefepime. Overnight (10/30), he had a Tmax of 102.6 and worsening hypoxemia requiring reintubation. Was started on propofol, precedex and paralyzed but remained difficult to oxygenate with traditional mechanical ventilation requiring frequent bagging and ALI consult for ECMO was placed. He was steroids, nimbex gtt, vancomycin and received Lasix 40mg IVP. His P/F was 60 at time of consult; improved somewhat to 82 at 4:30 this morning on AC/28/460/18/100. The patient was cannulated for VV-ECMO and transfer to [facility] (30 Oct 2019 12:10)    Procedure:  Percutaneous insertion of cannula for extracorporeal membrane oxygenation (ECMO) / R-IJ 20F & R-Femoral 25F       Issues:   ARDS / ECMO support                Acute hypoxemic and hypercarbic respiratory failure  Aspiration pneumonia  Hypotension                         Home Medications:  OLANZapine 5 mg oral tablet: 1 tab(s) orally once a day (28 Oct 2019 01:19)      PAST MEDICAL & SURGICAL HISTORY:  Depression  Hepatitis: &quot;as a child&quot; unsure of type  Left knee pain  No significant past surgical history            VITAL SIGNS:  Vital Signs Last 24 Hrs  T(C): 37.4 (31 Oct 2019 20:00), Max: 37.4 (31 Oct 2019 20:00)  T(F): 99.4 (31 Oct 2019 20:00), Max: 99.4 (31 Oct 2019 20:00)  HR: 96 (31 Oct 2019 20:00) (72 - 105)  BP: 102/60 (31 Oct 2019 16:00) (102/60 - 107/70)  BP(mean): 70 (31 Oct 2019 16:00) (70 - 76)  RR: 20 (31 Oct 2019 20:00) (13 - 24)  SpO2: 100% (31 Oct 2019 20:00) (100% - 100%)    I/Os:   I&O's Detail    30 Oct 2019 07:01  -  31 Oct 2019 07:00  --------------------------------------------------------  IN:    cisatracurium Infusion: 358 mL    dexmedetomidine Infusion: 170 mL    Enteral Tube Flush: 150 mL    heparin  Infusion.: 20 mL    heparin Infusion: 163 mL    heparin Infusion: 61 mL    IV PiggyBack: 2450 mL    midazolam Infusion: 59.4 mL    norepinephrine Infusion: 41.2 mL    ns in tub fed  [id number]: 360 mL    propofol Infusion: 846.2 mL  Total IN: 4678.8 mL    OUT:    Indwelling Catheter - Urethral: 2850 mL  Total OUT: 2850 mL    Total NET: 1828.8 mL      31 Oct 2019 07:01  -  31 Oct 2019 20:22  --------------------------------------------------------  IN:    cisatracurium Infusion: 8.1 mL    dexmedetomidine Infusion: 67.6 mL    Enteral Tube Flush: 120 mL    fentaNYL Infusion.: 54.4 mL    heparin Infusion: 240 mL    IV PiggyBack: 1950 mL    midazolam Infusion: 55.4 mL    norepinephrine Infusion: 5.1 mL    ns in tub fed  [id number]: 780 mL    propofol Infusion: 529.1 mL  Total IN: 3809.7 mL    OUT:    Indwelling Catheter - Urethral: 1255 mL  Total OUT: 1255 mL    Total NET: 2554.7 mL          CAPILLARY BLOOD GLUCOSE      POCT Blood Glucose.: 140 mg/dL (31 Oct 2019 18:14)  POCT Blood Glucose.: 231 mg/dL (31 Oct 2019 12:18)      =======================MEDICATIONS===================  MEDICATIONS  (STANDING):  albuterol/ipratropium for Nebulization 3 milliLiter(s) Nebulizer every 6 hours  azithromycin  IVPB 500 milliGRAM(s) IV Intermittent every 24 hours  chlorhexidine 0.12% Liquid 15 milliLiter(s) Oral Mucosa every 12 hours  dextrose 5%. 1000 milliLiter(s) (50 mL/Hr) IV Continuous <Continuous>  fentaNYL   Infusion. 0.5 MICROgram(s)/kG/Hr (6.75 mL/Hr) IV Continuous <Continuous>  heparin  Infusion 1100 Unit(s)/Hr (20 mL/Hr) IV Continuous <Continuous>  insulin lispro (HumaLOG) corrective regimen sliding scale   SubCutaneous every 6 hours  levETIRAcetam  IVPB 750 milliGRAM(s) IV Intermittent every 12 hours  midazolam Infusion 0.02 mG/kG/Hr (2.7 mL/Hr) IV Continuous <Continuous>  norepinephrine Infusion 0.05 MICROgram(s)/kG/Min (6.328 mL/Hr) IV Continuous <Continuous>  petrolatum Ophthalmic Ointment 1 Application(s) Both EYES two times a day  piperacillin/tazobactam IVPB.. 4.5 Gram(s) IV Intermittent every 8 hours  polyethylene glycol 3350 17 Gram(s) Oral two times a day  propofol Infusion 10 MICROgram(s)/kG/Min (8.1 mL/Hr) IV Continuous <Continuous>  senna Syrup 15 milliLiter(s) Oral two times a day  vancomycin  IVPB 1500 milliGRAM(s) IV Intermittent every 8 hours  vancomycin  IVPB        MEDICATIONS  (PRN):      =======================VENTILATOR SETTINGS===================  Mode: VDR4  RR (machine): 12  FiO2: 50  PEEP: 14  ITime: 1.3  MAP: 14  PC: 26  PIP: 26  Frequency: 600        PHYSICAL EXAM============================                        General:               Pt is sedated  , vented                                                Neuro:                  Sedated                            Cardiovascular:   S1 & S2, regular                           Respiratory:         Air entry is decreased on right side, bilateral conducted sounds                          GI:                          Soft, nondistended, Bowel sounds active                            Ext:                        No cyanosis or edema     ============================LABS=========================                        9.4    7.13  )-----------( 116      ( 31 Oct 2019 18:45 )             28.5     10-31    137  |  104  |  7   ----------------------------<  211<H>  3.5   |  23  |  0.69    Ca    8.1<L>      31 Oct 2019 18:45  Phos  3.0     10-31  Mg     1.9     10-31    TPro  5.6<L>  /  Alb  3.0<L>  /  TBili  0.5  /  DBili  x   /  AST  48<H>  /  ALT  47<H>  /  AlkPhos  38<L>  10-31    LIVER FUNCTIONS - ( 31 Oct 2019 18:45 )  Alb: 3.0 g/dL / Pro: 5.6 g/dL / ALK PHOS: 38 u/L / ALT: 47 u/L / AST: 48 u/L / GGT: x           PT/INR - ( 31 Oct 2019 05:10 )   PT: 16.2 SEC;   INR: 1.41          PTT - ( 31 Oct 2019 18:45 )  PTT:44.3 SEC  ABG - ( 31 Oct 2019 18:45 )  pH, Arterial: 7.42  pH, Blood: x     /  pCO2: 40    /  pO2: 90    / HCO3: 26    / Base Excess: 1.8   /  SaO2: 96.8        ============================IMAGING STUDIES=========================  < from: Xray Chest 1 View- PORTABLE-Urgent (10.31.19 @ 10:39) >  INTERPRETATION:     Endotracheal and enteric tube in addition to left IJ line and right IJ   ECMO catheter are present. Lungs are free of focal consolidations with   improvement in what appear to be pulmonary edema  on the last exam. The   heart is not enlarged on this rotated image. Small left effusion may be   present.      COMPARISON:  October 30      IMPRESSION:  Follow-up with resolution of pulmonary edema.      54yMale with acute respiratory failure s/p  POD#  .  Remains on ECMO / ventilator support, sedated                             Neuro:                                         Pain control with Fentanyl drip                                        Continue Propofol / Versed drips for sedation, wean sedation as tolerated                                        Keep patient paralyzed with Nimbex    Seizures ? - Continue Keppra    Psych and Neurology eval                                         Medically paralyzed with cisatracurium, will try to discontinue Nimbex today.                            Cardiovascular:                                          Hemodynamic monitoring.                                        Hypotension most likely due to sedatives vs  sepsis.  Continue Levophed, titrate to MAP>65    Preserved RV but severe LV dysfunction                            Respiratory:                                        ARDS, Resp failure, on VV ECMO                                         Pt is Vent settings on arrival AC 10/150/15/30; Ppeak 26, Pplat 21, DP 6                                        VDR settings PIP 26, PEP 14, , FIO2 40, CR 15, I:E 1:1     ECMO: Flow 3.57 RPM 2700 Fio2 100%, Sweep 0.5                                          Bronched today     Pulmonary toilet                                                                  Continue Heparin drip, titrate to PTT 50-60                            GI                                         NGT feeds                                                                                                        Renal:                                         Normal renal function with good urine output                                         Monitor electrolyte                                                 Hem/ Onc:                                         Continue Heparin drip for systemic anticoagulation - Target PTT 50-60                                         Monitor for signs of bleeding.                                          Monitor Hb /Hct                                         Transfuse PRN to maintain Hb>8 and Platelets>50k     Monitor LDH /  Haptoglobin                           Infectious disease:                                          Aspiration pneumonia - Continue antibiotics Vanco / Zosyn / Azithro                                          Follow cultures                            Endocrine                                             Glycemic monitoring        Pertinent clinical, laboratory, radiographic, hemodynamic, echocardiographic, respiratory data, microbiologic data and chart were reviewed and analyzed frequently throughout the course of the day and night  Patient seen, examined and plan discussed with CT Surgeon / CTICU / MICU team during rounds.    Pt's status discussed with family at bedside, updated status    I have spent 40 minutes of critical care time with this pt between  7 pm   and  11.59pm         Dallas Abdalla DO, FACEP

## 2019-10-31 NOTE — PROGRESS NOTE ADULT - SUBJECTIVE AND OBJECTIVE BOX
CHIEF COMPLAINT:    Interval Events:    ======================REVIEW OF SYSTEMS=======================  [] All other systems negative  [x ] Unable to assess ROS because sedation/paralysis    OBJECTIVE:  =======================VITALS=================================  ICU Vital Signs Last 24 Hrs  T(C): 36.6 (31 Oct 2019 04:00), Max: 38.5 (30 Oct 2019 09:00)  T(F): 97.8 (31 Oct 2019 04:00), Max: 101.3 (30 Oct 2019 09:00)  HR: 102 (31 Oct 2019 07:00) (70 - 105)  BP: 102/77 (30 Oct 2019 09:00) (102/77 - 102/77)  BP(mean): 83 (30 Oct 2019 09:00) (83 - 83)  ABP: 124/61 (31 Oct 2019 07:00) (83/56 - 142/90)  ABP(mean): 77 (31 Oct 2019 07:00) (64 - 106)  RR: 14 (31 Oct 2019 07:00) (10 - 19)  SpO2: 100% (31 Oct 2019 07:00) (95% - 100%)    CAPILLARY BLOOD GLUCOSE      POCT Blood Glucose.: 188 mg/dL (30 Oct 2019 17:26)      10-30-19 @ 07:01  -  10-31-19 @ 07:00  --------------------------------------------------------  IN: 4678.8 mL / OUT: 2800 mL / NET: 1878.8 mL      =======================VENT=================================  Mode: VDR4, RR (machine): 15, FiO2: 40, PEEP: 14, ITime: 1.5, MAP: 18, PC: 26, PIP: 26, Frequency: 550      ======================ECMO=================================  ECMO Day# 2    ECMO SETTINGS:  Type:		[x ] Venovenous		[ ] Venoarterial  Cannulation Site(s):   RIJ 20F return cannula  RFV 25F pull cannula    Flow:  	          P1:                  Delta P:  RPM: 		  P2:                  SVO2:    Oxygenator:	Sweep:  0.5 L/min	FiO2:   100%    ======================PHYSICAL EXAM==========================      =======================HOSPITAL MEDICATIONS=======================  MEDICATIONS  (STANDING):  albuterol/ipratropium for Nebulization 3 milliLiter(s) Nebulizer every 6 hours  azithromycin  IVPB 500 milliGRAM(s) IV Intermittent every 24 hours  chlorhexidine 0.12% Liquid 15 milliLiter(s) Oral Mucosa every 12 hours  cisatracurium Infusion 3 MICROgram(s)/kG/Min (24.3 mL/Hr) IV Continuous <Continuous>  heparin  Infusion 1100 Unit(s)/Hr (15 mL/Hr) IV Continuous <Continuous>  levETIRAcetam  IVPB 750 milliGRAM(s) IV Intermittent every 12 hours  midazolam Infusion 0.02 mG/kG/Hr (2.7 mL/Hr) IV Continuous <Continuous>  norepinephrine Infusion 0.05 MICROgram(s)/kG/Min (6.328 mL/Hr) IV Continuous <Continuous>  petrolatum Ophthalmic Ointment 1 Application(s) Both EYES two times a day  piperacillin/tazobactam IVPB.. 4.5 Gram(s) IV Intermittent every 8 hours  polyethylene glycol 3350 17 Gram(s) Oral daily  propofol Infusion 10 MICROgram(s)/kG/Min (8.1 mL/Hr) IV Continuous <Continuous>  senna Syrup 15 milliLiter(s) Oral two times a day  vancomycin  IVPB 1500 milliGRAM(s) IV Intermittent every 8 hours  vancomycin  IVPB      vasopressin Infusion 0.04 Unit(s)/Min (2.4 mL/Hr) IV Continuous <Continuous>    MEDICATIONS  (PRN):      =======================LABS=======================================                        10.0   9.09  )-----------( 137      ( 31 Oct 2019 05:10 )             29.6     10-31    139  |  108<H>  |  7   ----------------------------<  208<H>  3.3<L>   |  20<L>  |  0.56    Ca    7.4<L>      31 Oct 2019 05:10  Phos  2.1     10-31  Mg     1.8     10-31    TPro  5.2<L>  /  Alb  2.5<L>  /  TBili  0.6  /  DBili  x   /  AST  23  /  ALT  35  /  AlkPhos  36<L>  10-31    PT/INR - ( 31 Oct 2019 05:10 )   PT: 16.2 SEC;   INR: 1.41          PTT - ( 31 Oct 2019 05:10 )  PTT:33.9 SEC    Arterial Blood Gas:  10-31 @ 05:15  7.46/35/118/25/98.3/0.7  ABG lactate: --  Arterial Blood Gas:  10-31 @ 01:10  7.49/32/127/26/98.6/0.8  ABG lactate: --  Arterial Blood Gas:  10-30 @ 22:32  7.47/35/127/26/98.6/1.0  ABG lactate: 1.3  Arterial Blood Gas:  10-30 @ 16:09  7.43/36/119/25/98.2/-0.1  ABG lactate: 1.7  Arterial Blood Gas:  10-30 @ 15:41  7.49/30/119/25/98.5/-0.2  ABG lactate: 1.8  Arterial Blood Gas:  10-30 @ 14:47  7.48/31/100/25/97.8/-0.5  ABG lactate: 1.7  Arterial Blood Gas:  10-30 @ 14:17  7.49/30/84/24/96.9/-0.8  ABG lactate: 1.9  Arterial Blood Gas:  10-30 @ 13:26  7.31/49/67/23/90.9/-1.9  ABG lactate: 1.4  Arterial Blood Gas:  10-30 @ 09:30  7.42/36/90/24/96.8/-0.8  ABG lactate: 2.0  Arterial Blood Gas:  10-30 @ 06:47  7.40/31/257/--/98/--  ABG lactate: --  Arterial Blood Gas:  10-30 @ 04:35  7.21/60/82/--/91/--  ABG lactate: --  Arterial Blood Gas:  10-30 @ 02:35  7.33/41/55/--/82/--  ABG lactate: --  Arterial Blood Gas:  10-30 @ 00:25  7.44/30/69/--/92/--  ABG lactate: --  Arterial Blood Gas:  10-29 @ 14:15  7.48/27/55/--/88/--  ABG lactate: --        ======================MICROBIOLOGY===================================    Culture - Respiratory with Gram Stain (collected 30 Oct 2019 18:48)  Source: BRONCHIAL LAVAGE        ======================RADIOLOGY======================================  [ ] Reviewed and interpreted by me    ======================Point of Care Ultrasound Findings=====================  EKGs CHIEF COMPLAINT: VV-ECMO/ARDS    Interval Events:  -Received lasix overnight  -On VDR; ME increased overnight  -No other acute events overnight    -This morning wife reports reviewing all medications with the patient's psychiatrist last night. The empty bottle which was mentioned in prior notes was likely supposed to be empty. They are more concerned that he stopped taking clonazepam abruply    ======================REVIEW OF SYSTEMS=======================  [] All other systems negative  [x ] Unable to assess ROS because sedation/paralysis    OBJECTIVE:  =======================VITALS=================================  ICU Vital Signs Last 24 Hrs  T(C): 36.6 (31 Oct 2019 04:00), Max: 38.5 (30 Oct 2019 09:00)  T(F): 97.8 (31 Oct 2019 04:00), Max: 101.3 (30 Oct 2019 09:00)  HR: 102 (31 Oct 2019 07:00) (70 - 105)  BP: 102/77 (30 Oct 2019 09:00) (102/77 - 102/77)  BP(mean): 83 (30 Oct 2019 09:00) (83 - 83)  ABP: 124/61 (31 Oct 2019 07:00) (83/56 - 142/90)  ABP(mean): 77 (31 Oct 2019 07:00) (64 - 106)  RR: 14 (31 Oct 2019 07:00) (10 - 19)  SpO2: 100% (31 Oct 2019 07:00) (95% - 100%)    CAPILLARY BLOOD GLUCOSE      POCT Blood Glucose.: 188 mg/dL (30 Oct 2019 17:26)      10-30-19 @ 07:01  -  10-31-19 @ 07:00  --------------------------------------------------------  IN: 4678.8 mL / OUT: 2800 mL / NET: 1878.8 mL      =======================VENT=================================  Mode: VDR4, RR (machine): 15, FiO2: 40, PEEP: 14, ITime: 1.5, MAP: 18, PC: 26, PIP: 26, Frequency: 550      ======================ECMO=================================  ECMO Day# 2    ECMO SETTINGS:  Type:		[x ] Venovenous		[ ] Venoarterial  Cannulation Site(s):   RIJ 20F return cannula  RFV 25F pull cannula    Flow:  	 3.57         P1:      127            Delta P: 15       P3: -43  RPM: 	2700	  P2:         142         SVO2: 85    Oxygenator:	Sweep:  0.5 L/min	FiO2:   100%    ======================PHYSICAL EXAM==========================  GENERAL: Sedated, paralyzed  	HEAD:  Atraumatic, Normocephalic  	EYES: Pupils reactive to light  	ENT: + ET tube  	NECK: RIJ ECMO cannula, No JVD  	CHEST/LUNG: Decreased breath sounds; clear anteriorly  	HEART: Regular rate and rhythm; No murmurs, rubs, or gallops  	ABDOMEN: Hypoactive bowel sounds; Soft, Nontender, Nondistended  	EXTREMITIES:  1+ Peripheral Pulses, brisk capillary refill. No clubbing,or edema  	NERVOUS SYSTEM:  Sedated, paralyzed, limited exam  	SKIN: Intact  Lines: Right radial A-line, Left IJ TLC (10/30), RFV and RIJ ECMO cannula (10/30)    =======================HOSPITAL MEDICATIONS=======================  MEDICATIONS  (STANDING):  albuterol/ipratropium for Nebulization 3 milliLiter(s) Nebulizer every 6 hours  azithromycin  IVPB 500 milliGRAM(s) IV Intermittent every 24 hours  chlorhexidine 0.12% Liquid 15 milliLiter(s) Oral Mucosa every 12 hours  cisatracurium Infusion 3 MICROgram(s)/kG/Min (24.3 mL/Hr) IV Continuous <Continuous>  heparin  Infusion 1100 Unit(s)/Hr (15 mL/Hr) IV Continuous <Continuous>  levETIRAcetam  IVPB 750 milliGRAM(s) IV Intermittent every 12 hours  midazolam Infusion 0.02 mG/kG/Hr (2.7 mL/Hr) IV Continuous <Continuous>  norepinephrine Infusion 0.05 MICROgram(s)/kG/Min (6.328 mL/Hr) IV Continuous <Continuous>  petrolatum Ophthalmic Ointment 1 Application(s) Both EYES two times a day  piperacillin/tazobactam IVPB.. 4.5 Gram(s) IV Intermittent every 8 hours  polyethylene glycol 3350 17 Gram(s) Oral daily  propofol Infusion 10 MICROgram(s)/kG/Min (8.1 mL/Hr) IV Continuous <Continuous>  senna Syrup 15 milliLiter(s) Oral two times a day  vancomycin  IVPB 1500 milliGRAM(s) IV Intermittent every 8 hours  vancomycin  IVPB      vasopressin Infusion 0.04 Unit(s)/Min (2.4 mL/Hr) IV Continuous <Continuous>    MEDICATIONS  (PRN):      =======================LABS=======================================                        10.0   9.09  )-----------( 137      ( 31 Oct 2019 05:10 )             29.6     10-31    139  |  108<H>  |  7   ----------------------------<  208<H>  3.3<L>   |  20<L>  |  0.56    Ca    7.4<L>      31 Oct 2019 05:10  Phos  2.1     10-31  Mg     1.8     10-31    TPro  5.2<L>  /  Alb  2.5<L>  /  TBili  0.6  /  DBili  x   /  AST  23  /  ALT  35  /  AlkPhos  36<L>  10-31    PT/INR - ( 31 Oct 2019 05:10 )   PT: 16.2 SEC;   INR: 1.41          PTT - ( 31 Oct 2019 05:10 )  PTT:33.9 SEC    Arterial Blood Gas:  10-31 @ 05:15  7.46/35/118/25/98.3/0.7  ABG lactate: --  Arterial Blood Gas:  10-31 @ 01:10  7.49/32/127/26/98.6/0.8  ABG lactate: --  Arterial Blood Gas:  10-30 @ 22:32  7.47/35/127/26/98.6/1.0  ABG lactate: 1.3  Arterial Blood Gas:  10-30 @ 16:09  7.43/36/119/25/98.2/-0.1  ABG lactate: 1.7  Arterial Blood Gas:  10-30 @ 15:41  7.49/30/119/25/98.5/-0.2  ABG lactate: 1.8  Arterial Blood Gas:  10-30 @ 14:47  7.48/31/100/25/97.8/-0.5  ABG lactate: 1.7  Arterial Blood Gas:  10-30 @ 14:17  7.49/30/84/24/96.9/-0.8  ABG lactate: 1.9  Arterial Blood Gas:  10-30 @ 13:26  7.31/49/67/23/90.9/-1.9  ABG lactate: 1.4  Arterial Blood Gas:  10-30 @ 09:30  7.42/36/90/24/96.8/-0.8  ABG lactate: 2.0  Arterial Blood Gas:  10-30 @ 06:47  7.40/31/257/--/98/--  ABG lactate: --  Arterial Blood Gas:  10-30 @ 04:35  7.21/60/82/--/91/--  ABG lactate: --  Arterial Blood Gas:  10-30 @ 02:35  7.33/41/55/--/82/--  ABG lactate: --  Arterial Blood Gas:  10-30 @ 00:25  7.44/30/69/--/92/--  ABG lactate: --  Arterial Blood Gas:  10-29 @ 14:15  7.48/27/55/--/88/--  ABG lactate: --        ======================MICROBIOLOGY===================================    Culture - Respiratory with Gram Stain (collected 30 Oct 2019 18:48)  Source: BRONCHIAL LAVAGE        ======================RADIOLOGY======================================  [X ] Reviewed and interpreted by me    ======================Point of Care Ultrasound Findings=====================  EKGs

## 2019-10-31 NOTE — OCCUPATIONAL THERAPY INITIAL EVALUATION ADULT - PLANNED THERAPY INTERVENTIONS, OT EVAL
balance training/parent/caregiver training.../motor coordination training/transfer training/cognitive, visual perceptual/fine motor coordination training/neuromuscular re-education/ADL retraining/bed mobility training/ROM/strengthening

## 2019-10-31 NOTE — CHART NOTE - NSCHARTNOTEFT_GEN_A_CORE
: Jostin Alfonso MD    INDICATION: ARDS, HFrEF, Pneumonia    PROCEDURE:  [x ] LIMITED ECHO  [x ] LIMITED CHEST  [ ] LIMITED RETROPERITONEAL  [ ] LIMITED ABDOMINAL  [ ] LIMITED DVT  [ ] NEEDLE GUIDANCE VASCULAR  [ ] NEEDLE GUIDANCE THORACENTESIS  [ ] NEEDLE GUIDANCE PARACENTESIS  [ ] NEEDLE GUIDANCE PERICARDIOCENTESIS  [ ] OTHER    FINDINGS:  ECHO: suboptimal windows. RV<LV, severe LV dysfunction, no pericardial effusion, IVC indeterminate with ECMO cannulae in place  Chest: A-line predominant anteriorly. Bibasilar translobar consolidations with small pleural effusions      INTERPRETATION:  Severe LV dysfunction, bilateral consolidations consistent with pneumonia     Images stored in [employer]    Jostin Alfonso MD, PGY5  Pulmonary and Critical Care Fellow  [id number]/[phone] : Jostin Alfonso MD, Tessy Corbin    INDICATION: ARDS, HFrEF, Pneumonia    PROCEDURE:  [x ] LIMITED ECHO  [x ] LIMITED CHEST  [ ] LIMITED RETROPERITONEAL  [ ] LIMITED ABDOMINAL  [ ] LIMITED DVT  [ ] NEEDLE GUIDANCE VASCULAR  [ ] NEEDLE GUIDANCE THORACENTESIS  [ ] NEEDLE GUIDANCE PARACENTESIS  [ ] NEEDLE GUIDANCE PERICARDIOCENTESIS  [ ] OTHER    FINDINGS:  ECHO: suboptimal windows. RV<LV, severe LV systolic dysfunction, no pericardial effusion, IVC indeterminate with ECMO cannulae in place  Chest: A-line predominant anteriorly. Bibasilar translobar consolidations with small pleural effusions      INTERPRETATION:  Severe LV systolic dysfunction, bilateral consolidations consistent with pneumonia     Images stored in [employer]    Jostin Alfonso MD, PGY5  Pulmonary and Critical Care Fellow  [id number]/[phone]

## 2019-10-31 NOTE — OCCUPATIONAL THERAPY INITIAL EVALUATION ADULT - PRECAUTIONS/LIMITATIONS, REHAB EVAL
aspiration precautions/surgical precautions/cardiac precautions/obesity precautions/oxygen therapy device and L/min

## 2019-11-01 LAB
-  COAGULASE NEGATIVE STAPHYLOCOCCUS: SIGNIFICANT CHANGE UP
ALBUMIN SERPL ELPH-MCNC: 2.9 G/DL — LOW (ref 3.3–5)
ALBUMIN SERPL ELPH-MCNC: 2.9 G/DL — LOW (ref 3.3–5)
ALBUMIN SERPL ELPH-MCNC: 3 G/DL — LOW (ref 3.3–5)
ALP SERPL-CCNC: 37 U/L — LOW (ref 40–120)
ALP SERPL-CCNC: 39 U/L — LOW (ref 40–120)
ALP SERPL-CCNC: 41 U/L — SIGNIFICANT CHANGE UP (ref 40–120)
ALT FLD-CCNC: 45 U/L — HIGH (ref 4–41)
ALT FLD-CCNC: 46 U/L — HIGH (ref 4–41)
ALT FLD-CCNC: 48 U/L — HIGH (ref 4–41)
ANION GAP SERPL CALC-SCNC: 10 MMO/L — SIGNIFICANT CHANGE UP (ref 7–14)
ANION GAP SERPL CALC-SCNC: 12 MMO/L — SIGNIFICANT CHANGE UP (ref 7–14)
ANION GAP SERPL CALC-SCNC: 9 MMO/L — SIGNIFICANT CHANGE UP (ref 7–14)
APTT BLD: 46.4 SEC — HIGH (ref 27.5–36.3)
APTT BLD: 54.1 SEC — HIGH (ref 27.5–36.3)
APTT BLD: 62.9 SEC — HIGH (ref 27.5–36.3)
AST SERPL-CCNC: 49 U/L — HIGH (ref 4–40)
AST SERPL-CCNC: 49 U/L — HIGH (ref 4–40)
AST SERPL-CCNC: 52 U/L — HIGH (ref 4–40)
BACTERIA BLD CULT: SIGNIFICANT CHANGE UP
BASE EXCESS BLDA CALC-SCNC: 1.4 MMOL/L — SIGNIFICANT CHANGE UP
BASE EXCESS BLDA CALC-SCNC: 1.8 MMOL/L — SIGNIFICANT CHANGE UP
BASE EXCESS BLDA CALC-SCNC: 2 MMOL/L — SIGNIFICANT CHANGE UP
BASE EXCESS BLDA CALC-SCNC: 2.2 MMOL/L — SIGNIFICANT CHANGE UP
BASE EXCESS BLDA CALC-SCNC: 2.2 MMOL/L — SIGNIFICANT CHANGE UP
BASE EXCESS BLDA CALC-SCNC: 3.1 MMOL/L — SIGNIFICANT CHANGE UP
BASE EXCESS BLDCOA CALC-SCNC: 0.3 MMOL/L — SIGNIFICANT CHANGE UP
BASE EXCESS BLDCOV CALC-SCNC: 0.1 MMOL/L — SIGNIFICANT CHANGE UP
BASOPHILS # BLD AUTO: 0.02 K/UL — SIGNIFICANT CHANGE UP (ref 0–0.2)
BASOPHILS # BLD AUTO: 0.02 K/UL — SIGNIFICANT CHANGE UP (ref 0–0.2)
BASOPHILS # BLD AUTO: 0.03 K/UL — SIGNIFICANT CHANGE UP (ref 0–0.2)
BASOPHILS NFR BLD AUTO: 0.3 % — SIGNIFICANT CHANGE UP (ref 0–2)
BASOPHILS NFR BLD AUTO: 0.4 % — SIGNIFICANT CHANGE UP (ref 0–2)
BASOPHILS NFR BLD AUTO: 0.6 % — SIGNIFICANT CHANGE UP (ref 0–2)
BILIRUB SERPL-MCNC: 0.5 MG/DL — SIGNIFICANT CHANGE UP (ref 0.2–1.2)
BILIRUB SERPL-MCNC: 0.8 MG/DL — SIGNIFICANT CHANGE UP (ref 0.2–1.2)
BILIRUB SERPL-MCNC: 0.8 MG/DL — SIGNIFICANT CHANGE UP (ref 0.2–1.2)
BLOOD GAS ARTERIAL - FIO2: 40 — SIGNIFICANT CHANGE UP
BLOOD GAS POST MEMBRANE - GLUCOSE: 124 MG/DL — HIGH (ref 70–99)
BLOOD GAS POST MEMBRANE - ICALCIUM: 1.09 MMOL/L — SIGNIFICANT CHANGE UP (ref 1.03–1.23)
BLOOD GAS POST MEMBRANE - POTASSIUM: 3.1 MMOL/L — LOW (ref 3.4–4.5)
BLOOD GAS POST MEMBRANE - SODIUM: 141 MMOL/L — SIGNIFICANT CHANGE UP (ref 136–146)
BLOOD GAS PRE MEMBRANE - GLUCOSE: 122 MG/DL — HIGH (ref 70–99)
BLOOD GAS PRE MEMBRANE - ICALCIUM: 1.09 MMOL/L — SIGNIFICANT CHANGE UP (ref 1.03–1.23)
BLOOD GAS PRE MEMBRANE - POTASSIUM: 3.1 MMOL/L — LOW (ref 3.4–4.5)
BLOOD GAS PRE MEMBRANE - SODIUM: 141 MMOL/L — SIGNIFICANT CHANGE UP (ref 136–146)
BUN SERPL-MCNC: 7 MG/DL — SIGNIFICANT CHANGE UP (ref 7–23)
BUN SERPL-MCNC: 7 MG/DL — SIGNIFICANT CHANGE UP (ref 7–23)
BUN SERPL-MCNC: 8 MG/DL — SIGNIFICANT CHANGE UP (ref 7–23)
CA-I BLDA-SCNC: 1.15 MMOL/L — SIGNIFICANT CHANGE UP (ref 1.15–1.29)
CA-I BLDA-SCNC: 1.17 MMOL/L — SIGNIFICANT CHANGE UP (ref 1.15–1.29)
CA-I BLDA-SCNC: 1.21 MMOL/L — SIGNIFICANT CHANGE UP (ref 1.15–1.29)
CALCIUM SERPL-MCNC: 8.2 MG/DL — LOW (ref 8.4–10.5)
CALCIUM SERPL-MCNC: 8.3 MG/DL — LOW (ref 8.4–10.5)
CALCIUM SERPL-MCNC: 8.5 MG/DL — SIGNIFICANT CHANGE UP (ref 8.4–10.5)
CHLORIDE BLDA-SCNC: 108 MMOL/L — SIGNIFICANT CHANGE UP (ref 96–108)
CHLORIDE SERPL-SCNC: 103 MMOL/L — SIGNIFICANT CHANGE UP (ref 98–107)
CHLORIDE SERPL-SCNC: 104 MMOL/L — SIGNIFICANT CHANGE UP (ref 98–107)
CHLORIDE SERPL-SCNC: 105 MMOL/L — SIGNIFICANT CHANGE UP (ref 98–107)
CO2 SERPL-SCNC: 22 MMOL/L — SIGNIFICANT CHANGE UP (ref 22–31)
CO2 SERPL-SCNC: 24 MMOL/L — SIGNIFICANT CHANGE UP (ref 22–31)
CO2 SERPL-SCNC: 25 MMOL/L — SIGNIFICANT CHANGE UP (ref 22–31)
COHGB MFR BLDA: 0.1 % — LOW (ref 0.5–1.5)
CREAT SERPL-MCNC: 0.74 MG/DL — SIGNIFICANT CHANGE UP (ref 0.5–1.3)
CREAT SERPL-MCNC: 0.78 MG/DL — SIGNIFICANT CHANGE UP (ref 0.5–1.3)
CREAT SERPL-MCNC: 0.78 MG/DL — SIGNIFICANT CHANGE UP (ref 0.5–1.3)
EOSINOPHIL # BLD AUTO: 0.1 K/UL — SIGNIFICANT CHANGE UP (ref 0–0.5)
EOSINOPHIL # BLD AUTO: 0.11 K/UL — SIGNIFICANT CHANGE UP (ref 0–0.5)
EOSINOPHIL # BLD AUTO: 0.14 K/UL — SIGNIFICANT CHANGE UP (ref 0–0.5)
EOSINOPHIL NFR BLD AUTO: 1.7 % — SIGNIFICANT CHANGE UP (ref 0–6)
EOSINOPHIL NFR BLD AUTO: 2.1 % — SIGNIFICANT CHANGE UP (ref 0–6)
EOSINOPHIL NFR BLD AUTO: 2.8 % — SIGNIFICANT CHANGE UP (ref 0–6)
GLUCOSE BLDA-MCNC: 133 MG/DL — HIGH (ref 70–99)
GLUCOSE BLDA-MCNC: 133 MG/DL — HIGH (ref 70–99)
GLUCOSE BLDA-MCNC: 135 MG/DL — HIGH (ref 70–99)
GLUCOSE BLDA-MCNC: 144 MG/DL — HIGH (ref 70–99)
GLUCOSE BLDA-MCNC: 171 MG/DL — HIGH (ref 70–99)
GLUCOSE BLDA-MCNC: 185 MG/DL — HIGH (ref 70–99)
GLUCOSE BLDC GLUCOMTR-MCNC: 114 MG/DL — HIGH (ref 70–99)
GLUCOSE BLDC GLUCOMTR-MCNC: 117 MG/DL — HIGH (ref 70–99)
GLUCOSE BLDC GLUCOMTR-MCNC: 137 MG/DL — HIGH (ref 70–99)
GLUCOSE SERPL-MCNC: 144 MG/DL — HIGH (ref 70–99)
GLUCOSE SERPL-MCNC: 184 MG/DL — HIGH (ref 70–99)
GLUCOSE SERPL-MCNC: 189 MG/DL — HIGH (ref 70–99)
HCO3 BLDA-SCNC: 26 MMOL/L — SIGNIFICANT CHANGE UP (ref 22–26)
HCO3 BLDA-SCNC: 27 MMOL/L — HIGH (ref 22–26)
HCO3, POST MEMBRANE ARTERIAL: 24 MMOL/L — SIGNIFICANT CHANGE UP
HCO3, PRE MEMBRANE VENOUS: 24 MMOL/L — SIGNIFICANT CHANGE UP (ref 20–27)
HCT VFR BLD CALC: 27.4 % — LOW (ref 39–50)
HCT VFR BLD CALC: 28.2 % — LOW (ref 39–50)
HCT VFR BLD CALC: 28.9 % — LOW (ref 39–50)
HCT VFR BLDA CALC: 29 % — LOW (ref 39–51)
HCT VFR BLDA CALC: 29 % — LOW (ref 39–51)
HCT VFR BLDA CALC: 29.2 % — LOW (ref 39–51)
HCT VFR BLDA CALC: 29.3 % — LOW (ref 39–51)
HCT VFR BLDA CALC: 32.2 % — LOW (ref 39–51)
HCT VFR BLDA CALC: 32.2 % — LOW (ref 39–51)
HGB BLD-MCNC: 9.3 G/DL — LOW (ref 13–17)
HGB BLD-MCNC: 9.3 G/DL — LOW (ref 13–17)
HGB BLD-MCNC: 9.4 G/DL — LOW (ref 13–17)
HGB BLDA-MCNC: 10.5 G/DL — LOW (ref 13–17)
HGB BLDA-MCNC: 10.5 G/DL — LOW (ref 13–17)
HGB BLDA-MCNC: 9.3 G/DL — LOW (ref 13–17)
HGB BLDA-MCNC: 9.4 G/DL — LOW (ref 13–17)
HGB BLDA-MCNC: 9.5 G/DL — LOW (ref 13–17)
HGB BLDA-MCNC: 9.6 G/DL — LOW (ref 13–17)
IMM GRANULOCYTES NFR BLD AUTO: 1.5 % — SIGNIFICANT CHANGE UP (ref 0–1.5)
IMM GRANULOCYTES NFR BLD AUTO: 2.1 % — HIGH (ref 0–1.5)
IMM GRANULOCYTES NFR BLD AUTO: 3.4 % — HIGH (ref 0–1.5)
INR BLD: 1.35 — HIGH (ref 0.88–1.17)
L PNEUMO AG UR QL: NEGATIVE — SIGNIFICANT CHANGE UP
LACTATE BLDA-SCNC: 0.9 MMOL/L — SIGNIFICANT CHANGE UP (ref 0.5–2)
LACTATE BLDA-SCNC: 1 MMOL/L — SIGNIFICANT CHANGE UP (ref 0.5–2)
LACTATE BLDA-SCNC: 1.4 MMOL/L — SIGNIFICANT CHANGE UP (ref 0.5–2)
LACTATE BLDA-SCNC: 1.5 MMOL/L — SIGNIFICANT CHANGE UP (ref 0.5–2)
LACTATE, POST MEMBRANE ARTERIAL: 0.8 MMOL/L — SIGNIFICANT CHANGE UP (ref 0.5–2.2)
LACTATE, PRE MEMBRANE VENOUS: 0.8 MMOL/L — SIGNIFICANT CHANGE UP (ref 0.5–2.2)
LYMPHOCYTES # BLD AUTO: 0.7 K/UL — LOW (ref 1–3.3)
LYMPHOCYTES # BLD AUTO: 0.74 K/UL — LOW (ref 1–3.3)
LYMPHOCYTES # BLD AUTO: 0.77 K/UL — LOW (ref 1–3.3)
LYMPHOCYTES # BLD AUTO: 12.3 % — LOW (ref 13–44)
LYMPHOCYTES # BLD AUTO: 14 % — SIGNIFICANT CHANGE UP (ref 13–44)
LYMPHOCYTES # BLD AUTO: 14.4 % — SIGNIFICANT CHANGE UP (ref 13–44)
MAGNESIUM SERPL-MCNC: 2 MG/DL — SIGNIFICANT CHANGE UP (ref 1.6–2.6)
MCHC RBC-ENTMCNC: 31 PG — SIGNIFICANT CHANGE UP (ref 27–34)
MCHC RBC-ENTMCNC: 31.6 PG — SIGNIFICANT CHANGE UP (ref 27–34)
MCHC RBC-ENTMCNC: 32.2 % — SIGNIFICANT CHANGE UP (ref 32–36)
MCHC RBC-ENTMCNC: 32.4 PG — SIGNIFICANT CHANGE UP (ref 27–34)
MCHC RBC-ENTMCNC: 33 % — SIGNIFICANT CHANGE UP (ref 32–36)
MCHC RBC-ENTMCNC: 34.3 % — SIGNIFICANT CHANGE UP (ref 32–36)
MCV RBC AUTO: 94.5 FL — SIGNIFICANT CHANGE UP (ref 80–100)
MCV RBC AUTO: 95.9 FL — SIGNIFICANT CHANGE UP (ref 80–100)
MCV RBC AUTO: 96.3 FL — SIGNIFICANT CHANGE UP (ref 80–100)
METHGB MFR BLDMV: 0.8 % — SIGNIFICANT CHANGE UP (ref 0–1.5)
MONOCYTES # BLD AUTO: 0.35 K/UL — SIGNIFICANT CHANGE UP (ref 0–0.9)
MONOCYTES # BLD AUTO: 0.36 K/UL — SIGNIFICANT CHANGE UP (ref 0–0.9)
MONOCYTES # BLD AUTO: 0.41 K/UL — SIGNIFICANT CHANGE UP (ref 0–0.9)
MONOCYTES NFR BLD AUTO: 6 % — SIGNIFICANT CHANGE UP (ref 2–14)
MONOCYTES NFR BLD AUTO: 6.6 % — SIGNIFICANT CHANGE UP (ref 2–14)
MONOCYTES NFR BLD AUTO: 8.2 % — SIGNIFICANT CHANGE UP (ref 2–14)
NEUTROPHILS # BLD AUTO: 3.57 K/UL — SIGNIFICANT CHANGE UP (ref 1.8–7.4)
NEUTROPHILS # BLD AUTO: 3.96 K/UL — SIGNIFICANT CHANGE UP (ref 1.8–7.4)
NEUTROPHILS # BLD AUTO: 4.71 K/UL — SIGNIFICANT CHANGE UP (ref 1.8–7.4)
NEUTROPHILS NFR BLD AUTO: 71.2 % — SIGNIFICANT CHANGE UP (ref 43–77)
NEUTROPHILS NFR BLD AUTO: 74.2 % — SIGNIFICANT CHANGE UP (ref 43–77)
NEUTROPHILS NFR BLD AUTO: 78.2 % — HIGH (ref 43–77)
NRBC # FLD: 0 K/UL — SIGNIFICANT CHANGE UP (ref 0–0)
NRBC # FLD: 0 K/UL — SIGNIFICANT CHANGE UP (ref 0–0)
NRBC # FLD: 0.02 K/UL — SIGNIFICANT CHANGE UP (ref 0–0)
ORGANISM # SPEC MICROSCOPIC CNT: SIGNIFICANT CHANGE UP
OXYGEN SATURATION, PRE MEMBRANE VENOUS: 73.7 % — LOW (ref 95–99)
OXYGEN SATURATION,POST MEMBRANE ARTERIAL: 78.9 % — LOW (ref 95–99)
OXYHEMOGLOBIN, PRE MEMBRANE VENOUS: 73.1 % — LOW (ref 94–98)
PCO2 BLDA: 40 MMHG — SIGNIFICANT CHANGE UP (ref 35–48)
PCO2 BLDA: 40 MMHG — SIGNIFICANT CHANGE UP (ref 35–48)
PCO2 BLDA: 42 MMHG — SIGNIFICANT CHANGE UP (ref 35–48)
PCO2 BLDA: 42 MMHG — SIGNIFICANT CHANGE UP (ref 35–48)
PCO2 BLDA: 43 MMHG — SIGNIFICANT CHANGE UP (ref 35–48)
PCO2 BLDA: 44 MMHG — SIGNIFICANT CHANGE UP (ref 35–48)
PCO2, POST MEMBRANE ARTERIAL: 43 MMHG — SIGNIFICANT CHANGE UP (ref 35–48)
PCO2, PRE MEMBRANE VENOUS: 44 MMHG — SIGNIFICANT CHANGE UP (ref 32–48)
PH BLDA: 7.39 PH — SIGNIFICANT CHANGE UP (ref 7.35–7.45)
PH BLDA: 7.41 PH — SIGNIFICANT CHANGE UP (ref 7.35–7.45)
PH BLDA: 7.41 PH — SIGNIFICANT CHANGE UP (ref 7.35–7.45)
PH BLDA: 7.42 PH — SIGNIFICANT CHANGE UP (ref 7.35–7.45)
PH BLDA: 7.42 PH — SIGNIFICANT CHANGE UP (ref 7.35–7.45)
PH BLDA: 7.43 PH — SIGNIFICANT CHANGE UP (ref 7.35–7.45)
PH, POST MEMBRANE ARTERIAL: 7.38 PH — SIGNIFICANT CHANGE UP (ref 7.35–7.45)
PH, PRE MEMBRANE VENOUS: 7.37 PH — SIGNIFICANT CHANGE UP (ref 7.32–7.43)
PHOSPHATE SERPL-MCNC: 1.8 MG/DL — LOW (ref 2.5–4.5)
PHOSPHATE SERPL-MCNC: 2.3 MG/DL — LOW (ref 2.5–4.5)
PHOSPHATE SERPL-MCNC: 2.9 MG/DL — SIGNIFICANT CHANGE UP (ref 2.5–4.5)
PLATELET # BLD AUTO: 116 K/UL — LOW (ref 150–400)
PLATELET # BLD AUTO: 116 K/UL — LOW (ref 150–400)
PLATELET # BLD AUTO: 121 K/UL — LOW (ref 150–400)
PMV BLD: 10 FL — SIGNIFICANT CHANGE UP (ref 7–13)
PMV BLD: 10.2 FL — SIGNIFICANT CHANGE UP (ref 7–13)
PMV BLD: 10.4 FL — SIGNIFICANT CHANGE UP (ref 7–13)
PO2 BLDA: 112 MMHG — HIGH (ref 83–108)
PO2 BLDA: 113 MMHG — HIGH (ref 83–108)
PO2 BLDA: 124 MMHG — HIGH (ref 83–108)
PO2 BLDA: 95 MMHG — SIGNIFICANT CHANGE UP (ref 83–108)
PO2, POST MEMBRANE ARTERIAL: 45.4 MMHG — CRITICAL LOW (ref 83–108)
PO2, PRE MEMBRANE VENOUS: 41.4 MMHG — HIGH (ref 35–40)
POTASSIUM BLDA-SCNC: 3.3 MMOL/L — LOW (ref 3.4–4.5)
POTASSIUM BLDA-SCNC: 3.4 MMOL/L — SIGNIFICANT CHANGE UP (ref 3.4–4.5)
POTASSIUM BLDA-SCNC: 3.5 MMOL/L — SIGNIFICANT CHANGE UP (ref 3.4–4.5)
POTASSIUM BLDA-SCNC: 3.6 MMOL/L — SIGNIFICANT CHANGE UP (ref 3.4–4.5)
POTASSIUM SERPL-MCNC: 3.5 MMOL/L — SIGNIFICANT CHANGE UP (ref 3.5–5.3)
POTASSIUM SERPL-MCNC: 3.7 MMOL/L — SIGNIFICANT CHANGE UP (ref 3.5–5.3)
POTASSIUM SERPL-MCNC: 3.8 MMOL/L — SIGNIFICANT CHANGE UP (ref 3.5–5.3)
POTASSIUM SERPL-SCNC: 3.5 MMOL/L — SIGNIFICANT CHANGE UP (ref 3.5–5.3)
POTASSIUM SERPL-SCNC: 3.7 MMOL/L — SIGNIFICANT CHANGE UP (ref 3.5–5.3)
POTASSIUM SERPL-SCNC: 3.8 MMOL/L — SIGNIFICANT CHANGE UP (ref 3.5–5.3)
PROT SERPL-MCNC: 5.7 G/DL — LOW (ref 6–8.3)
PROT SERPL-MCNC: 5.9 G/DL — LOW (ref 6–8.3)
PROT SERPL-MCNC: 5.9 G/DL — LOW (ref 6–8.3)
PROTHROM AB SERPL-ACNC: 15.1 SEC — HIGH (ref 9.8–13.1)
RBC # BLD: 2.9 M/UL — LOW (ref 4.2–5.8)
RBC # BLD: 2.94 M/UL — LOW (ref 4.2–5.8)
RBC # BLD: 3 M/UL — LOW (ref 4.2–5.8)
RBC # FLD: 12.9 % — SIGNIFICANT CHANGE UP (ref 10.3–14.5)
RBC # FLD: 13.1 % — SIGNIFICANT CHANGE UP (ref 10.3–14.5)
RBC # FLD: 13.2 % — SIGNIFICANT CHANGE UP (ref 10.3–14.5)
SAO2 % BLDA: 97.3 % — SIGNIFICANT CHANGE UP (ref 95–99)
SAO2 % BLDA: 97.5 % — SIGNIFICANT CHANGE UP (ref 95–99)
SAO2 % BLDA: 97.9 % — SIGNIFICANT CHANGE UP (ref 95–99)
SAO2 % BLDA: 98.1 % — SIGNIFICANT CHANGE UP (ref 95–99)
SAO2 % BLDA: 98.1 % — SIGNIFICANT CHANGE UP (ref 95–99)
SAO2 % BLDA: 98.5 % — SIGNIFICANT CHANGE UP (ref 95–99)
SODIUM BLDA-SCNC: 136 MMOL/L — SIGNIFICANT CHANGE UP (ref 136–146)
SODIUM BLDA-SCNC: 138 MMOL/L — SIGNIFICANT CHANGE UP (ref 136–146)
SODIUM BLDA-SCNC: 139 MMOL/L — SIGNIFICANT CHANGE UP (ref 136–146)
SODIUM BLDA-SCNC: 140 MMOL/L — SIGNIFICANT CHANGE UP (ref 136–146)
SODIUM SERPL-SCNC: 137 MMOL/L — SIGNIFICANT CHANGE UP (ref 135–145)
SODIUM SERPL-SCNC: 138 MMOL/L — SIGNIFICANT CHANGE UP (ref 135–145)
SODIUM SERPL-SCNC: 139 MMOL/L — SIGNIFICANT CHANGE UP (ref 135–145)
TOTAL HEMOGLOBIN, PRE MEMBRANE VENOUS: 9.5 G/DL — LOW (ref 13–17)
WBC # BLD: 5.01 K/UL — SIGNIFICANT CHANGE UP (ref 3.8–10.5)
WBC # BLD: 5.33 K/UL — SIGNIFICANT CHANGE UP (ref 3.8–10.5)
WBC # BLD: 6.02 K/UL — SIGNIFICANT CHANGE UP (ref 3.8–10.5)
WBC # FLD AUTO: 5.01 K/UL — SIGNIFICANT CHANGE UP (ref 3.8–10.5)
WBC # FLD AUTO: 5.33 K/UL — SIGNIFICANT CHANGE UP (ref 3.8–10.5)
WBC # FLD AUTO: 6.02 K/UL — SIGNIFICANT CHANGE UP (ref 3.8–10.5)

## 2019-11-01 PROCEDURE — 99223 1ST HOSP IP/OBS HIGH 75: CPT | Mod: GC

## 2019-11-01 PROCEDURE — 99291 CRITICAL CARE FIRST HOUR: CPT | Mod: 25

## 2019-11-01 PROCEDURE — 71045 X-RAY EXAM CHEST 1 VIEW: CPT | Mod: 26

## 2019-11-01 PROCEDURE — 93308 TTE F-UP OR LMTD: CPT | Mod: 26,GC

## 2019-11-01 PROCEDURE — 76604 US EXAM CHEST: CPT | Mod: 26,GC

## 2019-11-01 PROCEDURE — 76937 US GUIDE VASCULAR ACCESS: CPT | Mod: 26,59

## 2019-11-01 PROCEDURE — 99292 CRITICAL CARE ADDL 30 MIN: CPT

## 2019-11-01 PROCEDURE — ZZZZZ: CPT

## 2019-11-01 PROCEDURE — 36569 INSJ PICC 5 YR+ W/O IMAGING: CPT

## 2019-11-01 PROCEDURE — 33948 ECMO/ECLS DAILY MGMT-VENOUS: CPT | Mod: GC

## 2019-11-01 RX ORDER — SODIUM,POTASSIUM PHOSPHATES 278-250MG
2 POWDER IN PACKET (EA) ORAL THREE TIMES A DAY
Refills: 0 | Status: COMPLETED | OUTPATIENT
Start: 2019-11-01 | End: 2019-11-02

## 2019-11-01 RX ORDER — POTASSIUM CHLORIDE 20 MEQ
40 PACKET (EA) ORAL ONCE
Refills: 0 | Status: COMPLETED | OUTPATIENT
Start: 2019-11-01 | End: 2019-11-01

## 2019-11-01 RX ORDER — FUROSEMIDE 40 MG
40 TABLET ORAL ONCE
Refills: 0 | Status: COMPLETED | OUTPATIENT
Start: 2019-11-01 | End: 2019-11-01

## 2019-11-01 RX ADMIN — PROPOFOL 8.1 MICROGRAM(S)/KG/MIN: 10 INJECTION, EMULSION INTRAVENOUS at 07:41

## 2019-11-01 RX ADMIN — POLYETHYLENE GLYCOL 3350 17 GRAM(S): 17 POWDER, FOR SOLUTION ORAL at 05:31

## 2019-11-01 RX ADMIN — PROPOFOL 8.1 MICROGRAM(S)/KG/MIN: 10 INJECTION, EMULSION INTRAVENOUS at 19:20

## 2019-11-01 RX ADMIN — CHLORHEXIDINE GLUCONATE 15 MILLILITER(S): 213 SOLUTION TOPICAL at 05:31

## 2019-11-01 RX ADMIN — Medication 40 MILLIEQUIVALENT(S): at 05:57

## 2019-11-01 RX ADMIN — MIDAZOLAM HYDROCHLORIDE 4 MILLIGRAM(S): 1 INJECTION, SOLUTION INTRAMUSCULAR; INTRAVENOUS at 23:15

## 2019-11-01 RX ADMIN — Medication 3 MILLILITER(S): at 09:34

## 2019-11-01 RX ADMIN — MIDAZOLAM HYDROCHLORIDE 2.7 MG/KG/HR: 1 INJECTION, SOLUTION INTRAMUSCULAR; INTRAVENOUS at 07:42

## 2019-11-01 RX ADMIN — MIDAZOLAM HYDROCHLORIDE 4 MILLIGRAM(S): 1 INJECTION, SOLUTION INTRAMUSCULAR; INTRAVENOUS at 00:16

## 2019-11-01 RX ADMIN — CISATRACURIUM BESYLATE 20 MILLIGRAM(S): 2 INJECTION INTRAVENOUS at 23:15

## 2019-11-01 RX ADMIN — Medication 300 MILLIGRAM(S): at 17:43

## 2019-11-01 RX ADMIN — Medication 300 MILLIGRAM(S): at 01:15

## 2019-11-01 RX ADMIN — Medication 1 APPLICATION(S): at 05:31

## 2019-11-01 RX ADMIN — FENTANYL CITRATE 6.75 MICROGRAM(S)/KG/HR: 50 INJECTION INTRAVENOUS at 07:42

## 2019-11-01 RX ADMIN — Medication 3 MILLILITER(S): at 15:38

## 2019-11-01 RX ADMIN — Medication 2 PACKET(S): at 21:37

## 2019-11-01 RX ADMIN — Medication 40 MILLIGRAM(S): at 06:43

## 2019-11-01 RX ADMIN — HEPARIN SODIUM 20 UNIT(S)/HR: 5000 INJECTION INTRAVENOUS; SUBCUTANEOUS at 19:20

## 2019-11-01 RX ADMIN — SENNA PLUS 15 MILLILITER(S): 8.6 TABLET ORAL at 05:31

## 2019-11-01 RX ADMIN — Medication 300 MILLIGRAM(S): at 09:08

## 2019-11-01 RX ADMIN — FENTANYL CITRATE 6.75 MICROGRAM(S)/KG/HR: 50 INJECTION INTRAVENOUS at 19:20

## 2019-11-01 RX ADMIN — HEPARIN SODIUM 20 UNIT(S)/HR: 5000 INJECTION INTRAVENOUS; SUBCUTANEOUS at 07:43

## 2019-11-01 RX ADMIN — MIDAZOLAM HYDROCHLORIDE 2.7 MG/KG/HR: 1 INJECTION, SOLUTION INTRAMUSCULAR; INTRAVENOUS at 19:20

## 2019-11-01 RX ADMIN — SENNA PLUS 15 MILLILITER(S): 8.6 TABLET ORAL at 17:43

## 2019-11-01 RX ADMIN — FENTANYL CITRATE 100 MICROGRAM(S): 50 INJECTION INTRAVENOUS at 00:30

## 2019-11-01 RX ADMIN — AZITHROMYCIN 255 MILLIGRAM(S): 500 TABLET, FILM COATED ORAL at 14:40

## 2019-11-01 RX ADMIN — Medication 3 MILLILITER(S): at 21:29

## 2019-11-01 RX ADMIN — FENTANYL CITRATE 200 MICROGRAM(S): 50 INJECTION INTRAVENOUS at 23:15

## 2019-11-01 RX ADMIN — HYDROMORPHONE HYDROCHLORIDE 2 MILLIGRAM(S): 2 INJECTION INTRAMUSCULAR; INTRAVENOUS; SUBCUTANEOUS at 23:15

## 2019-11-01 RX ADMIN — Medication 3 MILLILITER(S): at 04:44

## 2019-11-01 RX ADMIN — LEVETIRACETAM 400 MILLIGRAM(S): 250 TABLET, FILM COATED ORAL at 17:43

## 2019-11-01 RX ADMIN — POLYETHYLENE GLYCOL 3350 17 GRAM(S): 17 POWDER, FOR SOLUTION ORAL at 17:15

## 2019-11-01 RX ADMIN — LEVETIRACETAM 400 MILLIGRAM(S): 250 TABLET, FILM COATED ORAL at 05:32

## 2019-11-01 RX ADMIN — CHLORHEXIDINE GLUCONATE 15 MILLILITER(S): 213 SOLUTION TOPICAL at 17:15

## 2019-11-01 RX ADMIN — HYDROMORPHONE HYDROCHLORIDE 2 MILLIGRAM(S): 2 INJECTION INTRAMUSCULAR; INTRAVENOUS; SUBCUTANEOUS at 00:30

## 2019-11-01 RX ADMIN — Medication 40 MILLIEQUIVALENT(S): at 05:31

## 2019-11-01 RX ADMIN — PIPERACILLIN AND TAZOBACTAM 25 GRAM(S): 4; .5 INJECTION, POWDER, LYOPHILIZED, FOR SOLUTION INTRAVENOUS at 17:15

## 2019-11-01 RX ADMIN — Medication 2 PACKET(S): at 14:10

## 2019-11-01 RX ADMIN — PIPERACILLIN AND TAZOBACTAM 25 GRAM(S): 4; .5 INJECTION, POWDER, LYOPHILIZED, FOR SOLUTION INTRAVENOUS at 07:42

## 2019-11-01 RX ADMIN — Medication 1 APPLICATION(S): at 17:44

## 2019-11-01 NOTE — PROGRESS NOTE ADULT - ASSESSMENT
54M with severe depression who presented to  10/28 with lethargy and hypoxemic/hypercapnic respiratory failure possibly related to overdose +/- pneumonia extubated but required reintubation 10/30 but with persistent hypoxemia refractory to conventional management and ultimately cannulated for VV ECMO 10/30/2019 for hypoxemic respiratory failure with ARDS and transferred to [facility] for further management    NEUROLOGY  Baseline AOX3. Has severe depression refractory to multiple medications/ECT with prior psych hospitalizations. Had a tonic-clonic seizure at . Was concern for overdose at . Per discussion with wife this morning, strong consideration for initial benzo withdrawal related seizures	  - Sedated with propofol, versed; required Nimbex push overnight.  - Off Nimbex gtt but received push overnight. Will try to avoid paralytics if possible. May require for decannulation  - Neuro checks per ICU routine  - Psych evaluation; was being considered for inpatient admission at   - Concern for abrupt cessation of Clonazepam causing seizures; initial utox negative for benzos. Likely will restart PO benzo as take off clonazepam  - Seizure like episode at ; ? related overdose vs withdrawal. EEG unremarkable there. CTH unremarkable at . Consider repeat EEG  - On Kera, neuro consulted.   -PT and OT evaluation, will continue as tolerated    CARDIOVASCULAR  TTE at  with HFrEF, MR, IL. Unclear if new diagnosis of HFrEF. Bedside FREDI performed with severe LV dysfunction and grade I diastolic dysfunction, mild MR.   -On NE likely related to sedation; maintain MAP>65 (had very labile BPs on precedex which improved after precedex was discontinued)  -No significant right heart dysfunction on echo  -Maintain net even today; will most likely require additional lasix today  -ECG without ischemic changes; troponins peaked; will need to repeat echo. Ischemic cardiomyopathy vs sepsis cardiomyopathy    PULMONARY  Patient with severe hypoxemic respiratory failure likely severe ARDS with P/F 82 and now cannulated for VV-ECMO possibly related to aspiration pneumonia.   Bronchoscopy performed on arrival with billious secretions predominantly in the lower lobes; BAL sent for cyto (with red oil stain), micro, cell count, Legionella PCR    #VENTILATOR  - Vent settings on arrival AC 10/150/15/30; Ppeak 26, Pplat 21, DP 6  - VDR settings PIP 26, PEP 14, , FIO2 40, CR 15, I:E 3:1. Will attempt to wean down today    #ECMO  ECMO SETTINGS: Day# 3  Type:		[ x] Venovenous		[ ] Venoarterial  Cannulation Site(s):   RIJ 20F return cannula  RFV 25F pull cannula    Flow: 3.4 	          P1:    120              Delta P: 28	P3: -42  RPM: 2700		  P2:     148             SVO2: 71.4    Oxygenator:	Sweep: 0.5    L/min	FiO2:   21%    -Clinically perfusing. Lactate is within normal limits  - Will aim to decannulate today    #ARDS  -ECMO and vent management as above  - Follow up BAL studies  -Nebs to help with secretion mobilization on VDR    INFECTIOUS DISEASE  High suspicion for aspiration pneumonia  -Follow up blood cultures and BAL cultures   -Continue vancomycin, zosyn and azithro  -BAL fluid sent for legionella PCR as well  -Follow fever curve/WBCs    RENAL/  No active issues   -Meredith in place for critically ill patient   -Goal net even today  -Lasix as above    GASTROINTESTINAL  -OGT/TFs  -Bowel regimen; needs to be increased    HEME  -Heparin GTT while on ECMO; trend PTT  -Transfuse PRBCs PRN  -Monitor for hemolysis; follow LDH/haptoglobin--hapto low but LDH only minimally elevated and Hb relatively stable  -Follow platelets    ENDOCRINE  -Monitor FS/low dose SS    PROPHYLAXIS  VTE: Systemic AC while on circuit    DISPO/ETHICS/GOC  FULL CODE 54M with severe depression who presented to  10/28 with lethargy and hypoxemic/hypercapnic respiratory failure possibly related to overdose +/- pneumonia extubated but required reintubation 10/30 but with persistent hypoxemia refractory to conventional management and ultimately cannulated for VV ECMO 10/30/2019 for hypoxemic respiratory failure with ARDS and transferred to [facility] for further management    NEUROLOGY  Baseline AOX3. Has severe depression refractory to multiple medications/ECT with prior psych hospitalizations. Had a tonic-clonic seizure at . Was concern for overdose at . Per discussion with wife this morning, strong consideration for initial benzo withdrawal related seizures	  - Sedated with propofol, versed; required Nimbex push overnight.  - Off Nimbex gtt but received push overnight. Will try to avoid paralytics if possible. May require for decannulation  - Neuro checks per ICU routine  - Psych evaluation; was being considered for inpatient admission at   - Concern for abrupt cessation of Clonazepam causing seizures; initial utox negative for benzos. Likely will restart PO benzo as take off clonazepam  - Seizure like episode at ; ? related overdose vs withdrawal. EEG unremarkable there. CTH unremarkable at . Consider repeat EEG  - On Kera, neuro consulted.   -PT and OT evaluation, will continue as tolerated    CARDIOVASCULAR  TTE at  with HFrEF, MR, HI. Unclear if new diagnosis of HFrEF. Bedside FREDI performed initially with severe LV dysfunction and grade I diastolic dysfunction, mild MR.   -On NE likely related to sedation; maintain MAP>65 (had very labile BPs on precedex which improved after precedex was discontinued)  -No significant right heart dysfunction on echo  -Maintain net even today; will most likely require additional lasix today  -ECG without ischemic changes; troponins peaked; will need to repeat echo. Ischemic cardiomyopathy vs sepsis cardiomyopathy    PULMONARY  Patient with severe hypoxemic respiratory failure likely severe ARDS with P/F 82 and now cannulated for VV-ECMO possibly related to aspiration pneumonia.   Bronchoscopy performed on arrival with billious secretions predominantly in the lower lobes; BAL sent for cyto (with red oil stain), micro, cell count, Legionella PCR    #VENTILATOR  - Vent settings on arrival AC 10/150/15/30; Ppeak 26, Pplat 21, DP 6  - VDR settings PIP 26, PEP 14, , FIO2 40, CR 15, I:E 3:1. Will attempt to wean down today    #ECMO  ECMO SETTINGS: Day# 3  Type:		[ x] Venovenous		[ ] Venoarterial  Cannulation Site(s):   RIJ 20F return cannula  RFV 25F pull cannula    Flow: 3.4 	          P1:    120              Delta P: 28	P3: -42  RPM: 2700		  P2:     148             SVO2: 71.4    Oxygenator:	Sweep: 0.5    L/min	FiO2:   21%    -Clinically perfusing. Lactate is within normal limits  - Will aim to decannulate today    #ARDS  -ECMO and vent management as above  - Follow up BAL studies  -Nebs to help with secretion mobilization on VDR    INFECTIOUS DISEASE  High suspicion for aspiration pneumonia  -Follow up blood cultures and BAL cultures   -Continue vancomycin, zosyn and azithro  -BAL fluid sent for legionella PCR as well  -Follow fever curve/WBCs    RENAL/  No active issues   -Meredith in place for critically ill patient   -Goal net even today  -Lasix as above    GASTROINTESTINAL  -OGT/TFs  -Bowel regimen; needs to be increased    HEME  -Heparin GTT while on ECMO; trend PTT  -Transfuse PRBCs PRN  -Monitor for hemolysis; follow LDH/haptoglobin--hapto low but LDH only minimally elevated and Hb relatively stable  -Follow platelets    ENDOCRINE  -Monitor FS/low dose SS    PROPHYLAXIS  VTE: Systemic AC while on circuit    DISPO/ETHICS/GOC  FULL CODE 54M with severe depression who presented to  10/28 with lethargy and hypoxemic/hypercapnic respiratory failure possibly related to overdose +/- pneumonia extubated but required reintubation 10/30 but with persistent hypoxemia refractory to conventional management and ultimately cannulated for VV ECMO 10/30/2019 for hypoxemic respiratory failure with ARDS and transferred to [facility] for further management    NEUROLOGY  Baseline AOX3. Has severe depression refractory to multiple medications/ECT with prior psych hospitalizations. Had a tonic-clonic seizure at . Was concern for overdose at . Per discussion with wife this morning, strong consideration for initial benzo withdrawal related seizures	  - Sedated with propofol, versed; required Nimbex push overnight.  - Off Nimbex gtt but received push overnight. Will try to avoid paralytics if possible. May require for decannulation  - Neuro checks per ICU routine  - Psych evaluation; was being considered for inpatient admission at   - Concern for abrupt cessation of Clonazepam causing seizures; initial utox negative for benzos. Likely will restart PO benzo as take off clonazepam  - Seizure like episode at ; ? related overdose vs withdrawal. EEG unremarkable there. CTH unremarkable at . Consider repeat EEG  - On Keppra, neuro consulted.   -PT and OT evaluation, will continue as tolerated    CARDIOVASCULAR  TTE at  with HFrEF, MR, MI. Unclear if new diagnosis of HFrEF. Bedside FREDI performed initially with severe LV dysfunction and grade I diastolic dysfunction, mild MR.   -Previously on NE likely related to sedation; maintain MAP>65 (had very labile BPs on precedex which improved after precedex was discontinued)  -No significant right heart dysfunction on echo  -Maintain net even today; will most likely require additional lasix today  -ECG without ischemic changes; troponins peaked; will need to repeat echo. Ischemic cardiomyopathy vs sepsis cardiomyopathy    PULMONARY  Patient with severe hypoxemic respiratory failure likely severe ARDS with P/F 82 and now cannulated for VV-ECMO possibly related to aspiration pneumonia.   Bronchoscopy performed on arrival with billious secretions predominantly in the lower lobes; BAL sent for cyto (with red oil stain), micro, cell count, Legionella PCR    #VENTILATOR  - Vent settings on arrival AC 10/150/15/30; Ppeak 26, Pplat 21, DP 6  - VDR settings PIP 26, PEP 14, , FIO2 40, CR 15, I:E 3:1. Will attempt to wean down today    #ECMO  ECMO SETTINGS: Day# 3  Type:		[ x] Venovenous		[ ] Venoarterial  Cannulation Site(s):   RIJ 20F return cannula  RFV 25F pull cannula    Flow: 3.4 	          P1:    120              Delta P: 28	P3: -42  RPM: 2700		  P2:     148             SVO2: 71.4    Oxygenator:	Sweep: 0.5    L/min	FiO2:   21%    -Clinically perfusing. Lactate is within normal limits  - Will aim to decannulate today    #ARDS  -ECMO and vent management as above  - Follow up BAL studies  -Nebs to help with secretion mobilization on VDR    INFECTIOUS DISEASE  High suspicion for aspiration pneumonia  -Follow up blood cultures and BAL cultures   -Continue vancomycin, zosyn and azithro  -BAL fluid sent for legionella PCR as well  -Follow fever curve/WBCs    RENAL/  No active issues   -Meredith in place for critically ill patient   -Goal net even today  -Lasix as above    GASTROINTESTINAL  -OGT/TFs  -Bowel regimen; needs to be increased    HEME  -Heparin GTT while on ECMO; trend PTT  -Transfuse PRBCs PRN  -Monitor for hemolysis; follow LDH/haptoglobin--hapto low but LDH only minimally elevated and Hb relatively stable  -Follow platelets    ENDOCRINE  -Monitor FS/low dose SS    PROPHYLAXIS  VTE: Systemic AC while on circuit    DISPO/ETHICS/GOC  FULL CODE

## 2019-11-01 NOTE — CONSULT NOTE ADULT - ATTENDING COMMENTS
55 y/o man with hx of Major depression on ECT and long term Klonopin treatment. Pt intubated for hypoxia at [facility], subsequently extubated, then reintubated for respiratory failure and possible aspiration, developing into ARDS and pt placed on ECMO. Reported GTC at [facility] fro 30 seconds and started on keppra. No EEG findings other than slowing. Family went through his medications and found that he likely hd not been taking his medications, including Klonopin as of recent.      Impression: seizure in setting of suspected cessation of Klonopin use, possible benzo withdrawal, and episodes of hypoxia.   Seizure most likely provoked and not due to underlying epilepsy.   - Would taper down keppra starting with 500mg BID for 2 days, then 250mg BID for 2 days then off.
Pt seen with NP.  Agree with above plan.

## 2019-11-01 NOTE — CHART NOTE - NSCHARTNOTEFT_GEN_A_CORE
Removal of VV ECMO    Patient was prepped and draped in sterile manner. The ECMO cannulation sites where identified and the sutures were removed. ECMO machine was turned off, cannulas clamped and coordinated with perfusion at bedside as well a MICU team. 0-prolene purse string used on both right IJ and right femoral vein to close off skin after cannulas were removed. Patient tolerated decannulation well. No significant blood loss. No hematoma, bleeding after procedure.           Mikala Mckeon, PAC  Thoracic Surgery

## 2019-11-01 NOTE — CONSULT NOTE ADULT - SUBJECTIVE AND OBJECTIVE BOX
HPI:  54M with severe MDD with prior psych admissions/ECT who was initially admitted to  10/28 after presenting with lethargy and hypoxemia. Patient reportedly was dealing with severe depression and expressed SI to his wife. His wife reported he was sleeping throughout the day and in the evening she heard a thump and found him on the floor. At  he became obtunded with hypercapnic respiratory failure and was intubated and admitted to the CCU. Initial imaging showed lower lobe consolidations and he was treated with CTX/Azithromycin. Poison control was contacted and he was received NaHCO3 in the setting of mild QRS widening on ECG and suspected overdose. His Utox returned positive for barbiturates; he reportedly had positive testing for barbiturates in the past thought secondary to OTC supplementations he takes from a [employer] for sleep. On the morning of admission, he was noted to have a 30 second tonic clonic seizure and received IV benzo and keppra load. Subsequent EEG did not reveal epileptiform discharges. He was extubated in the morning of 10/28 but required BiPAP in the setting of increased oxygenation needs. On 10/29 he was agitated and was started on Precedex. He developed fevers and was broadened to cefepime. Overnight (10/30), he had a Tmax of 102.6 and worsening hypoxemia requiring reintubation. Was started on propofol, precedex and paralyzed but remained difficult to oxygenate with traditional mechanical ventilation requiring frequent bagging and ALI consult for ECMO was placed. He was steroids, nimbex gtt, vancomycin and received Lasix 40mg IVP. His P/F was 60 at time of consult; improved somewhat to 82 at 4:30 this morning on AC/28/460/18/100. The patient was cannulated for VV-ECMO and transfer to [facility]. Neurology consulted for hx GTC. Extended monitoring EEG with diffuse dysfunction, no epileptiform activity.     ROS: as above    PAST MEDICAL & SURGICAL HISTORY:  Depression  Hepatitis: &quot;as a child&quot; unsure of type  Left knee pain  No significant past surgical history    FAMILY HISTORY:    SOCIAL HISTORY:   T/E/D:   Occupation:   Lives with:     MEDICATIONS (HOME):  Home Medications:  OLANZapine 5 mg oral tablet: 1 tab(s) orally once a day (28 Oct 2019 01:19)    MEDICATIONS  (STANDING):  albuterol/ipratropium for Nebulization 3 milliLiter(s) Nebulizer every 6 hours  azithromycin  IVPB 500 milliGRAM(s) IV Intermittent every 24 hours  chlorhexidine 0.12% Liquid 15 milliLiter(s) Oral Mucosa every 12 hours  dextrose 5%. 1000 milliLiter(s) (50 mL/Hr) IV Continuous <Continuous>  fentaNYL   Infusion. 0.5 MICROgram(s)/kG/Hr (6.75 mL/Hr) IV Continuous <Continuous>  heparin  Infusion 1100 Unit(s)/Hr (20 mL/Hr) IV Continuous <Continuous>  insulin lispro (HumaLOG) corrective regimen sliding scale   SubCutaneous every 6 hours  levETIRAcetam  IVPB 750 milliGRAM(s) IV Intermittent every 12 hours  midazolam Infusion 0.02 mG/kG/Hr (2.7 mL/Hr) IV Continuous <Continuous>  norepinephrine Infusion 0.05 MICROgram(s)/kG/Min (6.328 mL/Hr) IV Continuous <Continuous>  petrolatum Ophthalmic Ointment 1 Application(s) Both EYES two times a day  piperacillin/tazobactam IVPB.. 4.5 Gram(s) IV Intermittent every 8 hours  polyethylene glycol 3350 17 Gram(s) Oral two times a day  propofol Infusion 10 MICROgram(s)/kG/Min (8.1 mL/Hr) IV Continuous <Continuous>  senna Syrup 15 milliLiter(s) Oral two times a day  vancomycin  IVPB 1500 milliGRAM(s) IV Intermittent every 8 hours  vancomycin  IVPB        MEDICATIONS  (PRN):    ALLERGIES/INTOLERANCES:  Allergies  No Known Allergies    Intolerances    VITALS & EXAMINATION:  Vital Signs Last 24 Hrs  T(C): 37.2 (01 Nov 2019 08:00), Max: 37.6 (01 Nov 2019 00:00)  T(F): 99 (01 Nov 2019 08:00), Max: 99.7 (01 Nov 2019 00:00)  HR: 84 (01 Nov 2019 07:19) (72 - 106)  BP: 102/60 (31 Oct 2019 16:00) (102/60 - 102/60)  BP(mean): 70 (31 Oct 2019 16:00) (70 - 70)  RR: 12 (01 Nov 2019 07:00) (12 - 25)  SpO2: 100% (01 Nov 2019 07:19) (92% - 100%)    Mental Status: Patient intubated, sedated    Cranial Nerves: PERRL, eyes moving spontaneously in all directions crossing midline, -BTT, no gross facial asymmetry, tongue/uvula/palate midline    Motor Exam:  Withdrawing x4 extremities to noxious stimuli    Sensory: No grimace to noxious stimuli x4    Reflexes: Bilateral 2+ Biceps, Brachial, Patellar, Ankle  Plantar: Down bilateral    LABORATORY:  CBC                       9.4    6.02  )-----------( 116      ( 01 Nov 2019 03:40 )             27.4     Chem 11-01    138  |  104  |  8   ----------------------------<  189<H>  3.5   |  22  |  0.78    Ca    8.2<L>      01 Nov 2019 03:40  Phos  2.3     11-01  Mg     2.0     11-01    TPro  5.9<L>  /  Alb  2.9<L>  /  TBili  0.5  /  DBili  x   /  AST  52<H>  /  ALT  48<H>  /  AlkPhos  37<L>  11-01    LFTs LIVER FUNCTIONS - ( 01 Nov 2019 03:40 )  Alb: 2.9 g/dL / Pro: 5.9 g/dL / ALK PHOS: 37 u/L / ALT: 48 u/L / AST: 52 u/L / GGT: x           Coagulopathy PT/INR - ( 31 Oct 2019 05:10 )   PT: 16.2 SEC;   INR: 1.41          PTT - ( 01 Nov 2019 03:40 )  PTT:46.4 SEC  Lipid Panel   A1c 10-29 [id number] 5.2    Cardiac enzymes CARDIAC MARKERS ( 30 Oct 2019 13:00 )  x     / x     / 639 u/L / 4.19 ng/mL / x      CARDIAC MARKERS ( 30 Oct 2019 09:30 )  x     / x     / 628 u/L / x     / x          U/A   CSF  Immunological  Other    STUDIES & IMAGING:  Studies (EKG, EEG, EMG, etc):     Radiology (XR, CT, MR, U/S, TTE/FREDI): HPI:  54M with severe MDD with prior psych admissions/ECT who was initially admitted to  10/28 after presenting with lethargy and hypoxemia. Patient reportedly was dealing with severe depression and expressed SI to his wife. His wife reported he was sleeping throughout the day and in the evening she heard a thump and found him on the floor. At  he became obtunded with hypercapnic respiratory failure and was intubated and admitted to the CCU. Initial imaging showed lower lobe consolidations and he was treated with CTX/Azithromycin. Poison control was contacted and he was received NaHCO3 in the setting of mild QRS widening on ECG and suspected overdose. His Utox returned positive for barbiturates; he reportedly had positive testing for barbiturates in the past thought secondary to OTC supplementations he takes from a [employer] for sleep. On the morning of admission, he was noted to have a 30 second tonic clonic seizure and received IV benzo and keppra load. Subsequent EEG did not reveal epileptiform discharges. He was extubated in the morning of 10/28 but required BiPAP in the setting of increased oxygenation needs. On 10/29 he was agitated and was started on Precedex. He developed fevers and was broadened to cefepime. Overnight (10/30), he had a Tmax of 102.6 and worsening hypoxemia requiring reintubation. Was started on propofol, precedex and paralyzed but remained difficult to oxygenate with traditional mechanical ventilation requiring frequent bagging and ALI consult for ECMO was placed. He was steroids, nimbex gtt, vancomycin and received Lasix 40mg IVP. His P/F was 60 at time of consult; improved somewhat to 82 at 4:30 this morning on AC/28/460/18/100. The patient was cannulated for VV-ECMO and transfer to [facility]. Neurology consulted for hx GTC. Extended monitoring EEG with diffuse dysfunction, no epileptiform activity.     ROS: as above    PAST MEDICAL & SURGICAL HISTORY:  Depression  Hepatitis: &quot;as a child&quot; unsure of type  Left knee pain  No significant past surgical history    FAMILY HISTORY:    SOCIAL HISTORY:   T/E/D:   Occupation:   Lives with:     MEDICATIONS (HOME):  Home Medications:  OLANZapine 5 mg oral tablet: 1 tab(s) orally once a day (28 Oct 2019 01:19)    MEDICATIONS  (STANDING):  albuterol/ipratropium for Nebulization 3 milliLiter(s) Nebulizer every 6 hours  azithromycin  IVPB 500 milliGRAM(s) IV Intermittent every 24 hours  chlorhexidine 0.12% Liquid 15 milliLiter(s) Oral Mucosa every 12 hours  dextrose 5%. 1000 milliLiter(s) (50 mL/Hr) IV Continuous <Continuous>  fentaNYL   Infusion. 0.5 MICROgram(s)/kG/Hr (6.75 mL/Hr) IV Continuous <Continuous>  heparin  Infusion 1100 Unit(s)/Hr (20 mL/Hr) IV Continuous <Continuous>  insulin lispro (HumaLOG) corrective regimen sliding scale   SubCutaneous every 6 hours  levETIRAcetam  IVPB 750 milliGRAM(s) IV Intermittent every 12 hours  midazolam Infusion 0.02 mG/kG/Hr (2.7 mL/Hr) IV Continuous <Continuous>  norepinephrine Infusion 0.05 MICROgram(s)/kG/Min (6.328 mL/Hr) IV Continuous <Continuous>  petrolatum Ophthalmic Ointment 1 Application(s) Both EYES two times a day  piperacillin/tazobactam IVPB.. 4.5 Gram(s) IV Intermittent every 8 hours  polyethylene glycol 3350 17 Gram(s) Oral two times a day  propofol Infusion 10 MICROgram(s)/kG/Min (8.1 mL/Hr) IV Continuous <Continuous>  senna Syrup 15 milliLiter(s) Oral two times a day  vancomycin  IVPB 1500 milliGRAM(s) IV Intermittent every 8 hours  vancomycin  IVPB        MEDICATIONS  (PRN):    ALLERGIES/INTOLERANCES:  Allergies  No Known Allergies    Intolerances    VITALS & EXAMINATION:  Vital Signs Last 24 Hrs  T(C): 37.2 (01 Nov 2019 08:00), Max: 37.6 (01 Nov 2019 00:00)  T(F): 99 (01 Nov 2019 08:00), Max: 99.7 (01 Nov 2019 00:00)  HR: 84 (01 Nov 2019 07:19) (72 - 106)  BP: 102/60 (31 Oct 2019 16:00) (102/60 - 102/60)  BP(mean): 70 (31 Oct 2019 16:00) (70 - 70)  RR: 12 (01 Nov 2019 07:00) (12 - 25)  SpO2: 100% (01 Nov 2019 07:19) (92% - 100%)    Mental Status: Patient intubated, sedated    Cranial Nerves: PERRL, -BTT, no gross facial asymmetry, tongue/uvula/palate midline    Motor Exam:  Tone flaccid    Sensory: No grimace to noxious stimuli x4    Reflexes: Bilateral 2+ Biceps, Brachial, +0 Patellar, +1 Ankle  Plantar: upgoing bilateral    LABORATORY:  CBC                       9.4    6.02  )-----------( 116      ( 01 Nov 2019 03:40 )             27.4     Chem 11-01    138  |  104  |  8   ----------------------------<  189<H>  3.5   |  22  |  0.78    Ca    8.2<L>      01 Nov 2019 03:40  Phos  2.3     11-01  Mg     2.0     11-01    TPro  5.9<L>  /  Alb  2.9<L>  /  TBili  0.5  /  DBili  x   /  AST  52<H>  /  ALT  48<H>  /  AlkPhos  37<L>  11-01    LFTs LIVER FUNCTIONS - ( 01 Nov 2019 03:40 )  Alb: 2.9 g/dL / Pro: 5.9 g/dL / ALK PHOS: 37 u/L / ALT: 48 u/L / AST: 52 u/L / GGT: x           Coagulopathy PT/INR - ( 31 Oct 2019 05:10 )   PT: 16.2 SEC;   INR: 1.41          PTT - ( 01 Nov 2019 03:40 )  PTT:46.4 SEC  Lipid Panel   A1c 10-29 [id number] 5.2    Cardiac enzymes CARDIAC MARKERS ( 30 Oct 2019 13:00 )  x     / x     / 639 u/L / 4.19 ng/mL / x      CARDIAC MARKERS ( 30 Oct 2019 09:30 )  x     / x     / 628 u/L / x     / x          U/A   CSF  Immunological  Other    STUDIES & IMAGING:  Studies (EKG, EEG, EMG, etc):     Radiology (XR, CT, MR, U/S, TTE/FREDI):

## 2019-11-01 NOTE — PROCEDURE NOTE - NSSITEPREP_SKIN_A_CORE
Adherence to aseptic technique: hand hygiene prior to donning barriers (gown, gloves), don cap and mask, sterile drape over patient
chlorhexidine/Adherence to aseptic technique: hand hygiene prior to donning barriers (gown, gloves), don cap and mask, sterile drape over patient

## 2019-11-01 NOTE — PROGRESS NOTE ADULT - SUBJECTIVE AND OBJECTIVE BOX
CHIEF COMPLAINT: ARDS/VV-ECMO    Interval Events:  -Agitated overnight not responsive to IV opiods/versed  -Received IV push of Nimbex  -Received lasix overnight x2    ======================REVIEW OF SYSTEMS=======================  [] All other systems negative  [x ] Unable to assess ROS because sedation    OBJECTIVE:  =======================VITALS=================================  ICU Vital Signs Last 24 Hrs  T(C): 37.2 (01 Nov 2019 08:00), Max: 37.6 (01 Nov 2019 00:00)  T(F): 99 (01 Nov 2019 08:00), Max: 99.7 (01 Nov 2019 00:00)  HR: 82 (01 Nov 2019 08:00) (72 - 106)  BP: 102/60 (31 Oct 2019 16:00) (102/60 - 102/60)  BP(mean): 70 (31 Oct 2019 16:00) (70 - 70)  ABP: 113/53 (01 Nov 2019 08:00) (84/49 - 145/71)  ABP(mean): 69 (01 Nov 2019 08:00) (59 - 95)  RR: 12 (01 Nov 2019 08:00) (12 - 25)  SpO2: 100% (01 Nov 2019 08:00) (92% - 100%)    CAPILLARY BLOOD GLUCOSE      POCT Blood Glucose.: 117 mg/dL (01 Nov 2019 05:52)      10-31-19 @ 07:01  -  11-01-19 @ 07:00  --------------------------------------------------------  IN: 6272.6 mL / OUT: 3000 mL / NET: 3272.6 mL    11-01-19 @ 07:01  - 11-01-19 @ 08:56  --------------------------------------------------------  IN: 241.2 mL / OUT: 900 mL / NET: -658.8 mL      =======================VENT=================================  Mode: VDR4, RR (machine): 12, FiO2: 40, PEEP: 14, ITime: 1, MAP: 17, PC: 26, PIP: 26, Frequency: 600      ======================ECMO=================================  ECMO Day# 3    ECMO SETTINGS:  Type:		[ x] Venovenous		[ ] Venoarterial  Cannulation Site(s):   RIJ 20F return cannula  RFV 25F pull cannula    Flow: 3.4 	          P1:    120              Delta P: 28	P3: -42  RPM: 2700		  P2:     148             SVO2: 71.4    Oxygenator:	Sweep: 0.5    L/min	FiO2:   21%    ======================PHYSICAL EXAM==========================  GENERAL: Sedated, paralyzed  	HEAD:  Atraumatic, Normocephalic  	EYES: Pupils reactive to light  	ENT: + ET tube  	NECK: RIJ ECMO cannula, No JVD  	CHEST/LUNG: Decreased breath sounds; clear anteriorly  	HEART: Regular rate and rhythm; No murmurs, rubs, or gallops  	ABDOMEN: Hypoactive bowel sounds; Soft, Nontender, Nondistended  	EXTREMITIES:  1+ Peripheral Pulses, brisk capillary refill. No clubbing,or edema  	NERVOUS SYSTEM:  Sedated, paralyzed, limited exam  	SKIN: Intact  Lines: Right radial A-line, Left IJ TLC (10/30), RFV and RIJ ECMO cannula (10/30)    =======================HOSPITAL MEDICATIONS=======================  MEDICATIONS  (STANDING):  albuterol/ipratropium for Nebulization 3 milliLiter(s) Nebulizer every 6 hours  azithromycin  IVPB 500 milliGRAM(s) IV Intermittent every 24 hours  chlorhexidine 0.12% Liquid 15 milliLiter(s) Oral Mucosa every 12 hours  dextrose 5%. 1000 milliLiter(s) (50 mL/Hr) IV Continuous <Continuous>  fentaNYL   Infusion. 0.5 MICROgram(s)/kG/Hr (6.75 mL/Hr) IV Continuous <Continuous>  heparin  Infusion 1100 Unit(s)/Hr (20 mL/Hr) IV Continuous <Continuous>  insulin lispro (HumaLOG) corrective regimen sliding scale   SubCutaneous every 6 hours  levETIRAcetam  IVPB 750 milliGRAM(s) IV Intermittent every 12 hours  midazolam Infusion 0.02 mG/kG/Hr (2.7 mL/Hr) IV Continuous <Continuous>  norepinephrine Infusion 0.05 MICROgram(s)/kG/Min (6.328 mL/Hr) IV Continuous <Continuous>  petrolatum Ophthalmic Ointment 1 Application(s) Both EYES two times a day  piperacillin/tazobactam IVPB.. 4.5 Gram(s) IV Intermittent every 8 hours  polyethylene glycol 3350 17 Gram(s) Oral two times a day  propofol Infusion 10 MICROgram(s)/kG/Min (8.1 mL/Hr) IV Continuous <Continuous>  senna Syrup 15 milliLiter(s) Oral two times a day  vancomycin  IVPB 1500 milliGRAM(s) IV Intermittent every 8 hours  vancomycin  IVPB        MEDICATIONS  (PRN):      =======================LABS=======================================                        9.4    6.02  )-----------( 116      ( 01 Nov 2019 03:40 )             27.4     11-01    138  |  104  |  8   ----------------------------<  189<H>  3.5   |  22  |  0.78    Ca    8.2<L>      01 Nov 2019 03:40  Phos  2.3     11-01  Mg     2.0     11-01    TPro  5.9<L>  /  Alb  2.9<L>  /  TBili  0.5  /  DBili  x   /  AST  52<H>  /  ALT  48<H>  /  AlkPhos  37<L>  11-01    PT/INR - ( 31 Oct 2019 05:10 )   PT: 16.2 SEC;   INR: 1.41          PTT - ( 01 Nov 2019 03:40 )  PTT:46.4 SEC    Arterial Blood Gas:  11-01 @ 03:40  7.43/40/124/26/98.5/2.0  ABG lactate: 1.5  Arterial Blood Gas:  11-01 @ 00:15  7.41/42/113/26/98.1/2.2  ABG lactate: 0.9  Arterial Blood Gas:  10-31 @ 18:45  7.42/40/90/26/96.8/1.8  ABG lactate: 1.5  Arterial Blood Gas:  10-31 @ 14:39  7.48/35/117/27/98.3/2.2  ABG lactate: 1.7  Arterial Blood Gas:  10-31 @ 10:46  7.48/35/101/26/98.6/2.0  ABG lactate: 2.1  Arterial Blood Gas:  10-31 @ 08:57  7.48/34/125/26/98.6/1.8  ABG lactate: 2.1  Arterial Blood Gas:  10-31 @ 05:15  7.46/35/118/25/98.3/0.7  ABG lactate: --  Arterial Blood Gas:  10-31 @ 01:10  7.49/32/127/26/98.6/0.8  ABG lactate: --  Arterial Blood Gas:  10-31 @ 00:15  7.41/42/113/26/98.1/2.2  ABG lactate: 0.9  Arterial Blood Gas:  10-30 @ 22:32  7.47/35/127/26/98.6/1.0  ABG lactate: 1.3  Arterial Blood Gas:  10-30 @ 16:09  7.43/36/119/25/98.2/-0.1  ABG lactate: 1.7  Arterial Blood Gas:  10-30 @ 15:41  7.49/30/119/25/98.5/-0.2  ABG lactate: 1.8  Arterial Blood Gas:  10-30 @ 14:47  7.48/31/100/25/97.8/-0.5  ABG lactate: 1.7  Arterial Blood Gas:  10-30 @ 14:17  7.49/30/84/24/96.9/-0.8  ABG lactate: 1.9  Arterial Blood Gas:  10-30 @ 13:26  7.31/49/67/23/90.9/-1.9  ABG lactate: 1.4  Arterial Blood Gas:  10-30 @ 09:30  7.42/36/90/24/96.8/-0.8  ABG lactate: 2.0        ======================MICROBIOLOGY===================================    Culture - Blood (collected 30 Oct 2019 22:14)  Source: BLOOD VENOUS  Preliminary Report (31 Oct 2019 22:15):    NO ORGANISMS ISOLATED    NO ORGANISMS ISOLATED AT 24 HOURS    Culture - Respiratory with Gram Stain (collected 30 Oct 2019 18:48)  Source: BRONCHIAL LAVAGE  Preliminary Report (31 Oct 2019 09:48):    NO GROWTH - PRELIMINARY RESULTS    Culture - Blood (collected 30 Oct 2019 13:56)  Source: BLOOD PERIPHERAL  Preliminary Report (31 Oct 2019 13:40):    ***Blood Panel PCR results on this specimen are available    approximately 3 hours after the Gram stain result***    Gram stain, PCR, and/or culture results may not always    correspond due to difference in methodologies    ------------------------------------------------------------    This PCR assay was performed using [employer].  The    following targets are tested for:  Enterococcus, vancomycin    resistant enterococci, Listeria monocytogenes,  coagulase    negative staphylococci, S. aureus, methicillin resistant S.    aureus, Streptococcus agalactiae (Group B), S. pneumoniae,    S. pyogenes (Group A), Acinetobacter baumannii, Enterobacter    cloacae, E. coli, Klebsiella oxytoca, K. pneumoniae, Proteus    sp., Serratia marcescens, Haemophilus influenzae, Neisseria    meningitidis, Pseudomonas aeruginosa, Candida albicans, C.    glabrata, C. krusei, C. parapsilosis, C. tropicalis and the    KPC resistance gene.    **NOTE: Due to technical problems, Proteus sp. will NOT be    reported as part of the BCID paneluntil further notice.  Organism: BLOOD CULTURE PCR (31 Oct 2019 13:40)  Organism: BLOOD CULTURE PCR (31 Oct 2019 13:40)    Culture - Blood (collected 29 Oct 2019 21:20)  Source: .Blood Blood  Preliminary Report (31 Oct 2019 10:00):    No growth to date.    Culture - Blood (collected 29 Oct 2019 21:20)  Source: .Blood Blood  Preliminary Report (31 Oct 2019 10:00):    No growth to date.        ======================RADIOLOGY======================================  [ ] Reviewed and interpreted by me    ======================Point of Care Ultrasound Findings=====================    =======================EKG============================================ CHIEF COMPLAINT: ARDS/VV-ECMO    Interval Events:  -Agitated overnight not responsive to IV opiods/versed  -Received IV push of Nimbex  -Received lasix overnight x2    ======================REVIEW OF SYSTEMS=======================  [] All other systems negative  [x ] Unable to assess ROS because sedation    OBJECTIVE:  =======================VITALS=================================  ICU Vital Signs Last 24 Hrs  T(C): 37.2 (01 Nov 2019 08:00), Max: 37.6 (01 Nov 2019 00:00)  T(F): 99 (01 Nov 2019 08:00), Max: 99.7 (01 Nov 2019 00:00)  HR: 82 (01 Nov 2019 08:00) (72 - 106)  BP: 102/60 (31 Oct 2019 16:00) (102/60 - 102/60)  BP(mean): 70 (31 Oct 2019 16:00) (70 - 70)  ABP: 113/53 (01 Nov 2019 08:00) (84/49 - 145/71)  ABP(mean): 69 (01 Nov 2019 08:00) (59 - 95)  RR: 12 (01 Nov 2019 08:00) (12 - 25)  SpO2: 100% (01 Nov 2019 08:00) (92% - 100%)    CAPILLARY BLOOD GLUCOSE      POCT Blood Glucose.: 117 mg/dL (01 Nov 2019 05:52)      10-31-19 @ 07:01  -  11-01-19 @ 07:00  --------------------------------------------------------  IN: 6272.6 mL / OUT: 3000 mL / NET: 3272.6 mL    11-01-19 @ 07:01  - 11-01-19 @ 08:56  --------------------------------------------------------  IN: 241.2 mL / OUT: 900 mL / NET: -658.8 mL      =======================VENT=================================  Mode: VDR4, RR (machine): 12, FiO2: 40, PEEP: 14, ITime: 1, MAP: 17, PC: 26, PIP: 26, Frequency: 600      ======================ECMO=================================  ECMO Day# 3    ECMO SETTINGS:  Type:		[ x] Venovenous		[ ] Venoarterial  Cannulation Site(s):   RIJ 20F return cannula  RFV 25F pull cannula    Flow: 3.4 	          P1:    120              Delta P: 28	P3: -42  RPM: 2700		  P2:     148             SVO2: 71.4    Oxygenator:	Sweep: 0.5    L/min	FiO2:   21%    ======================PHYSICAL EXAM==========================  GENERAL: Sedated, paralyzed  	HEAD:  Atraumatic, Normocephalic  	EYES: Pupils reactive to light  	ENT: + ET tube  	NECK: RIJ ECMO cannula, No JVD  	CHEST/LUNG: Decreased breath sounds; clear anteriorly  	HEART: Regular rate and rhythm; No murmurs, rubs, or gallops  	ABDOMEN: Hypoactive bowel sounds; Soft, Nontender, Nondistended  	EXTREMITIES:  1+ Peripheral Pulses, brisk capillary refill. No clubbing,or edema  	NERVOUS SYSTEM:  Sedated, paralyzed, limited exam  	SKIN: Intact  Lines: Right radial A-line, Left IJ TLC (10/30), RFV and RIJ ECMO cannula (10/30)    =======================HOSPITAL MEDICATIONS=======================  MEDICATIONS  (STANDING):  albuterol/ipratropium for Nebulization 3 milliLiter(s) Nebulizer every 6 hours  azithromycin  IVPB 500 milliGRAM(s) IV Intermittent every 24 hours  chlorhexidine 0.12% Liquid 15 milliLiter(s) Oral Mucosa every 12 hours  dextrose 5%. 1000 milliLiter(s) (50 mL/Hr) IV Continuous <Continuous>  fentaNYL   Infusion. 0.5 MICROgram(s)/kG/Hr (6.75 mL/Hr) IV Continuous <Continuous>  heparin  Infusion 1100 Unit(s)/Hr (20 mL/Hr) IV Continuous <Continuous>  insulin lispro (HumaLOG) corrective regimen sliding scale   SubCutaneous every 6 hours  levETIRAcetam  IVPB 750 milliGRAM(s) IV Intermittent every 12 hours  midazolam Infusion 0.02 mG/kG/Hr (2.7 mL/Hr) IV Continuous <Continuous>  norepinephrine Infusion 0.05 MICROgram(s)/kG/Min (6.328 mL/Hr) IV Continuous <Continuous>  petrolatum Ophthalmic Ointment 1 Application(s) Both EYES two times a day  piperacillin/tazobactam IVPB.. 4.5 Gram(s) IV Intermittent every 8 hours  polyethylene glycol 3350 17 Gram(s) Oral two times a day  propofol Infusion 10 MICROgram(s)/kG/Min (8.1 mL/Hr) IV Continuous <Continuous>  senna Syrup 15 milliLiter(s) Oral two times a day  vancomycin  IVPB 1500 milliGRAM(s) IV Intermittent every 8 hours  vancomycin  IVPB        MEDICATIONS  (PRN):      =======================LABS=======================================                        9.4    6.02  )-----------( 116      ( 01 Nov 2019 03:40 )             27.4     11-01    138  |  104  |  8   ----------------------------<  189<H>  3.5   |  22  |  0.78    Ca    8.2<L>      01 Nov 2019 03:40  Phos  2.3     11-01  Mg     2.0     11-01    TPro  5.9<L>  /  Alb  2.9<L>  /  TBili  0.5  /  DBili  x   /  AST  52<H>  /  ALT  48<H>  /  AlkPhos  37<L>  11-01    PT/INR - ( 31 Oct 2019 05:10 )   PT: 16.2 SEC;   INR: 1.41          PTT - ( 01 Nov 2019 03:40 )  PTT:46.4 SEC    Arterial Blood Gas:  11-01 @ 03:40  7.43/40/124/26/98.5/2.0  ABG lactate: 1.5  Arterial Blood Gas:  11-01 @ 00:15  7.41/42/113/26/98.1/2.2  ABG lactate: 0.9  Arterial Blood Gas:  10-31 @ 18:45  7.42/40/90/26/96.8/1.8  ABG lactate: 1.5  Arterial Blood Gas:  10-31 @ 14:39  7.48/35/117/27/98.3/2.2  ABG lactate: 1.7  Arterial Blood Gas:  10-31 @ 10:46  7.48/35/101/26/98.6/2.0  ABG lactate: 2.1  Arterial Blood Gas:  10-31 @ 08:57  7.48/34/125/26/98.6/1.8  ABG lactate: 2.1  Arterial Blood Gas:  10-31 @ 05:15  7.46/35/118/25/98.3/0.7  ABG lactate: --  Arterial Blood Gas:  10-31 @ 01:10  7.49/32/127/26/98.6/0.8  ABG lactate: --  Arterial Blood Gas:  10-31 @ 00:15  7.41/42/113/26/98.1/2.2  ABG lactate: 0.9  Arterial Blood Gas:  10-30 @ 22:32  7.47/35/127/26/98.6/1.0  ABG lactate: 1.3  Arterial Blood Gas:  10-30 @ 16:09  7.43/36/119/25/98.2/-0.1  ABG lactate: 1.7  Arterial Blood Gas:  10-30 @ 15:41  7.49/30/119/25/98.5/-0.2  ABG lactate: 1.8  Arterial Blood Gas:  10-30 @ 14:47  7.48/31/100/25/97.8/-0.5  ABG lactate: 1.7  Arterial Blood Gas:  10-30 @ 14:17  7.49/30/84/24/96.9/-0.8  ABG lactate: 1.9  Arterial Blood Gas:  10-30 @ 13:26  7.31/49/67/23/90.9/-1.9  ABG lactate: 1.4  Arterial Blood Gas:  10-30 @ 09:30  7.42/36/90/24/96.8/-0.8  ABG lactate: 2.0        ======================MICROBIOLOGY===================================    Culture - Blood (collected 30 Oct 2019 22:14)  Source: BLOOD VENOUS  Preliminary Report (31 Oct 2019 22:15):    NO ORGANISMS ISOLATED    NO ORGANISMS ISOLATED AT 24 HOURS    Culture - Respiratory with Gram Stain (collected 30 Oct 2019 18:48)  Source: BRONCHIAL LAVAGE  Preliminary Report (31 Oct 2019 09:48):    NO GROWTH - PRELIMINARY RESULTS    Culture - Blood (collected 30 Oct 2019 13:56)  Source: BLOOD PERIPHERAL  Preliminary Report (31 Oct 2019 13:40):    ***Blood Panel PCR results on this specimen are available    approximately 3 hours after the Gram stain result***    Gram stain, PCR, and/or culture results may not always    correspond due to difference in methodologies    ------------------------------------------------------------    This PCR assay was performed using [employer].  The    following targets are tested for:  Enterococcus, vancomycin    resistant enterococci, Listeria monocytogenes,  coagulase    negative staphylococci, S. aureus, methicillin resistant S.    aureus, Streptococcus agalactiae (Group B), S. pneumoniae,    S. pyogenes (Group A), Acinetobacter baumannii, Enterobacter    cloacae, E. coli, Klebsiella oxytoca, K. pneumoniae, Proteus    sp., Serratia marcescens, Haemophilus influenzae, Neisseria    meningitidis, Pseudomonas aeruginosa, Candida albicans, C.    glabrata, C. krusei, C. parapsilosis, C. tropicalis and the    KPC resistance gene.    **NOTE: Due to technical problems, Proteus sp. will NOT be    reported as part of the BCID paneluntil further notice.  Organism: BLOOD CULTURE PCR (31 Oct 2019 13:40)  Organism: BLOOD CULTURE PCR (31 Oct 2019 13:40)    Culture - Blood (collected 29 Oct 2019 21:20)  Source: .Blood Blood  Preliminary Report (31 Oct 2019 10:00):    No growth to date.    Culture - Blood (collected 29 Oct 2019 21:20)  Source: .Blood Blood  Preliminary Report (31 Oct 2019 10:00):    No growth to date.        ======================RADIOLOGY======================================  [ ] Reviewed and interpreted by me    ======================Point of Care Ultrasound Findings=====================    =======================EKG============================================

## 2019-11-01 NOTE — CHART NOTE - NSCHARTNOTEFT_GEN_A_CORE
: Jostin Alfonso MD, Tessy Corbin    INDICATION: ARDS, HFrEF, Pneumonia    PROCEDURE:  [x ] LIMITED ECHO  [x ] LIMITED CHEST  [ ] LIMITED RETROPERITONEAL  [ ] LIMITED ABDOMINAL  [ ] LIMITED DVT  [ ] NEEDLE GUIDANCE VASCULAR  [ ] NEEDLE GUIDANCE THORACENTESIS  [ ] NEEDLE GUIDANCE PARACENTESIS  [ ] NEEDLE GUIDANCE PERICARDIOCENTESIS  [ ] OTHER    FINDINGS:  ECHO: suboptimal windows. RV<LV, at least moderate LV systolic dysfunction (looks to be improved from prior), no pericardial effusion, IVC indeterminate with ECMO cannulae in place partially in RA  Chest: A-line predominant anteriorly. Bibasilar translobar consolidations with small pleural effusions      INTERPRETATION:  Severe LV systolic dysfunction, bilateral consolidations consistent with pneumonia     Images stored in [employer]    Jostin Alfonso MD, PGY5  Pulmonary and Critical Care Fellow  [id number]/[phone].

## 2019-11-01 NOTE — PROGRESS NOTE ADULT - ATTENDING COMMENTS
ARDS and Acute hypoxic respiratory failure due to aspiration pneumonia, septic shock due to aspiration pneumonia, on VVECMO for ARDS.  Now with Gram positive cocci bacteremia, due to coag negative staph.  He is improving overall from ARDS standpoint - able to come down to 0.5 sweep on ECMO, FIO2 of 40% on circuit, VDR requirements improving too based on ABGs.  Off pressors, waking up now that he is off paralytics.  Will slowly wean sedation.  Psych consulted, awaiting reccs.  His wife spoke to his psychiatrist yesterday; they went through his meds and noticed that he has a bottle of Klonopin at home, but that he had more pills in the bottle than he should have, if he was taking it as prescribed.  He also had a utox that was negative for benzos on admission at [place].  This leads them and us to believe that he stopped taking it, and perhaps he had a withdrawal seizure at , and may have aspirated from that.  Currently on keppra, will f/u neurology and psychiatry reccs.    Wean sedation as tolerated.  Will continue to wean ecmo, possibly decannulate in next 1-2 days.  C/w broad abx for now (f/u urine legionella).  Check vanco level, f/u speciation from blood cultures, f/u bronch cultures.  Troponin were elevated but have since come down, no EKG changes. f/u formal TTE as POCUS shows reduced LV function still, RV smaller than LV.  Will use diuretics to keep net even. ARDS and Acute hypoxic respiratory failure due to aspiration pneumonia, septic shock due to aspiration pneumonia, on VVECMO for ARDS. He is improving overall from ARDS standpoint - able to come down to 0.5 sweep on ECMO, FIO2 of 40% on circuit, VDR requirements improving too based on ABGs with plan for decannulation today.  Off pressors, slowly waking up and remains off paralytics.  Will slowly wean sedation.  Psych consulted, awaiting reccs.  His wife spoke to his psychiatrist previously and after going through his meds and noticed that he has a bottle of Klonopin at home, but that he had more pills in the bottle than he should have, if he was taking it as prescribed.  He also had a utox that was negative for benzos on admission at [place].  This raises the possibility that he stopped taking it, and perhaps he had a withdrawal seizure at , and may have aspirated from that.  Currently on keppra, will f/u neurology and psychiatry recs.    Wean sedation as tolerated.  Plan to decannulate today.  C/w broad abx for now (f/u urine legionella).  Check vanco level, f/u speciation from blood cultures, f/u bronch cultures.  Troponin were elevated but have since come down, no EKG changes. f/u formal TTE as POCUS shows reduced LV function still, RV smaller than LV.  Will continue diuretics with goal net negative.

## 2019-11-01 NOTE — CONSULT NOTE ADULT - ASSESSMENT
54M with severe MDD with prior psych admissions here for VV-ECMO, neurology consulted for GTC in context of benzo withdrawal, EEG with mild diffuse dysfunction. Given single GTC with known provoking factors, in my judgment no indication for AEDs at this time.     Recs:  Will defer to attending in AM as per stopping AEDs 54M with severe MDD with prior psych admissions here for VV-ECMO, neurology consulted for GTCx1 in context of benzo withdrawal, EEG with mild diffuse dysfunction. No prior hx of seizure as per wife. Given single GTC with known provoking factors, in my judgment no indication for AEDs at this time.     Recs:  Will defer to attending in AM as per stopping AEDs 54M with severe MDD with prior psych admissions here for VV-ECMO, neurology consulted for GTCx1 in context of benzo withdrawal, EEG with mild diffuse dysfunction. No prior hx of seizure as per wife. Given single GTC with known provoking factors, in my judgment no indication for AEDs at this time.

## 2019-11-02 LAB
ALBUMIN SERPL ELPH-MCNC: 2.9 G/DL — LOW (ref 3.3–5)
ALBUMIN SERPL ELPH-MCNC: 3 G/DL — LOW (ref 3.3–5)
ALP SERPL-CCNC: 47 U/L — SIGNIFICANT CHANGE UP (ref 40–120)
ALP SERPL-CCNC: 47 U/L — SIGNIFICANT CHANGE UP (ref 40–120)
ALT FLD-CCNC: 48 U/L — HIGH (ref 4–41)
ALT FLD-CCNC: 48 U/L — HIGH (ref 4–41)
ANION GAP SERPL CALC-SCNC: 10 MMO/L — SIGNIFICANT CHANGE UP (ref 7–14)
ANION GAP SERPL CALC-SCNC: 11 MMO/L — SIGNIFICANT CHANGE UP (ref 7–14)
APTT BLD: 61 SEC — HIGH (ref 27.5–36.3)
APTT BLD: 64.1 SEC — HIGH (ref 27.5–36.3)
APTT BLD: 67.3 SEC — HIGH (ref 27.5–36.3)
AST SERPL-CCNC: 49 U/L — HIGH (ref 4–40)
AST SERPL-CCNC: 50 U/L — HIGH (ref 4–40)
BASE EXCESS BLDA CALC-SCNC: 2.1 MMOL/L — SIGNIFICANT CHANGE UP
BASE EXCESS BLDA CALC-SCNC: 2.1 MMOL/L — SIGNIFICANT CHANGE UP
BASE EXCESS BLDA CALC-SCNC: 2.8 MMOL/L — SIGNIFICANT CHANGE UP
BASE EXCESS BLDA CALC-SCNC: 3 MMOL/L — SIGNIFICANT CHANGE UP
BASE EXCESS BLDA CALC-SCNC: 3.5 MMOL/L — SIGNIFICANT CHANGE UP
BASE EXCESS BLDA CALC-SCNC: 3.9 MMOL/L — SIGNIFICANT CHANGE UP
BASE EXCESS BLDA CALC-SCNC: 4.9 MMOL/L — SIGNIFICANT CHANGE UP
BASOPHILS # BLD AUTO: 0.03 K/UL — SIGNIFICANT CHANGE UP (ref 0–0.2)
BASOPHILS # BLD AUTO: 0.04 K/UL — SIGNIFICANT CHANGE UP (ref 0–0.2)
BASOPHILS NFR BLD AUTO: 0.5 % — SIGNIFICANT CHANGE UP (ref 0–2)
BASOPHILS NFR BLD AUTO: 0.7 % — SIGNIFICANT CHANGE UP (ref 0–2)
BILIRUB SERPL-MCNC: 0.5 MG/DL — SIGNIFICANT CHANGE UP (ref 0.2–1.2)
BILIRUB SERPL-MCNC: 0.7 MG/DL — SIGNIFICANT CHANGE UP (ref 0.2–1.2)
BLOOD GAS ARTERIAL - FIO2: 100 — SIGNIFICANT CHANGE UP
BLOOD GAS ARTERIAL - FIO2: 40 — SIGNIFICANT CHANGE UP
BLOOD GAS ARTERIAL - FIO2: 40 — SIGNIFICANT CHANGE UP
BUN SERPL-MCNC: 6 MG/DL — LOW (ref 7–23)
BUN SERPL-MCNC: 7 MG/DL — SIGNIFICANT CHANGE UP (ref 7–23)
CA-I BLDA-SCNC: 1.17 MMOL/L — SIGNIFICANT CHANGE UP (ref 1.15–1.29)
CA-I BLDA-SCNC: 1.19 MMOL/L — SIGNIFICANT CHANGE UP (ref 1.15–1.29)
CA-I BLDA-SCNC: 1.2 MMOL/L — SIGNIFICANT CHANGE UP (ref 1.15–1.29)
CA-I BLDA-SCNC: 1.22 MMOL/L — SIGNIFICANT CHANGE UP (ref 1.15–1.29)
CA-I BLDA-SCNC: 1.22 MMOL/L — SIGNIFICANT CHANGE UP (ref 1.15–1.29)
CA-I BLDA-SCNC: 1.26 MMOL/L — SIGNIFICANT CHANGE UP (ref 1.15–1.29)
CALCIUM SERPL-MCNC: 8.6 MG/DL — SIGNIFICANT CHANGE UP (ref 8.4–10.5)
CALCIUM SERPL-MCNC: 8.7 MG/DL — SIGNIFICANT CHANGE UP (ref 8.4–10.5)
CHLORIDE BLDA-SCNC: 108 MMOL/L — SIGNIFICANT CHANGE UP (ref 96–108)
CHLORIDE SERPL-SCNC: 104 MMOL/L — SIGNIFICANT CHANGE UP (ref 98–107)
CHLORIDE SERPL-SCNC: 105 MMOL/L — SIGNIFICANT CHANGE UP (ref 98–107)
CO2 SERPL-SCNC: 24 MMOL/L — SIGNIFICANT CHANGE UP (ref 22–31)
CO2 SERPL-SCNC: 26 MMOL/L — SIGNIFICANT CHANGE UP (ref 22–31)
CREAT SERPL-MCNC: 0.64 MG/DL — SIGNIFICANT CHANGE UP (ref 0.5–1.3)
CREAT SERPL-MCNC: 0.72 MG/DL — SIGNIFICANT CHANGE UP (ref 0.5–1.3)
CULTURE RESULTS: SIGNIFICANT CHANGE UP
CULTURE RESULTS: SIGNIFICANT CHANGE UP
EOSINOPHIL # BLD AUTO: 0.13 K/UL — SIGNIFICANT CHANGE UP (ref 0–0.5)
EOSINOPHIL # BLD AUTO: 0.14 K/UL — SIGNIFICANT CHANGE UP (ref 0–0.5)
EOSINOPHIL NFR BLD AUTO: 2.4 % — SIGNIFICANT CHANGE UP (ref 0–6)
EOSINOPHIL NFR BLD AUTO: 2.5 % — SIGNIFICANT CHANGE UP (ref 0–6)
GLUCOSE BLDA-MCNC: 130 MG/DL — HIGH (ref 70–99)
GLUCOSE BLDA-MCNC: 133 MG/DL — HIGH (ref 70–99)
GLUCOSE BLDA-MCNC: 141 MG/DL — HIGH (ref 70–99)
GLUCOSE BLDA-MCNC: 143 MG/DL — HIGH (ref 70–99)
GLUCOSE BLDA-MCNC: 147 MG/DL — HIGH (ref 70–99)
GLUCOSE BLDA-MCNC: 153 MG/DL — HIGH (ref 70–99)
GLUCOSE BLDA-MCNC: 171 MG/DL — HIGH (ref 70–99)
GLUCOSE BLDC GLUCOMTR-MCNC: 115 MG/DL — HIGH (ref 70–99)
GLUCOSE BLDC GLUCOMTR-MCNC: 131 MG/DL — HIGH (ref 70–99)
GLUCOSE BLDC GLUCOMTR-MCNC: 133 MG/DL — HIGH (ref 70–99)
GLUCOSE BLDC GLUCOMTR-MCNC: 142 MG/DL — HIGH (ref 70–99)
GLUCOSE BLDC GLUCOMTR-MCNC: 150 MG/DL — HIGH (ref 70–99)
GLUCOSE SERPL-MCNC: 135 MG/DL — HIGH (ref 70–99)
GLUCOSE SERPL-MCNC: 153 MG/DL — HIGH (ref 70–99)
HCO3 BLDA-SCNC: 24 MMOL/L — SIGNIFICANT CHANGE UP (ref 22–26)
HCO3 BLDA-SCNC: 26 MMOL/L — SIGNIFICANT CHANGE UP (ref 22–26)
HCO3 BLDA-SCNC: 27 MMOL/L — HIGH (ref 22–26)
HCO3 BLDA-SCNC: 28 MMOL/L — HIGH (ref 22–26)
HCO3 BLDA-SCNC: 28 MMOL/L — HIGH (ref 22–26)
HCT VFR BLD CALC: 27.1 % — LOW (ref 39–50)
HCT VFR BLD CALC: 30.5 % — LOW (ref 39–50)
HCT VFR BLDA CALC: 26 % — LOW (ref 39–51)
HCT VFR BLDA CALC: 27.1 % — LOW (ref 39–51)
HCT VFR BLDA CALC: 28 % — LOW (ref 39–51)
HCT VFR BLDA CALC: 29.1 % — LOW (ref 39–51)
HCT VFR BLDA CALC: 29.1 % — LOW (ref 39–51)
HCT VFR BLDA CALC: 30.9 % — LOW (ref 39–51)
HCT VFR BLDA CALC: 33.6 % — LOW (ref 39–51)
HGB BLD-MCNC: 8.7 G/DL — LOW (ref 13–17)
HGB BLD-MCNC: 9.5 G/DL — LOW (ref 13–17)
HGB BLDA-MCNC: 10 G/DL — LOW (ref 13–17)
HGB BLDA-MCNC: 11 G/DL — LOW (ref 13–17)
HGB BLDA-MCNC: 8.4 G/DL — LOW (ref 13–17)
HGB BLDA-MCNC: 8.7 G/DL — LOW (ref 13–17)
HGB BLDA-MCNC: 9 G/DL — LOW (ref 13–17)
HGB BLDA-MCNC: 9.4 G/DL — LOW (ref 13–17)
HGB BLDA-MCNC: 9.5 G/DL — LOW (ref 13–17)
IMM GRANULOCYTES NFR BLD AUTO: 4.9 % — HIGH (ref 0–1.5)
IMM GRANULOCYTES NFR BLD AUTO: 5.9 % — HIGH (ref 0–1.5)
INR BLD: 1.31 — HIGH (ref 0.88–1.17)
LACTATE BLDA-SCNC: 0.6 MMOL/L — SIGNIFICANT CHANGE UP (ref 0.5–2)
LACTATE BLDA-SCNC: 0.8 MMOL/L — SIGNIFICANT CHANGE UP (ref 0.5–2)
LACTATE BLDA-SCNC: 0.8 MMOL/L — SIGNIFICANT CHANGE UP (ref 0.5–2)
LACTATE BLDA-SCNC: 0.9 MMOL/L — SIGNIFICANT CHANGE UP (ref 0.5–2)
LACTATE BLDA-SCNC: 1.1 MMOL/L — SIGNIFICANT CHANGE UP (ref 0.5–2)
LACTATE BLDA-SCNC: 1.2 MMOL/L — SIGNIFICANT CHANGE UP (ref 0.5–2)
LACTATE BLDA-SCNC: 1.2 MMOL/L — SIGNIFICANT CHANGE UP (ref 0.5–2)
LYMPHOCYTES # BLD AUTO: 0.7 K/UL — LOW (ref 1–3.3)
LYMPHOCYTES # BLD AUTO: 0.72 K/UL — LOW (ref 1–3.3)
LYMPHOCYTES # BLD AUTO: 12.8 % — LOW (ref 13–44)
LYMPHOCYTES # BLD AUTO: 12.9 % — LOW (ref 13–44)
MAGNESIUM SERPL-MCNC: 2.2 MG/DL — SIGNIFICANT CHANGE UP (ref 1.6–2.6)
MAGNESIUM SERPL-MCNC: 2.2 MG/DL — SIGNIFICANT CHANGE UP (ref 1.6–2.6)
MCHC RBC-ENTMCNC: 30.6 PG — SIGNIFICANT CHANGE UP (ref 27–34)
MCHC RBC-ENTMCNC: 31.1 % — LOW (ref 32–36)
MCHC RBC-ENTMCNC: 31.2 PG — SIGNIFICANT CHANGE UP (ref 27–34)
MCHC RBC-ENTMCNC: 32.1 % — SIGNIFICANT CHANGE UP (ref 32–36)
MCV RBC AUTO: 97.1 FL — SIGNIFICANT CHANGE UP (ref 80–100)
MCV RBC AUTO: 98.4 FL — SIGNIFICANT CHANGE UP (ref 80–100)
MONOCYTES # BLD AUTO: 0.48 K/UL — SIGNIFICANT CHANGE UP (ref 0–0.9)
MONOCYTES # BLD AUTO: 0.5 K/UL — SIGNIFICANT CHANGE UP (ref 0–0.9)
MONOCYTES NFR BLD AUTO: 8.7 % — SIGNIFICANT CHANGE UP (ref 2–14)
MONOCYTES NFR BLD AUTO: 9 % — SIGNIFICANT CHANGE UP (ref 2–14)
NEUTROPHILS # BLD AUTO: 3.85 K/UL — SIGNIFICANT CHANGE UP (ref 1.8–7.4)
NEUTROPHILS # BLD AUTO: 3.88 K/UL — SIGNIFICANT CHANGE UP (ref 1.8–7.4)
NEUTROPHILS NFR BLD AUTO: 69 % — SIGNIFICANT CHANGE UP (ref 43–77)
NEUTROPHILS NFR BLD AUTO: 70.7 % — SIGNIFICANT CHANGE UP (ref 43–77)
NRBC # FLD: 0 K/UL — SIGNIFICANT CHANGE UP (ref 0–0)
NRBC # FLD: 0.03 K/UL — SIGNIFICANT CHANGE UP (ref 0–0)
PCO2 BLDA: 37 MMHG — SIGNIFICANT CHANGE UP (ref 35–48)
PCO2 BLDA: 40 MMHG — SIGNIFICANT CHANGE UP (ref 35–48)
PCO2 BLDA: 46 MMHG — SIGNIFICANT CHANGE UP (ref 35–48)
PCO2 BLDA: 48 MMHG — SIGNIFICANT CHANGE UP (ref 35–48)
PCO2 BLDA: 51 MMHG — HIGH (ref 35–48)
PCO2 BLDA: 53 MMHG — HIGH (ref 35–48)
PCO2 BLDA: 81 MMHG — CRITICAL HIGH (ref 35–48)
PH BLDA: 7.18 PH — CRITICAL LOW (ref 7.35–7.45)
PH BLDA: 7.35 PH — SIGNIFICANT CHANGE UP (ref 7.35–7.45)
PH BLDA: 7.37 PH — SIGNIFICANT CHANGE UP (ref 7.35–7.45)
PH BLDA: 7.38 PH — SIGNIFICANT CHANGE UP (ref 7.35–7.45)
PH BLDA: 7.4 PH — SIGNIFICANT CHANGE UP (ref 7.35–7.45)
PH BLDA: 7.46 PH — HIGH (ref 7.35–7.45)
PH BLDA: 7.47 PH — HIGH (ref 7.35–7.45)
PHOSPHATE SERPL-MCNC: 3.5 MG/DL — SIGNIFICANT CHANGE UP (ref 2.5–4.5)
PHOSPHATE SERPL-MCNC: 4 MG/DL — SIGNIFICANT CHANGE UP (ref 2.5–4.5)
PLATELET # BLD AUTO: 117 K/UL — LOW (ref 150–400)
PLATELET # BLD AUTO: 129 K/UL — LOW (ref 150–400)
PMV BLD: 10.3 FL — SIGNIFICANT CHANGE UP (ref 7–13)
PMV BLD: 9.6 FL — SIGNIFICANT CHANGE UP (ref 7–13)
PO2 BLDA: 50 MMHG — CRITICAL LOW (ref 83–108)
PO2 BLDA: 68 MMHG — LOW (ref 83–108)
PO2 BLDA: 74 MMHG — LOW (ref 83–108)
PO2 BLDA: 78 MMHG — LOW (ref 83–108)
PO2 BLDA: 86 MMHG — SIGNIFICANT CHANGE UP (ref 83–108)
PO2 BLDA: 94 MMHG — SIGNIFICANT CHANGE UP (ref 83–108)
PO2 BLDA: 98 MMHG — SIGNIFICANT CHANGE UP (ref 83–108)
POTASSIUM BLDA-SCNC: 3.3 MMOL/L — LOW (ref 3.4–4.5)
POTASSIUM BLDA-SCNC: 3.7 MMOL/L — SIGNIFICANT CHANGE UP (ref 3.4–4.5)
POTASSIUM BLDA-SCNC: 3.7 MMOL/L — SIGNIFICANT CHANGE UP (ref 3.4–4.5)
POTASSIUM BLDA-SCNC: 3.8 MMOL/L — SIGNIFICANT CHANGE UP (ref 3.4–4.5)
POTASSIUM BLDA-SCNC: 3.8 MMOL/L — SIGNIFICANT CHANGE UP (ref 3.4–4.5)
POTASSIUM BLDA-SCNC: 4 MMOL/L — SIGNIFICANT CHANGE UP (ref 3.4–4.5)
POTASSIUM BLDA-SCNC: 4.2 MMOL/L — SIGNIFICANT CHANGE UP (ref 3.4–4.5)
POTASSIUM SERPL-MCNC: 3.8 MMOL/L — SIGNIFICANT CHANGE UP (ref 3.5–5.3)
POTASSIUM SERPL-MCNC: 3.9 MMOL/L — SIGNIFICANT CHANGE UP (ref 3.5–5.3)
POTASSIUM SERPL-SCNC: 3.8 MMOL/L — SIGNIFICANT CHANGE UP (ref 3.5–5.3)
POTASSIUM SERPL-SCNC: 3.9 MMOL/L — SIGNIFICANT CHANGE UP (ref 3.5–5.3)
PROT SERPL-MCNC: 5.9 G/DL — LOW (ref 6–8.3)
PROT SERPL-MCNC: 6.1 G/DL — SIGNIFICANT CHANGE UP (ref 6–8.3)
PROTHROM AB SERPL-ACNC: 14.7 SEC — HIGH (ref 9.8–13.1)
RBC # BLD: 2.79 M/UL — LOW (ref 4.2–5.8)
RBC # BLD: 3.1 M/UL — LOW (ref 4.2–5.8)
RBC # FLD: 13.4 % — SIGNIFICANT CHANGE UP (ref 10.3–14.5)
RBC # FLD: 13.6 % — SIGNIFICANT CHANGE UP (ref 10.3–14.5)
SAO2 % BLDA: 71.9 % — LOW (ref 95–99)
SAO2 % BLDA: 93.7 % — LOW (ref 95–99)
SAO2 % BLDA: 94.7 % — LOW (ref 95–99)
SAO2 % BLDA: 95 % — SIGNIFICANT CHANGE UP (ref 95–99)
SAO2 % BLDA: 95.5 % — SIGNIFICANT CHANGE UP (ref 95–99)
SAO2 % BLDA: 96.8 % — SIGNIFICANT CHANGE UP (ref 95–99)
SAO2 % BLDA: 97.4 % — SIGNIFICANT CHANGE UP (ref 95–99)
SODIUM BLDA-SCNC: 137 MMOL/L — SIGNIFICANT CHANGE UP (ref 136–146)
SODIUM BLDA-SCNC: 138 MMOL/L — SIGNIFICANT CHANGE UP (ref 136–146)
SODIUM BLDA-SCNC: 138 MMOL/L — SIGNIFICANT CHANGE UP (ref 136–146)
SODIUM BLDA-SCNC: 139 MMOL/L — SIGNIFICANT CHANGE UP (ref 136–146)
SODIUM BLDA-SCNC: 139 MMOL/L — SIGNIFICANT CHANGE UP (ref 136–146)
SODIUM BLDA-SCNC: 142 MMOL/L — SIGNIFICANT CHANGE UP (ref 136–146)
SODIUM BLDA-SCNC: 143 MMOL/L — SIGNIFICANT CHANGE UP (ref 136–146)
SODIUM SERPL-SCNC: 139 MMOL/L — SIGNIFICANT CHANGE UP (ref 135–145)
SODIUM SERPL-SCNC: 141 MMOL/L — SIGNIFICANT CHANGE UP (ref 135–145)
SPECIMEN SOURCE: SIGNIFICANT CHANGE UP
SPECIMEN SOURCE: SIGNIFICANT CHANGE UP
VANCOMYCIN TROUGH SERPL-MCNC: 14.1 UG/ML — SIGNIFICANT CHANGE UP (ref 10–20)
WBC # BLD: 5.49 K/UL — SIGNIFICANT CHANGE UP (ref 3.8–10.5)
WBC # BLD: 5.58 K/UL — SIGNIFICANT CHANGE UP (ref 3.8–10.5)
WBC # FLD AUTO: 5.49 K/UL — SIGNIFICANT CHANGE UP (ref 3.8–10.5)
WBC # FLD AUTO: 5.58 K/UL — SIGNIFICANT CHANGE UP (ref 3.8–10.5)

## 2019-11-02 PROCEDURE — 99291 CRITICAL CARE FIRST HOUR: CPT

## 2019-11-02 PROCEDURE — 71045 X-RAY EXAM CHEST 1 VIEW: CPT | Mod: 26

## 2019-11-02 RX ORDER — FENTANYL CITRATE 50 UG/ML
200 INJECTION INTRAVENOUS ONCE
Refills: 0 | Status: DISCONTINUED | OUTPATIENT
Start: 2019-11-02 | End: 2019-11-01

## 2019-11-02 RX ORDER — HEPARIN SODIUM 5000 [USP'U]/ML
INJECTION INTRAVENOUS; SUBCUTANEOUS
Qty: 25000 | Refills: 0 | Status: DISCONTINUED | OUTPATIENT
Start: 2019-11-02 | End: 2019-11-04

## 2019-11-02 RX ORDER — MIDAZOLAM HYDROCHLORIDE 1 MG/ML
4 INJECTION, SOLUTION INTRAMUSCULAR; INTRAVENOUS ONCE
Refills: 0 | Status: DISCONTINUED | OUTPATIENT
Start: 2019-11-02 | End: 2019-11-01

## 2019-11-02 RX ORDER — CISATRACURIUM BESYLATE 2 MG/ML
20 INJECTION INTRAVENOUS ONCE
Refills: 0 | Status: COMPLETED | OUTPATIENT
Start: 2019-11-02 | End: 2019-11-01

## 2019-11-02 RX ORDER — CISATRACURIUM BESYLATE 2 MG/ML
2 INJECTION INTRAVENOUS
Qty: 200 | Refills: 0 | Status: DISCONTINUED | OUTPATIENT
Start: 2019-11-02 | End: 2019-11-04

## 2019-11-02 RX ORDER — LEVETIRACETAM 250 MG/1
500 TABLET, FILM COATED ORAL EVERY 12 HOURS
Refills: 0 | Status: DISCONTINUED | OUTPATIENT
Start: 2019-11-02 | End: 2019-11-04

## 2019-11-02 RX ORDER — HYDROMORPHONE HYDROCHLORIDE 2 MG/ML
2 INJECTION INTRAMUSCULAR; INTRAVENOUS; SUBCUTANEOUS ONCE
Refills: 0 | Status: DISCONTINUED | OUTPATIENT
Start: 2019-11-02 | End: 2019-11-01

## 2019-11-02 RX ORDER — CHLORHEXIDINE GLUCONATE 213 G/1000ML
15 SOLUTION TOPICAL EVERY 12 HOURS
Refills: 0 | Status: DISCONTINUED | OUTPATIENT
Start: 2019-11-02 | End: 2019-11-04

## 2019-11-02 RX ORDER — CISATRACURIUM BESYLATE 2 MG/ML
20 INJECTION INTRAVENOUS ONCE
Refills: 0 | Status: COMPLETED | OUTPATIENT
Start: 2019-11-02 | End: 2019-11-02

## 2019-11-02 RX ADMIN — Medication 300 MILLIGRAM(S): at 02:06

## 2019-11-02 RX ADMIN — CISATRACURIUM BESYLATE 16.2 MICROGRAM(S)/KG/MIN: 2 INJECTION INTRAVENOUS at 20:02

## 2019-11-02 RX ADMIN — Medication 300 MILLIGRAM(S): at 18:00

## 2019-11-02 RX ADMIN — CISATRACURIUM BESYLATE 16.2 MICROGRAM(S)/KG/MIN: 2 INJECTION INTRAVENOUS at 01:31

## 2019-11-02 RX ADMIN — FENTANYL CITRATE 6.75 MICROGRAM(S)/KG/HR: 50 INJECTION INTRAVENOUS at 20:01

## 2019-11-02 RX ADMIN — Medication 3 MILLILITER(S): at 15:33

## 2019-11-02 RX ADMIN — Medication 300 MILLIGRAM(S): at 08:31

## 2019-11-02 RX ADMIN — Medication 3 MILLILITER(S): at 10:07

## 2019-11-02 RX ADMIN — FENTANYL CITRATE 6.75 MICROGRAM(S)/KG/HR: 50 INJECTION INTRAVENOUS at 07:39

## 2019-11-02 RX ADMIN — CHLORHEXIDINE GLUCONATE 15 MILLILITER(S): 213 SOLUTION TOPICAL at 17:52

## 2019-11-02 RX ADMIN — MIDAZOLAM HYDROCHLORIDE 2.7 MG/KG/HR: 1 INJECTION, SOLUTION INTRAMUSCULAR; INTRAVENOUS at 20:03

## 2019-11-02 RX ADMIN — CHLORHEXIDINE GLUCONATE 15 MILLILITER(S): 213 SOLUTION TOPICAL at 05:50

## 2019-11-02 RX ADMIN — Medication 2 PACKET(S): at 05:50

## 2019-11-02 RX ADMIN — SENNA PLUS 15 MILLILITER(S): 8.6 TABLET ORAL at 17:31

## 2019-11-02 RX ADMIN — Medication 2 MILLIGRAM(S): at 00:58

## 2019-11-02 RX ADMIN — PROPOFOL 8.1 MICROGRAM(S)/KG/MIN: 10 INJECTION, EMULSION INTRAVENOUS at 20:02

## 2019-11-02 RX ADMIN — LEVETIRACETAM 400 MILLIGRAM(S): 250 TABLET, FILM COATED ORAL at 17:29

## 2019-11-02 RX ADMIN — PIPERACILLIN AND TAZOBACTAM 25 GRAM(S): 4; .5 INJECTION, POWDER, LYOPHILIZED, FOR SOLUTION INTRAVENOUS at 23:29

## 2019-11-02 RX ADMIN — PROPOFOL 8.1 MICROGRAM(S)/KG/MIN: 10 INJECTION, EMULSION INTRAVENOUS at 07:38

## 2019-11-02 RX ADMIN — PIPERACILLIN AND TAZOBACTAM 25 GRAM(S): 4; .5 INJECTION, POWDER, LYOPHILIZED, FOR SOLUTION INTRAVENOUS at 16:00

## 2019-11-02 RX ADMIN — Medication 1 APPLICATION(S): at 05:50

## 2019-11-02 RX ADMIN — CISATRACURIUM BESYLATE 20 MILLIGRAM(S): 2 INJECTION INTRAVENOUS at 00:30

## 2019-11-02 RX ADMIN — LEVETIRACETAM 400 MILLIGRAM(S): 250 TABLET, FILM COATED ORAL at 05:49

## 2019-11-02 RX ADMIN — PIPERACILLIN AND TAZOBACTAM 25 GRAM(S): 4; .5 INJECTION, POWDER, LYOPHILIZED, FOR SOLUTION INTRAVENOUS at 01:37

## 2019-11-02 RX ADMIN — Medication 1 APPLICATION(S): at 17:31

## 2019-11-02 RX ADMIN — MIDAZOLAM HYDROCHLORIDE 2.7 MG/KG/HR: 1 INJECTION, SOLUTION INTRAMUSCULAR; INTRAVENOUS at 07:38

## 2019-11-02 RX ADMIN — PIPERACILLIN AND TAZOBACTAM 25 GRAM(S): 4; .5 INJECTION, POWDER, LYOPHILIZED, FOR SOLUTION INTRAVENOUS at 07:40

## 2019-11-02 RX ADMIN — POLYETHYLENE GLYCOL 3350 17 GRAM(S): 17 POWDER, FOR SOLUTION ORAL at 17:31

## 2019-11-02 RX ADMIN — CISATRACURIUM BESYLATE 16.2 MICROGRAM(S)/KG/MIN: 2 INJECTION INTRAVENOUS at 07:39

## 2019-11-02 RX ADMIN — Medication 3 MILLILITER(S): at 03:35

## 2019-11-02 NOTE — PROGRESS NOTE ADULT - SUBJECTIVE AND OBJECTIVE BOX
PATIENT: EBONI CARRASCO   AGE: Mode: VDR-4, RR (machine): 12, FiO2: 30, ITime: 1.5, MAP: 15, PC: 12, PIP: 24, Frequency: 600o     CHIEF COMPLAINT:  Patient is a 54y old  Male who presents with a chief complaint of ARDS (01 Nov 2019 08:03)      OVERNIGHT EVENTS:    Desaturated. Lavages. Desynchronizes with the vent. Placed on paralytics. Did not get bronched due to improvement of O2. ABG- showed improvement.       SUBJECTIVE: Patient seen and examined at bedside.     REVIEW OF SYSTEM:  CONSTITUTIONAL: No weakness, fevers or chills  EYES/ENT: No visual changes;  No vertigo or throat pain   NECK: No pain or stiffness  RESPIRATORY: No cough, wheezing, hemoptysis; No shortness of breath  CARDIOVASCULAR: No chest pain or palpitations  GASTROINTESTINAL: No abdominal or epigastric pain. No nausea, vomiting, or hematemesis; No diarrhea or constipation. No melena or hematochezia.  GENITOURINARY: No dysuria, frequency or hematuria  NEUROLOGICAL: No numbness or weakness  SKIN: No itching, rashes    OBJECTIVE:    VITAL SIGNS:  ICU Vital Signs Last 24 Hrs  T(C): 36.1 (02 Nov 2019 04:00), Max: 37.3 (01 Nov 2019 20:00)  T(F): 97 (02 Nov 2019 04:00), Max: 99.2 (01 Nov 2019 20:00)  HR: 91 (02 Nov 2019 07:00) (72 - 108)  BP: --  BP(mean): --  ABP: 107/49 (02 Nov 2019 07:00) (107/49 - 146/77)  ABP(mean): 68 (02 Nov 2019 07:00) (66 - 96)  RR: 12 (02 Nov 2019 07:00) (12 - 21)  SpO2: 99% (02 Nov 2019 07:00) (83% - 100%)    Mode: VDR-4, RR (machine): 12, FiO2: 30, ITime: 1.5, MAP: 15, PC: 12, PIP: 24, Frequency: 600    11-01 @ 07:01  -  11-02 @ 07:00  --------------------------------------------------------  IN: 5178.5 mL / OUT: 6225 mL / NET: -1046.5 mL    11-02 @ 08:01 - 11-02 @ 07:58  --------------------------------------------------------  IN: 240.1 mL / OUT: 125 mL / NET: 115.1 mL      CAPILLARY BLOOD GLUCOSE      POCT Blood Glucose.: 142 mg/dL (02 Nov 2019 06:09)      I/O SUMMARY:    11-01-19 @ 07:01  -  11-02-19 @ 07:00  --------------------------------------------------------  IN: 5178.5 mL / OUT: 6225 mL / NET: -1046.5 mL    11-02-19 @ 08:01  -  11-02-19 @ 07:58  --------------------------------------------------------  IN: 240.1 mL / OUT: 125 mL / NET: 115.1 mL      PHYSICAL EXAM:    General: NAD  HEENT: NC/AT; PERRL, clear conjunctiva  Neck: supple  Respiratory: CTA b/l  Cardiovascular: +S1/S2; RRR  Abdomen: soft, NT/ND; +BS x4  Extremities: 2+ peripheral pulses b/l; no LE edema  Skin: normal color and turgor; no rash  Neurological:    MEDICATIONS:  MEDICATIONS  (STANDING):  albuterol/ipratropium for Nebulization 3 milliLiter(s) Nebulizer every 6 hours  azithromycin  IVPB 500 milliGRAM(s) IV Intermittent every 24 hours  bisacodyl Suppository 10 milliGRAM(s) Rectal once  chlorhexidine 0.12% Liquid 15 milliLiter(s) Oral Mucosa every 12 hours  cisatracurium Infusion 2 MICROgram(s)/kG/Min (16.2 mL/Hr) IV Continuous <Continuous>  dextrose 5%. 1000 milliLiter(s) (50 mL/Hr) IV Continuous <Continuous>  fentaNYL   Infusion. 0.5 MICROgram(s)/kG/Hr (6.75 mL/Hr) IV Continuous <Continuous>  heparin  Infusion 1100 Unit(s)/Hr (20 mL/Hr) IV Continuous <Continuous>  insulin lispro (HumaLOG) corrective regimen sliding scale   SubCutaneous every 6 hours  levETIRAcetam  IVPB 750 milliGRAM(s) IV Intermittent every 12 hours  midazolam Infusion 0.02 mG/kG/Hr (2.7 mL/Hr) IV Continuous <Continuous>  norepinephrine Infusion 0.05 MICROgram(s)/kG/Min (6.328 mL/Hr) IV Continuous <Continuous>  petrolatum Ophthalmic Ointment 1 Application(s) Both EYES two times a day  piperacillin/tazobactam IVPB.. 4.5 Gram(s) IV Intermittent every 8 hours  polyethylene glycol 3350 17 Gram(s) Oral two times a day  propofol Infusion 10 MICROgram(s)/kG/Min (8.1 mL/Hr) IV Continuous <Continuous>  senna Syrup 15 milliLiter(s) Oral two times a day  vancomycin  IVPB 1500 milliGRAM(s) IV Intermittent every 8 hours  vancomycin  IVPB        MEDICATIONS  (PRN):      ALLERGIES:  Allergies    No Known Allergies    Intolerances        LABS:                        9.5    5.49  )-----------( 129      ( 02 Nov 2019 00:00 )             30.5     11-02    139  |  104  |  7   ----------------------------<  135<H>  3.9   |  24  |  0.64    Ca    8.7      02 Nov 2019 00:00  Phos  4.0     11-02  Mg     2.2     11-02    TPro  6.1  /  Alb  2.9<L>  /  TBili  0.7  /  DBili  x   /  AST  50<H>  /  ALT  48<H>  /  AlkPhos  47  11-02    LIVER FUNCTIONS - ( 02 Nov 2019 00:00 )  Alb: 2.9 g/dL / Pro: 6.1 g/dL / ALK PHOS: 47 u/L / ALT: 48 u/L / AST: 50 u/L / GGT: x           COAGULATION STUDIES:     aPTT  61.0 SEC    (11-02-19 @ 06:10)     PT     x        (11-02-19 @ 06:10)     INR    x             (11-02-19 @ 06:10), COAGULATION STUDIES:     aPTT  64.1 SEC    (11-02-19 @ 00:00)     PT     14.7 SEC    (11-02-19 @ 00:00)     INR    1.31          (11-02-19 @ 00:00), COAGULATION STUDIES:     aPTT  62.9 SEC    (11-01-19 @ 17:45)     PT     15.1 SEC    (11-01-19 @ 17:45)     INR    1.35          (11-01-19 @ 17:45), COAGULATION STUDIES:     aPTT  54.1 SEC    (11-01-19 @ 11:00)     PT     x        (11-01-19 @ 11:00)     INR    x             (11-01-19 @ 11:00), COAGULATION STUDIES:     aPTT  46.4 SEC    (11-01-19 @ 03:40)     PT     x        (11-01-19 @ 03:40)     INR    x             (11-01-19 @ 03:40), COAGULATION STUDIES:     aPTT  44.3 SEC    (10-31-19 @ 18:45)     PT     x        (10-31-19 @ 18:45)     INR    x             (10-31-19 @ 18:45), COAGULATION STUDIES:     aPTT  35.7 SEC    (10-31-19 @ 10:50)     PT     x        (10-31-19 @ 10:50)     INR    x             (10-31-19 @ 10:50)   PT/INR - ( 02 Nov 2019 00:00 )   PT: 14.7 SEC;   INR: 1.31          PTT - ( 02 Nov 2019 06:10 )  PTT:61.0 SEC    Mode: VDR-4, RR (machine): 12, FiO2: 30, ITime: 1.5, MAP: 15, PC: 12, PIP: 24, Frequency: 600  ABG - ( 02 Nov 2019 06:10 )  pH, Arterial: 7.40  pH, Blood: x     /  pCO2: 46    /  pO2: 78    / HCO3: 27    / Base Excess: 3.5   /  SaO2: 95.0              POCT Blood Glucose.: 142 mg/dL (11-02-19 @ 06:09)  POCT Blood Glucose.: 133 mg/dL (11-01-19 @ 23:57)  POCT Blood Glucose.: 137 mg/dL (11-01-19 @ 17:53)  POCT Blood Glucose.: 114 mg/dL (11-01-19 @ 12:51)        MICROBIOLOGY:    Culture - Blood (collected 10-30-19 @ 22:14)  Source: BLOOD VENOUS  Preliminary Report (11-01-19 @ 22:15):    NO ORGANISMS ISOLATED    NO ORGANISMS ISOLATED AT 48 HRS.    Culture - Respiratory with Gram Stain (collected 10-30-19 @ 18:48)  Source: BRONCHIAL LAVAGE  Preliminary Report (10-31-19 @ 09:48):    NO GROWTH - PRELIMINARY RESULTS    Culture - Blood (collected 10-30-19 @ 13:56)  Source: BLOOD PERIPHERAL  Preliminary Report (10-31-19 @ 13:40):    ***Blood Panel PCR results on this specimen are available    approximately 3 hours after the Gram stain result***    Gram stain, PCR, and/or culture results may not always    correspond due to difference in methodologies    ------------------------------------------------------------    This PCR assay was performed using [employer].  The    following targets are tested for:  Enterococcus, vancomycin    resistant enterococci, Listeria monocytogenes,  coagulase    negative staphylococci, S. aureus, methicillin resistant S.    aureus, Streptococcus agalactiae (Group B), S. pneumoniae,    S. pyogenes (Group A), Acinetobacter baumannii, Enterobacter    cloacae, E. coli, Klebsiella oxytoca, K. pneumoniae, Proteus    sp., Serratia marcescens, Haemophilus influenzae, Neisseria    meningitidis, Pseudomonas aeruginosa, Candida albicans, C.    glabrata, C. krusei, C. parapsilosis, C. tropicalis and the    KPC resistance gene.    **NOTE: Due to technical problems, Proteus sp. will NOT be    reported as part of the BCID paneluntil further notice.  Final Report (11-01-19 @ 11:11):    Single set isolate, possible contaminant.    Contact microbiology if susceptibility testing is clinically    indicated.  Organism: BLOOD CULTURE PCR  Staphylococcus sp.,coag neg (11-01-19 @ 11:11)  Organism: Staphylococcus sp.,coag neg (11-01-19 @ 11:11)  Organism: BLOOD CULTURE PCR  ***Blood Panel PCR results on this specimen are available  approximately 3 hours after the Gram stain result***  Gram stain, PCR, and/or culture results may not always  correspond due to difference in methodologies  ------------------------------------------------------------  This PCR assay was performed using [employer].  The  following targets are tested for:  Enterococcus, vancomycin  resistant enterococci, Listeria monocytogenes,  coagulase  negative staphylococci, S. aureus, methicillin resistant S.  aureus, Streptococcus agalactiae (Group B), S. pneumoniae,  S. pyogenes (Group A), Acinetobacter baumannii, Enterobacter  cloacae, E. coli, Klebsiella oxytoca, K. pneumoniae, Proteus  sp., Serratia marcescens, Haemophilus influenzae, Neisseria  meningitidis, Pseudomonas aeruginosa, Candida albicans, C.  glabrata, C. krusei, C. parapsilosis, C. tropicalis and the  KPC resistance gene.  **NOTE: Due to technical problems, Proteus sp. will NOT be  reported as part of the BCID panel until further notice. (11-01-19 @ 11:11)        RADIOLOGY & ADDITIONAL TESTS: Reviewed. PATIENT: EBONI CARRASCO   AGE: Mode: VDR-4, RR (machine): 12, FiO2: 30, ITime: 1.5, MAP: 15, PC: 12, PIP: 24, Frequency: 600o     CHIEF COMPLAINT:  Patient is a 54y old  Male who presents with a chief complaint of ARDS (01 Nov 2019 08:03)      OVERNIGHT EVENTS:  Overnight, patient desaturated. Team performed lavages with copious secretions. He was desynchronous with the vent. Received paralytics. Initial ABG was abnormal, repeat showed improvement.       SUBJECTIVE: Patient seen and examined at bedside.     REVIEW OF SYSTEM:  Unable to assess to patient and intubated status.     OBJECTIVE:    VITAL SIGNS:  ICU Vital Signs Last 24 Hrs  T(C): 36.1 (02 Nov 2019 04:00), Max: 37.3 (01 Nov 2019 20:00)  T(F): 97 (02 Nov 2019 04:00), Max: 99.2 (01 Nov 2019 20:00)  HR: 91 (02 Nov 2019 07:00) (72 - 108)  ABP: 107/49 (02 Nov 2019 07:00) (107/49 - 146/77)  ABP(mean): 68 (02 Nov 2019 07:00) (66 - 96)  RR: 12 (02 Nov 2019 07:00) (12 - 21)  SpO2: 99% (02 Nov 2019 07:00) (83% - 100%)    Mode: VDR-4, RR (machine): 12, FiO2: 30, ITime: 1.5, MAP: 15, PC: 12, PIP: 24, Frequency: 600    11-01 @ 07:01 - 11-02 @ 07:00  --------------------------------------------------------  IN: 5178.5 mL / OUT: 6225 mL / NET: -1046.5 mL    11-02 @ 08:01  -  11-02 @ 07:58  --------------------------------------------------------  IN: 240.1 mL / OUT: 125 mL / NET: 115.1 mL      CAPILLARY BLOOD GLUCOSE      POCT Blood Glucose.: 142 mg/dL (02 Nov 2019 06:09)      I/O SUMMARY:    11-01-19 @ 07:01  -  11-02-19 @ 07:00  --------------------------------------------------------  IN: 5178.5 mL / OUT: 6225 mL / NET: -1046.5 mL    11-02-19 @ 08:01  -  11-02-19 @ 07:58  --------------------------------------------------------  IN: 240.1 mL / OUT: 125 mL / NET: 115.1 mL      PHYSICAL EXAM:    General: NAD  HEENT: NC/AT; PERRL, clear conjunctiva  Neck: supple  Respiratory: CTA b/l  Cardiovascular: +S1/S2; RRR  Abdomen: soft, NT/ND; +BS x4  Extremities: 2+ peripheral pulses b/l; no LE edema  Skin: normal color and turgor; no rash  Neurological: unable to assess due to patient's mental status.  Lines: Right arm arterial line.     MEDICATIONS:  MEDICATIONS  (STANDING):  albuterol/ipratropium for Nebulization 3 milliLiter(s) Nebulizer every 6 hours  azithromycin  IVPB 500 milliGRAM(s) IV Intermittent every 24 hours  bisacodyl Suppository 10 milliGRAM(s) Rectal once  chlorhexidine 0.12% Liquid 15 milliLiter(s) Oral Mucosa every 12 hours  cisatracurium Infusion 2 MICROgram(s)/kG/Min (16.2 mL/Hr) IV Continuous <Continuous>  dextrose 5%. 1000 milliLiter(s) (50 mL/Hr) IV Continuous <Continuous>  fentaNYL   Infusion. 0.5 MICROgram(s)/kG/Hr (6.75 mL/Hr) IV Continuous <Continuous>  heparin  Infusion 1100 Unit(s)/Hr (20 mL/Hr) IV Continuous <Continuous>  insulin lispro (HumaLOG) corrective regimen sliding scale   SubCutaneous every 6 hours  levETIRAcetam  IVPB 750 milliGRAM(s) IV Intermittent every 12 hours  midazolam Infusion 0.02 mG/kG/Hr (2.7 mL/Hr) IV Continuous <Continuous>  norepinephrine Infusion 0.05 MICROgram(s)/kG/Min (6.328 mL/Hr) IV Continuous <Continuous>  petrolatum Ophthalmic Ointment 1 Application(s) Both EYES two times a day  piperacillin/tazobactam IVPB.. 4.5 Gram(s) IV Intermittent every 8 hours  polyethylene glycol 3350 17 Gram(s) Oral two times a day  propofol Infusion 10 MICROgram(s)/kG/Min (8.1 mL/Hr) IV Continuous <Continuous>  senna Syrup 15 milliLiter(s) Oral two times a day  vancomycin  IVPB 1500 milliGRAM(s) IV Intermittent every 8 hours  vancomycin  IVPB        MEDICATIONS  (PRN):      ALLERGIES:  Allergies    No Known Allergies    Intolerances        LABS:                        9.5    5.49  )-----------( 129      ( 02 Nov 2019 00:00 )             30.5     11-02    139  |  104  |  7   ----------------------------<  135<H>  3.9   |  24  |  0.64    Ca    8.7      02 Nov 2019 00:00  Phos  4.0     11-02  Mg     2.2     11-02    TPro  6.1  /  Alb  2.9<L>  /  TBili  0.7  /  DBili  x   /  AST  50<H>  /  ALT  48<H>  /  AlkPhos  47  11-02    LIVER FUNCTIONS - ( 02 Nov 2019 00:00 )  Alb: 2.9 g/dL / Pro: 6.1 g/dL / ALK PHOS: 47 u/L / ALT: 48 u/L / AST: 50 u/L / GGT: x           COAGULATION STUDIES:     aPTT  61.0 SEC    (11-02-19 @ 06:10)     PT     x        (11-02-19 @ 06:10)     INR    x             (11-02-19 @ 06:10), COAGULATION STUDIES:     aPTT  64.1 SEC    (11-02-19 @ 00:00)     PT     14.7 SEC    (11-02-19 @ 00:00)     INR    1.31          (11-02-19 @ 00:00), COAGULATION STUDIES:     aPTT  62.9 SEC    (11-01-19 @ 17:45)     PT     15.1 SEC    (11-01-19 @ 17:45)     INR    1.35          (11-01-19 @ 17:45), COAGULATION STUDIES:     aPTT  54.1 SEC    (11-01-19 @ 11:00)     PT     x        (11-01-19 @ 11:00)     INR    x             (11-01-19 @ 11:00), COAGULATION STUDIES:     aPTT  46.4 SEC    (11-01-19 @ 03:40)     PT     x        (11-01-19 @ 03:40)     INR    x             (11-01-19 @ 03:40), COAGULATION STUDIES:     aPTT  44.3 SEC    (10-31-19 @ 18:45)     PT     x        (10-31-19 @ 18:45)     INR    x             (10-31-19 @ 18:45), COAGULATION STUDIES:     aPTT  35.7 SEC    (10-31-19 @ 10:50)     PT     x        (10-31-19 @ 10:50)     INR    x             (10-31-19 @ 10:50)   PT/INR - ( 02 Nov 2019 00:00 )   PT: 14.7 SEC;   INR: 1.31          PTT - ( 02 Nov 2019 06:10 )  PTT:61.0 SEC    Mode: VDR-4, RR (machine): 12, FiO2: 30, ITime: 1.5, MAP: 15, PC: 12, PIP: 24, Frequency: 600  ABG - ( 02 Nov 2019 06:10 )  pH, Arterial: 7.40  pH, Blood: x     /  pCO2: 46    /  pO2: 78    / HCO3: 27    / Base Excess: 3.5   /  SaO2: 95.0              POCT Blood Glucose.: 142 mg/dL (11-02-19 @ 06:09)  POCT Blood Glucose.: 133 mg/dL (11-01-19 @ 23:57)  POCT Blood Glucose.: 137 mg/dL (11-01-19 @ 17:53)  POCT Blood Glucose.: 114 mg/dL (11-01-19 @ 12:51)        MICROBIOLOGY:    Culture - Blood (collected 10-30-19 @ 22:14)  Source: BLOOD VENOUS  Preliminary Report (11-01-19 @ 22:15):    NO ORGANISMS ISOLATED    NO ORGANISMS ISOLATED AT 48 HRS.    Culture - Respiratory with Gram Stain (collected 10-30-19 @ 18:48)  Source: BRONCHIAL LAVAGE  Preliminary Report (10-31-19 @ 09:48):    NO GROWTH - PRELIMINARY RESULTS    Culture - Blood (collected 10-30-19 @ 13:56)  Source: BLOOD PERIPHERAL  Preliminary Report (10-31-19 @ 13:40):    ***Blood Panel PCR results on this specimen are available    approximately 3 hours after the Gram stain result***    Gram stain, PCR, and/or culture results may not always    correspond due to difference in methodologies    ------------------------------------------------------------    This PCR assay was performed using [employer].  The    following targets are tested for:  Enterococcus, vancomycin    resistant enterococci, Listeria monocytogenes,  coagulase    negative staphylococci, S. aureus, methicillin resistant S.    aureus, Streptococcus agalactiae (Group B), S. pneumoniae,    S. pyogenes (Group A), Acinetobacter baumannii, Enterobacter    cloacae, E. coli, Klebsiella oxytoca, K. pneumoniae, Proteus    sp., Serratia marcescens, Haemophilus influenzae, Neisseria    meningitidis, Pseudomonas aeruginosa, Candida albicans, C.    glabrata, C. krusei, C. parapsilosis, C. tropicalis and the    KPC resistance gene.    **NOTE: Due to technical problems, Proteus sp. will NOT be    reported as part of the BCID paneluntil further notice.  Final Report (11-01-19 @ 11:11):    Single set isolate, possible contaminant.    Contact microbiology if susceptibility testing is clinically    indicated.  Organism: BLOOD CULTURE PCR  Staphylococcus sp.,coag neg (11-01-19 @ 11:11)  Organism: Staphylococcus sp.,coag neg (11-01-19 @ 11:11)  Organism: BLOOD CULTURE PCR  ***Blood Panel PCR results on this specimen are available  approximately 3 hours after the Gram stain result***  Gram stain, PCR, and/or culture results may not always  correspond due to difference in methodologies  ------------------------------------------------------------  This PCR assay was performed using [employer].  The  following targets are tested for:  Enterococcus, vancomycin  resistant enterococci, Listeria monocytogenes,  coagulase  negative staphylococci, S. aureus, methicillin resistant S.  aureus, Streptococcus agalactiae (Group B), S. pneumoniae,  S. pyogenes (Group A), Acinetobacter baumannii, Enterobacter  cloacae, E. coli, Klebsiella oxytoca, K. pneumoniae, Proteus  sp., Serratia marcescens, Haemophilus influenzae, Neisseria  meningitidis, Pseudomonas aeruginosa, Candida albicans, C.  glabrata, C. krusei, C. parapsilosis, C. tropicalis and the  KPC resistance gene.  **NOTE: Due to technical problems, Proteus sp. will NOT be  reported as part of the BCID panel until further notice. (11-01-19 @ 11:11)        RADIOLOGY & ADDITIONAL TESTS: Reviewed.

## 2019-11-02 NOTE — PROGRESS NOTE ADULT - ASSESSMENT
54M with severe depression who presented to  10/28 with lethargy and hypoxemic/hypercapnic respiratory failure possibly related to overdose +/- pneumonia extubated but required reintubation 10/30 but with persistent hypoxemia refractory to conventional management and ultimately cannulated for VV ECMO 10/30/2019 for hypoxemic respiratory failure with ARDS and transferred to [facility] for further management    NEUROLOGY  Baseline AOX3. Has severe depression refractory to multiple medications/ECT with prior psych hospitalizations. Had a tonic-clonic seizure at . Was concern for overdose at . Per discussion with wife this morning, strong consideration for initial benzo withdrawal related seizures	  - Sedated with propofol, versed; required Nimbex push overnight.  - Off Nimbex gtt but received push overnight. Will try to avoid paralytics if possible. May require for decannulation  - Neuro checks per ICU routine  - Psych evaluation; was being considered for inpatient admission at   - Concern for abrupt cessation of Clonazepam causing seizures; initial utox negative for benzos. Likely will restart PO benzo as take off clonazepam  - Seizure like episode at ; ? related overdose vs withdrawal. EEG unremarkable there. CTH unremarkable at . Consider repeat EEG  - On Keppra, neuro consulted.   -PT and OT evaluation, will continue as tolerated    CARDIOVASCULAR  TTE at  with HFrEF, [place], [place]. Unclear if new diagnosis of HFrEF. Bedside FREDI performed initially with severe LV dysfunction and grade I diastolic dysfunction, mild MR.   -Previously on NE likely related to sedation; maintain MAP>65 (had very labile BPs on precedex which improved after precedex was discontinued)  -No significant right heart dysfunction on echo  -Maintain net even today; will most likely require additional lasix today  -ECG without ischemic changes; troponins peaked; will need to repeat echo. Ischemic cardiomyopathy vs sepsis cardiomyopathy    PULMONARY  Patient with severe hypoxemic respiratory failure likely severe ARDS with P/F 82 and now cannulated for VV-ECMO possibly related to aspiration pneumonia.   Bronchoscopy performed on arrival with billious secretions predominantly in the lower lobes; BAL sent for cyto (with red oil stain), micro, cell count, Legionella PCR    #VENTILATOR  - Vent settings on arrival AC 10/150/15/30; Ppeak 26, Pplat 21, DP 6  - VDR settings PIP 26, PEP 14, , FIO2 40, CR 15, I:E 3:1. Will attempt to wean down today    #ECMO  ECMO SETTINGS: Day# 3  Type:		[ x] Venovenous		[ ] Venoarterial  Cannulation Site(s):   RIJ 20F return cannula  RFV 25F pull cannula    Flow: 3.4 	          P1:    120              Delta P: 28	P3: -42  RPM: 2700		  P2:     148             SVO2: 71.4    Oxygenator:	Sweep: 0.5    L/min	FiO2:   21%    -Clinically perfusing. Lactate is within normal limits  - Will aim to decannulate today    #ARDS  -ECMO and vent management as above  - Follow up BAL studies  -Nebs to help with secretion mobilization on VDR    INFECTIOUS DISEASE  High suspicion for aspiration pneumonia  -Follow up blood cultures and BAL cultures   -Continue vancomycin, zosyn and azithro  -BAL fluid sent for legionella PCR as well  -Follow fever curve/WBCs    RENAL/  No active issues   -Meredith in place for critically ill patient   -Goal net even today  -Lasix as above    GASTROINTESTINAL  -OGT/TFs  -Bowel regimen; needs to be increased    HEME  -Heparin GTT while on ECMO; trend PTT  -Transfuse PRBCs PRN  -Monitor for hemolysis; follow LDH/haptoglobin--hapto low but LDH only minimally elevated and Hb relatively stable  -Follow platelets    ENDOCRINE  -Monitor FS/low dose SS    PROPHYLAXIS  VTE: Systemic AC while on circuit    DISPO/ETHICS/GOC  FULL CODE 54M with severe depression who presented to  10/28 with lethargy and hypoxemic/hypercapnic respiratory failure possibly related to overdose +/- pneumonia extubated but required reintubation 10/30 but with persistent hypoxemia refractory to conventional management and ultimately cannulated for VV ECMO 10/30/2019 for hypoxemic respiratory failure with ARDS and transferred to [facility] for further management    NEUROLOGY  Baseline AOX3. Has severe depression refractory to multiple medications/ECT with prior psych hospitalizations. Had a tonic-clonic seizure at . Was concern for overdose at . Per discussion with wife this morning, strong consideration for initial benzo withdrawal related seizures	  - Sedated with propofol, versed; required Nimbex push overnight.  - Off Nimbex gtt but received push overnight. Will try to avoid paralytics if possible. May require for decannulation  - Neuro checks per ICU routine  - Psych evaluation; was being considered for inpatient admission at   - Concern for abrupt cessation of Clonazepam causing seizures; initial utox negative for benzos. Likely will restart PO benzo as take off clonazepam  - Seizure like episode at ; ? related overdose vs withdrawal. EEG unremarkable there. CTH unremarkable at . Consider repeat EEG  - On Keppra, neuro recs appreciated, taper keppra.   -PT and OT evaluation, will continue as tolerated    CARDIOVASCULAR  TTE at  with HFrEF, [place], [place]. Unclear if new diagnosis of HFrEF. Bedside FREDI performed initially with severe LV dysfunction and grade I diastolic dysfunction, mild MR.   -Previously on NE likely related to sedation; maintain MAP>65 (had very labile BPs on precedex which improved after precedex was discontinued)  -No significant right heart dysfunction on echo  -Maintain net even ; will most likely require additional lasix today  -ECG without ischemic changes; troponins peaked; will need to repeat echo. Ischemic cardiomyopathy vs sepsis cardiomyopathy    PULMONARY  Patient with severe hypoxemic respiratory failure likely severe ARDS with P/F 82 and now cannulated for VV-ECMO possibly related to aspiration pneumonia.   Bronchoscopy performed on arrival with billious secretions predominantly in the lower lobes; BAL sent for cyto (with red oil stain), micro, cell count, Legionella PCR    #VENTILATOR  - Vent settings on arrival AC 10/150/15/30; Ppeak 26, Pplat 21, DP 6  - VDR settings PIP 26, PEP 14, , FIO2 40, CR 15, I:E 3:1. Will attempt to wean down     #ARDS  - Follow up BAL studies  -Nebs to help with secretion mobilization on VDR    INFECTIOUS DISEASE  High suspicion for aspiration pneumonia  -Follow up blood cultures and BAL cultures   -Continue vancomycin, zosyn and azithro  -BAL fluid sent for legionella PCR as well  -Follow fever curve/WBCs    RENAL/  No active issues   -Meredith in place for critically ill patient   -Goal net even today  -Lasix as above    GASTROINTESTINAL  -OGT/TFs  -Bowel regimen; needs to be increased    HEME  -Heparin GTT while on ECMO; trend PTT  -Transfuse PRBCs PRN  -Monitor for hemolysis; follow LDH/haptoglobin--hapto low but LDH only minimally elevated and Hb relatively stable  -Follow platelets    ENDOCRINE  -Monitor FS/low dose SS    PROPHYLAXIS  VTE: Systemic AC while on circuit    DISPO/ETHICS/GOC  FULL CODE 54M with severe depression who presented to  10/28 with lethargy and hypoxemic/hypercapnic respiratory failure possibly related to overdose +/- pneumonia extubated but required reintubation 10/30 but with persistent hypoxemia refractory to conventional management and ultimately cannulated for VV ECMO 10/30/2019 for hypoxemic respiratory failure with ARDS and transferred to [facility] for further management    NEUROLOGY  Baseline AOX3. Has severe depression refractory to multiple medications/ECT with prior psych hospitalizations. Had a tonic-clonic seizure at . Was concern for overdose at . Per discussion with wife this morning, strong consideration for initial benzo withdrawal related seizures	  - Sedated with propofol, versed; required Nimbex push overnight.  - Off Nimbex gtt but received push overnight. Will try to avoid paralytics if possible. May require for decannulation  - Neuro checks per ICU routine  - Psych evaluation; was being considered for inpatient admission at   - Concern for abrupt cessation of Clonazepam causing seizures; initial utox negative for benzos. Likely will restart PO benzo as take off clonazepam  - Seizure like episode at ; ? related overdose vs withdrawal. EEG unremarkable there. CTH unremarkable at . Consider repeat EEG  - On Keppra, neuro recs appreciated, taper keppra 500mg BID, switch on monday to 250mg BID.    -PT and OT evaluation, will continue as tolerated    CARDIOVASCULAR  TTE at  with HFrEF, [place], [place]. Unclear if new diagnosis of HFrEF. Bedside FREDI performed initially with severe LV dysfunction and grade I diastolic dysfunction, mild MR.   -Previously on NE likely related to sedation; maintain MAP>65 (had very labile BPs on precedex which improved after precedex was discontinued)  -No significant right heart dysfunction on echo  -Maintain net even ; will most likely require additional lasix today  -ECG without ischemic changes; troponins peaked; will need to repeat echo. Ischemic cardiomyopathy vs sepsis cardiomyopathy    PULMONARY  Patient with severe hypoxemic respiratory failure likely severe ARDS with P/F 82 and now cannulated for VV-ECMO possibly related to aspiration pneumonia.   Bronchoscopy performed on arrival with billious secretions predominantly in the lower lobes; BAL sent for cyto (with red oil stain), micro, cell count, Legionella PCR    #VENTILATOR  - Vent settings on arrival AC 10/150/15/30; Ppeak 26, Pplat 21, DP 6  -on VDR currently, will wean off and transition to Volume A/C    #ARDS  - Follow up BAL studies  -Nebs to help with secretion mobilization on VDR    INFECTIOUS DISEASE  High suspicion for aspiration pneumonia  -Follow up blood cultures and BAL cultures  -Legionella negative. DC azithromycin on 11/2.    -Continue vancomycin, zosyn   -BAL fluid sent for legionella PCR as well  -Follow fever curve/WBCs    RENAL/  No active issues   -Meredith in place for critically ill patient   -Goal net even today    GASTROINTESTINAL  -OGT/TFs  -Bowel regimen; needs to be increased    HEME  -Heparin GTT while on ECMO; trend PTT  -Transfuse PRBCs PRN  -Monitor for hemolysis; follow LDH/haptoglobin--hapto low but LDH only minimally elevated and Hb relatively stable  -Follow platelets    ENDOCRINE  -Monitor FS/low dose SS    PROPHYLAXIS  VTE: Systemic AC while on circuit for 48hr     DISPO/ETHICS/GOC  FULL CODE 54M with severe depression who presented to  10/28 with lethargy and hypoxemic/hypercapnic respiratory failure possibly related to overdose +/- pneumonia extubated but required reintubation 10/30 but with persistent hypoxemia refractory to conventional management and ultimately cannulated for VV ECMO 10/30/2019 for hypoxemic respiratory failure with ARDS and transferred to [facility] for further management    NEUROLOGY  Baseline AOX3. Has severe depression refractory to multiple medications/ECT with prior psych hospitalizations. Had a tonic-clonic seizure at . Was concern for overdose at . Per discussion with wife this morning, strong consideration for initial benzo withdrawal related seizures	  - Sedated with propofol, versed; required Nimbex push overnight.  - Off Nimbex gtt but received push overnight. Will try to avoid paralytics if possible. May require for decannulation  - Neuro checks per ICU routine  - Psych evaluation; was being considered for inpatient admission at   - Concern for abrupt cessation of Clonazepam causing seizures; initial utox negative for benzos. Likely will restart PO benzo as take off clonazepam  - Seizure like episode at ; ? related overdose vs withdrawal. EEG unremarkable there. CTH unremarkable at . Consider repeat EEG  - On Keppra, neuro recs appreciated, taper keppra 500mg BID, switch on monday to 250mg BID.    -PT and OT evaluation, will continue as tolerated    CARDIOVASCULAR  TTE at  with HFrEF, [place], [place]. Unclear if new diagnosis of HFrEF. Bedside FREDI performed initially with severe LV dysfunction and grade I diastolic dysfunction, mild MR.   -Previously on NE likely related to sedation; maintain MAP>65 (had very labile BPs on precedex which improved after precedex was discontinued)  -No significant right heart dysfunction on echo  -Maintain net even ; will most likely require additional lasix today  -ECG without ischemic changes; troponins peaked; will need to repeat echo. Ischemic cardiomyopathy vs sepsis cardiomyopathy    PULMONARY  Patient with severe hypoxemic respiratory failure likely severe ARDS with P/F 82 and now cannulated for VV-ECMO possibly related to aspiration pneumonia.   Bronchoscopy performed on arrival with billious secretions predominantly in the lower lobes; BAL sent for cyto (with red oil stain), micro, cell count, Legionella PCR    #VENTILATOR  - Vent settings on arrival AC 10/150/15/30; Ppeak 26, Pplat 21, DP 6  -on VDR currently, will wean off and transition to Volume A/C  - on nimbex gtt, will wean off when on the ventilator     #ARDS  - Follow up BAL studies  -Nebs to help with secretion mobilization on VDR    INFECTIOUS DISEASE  High suspicion for aspiration pneumonia  -Follow up blood cultures and BAL cultures  -Legionella negative. DC azithromycin on 11/2.    -Continue vancomycin, zosyn   -BAL fluid sent for legionella PCR as well  -Follow fever curve/WBCs    RENAL/  No active issues   -Meredith in place for critically ill patient   -Goal net even today    GASTROINTESTINAL  -OGT/TFs  -Bowel regimen; needs to be increased    HEME  -Heparin GTT while on ECMO; trend PTT  -Transfuse PRBCs PRN  -Monitor for hemolysis; follow LDH/haptoglobin--hapto low but LDH only minimally elevated and Hb relatively stable  -Follow platelets    ENDOCRINE  -Monitor FS/low dose SS    PROPHYLAXIS  VTE: Systemic AC while on circuit for 48hr     DISPO/ETHICS/GOC  FULL CODE

## 2019-11-02 NOTE — PROGRESS NOTE ADULT - ATTENDING COMMENTS
ARDS and Acute hypoxic respiratory failure due to aspiration pneumonia, on VVECMO for ARDS, decannulated 24 hrs ago.   Transitioning from VDR to PC if stable, once tolerating PC will stop neuromuscular blockers.  Continue abx.  ETT adjusted.

## 2019-11-02 NOTE — CHART NOTE - NSCHARTNOTEFT_GEN_A_CORE
Contacted by IGOR Irvin that patient is desaturating to 40's. Pt noted to be desynchronous on ventilator, despite Versed/Propofol/Fentanyl gtts. Able to be ambu lavaged with return of purulent secretions and subsequent  improvement in oxygen saturation Contacted by IGOR Irvin that patient is desaturating to 40's. Pt noted to be desynchronous on ventilator, despite Versed/Propofol/Fentanyl gtts. Able to be ambu lavaged with return of purulent secretions and subsequent  improvement in oxygen saturation, however once placed back on ventilator found to be dyschronous and discordant despite Fentantyl/Propofol/Versed gtts. ABG 7.18/81/50/71.9. Decision made to give Nimbex 20mg in preparation for bedside bronchoscopy- once patient re-paralyzed saturation immediately improved. Pt placed back on Nimbex gtt and bronch deferred at that time given resolution of hypoxemia. Repeat ABG 7.40/46/78/95. Will maintain on paralytics and appropriate sedation and discuss with day team on plan to wean at later time.  Otherwise HD stable at this time.

## 2019-11-03 LAB
-  CEFAZOLIN: SIGNIFICANT CHANGE UP
-  CIPROFLOXACIN: SIGNIFICANT CHANGE UP
-  CLINDAMYCIN: SIGNIFICANT CHANGE UP
-  GENTAMICIN: SIGNIFICANT CHANGE UP
-  LEVOFLOXACIN: SIGNIFICANT CHANGE UP
-  MOXIFLOXACIN(AEROBIC): SIGNIFICANT CHANGE UP
-  OXACILLIN: SIGNIFICANT CHANGE UP
-  PENICILLIN: SIGNIFICANT CHANGE UP
-  RIFAMPIN.: SIGNIFICANT CHANGE UP
-  TETRACYCLINE: SIGNIFICANT CHANGE UP
-  TRIMETHOPRIM/SULFAMETHOXAZOLE: SIGNIFICANT CHANGE UP
-  VANCOMYCIN: SIGNIFICANT CHANGE UP
ALBUMIN SERPL ELPH-MCNC: 2.6 G/DL — LOW (ref 3.3–5)
ALP SERPL-CCNC: 45 U/L — SIGNIFICANT CHANGE UP (ref 40–120)
ALT FLD-CCNC: 44 U/L — HIGH (ref 4–41)
ANION GAP SERPL CALC-SCNC: 10 MMO/L — SIGNIFICANT CHANGE UP (ref 7–14)
APTT BLD: 65.2 SEC — HIGH (ref 27.5–36.3)
APTT BLD: 71.5 SEC — HIGH (ref 27.5–36.3)
AST SERPL-CCNC: 40 U/L — SIGNIFICANT CHANGE UP (ref 4–40)
BACTERIA SPT RESP CULT: SIGNIFICANT CHANGE UP
BASE EXCESS BLDA CALC-SCNC: 3.4 MMOL/L — SIGNIFICANT CHANGE UP
BASOPHILS # BLD AUTO: 0.03 K/UL — SIGNIFICANT CHANGE UP (ref 0–0.2)
BASOPHILS NFR BLD AUTO: 0.6 % — SIGNIFICANT CHANGE UP (ref 0–2)
BILIRUB SERPL-MCNC: 0.6 MG/DL — SIGNIFICANT CHANGE UP (ref 0.2–1.2)
BUN SERPL-MCNC: 7 MG/DL — SIGNIFICANT CHANGE UP (ref 7–23)
CALCIUM SERPL-MCNC: 8.7 MG/DL — SIGNIFICANT CHANGE UP (ref 8.4–10.5)
CHLORIDE BLDA-SCNC: 107 MMOL/L — SIGNIFICANT CHANGE UP (ref 96–108)
CHLORIDE SERPL-SCNC: 103 MMOL/L — SIGNIFICANT CHANGE UP (ref 98–107)
CO2 SERPL-SCNC: 25 MMOL/L — SIGNIFICANT CHANGE UP (ref 22–31)
CREAT SERPL-MCNC: 0.74 MG/DL — SIGNIFICANT CHANGE UP (ref 0.5–1.3)
EOSINOPHIL # BLD AUTO: 0.11 K/UL — SIGNIFICANT CHANGE UP (ref 0–0.5)
EOSINOPHIL NFR BLD AUTO: 2.1 % — SIGNIFICANT CHANGE UP (ref 0–6)
GLUCOSE BLDA-MCNC: 127 MG/DL — HIGH (ref 70–99)
GLUCOSE BLDC GLUCOMTR-MCNC: 124 MG/DL — HIGH (ref 70–99)
GLUCOSE BLDC GLUCOMTR-MCNC: 127 MG/DL — HIGH (ref 70–99)
GLUCOSE BLDC GLUCOMTR-MCNC: 157 MG/DL — HIGH (ref 70–99)
GLUCOSE BLDC GLUCOMTR-MCNC: 163 MG/DL — HIGH (ref 70–99)
GLUCOSE SERPL-MCNC: 133 MG/DL — HIGH (ref 70–99)
GRAM STN SPT: SIGNIFICANT CHANGE UP
HCO3 BLDA-SCNC: 28 MMOL/L — HIGH (ref 22–26)
HCT VFR BLD CALC: 24.6 % — LOW (ref 39–50)
HCT VFR BLD CALC: 26.1 % — LOW (ref 39–50)
HCT VFR BLDA CALC: 25.9 % — LOW (ref 39–51)
HGB BLD-MCNC: 8.1 G/DL — LOW (ref 13–17)
HGB BLD-MCNC: 8.5 G/DL — LOW (ref 13–17)
HGB BLDA-MCNC: 8.3 G/DL — LOW (ref 13–17)
IMM GRANULOCYTES NFR BLD AUTO: 8.6 % — HIGH (ref 0–1.5)
LACTATE BLDA-SCNC: 1 MMOL/L — SIGNIFICANT CHANGE UP (ref 0.5–2)
LYMPHOCYTES # BLD AUTO: 0.59 K/UL — LOW (ref 1–3.3)
LYMPHOCYTES # BLD AUTO: 11.3 % — LOW (ref 13–44)
MAGNESIUM SERPL-MCNC: 2.2 MG/DL — SIGNIFICANT CHANGE UP (ref 1.6–2.6)
MCHC RBC-ENTMCNC: 31.5 PG — SIGNIFICANT CHANGE UP (ref 27–34)
MCHC RBC-ENTMCNC: 31.8 PG — SIGNIFICANT CHANGE UP (ref 27–34)
MCHC RBC-ENTMCNC: 32.6 % — SIGNIFICANT CHANGE UP (ref 32–36)
MCHC RBC-ENTMCNC: 32.9 % — SIGNIFICANT CHANGE UP (ref 32–36)
MCV RBC AUTO: 96.5 FL — SIGNIFICANT CHANGE UP (ref 80–100)
MCV RBC AUTO: 96.7 FL — SIGNIFICANT CHANGE UP (ref 80–100)
METHOD TYPE: SIGNIFICANT CHANGE UP
MONOCYTES # BLD AUTO: 0.43 K/UL — SIGNIFICANT CHANGE UP (ref 0–0.9)
MONOCYTES NFR BLD AUTO: 8.3 % — SIGNIFICANT CHANGE UP (ref 2–14)
NEUTROPHILS # BLD AUTO: 3.6 K/UL — SIGNIFICANT CHANGE UP (ref 1.8–7.4)
NEUTROPHILS NFR BLD AUTO: 69.1 % — SIGNIFICANT CHANGE UP (ref 43–77)
NRBC # FLD: 0.02 K/UL — SIGNIFICANT CHANGE UP (ref 0–0)
NRBC # FLD: 0.03 K/UL — SIGNIFICANT CHANGE UP (ref 0–0)
ORGANISM # SPEC MICROSCOPIC CNT: SIGNIFICANT CHANGE UP
ORGANISM # SPEC MICROSCOPIC CNT: SIGNIFICANT CHANGE UP
PCO2 BLDA: 40 MMHG — SIGNIFICANT CHANGE UP (ref 35–48)
PH BLDA: 7.45 PH — SIGNIFICANT CHANGE UP (ref 7.35–7.45)
PHOSPHATE SERPL-MCNC: 3 MG/DL — SIGNIFICANT CHANGE UP (ref 2.5–4.5)
PLATELET # BLD AUTO: 127 K/UL — LOW (ref 150–400)
PLATELET # BLD AUTO: 135 K/UL — LOW (ref 150–400)
PMV BLD: 9.5 FL — SIGNIFICANT CHANGE UP (ref 7–13)
PMV BLD: 9.7 FL — SIGNIFICANT CHANGE UP (ref 7–13)
PO2 BLDA: 73 MMHG — LOW (ref 83–108)
POTASSIUM BLDA-SCNC: 3.4 MMOL/L — SIGNIFICANT CHANGE UP (ref 3.4–4.5)
POTASSIUM SERPL-MCNC: 3.5 MMOL/L — SIGNIFICANT CHANGE UP (ref 3.5–5.3)
POTASSIUM SERPL-SCNC: 3.5 MMOL/L — SIGNIFICANT CHANGE UP (ref 3.5–5.3)
PROT SERPL-MCNC: 5.6 G/DL — LOW (ref 6–8.3)
RBC # BLD: 2.55 M/UL — LOW (ref 4.2–5.8)
RBC # BLD: 2.7 M/UL — LOW (ref 4.2–5.8)
RBC # FLD: 13.5 % — SIGNIFICANT CHANGE UP (ref 10.3–14.5)
RBC # FLD: 13.7 % — SIGNIFICANT CHANGE UP (ref 10.3–14.5)
SAO2 % BLDA: 94.6 % — LOW (ref 95–99)
SODIUM BLDA-SCNC: 139 MMOL/L — SIGNIFICANT CHANGE UP (ref 136–146)
SODIUM SERPL-SCNC: 138 MMOL/L — SIGNIFICANT CHANGE UP (ref 135–145)
VANCOMYCIN TROUGH SERPL-MCNC: 14.6 UG/ML — SIGNIFICANT CHANGE UP (ref 10–20)
WBC # BLD: 5.21 K/UL — SIGNIFICANT CHANGE UP (ref 3.8–10.5)
WBC # BLD: 6.13 K/UL — SIGNIFICANT CHANGE UP (ref 3.8–10.5)
WBC # FLD AUTO: 5.21 K/UL — SIGNIFICANT CHANGE UP (ref 3.8–10.5)
WBC # FLD AUTO: 6.13 K/UL — SIGNIFICANT CHANGE UP (ref 3.8–10.5)

## 2019-11-03 PROCEDURE — 99291 CRITICAL CARE FIRST HOUR: CPT

## 2019-11-03 PROCEDURE — 71045 X-RAY EXAM CHEST 1 VIEW: CPT | Mod: 26

## 2019-11-03 RX ORDER — FENTANYL CITRATE 50 UG/ML
100 INJECTION INTRAVENOUS ONCE
Refills: 0 | Status: DISCONTINUED | OUTPATIENT
Start: 2019-11-03 | End: 2019-11-03

## 2019-11-03 RX ORDER — CISATRACURIUM BESYLATE 2 MG/ML
20 INJECTION INTRAVENOUS ONCE
Refills: 0 | Status: COMPLETED | OUTPATIENT
Start: 2019-11-03 | End: 2019-11-03

## 2019-11-03 RX ORDER — POTASSIUM CHLORIDE 20 MEQ
40 PACKET (EA) ORAL ONCE
Refills: 0 | Status: COMPLETED | OUTPATIENT
Start: 2019-11-03 | End: 2019-11-04

## 2019-11-03 RX ADMIN — CHLORHEXIDINE GLUCONATE 15 MILLILITER(S): 213 SOLUTION TOPICAL at 17:07

## 2019-11-03 RX ADMIN — FENTANYL CITRATE 6.75 MICROGRAM(S)/KG/HR: 50 INJECTION INTRAVENOUS at 08:09

## 2019-11-03 RX ADMIN — FENTANYL CITRATE 6.75 MICROGRAM(S)/KG/HR: 50 INJECTION INTRAVENOUS at 19:25

## 2019-11-03 RX ADMIN — CISATRACURIUM BESYLATE 16.2 MICROGRAM(S)/KG/MIN: 2 INJECTION INTRAVENOUS at 19:23

## 2019-11-03 RX ADMIN — Medication 1 APPLICATION(S): at 18:07

## 2019-11-03 RX ADMIN — PIPERACILLIN AND TAZOBACTAM 25 GRAM(S): 4; .5 INJECTION, POWDER, LYOPHILIZED, FOR SOLUTION INTRAVENOUS at 08:49

## 2019-11-03 RX ADMIN — Medication 1: at 12:08

## 2019-11-03 RX ADMIN — MIDAZOLAM HYDROCHLORIDE 2.7 MG/KG/HR: 1 INJECTION, SOLUTION INTRAMUSCULAR; INTRAVENOUS at 19:24

## 2019-11-03 RX ADMIN — HEPARIN SODIUM 2400 UNIT(S)/HR: 5000 INJECTION INTRAVENOUS; SUBCUTANEOUS at 11:11

## 2019-11-03 RX ADMIN — MIDAZOLAM HYDROCHLORIDE 2.7 MG/KG/HR: 1 INJECTION, SOLUTION INTRAMUSCULAR; INTRAVENOUS at 08:09

## 2019-11-03 RX ADMIN — LEVETIRACETAM 400 MILLIGRAM(S): 250 TABLET, FILM COATED ORAL at 05:05

## 2019-11-03 RX ADMIN — HEPARIN SODIUM 2400 UNIT(S)/HR: 5000 INJECTION INTRAVENOUS; SUBCUTANEOUS at 04:07

## 2019-11-03 RX ADMIN — FENTANYL CITRATE 100 MICROGRAM(S): 50 INJECTION INTRAVENOUS at 01:50

## 2019-11-03 RX ADMIN — Medication 300 MILLIGRAM(S): at 12:04

## 2019-11-03 RX ADMIN — PROPOFOL 8.1 MICROGRAM(S)/KG/MIN: 10 INJECTION, EMULSION INTRAVENOUS at 19:24

## 2019-11-03 RX ADMIN — CHLORHEXIDINE GLUCONATE 15 MILLILITER(S): 213 SOLUTION TOPICAL at 05:06

## 2019-11-03 RX ADMIN — FENTANYL CITRATE 100 MICROGRAM(S): 50 INJECTION INTRAVENOUS at 23:30

## 2019-11-03 RX ADMIN — CISATRACURIUM BESYLATE 20 MILLIGRAM(S): 2 INJECTION INTRAVENOUS at 01:57

## 2019-11-03 RX ADMIN — PIPERACILLIN AND TAZOBACTAM 25 GRAM(S): 4; .5 INJECTION, POWDER, LYOPHILIZED, FOR SOLUTION INTRAVENOUS at 16:24

## 2019-11-03 RX ADMIN — Medication 300 MILLIGRAM(S): at 20:37

## 2019-11-03 RX ADMIN — CISATRACURIUM BESYLATE 16.2 MICROGRAM(S)/KG/MIN: 2 INJECTION INTRAVENOUS at 08:08

## 2019-11-03 RX ADMIN — Medication 1 APPLICATION(S): at 05:06

## 2019-11-03 RX ADMIN — PROPOFOL 8.1 MICROGRAM(S)/KG/MIN: 10 INJECTION, EMULSION INTRAVENOUS at 08:09

## 2019-11-03 RX ADMIN — LEVETIRACETAM 400 MILLIGRAM(S): 250 TABLET, FILM COATED ORAL at 18:05

## 2019-11-03 RX ADMIN — FENTANYL CITRATE 100 MICROGRAM(S): 50 INJECTION INTRAVENOUS at 02:20

## 2019-11-03 RX ADMIN — Medication 300 MILLIGRAM(S): at 00:27

## 2019-11-03 NOTE — PROGRESS NOTE ADULT - ATTENDING COMMENTS
ARDS and Acute hypoxic respiratory failure due to aspiration pneumonia, on VVECMO for ARDS, decannulated 24 hrs ago.  Doing well on PC, but unable to get off neuromuscular blockers.   Continue abx. monthly or less

## 2019-11-03 NOTE — PROGRESS NOTE ADULT - SUBJECTIVE AND OBJECTIVE BOX
Interval Events: Overnight, the patient was weaned off nimbex while on pressure support. He became hypoxic, so nimbex was restarted.    REVIEW OF SYSTEMS:  Unable to assess ROS because intubated and sedated.    OBJECTIVE:  ICU Vital Signs Last 24 Hrs  T(C): 37.3 (03 Nov 2019 12:00), Max: 37.9 (03 Nov 2019 09:00)  T(F): 99.2 (03 Nov 2019 12:00), Max: 100.2 (03 Nov 2019 09:00)  HR: 75 (03 Nov 2019 12:00) (75 - 99)  BP: 111/55 (03 Nov 2019 12:00) (111/55 - 131/85)  BP(mean): 68 (03 Nov 2019 12:00) (68 - 92)  ABP: 104/45 (03 Nov 2019 12:00) (100/45 - 171/70)  ABP(mean): 63 (03 Nov 2019 12:00) (62 - 105)  RR: 20 (03 Nov 2019 12:00) (12 - 22)  SpO2: 99% (03 Nov 2019 12:00) (98% - 100%)    Mode: AC/ CMV (Assist Control/ Continuous Mandatory Ventilation), RR (machine): 20, FiO2: 40, PEEP: 12, ITime: 1, MAP: 16, PC: 12, PIP: 24    11-02 @ 08:01 - 11-03 @ 07:00  --------------------------------------------------------  IN: 5152.1 mL / OUT: 4225 mL / NET: 927.1 mL    11-03 @ 07:01 - 11-03 @ 12:32  --------------------------------------------------------  IN: 892.2 mL / OUT: 900 mL / NET: -7.8 mL      CAPILLARY BLOOD GLUCOSE      POCT Blood Glucose.: 163 mg/dL (03 Nov 2019 12:00)      PHYSICAL EXAM:  General: NAD  HEENT: NC/AT; PERRL, clear conjunctiva  Neck: supple  Respiratory: CTA b/l  Cardiovascular: +S1/S2; RRR  Abdomen: soft, NT/ND; +BS x4  Extremities: 2+ peripheral pulses b/l; no LE edema  Skin: normal color and turgor; no rash  Neurological: unable to assess due to patient's mental status.  Lines: Right arm arterial line.     HOSPITAL MEDICATIONS:  Standing Meds:  chlorhexidine 0.12% Liquid 15 milliLiter(s) Oral Mucosa every 12 hours  cisatracurium Infusion 2 MICROgram(s)/kG/Min IV Continuous <Continuous>  dextrose 5%. 1000 milliLiter(s) IV Continuous <Continuous>  fentaNYL   Infusion. 0.5 MICROgram(s)/kG/Hr IV Continuous <Continuous>  heparin  Infusion.  Unit(s)/Hr IV Continuous <Continuous>  insulin lispro (HumaLOG) corrective regimen sliding scale   SubCutaneous every 6 hours  levETIRAcetam  IVPB 500 milliGRAM(s) IV Intermittent every 12 hours  midazolam Infusion 0.02 mG/kG/Hr IV Continuous <Continuous>  norepinephrine Infusion 0.05 MICROgram(s)/kG/Min IV Continuous <Continuous>  petrolatum Ophthalmic Ointment 1 Application(s) Both EYES two times a day  piperacillin/tazobactam IVPB.. 4.5 Gram(s) IV Intermittent every 8 hours  polyethylene glycol 3350 17 Gram(s) Oral two times a day  propofol Infusion 10 MICROgram(s)/kG/Min IV Continuous <Continuous>  senna Syrup 15 milliLiter(s) Oral two times a day  vancomycin  IVPB 1500 milliGRAM(s) IV Intermittent every 8 hours  vancomycin  IVPB          PRN Meds:      LABS:                        8.5    6.13  )-----------( 135      ( 03 Nov 2019 09:55 )             26.1     Hgb Trend: 8.5<--, 8.1<--, 8.7<--, 9.5<--, 9.3<--  11-03    138  |  103  |  7   ----------------------------<  133<H>  3.5   |  25  |  0.74    Ca    8.7      03 Nov 2019 02:50  Phos  3.0     11-03  Mg     2.2     11-03    TPro  5.6<L>  /  Alb  2.6<L>  /  TBili  0.6  /  DBili  x   /  AST  40  /  ALT  44<H>  /  AlkPhos  45  11-03    Creatinine Trend: 0.74<--, 0.72<--, 0.64<--, 0.74<--, 0.78<--, 0.78<--  PT/INR - ( 02 Nov 2019 00:00 )   PT: 14.7 SEC;   INR: 1.31          PTT - ( 03 Nov 2019 09:55 )  PTT:71.5 SEC    Arterial Blood Gas:  11-03 @ 02:50  7.45/40/73/28/94.6/3.4  ABG lactate: 1.0  Arterial Blood Gas:  11-02 @ 23:06  7.46/40/68/28/93.7/3.9  ABG lactate: 1.1  Arterial Blood Gas:  11-02 @ 19:00  7.47/37/98/27/97.4/2.8  ABG lactate: 1.2  Arterial Blood Gas:  11-02 @ 16:45  7.37/53/94/28/96.8/4.9  ABG lactate: 0.6  Arterial Blood Gas:  11-02 @ 11:50  7.38/48/74/27/94.7/3.0  ABG lactate: 1.2  Arterial Blood Gas:  11-02 @ 06:10  7.40/46/78/27/95.0/3.5  ABG lactate: 0.8  Arterial Blood Gas:  11-02 @ 00:30  7.18/81/50/24/71.9/2.1  ABG lactate: 0.9  Arterial Blood Gas:  11-02 @ 00:00  7.35/51/86/26/95.5/2.1  ABG lactate: 0.8  Arterial Blood Gas:  11-01 @ 17:45  7.39/44/113/26/97.5/1.8  ABG lactate: 0.9  Arterial Blood Gas:  11-01 @ 12:47  7.42/43/95/27/97.3/3.1  ABG lactate: 1.0        MICROBIOLOGY:     RADIOLOGY:  [ ] Reviewed and interpreted by me    EKG:

## 2019-11-03 NOTE — PROGRESS NOTE ADULT - ASSESSMENT
54M with severe depression who presented to  10/28 with lethargy and hypoxemic/hypercapnic respiratory failure possibly related to overdose +/- pneumonia extubated but required reintubation 10/30 but with persistent hypoxemia refractory to conventional management and ultimately cannulated for VV ECMO 10/30/2019 for hypoxemic respiratory failure with ARDS and transferred to [facility] for further management    NEUROLOGY  Baseline AOX3. Has severe depression refractory to multiple medications/ECT with prior psych hospitalizations. Had a tonic-clonic seizure at . Was concern for overdose at . Per discussion with wife this morning, strong consideration for initial benzo withdrawal related seizures	  - Sedated with propofol, versed; requires Nimbex for now  - Neuro checks per ICU routine  - Psych evaluation; was being considered for inpatient admission at   - Concern for abrupt cessation of Clonazepam causing seizures; initial utox negative for benzos. Likely will restart PO benzo as take off clonazepam  - Seizure like episode at ; ? related overdose vs withdrawal. EEG unremarkable there. CTH unremarkable at . Consider repeat EEG  - On Keppra, neuro recs appreciated, taper keppra 500mg BID, switch on monday to 250mg BID.    -PT and OT evaluation, will continue as tolerated    CARDIOVASCULAR  TTE at  with HFrEF, [place], [place]. Unclear if new diagnosis of HFrEF. Bedside FREDI performed initially with severe LV dysfunction and grade I diastolic dysfunction, mild MR.   -Previously on NE likely related to sedation; maintain MAP>65 (had very labile BPs on precedex which improved after precedex was discontinued)  -No significant right heart dysfunction on echo  -Maintain net even; will most likely require additional lasix today  -ECG without ischemic changes; troponins peaked; will need to repeat echo. Ischemic cardiomyopathy vs sepsis cardiomyopathy    PULMONARY  Patient with severe hypoxemic respiratory failure likely severe ARDS with P/F 82 and now cannulated for VV-ECMO possibly related to aspiration pneumonia.   Bronchoscopy performed on arrival with billious secretions predominantly in the lower lobes; BAL sent for cyto (with red oil stain), micro, cell count, Legionella PCR    #VENTILATOR  -saturating well on Volume A/C  -on nimbex gtt, wean as tolerated    #ARDS  -Follow up BAL studies  -Nebs to help with secretion mobilization on VDR    INFECTIOUS DISEASE  High suspicion for aspiration pneumonia  -Follow up blood cultures and BAL cultures  -Legionella negative. DC azithromycin on 11/2.    -Continue vancomycin, zosyn   -BAL fluid sent for legionella PCR as well  -Follow fever curve/WBCs    RENAL/  No active issues   -Meredith in place for critically ill patient   -Goal net even today    GASTROINTESTINAL  -OGT/TFs  -Bowel regimen    HEME  -Heparin GTT while on ECMO; trend PTT  -Transfuse PRBCs PRN  -Monitor for hemolysis; follow LDH/haptoglobin--hapto low but LDH only minimally elevated and Hb relatively stable  -Follow platelets    ENDOCRINE  -Monitor FS/low dose SS    PROPHYLAXIS  VTE: Systemic AC while on circuit for 48hr     DISPO/ETHICS/GOC  FULL CODE

## 2019-11-04 DIAGNOSIS — G93.40 ENCEPHALOPATHY, UNSPECIFIED: ICD-10-CM

## 2019-11-04 DIAGNOSIS — T42.3X4A: ICD-10-CM

## 2019-11-04 DIAGNOSIS — F32.9 MAJOR DEPRESSIVE DISORDER, SINGLE EPISODE, UNSPECIFIED: ICD-10-CM

## 2019-11-04 LAB
ALBUMIN SERPL ELPH-MCNC: 2.8 G/DL — LOW (ref 3.3–5)
ALBUMIN SERPL ELPH-MCNC: 3 G/DL — LOW (ref 3.3–5)
ALP SERPL-CCNC: 45 U/L — SIGNIFICANT CHANGE UP (ref 40–120)
ALP SERPL-CCNC: 47 U/L — SIGNIFICANT CHANGE UP (ref 40–120)
ALT FLD-CCNC: 39 U/L — SIGNIFICANT CHANGE UP (ref 4–41)
ALT FLD-CCNC: 40 U/L — SIGNIFICANT CHANGE UP (ref 4–41)
ANION GAP SERPL CALC-SCNC: 12 MMO/L — SIGNIFICANT CHANGE UP (ref 7–14)
ANION GAP SERPL CALC-SCNC: 9 MMO/L — SIGNIFICANT CHANGE UP (ref 7–14)
ANISOCYTOSIS BLD QL: SLIGHT — SIGNIFICANT CHANGE UP
APTT BLD: 76.7 SEC — HIGH (ref 27.5–36.3)
AST SERPL-CCNC: 30 U/L — SIGNIFICANT CHANGE UP (ref 4–40)
AST SERPL-CCNC: 32 U/L — SIGNIFICANT CHANGE UP (ref 4–40)
BACTERIA BLD CULT: SIGNIFICANT CHANGE UP
BASE EXCESS BLDA CALC-SCNC: 1.4 MMOL/L — SIGNIFICANT CHANGE UP
BASE EXCESS BLDA CALC-SCNC: 3.3 MMOL/L — SIGNIFICANT CHANGE UP
BASOPHILS # BLD AUTO: 0.03 K/UL — SIGNIFICANT CHANGE UP (ref 0–0.2)
BASOPHILS # BLD AUTO: 0.05 K/UL — SIGNIFICANT CHANGE UP (ref 0–0.2)
BASOPHILS NFR BLD AUTO: 0.4 % — SIGNIFICANT CHANGE UP (ref 0–2)
BASOPHILS NFR BLD AUTO: 0.8 % — SIGNIFICANT CHANGE UP (ref 0–2)
BASOPHILS NFR SPEC: 0 % — SIGNIFICANT CHANGE UP (ref 0–2)
BILIRUB SERPL-MCNC: 0.3 MG/DL — SIGNIFICANT CHANGE UP (ref 0.2–1.2)
BILIRUB SERPL-MCNC: 0.4 MG/DL — SIGNIFICANT CHANGE UP (ref 0.2–1.2)
BLASTS # FLD: 0 % — SIGNIFICANT CHANGE UP (ref 0–0)
BLOOD GAS ARTERIAL - FIO2: 40 — SIGNIFICANT CHANGE UP
BUN SERPL-MCNC: 8 MG/DL — SIGNIFICANT CHANGE UP (ref 7–23)
BUN SERPL-MCNC: 9 MG/DL — SIGNIFICANT CHANGE UP (ref 7–23)
CALCIUM SERPL-MCNC: 8.9 MG/DL — SIGNIFICANT CHANGE UP (ref 8.4–10.5)
CALCIUM SERPL-MCNC: 9 MG/DL — SIGNIFICANT CHANGE UP (ref 8.4–10.5)
CHLORIDE BLDA-SCNC: 109 MMOL/L — HIGH (ref 96–108)
CHLORIDE BLDA-SCNC: 111 MMOL/L — HIGH (ref 96–108)
CHLORIDE SERPL-SCNC: 107 MMOL/L — SIGNIFICANT CHANGE UP (ref 98–107)
CHLORIDE SERPL-SCNC: 107 MMOL/L — SIGNIFICANT CHANGE UP (ref 98–107)
CO2 SERPL-SCNC: 23 MMOL/L — SIGNIFICANT CHANGE UP (ref 22–31)
CO2 SERPL-SCNC: 27 MMOL/L — SIGNIFICANT CHANGE UP (ref 22–31)
CREAT BLDA-MCNC: 0.66 MG/DL — SIGNIFICANT CHANGE UP (ref 0.5–1.3)
CREAT SERPL-MCNC: 0.7 MG/DL — SIGNIFICANT CHANGE UP (ref 0.5–1.3)
CREAT SERPL-MCNC: 0.83 MG/DL — SIGNIFICANT CHANGE UP (ref 0.5–1.3)
CULTURE RESULTS: SIGNIFICANT CHANGE UP
CULTURE RESULTS: SIGNIFICANT CHANGE UP
EOSINOPHIL # BLD AUTO: 0.11 K/UL — SIGNIFICANT CHANGE UP (ref 0–0.5)
EOSINOPHIL # BLD AUTO: 0.11 K/UL — SIGNIFICANT CHANGE UP (ref 0–0.5)
EOSINOPHIL NFR BLD AUTO: 1.6 % — SIGNIFICANT CHANGE UP (ref 0–6)
EOSINOPHIL NFR BLD AUTO: 1.7 % — SIGNIFICANT CHANGE UP (ref 0–6)
EOSINOPHIL NFR FLD: 0.9 % — SIGNIFICANT CHANGE UP (ref 0–6)
GLUCOSE BLDA-MCNC: 122 MG/DL — HIGH (ref 70–99)
GLUCOSE BLDA-MCNC: 146 MG/DL — HIGH (ref 70–99)
GLUCOSE BLDC GLUCOMTR-MCNC: 129 MG/DL — HIGH (ref 70–99)
GLUCOSE BLDC GLUCOMTR-MCNC: 133 MG/DL — HIGH (ref 70–99)
GLUCOSE BLDC GLUCOMTR-MCNC: 142 MG/DL — HIGH (ref 70–99)
GLUCOSE BLDC GLUCOMTR-MCNC: 165 MG/DL — HIGH (ref 70–99)
GLUCOSE SERPL-MCNC: 138 MG/DL — HIGH (ref 70–99)
GLUCOSE SERPL-MCNC: 157 MG/DL — HIGH (ref 70–99)
HCO3 BLDA-SCNC: 25 MMOL/L — SIGNIFICANT CHANGE UP (ref 22–26)
HCO3 BLDA-SCNC: 27 MMOL/L — HIGH (ref 22–26)
HCT VFR BLD CALC: 25.8 % — LOW (ref 39–50)
HCT VFR BLD CALC: 25.8 % — LOW (ref 39–50)
HCT VFR BLD CALC: 26.3 % — LOW (ref 39–50)
HCT VFR BLDA CALC: 27 % — LOW (ref 39–51)
HCT VFR BLDA CALC: 27.1 % — LOW (ref 39–51)
HGB BLD-MCNC: 8.2 G/DL — LOW (ref 13–17)
HGB BLD-MCNC: 8.2 G/DL — LOW (ref 13–17)
HGB BLD-MCNC: 8.6 G/DL — LOW (ref 13–17)
HGB BLDA-MCNC: 8.7 G/DL — LOW (ref 13–17)
HGB BLDA-MCNC: 8.7 G/DL — LOW (ref 13–17)
HYPOCHROMIA BLD QL: SLIGHT — SIGNIFICANT CHANGE UP
IMM GRANULOCYTES NFR BLD AUTO: 9 % — HIGH (ref 0–1.5)
IMM GRANULOCYTES NFR BLD AUTO: 9.4 % — HIGH (ref 0–1.5)
LACTATE BLDA-SCNC: 0.8 MMOL/L — SIGNIFICANT CHANGE UP (ref 0.5–2)
LACTATE BLDA-SCNC: 0.9 MMOL/L — SIGNIFICANT CHANGE UP (ref 0.5–2)
LYMPHOCYTES # BLD AUTO: 0.59 K/UL — LOW (ref 1–3.3)
LYMPHOCYTES # BLD AUTO: 0.66 K/UL — LOW (ref 1–3.3)
LYMPHOCYTES # BLD AUTO: 9.3 % — LOW (ref 13–44)
LYMPHOCYTES # BLD AUTO: 9.8 % — LOW (ref 13–44)
LYMPHOCYTES NFR SPEC AUTO: 7.9 % — LOW (ref 13–44)
MACROCYTES BLD QL: SLIGHT — SIGNIFICANT CHANGE UP
MAGNESIUM SERPL-MCNC: 2.3 MG/DL — SIGNIFICANT CHANGE UP (ref 1.6–2.6)
MAGNESIUM SERPL-MCNC: 2.3 MG/DL — SIGNIFICANT CHANGE UP (ref 1.6–2.6)
MCHC RBC-ENTMCNC: 30.7 PG — SIGNIFICANT CHANGE UP (ref 27–34)
MCHC RBC-ENTMCNC: 30.7 PG — SIGNIFICANT CHANGE UP (ref 27–34)
MCHC RBC-ENTMCNC: 31.8 % — LOW (ref 32–36)
MCHC RBC-ENTMCNC: 31.8 % — LOW (ref 32–36)
MCHC RBC-ENTMCNC: 31.9 PG — SIGNIFICANT CHANGE UP (ref 27–34)
MCHC RBC-ENTMCNC: 32.7 % — SIGNIFICANT CHANGE UP (ref 32–36)
MCV RBC AUTO: 96.6 FL — SIGNIFICANT CHANGE UP (ref 80–100)
MCV RBC AUTO: 96.6 FL — SIGNIFICANT CHANGE UP (ref 80–100)
MCV RBC AUTO: 97.4 FL — SIGNIFICANT CHANGE UP (ref 80–100)
METAMYELOCYTES # FLD: 1.8 % — HIGH (ref 0–1)
MONOCYTES # BLD AUTO: 0.5 K/UL — SIGNIFICANT CHANGE UP (ref 0–0.9)
MONOCYTES # BLD AUTO: 0.52 K/UL — SIGNIFICANT CHANGE UP (ref 0–0.9)
MONOCYTES NFR BLD AUTO: 7.4 % — SIGNIFICANT CHANGE UP (ref 2–14)
MONOCYTES NFR BLD AUTO: 8.2 % — SIGNIFICANT CHANGE UP (ref 2–14)
MONOCYTES NFR BLD: 6.1 % — SIGNIFICANT CHANGE UP (ref 2–9)
MYELOCYTES NFR BLD: 4.4 % — HIGH (ref 0–0)
NEUTROPHIL AB SER-ACNC: 78.9 % — HIGH (ref 43–77)
NEUTROPHILS # BLD AUTO: 4.52 K/UL — SIGNIFICANT CHANGE UP (ref 1.8–7.4)
NEUTROPHILS # BLD AUTO: 4.8 K/UL — SIGNIFICANT CHANGE UP (ref 1.8–7.4)
NEUTROPHILS NFR BLD AUTO: 71 % — SIGNIFICANT CHANGE UP (ref 43–77)
NEUTROPHILS NFR BLD AUTO: 71.4 % — SIGNIFICANT CHANGE UP (ref 43–77)
NEUTS BAND # BLD: 0 % — SIGNIFICANT CHANGE UP (ref 0–6)
NON-GYNECOLOGICAL CYTOLOGY STUDY: SIGNIFICANT CHANGE UP
NRBC # BLD: 1 /100WBC — SIGNIFICANT CHANGE UP
NRBC # FLD: 0.03 K/UL — SIGNIFICANT CHANGE UP (ref 0–0)
OTHER - HEMATOLOGY %: 0 — SIGNIFICANT CHANGE UP
PCO2 BLDA: 40 MMHG — SIGNIFICANT CHANGE UP (ref 35–48)
PCO2 BLDA: 46 MMHG — SIGNIFICANT CHANGE UP (ref 35–48)
PH BLDA: 7.37 PH — SIGNIFICANT CHANGE UP (ref 7.35–7.45)
PH BLDA: 7.45 PH — SIGNIFICANT CHANGE UP (ref 7.35–7.45)
PHOSPHATE SERPL-MCNC: 3.6 MG/DL — SIGNIFICANT CHANGE UP (ref 2.5–4.5)
PHOSPHATE SERPL-MCNC: 4.5 MG/DL — SIGNIFICANT CHANGE UP (ref 2.5–4.5)
PLATELET # BLD AUTO: 132 K/UL — LOW (ref 150–400)
PLATELET # BLD AUTO: 132 K/UL — LOW (ref 150–400)
PLATELET # BLD AUTO: 148 K/UL — LOW (ref 150–400)
PLATELET COUNT - ESTIMATE: NORMAL — SIGNIFICANT CHANGE UP
PMV BLD: 9.2 FL — SIGNIFICANT CHANGE UP (ref 7–13)
PO2 BLDA: 103 MMHG — SIGNIFICANT CHANGE UP (ref 83–108)
PO2 BLDA: 196 MMHG — HIGH (ref 83–108)
POLYCHROMASIA BLD QL SMEAR: SLIGHT — SIGNIFICANT CHANGE UP
POTASSIUM BLDA-SCNC: 3.8 MMOL/L — SIGNIFICANT CHANGE UP (ref 3.4–4.5)
POTASSIUM BLDA-SCNC: 4 MMOL/L — SIGNIFICANT CHANGE UP (ref 3.4–4.5)
POTASSIUM SERPL-MCNC: 4 MMOL/L — SIGNIFICANT CHANGE UP (ref 3.5–5.3)
POTASSIUM SERPL-MCNC: 4.3 MMOL/L — SIGNIFICANT CHANGE UP (ref 3.5–5.3)
POTASSIUM SERPL-SCNC: 4 MMOL/L — SIGNIFICANT CHANGE UP (ref 3.5–5.3)
POTASSIUM SERPL-SCNC: 4.3 MMOL/L — SIGNIFICANT CHANGE UP (ref 3.5–5.3)
PROMYELOCYTES # FLD: 0 % — SIGNIFICANT CHANGE UP (ref 0–0)
PROT SERPL-MCNC: 5.8 G/DL — LOW (ref 6–8.3)
PROT SERPL-MCNC: 6.1 G/DL — SIGNIFICANT CHANGE UP (ref 6–8.3)
RBC # BLD: 2.67 M/UL — LOW (ref 4.2–5.8)
RBC # BLD: 2.67 M/UL — LOW (ref 4.2–5.8)
RBC # BLD: 2.7 M/UL — LOW (ref 4.2–5.8)
RBC # FLD: 13.4 % — SIGNIFICANT CHANGE UP (ref 10.3–14.5)
RBC # FLD: 13.4 % — SIGNIFICANT CHANGE UP (ref 10.3–14.5)
RBC # FLD: 13.6 % — SIGNIFICANT CHANGE UP (ref 10.3–14.5)
REVIEW TO FOLLOW: YES — SIGNIFICANT CHANGE UP
SAO2 % BLDA: 97 % — SIGNIFICANT CHANGE UP (ref 95–99)
SAO2 % BLDA: 98.8 % — SIGNIFICANT CHANGE UP (ref 95–99)
SODIUM BLDA-SCNC: 140 MMOL/L — SIGNIFICANT CHANGE UP (ref 136–146)
SODIUM BLDA-SCNC: 140 MMOL/L — SIGNIFICANT CHANGE UP (ref 136–146)
SODIUM SERPL-SCNC: 142 MMOL/L — SIGNIFICANT CHANGE UP (ref 135–145)
SODIUM SERPL-SCNC: 143 MMOL/L — SIGNIFICANT CHANGE UP (ref 135–145)
SPECIMEN SOURCE: SIGNIFICANT CHANGE UP
SPECIMEN SOURCE: SIGNIFICANT CHANGE UP
VANCOMYCIN TROUGH SERPL-MCNC: 15.2 UG/ML — SIGNIFICANT CHANGE UP (ref 10–20)
VARIANT LYMPHS # BLD: 0 % — SIGNIFICANT CHANGE UP
WBC # BLD: 6.36 K/UL — SIGNIFICANT CHANGE UP (ref 3.8–10.5)
WBC # BLD: 6.36 K/UL — SIGNIFICANT CHANGE UP (ref 3.8–10.5)
WBC # BLD: 6.73 K/UL — SIGNIFICANT CHANGE UP (ref 3.8–10.5)
WBC # FLD AUTO: 6.36 K/UL — SIGNIFICANT CHANGE UP (ref 3.8–10.5)
WBC # FLD AUTO: 6.36 K/UL — SIGNIFICANT CHANGE UP (ref 3.8–10.5)
WBC # FLD AUTO: 6.73 K/UL — SIGNIFICANT CHANGE UP (ref 3.8–10.5)

## 2019-11-04 PROCEDURE — 90792 PSYCH DIAG EVAL W/MED SRVCS: CPT

## 2019-11-04 PROCEDURE — 71045 X-RAY EXAM CHEST 1 VIEW: CPT | Mod: 26

## 2019-11-04 PROCEDURE — 93971 EXTREMITY STUDY: CPT | Mod: 26

## 2019-11-04 PROCEDURE — 31500 INSERT EMERGENCY AIRWAY: CPT

## 2019-11-04 PROCEDURE — 99291 CRITICAL CARE FIRST HOUR: CPT | Mod: 25

## 2019-11-04 PROCEDURE — 99291 CRITICAL CARE FIRST HOUR: CPT

## 2019-11-04 PROCEDURE — 99233 SBSQ HOSP IP/OBS HIGH 50: CPT | Mod: GC

## 2019-11-04 RX ORDER — CHLORHEXIDINE GLUCONATE 213 G/1000ML
15 SOLUTION TOPICAL EVERY 12 HOURS
Refills: 0 | Status: DISCONTINUED | OUTPATIENT
Start: 2019-11-04 | End: 2019-11-06

## 2019-11-04 RX ORDER — FENTANYL CITRATE 50 UG/ML
100 INJECTION INTRAVENOUS ONCE
Refills: 0 | Status: DISCONTINUED | OUTPATIENT
Start: 2019-11-04 | End: 2019-11-04

## 2019-11-04 RX ORDER — FENTANYL CITRATE 50 UG/ML
100 INJECTION INTRAVENOUS ONCE
Refills: 0 | Status: DISCONTINUED | OUTPATIENT
Start: 2019-11-04 | End: 2019-11-03

## 2019-11-04 RX ORDER — MIDAZOLAM HYDROCHLORIDE 1 MG/ML
4 INJECTION, SOLUTION INTRAMUSCULAR; INTRAVENOUS ONCE
Refills: 0 | Status: DISCONTINUED | OUTPATIENT
Start: 2019-11-04 | End: 2019-11-04

## 2019-11-04 RX ORDER — LEVETIRACETAM 250 MG/1
250 TABLET, FILM COATED ORAL EVERY 12 HOURS
Refills: 0 | Status: COMPLETED | OUTPATIENT
Start: 2019-11-04 | End: 2019-11-06

## 2019-11-04 RX ORDER — HEPARIN SODIUM 5000 [USP'U]/ML
5000 INJECTION INTRAVENOUS; SUBCUTANEOUS EVERY 8 HOURS
Refills: 0 | Status: DISCONTINUED | OUTPATIENT
Start: 2019-11-04 | End: 2019-11-05

## 2019-11-04 RX ORDER — DEXMEDETOMIDINE HYDROCHLORIDE IN 0.9% SODIUM CHLORIDE 4 UG/ML
0.5 INJECTION INTRAVENOUS
Qty: 200 | Refills: 0 | Status: DISCONTINUED | OUTPATIENT
Start: 2019-11-04 | End: 2019-11-06

## 2019-11-04 RX ORDER — FUROSEMIDE 40 MG
40 TABLET ORAL ONCE
Refills: 0 | Status: COMPLETED | OUTPATIENT
Start: 2019-11-04 | End: 2019-11-04

## 2019-11-04 RX ORDER — METOCLOPRAMIDE HCL 10 MG
10 TABLET ORAL EVERY 8 HOURS
Refills: 0 | Status: COMPLETED | OUTPATIENT
Start: 2019-11-04 | End: 2019-11-05

## 2019-11-04 RX ADMIN — Medication 1 APPLICATION(S): at 05:15

## 2019-11-04 RX ADMIN — PROPOFOL 8.1 MICROGRAM(S)/KG/MIN: 10 INJECTION, EMULSION INTRAVENOUS at 08:09

## 2019-11-04 RX ADMIN — HEPARIN SODIUM 2400 UNIT(S)/HR: 5000 INJECTION INTRAVENOUS; SUBCUTANEOUS at 08:10

## 2019-11-04 RX ADMIN — LEVETIRACETAM 400 MILLIGRAM(S): 250 TABLET, FILM COATED ORAL at 05:14

## 2019-11-04 RX ADMIN — MIDAZOLAM HYDROCHLORIDE 2.7 MG/KG/HR: 1 INJECTION, SOLUTION INTRAMUSCULAR; INTRAVENOUS at 07:46

## 2019-11-04 RX ADMIN — Medication 1: at 18:08

## 2019-11-04 RX ADMIN — FENTANYL CITRATE 100 MICROGRAM(S): 50 INJECTION INTRAVENOUS at 21:50

## 2019-11-04 RX ADMIN — MIDAZOLAM HYDROCHLORIDE 4 MILLIGRAM(S): 1 INJECTION, SOLUTION INTRAMUSCULAR; INTRAVENOUS at 03:04

## 2019-11-04 RX ADMIN — Medication 40 MILLIGRAM(S): at 14:38

## 2019-11-04 RX ADMIN — PROPOFOL 8.1 MICROGRAM(S)/KG/MIN: 10 INJECTION, EMULSION INTRAVENOUS at 19:51

## 2019-11-04 RX ADMIN — Medication 1: at 00:08

## 2019-11-04 RX ADMIN — Medication 1 APPLICATION(S): at 18:28

## 2019-11-04 RX ADMIN — FENTANYL CITRATE 100 MICROGRAM(S): 50 INJECTION INTRAVENOUS at 03:04

## 2019-11-04 RX ADMIN — FENTANYL CITRATE 100 MICROGRAM(S): 50 INJECTION INTRAVENOUS at 21:35

## 2019-11-04 RX ADMIN — Medication 10 MILLIGRAM(S): at 05:14

## 2019-11-04 RX ADMIN — CHLORHEXIDINE GLUCONATE 15 MILLILITER(S): 213 SOLUTION TOPICAL at 05:14

## 2019-11-04 RX ADMIN — PIPERACILLIN AND TAZOBACTAM 25 GRAM(S): 4; .5 INJECTION, POWDER, LYOPHILIZED, FOR SOLUTION INTRAVENOUS at 08:30

## 2019-11-04 RX ADMIN — DEXMEDETOMIDINE HYDROCHLORIDE IN 0.9% SODIUM CHLORIDE 16.88 MICROGRAM(S)/KG/HR: 4 INJECTION INTRAVENOUS at 19:52

## 2019-11-04 RX ADMIN — Medication 300 MILLIGRAM(S): at 05:14

## 2019-11-04 RX ADMIN — FENTANYL CITRATE 6.75 MICROGRAM(S)/KG/HR: 50 INJECTION INTRAVENOUS at 08:10

## 2019-11-04 RX ADMIN — PIPERACILLIN AND TAZOBACTAM 25 GRAM(S): 4; .5 INJECTION, POWDER, LYOPHILIZED, FOR SOLUTION INTRAVENOUS at 00:08

## 2019-11-04 RX ADMIN — HEPARIN SODIUM 5000 UNIT(S): 5000 INJECTION INTRAVENOUS; SUBCUTANEOUS at 22:29

## 2019-11-04 RX ADMIN — DEXMEDETOMIDINE HYDROCHLORIDE IN 0.9% SODIUM CHLORIDE 16.88 MICROGRAM(S)/KG/HR: 4 INJECTION INTRAVENOUS at 10:35

## 2019-11-04 RX ADMIN — FENTANYL CITRATE 100 MICROGRAM(S): 50 INJECTION INTRAVENOUS at 11:00

## 2019-11-04 RX ADMIN — CHLORHEXIDINE GLUCONATE 15 MILLILITER(S): 213 SOLUTION TOPICAL at 18:28

## 2019-11-04 RX ADMIN — Medication 10 MILLIGRAM(S): at 19:56

## 2019-11-04 RX ADMIN — FENTANYL CITRATE 6.75 MICROGRAM(S)/KG/HR: 50 INJECTION INTRAVENOUS at 19:53

## 2019-11-04 RX ADMIN — MIDAZOLAM HYDROCHLORIDE 2.7 MG/KG/HR: 1 INJECTION, SOLUTION INTRAMUSCULAR; INTRAVENOUS at 08:09

## 2019-11-04 RX ADMIN — LEVETIRACETAM 400 MILLIGRAM(S): 250 TABLET, FILM COATED ORAL at 18:19

## 2019-11-04 RX ADMIN — MIDAZOLAM HYDROCHLORIDE 2.7 MG/KG/HR: 1 INJECTION, SOLUTION INTRAMUSCULAR; INTRAVENOUS at 19:52

## 2019-11-04 RX ADMIN — Medication 40 MILLIEQUIVALENT(S): at 00:08

## 2019-11-04 RX ADMIN — FENTANYL CITRATE 100 MICROGRAM(S): 50 INJECTION INTRAVENOUS at 11:15

## 2019-11-04 RX ADMIN — PIPERACILLIN AND TAZOBACTAM 25 GRAM(S): 4; .5 INJECTION, POWDER, LYOPHILIZED, FOR SOLUTION INTRAVENOUS at 18:07

## 2019-11-04 RX ADMIN — Medication 300 MILLIGRAM(S): at 16:00

## 2019-11-04 NOTE — BEHAVIORAL HEALTH ASSESSMENT NOTE - NSBHCHARTREVIEWVS_PSY_A_CORE FT
Vital Signs Last 24 Hrs  T(C): 37.8 (04 Nov 2019 13:00), Max: 37.9 (03 Nov 2019 16:00)  T(F): 100 (04 Nov 2019 13:00), Max: 100.2 (03 Nov 2019 16:00)  HR: 85 (04 Nov 2019 14:00) (75 - 110)  BP: 103/60 (04 Nov 2019 14:00) (103/60 - 155/80)  BP(mean): 70 (04 Nov 2019 14:00) (67 - 112)  RR: 20 (04 Nov 2019 14:00) (18 - 29)  SpO2: 100% (04 Nov 2019 14:00) (93% - 100%)

## 2019-11-04 NOTE — PROCEDURE NOTE - NSTRACHPOSTINTU_RESP_A_CORE
Appropriate capnography/Positive end tidal Co2 noted/Breath sounds bilateral/Breath sounds equal/Chest excursion noted/Chest X-Ray

## 2019-11-04 NOTE — PROGRESS NOTE ADULT - SUBJECTIVE AND OBJECTIVE BOX
SUBJECTIVE / OVERNIGHT EVENTS: Patient had no acute events overnight. Patient seen and examined at bedside this morning.     ROS: Unable to obtain       OBJECTIVE:  ICU Vital Signs Last 24 Hrs  T(C): 37.7 (04 Nov 2019 04:00), Max: 37.9 (03 Nov 2019 09:00)  T(F): 99.9 (04 Nov 2019 04:00), Max: 100.2 (03 Nov 2019 09:00)  HR: 85 (04 Nov 2019 07:24) (75 - 110)  BP: 111/57 (04 Nov 2019 07:00) (104/55 - 155/80)  BP(mean): 69 (04 Nov 2019 07:00) (67 - 112)  ABP: 109/50 (04 Nov 2019 07:00) (102/46 - 191/89)  ABP(mean): 69 (04 Nov 2019 07:00) (63 - 123)  RR: 20 (04 Nov 2019 07:00) (18 - 29)  SpO2: 99% (04 Nov 2019 07:24) (96% - 100%)    Mode: AC/ CMV (Assist Control/ Continuous Mandatory Ventilation), RR (machine): 20, FiO2: 40, PEEP: 12, ITime: 1, MAP: 17, PC: 12, PIP: 24    11-03 @ 07:01  -  11-04 @ 07:00  --------------------------------------------------------  IN: 4729.5 mL / OUT: 3800 mL / NET: 929.5 mL      CAPILLARY BLOOD GLUCOSE      POCT Blood Glucose.: 142 mg/dL (04 Nov 2019 05:13)      PHYSICAL EXAM:    General: NAD  HEENT: NC/AT; PERRL, clear conjunctiva  Neck: supple  Respiratory: CTA b/l  Cardiovascular: +S1/S2; RRR  Abdomen: soft, NT/ND; +BS x4  Extremities: 2+ peripheral pulses b/l; no LE edema  Skin: normal color and turgor; no rash  Neurological: unable to assess due to patient's mental status.  Lines: Right arm arterial line.       LINES:    HOSPITAL MEDICATIONS:  Standing Meds:  chlorhexidine 0.12% Liquid 15 milliLiter(s) Oral Mucosa every 12 hours  cisatracurium Infusion 2 MICROgram(s)/kG/Min IV Continuous <Continuous>  dextrose 5%. 1000 milliLiter(s) IV Continuous <Continuous>  fentaNYL   Infusion. 0.5 MICROgram(s)/kG/Hr IV Continuous <Continuous>  heparin  Infusion.  Unit(s)/Hr IV Continuous <Continuous>  insulin lispro (HumaLOG) corrective regimen sliding scale   SubCutaneous every 6 hours  levETIRAcetam  IVPB 500 milliGRAM(s) IV Intermittent every 12 hours  metoclopramide Injectable 10 milliGRAM(s) IV Push every 8 hours  midazolam Infusion 0.02 mG/kG/Hr IV Continuous <Continuous>  petrolatum Ophthalmic Ointment 1 Application(s) Both EYES two times a day  piperacillin/tazobactam IVPB.. 4.5 Gram(s) IV Intermittent every 8 hours  polyethylene glycol 3350 17 Gram(s) Oral two times a day  propofol Infusion 10 MICROgram(s)/kG/Min IV Continuous <Continuous>  senna Syrup 15 milliLiter(s) Oral two times a day  vancomycin  IVPB 1500 milliGRAM(s) IV Intermittent every 8 hours  vancomycin  IVPB          PRN Meds:      LABS:                        8.2    6.36  )-----------( 132      ( 04 Nov 2019 02:25 )             25.8     Hgb Trend: 8.2<--, 8.5<--, 8.1<--, 8.7<--, 9.5<--  11-04    143  |  107  |  8   ----------------------------<  157<H>  4.0   |  27  |  0.70    Ca    8.9      04 Nov 2019 02:25  Phos  3.6     11-04  Mg     2.3     11-04    TPro  5.8<L>  /  Alb  2.8<L>  /  TBili  0.3  /  DBili  x   /  AST  32  /  ALT  39  /  AlkPhos  47  11-04    Creatinine Trend: 0.70<--, 0.74<--, 0.72<--, 0.64<--, 0.74<--, 0.78<--  PTT - ( 04 Nov 2019 02:25 )  PTT:76.7 SEC    Arterial Blood Gas:  11-04 @ 02:25  7.45/40/196/27/98.8/3.3  ABG lactate: 0.9  Arterial Blood Gas:  11-03 @ 02:50  7.45/40/73/28/94.6/3.4  ABG lactate: 1.0  Arterial Blood Gas:  11-02 @ 23:06  7.46/40/68/28/93.7/3.9  ABG lactate: 1.1  Arterial Blood Gas:  11-02 @ 19:00  7.47/37/98/27/97.4/2.8  ABG lactate: 1.2  Arterial Blood Gas:  11-02 @ 16:45  7.37/53/94/28/96.8/4.9  ABG lactate: 0.6  Arterial Blood Gas:  11-02 @ 11:50  7.38/48/74/27/94.7/3.0  ABG lactate: 1.2 SUBJECTIVE / OVERNIGHT EVENTS: Patient had no acute events overnight. Nimbex off this morning     HPI:  54M with severe MDD with prior psych admissions/ECT who was initially admitted to  10/28 after presenting with lethargy and hypoxemia. Patient reportedly was dealing with severe depression and expressed SI to his wife. His wife reported he was sleeping throughout the day and in the evening she heard a thump and found him on the floor. At  he became obtunded with hypercapnic respiratory failure and was intubated and admitted to the CCU. Initial imaging showed lower lobe consolidations and he was treated with CTX/Azithromycin. Poison control was contacted and he was received NaHCO3 in the setting of mild QRS widening on ECG and suspected overdose. His Utox returned positive for barbiturates; he reportedly had positive testing for barbiturates in the past thought secondary to OTC supplementations he takes from a [employer] for sleep. On the morning of admission, he was noted to have a 30 second tonic clonic seizure and received IV benzo and keppra load. Subsequent EEG did not reveal epileptiform discharges. He was extubated in the morning of 10/28 but required BiPAP in the setting of increased oxygenation needs. On 10/29 he was agitated and was started on Precedex. He developed fevers and was broadened to cefepime. Overnight (10/30), he had a Tmax of 102.6 and worsening hypoxemia requiring reintubation. Was started on propofol, precedex and paralyzed but remained difficult to oxygenate with traditional mechanical ventilation requiring frequent bagging and ALI consult for ECMO was placed. He was steroids, nimbex gtt, vancomycin and received Lasix 40mg IVP. His P/F was 60 at time of consult; improved somewhat to 82 at 4:30 this morning on AC/28/460/18/100. The patient was cannulated for VV-ECMO and transfer to [facility] (30 Oct 2019 12:10)      ROS: Unable to obtain patient is orally intubated/sedated @this time       OBJECTIVE:  ICU Vital Signs Last 24 Hrs  T(C): 37.7 (04 Nov 2019 04:00), Max: 37.9 (03 Nov 2019 09:00)  T(F): 99.9 (04 Nov 2019 04:00), Max: 100.2 (03 Nov 2019 09:00)  HR: 85 (04 Nov 2019 07:24) (75 - 110)  BP: 111/57 (04 Nov 2019 07:00) (104/55 - 155/80)  BP(mean): 69 (04 Nov 2019 07:00) (67 - 112)  ABP: 109/50 (04 Nov 2019 07:00) (102/46 - 191/89)  ABP(mean): 69 (04 Nov 2019 07:00) (63 - 123)  RR: 20 (04 Nov 2019 07:00) (18 - 29)  SpO2: 99% (04 Nov 2019 07:24) (96% - 100%)    Mode: AC/ CMV (Assist Control/ Continuous Mandatory Ventilation), RR (machine): 20, FiO2: 40, PEEP: 12, ITime: 1, MAP: 17, PC: 12, PIP: 24    11-03 @ 07:01  -  11-04 @ 07:00  --------------------------------------------------------  IN: 4729.5 mL / OUT: 3800 mL / NET: 929.5 mL      CAPILLARY BLOOD GLUCOSE      POCT Blood Glucose.: 142 mg/dL (04 Nov 2019 05:13)      PHYSICAL EXAM:    General: NAD  HEENT: NC/AT; PERRLA, clear conjunctiva  Neck: supple  Respiratory: ventilator assisted breath sounds, rhonchi B/L    Cardiovascular: +S1/S2; RRR  Abdomen: obese, soft, NT/ND; +BS x4  Extremities: 2+ peripheral pulses b/l; no LE edema  Skin: normal color and turgor; no rash  Neurological: patient currently intubated/sedated unable to assess       HOSPITAL MEDICATIONS:  Standing Meds:  chlorhexidine 0.12% Liquid 15 milliLiter(s) Oral Mucosa every 12 hours  cisatracurium Infusion 2 MICROgram(s)/kG/Min IV Continuous <Continuous>  dextrose 5%. 1000 milliLiter(s) IV Continuous <Continuous>  fentaNYL   Infusion. 0.5 MICROgram(s)/kG/Hr IV Continuous <Continuous>  heparin  Infusion.  Unit(s)/Hr IV Continuous <Continuous>  insulin lispro (HumaLOG) corrective regimen sliding scale   SubCutaneous every 6 hours  levETIRAcetam  IVPB 500 milliGRAM(s) IV Intermittent every 12 hours  metoclopramide Injectable 10 milliGRAM(s) IV Push every 8 hours  midazolam Infusion 0.02 mG/kG/Hr IV Continuous <Continuous>  petrolatum Ophthalmic Ointment 1 Application(s) Both EYES two times a day  piperacillin/tazobactam IVPB.. 4.5 Gram(s) IV Intermittent every 8 hours  polyethylene glycol 3350 17 Gram(s) Oral two times a day  propofol Infusion 10 MICROgram(s)/kG/Min IV Continuous <Continuous>  senna Syrup 15 milliLiter(s) Oral two times a day  vancomycin  IVPB 1500 milliGRAM(s) IV Intermittent every 8 hours  vancomycin  IVPB          PRN Meds:      LABS:                        8.2    6.36  )-----------( 132      ( 04 Nov 2019 02:25 )             25.8     Hgb Trend: 8.2<--, 8.5<--, 8.1<--, 8.7<--, 9.5<--  11-04    143  |  107  |  8   ----------------------------<  157<H>  4.0   |  27  |  0.70    Ca    8.9      04 Nov 2019 02:25  Phos  3.6     11-04  Mg     2.3     11-04    TPro  5.8<L>  /  Alb  2.8<L>  /  TBili  0.3  /  DBili  x   /  AST  32  /  ALT  39  /  AlkPhos  47  11-04    Creatinine Trend: 0.70<--, 0.74<--, 0.72<--, 0.64<--, 0.74<--, 0.78<--  PTT - ( 04 Nov 2019 02:25 )  PTT:76.7 SEC    Arterial Blood Gas:  11-04 @ 02:25  7.45/40/196/27/98.8/3.3  ABG lactate: 0.9  Arterial Blood Gas:  11-03 @ 02:50  7.45/40/73/28/94.6/3.4  ABG lactate: 1.0  Arterial Blood Gas:  11-02 @ 23:06  7.46/40/68/28/93.7/3.9  ABG lactate: 1.1  Arterial Blood Gas:  11-02 @ 19:00  7.47/37/98/27/97.4/2.8  ABG lactate: 1.2  Arterial Blood Gas:  11-02 @ 16:45  7.37/53/94/28/96.8/4.9  ABG lactate: 0.6  Arterial Blood Gas:  11-02 @ 11:50  7.38/48/74/27/94.7/3.0  ABG lactate: 1.2

## 2019-11-04 NOTE — BEHAVIORAL HEALTH ASSESSMENT NOTE - NSBHCHARTREVIEWLAB_PSY_A_CORE FT
11-04    143  |  107  |  8   ----------------------------<  157<H>  4.0   |  27  |  0.70    Ca    8.9      04 Nov 2019 02:25  Phos  3.6     11-04  Mg     2.3     11-04    TPro  5.8<L>  /  Alb  2.8<L>  /  TBili  0.3  /  DBili  x   /  AST  32  /  ALT  39  /  AlkPhos  47  11-04                        8.6    6.73  )-----------( 148      ( 04 Nov 2019 14:20 )             26.3

## 2019-11-04 NOTE — BEHAVIORAL HEALTH ASSESSMENT NOTE - SUMMARY
Patient is a 53 y/o [place] male that is employed, , lives with his wife, has a supportive family, hx of severe depression and anxiety, multiple hx of psychiatry admissions (last in June 2019), tx refractory to many antidepressant meds, no hx of prior SA, hx of alcohol use- none recently, no hx of psychosis, received 5 tx of ECT then stopped, PMHx of hepatitis presenting with increased lethargy and hypoxemia. Found to have kieran overdose on urine toxicology and admitted to MICU because of respiratory depression c/b seizure requiring intubation and sedation. Psychiatry consulted for management of psychiatry medication because patient is being weaned off sedation.     11/4- On exam, patient is sedated.    Recommendations-  - Recheck EKG  - If qtc < 500, start haldol 25 mg q 6 hours PRN IV/IM for agitation Patient is a 55 y/o [place] male that is employed, , lives with his wife, has a supportive family, hx of severe depression and anxiety, multiple hx of psychiatry admissions (last in June 2019), tx refractory to many antidepressant meds, no hx of prior SA, hx of alcohol use- none recently, no hx of psychosis, received 5 tx of ECT (while in inpatient psych unit at [facility]), then stopped with no outpatient treatments in June 2019, PMHx of hepatitis presenting with increased lethargy and hypoxemia. As per records- patient was s/p fall at home. In ed was obtunded, noted to be in hypercarbic RF- requiring intubation. LL consolidation on CXray- started on antibiotics. Found to have barbiturates+ on urine toxicology. Course complicated by seizure. Extubated on 10/28- placed on BIPAP. Course continued to be complicated by fevers, worsening hypoxemia requiring reintubation. Was started on propofol, precedex and paralyzed but remained difficult to oxygenate with traditional mechanical ventilation requiring frequent bagging and ALI consult for ECMO was placed. The patient was cannulated for VV-ECMO and transfer to [facility]. Decannulated on Friday last week. Remained intubated on multiple sedative- Midazolam, Precedex, Propofol, Fentanyl. Psychiatry has been consulted as sedatives will slowly be weaned off- agitation management and also for baseline depression (was on multiple psychotropics)    11/4- On exam, patient is sedated.    Recommendations-  - Recheck EKG to ensure qtc<500  - Not starting a standing home psychotropic at this time considering his mentation. Aware that there is an increased risk for delirium- agitation as sedation is weaned, and patient becomes more awake. Will monitor symptoms for today. PRN meds as below for now.   - If qtc < 500, start haldol 2.5 mg q 6 hours PRN IV/IM for agitation

## 2019-11-04 NOTE — BEHAVIORAL HEALTH ASSESSMENT NOTE - HPI (INCLUDE ILLNESS QUALITY, SEVERITY, DURATION, TIMING, CONTEXT, MODIFYING FACTORS, ASSOCIATED SIGNS AND SYMPTOMS)
Patient is a 53 y/o [place] male that is employed, , lives with his wife, has a supportive family, hx of severe depression and anxiety, multiple hx of psychiatry admissions (last in June 2019), tx refractory to many antidepressant meds, no hx of prior SA, hx of alcohol use- none recently, no hx of psychosis, received 5 tx of ECT then stopped, PMHx of hepatitis presenting with increased lethargy and hypoxemia. Found to have kieran overdose on urine toxicology and admitted to MICU because of respiratory depression c/b seizure requiring intubation and sedation. Psychiatry consulted for management of psychiatry medication because patient is being weaned off sedation.   On exam, patient is sedated.  Collateral- wife at bedside. She explains over the past month the patient has had a lot of difficulty sleeping and his depression has worsened. She says that lately he has been feeling hopeless. He stopped the ECT therapy because he did not want to burden his wife and daughter with taking him to the treatments. His daughter is a teacher and comes by their house in the evening to spend time with the patient. However, he is alone for about 3-4 hours each day during the week. She says he has been able to work despite his depression and anxiety. At this time we are not sure if the kieran overdose was intentional.  Spoke with [facility] pharmacy ([phone]) for home meds. He was prescribed ambien ER 12.5 mg q hs, Seroquel 400 mg q hs, trazodone 150 mg q hs PO, Klonopin 1 mg q ID PO, and venlafaxine  mg QD.   Current psychiatrist is Dr. Cobian [phone] Patient is a 53 y/o Sudanese male that is employed, , lives with his wife, has a supportive family, hx of severe depression and anxiety, multiple hx of psychiatry admissions (last in June 2019), tx refractory to many antidepressant meds, no hx of prior SA, hx of alcohol use- none recently, no hx of psychosis, received 5 [facility] then stopped with no outpatient treatments in June 2019, PMHx of hepatitis presenting with increased lethargy and hypoxemia. As per records- patient was s/p fall at home. In ed was obtunded, noted to be in hypercarbic RF- requiring intubation. LL consolidation on CXray- started on antibiotics. Found to have barbiturates+ on urine toxicology. Course complicated by seizure. Extubated on 10/28- placed on BIPAP. Course continued to be complicated by fevers, worsening hypoxemia requiring reintubation. Was started on propofol, precedex and paralyzed but remained difficult to oxygenate with traditional mechanical ventilation requiring frequent bagging and ALI consult for ECMO was placed. The patient was cannulated for VV-ECMO and transfer to [facility]. Decannulated on Friday last week. Remained intubated on multiple sedative- Midazolam, Precedex, Propofol, Fentanyl. Psychiatry has been consulted as sedatives will slowly be weaned off- agitation management and also for baseline depression (was on multiple psychotropics)    On exam, patient is sedated.    Collateral- wife at bedside. She explains over the past month the patient has had a lot of difficulty sleeping and his depression has worsened. She says that lately he has been feeling hopeless. He stopped the ECT therapy because he did not want to burden his wife and daughter with taking him to the treatments. His daughter is a teacher and comes by their house in the evening to spend time with the patient. However, he is alone for about 3-4 hours each day during the week. She says he has been able to work despite his depression and anxiety. Wife states that patient has been taking over the counter Vallerian root for anxiety, and sleep.   Spoke with [facility] pharmacy ([phone]) for home meds. He was prescribed ambien ER 12.5 mg q hs, Seroquel 400 mg q hs, trazodone 150 mg q hs PO, Klonopin 1 mg q ID PO, and venlafaxine  mg QD.   Current psychiatrist is Dr. Cobian [phone]

## 2019-11-04 NOTE — PROCEDURE NOTE - ADDITIONAL PROCEDURE DETAILS
Critically ill pt requiring PIV access for medical management and/or pressor support. Under US guidance identified Rt UE vein and successfully placed 20g x 60cm angiocath into vessel. Placement confirmed s/p with ultrasound and catheter determined to be in patent lumen of vein. Pt tolerated well w/o complication.
Significant cuff leak with ETT. Removed ETT and re-intubated.

## 2019-11-04 NOTE — PROGRESS NOTE ADULT - ASSESSMENT
54 year old man with Respiratory failure, functional quadriplegia, Depression and encounter for palliative care

## 2019-11-04 NOTE — PROGRESS NOTE ADULT - ATTENDING COMMENTS
Agree with above. Seen and examined with team. Still intubated, on nimbex and sedatives. Hold nimbex and assess. Psych eval appreciated.

## 2019-11-04 NOTE — BEHAVIORAL HEALTH ASSESSMENT NOTE - RISK ASSESSMENT
Unable to determine Suicide Risk Patient's risk factors include psych hx of severe depression with anxiety, prior psych hospital admissions, and polypharmacy use. He is also an older male. Protective factors include being employed, supportive family, , and lives with wife.

## 2019-11-04 NOTE — PROCEDURE NOTE - NSPROCDETAILS_GEN_ALL_CORE
patient pre-oxygenated, tube inserted, placement confirmed
blood seen on insertion/secured in place/location identified, draped/prepped, sterile technique used/sterile technique, catheter placed/ultrasound utilization/dressing applied/flushes easily
sterile technique, catheter placed/sterile dressing applied/lumen(s) aspirated and flushed/ultrasound guidance/guidewire recovered

## 2019-11-04 NOTE — PROGRESS NOTE ADULT - ASSESSMENT
54M with severe depression who presented to  10/28 with lethargy and hypoxemic/hypercapnic respiratory failure possibly related to overdose +/- pneumonia extubated but required reintubation 10/30 but with persistent hypoxemia refractory to conventional management and ultimately cannulated for VV ECMO 10/30/2019 for hypoxemic respiratory failure with ARDS and transferred to [facility] for further management.     NEUROLOGY  Baseline AOX3. Has severe depression refractory to multiple medications/ECT with prior psych hospitalizations. Had a tonic-clonic seizure at . Was concern for overdose at . Per discussion with wife,strong consideration for initial benzo withdrawal related seizures	  - Sedated with propofol, versed; requires Nimbex for now  - Neuro checks per ICU routine  - Psych evaluation; was being considered for inpatient admission at   - Concern for abrupt cessation of Clonazepam causing seizures; initial utox negative for benzos. Likely will restart PO benzo as take off clonazepam  - Seizure like episode at ; ? related overdose vs withdrawal. EEG unremarkable there. CTH unremarkable at . Consider repeat EEG  - On Keppra, neuro recs appreciated, taper keppra 500mg BID, switch on monday to 250mg BID.     CARDIOVASCULAR  TTE at  with HFrEF, [place], [place]. Unclear if new diagnosis of HFrEF. Bedside FREDI performed initially with severe LV dysfunction and grade I diastolic dysfunction, mild MR.   -Previously on NE likely related to sedation; maintain MAP>65 (had very labile BPs on precedex which improved after precedex was discontinued)  -No significant right heart dysfunction on echo  -Maintain net even; will most likely require additional lasix today  -ECG without ischemic changes; troponins peaked; will need to repeat echo. Ischemic cardiomyopathy vs sepsis cardiomyopathy    PULMONARY  Patient with severe hypoxemic respiratory failure likely severe ARDS with P/F 82 and now cannulated for VV-ECMO possibly related to aspiration pneumonia.   Bronchoscopy performed on arrival with billious secretions predominantly in the lower lobes; BAL sent for cyto (with red oil stain), micro, cell count, Legionella PCR    #VENTILATOR  -saturating well on Volume A/C  -on nimbex gtt, wean as tolerated    #ARDS  -Follow up BAL studies  -Nebs to help with secretion mobilization on VDR    INFECTIOUS DISEASE  High suspicion for aspiration pneumonia  -Follow up blood cultures and BAL cultures  -Legionella negative. DC azithromycin on 11/2.    -Continue vancomycin, zosyn   -BAL fluid sent for legionella PCR as well  -Follow fever curve/WBCs    RENAL/  No active issues   -Meredith in place for critically ill patient   -Goal net even today    GASTROINTESTINAL  -OGT/TFs  -Bowel regimen    HEME  -Heparin GTT while on ECMO; trend PTT  -Transfuse PRBCs PRN  -Monitor for hemolysis; follow LDH/haptoglobin--hapto low but LDH only minimally elevated and Hb relatively stable  -Follow platelets    ENDOCRINE  -Monitor FS/low dose SS    PROPHYLAXIS  VTE: Systemic AC while on circuit for 48hr     DISPO/ETHICS/GOC: FULL CODE 54M with severe depression who presented to  10/28 with lethargy and hypoxemic/hypercapnic respiratory failure possibly related to overdose +/- pneumonia extubated but required reintubation 10/30 but with persistent hypoxemia refractory to conventional management and ultimately cannulated for VV ECMO 10/30/2019 for hypoxemic respiratory failure with ARDS and transferred to [facility] for further management. Decannulated 11/1, remains intubated, nimbex D/C'd this morning, will start to lower sedation.     NEUROLOGY  Baseline AOX3. Has Hx of severe depression refractory to multiple medications/ECT with prior psych hospitalizations. Had a tonic-clonic seizure at . Was concern for overdose at . Per discussion with wife, Strong consideration for initial benzo withdrawal related seizures	  - Sedated with propofol, versed, and fentanyl, required nimbex gtt over the weekened, now currently off   - Neuro checks per ICU routine  - Psych evaluation; was being considered for inpatient admission at   - Concern for abrupt cessation of Clonazepam causing seizures; initial utox negative for benzos. Likely will restart PO benzo as take off clonazepam  - Seizure like episode at ; ? related overdose vs withdrawal. EEG unremarkable there. CTH unremarkable at . Consider repeat EEG  - On Keppra, neuro recs appreciated, would like to taper keppra, currently on keppra 500mg BID, will taper today to 250mg BID x 2 days then D/C      PULMONARY  Patient with severe hypoxemic respiratory failure likely severe ARDS with P/F cannulated for VV-ECMO on 10/30 possibly related to aspiration pneumonia.   Bronchoscopy performed on arrival with billious secretions predominantly in the lower lobes; BAL sent for cyto (with red oil stain), micro, cell count, Legionella PCR  -tolerating current ventilator settings on Pressure control   -required nimbex for dyssynchrony now off without any issues    -Nebs to help with secretion mobilization and will continue with IPV     CARDIOVASCULAR  TTE at  with HFrEF, MR, [place]. Unclear if new diagnosis of HFrEF. Bedside FREDI performed initially with severe LV dysfunction and grade I diastolic dysfunction, mild MR.   -Previously on NE likely related to sedation; maintain MAP>65 (had very labile BPs on precedex which improved after precedex was discontinued)  -No significant right heart dysfunction on echo  -Maintain net even  -ECG without ischemic changes; troponins peaked; will need to repeat echo. Ischemic cardiomyopathy vs sepsis cardiomyopathy    SKIN/LINES:  -Left IJ TLC 10/30  -right radial katherin 10/30  -PIV/arrow x1   -Right IJ/Right fem site with intact sutures, open to air, clean and dry     GASTROINTESTINAL  -OGT in place tolerating tube feeds   -c/w Bowel regimen    RENAL/  No active issues   -Meredith in place for critically ill patient good UO    -Goal is to remain net even today    INFECTIOUS DISEASE  High suspicion for aspiration pneumonia  -blood cultures grew coag negative staph. most likely decontaminate, BAL cultures negative   -Legionella negative. DC azithromycin on 11/2.    -day 6 of vanco and zosyn, will D/C today    -BAL fluid sent for legionella PCR as well    HEME  -was on Heparin GTT while on ECMO PTT therapeutic   -official [facility] duplex ordered and to be completed today, will f/u and D/C heparin       ENDOCRINE  -Monitor FS/low dose SS    PROPHYLAXIS  -on heparin gtt, will D/C after official DVT study     PSYCH:  Patient with significant psych history, severe depression, on multiple medications, required ECT in the past.   -psych consult in place for recommendations     DISPO/ETHICS/GOC: FULL CODE 54M with severe depression who presented to  10/28 with lethargy and hypoxemic/hypercapnic respiratory failure possibly related to overdose +/- pneumonia extubated but required reintubation 10/30 but with persistent hypoxemia refractory to conventional management and ultimately cannulated for VV ECMO 10/30/2019 for hypoxemic respiratory failure with ARDS and transferred to [facility] for further management. Decannulated 11/1, remains intubated, nimbex D/C'd this morning, will start to lower sedation.     NEUROLOGY  Baseline AOX3. Has Hx of severe depression refractory to multiple medications/ECT with prior psych hospitalizations. Had a tonic-clonic seizure at . Was concern for overdose at . Per discussion with wife, Strong consideration for initial benzo withdrawal related seizures	  - Sedated with propofol, versed, and fentanyl, required nimbex gtt over the weekened, now currently off   - Neuro checks per ICU routine  - Psych evaluation; was being considered for inpatient admission at   - Concern for abrupt cessation of Clonazepam causing seizures; initial utox negative for benzos. Likely will restart PO benzo as take off clonazepam  - Seizure like episode at ; ? related overdose vs withdrawal. EEG unremarkable there. CTH unremarkable at . Consider repeat EEG  - On Keppra, neuro recs appreciated, would like to taper keppra, currently on keppra 500mg BID, will taper today to 250mg BID x 2 days then D/C      PULMONARY  Patient with severe hypoxemic respiratory failure likely severe ARDS with P/F cannulated for VV-ECMO on 10/30 possibly related to aspiration pneumonia.   Bronchoscopy performed on arrival with billious secretions predominantly in the lower lobes; BAL sent for cyto (with red oil stain), micro, cell count, Legionella PCR  -tolerating current ventilator settings on Pressure control   -required nimbex for dyssynchrony now off without any issues    -Nebs to help with secretion mobilization and will continue with IPV     CARDIOVASCULAR  TTE at  with HFrEF, [place], [place]. Unclear if new diagnosis of HFrEF. Bedside FREDI performed initially with severe LV dysfunction and grade I diastolic dysfunction, mild MR.   -Previously on NE likely related to sedation; maintain MAP>65 (had very labile BPs on precedex which improved after precedex was discontinued)  -No significant right heart dysfunction on echo  -Maintain net even  -ECG without ischemic changes; troponins peaked; will need to repeat echo. Ischemic cardiomyopathy vs sepsis cardiomyopathy    SKIN/LINES:  -Left IJ TLC 10/30  -right radial katherin 10/30  -PIV/arrow x1   -Right IJ/Right fem site with intact sutures, open to air, clean and dry     GASTROINTESTINAL  -OGT in place tolerating tube feeds   -c/w Bowel regimen    RENAL/  No active issues   -Meredith in place for critically ill patient good UO    -Goal is to remain net even today, will give lasix    INFECTIOUS DISEASE  High suspicion for aspiration pneumonia  -blood cultures grew coag negative staph. most likely decontaminate, BAL cultures +for staph aureus   -Legionella negative. DC azithromycin on 11/2.    -day 6 of vanco and zosyn, will D/C tomorrow for total of 7 day course   -BAL fluid sent for legionella PCR as well    HEME  -was on Heparin GTT while on ECMO PTT therapeutic   -official [facility] duplex ordered and to be completed today, will f/u and D/C heparin       ENDOCRINE  -Monitor FS/low dose SS    PROPHYLAXIS  -on heparin gtt, will D/C after official DVT study     PSYCH:  Patient with significant psych history, severe depression, on multiple medications, required ECT in the past.   -psych consult in place for recommendations     DISPO/ETHICS/GOC: FULL CODE

## 2019-11-05 LAB
ALBUMIN SERPL ELPH-MCNC: 3 G/DL — LOW (ref 3.3–5)
ALBUMIN SERPL ELPH-MCNC: 3.5 G/DL — SIGNIFICANT CHANGE UP (ref 3.3–5)
ALP SERPL-CCNC: 47 U/L — SIGNIFICANT CHANGE UP (ref 40–120)
ALP SERPL-CCNC: 48 U/L — SIGNIFICANT CHANGE UP (ref 40–120)
ALT FLD-CCNC: 33 U/L — SIGNIFICANT CHANGE UP (ref 4–41)
ALT FLD-CCNC: 34 U/L — SIGNIFICANT CHANGE UP (ref 4–41)
ANION GAP SERPL CALC-SCNC: 10 MMO/L — SIGNIFICANT CHANGE UP (ref 7–14)
ANION GAP SERPL CALC-SCNC: 10 MMO/L — SIGNIFICANT CHANGE UP (ref 7–14)
APPEARANCE UR: CLEAR — SIGNIFICANT CHANGE UP
AST SERPL-CCNC: 25 U/L — SIGNIFICANT CHANGE UP (ref 4–40)
AST SERPL-CCNC: 29 U/L — SIGNIFICANT CHANGE UP (ref 4–40)
BASE EXCESS BLDA CALC-SCNC: 3.2 MMOL/L — SIGNIFICANT CHANGE UP
BASOPHILS # BLD AUTO: 0.04 K/UL — SIGNIFICANT CHANGE UP (ref 0–0.2)
BASOPHILS NFR BLD AUTO: 0.6 % — SIGNIFICANT CHANGE UP (ref 0–2)
BILIRUB SERPL-MCNC: 0.4 MG/DL — SIGNIFICANT CHANGE UP (ref 0.2–1.2)
BILIRUB SERPL-MCNC: 0.4 MG/DL — SIGNIFICANT CHANGE UP (ref 0.2–1.2)
BILIRUB UR-MCNC: NEGATIVE — SIGNIFICANT CHANGE UP
BLOOD UR QL VISUAL: NEGATIVE — SIGNIFICANT CHANGE UP
BUN SERPL-MCNC: 12 MG/DL — SIGNIFICANT CHANGE UP (ref 7–23)
BUN SERPL-MCNC: 15 MG/DL — SIGNIFICANT CHANGE UP (ref 7–23)
CA-I BLDA-SCNC: 1.21 MMOL/L — SIGNIFICANT CHANGE UP (ref 1.15–1.29)
CALCIUM SERPL-MCNC: 9.2 MG/DL — SIGNIFICANT CHANGE UP (ref 8.4–10.5)
CALCIUM SERPL-MCNC: 9.3 MG/DL — SIGNIFICANT CHANGE UP (ref 8.4–10.5)
CHLORIDE SERPL-SCNC: 104 MMOL/L — SIGNIFICANT CHANGE UP (ref 98–107)
CHLORIDE SERPL-SCNC: 105 MMOL/L — SIGNIFICANT CHANGE UP (ref 98–107)
CO2 SERPL-SCNC: 25 MMOL/L — SIGNIFICANT CHANGE UP (ref 22–31)
CO2 SERPL-SCNC: 25 MMOL/L — SIGNIFICANT CHANGE UP (ref 22–31)
COLOR SPEC: YELLOW — SIGNIFICANT CHANGE UP
CORTIS SERPL-MCNC: 2.5 UG/DL — LOW (ref 2.7–18.4)
CREAT SERPL-MCNC: 0.86 MG/DL — SIGNIFICANT CHANGE UP (ref 0.5–1.3)
CREAT SERPL-MCNC: 0.89 MG/DL — SIGNIFICANT CHANGE UP (ref 0.5–1.3)
EOSINOPHIL # BLD AUTO: 0.14 K/UL — SIGNIFICANT CHANGE UP (ref 0–0.5)
EOSINOPHIL NFR BLD AUTO: 2 % — SIGNIFICANT CHANGE UP (ref 0–6)
GLUCOSE BLDA-MCNC: 133 MG/DL — HIGH (ref 70–99)
GLUCOSE BLDC GLUCOMTR-MCNC: 120 MG/DL — HIGH (ref 70–99)
GLUCOSE BLDC GLUCOMTR-MCNC: 140 MG/DL — HIGH (ref 70–99)
GLUCOSE BLDC GLUCOMTR-MCNC: 143 MG/DL — HIGH (ref 70–99)
GLUCOSE SERPL-MCNC: 136 MG/DL — HIGH (ref 70–99)
GLUCOSE SERPL-MCNC: 151 MG/DL — HIGH (ref 70–99)
GLUCOSE UR-MCNC: NEGATIVE — SIGNIFICANT CHANGE UP
GRAM STN SPT: SIGNIFICANT CHANGE UP
HCO3 BLDA-SCNC: 27 MMOL/L — HIGH (ref 22–26)
HCT VFR BLD CALC: 26.2 % — LOW (ref 39–50)
HCT VFR BLDA CALC: 27.4 % — LOW (ref 39–51)
HGB BLD-MCNC: 8.2 G/DL — LOW (ref 13–17)
HGB BLDA-MCNC: 8.8 G/DL — LOW (ref 13–17)
IMM GRANULOCYTES NFR BLD AUTO: 12.8 % — HIGH (ref 0–1.5)
KETONES UR-MCNC: NEGATIVE — SIGNIFICANT CHANGE UP
LACTATE BLDA-SCNC: 1.1 MMOL/L — SIGNIFICANT CHANGE UP (ref 0.5–2)
LEUKOCYTE ESTERASE UR-ACNC: NEGATIVE — SIGNIFICANT CHANGE UP
LYMPHOCYTES # BLD AUTO: 0.6 K/UL — LOW (ref 1–3.3)
LYMPHOCYTES # BLD AUTO: 8.7 % — LOW (ref 13–44)
MAGNESIUM SERPL-MCNC: 2.1 MG/DL — SIGNIFICANT CHANGE UP (ref 1.6–2.6)
MAGNESIUM SERPL-MCNC: 2.2 MG/DL — SIGNIFICANT CHANGE UP (ref 1.6–2.6)
MCHC RBC-ENTMCNC: 30.5 PG — SIGNIFICANT CHANGE UP (ref 27–34)
MCHC RBC-ENTMCNC: 31.3 % — LOW (ref 32–36)
MCV RBC AUTO: 97.4 FL — SIGNIFICANT CHANGE UP (ref 80–100)
MONOCYTES # BLD AUTO: 0.64 K/UL — SIGNIFICANT CHANGE UP (ref 0–0.9)
MONOCYTES NFR BLD AUTO: 9.3 % — SIGNIFICANT CHANGE UP (ref 2–14)
NEUTROPHILS # BLD AUTO: 4.59 K/UL — SIGNIFICANT CHANGE UP (ref 1.8–7.4)
NEUTROPHILS NFR BLD AUTO: 66.6 % — SIGNIFICANT CHANGE UP (ref 43–77)
NITRITE UR-MCNC: NEGATIVE — SIGNIFICANT CHANGE UP
NRBC # FLD: 0.03 K/UL — SIGNIFICANT CHANGE UP (ref 0–0)
PCO2 BLDA: 45 MMHG — SIGNIFICANT CHANGE UP (ref 35–48)
PH BLDA: 7.4 PH — SIGNIFICANT CHANGE UP (ref 7.35–7.45)
PH UR: 6 — SIGNIFICANT CHANGE UP (ref 5–8)
PHOSPHATE SERPL-MCNC: 4.1 MG/DL — SIGNIFICANT CHANGE UP (ref 2.5–4.5)
PHOSPHATE SERPL-MCNC: 4.3 MG/DL — SIGNIFICANT CHANGE UP (ref 2.5–4.5)
PLATELET # BLD AUTO: 153 K/UL — SIGNIFICANT CHANGE UP (ref 150–400)
PMV BLD: 9.5 FL — SIGNIFICANT CHANGE UP (ref 7–13)
PO2 BLDA: 68 MMHG — LOW (ref 83–108)
POTASSIUM BLDA-SCNC: 3.9 MMOL/L — SIGNIFICANT CHANGE UP (ref 3.4–4.5)
POTASSIUM SERPL-MCNC: 4 MMOL/L — SIGNIFICANT CHANGE UP (ref 3.5–5.3)
POTASSIUM SERPL-MCNC: 4.4 MMOL/L — SIGNIFICANT CHANGE UP (ref 3.5–5.3)
POTASSIUM SERPL-SCNC: 4 MMOL/L — SIGNIFICANT CHANGE UP (ref 3.5–5.3)
POTASSIUM SERPL-SCNC: 4.4 MMOL/L — SIGNIFICANT CHANGE UP (ref 3.5–5.3)
PROT SERPL-MCNC: 6.3 G/DL — SIGNIFICANT CHANGE UP (ref 6–8.3)
PROT SERPL-MCNC: 6.5 G/DL — SIGNIFICANT CHANGE UP (ref 6–8.3)
PROT UR-MCNC: 10 — SIGNIFICANT CHANGE UP
RBC # BLD: 2.69 M/UL — LOW (ref 4.2–5.8)
RBC # FLD: 13.3 % — SIGNIFICANT CHANGE UP (ref 10.3–14.5)
SAO2 % BLDA: 92.3 % — LOW (ref 95–99)
SODIUM BLDA-SCNC: 139 MMOL/L — SIGNIFICANT CHANGE UP (ref 136–146)
SODIUM SERPL-SCNC: 139 MMOL/L — SIGNIFICANT CHANGE UP (ref 135–145)
SODIUM SERPL-SCNC: 140 MMOL/L — SIGNIFICANT CHANGE UP (ref 135–145)
SP GR SPEC: 1.03 — SIGNIFICANT CHANGE UP (ref 1–1.04)
SPECIMEN SOURCE: SIGNIFICANT CHANGE UP
UROBILINOGEN FLD QL: NORMAL — SIGNIFICANT CHANGE UP
WBC # BLD: 6.89 K/UL — SIGNIFICANT CHANGE UP (ref 3.8–10.5)
WBC # FLD AUTO: 6.89 K/UL — SIGNIFICANT CHANGE UP (ref 3.8–10.5)

## 2019-11-05 PROCEDURE — 99291 CRITICAL CARE FIRST HOUR: CPT

## 2019-11-05 RX ORDER — HYDROCORTISONE 20 MG
50 TABLET ORAL EVERY 6 HOURS
Refills: 0 | Status: DISCONTINUED | OUTPATIENT
Start: 2019-11-05 | End: 2019-11-06

## 2019-11-05 RX ORDER — FUROSEMIDE 40 MG
40 TABLET ORAL ONCE
Refills: 0 | Status: COMPLETED | OUTPATIENT
Start: 2019-11-05 | End: 2019-11-05

## 2019-11-05 RX ORDER — HALOPERIDOL DECANOATE 100 MG/ML
2.5 INJECTION INTRAMUSCULAR EVERY 6 HOURS
Refills: 0 | Status: DISCONTINUED | OUTPATIENT
Start: 2019-11-05 | End: 2019-11-06

## 2019-11-05 RX ORDER — ACETAMINOPHEN 500 MG
1000 TABLET ORAL ONCE
Refills: 0 | Status: COMPLETED | OUTPATIENT
Start: 2019-11-05 | End: 2019-11-05

## 2019-11-05 RX ORDER — NOREPINEPHRINE BITARTRATE/D5W 8 MG/250ML
0.05 PLASTIC BAG, INJECTION (ML) INTRAVENOUS
Qty: 16 | Refills: 0 | Status: DISCONTINUED | OUTPATIENT
Start: 2019-11-05 | End: 2019-11-06

## 2019-11-05 RX ORDER — POTASSIUM CHLORIDE 20 MEQ
40 PACKET (EA) ORAL ONCE
Refills: 0 | Status: COMPLETED | OUTPATIENT
Start: 2019-11-05 | End: 2019-11-05

## 2019-11-05 RX ORDER — FENTANYL CITRATE 50 UG/ML
50 INJECTION INTRAVENOUS ONCE
Refills: 0 | Status: DISCONTINUED | OUTPATIENT
Start: 2019-11-05 | End: 2019-11-05

## 2019-11-05 RX ORDER — ENOXAPARIN SODIUM 100 MG/ML
40 INJECTION SUBCUTANEOUS EVERY 12 HOURS
Refills: 0 | Status: DISCONTINUED | OUTPATIENT
Start: 2019-11-05 | End: 2019-11-07

## 2019-11-05 RX ADMIN — DEXMEDETOMIDINE HYDROCHLORIDE IN 0.9% SODIUM CHLORIDE 16.88 MICROGRAM(S)/KG/HR: 4 INJECTION INTRAVENOUS at 08:32

## 2019-11-05 RX ADMIN — Medication 10 MILLIGRAM(S): at 17:52

## 2019-11-05 RX ADMIN — Medication 300 MILLIGRAM(S): at 08:32

## 2019-11-05 RX ADMIN — Medication 50 MILLIGRAM(S): at 23:59

## 2019-11-05 RX ADMIN — Medication 1000 MILLIGRAM(S): at 06:00

## 2019-11-05 RX ADMIN — PIPERACILLIN AND TAZOBACTAM 25 GRAM(S): 4; .5 INJECTION, POWDER, LYOPHILIZED, FOR SOLUTION INTRAVENOUS at 02:44

## 2019-11-05 RX ADMIN — Medication 400 MILLIGRAM(S): at 05:30

## 2019-11-05 RX ADMIN — CHLORHEXIDINE GLUCONATE 15 MILLILITER(S): 213 SOLUTION TOPICAL at 05:20

## 2019-11-05 RX ADMIN — FENTANYL CITRATE 6.75 MICROGRAM(S)/KG/HR: 50 INJECTION INTRAVENOUS at 19:25

## 2019-11-05 RX ADMIN — Medication 1: at 23:59

## 2019-11-05 RX ADMIN — ENOXAPARIN SODIUM 40 MILLIGRAM(S): 100 INJECTION SUBCUTANEOUS at 17:52

## 2019-11-05 RX ADMIN — Medication 10 MILLIGRAM(S): at 02:44

## 2019-11-05 RX ADMIN — Medication 40 MILLIEQUIVALENT(S): at 10:11

## 2019-11-05 RX ADMIN — LEVETIRACETAM 400 MILLIGRAM(S): 250 TABLET, FILM COATED ORAL at 17:53

## 2019-11-05 RX ADMIN — DEXMEDETOMIDINE HYDROCHLORIDE IN 0.9% SODIUM CHLORIDE 16.88 MICROGRAM(S)/KG/HR: 4 INJECTION INTRAVENOUS at 07:46

## 2019-11-05 RX ADMIN — PROPOFOL 8.1 MICROGRAM(S)/KG/MIN: 10 INJECTION, EMULSION INTRAVENOUS at 07:46

## 2019-11-05 RX ADMIN — Medication 40 MILLIGRAM(S): at 10:11

## 2019-11-05 RX ADMIN — Medication 1 APPLICATION(S): at 05:19

## 2019-11-05 RX ADMIN — FENTANYL CITRATE 50 MICROGRAM(S): 50 INJECTION INTRAVENOUS at 01:20

## 2019-11-05 RX ADMIN — CHLORHEXIDINE GLUCONATE 15 MILLILITER(S): 213 SOLUTION TOPICAL at 17:51

## 2019-11-05 RX ADMIN — MIDAZOLAM HYDROCHLORIDE 2.7 MG/KG/HR: 1 INJECTION, SOLUTION INTRAMUSCULAR; INTRAVENOUS at 07:45

## 2019-11-05 RX ADMIN — LEVETIRACETAM 400 MILLIGRAM(S): 250 TABLET, FILM COATED ORAL at 05:19

## 2019-11-05 RX ADMIN — FENTANYL CITRATE 6.75 MICROGRAM(S)/KG/HR: 50 INJECTION INTRAVENOUS at 07:45

## 2019-11-05 RX ADMIN — Medication 1 APPLICATION(S): at 17:53

## 2019-11-05 RX ADMIN — Medication 50 MILLIGRAM(S): at 17:52

## 2019-11-05 RX ADMIN — Medication 300 MILLIGRAM(S): at 00:00

## 2019-11-05 RX ADMIN — Medication 10 MILLIGRAM(S): at 10:12

## 2019-11-05 RX ADMIN — Medication 400 MILLIGRAM(S): at 17:04

## 2019-11-05 RX ADMIN — Medication 6.33 MICROGRAM(S)/KG/MIN: at 13:29

## 2019-11-05 RX ADMIN — HEPARIN SODIUM 5000 UNIT(S): 5000 INJECTION INTRAVENOUS; SUBCUTANEOUS at 05:20

## 2019-11-05 RX ADMIN — DEXMEDETOMIDINE HYDROCHLORIDE IN 0.9% SODIUM CHLORIDE 16.88 MICROGRAM(S)/KG/HR: 4 INJECTION INTRAVENOUS at 19:25

## 2019-11-05 RX ADMIN — PROPOFOL 8.1 MICROGRAM(S)/KG/MIN: 10 INJECTION, EMULSION INTRAVENOUS at 19:25

## 2019-11-05 RX ADMIN — Medication 1000 MILLIGRAM(S): at 17:34

## 2019-11-05 NOTE — PROGRESS NOTE ADULT - ASSESSMENT
54M with severe depression who presented to  10/28 with lethargy and hypoxemic/hypercapnic respiratory failure possibly related to overdose +/- pneumonia extubated but required reintubation 10/30 but with persistent hypoxemia refractory to conventional management and ultimately cannulated for VV ECMO 10/30/2019 for hypoxemic respiratory failure with ARDS and transferred to [facility] for further management. Decannulated 11/1, remains intubated, nimbex D/C'd this morning, will start to lower sedation.     NEUROLOGY  Baseline AOX3. Has Hx of severe depression refractory to multiple medications/ECT with prior psych hospitalizations. Had a tonic-clonic seizure at . Was concern for overdose at . Per discussion with wife, Strong consideration for initial benzo withdrawal related seizures	  - Sedated with propofol, versed, and fentanyl, required nimbex gtt over the weekened, now currently off   - Neuro checks per ICU routine  - Psych evaluation; was being considered for inpatient admission at   - Concern for abrupt cessation of Clonazepam causing seizures; initial utox negative for benzos. Likely will restart PO benzo as take off clonazepam  - Seizure like episode at ; ? related overdose vs withdrawal. EEG unremarkable there. CTH unremarkable at . Consider repeat EEG  - On Keppra, neuro recs appreciated, would like to taper keppra, currently on keppra 500mg BID, will taper today to 250mg BID x 2 days then D/C      PULMONARY  Patient with severe hypoxemic respiratory failure likely severe ARDS with P/F cannulated for VV-ECMO on 10/30 possibly related to aspiration pneumonia.   Bronchoscopy performed on arrival with billious secretions predominantly in the lower lobes; BAL sent for cyto (with red oil stain), micro, cell count, Legionella PCR  -tolerating current ventilator settings on Pressure control   -required nimbex for dyssynchrony now off without any issues    -Nebs to help with secretion mobilization and will continue with IPV     CARDIOVASCULAR  TTE at  with HFrEF, [place], [place]. Unclear if new diagnosis of HFrEF. Bedside FREDI performed initially with severe LV dysfunction and grade I diastolic dysfunction, mild MR.   -Previously on NE likely related to sedation; maintain MAP>65 (had very labile BPs on precedex which improved after precedex was discontinued)  -No significant right heart dysfunction on echo  -Maintain net even  -ECG without ischemic changes; troponins peaked; will need to repeat echo. Ischemic cardiomyopathy vs sepsis cardiomyopathy    SKIN/LINES:  -Left IJ TLC 10/30  -right radial katherin 10/30  -PIV/arrow x1   -Right IJ/Right fem site with intact sutures, open to air, clean and dry     GASTROINTESTINAL  -OGT in place tolerating tube feeds   -c/w Bowel regimen    RENAL/  No active issues   -Meredith in place for critically ill patient good UO    -Goal is to remain net even today, will give lasix    INFECTIOUS DISEASE  High suspicion for aspiration pneumonia  -blood cultures grew coag negative staph. most likely decontaminate, BAL cultures +for staph aureus   -Legionella negative. DC azithromycin on 11/2.    -day 6 of vanco and zosyn, will D/C tomorrow for total of 7 day course   -BAL fluid sent for legionella PCR as well    HEME  -was on Heparin GTT while on ECMO PTT therapeutic   -official [facility] duplex ordered and to be completed today, will f/u and D/C heparin       ENDOCRINE  -Monitor FS/low dose SS    PROPHYLAXIS  -on heparin gtt, will D/C after official DVT study     PSYCH:  Patient with significant psych history, severe depression, on multiple medications, required ECT in the past.   -psych consult in place for recommendations     DISPO/ETHICS/GOC: FULL CODE 54M with severe depression who presented to  10/28 with lethargy and hypoxemic/hypercapnic respiratory failure possibly related to overdose +/- pneumonia extubated but required reintubation 10/30 but with persistent hypoxemia refractory to conventional management and ultimately cannulated for VV ECMO 10/30/2019 for hypoxemic respiratory failure with ARDS and transferred to [facility] for further management. Decannulated 11/1, remains intubated, attempting to slowly wean down on sedation     NEUROLOGY  Baseline AOX3. Has Hx of severe depression refractory to multiple medications/ECT with prior psych hospitalizations. Had a tonic-clonic seizure at . Was concern for overdose at . Per discussion with wife, Strong consideration for initial benzo withdrawal related seizures	  - Sedated with propofol, versed, and fentanyl, required nimbex gtt over the weekened, now currently off, added precedex yesterday to help with decreasing sedation    - Neuro checks per ICU routine  - Psych evaluation; was being considered for inpatient admission at   - Concern for abrupt cessation of Clonazepam causing seizures; initial utox negative for benzos. Likely will restart PO benzo as take off clonazepam  - Seizure like episode at ; ? related overdose vs withdrawal. EEG unremarkable there. CTH unremarkable at . Consider repeat EEG  - On Keppra, neuro recs appreciated, would like to taper keppra, currently on keppra 250mg BID x 2 days will D/C tomorrow       PULMONARY  Patient with severe hypoxemic respiratory failure likely severe ARDS with P/F cannulated for VV-ECMO on 10/30 possibly related to aspiration pneumonia.   Bronchoscopy performed on arrival with billious secretions predominantly in the lower lobes; BAL sent for cyto (with red oil stain), micro, cell count, Legionella PCR  -tolerating current ventilator settings on volume control, tolerated CPAP trial but not quite ready to extubate due to high doses of sedation    -required nimbex for dyssynchrony now off without any issues    -Nebs to help with secretion mobilization and will continue with IPV     CARDIOVASCULAR  TTE at  with HFrEF, [place], [place]. Unclear if new diagnosis of HFrEF. Bedside FREDI performed initially with severe LV dysfunction and grade I diastolic dysfunction, mild MR.   -Previously on NE likely related to sedation; maintain MAP>65 (had very labile BPs on precedex which improved after precedex was discontinued), now back on precedex, will do FREDI today to assess LV function   -ECG without ischemic changes; troponins peaked; will need to repeat echo. Ischemic cardiomyopathy vs sepsis cardiomyopathy    SKIN/LINES:  -Left IJ TLC 10/30  -right radial katherin 10/30  -PIV/arrow x1   -Right IJ/Right fem site with intact sutures, open to air, clean and dry     GASTROINTESTINAL  -OGT in place tolerating tube feeds   -c/w Bowel regimen    RENAL/  No active issues    -Meredith in place for critically ill patient good UO    -Goal is remove fluid, keep net negative 1-2L, will give lasix today and f/u electrolytes     INFECTIOUS DISEASE  High suspicion for aspiration pneumonia  -blood cultures grew coag negative staph. most likely decontaminate, BAL cultures +for staph aureus   -Legionella negative. DC azithromycin on 11/2.    -day 7 of vanco and zosyn, will D/C for total of 7 day course     HEME  -was on Heparin GTT while on ECMO   -official [facility] duplex on 11/5 negative for DVT's started on Lovenox BID       ENDOCRINE  -Monitor FS/low dose SS    PROPHYLAXIS  -Lovenox BID     PSYCH:  Patient with significant psych history, severe depression, on multiple medications, required ECT in the past.   -psych consult in place for recommendations     DISPO/ETHICS/GOC: FULL CODE

## 2019-11-05 NOTE — PROGRESS NOTE ADULT - ATTENDING COMMENTS
Patient examined and care reviewed in detail on bedside rounds  Critically ill on vent with ARDS/high dose sedative-opiate requirement Good performance on SBT The challenge will be how to taper the sedative-opiate combination with H/O psych disorder  Frequent bedside visits with therapy change today.   I have personally provided 35+ minutes of critical care time concurrently with the resident/fellow; this excludes time spent on separate procedures.

## 2019-11-05 NOTE — PROGRESS NOTE ADULT - SUBJECTIVE AND OBJECTIVE BOX
Interval Events:    HPI:  54M with severe MDD with prior psych admissions/ECT who was initially admitted to  10/28 after presenting with lethargy and hypoxemia. Patient reportedly was dealing with severe depression and expressed SI to his wife. His wife reported he was sleeping throughout the day and in the evening she heard a thump and found him on the floor. At  he became obtunded with hypercapnic respiratory failure and was intubated and admitted to the CCU. Initial imaging showed lower lobe consolidations and he was treated with CTX/Azithromycin. Poison control was contacted and he was received NaHCO3 in the setting of mild QRS widening on ECG and suspected overdose. His Utox returned positive for barbiturates; he reportedly had positive testing for barbiturates in the past thought secondary to OTC supplementations he takes from a [employer] for sleep. On the morning of admission, he was noted to have a 30 second tonic clonic seizure and received IV benzo and keppra load. Subsequent EEG did not reveal epileptiform discharges. He was extubated in the morning of 10/28 but required BiPAP in the setting of increased oxygenation needs. On 10/29 he was agitated and was started on Precedex. He developed fevers and was broadened to cefepime. Overnight (10/30), he had a Tmax of 102.6 and worsening hypoxemia requiring reintubation. Was started on propofol, precedex and paralyzed but remained difficult to oxygenate with traditional mechanical ventilation requiring frequent bagging and ALI consult for ECMO was placed. He was steroids, nimbex gtt, vancomycin and received Lasix 40mg IVP. His P/F was 60 at time of consult; improved somewhat to 82 at 4:30 this morning on AC/28/460/18/100. The patient was cannulated for VV-ECMO and transfer to [facility] (30 Oct 2019 12:10)      REVIEW OF SYSTEMS:  [X ] Unable to assess ROS because patient is intubated/sedated     OBJECTIVE:  ICU Vital Signs Last 24 Hrs  T(C): 37.8 (05 Nov 2019 08:00), Max: 38.2 (05 Nov 2019 04:00)  T(F): 100.1 (05 Nov 2019 08:00), Max: 100.7 (05 Nov 2019 04:00)  HR: 62 (05 Nov 2019 11:08) (60 - 118)  BP: 105/62 (05 Nov 2019 11:00) (96/52 - 152/79)  BP(mean): 72 (05 Nov 2019 11:00) (62 - 98)  ABP: 110/56 (05 Nov 2019 11:00) (94/48 - 171/88)  ABP(mean): 73 (05 Nov 2019 11:00) (63 - 116)  RR: 22 (05 Nov 2019 11:00) (16 - 38)  SpO2: 93% (05 Nov 2019 11:08) (90% - 100%)    Mode: AC/ CMV (Assist Control/ Continuous Mandatory Ventilation), RR (machine): 18, TV (machine): 400, FiO2: 40, PEEP: 12, MAP: 13, PIP: 20    11-04 @ 07:01  -  11-05 @ 07:00  --------------------------------------------------------  IN: 4980.8 mL / OUT: 3765 mL / NET: 1215.8 mL    11-05 @ 07:01  -  11-05 @ 11:13  --------------------------------------------------------  IN: 1325.9 mL / OUT: 900 mL / NET: 425.9 mL      CAPILLARY BLOOD GLUCOSE      POCT Blood Glucose.: 120 mg/dL (05 Nov 2019 05:16)      Physical Exam:   General: NAD  HEENT: NC/AT; PERRLA, clear conjunctiva  Neck: supple  Respiratory: ventilator assisted breath sounds, rhonchi B/L    Cardiovascular: +S1/S2; RRR  Abdomen: obese, soft, NT/ND; +BS x4  Extremities: 2+ peripheral pulses b/l; no LE edema  Skin: normal color and turgor; no rash  Neurological: patient currently intubated/sedated unable to assess       HOSPITAL MEDICATIONS:  Standing Meds:  chlorhexidine 0.12% Liquid 15 milliLiter(s) Oral Mucosa every 12 hours  dexMEDEtomidine Infusion 0.5 MICROgram(s)/kG/Hr IV Continuous <Continuous>  dextrose 5%. 1000 milliLiter(s) IV Continuous <Continuous>  enoxaparin Injectable 40 milliGRAM(s) SubCutaneous every 12 hours  fentaNYL   Infusion. 0.5 MICROgram(s)/kG/Hr IV Continuous <Continuous>  insulin lispro (HumaLOG) corrective regimen sliding scale   SubCutaneous every 6 hours  levETIRAcetam  IVPB 250 milliGRAM(s) IV Intermittent every 12 hours  metoclopramide Injectable 10 milliGRAM(s) IV Push every 8 hours  midazolam Infusion 0.02 mG/kG/Hr IV Continuous <Continuous>  petrolatum Ophthalmic Ointment 1 Application(s) Both EYES two times a day  polyethylene glycol 3350 17 Gram(s) Oral two times a day  propofol Infusion 10 MICROgram(s)/kG/Min IV Continuous <Continuous>  senna Syrup 15 milliLiter(s) Oral two times a day      PRN Meds:      LABS:                        8.2    6.89  )-----------( 153      ( 05 Nov 2019 03:10 )             26.2     Hgb Trend: 8.2<--, 8.6<--, 8.2<--, 8.5<--, 8.1<--  11-05    139  |  104  |  12  ----------------------------<  136<H>  4.0   |  25  |  0.86    Ca    9.2      05 Nov 2019 03:10  Phos  4.1     11-05  Mg     2.2     11-05    TPro  6.3  /  Alb  3.0<L>  /  TBili  0.4  /  DBili  x   /  AST  29  /  ALT  34  /  AlkPhos  47  11-05    Creatinine Trend: 0.86<--, 0.83<--, 0.70<--, 0.74<--, 0.72<--, 0.64<--  PTT - ( 04 Nov 2019 02:25 )  PTT:76.7 SEC    Arterial Blood Gas:  11-05 @ 03:10  7.40/45/68/27/92.3/3.2  ABG lactate: 1.1  Arterial Blood Gas:  11-04 @ 14:20  7.37/46/103/25/97.0/1.4  ABG lactate: 0.8  Arterial Blood Gas:  11-04 @ 02:25  7.45/40/196/27/98.8/3.3  ABG lactate: 0.9        MICROBIOLOGY:     RADIOLOGY:  [ ] Reviewed and interpreted by me Interval Events:    HPI:  54M with severe MDD with prior psych admissions/ECT who was initially admitted to  10/28 after presenting with lethargy and hypoxemia. Patient reportedly was dealing with severe depression and expressed SI to his wife. His wife reported he was sleeping throughout the day and in the evening she heard a thump and found him on the floor. At  he became obtunded with hypercapnic respiratory failure and was intubated and admitted to the CCU. Initial imaging showed lower lobe consolidations and he was treated with CTX/Azithromycin. Poison control was contacted and he was received NaHCO3 in the setting of mild QRS widening on ECG and suspected overdose. His Utox returned positive for barbiturates; he reportedly had positive testing for barbiturates in the past thought secondary to OTC supplementations he takes from a [employer] for sleep. On the morning of admission, he was noted to have a 30 second tonic clonic seizure and received IV benzo and keppra load. Subsequent EEG did not reveal epileptiform discharges. He was extubated in the morning of 10/28 but required BiPAP in the setting of increased oxygenation needs. On 10/29 he was agitated and was started on Precedex. He developed fevers and was broadened to cefepime. Overnight (10/30), he had a Tmax of 102.6 and worsening hypoxemia requiring reintubation. Was started on propofol, precedex and paralyzed but remained difficult to oxygenate with traditional mechanical ventilation requiring frequent bagging and ALI consult for ECMO was placed. He was steroids, nimbex gtt, vancomycin and received Lasix 40mg IVP. His P/F was 60 at time of consult; improved somewhat to 82 at 4:30 this morning on AC/28/460/18/100. The patient was cannulated for VV-ECMO and transfer to [facility] (30 Oct 2019 12:10)      REVIEW OF SYSTEMS:  [X ] Unable to assess ROS because patient is intubated/sedated     OBJECTIVE:  ICU Vital Signs Last 24 Hrs  T(C): 37.8 (05 Nov 2019 08:00), Max: 38.2 (05 Nov 2019 04:00)  T(F): 100.1 (05 Nov 2019 08:00), Max: 100.7 (05 Nov 2019 04:00)  HR: 62 (05 Nov 2019 11:08) (60 - 118)  BP: 105/62 (05 Nov 2019 11:00) (96/52 - 152/79)  BP(mean): 72 (05 Nov 2019 11:00) (62 - 98)  ABP: 110/56 (05 Nov 2019 11:00) (94/48 - 171/88)  ABP(mean): 73 (05 Nov 2019 11:00) (63 - 116)  RR: 22 (05 Nov 2019 11:00) (16 - 38)  SpO2: 93% (05 Nov 2019 11:08) (90% - 100%)    Mode: AC/ CMV (Assist Control/ Continuous Mandatory Ventilation), RR (machine): 18, TV (machine): 400, FiO2: 40, PEEP: 12, MAP: 13, PIP: 20    11-04 @ 07:01  -  11-05 @ 07:00  --------------------------------------------------------  IN: 4980.8 mL / OUT: 3765 mL / NET: 1215.8 mL    11-05 @ 07:01  -  11-05 @ 11:13  --------------------------------------------------------  IN: 1325.9 mL / OUT: 900 mL / NET: 425.9 mL      CAPILLARY BLOOD GLUCOSE      POCT Blood Glucose.: 120 mg/dL (05 Nov 2019 05:16)      Physical Exam:   General: NAD  HEENT: NC/AT; PERRLA, clear conjunctiva  Neck: supple  Respiratory: ventilator assisted breath sounds, rhonchi B/L    Cardiovascular: +S1/S2; RRR  Abdomen: obese, soft, NT/ND; +BS x4  Extremities: 2+ peripheral pulses b/l; +1-2 LE edema  Skin: normal color and turgor; no rash   Neurological: patient currently intubated/sedated unable to assess       HOSPITAL MEDICATIONS:  Standing Meds:  chlorhexidine 0.12% Liquid 15 milliLiter(s) Oral Mucosa every 12 hours  dexMEDEtomidine Infusion 0.5 MICROgram(s)/kG/Hr IV Continuous <Continuous>  dextrose 5%. 1000 milliLiter(s) IV Continuous <Continuous>  enoxaparin Injectable 40 milliGRAM(s) SubCutaneous every 12 hours  fentaNYL   Infusion. 0.5 MICROgram(s)/kG/Hr IV Continuous <Continuous>  insulin lispro (HumaLOG) corrective regimen sliding scale   SubCutaneous every 6 hours  levETIRAcetam  IVPB 250 milliGRAM(s) IV Intermittent every 12 hours  metoclopramide Injectable 10 milliGRAM(s) IV Push every 8 hours  midazolam Infusion 0.02 mG/kG/Hr IV Continuous <Continuous>  petrolatum Ophthalmic Ointment 1 Application(s) Both EYES two times a day  polyethylene glycol 3350 17 Gram(s) Oral two times a day  propofol Infusion 10 MICROgram(s)/kG/Min IV Continuous <Continuous>  senna Syrup 15 milliLiter(s) Oral two times a day      PRN Meds:      LABS:                        8.2    6.89  )-----------( 153      ( 05 Nov 2019 03:10 )             26.2     Hgb Trend: 8.2<--, 8.6<--, 8.2<--, 8.5<--, 8.1<--  11-05    139  |  104  |  12  ----------------------------<  136<H>  4.0   |  25  |  0.86    Ca    9.2      05 Nov 2019 03:10  Phos  4.1     11-05  Mg     2.2     11-05    TPro  6.3  /  Alb  3.0<L>  /  TBili  0.4  /  DBili  x   /  AST  29  /  ALT  34  /  AlkPhos  47  11-05    Creatinine Trend: 0.86<--, 0.83<--, 0.70<--, 0.74<--, 0.72<--, 0.64<--  PTT - ( 04 Nov 2019 02:25 )  PTT:76.7 SEC    Arterial Blood Gas:  11-05 @ 03:10  7.40/45/68/27/92.3/3.2  ABG lactate: 1.1  Arterial Blood Gas:  11-04 @ 14:20  7.37/46/103/25/97.0/1.4  ABG lactate: 0.8  Arterial Blood Gas:  11-04 @ 02:25  7.45/40/196/27/98.8/3.3  ABG lactate: 0.9

## 2019-11-06 LAB
ALBUMIN SERPL ELPH-MCNC: 3.2 G/DL — LOW (ref 3.3–5)
ALP SERPL-CCNC: 45 U/L — SIGNIFICANT CHANGE UP (ref 40–120)
ALT FLD-CCNC: 30 U/L — SIGNIFICANT CHANGE UP (ref 4–41)
ANION GAP SERPL CALC-SCNC: 11 MMO/L — SIGNIFICANT CHANGE UP (ref 7–14)
AST SERPL-CCNC: 25 U/L — SIGNIFICANT CHANGE UP (ref 4–40)
BASE EXCESS BLDA CALC-SCNC: 2.3 MMOL/L — SIGNIFICANT CHANGE UP
BASOPHILS # BLD AUTO: 0.04 K/UL — SIGNIFICANT CHANGE UP (ref 0–0.2)
BASOPHILS NFR BLD AUTO: 0.6 % — SIGNIFICANT CHANGE UP (ref 0–2)
BILIRUB SERPL-MCNC: 0.3 MG/DL — SIGNIFICANT CHANGE UP (ref 0.2–1.2)
BUN SERPL-MCNC: 13 MG/DL — SIGNIFICANT CHANGE UP (ref 7–23)
CA-I BLDA-SCNC: 1.22 MMOL/L — SIGNIFICANT CHANGE UP (ref 1.15–1.29)
CALCIUM SERPL-MCNC: 8.8 MG/DL — SIGNIFICANT CHANGE UP (ref 8.4–10.5)
CHLORIDE SERPL-SCNC: 107 MMOL/L — SIGNIFICANT CHANGE UP (ref 98–107)
CO2 SERPL-SCNC: 23 MMOL/L — SIGNIFICANT CHANGE UP (ref 22–31)
CREAT SERPL-MCNC: 0.71 MG/DL — SIGNIFICANT CHANGE UP (ref 0.5–1.3)
EOSINOPHIL # BLD AUTO: 0.07 K/UL — SIGNIFICANT CHANGE UP (ref 0–0.5)
EOSINOPHIL NFR BLD AUTO: 1.1 % — SIGNIFICANT CHANGE UP (ref 0–6)
GLUCOSE BLDA-MCNC: 180 MG/DL — HIGH (ref 70–99)
GLUCOSE BLDC GLUCOMTR-MCNC: 107 MG/DL — HIGH (ref 70–99)
GLUCOSE BLDC GLUCOMTR-MCNC: 140 MG/DL — HIGH (ref 70–99)
GLUCOSE BLDC GLUCOMTR-MCNC: 168 MG/DL — HIGH (ref 70–99)
GLUCOSE BLDC GLUCOMTR-MCNC: 169 MG/DL — HIGH (ref 70–99)
GLUCOSE SERPL-MCNC: 172 MG/DL — HIGH (ref 70–99)
HCO3 BLDA-SCNC: 26 MMOL/L — SIGNIFICANT CHANGE UP (ref 22–26)
HCT VFR BLD CALC: 25.8 % — LOW (ref 39–50)
HCT VFR BLDA CALC: 28 % — LOW (ref 39–51)
HGB BLD-MCNC: 8.3 G/DL — LOW (ref 13–17)
HGB BLDA-MCNC: 9 G/DL — LOW (ref 13–17)
IMM GRANULOCYTES NFR BLD AUTO: 12.5 % — HIGH (ref 0–1.5)
LACTATE BLDA-SCNC: 0.7 MMOL/L — SIGNIFICANT CHANGE UP (ref 0.5–2)
LYMPHOCYTES # BLD AUTO: 0.56 K/UL — LOW (ref 1–3.3)
LYMPHOCYTES # BLD AUTO: 8.6 % — LOW (ref 13–44)
MAGNESIUM SERPL-MCNC: 2.2 MG/DL — SIGNIFICANT CHANGE UP (ref 1.6–2.6)
MCHC RBC-ENTMCNC: 30.7 PG — SIGNIFICANT CHANGE UP (ref 27–34)
MCHC RBC-ENTMCNC: 32.2 % — SIGNIFICANT CHANGE UP (ref 32–36)
MCV RBC AUTO: 95.6 FL — SIGNIFICANT CHANGE UP (ref 80–100)
MONOCYTES # BLD AUTO: 0.49 K/UL — SIGNIFICANT CHANGE UP (ref 0–0.9)
MONOCYTES NFR BLD AUTO: 7.6 % — SIGNIFICANT CHANGE UP (ref 2–14)
NEUTROPHILS # BLD AUTO: 4.52 K/UL — SIGNIFICANT CHANGE UP (ref 1.8–7.4)
NEUTROPHILS NFR BLD AUTO: 69.6 % — SIGNIFICANT CHANGE UP (ref 43–77)
NRBC # FLD: 0.03 K/UL — SIGNIFICANT CHANGE UP (ref 0–0)
PCO2 BLDA: 41 MMHG — SIGNIFICANT CHANGE UP (ref 35–48)
PH BLDA: 7.42 PH — SIGNIFICANT CHANGE UP (ref 7.35–7.45)
PHOSPHATE SERPL-MCNC: 3.6 MG/DL — SIGNIFICANT CHANGE UP (ref 2.5–4.5)
PLATELET # BLD AUTO: 171 K/UL — SIGNIFICANT CHANGE UP (ref 150–400)
PMV BLD: 9.2 FL — SIGNIFICANT CHANGE UP (ref 7–13)
PO2 BLDA: 81 MMHG — LOW (ref 83–108)
POTASSIUM BLDA-SCNC: 4.3 MMOL/L — SIGNIFICANT CHANGE UP (ref 3.4–4.5)
POTASSIUM SERPL-MCNC: 4.4 MMOL/L — SIGNIFICANT CHANGE UP (ref 3.5–5.3)
POTASSIUM SERPL-SCNC: 4.4 MMOL/L — SIGNIFICANT CHANGE UP (ref 3.5–5.3)
PROT SERPL-MCNC: 6.3 G/DL — SIGNIFICANT CHANGE UP (ref 6–8.3)
RBC # BLD: 2.7 M/UL — LOW (ref 4.2–5.8)
RBC # FLD: 13 % — SIGNIFICANT CHANGE UP (ref 10.3–14.5)
SAO2 % BLDA: 95.5 % — SIGNIFICANT CHANGE UP (ref 95–99)
SODIUM BLDA-SCNC: 140 MMOL/L — SIGNIFICANT CHANGE UP (ref 136–146)
SODIUM SERPL-SCNC: 141 MMOL/L — SIGNIFICANT CHANGE UP (ref 135–145)
SPECIMEN SOURCE: SIGNIFICANT CHANGE UP
WBC # BLD: 6.49 K/UL — SIGNIFICANT CHANGE UP (ref 3.8–10.5)
WBC # FLD AUTO: 6.49 K/UL — SIGNIFICANT CHANGE UP (ref 3.8–10.5)

## 2019-11-06 PROCEDURE — 99233 SBSQ HOSP IP/OBS HIGH 50: CPT

## 2019-11-06 PROCEDURE — 99222 1ST HOSP IP/OBS MODERATE 55: CPT

## 2019-11-06 PROCEDURE — 99291 CRITICAL CARE FIRST HOUR: CPT

## 2019-11-06 RX ORDER — FENTANYL CITRATE 50 UG/ML
1 INJECTION INTRAVENOUS
Refills: 0 | Status: DISCONTINUED | OUTPATIENT
Start: 2019-11-06 | End: 2019-11-06

## 2019-11-06 RX ORDER — FUROSEMIDE 40 MG
40 TABLET ORAL ONCE
Refills: 0 | Status: COMPLETED | OUTPATIENT
Start: 2019-11-06 | End: 2019-11-06

## 2019-11-06 RX ORDER — HALOPERIDOL DECANOATE 100 MG/ML
2.5 INJECTION INTRAMUSCULAR EVERY 6 HOURS
Refills: 0 | Status: DISCONTINUED | OUTPATIENT
Start: 2019-11-06 | End: 2019-11-18

## 2019-11-06 RX ORDER — HYDROCORTISONE 20 MG
50 TABLET ORAL EVERY 8 HOURS
Refills: 0 | Status: DISCONTINUED | OUTPATIENT
Start: 2019-11-06 | End: 2019-11-07

## 2019-11-06 RX ORDER — ACETAMINOPHEN 500 MG
650 TABLET ORAL EVERY 6 HOURS
Refills: 0 | Status: DISCONTINUED | OUTPATIENT
Start: 2019-11-06 | End: 2019-11-06

## 2019-11-06 RX ORDER — NYSTATIN CREAM 100000 [USP'U]/G
1 CREAM TOPICAL EVERY 12 HOURS
Refills: 0 | Status: DISCONTINUED | OUTPATIENT
Start: 2019-11-06 | End: 2019-11-18

## 2019-11-06 RX ORDER — ACETAMINOPHEN 500 MG
1000 TABLET ORAL ONCE
Refills: 0 | Status: COMPLETED | OUTPATIENT
Start: 2019-11-06 | End: 2019-11-06

## 2019-11-06 RX ADMIN — Medication 1 APPLICATION(S): at 05:06

## 2019-11-06 RX ADMIN — Medication 650 MILLIGRAM(S): at 06:00

## 2019-11-06 RX ADMIN — Medication 1: at 05:10

## 2019-11-06 RX ADMIN — Medication 40 MILLIGRAM(S): at 04:52

## 2019-11-06 RX ADMIN — NYSTATIN CREAM 1 APPLICATION(S): 100000 CREAM TOPICAL at 17:51

## 2019-11-06 RX ADMIN — Medication 10 MILLIGRAM(S): at 00:00

## 2019-11-06 RX ADMIN — Medication 650 MILLIGRAM(S): at 04:52

## 2019-11-06 RX ADMIN — ENOXAPARIN SODIUM 40 MILLIGRAM(S): 100 INJECTION SUBCUTANEOUS at 05:05

## 2019-11-06 RX ADMIN — Medication 1 DROP(S): at 17:50

## 2019-11-06 RX ADMIN — FENTANYL CITRATE 1 PATCH: 50 INJECTION INTRAVENOUS at 19:50

## 2019-11-06 RX ADMIN — HALOPERIDOL DECANOATE 2.5 MILLIGRAM(S): 100 INJECTION INTRAMUSCULAR at 18:02

## 2019-11-06 RX ADMIN — CHLORHEXIDINE GLUCONATE 15 MILLILITER(S): 213 SOLUTION TOPICAL at 05:00

## 2019-11-06 RX ADMIN — LEVETIRACETAM 400 MILLIGRAM(S): 250 TABLET, FILM COATED ORAL at 05:05

## 2019-11-06 RX ADMIN — Medication 1000 MILLIGRAM(S): at 16:15

## 2019-11-06 RX ADMIN — ENOXAPARIN SODIUM 40 MILLIGRAM(S): 100 INJECTION SUBCUTANEOUS at 17:51

## 2019-11-06 RX ADMIN — FENTANYL CITRATE 1 PATCH: 50 INJECTION INTRAVENOUS at 15:47

## 2019-11-06 RX ADMIN — Medication 400 MILLIGRAM(S): at 15:45

## 2019-11-06 RX ADMIN — Medication 50 MILLIGRAM(S): at 05:00

## 2019-11-06 RX ADMIN — Medication 50 MILLIGRAM(S): at 23:09

## 2019-11-06 RX ADMIN — Medication 50 MILLIGRAM(S): at 13:19

## 2019-11-06 NOTE — CHART NOTE - NSCHARTNOTEFT_GEN_A_CORE
Source: nursing  & EMR    Diet : NPO with tube feed via  Orogastric Jevity 1.2 @ 60 ml/hr x 24hrs     Patient sedated, intubated, with 2+ generalized edema and 3+ scrotum edema , tolerating EN per nursing . Noted wt. increase from admission . May be fluid related . Pt. will benefit from additional protein as per recommendation.      Enteral:  EN provides 1728 kcal & 80 gm protein/d; i.e. 12.8 kcal/kg dry wt. & 0.9 gm protein/kg IBW .       Current Weight: - 143.6 kg on 11/6/19     Pertinent Medications: MEDICATIONS  (STANDING):  chlorhexidine 0.12% Liquid 15 milliLiter(s) Oral Mucosa every 12 hours  dexMEDEtomidine Infusion 0.5 MICROgram(s)/kG/Hr (16.875 mL/Hr) IV Continuous <Continuous>  dextrose 5%. 1000 milliLiter(s) (50 mL/Hr) IV Continuous <Continuous>  enoxaparin Injectable 40 milliGRAM(s) SubCutaneous every 12 hours  fentaNYL   Infusion. 0.5 MICROgram(s)/kG/Hr (6.75 mL/Hr) IV Continuous <Continuous>  hydrocortisone sodium succinate Injectable 50 milliGRAM(s) IV Push every 6 hours  insulin lispro (HumaLOG) corrective regimen sliding scale   SubCutaneous every 6 hours  midazolam Infusion 0.02 mG/kG/Hr (2.7 mL/Hr) IV Continuous <Continuous>  norepinephrine Infusion 0.05 MICROgram(s)/kG/Min (6.328 mL/Hr) IV Continuous <Continuous>  nystatin Powder 1 Application(s) Topical every 12 hours  petrolatum Ophthalmic Ointment 1 Application(s) Both EYES two times a day  polyethylene glycol 3350 17 Gram(s) Oral two times a day  propofol Infusion 10 MICROgram(s)/kG/Min (8.1 mL/Hr) IV Continuous <Continuous>  senna Syrup 15 milliLiter(s) Oral two times a day      Pertinent Labs:  11-06 Na141 mmol/L Glu 172 mg/dL<H> K+ 4.4 mmol/L Cr  0.71 mg/dL BUN 13 mg/dL 11-06 Phos 3.6 mg/dL 11-06 Alb 3.2 g/dL<L> 10-29 [id number] 5.2 %      Skin: intact    Recommend Suggest No Carb Pro source 3 pack daily to increase protein to 125 gm/d. Continue current feeds .     Monitoring and Evaluation:  Tolerance to diet prescription, weights & follow up per protocol

## 2019-11-06 NOTE — CONSULT NOTE ADULT - SUBJECTIVE AND OBJECTIVE BOX
Patient is a 54y old  Male who presents with a chief complaint of ARDS (2019 12:27)      HPI:  54M with severe MDD with prior psych admissions/ECT who was initially admitted to  10/28 after presenting with lethargy and hypoxemia. Patient reportedly was dealing with severe depression and expressed SI to his wife. His wife reported he was sleeping throughout the day and in the evening she heard a thump and found him on the floor. At  he became obtunded with hypercapnic respiratory failure and was intubated and admitted to the CCU. Initial imaging showed lower lobe consolidations and he was treated with CTX/Azithromycin. Poison control was contacted and he was received NaHCO3 in the setting of mild QRS widening on ECG and suspected overdose. His Utox returned positive for barbiturates; he reportedly had positive testing for barbiturates in the past thought secondary to OTC supplementations he takes from a [employer] for sleep. On the morning of admission, he was noted to have a 30 second tonic clonic seizure and received IV benzo and keppra load. Subsequent EEG did not reveal epileptiform discharges. He was extubated in the morning of 10/28 but required BiPAP in the setting of increased oxygenation needs. On 10/29 he was agitated and was started on Precedex. He developed fevers and was broadened to cefepime. Overnight (10/30), he had a Tmax of 102.6 and worsening hypoxemia requiring reintubation. Was started on propofol, precedex and paralyzed but remained difficult to oxygenate with traditional mechanical ventilation requiring frequent bagging and ALI consult for ECMO was placed. He was steroids, nimbex gtt, vancomycin and received Lasix 40mg IVP. His P/F was 60 at time of consult; improved somewhat to 82 at 4:30 this morning on //460/18/100. The patient was cannulated for VV-ECMO and transfer to [facility] (30 Oct 2019 12:10)    Subjective: patient seen and examined today. Wife at bedside, reports patient recently extubated today.  She reports patient was moving b/l UE, attempting to pull on tubes/ lines.  Patient currently not moving UE on command.    REVIEW OF SYSTEMS  -pt unable to participate in ROS, appears to deny pain    PAST MEDICAL & SURGICAL HISTORY  Depression  Hepatitis  Left knee pain  No significant past surgical history      SOCIAL HISTORY  Smoking - Denied  EtOH - Denied   Drugs - Denied    FUNCTIONAL HISTORY  Lives with his   Independent    CURRENT FUNCTIONAL STATUS      FAMILY HISTORY   No significant family history      RECENT LABS/IMAGING  CBC Full  -  ( 2019 03:40 )  WBC Count : 6.49 K/uL  RBC Count : 2.70 M/uL  Hemoglobin : 8.3 g/dL  Hematocrit : 25.8 %  Platelet Count - Automated : 171 K/uL  Mean Cell Volume : 95.6 fL  Mean Cell Hemoglobin : 30.7 pg  Mean Cell Hemoglobin Concentration : 32.2 %  Auto Neutrophil # : 4.52 K/uL  Auto Lymphocyte # : 0.56 K/uL  Auto Monocyte # : 0.49 K/uL  Auto Eosinophil # : 0.07 K/uL  Auto Basophil # : 0.04 K/uL  Auto Neutrophil % : 69.6 %  Auto Lymphocyte % : 8.6 %  Auto Monocyte % : 7.6 %  Auto Eosinophil % : 1.1 %  Auto Basophil % : 0.6 %        141  |  107  |  13  ----------------------------<  172<H>  4.4   |  23  |  0.71    Ca    8.8      2019 03:40  Phos  3.6       Mg     2.2         TPro  6.3  /  Alb  3.2<L>  /  TBili  0.3  /  DBili  x   /  AST  25  /  ALT  30  /  AlkPhos  45      Urinalysis Basic - ( 2019 18:00 )    Color: YELLOW / Appearance: CLEAR / S.027 / pH: 6.0  Gluc: NEGATIVE / Ketone: NEGATIVE  / Bili: NEGATIVE / Urobili: NORMAL   Blood: NEGATIVE / Protein: 10 / Nitrite: NEGATIVE   Leuk Esterase: NEGATIVE / RBC: x / WBC x   Sq Epi: x / Non Sq Epi: x / Bacteria: x        VITALS  T(C): 37.9 (19 @ 08:00), Max: 38.2 (19 @ 16:00)  HR: 100 (19 @ 14:00) (56 - 104)  BP: 147/87 (19 @ 11:00) (97/55 - 147/87)  RR: 11 (19 @ 14:00) (11 - 24)  SpO2: 92% (19 @ 14:00) (90% - 100%)  Wt(kg): --    ALLERGIES  No Known Allergies      MEDICATIONS   acetaminophen    Suspension .. 650 milliGRAM(s) Oral every 6 hours PRN  dexMEDEtomidine Infusion 0.5 MICROgram(s)/kG/Hr IV Continuous <Continuous>  dextrose 5%. 1000 milliLiter(s) IV Continuous <Continuous>  enoxaparin Injectable 40 milliGRAM(s) SubCutaneous every 12 hours  fentaNYL   Infusion. 0.5 MICROgram(s)/kG/Hr IV Continuous <Continuous>  fentaNYL   Patch  25 MICROgram(s)/Hr 1 Patch Transdermal every 72 hours  haloperidol    Injectable 2.5 milliGRAM(s) IV Push every 6 hours PRN  hydrocortisone sodium succinate Injectable 50 milliGRAM(s) IV Push every 8 hours  insulin lispro (HumaLOG) corrective regimen sliding scale   SubCutaneous every 6 hours  midazolam Infusion 0.02 mG/kG/Hr IV Continuous <Continuous>  nystatin Powder 1 Application(s) Topical every 12 hours  petrolatum Ophthalmic Ointment 1 Application(s) Both EYES two times a day  polyethylene glycol 3350 17 Gram(s) Oral two times a day  senna Syrup 15 milliLiter(s) Oral two times a day      ----------------------------------------------------------------------------------------  PHYSICAL EXAM  Constitutional - NAD, Comfortable  HEENT - NCAT, EOMI  Neck - Supple, No limited ROM  Chest - CTA bilaterally, No wheeze, No rhonchi, No crackles  Cardiovascular - RRR, S1S2, No murmurs  Abdomen - BS+, Soft, NTND  Extremities - No C/C/E, No calf tenderness   Neurologic Exam -                    Cognitive - Awake, Alert, AAO to self, place, date, year, situation     Communication - Fluent, No dysarthria     Cranial Nerves - CN 2-12 intact     Motor - No focal deficits                    LEFT    UE - ShAB 5/5, EF 5/5, EE 5/5, WE 5/5,  5/5                    RIGHT UE - ShAB 5/5, EF 5/5, EE 5/5, WE 5/5,  5/5                    LEFT    LE - HF 5/5, KE 5/5, DF 5/5, PF 5/5                    RIGHT LE - HF 5/5, KE 5/5, DF 5/5, PF 5/5        Sensory - Intact to LT     Reflexes - DTR Intact, No primitive reflexive     Coordination - FTN intact     OculoVestibular - No saccades, No nystagmus, VOR         Balance - WNL Static  Psychiatric - Mood stable, Affect WNL  ----------------------------------------------------------------------------------------  ASSESSMENT/PLAN    Pain -  DVT PPX -   Rehab -    Recommend ACUTE inpatient rehabilitation for the functional deficits consisting of 3 hours of therapy/day & 24 hour RN/daily PMR physician for comorbid medical management. Will continue to follow for ongoing rehab needs and recommendations.   Recommend [facility], patient DOES NOT meet acute inpatient rehabilitation criteria   Recommend DC HOME with outpatient   Recommend DC HOME with homecare   Follow up with [facility] - Call [phone] for an appointment Patient is a 54y old  Male who presents with a chief complaint of ARDS (2019 12:27)      HPI:  54M with severe MDD with prior psych admissions/ECT who was initially admitted to  10/28 after presenting with lethargy and hypoxemia. Patient reportedly was dealing with severe depression and expressed SI to his wife. His wife reported he was sleeping throughout the day and in the evening she heard a thump and found him on the floor. At  he became obtunded with hypercapnic respiratory failure and was intubated and admitted to the CCU. Initial imaging showed lower lobe consolidations and he was treated with CTX/Azithromycin. Poison control was contacted and he was received NaHCO3 in the setting of mild QRS widening on ECG and suspected overdose. His Utox returned positive for barbiturates; he reportedly had positive testing for barbiturates in the past thought secondary to OTC supplementations he takes from a [employer] for sleep. On the morning of admission, he was noted to have a 30 second tonic clonic seizure and received IV benzo and keppra load. Subsequent EEG did not reveal epileptiform discharges. He was extubated in the morning of 10/28 but required BiPAP in the setting of increased oxygenation needs. On 10/29 he was agitated and was started on Precedex. He developed fevers and was broadened to cefepime. Overnight (10/30), he had a Tmax of 102.6 and worsening hypoxemia requiring reintubation. Was started on propofol, precedex and paralyzed but remained difficult to oxygenate with traditional mechanical ventilation requiring frequent bagging and ALI consult for ECMO was placed. He was steroids, nimbex gtt, vancomycin and received Lasix 40mg IVP. His P/F was 60 at time of consult; improved somewhat to 82 at 4:30 this morning on //460/18/100. The patient was cannulated for VV-ECMO and transfer to [facility] (30 Oct 2019 12:10)    Subjective: patient seen and examined today. Wife at bedside, reports patient recently extubated today.  She reports patient was moving b/l UE, attempting to pull on tubes/ lines.  Patient currently not moving UE on command.    REVIEW OF SYSTEMS  -pt unable to participate in ROS, appears to deny pain    PAST MEDICAL & SURGICAL HISTORY  Depression  Hepatitis  Left knee pain  No significant past surgical history      SOCIAL HISTORY  Drugs - Utox + for barbituates (possibly from supplement patient takes)    FUNCTIONAL HISTORY  Lives with his wife in coop apartment  Independent at baseline for ambulation and ADLs    CURRENT FUNCTIONAL STATUS   patient not following commands consistently.  BM supine to sit: dependent x 2      FAMILY HISTORY   No significant family history      RECENT LABS/IMAGING  CBC Full  -  ( 2019 03:40 )  WBC Count : 6.49 K/uL  RBC Count : 2.70 M/uL  Hemoglobin : 8.3 g/dL  Hematocrit : 25.8 %  Platelet Count - Automated : 171 K/uL  Mean Cell Volume : 95.6 fL  Mean Cell Hemoglobin : 30.7 pg  Mean Cell Hemoglobin Concentration : 32.2 %  Auto Neutrophil # : 4.52 K/uL  Auto Lymphocyte # : 0.56 K/uL  Auto Monocyte # : 0.49 K/uL  Auto Eosinophil # : 0.07 K/uL  Auto Basophil # : 0.04 K/uL  Auto Neutrophil % : 69.6 %  Auto Lymphocyte % : 8.6 %  Auto Monocyte % : 7.6 %  Auto Eosinophil % : 1.1 %  Auto Basophil % : 0.6 %        141  |  107  |  13  ----------------------------<  172<H>  4.4   |  23  |  0.71    Ca    8.8      2019 03:40  Phos  3.6       Mg     2.2         TPro  6.3  /  Alb  3.2<L>  /  TBili  0.3  /  DBili  x   /  AST  25  /  ALT  30  /  AlkPhos  45      Urinalysis Basic - ( 2019 18:00 )    Color: YELLOW / Appearance: CLEAR / S.027 / pH: 6.0  Gluc: NEGATIVE / Ketone: NEGATIVE  / Bili: NEGATIVE / Urobili: NORMAL   Blood: NEGATIVE / Protein: 10 / Nitrite: NEGATIVE   Leuk Esterase: NEGATIVE / RBC: x / WBC x   Sq Epi: x / Non Sq Epi: x / Bacteria: x      VITALS  T(C): 37.9 (19 @ 08:00), Max: 38.2 (19 @ 16:00)  HR: 100 (19 @ 14:00) (56 - 104)  BP: 147/87 (19 @ 11:00) (97/55 - 147/87)  RR: 11 (19 @ 14:00) (11 - 24)  SpO2: 92% (19 @ 14:00) (90% - 100%)  Wt(kg): --    ALLERGIES  No Known Allergies      MEDICATIONS   acetaminophen    Suspension .. 650 milliGRAM(s) Oral every 6 hours PRN  dexMEDEtomidine Infusion 0.5 MICROgram(s)/kG/Hr IV Continuous <Continuous>  dextrose 5%. 1000 milliLiter(s) IV Continuous <Continuous>  enoxaparin Injectable 40 milliGRAM(s) SubCutaneous every 12 hours  fentaNYL   Infusion. 0.5 MICROgram(s)/kG/Hr IV Continuous <Continuous>  fentaNYL   Patch  25 MICROgram(s)/Hr 1 Patch Transdermal every 72 hours  haloperidol    Injectable 2.5 milliGRAM(s) IV Push every 6 hours PRN  hydrocortisone sodium succinate Injectable 50 milliGRAM(s) IV Push every 8 hours  insulin lispro (HumaLOG) corrective regimen sliding scale   SubCutaneous every 6 hours  midazolam Infusion 0.02 mG/kG/Hr IV Continuous <Continuous>  nystatin Powder 1 Application(s) Topical every 12 hours  petrolatum Ophthalmic Ointment 1 Application(s) Both EYES two times a day  polyethylene glycol 3350 17 Gram(s) Oral two times a day  senna Syrup 15 milliLiter(s) Oral two times a day      ----------------------------------------------------------------------------------------  PHYSICAL EXAM- limited exam  Constitutional - NAD  HEENT - NCAT, wearing nasal cannula  Neck - Supple, No limited ROM  Chest - + wheeze, + rhonchi  Cardiovascular - RRR  Abdomen - BS+, Soft, NTND  Extremities - + mild swelling left hand, No calf tenderness   Neurologic Exam -                    Cognitive - Awake, Alert, AAO to self     Communication - mild hoarseness (pt just extubated)      Cranial Nerves - not following commands for CN exam     Motor - not following commands consistently, pt wiggles toes on commands but no other movement in b/l UE or LE      ----------------------------------------------------------------------------------------  ASSESSMENT/PLAN 53 yo m s/p intubation and VV Echmo for ARDS      DVT PPX - lovenox  Rehab - pending progress, patient not following commands consistently

## 2019-11-06 NOTE — PROGRESS NOTE ADULT - ASSESSMENT
54M with severe depression who presented to  10/28 with lethargy and hypoxemic/hypercapnic respiratory failure possibly related to overdose +/- pneumonia extubated but required reintubation 10/30 but with persistent hypoxemia refractory to conventional management and ultimately cannulated for VV ECMO 10/30/2019 for hypoxemic respiratory failure with ARDS and transferred to [facility] for further management. Decannulated 11/1, now extubated to BIPAP     NEUROLOGY  Baseline AOX3. Has Hx of severe depression refractory to multiple medications/ECT with prior psych hospitalizations. Had a tonic-clonic seizure at . Was concern for overdose at . Per discussion with wife, Strong consideration for initial benzo withdrawal related seizures. Patient is now extubated off sedation @this time 	  - was Sedated with propofol, versed, and fentanyl, precedex, now off all sedation  -haldol prn for agitation    - Psych evaluation; was being considered for inpatient admission at   - Concern for abrupt cessation of Clonazepam causing seizures; initial utox negative for benzos. Likely will restart PO benzo as take off clonazepam  - Seizure like episode at ; ? related overdose vs withdrawal. EEG unremarkable there. CTH unremarkable at . Consider repeat EEG  - Now off [facility] neuro recs appreciate    PULMONARY  Patient with severe hypoxemic respiratory failure likely severe ARDS with P/F cannulated for VV-ECMO on 10/30 possibly related to aspiration pneumonia.   Bronchoscopy performed on arrival with billious secretions predominantly in the lower lobes; BAL sent for cyto (with red oil stain), micro, cell count, Legionella PCR  - tolerated CPAP trial today, now extubated on Bilevel   -Nebs to help with secretion mobilization and will continue with IPV if patient can tolerated      CARDIOVASCULAR  TTE at  with HFrEF, MR, [place]. Unclear if new diagnosis of HFrEF. Bedside FREDI performed initially with severe LV dysfunction and grade I diastolic dysfunction, mild MR.   -Previously on NE likely related to sedation; maintain MAP>65 (had very labile BPs on precedex which improved after precedex was discontinued) off all pressors  -ECG without ischemic changes; troponins peaked initially ; will need to repeat echo. Ischemic cardiomyopathy vs sepsis cardiomyopathy    SKIN/LINES:  -Left IJ TLC 10/30  -right radial katherin 10/30  -PIV/arrow x1   -Right IJ/Right fem site with intact sutures, open to air, clean and dry     GASTROINTESTINAL  -NPO for now, patient extubated   -c/w Bowel regimen    RENAL/  No active issues    -Meredith in place for critically ill patient good UO    -was given lasix yestersday with appropriate response, will reassess need today for additional lasix      INFECTIOUS DISEASE  High suspicion for aspiration pneumonia  -blood cultures grew coag negative staph. most likely decontaminate, BAL cultures +for staph aureus   -Legionella negative. DC azithromycin on 11/2.    - 7 day course completed of vanco/zosyn   -low grade fevers yesterday f/u blood and sputum cx 11/5    HEME  -was on Heparin GTT while on VV ECMO   -official [facility] duplex on 11/5 negative for DVT's started on Lovenox BID       ENDOCRINE  -Monitor FS/low dose SS    PROPHYLAXIS  -Lovenox BID     PSYCH:  Patient with significant psych history, severe depression, on multiple medications, required ECT in the past.   -psych consult in place for recommendations     DISPO/ETHICS/GOC: FULL CODE 54M with severe depression who presented to  10/28 with lethargy and hypoxemic/hypercapnic respiratory failure possibly related to overdose +/- pneumonia extubated but required reintubation 10/30 but with persistent hypoxemia refractory to conventional management and ultimately cannulated for VV ECMO 10/30/2019 for hypoxemic respiratory failure with ARDS and transferred to [facility] for further management. Decannulated 11/1, now extubated to BIPAP     NEUROLOGY  Baseline AOX3. Has Hx of severe depression refractory to multiple medications/ECT with prior psych hospitalizations. Had a tonic-clonic seizure at . Was concern for overdose at . Per discussion with wife, Strong consideration for initial benzo withdrawal related seizures. Patient is now extubated off sedation @this time 	  - was Sedated with propofol, versed, and fentanyl, precedex, now off all sedation  -haldol prn for agitation    - Psych evaluation; was being considered for inpatient admission at   - Concern for abrupt cessation of Clonazepam causing seizures; initial utox negative for benzos. Likely will restart PO benzo as take off clonazepam  - Seizure like episode at ; ? related overdose vs withdrawal. EEG unremarkable there. CTH unremarkable at . Consider repeat EEG  - Now off [facility] neuro recs appreciate    PULMONARY  Patient with severe hypoxemic respiratory failure likely severe ARDS with P/F cannulated for VV-ECMO on 10/30 possibly related to aspiration pneumonia.   Bronchoscopy performed on arrival with billious secretions predominantly in the lower lobes; BAL sent for cyto (with red oil stain), micro, cell count, Legionella PCR  - tolerated CPAP trial today, now extubated on Bilevel   -Nebs to help with secretion mobilization and will continue with IPV if patient can tolerated      CARDIOVASCULAR  TTE at  with HFrEF, MR, [place]. Unclear if new diagnosis of HFrEF. Bedside FREDI performed initially with severe LV dysfunction and grade I diastolic dysfunction, mild MR.   -Previously on NE likely related to sedation; maintain MAP>65 (had very labile BPs on precedex which improved after precedex was discontinued) off all pressors  -ECG without ischemic changes; troponins peaked initially ; will need to repeat echo. Ischemic cardiomyopathy vs sepsis cardiomyopathy    SKIN/LINES:  -Left IJ TLC 10/30  -right radial katherin 10/30  -PIV/arrow x1   -Right IJ/Right fem site with intact sutures, open to air, clean and dry     GASTROINTESTINAL  -NPO for now, patient extubated   -c/w Bowel regimen    RENAL/  No active issues    -Meredith in place for critically ill patient good UO    -was given lasix yestersday with appropriate response, will reassess need today for additional lasix      INFECTIOUS DISEASE  High suspicion for aspiration pneumonia  -blood cultures grew coag negative staph. most likely decontaminate, BAL cultures +for staph aureus   -Legionella negative. DC azithromycin on 11/2.    - 7 day course completed of vanco/zosyn   -low grade fevers yesterday f/u blood and sputum cx 11/5    HEME  -was on Heparin GTT while on VV ECMO   -official [facility] duplex on 11/5 negative for DVT's started on Lovenox BID       ENDOCRINE  -Monitor FS/low dose SS  -cortisol level low yesterday started on hydrocortisone 50Q6h will change to Q8h today     PROPHYLAXIS  -Lovenox BID     PSYCH:  Patient with significant psych history, severe depression, on multiple medications, required ECT in the past.   -psych consult in place for recommendations     DISPO/ETHICS/GOC: FULL CODE

## 2019-11-06 NOTE — CHART NOTE - NSCHARTNOTEFT_GEN_A_CORE
Source:  nursing , EMR, NP     Diet : NPO with tube feed Jevity 1.2 via Orogastric tube @ 60 ml/hr x 24hrs daily but feeds not administered 2/2 pt. s/p extubation today and on BiPAP    Patient on BiPAP, S/P EXTUBATION , feeds held , pt. was tolerating EN prior to extubation per nursing . Pt. with 2+ generalized edema and 3+ scrotum edema  . Per medical team pt. will be taking PO if remans extubated .    Current Weight: - 143.6 kg on 11/6/19    Pertinent Medications: MEDICATIONS  (STANDING):  dexMEDEtomidine Infusion 0.5 MICROgram(s)/kG/Hr (16.875 mL/Hr) IV Continuous <Continuous>  dextrose 5%. 1000 milliLiter(s) (50 mL/Hr) IV Continuous <Continuous>  enoxaparin Injectable 40 milliGRAM(s) SubCutaneous every 12 hours  fentaNYL   Infusion. 0.5 MICROgram(s)/kG/Hr (6.75 mL/Hr) IV Continuous <Continuous>  fentaNYL   Patch  25 MICROgram(s)/Hr 1 Patch Transdermal every 72 hours  hydrocortisone sodium succinate Injectable 50 milliGRAM(s) IV Push every 8 hours  insulin lispro (HumaLOG) corrective regimen sliding scale   SubCutaneous every 6 hours  midazolam Infusion 0.02 mG/kG/Hr (2.7 mL/Hr) IV Continuous <Continuous>  nystatin Powder 1 Application(s) Topical every 12 hours  petrolatum Ophthalmic Ointment 1 Application(s) Both EYES two times a day  polyethylene glycol 3350 17 Gram(s) Oral two times a day  senna Syrup 15 milliLiter(s) Oral two times a day      Pertinent Labs:  11-06 Na141 mmol/L Glu 172 mg/dL<H> K+ 4.4 mmol/L Cr  0.71 mg/dL BUN 13 mg/dL 11-06 Phos 3.6 mg/dL 11-06 Alb 3.2 g/dL<L> 10-29 [id number] 5.2 %      Skin: intact    Estimated Needs:   no change since previous assessment      Previous Nutrition Diagnosis:  Overweight/Obesity     Recommend to initiate nutrition support when medically able via tolerated route     Monitoring and Evaluation:  Tolerance to diet prescription , weights & follow up per protocol

## 2019-11-06 NOTE — PROGRESS NOTE ADULT - SUBJECTIVE AND OBJECTIVE BOX
CHIEF COMPLAINT:    Interval Events:    HPI:  54M with severe MDD with prior psych admissions/ECT who was initially admitted to  10/28 after presenting with lethargy and hypoxemia. Patient reportedly was dealing with severe depression and expressed SI to his wife. His wife reported he was sleeping throughout the day and in the evening she heard a thump and found him on the floor. At  he became obtunded with hypercapnic respiratory failure and was intubated and admitted to the CCU. Initial imaging showed lower lobe consolidations and he was treated with CTX/Azithromycin. Poison control was contacted and he was received NaHCO3 in the setting of mild QRS widening on ECG and suspected overdose. His Utox returned positive for barbiturates; he reportedly had positive testing for barbiturates in the past thought secondary to OTC supplementations he takes from a [employer] for sleep. On the morning of admission, he was noted to have a 30 second tonic clonic seizure and received IV benzo and keppra load. Subsequent EEG did not reveal epileptiform discharges. He was extubated in the morning of 10/28 but required BiPAP in the setting of increased oxygenation needs. On 10/29 he was agitated and was started on Precedex. He developed fevers and was broadened to cefepime. Overnight (10/30), he had a Tmax of 102.6 and worsening hypoxemia requiring reintubation. Was started on propofol, precedex and paralyzed but remained difficult to oxygenate with traditional mechanical ventilation requiring frequent bagging and ALI consult for ECMO was placed. He was steroids, nimbex gtt, vancomycin and received Lasix 40mg IVP. His P/F was 60 at time of consult; improved somewhat to 82 at 4:30 this morning on /460/18/100. The patient was cannulated for VV-ECMO and transfer to [facility] (30 Oct 2019 12:10)      REVIEW OF SYSTEMS:    [X ] Unable to assess ROS because now on Bilevel, nonverbal exhibiting ICU delirium   OBJECTIVE:  ICU Vital Signs Last 24 Hrs  T(C): 37.9 (2019 08:00), Max: 38.2 (2019 16:00)  T(F): 100.3 (2019 08:00), Max: 100.8 (2019 16:00)  HR: 90 (2019 10:32) (56 - 104)  BP: 128/66 (2019 10:00) (97/55 - 145/80)  BP(mean): 80 (2019 10:00) (65 - 95)  ABP: 136/64 (2019 10:00) (94/48 - 154/77)  ABP(mean): 87 (2019 10:00) (61 - 105)  RR: 16 (2019 10:00) (13 - 24)  SpO2: 91% (2019 10:32) (90% - 97%)    Mode: CPAP with PS, FiO2: 50, PEEP: 5, PS: 5, MAP: 8     @ 07:  -   @ 07:00  --------------------------------------------------------  IN: 5625.7 mL / OUT: 5110 mL / NET: 515.6 mL     @ 07:  -   @ 12:27  --------------------------------------------------------  IN: 467 mL / OUT: 570 mL / NET: -103 mL      CAPILLARY BLOOD GLUCOSE      POCT Blood Glucose.: 169 mg/dL (2019 05:09)    Physical Exam:   General: NAD  HEENT: NC/AT; PERRLA, clear conjunctiva  Neck: supple  Respiratory: Bilevel on patient, s/p extubation, rhonchi B/L    Cardiovascular: +S1/S2; RRR  Abdomen: obese, soft, NT/ND; +BS x4  Extremities: 2+ peripheral pulses b/l; +1-2 LE edema  Skin: normal color and turgor; no rash   Neurological: patient following simple commands, nonverbal @this time       HOSPITAL MEDICATIONS:  Standing Meds:  chlorhexidine 0.12% Liquid 15 milliLiter(s) Oral Mucosa every 12 hours  dexMEDEtomidine Infusion 0.5 MICROgram(s)/kG/Hr IV Continuous <Continuous>  dextrose 5%. 1000 milliLiter(s) IV Continuous <Continuous>  enoxaparin Injectable 40 milliGRAM(s) SubCutaneous every 12 hours  fentaNYL   Infusion. 0.5 MICROgram(s)/kG/Hr IV Continuous <Continuous>  hydrocortisone sodium succinate Injectable 50 milliGRAM(s) IV Push every 6 hours  insulin lispro (HumaLOG) corrective regimen sliding scale   SubCutaneous every 6 hours  midazolam Infusion 0.02 mG/kG/Hr IV Continuous <Continuous>  nystatin Powder 1 Application(s) Topical every 12 hours  petrolatum Ophthalmic Ointment 1 Application(s) Both EYES two times a day  polyethylene glycol 3350 17 Gram(s) Oral two times a day  senna Syrup 15 milliLiter(s) Oral two times a day      PRN Meds:  acetaminophen    Suspension .. 650 milliGRAM(s) Oral every 6 hours PRN  haloperidol    Injectable 2.5 milliGRAM(s) IV Push every 6 hours PRN      LABS:                        8.3    6.49  )-----------( 171      ( 2019 03:40 )             25.8     Hgb Trend: 8.3<--, 8.2<--, 8.6<--, 8.2<--, 8.5<--  11    141  |  107  |  13  ----------------------------<  172<H>  4.4   |  23  |  0.71    Ca    8.8      2019 03:40  Phos  3.6       Mg     2.2         TPro  6.3  /  Alb  3.2<L>  /  TBili  0.3  /  DBili  x   /  AST  25  /  ALT  30  /  AlkPhos  45      Creatinine Trend: 0.71<--, 0.89<--, 0.86<--, 0.83<--, 0.70<--, 0.74<--    Urinalysis Basic - ( 2019 18:00 )    Color: YELLOW / Appearance: CLEAR / S.027 / pH: 6.0  Gluc: NEGATIVE / Ketone: NEGATIVE  / Bili: NEGATIVE / Urobili: NORMAL   Blood: NEGATIVE / Protein: 10 / Nitrite: NEGATIVE   Leuk Esterase: NEGATIVE / RBC: x / WBC x   Sq Epi: x / Non Sq Epi: x / Bacteria: x      Arterial Blood Gas:   @ 03:40  7.42/41/81/26/95.5/2.3  ABG lactate: 0.7  Arterial Blood Gas:   @ 03:10  7.40/45/68/27/92.3/3.2  ABG lactate: 1.1  Arterial Blood Gas:   @ 14:20  7.37/46/103/25/97.0/1.4  ABG lactate: 0.8        MICROBIOLOGY:     Culture - Respiratory with Gram Stain (collected 2019 18:38)  Source: SPUTUM  Preliminary Report (2019 09:01):    Insufficient growth, culture re-incubated. Interval Events: No acute events overnight     HPI:  54M with severe MDD with prior psych admissions/ECT who was initially admitted to  10/28 after presenting with lethargy and hypoxemia. Patient reportedly was dealing with severe depression and expressed SI to his wife. His wife reported he was sleeping throughout the day and in the evening she heard a thump and found him on the floor. At  he became obtunded with hypercapnic respiratory failure and was intubated and admitted to the CCU. Initial imaging showed lower lobe consolidations and he was treated with CTX/Azithromycin. Poison control was contacted and he was received NaHCO3 in the setting of mild QRS widening on ECG and suspected overdose. His Utox returned positive for barbiturates; he reportedly had positive testing for barbiturates in the past thought secondary to OTC supplementations he takes from a [employer] for sleep. On the morning of admission, he was noted to have a 30 second tonic clonic seizure and received IV benzo and keppra load. Subsequent EEG did not reveal epileptiform discharges. He was extubated in the morning of 10/28 but required BiPAP in the setting of increased oxygenation needs. On 10/29 he was agitated and was started on Precedex. He developed fevers and was broadened to cefepime. Overnight (10/30), he had a Tmax of 102.6 and worsening hypoxemia requiring reintubation. Was started on propofol, precedex and paralyzed but remained difficult to oxygenate with traditional mechanical ventilation requiring frequent bagging and ALI consult for ECMO was placed. He was steroids, nimbex gtt, vancomycin and received Lasix 40mg IVP. His P/F was 60 at time of consult; improved somewhat to 82 at 4:30 this morning on /460/18/100. The patient was cannulated for VV-ECMO and transfer to [facility] (30 Oct 2019 12:10)      REVIEW OF SYSTEMS:    [X ] Unable to assess ROS because now on Bilevel, nonverbal exhibiting ICU delirium   OBJECTIVE:  ICU Vital Signs Last 24 Hrs  T(C): 37.9 (2019 08:00), Max: 38.2 (2019 16:00)  T(F): 100.3 (2019 08:00), Max: 100.8 (2019 16:00)  HR: 90 (2019 10:32) (56 - 104)  BP: 128/66 (2019 10:00) (97/55 - 145/80)  BP(mean): 80 (2019 10:00) (65 - 95)  ABP: 136/64 (2019 10:00) (94/48 - 154/77)  ABP(mean): 87 (2019 10:00) (61 - 105)  RR: 16 (2019 10:00) (13 - 24)  SpO2: 91% (2019 10:32) (90% - 97%)    Mode: CPAP with PS, FiO2: 50, PEEP: 5, PS: 5, MAP: 8     @ 07:  -   @ 07:00  --------------------------------------------------------  IN: 5625.7 mL / OUT: 5110 mL / NET: 515.6 mL     @ 07:  -   @ 12:27  --------------------------------------------------------  IN: 467 mL / OUT: 570 mL / NET: -103 mL      CAPILLARY BLOOD GLUCOSE      POCT Blood Glucose.: 169 mg/dL (2019 05:09)    Physical Exam:   General: NAD  HEENT: NC/AT; PERRLA, clear conjunctiva  Neck: supple  Respiratory: Bilevel on patient, s/p extubation, rhonchi B/L    Cardiovascular: +S1/S2; RRR  Abdomen: obese, soft, NT/ND; +BS x4  Extremities: 2+ peripheral pulses b/l; +1-2 LE edema  Skin: normal color and turgor; no rash   Neurological: patient following simple commands, nonverbal @this time       HOSPITAL MEDICATIONS:  Standing Meds:  chlorhexidine 0.12% Liquid 15 milliLiter(s) Oral Mucosa every 12 hours  dexMEDEtomidine Infusion 0.5 MICROgram(s)/kG/Hr IV Continuous <Continuous>  dextrose 5%. 1000 milliLiter(s) IV Continuous <Continuous>  enoxaparin Injectable 40 milliGRAM(s) SubCutaneous every 12 hours  fentaNYL   Infusion. 0.5 MICROgram(s)/kG/Hr IV Continuous <Continuous>  hydrocortisone sodium succinate Injectable 50 milliGRAM(s) IV Push every 6 hours  insulin lispro (HumaLOG) corrective regimen sliding scale   SubCutaneous every 6 hours  midazolam Infusion 0.02 mG/kG/Hr IV Continuous <Continuous>  nystatin Powder 1 Application(s) Topical every 12 hours  petrolatum Ophthalmic Ointment 1 Application(s) Both EYES two times a day  polyethylene glycol 3350 17 Gram(s) Oral two times a day  senna Syrup 15 milliLiter(s) Oral two times a day      PRN Meds:  acetaminophen    Suspension .. 650 milliGRAM(s) Oral every 6 hours PRN  haloperidol    Injectable 2.5 milliGRAM(s) IV Push every 6 hours PRN      LABS:                        8.3    6.49  )-----------( 171      ( 2019 03:40 )             25.8     Hgb Trend: 8.3<--, 8.2<--, 8.6<--, 8.2<--, 8.5<--  11    141  |  107  |  13  ----------------------------<  172<H>  4.4   |  23  |  0.71    Ca    8.8      2019 03:40  Phos  3.6       Mg     2.2         TPro  6.3  /  Alb  3.2<L>  /  TBili  0.3  /  DBili  x   /  AST  25  /  ALT  30  /  AlkPhos  45      Creatinine Trend: 0.71<--, 0.89<--, 0.86<--, 0.83<--, 0.70<--, 0.74<--    Urinalysis Basic - ( 2019 18:00 )    Color: YELLOW / Appearance: CLEAR / S.027 / pH: 6.0  Gluc: NEGATIVE / Ketone: NEGATIVE  / Bili: NEGATIVE / Urobili: NORMAL   Blood: NEGATIVE / Protein: 10 / Nitrite: NEGATIVE   Leuk Esterase: NEGATIVE / RBC: x / WBC x   Sq Epi: x / Non Sq Epi: x / Bacteria: x      Arterial Blood Gas:   @ 03:40  7.42/41/81/26/95.5/2.3  ABG lactate: 0.7  Arterial Blood Gas:   @ 03:10  7.40/45/68/27/92.3/3.2  ABG lactate: 1.1  Arterial Blood Gas:   @ 14:20  7.37/46/103/25/97.0/1.4  ABG lactate: 0.8        MICROBIOLOGY:     Culture - Respiratory with Gram Stain (collected 2019 18:38)  Source: SPUTUM  Preliminary Report (2019 09:01):    Insufficient growth, culture re-incubated.

## 2019-11-06 NOTE — PROGRESS NOTE BEHAVIORAL HEALTH - SUMMARY
Patient is a 55 y/o [place] male that is employed, , lives with his wife, has a supportive family, hx of severe depression and anxiety, multiple hx of psychiatry admissions (last in June 2019), tx refractory to many antidepressant meds, no hx of prior SA, hx of alcohol use- none recently, no hx of psychosis, received 5 tx of ECT (while in inpatient psych unit at [facility]), then stopped with no outpatient treatments in June 2019, PMHx of hepatitis presenting with increased lethargy and hypoxemia. As per records- patient was s/p fall at home. In ed was obtunded, noted to be in hypercarbic RF- requiring intubation. LL consolidation on CXray- started on antibiotics. Found to have barbiturates+ on urine toxicology. Course complicated by seizure. Extubated on 10/28- placed on BIPAP. Course continued to be complicated by fevers, worsening hypoxemia requiring reintubation. Was started on propofol, precedex and paralyzed but remained difficult to oxygenate with traditional mechanical ventilation requiring frequent bagging and ALI consult for ECMO was placed. The patient was cannulated for VV-ECMO and transfer to [facility]. Decannulated on Friday last week. Remained intubated on multiple sedative- Midazolam, Precedex, Propofol, Fentanyl. Psychiatry has been consulted as sedatives will slowly be weaned off- agitation management and also for baseline depression (was on multiple psychotropics)    11/6- patient has been extubated and unable to converse. Will continue to follow daily to assess for delirium and depression symptoms.     Recommendations-  - Not starting a standing home psychotropic at this time considering his mentation. Aware that there is an increased risk for delirium- agitation as sedation is weaned, and patient becomes more awake. Will monitor symptoms for today. PRN meds as below for now.   - If qtc < 500, haldol 2.5 mg q 6 hours PRN IV/IM for agitation  - Will contact his community psychiatrist to obtain more hx Patient is a 55 y/o [place] male that is employed, , lives with his wife, has a supportive family, hx of severe depression and anxiety, multiple hx of psychiatry admissions (last in June 2019), tx refractory to many antidepressant meds, no hx of prior SA, hx of alcohol use- none recently, no hx of psychosis, received 5 tx of ECT (while in inpatient psych unit at [facility]), then stopped with no outpatient treatments in June 2019, PMHx of hepatitis presenting with increased lethargy and hypoxemia. As per records- patient was s/p fall at home. In ed was obtunded, noted to be in hypercarbic RF- requiring intubation. LL consolidation on CXray- started on antibiotics. Found to have barbiturates+ on urine toxicology. Course complicated by seizure. Extubated on 10/28- placed on BIPAP. Course continued to be complicated by fevers, worsening hypoxemia requiring reintubation. Was started on propofol, precedex and paralyzed but remained difficult to oxygenate with traditional mechanical ventilation requiring frequent bagging and ALI consult for ECMO was placed. The patient was cannulated for VV-ECMO and transfer to [facility]. Decannulated on Friday last week. Remained intubated on multiple sedative- Midazolam, Precedex, Propofol, Fentanyl. Psychiatry has been consulted as sedatives will slowly be weaned off- agitation management and also for baseline depression (was on multiple psychotropics)    11/6- patient has been extubated and unable to converse. Will continue to follow daily to assess for delirium and depression symptoms.     Recommendations-  - Not starting a standing home psychotropic at this time considering his mentation. Will monitor symptoms for now. PRN meds as below for now.   - If qtc < 500, haldol 2.5 mg q 6 hours PRN IV/IM for agitation  - Will contact his community psychiatrist to obtain more hx

## 2019-11-06 NOTE — PROGRESS NOTE BEHAVIORAL HEALTH - RISK ASSESSMENT
Patient's risk factors include psych hx of severe depression with anxiety, prior psych hospital admissions, and polypharmacy use. He is also an older male. Protective factors include being employed, supportive family, , and lives with wife.

## 2019-11-06 NOTE — PROGRESS NOTE BEHAVIORAL HEALTH - NSBHCHARTREVIEWLAB_PSY_A_CORE FT
11-06    141  |  107  |  13  ----------------------------<  172<H>  4.4   |  23  |  0.71    Ca    8.8      06 Nov 2019 03:40  Phos  3.6     11-06  Mg     2.2     11-06    TPro  6.3  /  Alb  3.2<L>  /  TBili  0.3  /  DBili  x   /  AST  25  /  ALT  30  /  AlkPhos  45  11-06                        8.3    6.49  )-----------( 171      ( 06 Nov 2019 03:40 )             25.8

## 2019-11-06 NOTE — PROGRESS NOTE BEHAVIORAL HEALTH - NSBHFUPINTERVALHXFT_PSY_A_CORE
Patient seen at bedside for follow up of depression management. At this time patient is lethargic and not responsive to verbal stimuli. He is currently unable to converse. He has just been extubated today. No signs of depression currently observed. Patient seen at bedside for follow up of depression management. At this time patient is lethargic and not responsive to verbal stimuli. He is currently unable to engage in conversation. He has just been extubated today. No agitation or aggression.   Wife at bedside. She can appear anxious and continues to focus on 'How can we help him with his feelings'. Educated the wife that I would continue to f/u closely with the patient but at this time he appears more delirious fior since just extubated, now off sedatives. Wife understands.   Placed a call to psychiatrist Dr Garcia ([phone])- left  to call back

## 2019-11-06 NOTE — PROGRESS NOTE BEHAVIORAL HEALTH - NSBHCHARTREVIEWVS_PSY_A_CORE FT
Vital Signs Last 24 Hrs  T(C): 37.9 (06 Nov 2019 08:00), Max: 38.2 (05 Nov 2019 16:00)  T(F): 100.3 (06 Nov 2019 08:00), Max: 100.8 (05 Nov 2019 16:00)  HR: 99 (06 Nov 2019 14:36) (73 - 104)  BP: 147/87 (06 Nov 2019 11:00) (106/58 - 147/87)  BP(mean): 99 (06 Nov 2019 11:00) (69 - 99)  RR: 11 (06 Nov 2019 14:00) (11 - 24)  SpO2: 93% (06 Nov 2019 14:36) (90% - 100%)

## 2019-11-06 NOTE — AIRWAY REMOVAL NOTE  ADULT & PEDS - ARTIFICAL AIRWAY REMOVAL COMMENTS
Pt hemodynamically stable, afebrile and free from respiratory distress. Respiratory and MICU team at bedside with emergency equipment. As per MD Escalera, no post ABG to be performed. Will continue to monitor and assess.

## 2019-11-07 ENCOUNTER — TRANSCRIPTION ENCOUNTER (OUTPATIENT)
Age: 54
End: 2019-11-07

## 2019-11-07 LAB
ALBUMIN SERPL ELPH-MCNC: 3.3 G/DL — SIGNIFICANT CHANGE UP (ref 3.3–5)
ALP SERPL-CCNC: 38 U/L — LOW (ref 40–120)
ALT FLD-CCNC: 27 U/L — SIGNIFICANT CHANGE UP (ref 4–41)
ANION GAP SERPL CALC-SCNC: 12 MMO/L — SIGNIFICANT CHANGE UP (ref 7–14)
ANION GAP SERPL CALC-SCNC: 14 MMO/L — SIGNIFICANT CHANGE UP (ref 7–14)
AST SERPL-CCNC: 25 U/L — SIGNIFICANT CHANGE UP (ref 4–40)
BASOPHILS # BLD AUTO: 0.02 K/UL — SIGNIFICANT CHANGE UP (ref 0–0.2)
BASOPHILS NFR BLD AUTO: 0.3 % — SIGNIFICANT CHANGE UP (ref 0–2)
BILIRUB SERPL-MCNC: 0.5 MG/DL — SIGNIFICANT CHANGE UP (ref 0.2–1.2)
BUN SERPL-MCNC: 15 MG/DL — SIGNIFICANT CHANGE UP (ref 7–23)
BUN SERPL-MCNC: 19 MG/DL — SIGNIFICANT CHANGE UP (ref 7–23)
CALCIUM SERPL-MCNC: 9.1 MG/DL — SIGNIFICANT CHANGE UP (ref 8.4–10.5)
CALCIUM SERPL-MCNC: 9.4 MG/DL — SIGNIFICANT CHANGE UP (ref 8.4–10.5)
CHLORIDE SERPL-SCNC: 112 MMOL/L — HIGH (ref 98–107)
CHLORIDE SERPL-SCNC: 114 MMOL/L — HIGH (ref 98–107)
CO2 SERPL-SCNC: 22 MMOL/L — SIGNIFICANT CHANGE UP (ref 22–31)
CO2 SERPL-SCNC: 22 MMOL/L — SIGNIFICANT CHANGE UP (ref 22–31)
CREAT SERPL-MCNC: 0.73 MG/DL — SIGNIFICANT CHANGE UP (ref 0.5–1.3)
CREAT SERPL-MCNC: 0.8 MG/DL — SIGNIFICANT CHANGE UP (ref 0.5–1.3)
EOSINOPHIL # BLD AUTO: 0.02 K/UL — SIGNIFICANT CHANGE UP (ref 0–0.5)
EOSINOPHIL NFR BLD AUTO: 0.3 % — SIGNIFICANT CHANGE UP (ref 0–6)
GLUCOSE BLDC GLUCOMTR-MCNC: 128 MG/DL — HIGH (ref 70–99)
GLUCOSE BLDC GLUCOMTR-MCNC: 136 MG/DL — HIGH (ref 70–99)
GLUCOSE BLDC GLUCOMTR-MCNC: 143 MG/DL — HIGH (ref 70–99)
GLUCOSE BLDC GLUCOMTR-MCNC: 151 MG/DL — HIGH (ref 70–99)
GLUCOSE BLDC GLUCOMTR-MCNC: 161 MG/DL — HIGH (ref 70–99)
GLUCOSE SERPL-MCNC: 137 MG/DL — HIGH (ref 70–99)
GLUCOSE SERPL-MCNC: 140 MG/DL — HIGH (ref 70–99)
HCT VFR BLD CALC: 27.1 % — LOW (ref 39–50)
HGB BLD-MCNC: 8.4 G/DL — LOW (ref 13–17)
IMM GRANULOCYTES NFR BLD AUTO: 7.3 % — HIGH (ref 0–1.5)
LYMPHOCYTES # BLD AUTO: 0.66 K/UL — LOW (ref 1–3.3)
LYMPHOCYTES # BLD AUTO: 10.3 % — LOW (ref 13–44)
MAGNESIUM SERPL-MCNC: 2.3 MG/DL — SIGNIFICANT CHANGE UP (ref 1.6–2.6)
MAGNESIUM SERPL-MCNC: 2.4 MG/DL — SIGNIFICANT CHANGE UP (ref 1.6–2.6)
MCHC RBC-ENTMCNC: 30 PG — SIGNIFICANT CHANGE UP (ref 27–34)
MCHC RBC-ENTMCNC: 31 % — LOW (ref 32–36)
MCV RBC AUTO: 96.8 FL — SIGNIFICANT CHANGE UP (ref 80–100)
MONOCYTES # BLD AUTO: 0.48 K/UL — SIGNIFICANT CHANGE UP (ref 0–0.9)
MONOCYTES NFR BLD AUTO: 7.5 % — SIGNIFICANT CHANGE UP (ref 2–14)
NEUTROPHILS # BLD AUTO: 4.76 K/UL — SIGNIFICANT CHANGE UP (ref 1.8–7.4)
NEUTROPHILS NFR BLD AUTO: 74.3 % — SIGNIFICANT CHANGE UP (ref 43–77)
NRBC # FLD: 0.02 K/UL — SIGNIFICANT CHANGE UP (ref 0–0)
PHOSPHATE SERPL-MCNC: 2.3 MG/DL — LOW (ref 2.5–4.5)
PHOSPHATE SERPL-MCNC: 2.6 MG/DL — SIGNIFICANT CHANGE UP (ref 2.5–4.5)
PLATELET # BLD AUTO: 197 K/UL — SIGNIFICANT CHANGE UP (ref 150–400)
PMV BLD: 9.4 FL — SIGNIFICANT CHANGE UP (ref 7–13)
POTASSIUM SERPL-MCNC: 3.1 MMOL/L — LOW (ref 3.5–5.3)
POTASSIUM SERPL-MCNC: 3.2 MMOL/L — LOW (ref 3.5–5.3)
POTASSIUM SERPL-SCNC: 3.1 MMOL/L — LOW (ref 3.5–5.3)
POTASSIUM SERPL-SCNC: 3.2 MMOL/L — LOW (ref 3.5–5.3)
PROT SERPL-MCNC: 6.3 G/DL — SIGNIFICANT CHANGE UP (ref 6–8.3)
RBC # BLD: 2.8 M/UL — LOW (ref 4.2–5.8)
RBC # FLD: 13.1 % — SIGNIFICANT CHANGE UP (ref 10.3–14.5)
SODIUM SERPL-SCNC: 148 MMOL/L — HIGH (ref 135–145)
SODIUM SERPL-SCNC: 148 MMOL/L — HIGH (ref 135–145)
WBC # BLD: 6.41 K/UL — SIGNIFICANT CHANGE UP (ref 3.8–10.5)
WBC # FLD AUTO: 6.41 K/UL — SIGNIFICANT CHANGE UP (ref 3.8–10.5)

## 2019-11-07 PROCEDURE — 93971 EXTREMITY STUDY: CPT | Mod: 26

## 2019-11-07 PROCEDURE — 71045 X-RAY EXAM CHEST 1 VIEW: CPT | Mod: 26

## 2019-11-07 RX ORDER — ACETAMINOPHEN 500 MG
650 TABLET ORAL EVERY 6 HOURS
Refills: 0 | Status: DISCONTINUED | OUTPATIENT
Start: 2019-11-07 | End: 2019-11-18

## 2019-11-07 RX ORDER — CEFEPIME 1 G/1
2000 INJECTION, POWDER, FOR SOLUTION INTRAMUSCULAR; INTRAVENOUS ONCE
Refills: 0 | Status: DISCONTINUED | OUTPATIENT
Start: 2019-11-07 | End: 2019-11-07

## 2019-11-07 RX ORDER — CEFEPIME 1 G/1
INJECTION, POWDER, FOR SOLUTION INTRAMUSCULAR; INTRAVENOUS
Refills: 0 | Status: DISCONTINUED | OUTPATIENT
Start: 2019-11-07 | End: 2019-11-07

## 2019-11-07 RX ORDER — INSULIN LISPRO 100/ML
VIAL (ML) SUBCUTANEOUS AT BEDTIME
Refills: 0 | Status: DISCONTINUED | OUTPATIENT
Start: 2019-11-07 | End: 2019-11-14

## 2019-11-07 RX ORDER — ENOXAPARIN SODIUM 100 MG/ML
40 INJECTION SUBCUTANEOUS DAILY
Refills: 0 | Status: DISCONTINUED | OUTPATIENT
Start: 2019-11-07 | End: 2019-11-07

## 2019-11-07 RX ORDER — POTASSIUM PHOSPHATE, MONOBASIC POTASSIUM PHOSPHATE, DIBASIC 236; 224 MG/ML; MG/ML
15 INJECTION, SOLUTION INTRAVENOUS ONCE
Refills: 0 | Status: COMPLETED | OUTPATIENT
Start: 2019-11-07 | End: 2019-11-07

## 2019-11-07 RX ORDER — HYDROCORTISONE 20 MG
50 TABLET ORAL EVERY 12 HOURS
Refills: 0 | Status: DISCONTINUED | OUTPATIENT
Start: 2019-11-07 | End: 2019-11-08

## 2019-11-07 RX ORDER — ACETAMINOPHEN 500 MG
1000 TABLET ORAL ONCE
Refills: 0 | Status: COMPLETED | OUTPATIENT
Start: 2019-11-07 | End: 2019-11-07

## 2019-11-07 RX ORDER — CEFEPIME 1 G/1
2000 INJECTION, POWDER, FOR SOLUTION INTRAMUSCULAR; INTRAVENOUS EVERY 8 HOURS
Refills: 0 | Status: DISCONTINUED | OUTPATIENT
Start: 2019-11-07 | End: 2019-11-08

## 2019-11-07 RX ORDER — DEXTROSE 50 % IN WATER 50 %
15 SYRINGE (ML) INTRAVENOUS ONCE
Refills: 0 | Status: DISCONTINUED | OUTPATIENT
Start: 2019-11-07 | End: 2019-11-14

## 2019-11-07 RX ORDER — SODIUM CHLORIDE 9 MG/ML
1000 INJECTION, SOLUTION INTRAVENOUS
Refills: 0 | Status: DISCONTINUED | OUTPATIENT
Start: 2019-11-07 | End: 2019-11-14

## 2019-11-07 RX ORDER — POTASSIUM CHLORIDE 20 MEQ
40 PACKET (EA) ORAL ONCE
Refills: 0 | Status: COMPLETED | OUTPATIENT
Start: 2019-11-07 | End: 2019-11-07

## 2019-11-07 RX ORDER — ENOXAPARIN SODIUM 100 MG/ML
60 INJECTION SUBCUTANEOUS DAILY
Refills: 0 | Status: DISCONTINUED | OUTPATIENT
Start: 2019-11-07 | End: 2019-11-18

## 2019-11-07 RX ORDER — SENNA PLUS 8.6 MG/1
2 TABLET ORAL AT BEDTIME
Refills: 0 | Status: DISCONTINUED | OUTPATIENT
Start: 2019-11-07 | End: 2019-11-07

## 2019-11-07 RX ORDER — POTASSIUM CHLORIDE 20 MEQ
40 PACKET (EA) ORAL EVERY 4 HOURS
Refills: 0 | Status: COMPLETED | OUTPATIENT
Start: 2019-11-07 | End: 2019-11-07

## 2019-11-07 RX ORDER — CEFEPIME 1 G/1
INJECTION, POWDER, FOR SOLUTION INTRAMUSCULAR; INTRAVENOUS
Refills: 0 | Status: DISCONTINUED | OUTPATIENT
Start: 2019-11-07 | End: 2019-11-08

## 2019-11-07 RX ORDER — GLUCAGON INJECTION, SOLUTION 0.5 MG/.1ML
1 INJECTION, SOLUTION SUBCUTANEOUS ONCE
Refills: 0 | Status: DISCONTINUED | OUTPATIENT
Start: 2019-11-07 | End: 2019-11-18

## 2019-11-07 RX ORDER — CEFEPIME 1 G/1
2000 INJECTION, POWDER, FOR SOLUTION INTRAMUSCULAR; INTRAVENOUS ONCE
Refills: 0 | Status: COMPLETED | OUTPATIENT
Start: 2019-11-07 | End: 2019-11-07

## 2019-11-07 RX ORDER — CEFEPIME 1 G/1
2000 INJECTION, POWDER, FOR SOLUTION INTRAMUSCULAR; INTRAVENOUS EVERY 8 HOURS
Refills: 0 | Status: DISCONTINUED | OUTPATIENT
Start: 2019-11-07 | End: 2019-11-07

## 2019-11-07 RX ORDER — IBUPROFEN 200 MG
400 TABLET ORAL ONCE
Refills: 0 | Status: COMPLETED | OUTPATIENT
Start: 2019-11-07 | End: 2019-11-07

## 2019-11-07 RX ORDER — FUROSEMIDE 40 MG
40 TABLET ORAL ONCE
Refills: 0 | Status: COMPLETED | OUTPATIENT
Start: 2019-11-07 | End: 2019-11-07

## 2019-11-07 RX ORDER — INSULIN LISPRO 100/ML
VIAL (ML) SUBCUTANEOUS
Refills: 0 | Status: DISCONTINUED | OUTPATIENT
Start: 2019-11-07 | End: 2019-11-14

## 2019-11-07 RX ORDER — DEXTROSE 50 % IN WATER 50 %
12.5 SYRINGE (ML) INTRAVENOUS ONCE
Refills: 0 | Status: DISCONTINUED | OUTPATIENT
Start: 2019-11-07 | End: 2019-11-14

## 2019-11-07 RX ORDER — POTASSIUM CHLORIDE 20 MEQ
10 PACKET (EA) ORAL
Refills: 0 | Status: COMPLETED | OUTPATIENT
Start: 2019-11-07 | End: 2019-11-07

## 2019-11-07 RX ADMIN — Medication 1: at 18:20

## 2019-11-07 RX ADMIN — Medication 1 DROP(S): at 18:03

## 2019-11-07 RX ADMIN — Medication 400 MILLIGRAM(S): at 01:20

## 2019-11-07 RX ADMIN — Medication 40 MILLIEQUIVALENT(S): at 09:08

## 2019-11-07 RX ADMIN — POTASSIUM PHOSPHATE, MONOBASIC POTASSIUM PHOSPHATE, DIBASIC 62.5 MILLIMOLE(S): 236; 224 INJECTION, SOLUTION INTRAVENOUS at 06:17

## 2019-11-07 RX ADMIN — NYSTATIN CREAM 1 APPLICATION(S): 100000 CREAM TOPICAL at 05:36

## 2019-11-07 RX ADMIN — CEFEPIME 100 MILLIGRAM(S): 1 INJECTION, POWDER, FOR SOLUTION INTRAMUSCULAR; INTRAVENOUS at 21:40

## 2019-11-07 RX ADMIN — Medication 50 MILLIGRAM(S): at 05:36

## 2019-11-07 RX ADMIN — CEFEPIME 100 MILLIGRAM(S): 1 INJECTION, POWDER, FOR SOLUTION INTRAMUSCULAR; INTRAVENOUS at 12:46

## 2019-11-07 RX ADMIN — Medication 650 MILLIGRAM(S): at 18:03

## 2019-11-07 RX ADMIN — HALOPERIDOL DECANOATE 2.5 MILLIGRAM(S): 100 INJECTION INTRAMUSCULAR at 02:19

## 2019-11-07 RX ADMIN — Medication 40 MILLIEQUIVALENT(S): at 18:04

## 2019-11-07 RX ADMIN — FENTANYL CITRATE 1 PATCH: 50 INJECTION INTRAVENOUS at 19:40

## 2019-11-07 RX ADMIN — Medication 100 MILLIEQUIVALENT(S): at 06:17

## 2019-11-07 RX ADMIN — Medication 400 MILLIGRAM(S): at 12:46

## 2019-11-07 RX ADMIN — Medication 400 MILLIGRAM(S): at 13:45

## 2019-11-07 RX ADMIN — Medication 1 DROP(S): at 22:04

## 2019-11-07 RX ADMIN — Medication 40 MILLIEQUIVALENT(S): at 18:05

## 2019-11-07 RX ADMIN — ENOXAPARIN SODIUM 40 MILLIGRAM(S): 100 INJECTION SUBCUTANEOUS at 05:36

## 2019-11-07 RX ADMIN — Medication 40 MILLIGRAM(S): at 09:07

## 2019-11-07 RX ADMIN — Medication 100 MILLIEQUIVALENT(S): at 07:42

## 2019-11-07 RX ADMIN — Medication 1 APPLICATION(S): at 18:03

## 2019-11-07 RX ADMIN — Medication 650 MILLIGRAM(S): at 10:08

## 2019-11-07 RX ADMIN — NYSTATIN CREAM 1 APPLICATION(S): 100000 CREAM TOPICAL at 18:01

## 2019-11-07 RX ADMIN — Medication 650 MILLIGRAM(S): at 18:52

## 2019-11-07 RX ADMIN — ENOXAPARIN SODIUM 60 MILLIGRAM(S): 100 INJECTION SUBCUTANEOUS at 12:33

## 2019-11-07 RX ADMIN — Medication 1 DROP(S): at 05:36

## 2019-11-07 RX ADMIN — FENTANYL CITRATE 1 PATCH: 50 INJECTION INTRAVENOUS at 06:08

## 2019-11-07 RX ADMIN — Medication 50 MILLIGRAM(S): at 18:02

## 2019-11-07 RX ADMIN — Medication 100 MILLIEQUIVALENT(S): at 06:45

## 2019-11-07 NOTE — DISCHARGE NOTE PROVIDER - HOSPITAL COURSE
54M with severe depression with prior psych admissions/ECT who was initially admitted to  10/28 after presenting with lethargy and hypoxemia. Patient reportedly was dealing with severe depression and expressed SI to his wife. His wife reported he was sleeping throughout the day and in the evening she heard a thump and found him on the floor. At  he became obtunded with hypercapnic respiratory failure and was intubated and admitted to the CCU. Initial imaging showed lower lobe consolidations and he was treated with CTX/Azithromycin. Poison control was contacted and he was received NaHCO3 in the setting of mild QRS widening on ECG and suspected overdose. His Utox returned positive for barbiturates; he reportedly had positive testing for barbiturates in the past thought secondary to OTC supplementations he takes from a [employer] for sleep. On the morning of admission, he was noted to have a 30 second tonic clonic seizure and received IV benzo and keppra load. Subsequent EEG did not reveal epileptiform discharges. He was extubated in the morning of 10/28 but required BiPAP in the setting of increased oxygenation needs. On 10/29 he was agitated and was started on Precedex. He developed fevers and was broadened to cefepime. Overnight (10/30), he had a Tmax of 102.6 and worsening hypoxemia requiring reintubation. Was started on propofol, precedex and paralyzed but remained difficult to oxygenate with traditional mechanical ventilation requiring frequent bagging and ALI consult for ECMO was placed. He was steroids, nimbex gtt, vancomycin and received Lasix 40mg IVP. His P/F was 60 at time of consult; improved somewhat to 82 at 4:30 this morning on AC/28/460/18/100. The patient was cannulated for VV-ECMO and transfer to [facility]. His lung compliance and oxygenation improved and he was decannulated 11/1. He subsequently extubated 11/5. He completed a 7 day course of vancomycin and zosyn.    Pt was tx to RCU on 11/7 54M with severe depression with prior psych admissions/ECT who was initially admitted to  10/28 after presenting with lethargy and hypoxemia. Patient reportedly was dealing with severe depression and expressed SI to his wife. His wife reported he was sleeping throughout the day and in the evening she heard a thump and found him on the floor. At  he became obtunded with hypercapnic respiratory failure and was intubated and admitted to the CCU. Initial imaging showed lower lobe consolidations and he was treated with CTX/Azithromycin. Poison control was contacted and he was received NaHCO3 in the setting of mild QRS widening on ECG and suspected overdose. His Utox returned positive for barbiturates; he reportedly had positive testing for barbiturates in the past thought secondary to OTC supplementations he takes from a [employer] for sleep. On the morning of admission, he was noted to have a 30 second tonic clonic seizure and received IV benzo and keppra load. Subsequent EEG did not reveal epileptiform discharges. He was extubated in the morning of 10/28 but required BiPAP in the setting of increased oxygenation needs. On 10/29 he was agitated and was started on Precedex. He developed fevers and was broadened to cefepime. Overnight (10/30), he had a Tmax of 102.6 and worsening hypoxemia requiring reintubation. Was started on propofol, precedex and paralyzed but remained difficult to oxygenate with traditional mechanical ventilation requiring frequent bagging and ALI consult for ECMO was placed. He was steroids, nimbex gtt, vancomycin and received Lasix 40mg IVP. His P/F was 60 at time of consult; improved somewhat to 82 at 4:30 this morning on AC/28/460/18/100. The patient was cannulated for VV-ECMO and transfer to [facility]. His lung compliance and oxygenation improved and he was decannulated 11/1. He subsequently extubated 11/5. He completed a 7 day course of vancomycin and zosyn.    Pt was tx to RCU on 11/7    He had persistent fevers, sputum culture with enterobacter - ID consulted and pt was placed on cefepime for ------------ day course.    Blood cultures negative.    Pt passed S&S, diet advanced.    PT recs rehab.                dispo: 54M with severe depression with prior psych admissions/ECT who was initially admitted to  10/28 after presenting with lethargy and hypoxemia. Patient reportedly was dealing with severe depression and expressed SI to his wife. His wife reported he was sleeping throughout the day and in the evening she heard a thump and found him on the floor. At  he became obtunded with hypercapnic respiratory failure and was intubated and admitted to the CCU. Initial imaging showed lower lobe consolidations and he was treated with CTX/Azithromycin. Poison control was contacted and he was received NaHCO3 in the setting of mild QRS widening on ECG and suspected overdose. His Utox returned positive for barbiturates; he reportedly had positive testing for barbiturates in the past thought secondary to OTC supplementations he takes from a [employer] for sleep. On the morning of admission, he was noted to have a 30 second tonic clonic seizure and received IV benzo and keppra load. Subsequent EEG did not reveal epileptiform discharges. He was extubated in the morning of 10/28 but required BiPAP in the setting of increased oxygenation needs. On 10/29 he was agitated and was started on Precedex. He developed fevers and was broadened to cefepime. Overnight (10/30), he had a Tmax of 102.6 and worsening hypoxemia requiring reintubation. Was started on propofol, precedex and paralyzed but remained difficult to oxygenate with traditional mechanical ventilation requiring frequent bagging and ALI consult for ECMO was placed. He was steroids, nimbex gtt, vancomycin and received Lasix 40mg IVP. His P/F was 60 at time of consult; improved somewhat to 82 at 4:30 this morning on AC/28/460/18/100. The patient was cannulated for VV-ECMO and transfer to [facility]. His lung compliance and oxygenation improved and he was decannulated 11/1. He subsequently extubated 11/5. He completed a 7 day course of vancomycin and zosyn.    Pt was tx to RCU on 11/7    He had persistent fevers, sputum culture with enterobacter - ID consulted and pt was placed on cefepime. Persistent fevers, vancomycin added, but then d/c'd as without source - fevers then resolved.    Blood cultures negative.    Pt passed S&S, diet advanced.    Psych following - medications restarted for depression, tolerated seroquel and effexor.    PT and PM&R recs acute rehab.                dispo: acute rehab 54M with severe depression with prior psych admissions/ECT who was initially admitted to  10/28 after presenting with lethargy and hypoxemia. Patient reportedly was dealing with severe depression and expressed SI to his wife. His wife reported he was sleeping throughout the day and in the evening she heard a thump and found him on the floor. At  he became obtunded with hypercapnic respiratory failure and was intubated and admitted to the CCU. Initial imaging showed lower lobe consolidations and he was treated with CTX/Azithromycin. Poison control was contacted and he was received NaHCO3 in the setting of mild QRS widening on ECG and suspected overdose. His Utox returned positive for barbiturates; he reportedly had positive testing for barbiturates in the past thought secondary to OTC supplementations he takes from a [employer] for sleep. On the morning of admission, he was noted to have a 30 second tonic clonic seizure and received IV benzo and keppra load. Subsequent EEG did not reveal epileptiform discharges. He was extubated in the morning of 10/28 but required BiPAP in the setting of increased oxygenation needs. On 10/29 he was agitated and was started on Precedex. He developed fevers and was broadened to cefepime. Overnight (10/30), he had a Tmax of 102.6 and worsening hypoxemia requiring reintubation. Was started on propofol, precedex and paralyzed but remained difficult to oxygenate with traditional mechanical ventilation requiring frequent bagging and ALI consult for ECMO was placed. He was steroids, nimbex gtt, vancomycin and received Lasix 40mg IVP. His P/F was 60 at time of consult; improved somewhat to 82 at 4:30 this morning on AC/28/460/18/100. The patient was cannulated for VV-ECMO and transfer [facility]. His lung compliance and oxygenation improved and he was decannulated 11/1. He subsequently extubated 11/5. He completed a 7 day course of vancomycin and zosyn.    Pt was tx to RCU on 11/7    He had persistent fevers, sputum culture with enterobacter - ID consulted and pt was placed on cefepime. Persistent fevers, vancomycin added, but then d/c'd as without source - fevers then resolved.    Blood cultures negative.    Pt passed S&S, diet advanced.    Psych following - medications restarted for depression, tolerated seroquel and effexor.    PT and PM&R recs acute rehab.        Dispo: Rehab

## 2019-11-07 NOTE — DISCHARGE NOTE PROVIDER - NSDCMRMEDTOKEN_GEN_ALL_CORE_FT
Ambien 10 mg oral tablet: 1 tab(s) orally once a day (at bedtime) x 30 days MDD:10 mg   atorvastatin 10 mg oral tablet: 1 tab(s) orally once a day (at bedtime) x 30 days   buPROPion 300 mg/24 hours (XL) oral tablet, extended release: 1 tab(s) orally once a day x 30 days   clonazePAM 1 mg oral tablet: 1 tab(s) orally 3 times a day MDD:3 mg  gabapentin 300 mg oral capsule: 1 cap(s) orally 2 times a day x 30 days   OLANZapine 5 mg oral tablet: 1 tab(s) orally once a day  QUEtiapine 400 mg oral tablet: 1 tab(s) orally once a day (at bedtime) x 30 days   traZODone 100 mg oral tablet: 1 tab(s) orally once a day (at bedtime) x 30 days   venlafaxine 150 mg oral capsule, extended release: 1 cap(s) orally once a day x 30 days amLODIPine 10 mg oral tablet: 1 tab(s) orally once a day  enoxaparin: 60 milligram(s) subcutaneous once a day  haloperidol: 2.5 milligram(s) intravenous every 6 hours, As Needed  lactobacillus acidophilus oral capsule:  orally   metoprolol tartrate 75 mg oral tablet: 1 tab(s) orally 2 times a day  nystatin 100,000 units/g topical powder: 1 application topically every 12 hours  QUEtiapine 25 mg oral tablet: 3 tab(s) orally   QUEtiapine 50 mg oral tablet: 1 tab(s) orally every 6 hours, As needed, agitation  venlafaxine 37.5 mg oral tablet: 1 tab(s) orally once a day

## 2019-11-07 NOTE — DISCHARGE NOTE PROVIDER - CARE PROVIDER_API CALL
Tessy Corbin)  Internal Medicine; Pulmonary Disease  [address], [facility] [address]  [place] [address]  Phone: [phone]  Fax: [phone]  Follow Up Time:

## 2019-11-07 NOTE — PROGRESS NOTE ADULT - SUBJECTIVE AND OBJECTIVE BOX
Interval Events:  Extubated yesterday   Doing well - has no complaints this morning  Mental status gradually improving  Tmax overnight 101.5 F    REVIEW OF SYSTEMS:  [x] All other systems negative  [ ] Unable to assess ROS because ________    OBJECTIVE:  ICU Vital Signs Last 24 Hrs  T(C): 38.3 (2019 04:00), Max: 38.6 (2019 01:00)  T(F): 100.9 (2019 04:00), Max: 101.5 (2019 01:00)  HR: 91 (2019 07:00) (79 - 104)  BP: 150/83 (2019 18:00) (128/66 - 150/83)  BP(mean): 100 (2019 18:00) (80 - 100)  ABP: 167/67 (2019 07:00) (136/64 - 181/81)  ABP(mean): 100 (2019 07:00) (87 - 116)  RR: 27 (2019 07:00) (11 - 27)  SpO2: 91% (2019 07:00) (90% - 100%)    Mode: CPAP with PS, FiO2: 50, PEEP: 5, PS: 5, MAP: 8     @ 07:01  -   @ 07:00  --------------------------------------------------------  IN: 1017 mL / OUT: 2470 mL / NET: -1453 mL      CAPILLARY BLOOD GLUCOSE      POCT Blood Glucose.: 128 mg/dL (2019 05:30)      PHYSICAL EXAM:  General: NAD  HEENT: PERRL  Neck: Supple  Respiratory: Rhonchi bilaterally. No wheezing. Poor cough. On 3 L supplemental oxygen  Cardiovascular: RRR, no murmur  Abdomen: Soft, NT/ND, +BS  Extremities: 2+ peripheral pulses b/l; +1-2 LE edema  Skin: normal color and turgor; no rash   Neurological: Following commands, answering questions appropriately    HOSPITAL MEDICATIONS:  enoxaparin Injectable 40 milliGRAM(s) SubCutaneous every 12 hours      furosemide   Injectable 40 milliGRAM(s) IV Push once    hydrocortisone sodium succinate Injectable 50 milliGRAM(s) IV Push every 12 hours  insulin lispro (HumaLOG) corrective regimen sliding scale   SubCutaneous every 6 hours      fentaNYL   Patch  25 MICROgram(s)/Hr 1 Patch Transdermal every 72 hours  haloperidol    Injectable 2.5 milliGRAM(s) IV Push every 6 hours PRN    polyethylene glycol 3350 17 Gram(s) Oral two times a day        dextrose 5%. 1000 milliLiter(s) IV Continuous <Continuous>      artificial tears (preservative free) Ophthalmic Solution 1 Drop(s) Both EYES two times a day  nystatin Powder 1 Application(s) Topical every 12 hours  petrolatum Ophthalmic Ointment 1 Application(s) Both EYES two times a day        LABS:                        8.4    6.41  )-----------( 197      ( 2019 03:30 )             27.1     Hgb Trend: 8.4<--, 8.3<--, 8.2<--, 8.6<--, 8.2<--  1107    148<H>  |  112<H>  |  15  ----------------------------<  137<H>  3.2<L>   |  22  |  0.73    Ca    9.1      2019 03:30  Phos  2.3       Mg     2.3         TPro  6.3  /  Alb  3.3  /  TBili  0.5  /  DBili  x   /  AST  25  /  ALT  27  /  AlkPhos  38<L>  1107    Creatinine Trend: 0.73<--, 0.71<--, 0.89<--, 0.86<--, 0.83<--, 0.70<--    Urinalysis Basic - ( 2019 18:00 )    Color: YELLOW / Appearance: CLEAR / S.027 / pH: 6.0  Gluc: NEGATIVE / Ketone: NEGATIVE  / Bili: NEGATIVE / Urobili: NORMAL   Blood: NEGATIVE / Protein: 10 / Nitrite: NEGATIVE   Leuk Esterase: NEGATIVE / RBC: x / WBC x   Sq Epi: x / Non Sq Epi: x / Bacteria: x      Arterial Blood Gas:   @ 03:40  7.42/41/81/26/95.5/2.3  ABG lactate: 0.7        MICROBIOLOGY:     RADIOLOGY:  [ ] Reviewed and interpreted by me    ASSESSMENT AND RECOMMENDATION:    54 M with severe depression who presented to  10/28 with lethargy and hypoxemic/hypercapnic respiratory failure possibly related to overdose +/- pneumonia extubated but required reintubation 10/30 and with persistent hypoxemia refractory to conventional management. Ultimately cannulated for VV ECMO 10/30/2019 for hypoxemic respiratory failure with ARDS and transferred to [facility] for further management. Decannulated , extubated . Doing well.    NEUROLOGY  Baseline AOX3, Severe depression	  -Haldol prn for agitation    -Holding off on adding back multiple psych medications (unclear if he was compliant with them at home)  -Will add back medications per psychiatry recommendations  -No seizure activity off antiepileptics at this time    PULMONARY  Severe ARDS s/p VV-ECMO 10/30 -   Extubated   -Supplemental oxygen 3L saturating well  -Completed course of antibiotics for pneumonia    CARDIOVASCULAR  TTE at  with HFrEF, MR, GA. Unclear if new diagnosis of HFrEF. Bedside FREDI performed initially with severe LV dysfunction and grade I diastolic dysfunction, mild MR. Possible component of adrenal insufficiency (unclear etiology) given hypotension and low random cortisol  -Taper stress dose steroids (initially on hydrocortisone 50 mg Q6H ) - now on 50 mg Q12H  -Volume overloaded - furosemide with goal net negative 1-2 L over next 24 hours    SKIN/LINES:  -Left IJ TLC 10/30 - will pull today  -right radial katherin 10/30 - pulled   -PIV/arrow x1   -Right IJ/Right fem site with intact sutures, open to air, clean and dry     GASTROINTESTINAL  -Failed bedside swallow exam  -Official speech and swallow pending  -NGT placed today    RENAL/  No active issues    -Meredith in place for critically ill patient good UO  -Can pull Meredith and place condom catheter today      INFECTIOUS DISEASE  -blood cultures grew coag negative staph. most likely decontaminate, BAL cultures +for staph aureus   -Legionella negative. DC azithromycin on .    -s/p  7 day course completed of vanco/zosyn   -Follow up cultures for fever  -Bedside DVT exam shows compressible LE vessels as well as compressible IJ    HEME  -No longer on heparin    ENDOCRINE  -Monitor FS/low dose SS  -cortisol level low yesterday started on hydrocortisone 50Q6h - taper as tolerates    DVT PROPHYLAXIS  -Lovenox BID     PSYCH  Patient with significant psych history, severe depression, on multiple medications, required ECT in the past.   -Appreciate psych recommendations     Full Code    Jw Blake MD  Pulmonary and Critical Care Fellow  [phone]

## 2019-11-07 NOTE — SWALLOW BEDSIDE ASSESSMENT ADULT - SWALLOW EVAL: RECOMMENDED FEEDING/EATING TECHNIQUES
small sips/bites/position upright (90 degrees)/oral hygiene/maintain upright posture during/after eating for 30 mins

## 2019-11-07 NOTE — SWALLOW BEDSIDE ASSESSMENT ADULT - SWALLOW EVAL: DIAGNOSIS
1. Functional oral phase for puree consistency, honey thick liquids and nectar thick liquids marked by adequate stripping of bolus from utensil, adequate bolus manipulation and functional oral transit time 2. Mild oral phase dysphagia for thin liquids marked by reduced labial seal with lateral loss, reduced bolus manipulation and reduced anterior-posterior transport with suspected posterior loss 3. Functional pharyngeal phase for puree consistency, honey thick liquids and nectar thick liquids marked by adequate laryngeal elevation upon digital palpation with NO overt s/s of laryngeal penetration/aspiration noted 4. Moderate pharyngeal phase dysphagia for thin liquids marked by delay in laryngeal elevation upon digital palpation. Immediate cough response subsequent thin liquids indicative of laryngeal penetration/aspiration.

## 2019-11-07 NOTE — DISCHARGE NOTE PROVIDER - NSDCCPCAREPLAN_GEN_ALL_CORE_FT
PRINCIPAL DISCHARGE DIAGNOSIS  Diagnosis: Acute respiratory distress syndrome (ARDS)  Assessment and Plan of Treatment:       SECONDARY DISCHARGE DIAGNOSES  Diagnosis: Major depressive disorder without psychotic features  Assessment and Plan of Treatment: PRINCIPAL DISCHARGE DIAGNOSIS  Diagnosis: Acute respiratory distress syndrome (ARDS)  Assessment and Plan of Treatment: Severe hypoxemic respiratory failure likely severe ARDS, cannulated for VV-ECMO on 10/30 possibly related to aspiration pneumonia after possible benzo withdrawal seizure  Decannulated 11/1, extubated on 11/5 - tolerating nasal cannula.  Completed multiple courses of antibiotics for pneumonia.  Continue chest PT, incentive spirometer, suction PRN.        SECONDARY DISCHARGE DIAGNOSES  Diagnosis: Major depressive disorder without psychotic features  Assessment and Plan of Treatment: PRINCIPAL DISCHARGE DIAGNOSIS  Diagnosis: Acute respiratory distress syndrome (ARDS)  Assessment and Plan of Treatment: Severe hypoxemic respiratory failure likely severe ARDS, cannulated for VV-ECMO on 10/30 possibly related to aspiration pneumonia after possible benzo withdrawal seizure.  Decannulated 11/1, extubated on 11/5 - tolerating room air.  Completed multiple courses of antibiotics for pneumonia.  Continue chest PT, incentive spirometer, suction PRN.        SECONDARY DISCHARGE DIAGNOSES  Diagnosis: Major depressive disorder without psychotic features  Assessment and Plan of Treatment: Continue seroquel and effexor.  Please continue follow up with psychiatrist at rehab and upon discharge from rehab for further treatment and management of medications. PRINCIPAL DISCHARGE DIAGNOSIS  Diagnosis: Acute respiratory distress syndrome (ARDS)  Assessment and Plan of Treatment: You presented with severe hypoxemic respiratory failure possibly related to aspiration pneumonia after possible benzodiazipine withdrawal. You required intubation and ECMO and you were extubated 11/5.  Completed multiple courses of antibiotics for pneumonia.      SECONDARY DISCHARGE DIAGNOSES  Diagnosis: Pneumonia  Assessment and Plan of Treatment: A course of antibiotics was initiated during your hospital stay - please continue as prescribed. Monitor for worsening of disease, such as, increased cough/sputum, difficulty breathing, fever/chills, or changes in mental status. Follow-up with your primary care provider as an outpatient for further medical care/recommendations.    Diagnosis: Major depressive disorder without psychotic features  Assessment and Plan of Treatment: Continue your Seroquel and Effexor as directed and follow up with your primary care provider/psychiatrist for further evaluation/management. If you are ever in need of immediate psychiatric assistance you may reach out to the [facility] [phone]. PRINCIPAL DISCHARGE DIAGNOSIS  Diagnosis: Acute respiratory distress syndrome (ARDS)  Assessment and Plan of Treatment: You presented with severe hypoxemic respiratory failure possibly related to aspiration pneumonia after possible benzodiazipine withdrawal. You required intubation and ECMO and you were extubated 11/5.  Completed multiple courses of antibiotics for pneumonia.      SECONDARY DISCHARGE DIAGNOSES  Diagnosis: Pneumonia  Assessment and Plan of Treatment: A course of antibiotics was initiated during your hospital stay - please continue as prescribed. Monitor for worsening of disease, such as, increased cough/sputum, difficulty breathing, fever/chills, or changes in mental status. Follow-up with your primary care provider as an outpatient for further medical care/recommendations.    Diagnosis: Major depressive disorder without psychotic features  Assessment and Plan of Treatment: Continue your Seroquel and Effexor as directed and follow up with your primary care provider/psychiatrist for further evaluation/management. If you are ever in need of immediate psychiatric assistance you may reach out to the [facility] [phone].Pt should have psychiatry fu at rehab

## 2019-11-07 NOTE — CHART NOTE - NSCHARTNOTEFT_GEN_A_CORE
HPI:  54M with severe depression with prior psych admissions/ECT who was initially admitted to  10/28 after presenting with lethargy and hypoxemia. Patient reportedly was dealing with severe depression and expressed SI to his wife. His wife reported he was sleeping throughout the day and in the evening she heard a thump and found him on the floor. At  he became obtunded with hypercapnic respiratory failure and was intubated and admitted to the CCU. Initial imaging showed lower lobe consolidations and he was treated with CTX/Azithromycin. Poison control was contacted and he was received NaHCO3 in the setting of mild QRS widening on ECG and suspected overdose. His Utox returned positive for barbiturates; he reportedly had positive testing for barbiturates in the past thought secondary to OTC supplementations he takes from a [employer] for sleep. On the morning of admission, he was noted to have a 30 second tonic clonic seizure and received IV benzo and keppra load. Subsequent EEG did not reveal epileptiform discharges. He was extubated in the morning of 10/28 but required BiPAP in the setting of increased oxygenation needs. On 10/29 he was agitated and was started on Precedex. He developed fevers and was broadened to cefepime. Overnight (10/30), he had a Tmax of 102.6 and worsening hypoxemia requiring reintubation. Was started on propofol, precedex and paralyzed but remained difficult to oxygenate with traditional mechanical ventilation requiring frequent bagging and ALI consult for ECMO was placed. He was steroids, nimbex gtt, vancomycin and received Lasix 40mg IVP. His P/F was 60 at time of consult; improved somewhat to 82 at 4:30 this morning on /460/18/100. The patient was cannulated for VV-ECMO and transfer to [facility]. His lung compliance and oxygenation improved and he was decannulated . He subsequently extubated . He completed a 7 day course of vancomycin and zosyn.    REVIEW OF SYSTEMS:  [x] All other systems negative  [ ] Unable to assess ROS because ________    OBJECTIVE:  ICU Vital Signs Last 24 Hrs  T(C): 38.3 (2019 04:00), Max: 38.6 (2019 01:00)  T(F): 100.9 (2019 04:00), Max: 101.5 (2019 01:00)  HR: 91 (2019 07:00) (79 - 104)  BP: 150/83 (2019 18:00) (128/66 - 150/83)  BP(mean): 100 (2019 18:00) (80 - 100)  ABP: 167/67 (2019 07:00) (136/64 - 181/81)  ABP(mean): 100 (2019 07:00) (87 - 116)  RR: 27 (2019 07:00) (11 - 27)  SpO2: 91% (2019 07:00) (90% - 100%)    Mode: CPAP with PS, FiO2: 50, PEEP: 5, PS: 5, MAP: 8     @ 07:01  -   @ 07:00  --------------------------------------------------------  IN: 1017 mL / OUT: 2470 mL / NET: -1453 mL      CAPILLARY BLOOD GLUCOSE      POCT Blood Glucose.: 128 mg/dL (2019 05:30)      PHYSICAL EXAM:  General: NAD  HEENT: PERRL  Neck: Supple  Respiratory: Rhonchi bilaterally. No wheezing. Poor cough. On 3 L supplemental oxygen  Cardiovascular: RRR, no murmur  Abdomen: Soft, NT/ND, +BS  Extremities: 2+ peripheral pulses b/l; +1-2 LE edema  Skin: normal color and turgor; no rash   Neurological: Following commands, answering questions appropriately    HOSPITAL MEDICATIONS:  enoxaparin Injectable 40 milliGRAM(s) SubCutaneous every 12 hours      furosemide   Injectable 40 milliGRAM(s) IV Push once    hydrocortisone sodium succinate Injectable 50 milliGRAM(s) IV Push every 12 hours  insulin lispro (HumaLOG) corrective regimen sliding scale   SubCutaneous every 6 hours      fentaNYL   Patch  25 MICROgram(s)/Hr 1 Patch Transdermal every 72 hours  haloperidol    Injectable 2.5 milliGRAM(s) IV Push every 6 hours PRN    polyethylene glycol 3350 17 Gram(s) Oral two times a day        dextrose 5%. 1000 milliLiter(s) IV Continuous <Continuous>      artificial tears (preservative free) Ophthalmic Solution 1 Drop(s) Both EYES two times a day  nystatin Powder 1 Application(s) Topical every 12 hours  petrolatum Ophthalmic Ointment 1 Application(s) Both EYES two times a day        LABS:                        8.4    6.41  )-----------( 197      ( 2019 03:30 )             27.1     Hgb Trend: 8.4<--, 8.3<--, 8.2<--, 8.6<--, 8.2<--      148<H>  |  112<H>  |  15  ----------------------------<  137<H>  3.2<L>   |  22  |  0.73    Ca    9.1      2019 03:30  Phos  2.3       Mg     2.3         TPro  6.3  /  Alb  3.3  /  TBili  0.5  /  DBili  x   /  AST  25  /  ALT  27  /  AlkPhos  38<L>      Creatinine Trend: 0.73<--, 0.71<--, 0.89<--, 0.86<--, 0.83<--, 0.70<--    Urinalysis Basic - ( 2019 18:00 )    Color: YELLOW / Appearance: CLEAR / S.027 / pH: 6.0  Gluc: NEGATIVE / Ketone: NEGATIVE  / Bili: NEGATIVE / Urobili: NORMAL   Blood: NEGATIVE / Protein: 10 / Nitrite: NEGATIVE   Leuk Esterase: NEGATIVE / RBC: x / WBC x   Sq Epi: x / Non Sq Epi: x / Bacteria: x      Arterial Blood Gas:   @ 03:40  7.42/41/81/26/95.5/2.3  ABG lactate: 0.7        MICROBIOLOGY:     RADIOLOGY:  [ ] Reviewed and interpreted by me    ASSESSMENT AND RECOMMENDATION:    54 M with severe depression who presented to  10/28 with lethargy and hypoxemic/hypercapnic respiratory failure possibly related to overdose +/- pneumonia extubated but required reintubation 10/30 and with persistent hypoxemia refractory to conventional management. Ultimately cannulated for VV ECMO 10/30/2019 for hypoxemic respiratory failure with ARDS and transferred to [facility] for further management. Decannulated , extubated . Doing well.    NEUROLOGY  Baseline AOX3, Severe depression	  -Haldol prn for agitation    -Holding off on adding back multiple psych medications (unclear if he was compliant with them at home)  -Will add back medications per psychiatry recommendations  -No seizure activity off antiepileptics at this time    PULMONARY  Severe ARDS s/p VV-ECMO 10/30 -   Extubated   -Supplemental oxygen 3L saturating well  -Completed course of antibiotics for pneumonia    CARDIOVASCULAR  TTE at  with HFrEF, MR, [place]. Unclear if new diagnosis of HFrEF. Bedside FREDI performed initially with severe LV dysfunction and grade I diastolic dysfunction, mild MR. Possible component of adrenal insufficiency (unclear etiology) given hypotension and low random cortisol  -Taper stress dose steroids (initially on hydrocortisone 50 mg Q6H ) - now on 50 mg Q12H  -Volume overloaded - furosemide with goal net negative 1-2 L over next 24 hours    SKIN/LINES:  -Left IJ TLC 10/30 - will pull today  -right radial katherin 10/30 - pulled   -PIV/arrow x1   -Right IJ/Right fem site with intact sutures, open to air, clean and dry     GASTROINTESTINAL  -Failed bedside swallow exam  -Official speech and swallow pending  -NGT placed today    RENAL/  No active issues    -Meredith in place for critically ill patient good UO  -Can pull Meredith and place condom catheter today      INFECTIOUS DISEASE  -blood cultures grew coag negative staph. most likely decontaminate, BAL cultures +for staph aureus   -Legionella negative. DC azithromycin on .    -s/p  7 day course completed of vanco/zosyn   -Follow up cultures for fever  -Bedside DVT exam shows compressible LE vessels as well as compressible IJ    HEME  -No longer on heparin    ENDOCRINE  -Monitor FS/low dose SS  -cortisol level low yesterday started on hydrocortisone 50Q6h - taper as tolerates    DVT PROPHYLAXIS  -Lovenox BID     PSYCH  Patient with significant psych history, severe depression, on multiple medications, required ECT in the past.   -Appreciate psych recommendations    OTHER  Physical therapy      ETHICS  Full Code    Jw Blake MD  Pulmonary and Critical Care Fellow  [phone]

## 2019-11-08 DIAGNOSIS — R50.9 FEVER, UNSPECIFIED: ICD-10-CM

## 2019-11-08 DIAGNOSIS — J80 ACUTE RESPIRATORY DISTRESS SYNDROME: ICD-10-CM

## 2019-11-08 DIAGNOSIS — Z29.9 ENCOUNTER FOR PROPHYLACTIC MEASURES, UNSPECIFIED: ICD-10-CM

## 2019-11-08 DIAGNOSIS — R53.81 OTHER MALAISE: ICD-10-CM

## 2019-11-08 LAB
-  AMIKACIN: SIGNIFICANT CHANGE UP
-  AMPICILLIN/SULBACTAM: SIGNIFICANT CHANGE UP
-  AMPICILLIN: SIGNIFICANT CHANGE UP
-  AZTREONAM: SIGNIFICANT CHANGE UP
-  CEFAZOLIN: SIGNIFICANT CHANGE UP
-  CEFEPIME: SIGNIFICANT CHANGE UP
-  CEFOXITIN: SIGNIFICANT CHANGE UP
-  CEFTAZIDIME: SIGNIFICANT CHANGE UP
-  CEFTRIAXONE: SIGNIFICANT CHANGE UP
-  ERTAPENEM: SIGNIFICANT CHANGE UP
-  GENTAMICIN: SIGNIFICANT CHANGE UP
-  IMIPENEM: SIGNIFICANT CHANGE UP
-  LEVOFLOXACIN: SIGNIFICANT CHANGE UP
-  MEROPENEM: SIGNIFICANT CHANGE UP
-  PIPERACILLIN/TAZOBACTAM: SIGNIFICANT CHANGE UP
-  TIGECYCLINE: SIGNIFICANT CHANGE UP
-  TOBRAMYCIN: SIGNIFICANT CHANGE UP
-  TRIMETHOPRIM/SULFAMETHOXAZOLE: SIGNIFICANT CHANGE UP
ANION GAP SERPL CALC-SCNC: 12 MMO/L — SIGNIFICANT CHANGE UP (ref 7–14)
ANION GAP SERPL CALC-SCNC: 14 MMO/L — SIGNIFICANT CHANGE UP (ref 7–14)
BACTERIA SPT RESP CULT: SIGNIFICANT CHANGE UP
BUN SERPL-MCNC: 21 MG/DL — SIGNIFICANT CHANGE UP (ref 7–23)
BUN SERPL-MCNC: 21 MG/DL — SIGNIFICANT CHANGE UP (ref 7–23)
CALCIUM SERPL-MCNC: 9.1 MG/DL — SIGNIFICANT CHANGE UP (ref 8.4–10.5)
CALCIUM SERPL-MCNC: 9.2 MG/DL — SIGNIFICANT CHANGE UP (ref 8.4–10.5)
CHLORIDE SERPL-SCNC: 113 MMOL/L — HIGH (ref 98–107)
CHLORIDE SERPL-SCNC: 116 MMOL/L — HIGH (ref 98–107)
CO2 SERPL-SCNC: 20 MMOL/L — LOW (ref 22–31)
CO2 SERPL-SCNC: 22 MMOL/L — SIGNIFICANT CHANGE UP (ref 22–31)
CREAT SERPL-MCNC: 0.83 MG/DL — SIGNIFICANT CHANGE UP (ref 0.5–1.3)
CREAT SERPL-MCNC: 0.86 MG/DL — SIGNIFICANT CHANGE UP (ref 0.5–1.3)
GLUCOSE BLDC GLUCOMTR-MCNC: 126 MG/DL — HIGH (ref 70–99)
GLUCOSE BLDC GLUCOMTR-MCNC: 128 MG/DL — HIGH (ref 70–99)
GLUCOSE BLDC GLUCOMTR-MCNC: 129 MG/DL — HIGH (ref 70–99)
GLUCOSE BLDC GLUCOMTR-MCNC: 140 MG/DL — HIGH (ref 70–99)
GLUCOSE SERPL-MCNC: 128 MG/DL — HIGH (ref 70–99)
GLUCOSE SERPL-MCNC: 129 MG/DL — HIGH (ref 70–99)
GRAM STN SPT: SIGNIFICANT CHANGE UP
HCT VFR BLD CALC: 27.1 % — LOW (ref 39–50)
HGB BLD-MCNC: 8.6 G/DL — LOW (ref 13–17)
MAGNESIUM SERPL-MCNC: 2.6 MG/DL — SIGNIFICANT CHANGE UP (ref 1.6–2.6)
MCHC RBC-ENTMCNC: 30.7 PG — SIGNIFICANT CHANGE UP (ref 27–34)
MCHC RBC-ENTMCNC: 31.7 % — LOW (ref 32–36)
MCV RBC AUTO: 96.8 FL — SIGNIFICANT CHANGE UP (ref 80–100)
METHOD TYPE: SIGNIFICANT CHANGE UP
NRBC # FLD: 0.05 K/UL — SIGNIFICANT CHANGE UP (ref 0–0)
ORGANISM # SPEC MICROSCOPIC CNT: SIGNIFICANT CHANGE UP
ORGANISM # SPEC MICROSCOPIC CNT: SIGNIFICANT CHANGE UP
PHOSPHATE SERPL-MCNC: 2.7 MG/DL — SIGNIFICANT CHANGE UP (ref 2.5–4.5)
PLATELET # BLD AUTO: 256 K/UL — SIGNIFICANT CHANGE UP (ref 150–400)
PMV BLD: 9.2 FL — SIGNIFICANT CHANGE UP (ref 7–13)
POTASSIUM SERPL-MCNC: 3 MMOL/L — LOW (ref 3.5–5.3)
POTASSIUM SERPL-MCNC: 3.7 MMOL/L — SIGNIFICANT CHANGE UP (ref 3.5–5.3)
POTASSIUM SERPL-SCNC: 3 MMOL/L — LOW (ref 3.5–5.3)
POTASSIUM SERPL-SCNC: 3.7 MMOL/L — SIGNIFICANT CHANGE UP (ref 3.5–5.3)
RBC # BLD: 2.8 M/UL — LOW (ref 4.2–5.8)
RBC # FLD: 13.1 % — SIGNIFICANT CHANGE UP (ref 10.3–14.5)
SODIUM SERPL-SCNC: 147 MMOL/L — HIGH (ref 135–145)
SODIUM SERPL-SCNC: 150 MMOL/L — HIGH (ref 135–145)
WBC # BLD: 7.24 K/UL — SIGNIFICANT CHANGE UP (ref 3.8–10.5)
WBC # FLD AUTO: 7.24 K/UL — SIGNIFICANT CHANGE UP (ref 3.8–10.5)

## 2019-11-08 PROCEDURE — 99232 SBSQ HOSP IP/OBS MODERATE 35: CPT | Mod: GC

## 2019-11-08 PROCEDURE — 99223 1ST HOSP IP/OBS HIGH 75: CPT

## 2019-11-08 PROCEDURE — 99233 SBSQ HOSP IP/OBS HIGH 50: CPT

## 2019-11-08 PROCEDURE — 93306 TTE W/DOPPLER COMPLETE: CPT | Mod: 26

## 2019-11-08 RX ORDER — HYDROCORTISONE 20 MG
25 TABLET ORAL DAILY
Refills: 0 | Status: COMPLETED | OUTPATIENT
Start: 2019-11-10 | End: 2019-11-11

## 2019-11-08 RX ORDER — FENTANYL CITRATE 50 UG/ML
1 INJECTION INTRAVENOUS
Refills: 0 | Status: DISCONTINUED | OUTPATIENT
Start: 2019-11-09 | End: 2019-11-12

## 2019-11-08 RX ORDER — POTASSIUM CHLORIDE 20 MEQ
40 PACKET (EA) ORAL EVERY 4 HOURS
Refills: 0 | Status: COMPLETED | OUTPATIENT
Start: 2019-11-08 | End: 2019-11-08

## 2019-11-08 RX ORDER — POTASSIUM CHLORIDE 20 MEQ
10 PACKET (EA) ORAL
Refills: 0 | Status: COMPLETED | OUTPATIENT
Start: 2019-11-08 | End: 2019-11-08

## 2019-11-08 RX ORDER — HYDROCORTISONE 20 MG
50 TABLET ORAL DAILY
Refills: 0 | Status: COMPLETED | OUTPATIENT
Start: 2019-11-09 | End: 2019-11-09

## 2019-11-08 RX ORDER — IBUPROFEN 200 MG
400 TABLET ORAL EVERY 8 HOURS
Refills: 0 | Status: DISCONTINUED | OUTPATIENT
Start: 2019-11-08 | End: 2019-11-15

## 2019-11-08 RX ORDER — CEFEPIME 1 G/1
2000 INJECTION, POWDER, FOR SOLUTION INTRAMUSCULAR; INTRAVENOUS EVERY 8 HOURS
Refills: 0 | Status: DISCONTINUED | OUTPATIENT
Start: 2019-11-08 | End: 2019-11-13

## 2019-11-08 RX ORDER — ERTAPENEM SODIUM 1 G/1
1000 INJECTION, POWDER, LYOPHILIZED, FOR SOLUTION INTRAMUSCULAR; INTRAVENOUS EVERY 24 HOURS
Refills: 0 | Status: DISCONTINUED | OUTPATIENT
Start: 2019-11-08 | End: 2019-11-08

## 2019-11-08 RX ORDER — METOPROLOL TARTRATE 50 MG
25 TABLET ORAL DAILY
Refills: 0 | Status: DISCONTINUED | OUTPATIENT
Start: 2019-11-08 | End: 2019-11-09

## 2019-11-08 RX ADMIN — CEFEPIME 100 MILLIGRAM(S): 1 INJECTION, POWDER, FOR SOLUTION INTRAMUSCULAR; INTRAVENOUS at 05:15

## 2019-11-08 RX ADMIN — NYSTATIN CREAM 1 APPLICATION(S): 100000 CREAM TOPICAL at 19:56

## 2019-11-08 RX ADMIN — Medication 1 DROP(S): at 11:31

## 2019-11-08 RX ADMIN — FENTANYL CITRATE 1 PATCH: 50 INJECTION INTRAVENOUS at 20:01

## 2019-11-08 RX ADMIN — Medication 1 DROP(S): at 19:55

## 2019-11-08 RX ADMIN — CEFEPIME 100 MILLIGRAM(S): 1 INJECTION, POWDER, FOR SOLUTION INTRAMUSCULAR; INTRAVENOUS at 21:35

## 2019-11-08 RX ADMIN — Medication 100 MILLIEQUIVALENT(S): at 09:28

## 2019-11-08 RX ADMIN — Medication 50 MILLIGRAM(S): at 05:16

## 2019-11-08 RX ADMIN — Medication 650 MILLIGRAM(S): at 20:38

## 2019-11-08 RX ADMIN — Medication 100 MILLIEQUIVALENT(S): at 14:41

## 2019-11-08 RX ADMIN — Medication 25 MILLIGRAM(S): at 09:28

## 2019-11-08 RX ADMIN — Medication 1 DROP(S): at 05:17

## 2019-11-08 RX ADMIN — ERTAPENEM SODIUM 120 MILLIGRAM(S): 1 INJECTION, POWDER, LYOPHILIZED, FOR SOLUTION INTRAMUSCULAR; INTRAVENOUS at 11:30

## 2019-11-08 RX ADMIN — Medication 1 APPLICATION(S): at 05:15

## 2019-11-08 RX ADMIN — ENOXAPARIN SODIUM 60 MILLIGRAM(S): 100 INJECTION SUBCUTANEOUS at 11:30

## 2019-11-08 RX ADMIN — FENTANYL CITRATE 1 PATCH: 50 INJECTION INTRAVENOUS at 07:40

## 2019-11-08 RX ADMIN — Medication 1 APPLICATION(S): at 19:55

## 2019-11-08 RX ADMIN — Medication 650 MILLIGRAM(S): at 20:08

## 2019-11-08 RX ADMIN — Medication 100 MILLIEQUIVALENT(S): at 11:31

## 2019-11-08 RX ADMIN — Medication 400 MILLIGRAM(S): at 15:00

## 2019-11-08 RX ADMIN — Medication 40 MILLIEQUIVALENT(S): at 14:41

## 2019-11-08 RX ADMIN — Medication 400 MILLIGRAM(S): at 15:52

## 2019-11-08 RX ADMIN — Medication 650 MILLIGRAM(S): at 09:55

## 2019-11-08 RX ADMIN — NYSTATIN CREAM 1 APPLICATION(S): 100000 CREAM TOPICAL at 05:16

## 2019-11-08 RX ADMIN — Medication 40 MILLIEQUIVALENT(S): at 09:28

## 2019-11-08 NOTE — PROGRESS NOTE ADULT - PROBLEM SELECTOR PLAN 1
-  severe hypoxemic respiratory failure likely severe ARDS with P/F cannulated for VV-ECMO on 10/30 possibly related to aspiration pneumonia after possible benzo withdrawal seizure  - decannulated 11/1, extubated on 11/5  - tolerating NC currently  - will need a lot of pulmonary toilet  - chest PT, metanebs, suction PRN

## 2019-11-08 NOTE — CONSULT NOTE ADULT - ASSESSMENT
54M with severe MDD with prior psych admissions/ECT who was initially admitted to  10/28/19 after apparent Barbituate overdose with respiratory depression, ARDS. Patient admitted to [facility] on 10/30/19 for ECMO, respiratory support.  Decannulated 11/1/19  Extubated 11/5/19  febrile since 11/5/19  Airway colonized with MSSA and Enterobacter aerogenes though most recent sputum gram stain unimpressive.  no leukocytosis - fever may be related to atelectasis, lung injury, aspiration pneumonitis without infection    Antibiotics  Ceftriaxone 10/28 -->29  Azithromycin 10/28, 10/30 -->11/2  Vanco 10/20 -->11/5  Zosyn 10/30 --> 11/5  Cefepime 10/29 --> 30 11/7 -->11/8-->  Ertapenem 11/8    Suggest  Cefepime 2 every 8 hours  Check  ProCalcitonin  check HIV, Legionnella urinary antigen    Patient will be followed by ID over weekend

## 2019-11-08 NOTE — PROGRESS NOTE ADULT - PROBLEM SELECTOR PLAN 1
Patient off ECMO and successfully extuabted. Transferred to [facility] for continuing care. Continue care as per primary team.

## 2019-11-08 NOTE — PROGRESS NOTE ADULT - PROBLEM SELECTOR PLAN 3
- hx of severe depression refractory to multiple medications/ECT with prior psych hospitalizations  - concern for poss withdrawal seizure at previous hospital, no seizures noted here  - CT head and EEG (at previous hospital) negative   - psych following  - cont haldol PRN, though pt is not agitated currently

## 2019-11-08 NOTE — PROGRESS NOTE BEHAVIORAL HEALTH - NSBHCHARTREVIEWVS_PSY_A_CORE FT
Vital Signs Last 24 Hrs  T(C): 38.6 (08 Nov 2019 14:39), Max: 39.1 (07 Nov 2019 18:20)  T(F): 101.4 (08 Nov 2019 14:39), Max: 102.4 (07 Nov 2019 18:20)  HR: 87 (08 Nov 2019 14:39) (82 - 93)  BP: 169/75 (08 Nov 2019 14:39) (150/75 - 175/87)  BP(mean): --  RR: 19 (08 Nov 2019 14:39) (19 - 20)  SpO2: 96% (08 Nov 2019 14:39) (90% - 96%)

## 2019-11-08 NOTE — PROGRESS NOTE BEHAVIORAL HEALTH - SUMMARY
Patient is a 55 y/o Citizen of [place] male that is employed, , lives with his wife, has a supportive family, hx of severe depression and anxiety, multiple hx of psychiatry admissions (last in June 2019), tx refractory to many antidepressant meds, no hx of prior SA, hx of alcohol use- none recently, no hx of psychosis, received 5 tx of ECT (while in inpatient psych unit at [facility]), then stopped with no outpatient treatments in June 2019, PMHx of hepatitis presenting with increased lethargy and hypoxemia. As per records- patient was s/p fall at home. In ed was obtunded, noted to be in hypercarbic RF- requiring intubation. LL consolidation on CXray- started on antibiotics. Found to have barbiturates+ on urine toxicology. Course complicated by seizure. Extubated on 10/28- placed on BIPAP. Course continued to be complicated by fevers, worsening hypoxemia requiring reintubation. Was started on propofol, precedex and paralyzed but remained difficult to oxygenate with traditional mechanical ventilation requiring frequent bagging and ALI consult for ECMO was placed. The patient was cannulated for VV-ECMO and transfer to [facility]. Decannulated on Friday last week. Remained intubated on multiple sedative- Midazolam, Precedex, Propofol, Fentanyl. Psychiatry has been consulted as sedatives will slowly be weaned off- agitation management and also for baseline depression (was on multiple psychotropics)    11/8- patient is able to converse minimally. He says he feels sad, but we are currently unable to assess reasons or his thoughts. Will continue to follow daily to assess for delirium and depression symptoms.     Recommendations-  - Not starting a standing home psychotropic at this time considering his mentation. Will monitor symptoms for now. PRN meds as below for now.   - If qtc < 500, haldol 2.5 mg q 6 hours PRN IV/IM for agitation Patient is a 55 y/o [place] male that is employed, , lives with his wife, has a supportive family, hx of severe depression and anxiety, multiple hx of psychiatry admissions (last in June 2019), tx refractory to many antidepressant meds, no hx of prior SA, hx of alcohol use- none recently, no hx of psychosis, received 5 tx of ECT (while in inpatient psych unit at [facility]), then stopped with no outpatient treatments in June 2019, PMHx of hepatitis presenting with increased lethargy and hypoxemia. As per records- patient was s/p fall at home. In ed was obtunded, noted to be in hypercarbic RF- requiring intubation. LL consolidation on CXray- started on antibiotics. Found to have barbiturates+ on urine toxicology. Course complicated by seizure. Extubated on 10/28- placed on BIPAP. Course continued to be complicated by fevers, worsening hypoxemia requiring reintubation. Was started on propofol, precedex and paralyzed but remained difficult to oxygenate with traditional mechanical ventilation requiring frequent bagging and ALI consult for ECMO was placed. The patient was cannulated for VV-ECMO and transfer to [facility]. Decannulated on Friday last week. Remained intubated on multiple sedative- Midazolam, Precedex, Propofol, Fentanyl. Psychiatry has been consulted as sedatives will slowly be weaned off- agitation management and also for baseline depression (was on multiple psychotropics)    11/8- patient is able to converse minimally. Remains delirious.    Recommendations-  - Not starting a standing home psychotropic at this time considering his mentation. Will monitor symptoms for now. PRN meds as below for now.   - As mentation improves, will gradually start on Seroquel, effexor   - If qtc < 500, haldol 2.5 mg q 6 hours PRN IV/IM for agitation

## 2019-11-08 NOTE — PROGRESS NOTE BEHAVIORAL HEALTH - NSBHFUPINTERVALHXFT_PSY_A_CORE
Patient seen at bedside for follow up of depression management. He is not oriented to the year (unsure of month), but he is oriented to place and name. He also is able to recognize his wife. He is unable to elaborate vocally about why he is sad. Patient says very few words on interview. When asked questions, he does not always answer and sometimes it needs to be repeated a few times. When asked about his outpatient psychiatrist, he nods his head saying he knows him and agrees that he is a good doctor. Patient seen at bedside for follow up of depression management. He is not oriented to the year (unsure of month), but he is oriented to place and name. He also is able to recognize his wife. He does not fully engage in conversation, can still appear intermittently lethargic. Patient says very few words on interview. When asked questions, he does not always answer and sometimes it needs to be repeated a few times. When asked about his outpatient psychiatrist, he nods his head saying he knows him. No statements of si or hi.   Coordinated closely with wife and also with psychiatrist

## 2019-11-08 NOTE — PROGRESS NOTE ADULT - ASSESSMENT
54M with severe depression who presented to  10/28 with lethargy and hypoxemic/hypercapnic respiratory failure possibly related to overdose +/- pneumonia extubated but required reintubation 10/30 but with persistent hypoxemia refractory to conventional management and ultimately cannulated for VV ECMO 10/30/2019 for hypoxemic respiratory failure with ARDS and transferred to [facility] for further management. Decannulated 11/1, now extubated to BIPAP

## 2019-11-08 NOTE — PROGRESS NOTE BEHAVIORAL HEALTH - NSBHCONSULTFOLLOWDETAILS_PSY_A_CORE FT
Patient has been sedated for a few days. Need to assess mood symptoms prior to discharge.
will f.u on monday

## 2019-11-08 NOTE — PROGRESS NOTE BEHAVIORAL HEALTH - NSBHFUPINTERVALCCFT_PSY_A_CORE
I am sad. No nurse concerns and no PRN needs I am sad. No nurse concerns and no PRN needs. Febrile today

## 2019-11-08 NOTE — PROGRESS NOTE ADULT - SUBJECTIVE AND OBJECTIVE BOX
CHIEF COMPLAINT: Patient is a 54y old  Male who presents with a chief complaint of ARDS (07 Nov 2019 16:34)    Interval Events:      REVIEW OF SYSTEMS:  Constitutional:   Eyes:  ENT:  CV:  Resp:  GI:  :  MSK:  Integumentary:  Neurological:  Psychiatric:  Endocrine:  Hematologic/Lymphatic:  Allergic/Immunologic:  [ ] All other systems negative  [ ] Unable to assess ROS because ________      OBJECTIVE:  ICU Vital Signs Last 24 Hrs  T(C): 37.7 (08 Nov 2019 05:06), Max: 39.1 (07 Nov 2019 18:20)  T(F): 99.9 (08 Nov 2019 05:06), Max: 102.4 (07 Nov 2019 18:20)  HR: 93 (08 Nov 2019 05:06) (90 - 95)  BP: 175/87 (08 Nov 2019 05:06) (148/89 - 175/87)  BP(mean): 102 (07 Nov 2019 10:00) (95 - 108)  ABP: 167/67 (07 Nov 2019 07:00) (167/67 - 167/67)  ABP(mean): 100 (07 Nov 2019 07:00) (100 - 100)  RR: 20 (08 Nov 2019 05:06) (20 - 27)  SpO2: 93% (08 Nov 2019 05:06) (89% - 95%)    11-06 @ 07:01  -  11-07 @ 07:00  --------------------------------------------------------  IN: 1017 mL / OUT: 2470 mL / NET: -1453 mL    11-07 @ 07:01  -  11-08 @ 06:58  --------------------------------------------------------  IN: 900 mL / OUT: 675 mL / NET: 225 mL    POCT Blood Glucose.: 151 mg/dL (07 Nov 2019 21:03)    HOSPITAL MEDICATIONS:  MEDICATIONS  (STANDING):  artificial tears (preservative free) Ophthalmic Solution 1 Drop(s) Both EYES two times a day  cefepime   IVPB 2000 milliGRAM(s) IV Intermittent every 8 hours  cefepime   IVPB      dextrose 5%. 1000 milliLiter(s) (50 mL/Hr) IV Continuous <Continuous>  dextrose 5%. 1000 milliLiter(s) (50 mL/Hr) IV Continuous <Continuous>  dextrose 50% Injectable 12.5 Gram(s) IV Push once  enoxaparin Injectable 60 milliGRAM(s) SubCutaneous daily  fentaNYL   Patch  25 MICROgram(s)/Hr 1 Patch Transdermal every 72 hours  insulin lispro (HumaLOG) corrective regimen sliding scale   SubCutaneous three times a day before meals  insulin lispro (HumaLOG) corrective regimen sliding scale   SubCutaneous at bedtime  nystatin Powder 1 Application(s) Topical every 12 hours  petrolatum Ophthalmic Ointment 1 Application(s) Both EYES two times a day    MEDICATIONS  (PRN):  acetaminophen    Suspension .. 650 milliGRAM(s) Oral every 6 hours PRN Temp greater or equal to 38C (100.4F), Mild Pain (1 - 3), Moderate Pain (4 - 6), Severe Pain (7 - 10)  artificial  tears Solution 1 Drop(s) Both EYES two times a day PRN Dry Eyes  dextrose 40% Gel 15 Gram(s) Oral once PRN Blood Glucose LESS THAN 70 milliGRAM(s)/deciliter  glucagon  Injectable 1 milliGRAM(s) IntraMuscular once PRN Glucose LESS THAN 70 milligrams/deciliter  haloperidol    Injectable 2.5 milliGRAM(s) IV Push every 6 hours PRN Agitation      LABS:                        8.4    6.41  )-----------( 197      ( 07 Nov 2019 03:30 )             27.1     11-07    148<H>  |  114<H>  |  19  ----------------------------<  140<H>  3.1<L>   |  22  |  0.80    Ca    9.4      07 Nov 2019 15:00  Phos  2.6     11-07  Mg     2.4     11-07    TPro  6.3  /  Alb  3.3  /  TBili  0.5  /  DBili  x   /  AST  25  /  ALT  27  /  AlkPhos  38<L>  11-07              MICROBIOLOGY:     RADIOLOGY:  [ ] Reviewed and interpreted by me    PULMONARY FUNCTION TESTS:    EKG: CHIEF COMPLAINT: Patient is a 54y old  Male who presents with a chief complaint of ARDS (07 Nov 2019 16:34)    Interval Events: continues to be febrile, blood cultures drawn again this am       REVIEW OF SYSTEMS:  [ ] All other systems negative  [x] Unable to assess ROS because: lethargic, does not follow commands       OBJECTIVE:  ICU Vital Signs Last 24 Hrs  T(C): 37.7 (08 Nov 2019 05:06), Max: 39.1 (07 Nov 2019 18:20)  T(F): 99.9 (08 Nov 2019 05:06), Max: 102.4 (07 Nov 2019 18:20)  HR: 93 (08 Nov 2019 05:06) (90 - 95)  BP: 175/87 (08 Nov 2019 05:06) (148/89 - 175/87)  BP(mean): 102 (07 Nov 2019 10:00) (95 - 108)  ABP: 167/67 (07 Nov 2019 07:00) (167/67 - 167/67)  ABP(mean): 100 (07 Nov 2019 07:00) (100 - 100)  RR: 20 (08 Nov 2019 05:06) (20 - 27)  SpO2: 93% (08 Nov 2019 05:06) (89% - 95%)    11-06 @ 07:01 - 11-07 @ 07:00  --------------------------------------------------------  IN: 1017 mL / OUT: 2470 mL / NET: -1453 mL    11-07 @ 07:01 - 11-08 @ 06:58  --------------------------------------------------------  IN: 900 mL / OUT: 675 mL / NET: 225 mL    POCT Blood Glucose.: 151 mg/dL (07 Nov 2019 21:03)    HOSPITAL MEDICATIONS:  MEDICATIONS  (STANDING):  artificial tears (preservative free) Ophthalmic Solution 1 Drop(s) Both EYES two times a day  cefepime   IVPB 2000 milliGRAM(s) IV Intermittent every 8 hours  cefepime   IVPB      dextrose 5%. 1000 milliLiter(s) (50 mL/Hr) IV Continuous <Continuous>  dextrose 5%. 1000 milliLiter(s) (50 mL/Hr) IV Continuous <Continuous>  dextrose 50% Injectable 12.5 Gram(s) IV Push once  enoxaparin Injectable 60 milliGRAM(s) SubCutaneous daily  fentaNYL   Patch  25 MICROgram(s)/Hr 1 Patch Transdermal every 72 hours  insulin lispro (HumaLOG) corrective regimen sliding scale   SubCutaneous three times a day before meals  insulin lispro (HumaLOG) corrective regimen sliding scale   SubCutaneous at bedtime  nystatin Powder 1 Application(s) Topical every 12 hours  petrolatum Ophthalmic Ointment 1 Application(s) Both EYES two times a day    MEDICATIONS  (PRN):  acetaminophen    Suspension .. 650 milliGRAM(s) Oral every 6 hours PRN Temp greater or equal to 38C (100.4F), Mild Pain (1 - 3), Moderate Pain (4 - 6), Severe Pain (7 - 10)  artificial  tears Solution 1 Drop(s) Both EYES two times a day PRN Dry Eyes  dextrose 40% Gel 15 Gram(s) Oral once PRN Blood Glucose LESS THAN 70 milliGRAM(s)/deciliter  glucagon  Injectable 1 milliGRAM(s) IntraMuscular once PRN Glucose LESS THAN 70 milligrams/deciliter  haloperidol    Injectable 2.5 milliGRAM(s) IV Push every 6 hours PRN Agitation      LABS:             Complete Blood Count in AM (11.08.19 @ 06:00)    WBC Count: 7.24 K/uL    RBC Count: 2.80 M/uL    Hemoglobin: 8.6 g/dL    Hematocrit: 27.1 %    Mean Cell Volume: 96.8 fL    Mean Cell Hemoglobin: 30.7 pg    Mean Cell Hemoglobin Conc: 31.7 %    Red Cell Distrib Width: 13.1 %    Platelet Count - Automated: 256 K/uL    MPV: 9.2 fl    Nucleated RBC #: 0.05 K/uL    Basic Metabolic Panel w/Mg &amp; Inorg Phos (11.08.19 @ 06:00)    Sodium, Serum: 147 mmol/L    Potassium, Serum: 3.0 mmol/L    Chloride, Serum: 113 mmol/L    Carbon Dioxide, Serum: 20 mmol/L    Anion Gap, Serum: 14 mmo/L    Blood Urea Nitrogen, Serum: 21 mg/dL    Creatinine, Serum: 0.83 mg/dL    Glucose, Serum: 129 mg/dL    Calcium, Total Serum: 9.1 mg/dL    Magnesium, Serum: 2.6 mg/dL    Phosphorus Level, Serum: 2.7 mg/dL    eGFR if Non : 98: The units for eGFR are ml/min/1.73m2 (normalized body  surface area). The eGFR is calculated from a serum  creatinine using the CKD-EPI equation. Other variables  required for calculation are race, age and sex. Among  patients with chronic kidney disease (CKD), the eGFR is  useful in determining the stage of disease according to  KDOQI CKD classification. All eGFR results are reported  numerically with the following interpretation.    GFR  (ml/min/1.73 m2)          W/KIDNEY DAMAGE    W/O KIDNEY DMG  ==========================================================  >= 90.......................Stage 1..............Normal  60-89.......................Stage 2...........Decreased GFR  30-59.......................Stage 3..............Stage 3  15-29.......................Stage 4..............Stage 4  < 15........................Stage 5..............Stage 5    Each stage of CKD assumes that the associated GFR level  has been in effect for at least 3 months. Determination of  stages one and two (with eGFR > 59ml/min/m2) requires  estimation of kidney damage for at least 3 months as  defined by structural or functional abnormalities.    Limitations: All estimates of GFR will be less accurate  for patients at extremes of muscle mass (including but  not limited to frail elderly, critically ill, or cancer  patients), those with unusual diets, and those with  conditions associated with reduced secretion or  extrarenal elimination of creatinine. The eGFR equation  is not recommended for use in patients with unstable  creatinine levels. mL/min    eGFR if : 114 mL/min        MICROBIOLOGY:     Culture - Respiratory with Gram Stain (11.05.19 @ 18:38)    Culture - Respiratory:   Normal Respiratory Leticia Also Present    -  Trimethoprim/Sulfamethoxazole: S <=0.5/9.5 CARLY    -  Tigecycline: S <=1 CARLY    -  Tobramycin: S <=2 CARLY    -  Piperacillin/Tazobactam: S <=8 CARLY    -  Meropenem: S <=1 CARLY    -  Ertapenem: S <=0.5 CARLY    -  Gentamicin: S <=1 CARLY    -  Imipenem: S <=1 CARLY    -  Levofloxacin: S <=1 CARLY    -  Cefazolin: R >16 CARLY    -  Cefepime: S <=2 CARLY    -  Cefoxitin: R >16 CARLY    -  Ceftazidime: S <=1 CARLY    -  Ceftriaxone: S <=1 CARLY    -  Amikacin: S <=8 CARLY    -  Ampicillin: R    -  Ampicillin/Sulbactam: R 16/8 CARLY    -  Aztreonam: S <=4 CARLY    Gram Stain Sputum:   WBC White Blood Cells  QNTY CELLS IN GRAM STAIN: RARE (1+)  NOS^No Organisms Seen    Specimen Source: SPUTUM    Organism Identification: Enterobacter cloacae    Organism: Enterobacter cloacae  QUANTITY OF GROWTH: MODERATE    Method Type: NEGATIVE CARLY 43    Culture - Blood (11.05.19 @ 18:15)    Culture - Blood:   NO ORGANISMS ISOLATED  NO ORGANISMS ISOLATED AT 48 HRS.    Specimen Source: BLOOD VENOUS

## 2019-11-08 NOTE — PROGRESS NOTE ADULT - PROBLEM SELECTOR PLAN 2
- persistent high fevers for 4 days now  - previous blood culture coag negative staph contaminant, and BAL with MSSA  - s/p course of vanco/zosyn   - DVT study negative   - most recent sputum with enterobacter, pan sensitive  - was previously on cefepime but now on ertapenem since persistent fevers  - repeat blood cultures taken this am, follow results  - monitor fever  - ID consulted

## 2019-11-08 NOTE — PROGRESS NOTE ADULT - ATTENDING COMMENTS
Cont to spike  ID evaluation called  hemodyn stable  hypernatremia, hypokalemia, on free H20, k repletion  psych follow up apprec

## 2019-11-08 NOTE — PROGRESS NOTE ADULT - SUBJECTIVE AND OBJECTIVE BOX
INTERVAL HPI/OVERNIGHT EVENTS:    Patient resting in bed no distress noted. Not talkative at this time.     Code Status: Full code  Allergies    No Known Allergies    Intolerances    MEDICATIONS  (STANDING):  artificial tears (preservative free) Ophthalmic Solution 1 Drop(s) Both EYES two times a day  dextrose 5%. 1000 milliLiter(s) (50 mL/Hr) IV Continuous <Continuous>  dextrose 5%. 1000 milliLiter(s) (50 mL/Hr) IV Continuous <Continuous>  dextrose 50% Injectable 12.5 Gram(s) IV Push once  enoxaparin Injectable 60 milliGRAM(s) SubCutaneous daily  ertapenem  IVPB 1000 milliGRAM(s) IV Intermittent every 24 hours  fentaNYL   Patch  25 MICROgram(s)/Hr 1 Patch Transdermal every 72 hours  insulin lispro (HumaLOG) corrective regimen sliding scale   SubCutaneous three times a day before meals  insulin lispro (HumaLOG) corrective regimen sliding scale   SubCutaneous at bedtime  metoprolol tartrate 25 milliGRAM(s) Oral daily  nystatin Powder 1 Application(s) Topical every 12 hours  petrolatum Ophthalmic Ointment 1 Application(s) Both EYES two times a day  potassium chloride   Powder 40 milliEquivalent(s) Oral every 4 hours  potassium chloride  10 mEq/100 mL IVPB 10 milliEquivalent(s) IV Intermittent every 1 hour    MEDICATIONS  (PRN):  acetaminophen    Suspension .. 650 milliGRAM(s) Oral every 6 hours PRN Temp greater or equal to 38C (100.4F), Mild Pain (1 - 3), Moderate Pain (4 - 6), Severe Pain (7 - 10)  artificial  tears Solution 1 Drop(s) Both EYES two times a day PRN Dry Eyes  dextrose 40% Gel 15 Gram(s) Oral once PRN Blood Glucose LESS THAN 70 milliGRAM(s)/deciliter  glucagon  Injectable 1 milliGRAM(s) IntraMuscular once PRN Glucose LESS THAN 70 milligrams/deciliter  haloperidol    Injectable 2.5 milliGRAM(s) IV Push every 6 hours PRN Agitation      PRESENT SYMPTOMS: [ ]Unable to obtain due to poor mentation   Source if other than patient:  [ ]Family   [ ]Team     Pain (Impact on QOL):  No pain  Location:  Severity:  Minimal acceptable level (0-10 scale):       Quality:       Onset:  Duration:  Aggravating factors:  Relieving Factors  Radiation:    Dyspnea:  Yes [ ] No [ ] - [ ]Mild [ ]Moderate [ ]Severe  Anxiety:    Yes [ ] No [ ] - [ ]Mild [ ]Moderate [ ]Severe  Fatigue:    Yes [ ] No [ ] - [ ]Mild [ ]Moderate [ ]Severe  Nausea:    Yes [ ] No [ ] - [ ]Mild [ ]Moderate [ ]Severe                         Loss of appetite: Yes [ ] No [ ] - [ ]Mild [ ]Moderate [ ]Severe             Constipation:  Yes [ ] No [ ] - [ ]Mild [ ]Moderate [ ]Severe  Grief:  Yes [ ] No [ ]     PAIN AD Score:	  [email or web address]/[email or web address] (Ctrl + left click to view)    Other Symptoms:  [ ]All other review of systems negative     Karnofsky Performance Score/Palliative Performance Status Version 2:         50 %    http://palliative.info/resource_material/[email or web address].pdf    PHYSICAL EXAM:  Vital Signs Last 24 Hrs  T(C): 38.4 (08 Nov 2019 09:47), Max: 39.1 (07 Nov 2019 18:20)  T(F): 101.2 (08 Nov 2019 09:47), Max: 102.4 (07 Nov 2019 18:20)  HR: 82 (08 Nov 2019 09:26) (82 - 95)  BP: 150/75 (08 Nov 2019 09:26) (148/89 - 175/87)  BP(mean): --  RR: 20 (08 Nov 2019 05:06) (20 - 20)  SpO2: 93% (08 Nov 2019 05:06) (89% - 95%) I&O's Summary    07 Nov 2019 07:01  -  08 Nov 2019 07:00  --------------------------------------------------------  IN: 900 mL / OUT: 675 mL / NET: 225 mL    08 Nov 2019 07:01  -  08 Nov 2019 11:26  --------------------------------------------------------  IN: 400 mL / OUT: 300 mL / NET: 100 mL     GENERAL:  [ x]Alert  [x ]Oriented x  3 [ ]Lethargic  [ ]Cachexia  [ ]Unarousable  [ ]Verbal  [ ]Non-Verbal  Behavioral:   [ ] Anxiety  [ ] Delirium [ ] Agitation [ ] Other  HEENT:  [ ]Normal   [x ]Dry mouth   [ ]ET Tube/Trach  [ ]Oral lesions  PULMONARY:   [x ]Clear anteriorly  [ ]Tachypnea  [ ]Audible excessive secretions   [ ]Rhonchi        [ ]Right [ ]Left [ ]Bilateral  [ ]Crackles        [ ]Right [ ]Left [ ]Bilateral  [ ]Wheezing     [ ]Right [ ]Left [ ]Bilateral  CARDIOVASCULAR:    [x ]Regular [ ]Irregular [ ]Tachy  [ ]Luis Fernando [ ]Murmur [ ]Other  GASTROINTESTINAL:  [x ]Soft  [ ]Distended   [ ]+BS  [x ]Non tender [ ]Tender  [ ]PEG [ x] NGT   Last BM:   GENITOURINARY:  [ ]Normal [x ] Incontinent   [ ]Oliguria/Anuria   [ ]Meredith  MUSCULOSKELETAL:   [ ]Normal   [x ]Weakness  [ ]Bed/Wheelchair bound [ ]Edema  NEUROLOGIC:   [ x]No focal deficits  [ ] Cognitive impairment  [ ] Dysphagia [ ]Dysarthria [ ] Paresis [ ]Other   SKIN:   [ x]Normal   [ ]Pressure ulcer(s)  [ ]Rash    CRITICAL CARE:  [ ] Shock Present  [ ]Septic [ ]Cardiogenic [ ]Neurologic [ ]Hypovolemic  [ ]  Vasopressors [ ]  Inotropes   [ ] Respiratory failure present  [ ] Acute  [ ] Chronic [ ] Hypoxic  [ ] Hypercarbic [ ] Other  [ ] Other organ failure     LABS:                        8.6    7.24  )-----------( 256      ( 08 Nov 2019 06:00 )             27.1   11-08    147<H>  |  113<H>  |  21  ----------------------------<  129<H>  3.0<L>   |  20<L>  |  0.83    Ca    9.1      08 Nov 2019 06:00  Phos  2.7     11-08  Mg     2.6     11-08    TPro  6.3  /  Alb  3.3  /  TBili  0.5  /  DBili  x   /  AST  25  /  ALT  27  /  AlkPhos  38<L>  11-07        RADIOLOGY & ADDITIONAL STUDIES:  < from: Xray Chest 1 View- PORTABLE-Urgent (11.07.19 @ 08:25) >  EXAM:  XR CHEST PORTABLE URGENT 1V        PROCEDURE DATE:  Nov 7 2019   IMPRESSION:  Statuspost enteric tube placement and extubation.    < end of copied text >    Protein Calorie Malnutrition Present: [ ] yes [x ] no  [ ] PPSV2 < or = 30%  [ ] significant weight loss [ ] poor nutritional intake [ ] anasarca [ ] catabolic state Albumin, Serum: 3.3 g/dL (11-07-19 @ 03:30)      REFERRALS:   [ ]Chaplaincy  [ ] Hospice  [ ]Child Life  [ ]Social Work  [ ]Case management [ ]Holistic Therapy   Goals of Care Document: INTERVAL HPI/OVERNIGHT EVENTS:  Patient resting in bed no distress noted. Not talkative at this time.     Code Status: Full code  Allergies    No Known Allergies    Intolerances    MEDICATIONS  (STANDING):  artificial tears (preservative free) Ophthalmic Solution 1 Drop(s) Both EYES two times a day  dextrose 5%. 1000 milliLiter(s) (50 mL/Hr) IV Continuous <Continuous>  dextrose 5%. 1000 milliLiter(s) (50 mL/Hr) IV Continuous <Continuous>  dextrose 50% Injectable 12.5 Gram(s) IV Push once  enoxaparin Injectable 60 milliGRAM(s) SubCutaneous daily  ertapenem  IVPB 1000 milliGRAM(s) IV Intermittent every 24 hours  fentaNYL   Patch  25 MICROgram(s)/Hr 1 Patch Transdermal every 72 hours  insulin lispro (HumaLOG) corrective regimen sliding scale   SubCutaneous three times a day before meals  insulin lispro (HumaLOG) corrective regimen sliding scale   SubCutaneous at bedtime  metoprolol tartrate 25 milliGRAM(s) Oral daily  nystatin Powder 1 Application(s) Topical every 12 hours  petrolatum Ophthalmic Ointment 1 Application(s) Both EYES two times a day  potassium chloride   Powder 40 milliEquivalent(s) Oral every 4 hours  potassium chloride  10 mEq/100 mL IVPB 10 milliEquivalent(s) IV Intermittent every 1 hour    MEDICATIONS  (PRN):  acetaminophen    Suspension .. 650 milliGRAM(s) Oral every 6 hours PRN Temp greater or equal to 38C (100.4F), Mild Pain (1 - 3), Moderate Pain (4 - 6), Severe Pain (7 - 10)  artificial  tears Solution 1 Drop(s) Both EYES two times a day PRN Dry Eyes  dextrose 40% Gel 15 Gram(s) Oral once PRN Blood Glucose LESS THAN 70 milliGRAM(s)/deciliter  glucagon  Injectable 1 milliGRAM(s) IntraMuscular once PRN Glucose LESS THAN 70 milligrams/deciliter  haloperidol    Injectable 2.5 milliGRAM(s) IV Push every 6 hours PRN Agitation    PRESENT SYMPTOMS: [ ]Unable to obtain due to poor mentation   Source if other than patient:  [ ]Family   [ ]Team     Pain (Impact on QOL):  No pain  Location:  Severity:  Minimal acceptable level (0-10 scale):       Quality:       Onset:  Duration:  Aggravating factors:  Relieving Factors  Radiation:    Dyspnea:  Yes [ ] No [ ] - [ ]Mild [ ]Moderate [ ]Severe  Anxiety:    Yes [ ] No [ ] - [ ]Mild [ ]Moderate [ ]Severe  Fatigue:    Yes [ ] No [ ] - [ ]Mild [ ]Moderate [ ]Severe  Nausea:    Yes [ ] No [ ] - [ ]Mild [ ]Moderate [ ]Severe                         Loss of appetite: Yes [ ] No [ ] - [ ]Mild [ ]Moderate [ ]Severe             Constipation:  Yes [ ] No [ ] - [ ]Mild [ ]Moderate [ ]Severe  Grief:  Yes [ ] No [ ]     PAIN AD Score:	  [email or web address]/painad.pdf (Ctrl + left click to view)    Other Symptoms:  [ ]All other review of systems negative     Karnofsky Performance Score/Palliative Performance Status Version 2:         50 %    http://palliative.info/resource_material/PPSv2.pdf    PHYSICAL EXAM:  Vital Signs Last 24 Hrs  T(C): 38.4 (08 Nov 2019 09:47), Max: 39.1 (07 Nov 2019 18:20)  T(F): 101.2 (08 Nov 2019 09:47), Max: 102.4 (07 Nov 2019 18:20)  HR: 82 (08 Nov 2019 09:26) (82 - 95)  BP: 150/75 (08 Nov 2019 09:26) (148/89 - 175/87)  BP(mean): --  RR: 20 (08 Nov 2019 05:06) (20 - 20)  SpO2: 93% (08 Nov 2019 05:06) (89% - 95%) I&O's Summary    07 Nov 2019 07:01  -  08 Nov 2019 07:00  --------------------------------------------------------  IN: 900 mL / OUT: 675 mL / NET: 225 mL    08 Nov 2019 07:01  -  08 Nov 2019 11:26  --------------------------------------------------------  IN: 400 mL / OUT: 300 mL / NET: 100 mL    GENERAL:  [ x]Alert  [x ]Oriented x  3 [ ]Lethargic  [ ]Cachexia  [ ]Unarousable  [ ]Verbal  [ ]Non-Verbal  Behavioral:   [ ] Anxiety  [ ] Delirium [ ] Agitation [ ] Other  HEENT:  [ ]Normal   [x ]Dry mouth   [ ]ET Tube/Trach  [ ]Oral lesions  PULMONARY:   [x ]Clear anteriorly  [ ]Tachypnea  [ ]Audible excessive secretions   [ ]Rhonchi        [ ]Right [ ]Left [ ]Bilateral  [ ]Crackles        [ ]Right [ ]Left [ ]Bilateral  [ ]Wheezing     [ ]Right [ ]Left [ ]Bilateral  CARDIOVASCULAR:    [x ]Regular [ ]Irregular [ ]Tachy  [ ]Luis Fernando [ ]Murmur [ ]Other  GASTROINTESTINAL:  [x ]Soft  [ ]Distended   [ ]+BS  [x ]Non tender [ ]Tender  [ ]PEG [ x] NGT   Last BM:   GENITOURINARY:  [ ]Normal [x ] Incontinent   [ ]Oliguria/Anuria   [ ]Meredith  MUSCULOSKELETAL:   [ ]Normal   [x ]Weakness  [ ]Bed/Wheelchair bound [ ]Edema  NEUROLOGIC:   [ x]No focal deficits  [ ] Cognitive impairment  [ ] Dysphagia [ ]Dysarthria [ ] Paresis [ ]Other   SKIN:   [ x]Normal   [ ]Pressure ulcer(s)  [ ]Rash    CRITICAL CARE:  [ ] Shock Present  [ ]Septic [ ]Cardiogenic [ ]Neurologic [ ]Hypovolemic  [ ]  Vasopressors [ ]  Inotropes   [ ] Respiratory failure present  [ ] Acute  [ ] Chronic [ ] Hypoxic  [ ] Hypercarbic [ ] Other  [ ] Other organ failure     LABS:                        8.6    7.24  )-----------( 256      ( 08 Nov 2019 06:00 )             27.1   11-08    147<H>  |  113<H>  |  21  ----------------------------<  129<H>  3.0<L>   |  20<L>  |  0.83    Ca    9.1      08 Nov 2019 06:00  Phos  2.7     11-08  Mg     2.6     11-08    TPro  6.3  /  Alb  3.3  /  TBili  0.5  /  DBili  x   /  AST  25  /  ALT  27  /  AlkPhos  38<L>  11-07    RADIOLOGY & ADDITIONAL STUDIES:  < from: Xray Chest 1 View- PORTABLE-Urgent (11.07.19 @ 08:25) >  EXAM:  XR CHEST PORTABLE URGENT 1V      PROCEDURE DATE:  Nov 7 2019   IMPRESSION:  Statuspost enteric tube placement and extubation.    Protein Calorie Malnutrition Present: [ ] yes [x ] no  [ ] PPSV2 < or = 30%  [ ] significant weight loss [ ] poor nutritional intake [ ] anasarca [ ] catabolic state Albumin, Serum: 3.3 g/dL (11-07-19 @ 03:30)    REFERRALS:   [ ]Chaplaincy  [ ] Hospice  [ ]Child Life  [ ]Social Work  [ ]Case management [ ]Holistic Therapy   Goals of Care Document:

## 2019-11-08 NOTE — PROGRESS NOTE BEHAVIORAL HEALTH - NSBHCHARTREVIEWLAB_PSY_A_CORE FT
11-08    147<H>  |  113<H>  |  21  ----------------------------<  129<H>  3.0<L>   |  20<L>  |  0.83    Ca    9.1      08 Nov 2019 06:00  Phos  2.7     11-08  Mg     2.6     11-08    TPro  6.3  /  Alb  3.3  /  TBili  0.5  /  DBili  x   /  AST  25  /  ALT  27  /  AlkPhos  38<L>  11-07                        8.6    7.24  )-----------( 256      ( 08 Nov 2019 06:00 )             27.1

## 2019-11-08 NOTE — CONSULT NOTE ADULT - SUBJECTIVE AND OBJECTIVE BOX
Patient is a 54y old  Male who presents with a chief complaint of ARDS (08 Nov 2019 11:26)    HPI:  54M with severe MDD with prior psych admissions/ECT who was initially admitted to  10/28/19 after presenting with lethargy and hypoxemia secondary to barbiturate overdose.  a. Patient reportedly was dealing with severe depression and expressed SI to his wife. His wife reported he was sleeping throughout the day and in the evening she heard a thump and found him on the floor. At  he became obtunded with hypercapnic respiratory failure and was intubated and admitted to the CCU. Initial imaging showed lower lobe consolidations and he was treated with CTX/Azithromycin. Poison control was contacted and he was received NaHCO3 in the setting of mild QRS widening on ECG and suspected overdose. His Utox returned positive for barbiturates; he reportedly had positive testing for barbiturates in the past thought secondary to OTC supplementations he takes from a [facility] for sleep. On the morning of admission, he was noted to have a 30 second tonic clonic seizure and received IV benzo and keppra load. Subsequent EEG did not reveal epileptiform discharges. He was extubated in the morning of 10/28 but required BiPAP in the setting of increased oxygenation needs. On 10/29 he was agitated and was started on Precedex. He developed fevers and was broadened to cefepime. Overnight (10/30), he had a Tmax of 102.6 and worsening hypoxemia requiring reintubation with elevated PEEP. Was started on propofol, precedex and paralyzed but remained difficult to oxygenate with traditional mechanical ventilation requiring frequent bagging and ALI consult for ECMO was placed. He was steroids, nimbex gtt, vancomycin and received Lasix 40mg IVP. His P/F was 60 at time of consult; improved somewhat to 82 at 4:30 this morning on AC/28/460/18/100. The patient was cannulated for VV-ECMO and transfer to [facility] (30 Oct 2019 12:10)  Decannulated 11/1/19  Extubated 11/5/19  He defevesced after fever 10/30- Febrile since 11/5  At present patient is lethargic. Eyes open but not engaged.      PAST MEDICAL & SURGICAL HISTORY:  Depression  Hepatitis: &quot;as a child&quot; unsure of type  Left knee pain  No significant past surgical history      Social history:  FAMILY HISTORY:  REVIEW OF SYSTEMS:  unable to obtain due to lethargy    Allergies  No Known Allergies    Antimicrobials:  cefepime   IVPB 2000 milliGRAM(s) IV Intermittent every 8 hours      Vital Signs Last 24 Hrs  T(C): 38.6 (08 Nov 2019 14:39), Max: 39.1 (07 Nov 2019 18:20)  T(F): 101.4 (08 Nov 2019 14:39), Max: 102.4 (07 Nov 2019 18:20)  HR: 87 (08 Nov 2019 14:39) (82 - 93)  BP: 169/75 (08 Nov 2019 14:39) (150/75 - 175/87)  BP(mean): --  RR: 19 (08 Nov 2019 14:39) (19 - 20)  SpO2: 96% (08 Nov 2019 14:39) (90% - 96%)    PHYSICAL EXAM:  General: WN/WD NAD  Neurology: eyes open - directed foward - no eye contact, non verbal  Respiratory: no distress on nasal O2, Clear to auscultation bilaterally with fine crackles  CV: RRR, S1S2, no murmurs, rubs or gallops  Abdominal: Soft, Non-tender, non-distended  Extremities: No edema,   Line Sites: Clear  Skin: No rash                        8.6    7.24  )-----------( 256      ( 08 Nov 2019 06:00 )             27.1   WBC Count: 7.24 (11-08 @ 06:00)  WBC Count: 6.41 (11-07 @ 03:30)  WBC Count: 6.49 (11-06 @ 03:40)  WBC Count: 6.89 (11-05 @ 03:10)  WBC Count: 6.73 (11-04 @ 14:20)  WBC Count: 6.36 (11-04 @ 02:25)  WBC Count: 6.36 (11-04 @ 02:25)      11-08    147<H>  |  113<H>  |  21  ----------------------------<  129<H>  3.0<L>   |  20<L>  |  0.83    Ca    9.1      08 Nov 2019 06:00  Phos  2.7     11-08  Mg     2.6     11-08    TPro  6.3  /  Alb  3.3  /  TBili  0.5  /  DBili  x   /  AST  25  /  ALT  27  /  AlkPhos  38<L>  11-07    MICROBIOLOGY:  SPUTUM  11-05-19 --  --  Enterobacter cloacae  Culture - Respiratory with Gram Stain (11.05.19 @ 18:38)    Culture - Respiratory:   Normal Respiratory Leticia Also Present    -  Trimethoprim/Sulfamethoxazole: S <=0.5/9.5 CARLY    -  Tigecycline: S <=1 CARLY    -  Tobramycin: S <=2 CARLY    -  Piperacillin/Tazobactam: S <=8 CARLY    -  Meropenem: S <=1 CARLY    -  Ertapenem: S <=0.5 CARLY    -  Gentamicin: S <=1 CARLY    -  Imipenem: S <=1 CARLY    -  Levofloxacin: S <=1 CARLY    -  Cefazolin: R >16 CARLY    -  Cefepime: S <=2 CARLY    -  Cefoxitin: R >16 CARLY    -  Ceftazidime: S <=1 CARLY    -  Ceftriaxone: S <=1 CARLY    -  Amikacin: S <=8 CARLY    -  Ampicillin: R    -  Ampicillin/Sulbactam: R 16/8 CARLY    -  Aztreonam: S <=4 CARLY    Gram Stain Sputum:   WBC White Blood Cells  QNTY CELLS IN GRAM STAIN: RARE (1+)  NOS^No Organisms Seen    BLOOD VENOUS  11-05-19 --  --  --      BLOOD VENOUS  10-30-19 --  --  --      BRONCHIAL LAVAGE  10-30-19 --  --  Staphylococcus aureus  Culture - Respiratory with Gram Stain (10.30.19 @ 18:48)    -  Trimethoprim/Sulfamethoxazole: S <=0.5/9.5 CARLY    -  Vancomycin: S 1 CARLY    Culture - Respiratory:   NO GROWTH - PRELIMINARY RESULTS  CULTURE IN PROGRESS, FURTHER REPORT TO FOLLOW.    -  Moxifloxacin(Aerobic): S <=0.5 CARLY    -  Oxacillin: S 1 CARLY    -  Penicillin: R >8 CARLY    -  Rifampin: S <=1 CARLY    -  Tetra/Doxy: S <=1 CARLY    -  Cefazolin: S <=4 CARLY    -  Gentamicin: S <=1 CARLY    -  Ciprofloxacin: S <=1 CARLY    -  Clindamycin: S 0.5 CARLY    -  Levofloxacin: S <=0.5 CARLY    Specimen Source: BRONCHIAL LAVAGE    BLOOD PERIPHERAL  10-30-19 --  --  BLOOD CULTURE PCR  Staphylococcus sp.,coag neg      .Blood Blood  10-29-19   No growth at 5 days.  --  --      .Blood Blood-Peripheral  10-27-19   No growth at 5 days.  --  --    < from: TTE Echo Complete w/Doppler (10.29.19 @ 11:41) >   1. Left ventricular ejection fraction, by visual estimation, is 40%.   2. Mildly decreased global left ventricular systolic function.   3. Spectral Doppler shows restrictive pattern of left ventricular   myocardial filling (Grade III diastolic dysfunction).   4. There is no evidence of pericardial effusion.   5. Sclerotic aortic valve with normal opening.   6. Mild to moderate pulmonic valve regurgitation.   7. Estimated pulmonary artery systolic pressure is 38.7 mmHg assuming a   right atrial pressure of 10 mmHg, which is consistent with borderline   pulmonary hypertension.   8. LA volume Index is 30.8 ml/m² ml/m2.    < end of copied text >    Radiology:  < from: Xray Chest 1 View- PORTABLE-Urgent (11.07.19 @ 08:25) >  Endotracheal tube has been removed since the last exam. Enteric tube   courses down the esophagus with its tip in the distal stomach. Left IJ   catheter are unchanged. Streaky atelectasis at the left lung base.      COMPARISON:  November 4      IMPRESSION:  Statuspost enteric tube placement and extubation.    < end of copied text >    Shan Preciado MD; Division of Infectious Disease; Pager: [phone]; nights and weekends: [phone]

## 2019-11-09 ENCOUNTER — TRANSCRIPTION ENCOUNTER (OUTPATIENT)
Age: 54
End: 2019-11-09

## 2019-11-09 LAB
ANION GAP SERPL CALC-SCNC: 15 MMO/L — HIGH (ref 7–14)
BUN SERPL-MCNC: 21 MG/DL — SIGNIFICANT CHANGE UP (ref 7–23)
CALCIUM SERPL-MCNC: 9.6 MG/DL — SIGNIFICANT CHANGE UP (ref 8.4–10.5)
CHLORIDE SERPL-SCNC: 111 MMOL/L — HIGH (ref 98–107)
CO2 SERPL-SCNC: 21 MMOL/L — LOW (ref 22–31)
CREAT SERPL-MCNC: 0.76 MG/DL — SIGNIFICANT CHANGE UP (ref 0.5–1.3)
GLUCOSE BLDC GLUCOMTR-MCNC: 112 MG/DL — HIGH (ref 70–99)
GLUCOSE BLDC GLUCOMTR-MCNC: 121 MG/DL — HIGH (ref 70–99)
GLUCOSE BLDC GLUCOMTR-MCNC: 121 MG/DL — HIGH (ref 70–99)
GLUCOSE BLDC GLUCOMTR-MCNC: 143 MG/DL — HIGH (ref 70–99)
GLUCOSE SERPL-MCNC: 122 MG/DL — HIGH (ref 70–99)
HCT VFR BLD CALC: 28.4 % — LOW (ref 39–50)
HGB BLD-MCNC: 9 G/DL — LOW (ref 13–17)
HIV 1+2 AB+HIV1 P24 AG SERPL QL IA: SIGNIFICANT CHANGE UP
L PNEUMO AG UR QL: NEGATIVE — SIGNIFICANT CHANGE UP
MCHC RBC-ENTMCNC: 31.1 PG — SIGNIFICANT CHANGE UP (ref 27–34)
MCHC RBC-ENTMCNC: 31.7 % — LOW (ref 32–36)
MCV RBC AUTO: 98.3 FL — SIGNIFICANT CHANGE UP (ref 80–100)
NRBC # FLD: 0.04 K/UL — SIGNIFICANT CHANGE UP (ref 0–0)
PLATELET # BLD AUTO: 226 K/UL — SIGNIFICANT CHANGE UP (ref 150–400)
PMV BLD: 10.4 FL — SIGNIFICANT CHANGE UP (ref 7–13)
POTASSIUM SERPL-MCNC: 3.3 MMOL/L — LOW (ref 3.5–5.3)
POTASSIUM SERPL-SCNC: 3.3 MMOL/L — LOW (ref 3.5–5.3)
PROCALCITONIN SERPL-MCNC: 0.16 NG/ML — HIGH (ref 0.02–0.1)
RBC # BLD: 2.89 M/UL — LOW (ref 4.2–5.8)
RBC # FLD: 13.1 % — SIGNIFICANT CHANGE UP (ref 10.3–14.5)
SODIUM SERPL-SCNC: 147 MMOL/L — HIGH (ref 135–145)
SPECIMEN SOURCE: SIGNIFICANT CHANGE UP
SPECIMEN SOURCE: SIGNIFICANT CHANGE UP
WBC # BLD: 8.31 K/UL — SIGNIFICANT CHANGE UP (ref 3.8–10.5)
WBC # FLD AUTO: 8.31 K/UL — SIGNIFICANT CHANGE UP (ref 3.8–10.5)

## 2019-11-09 PROCEDURE — 99232 SBSQ HOSP IP/OBS MODERATE 35: CPT

## 2019-11-09 PROCEDURE — 99233 SBSQ HOSP IP/OBS HIGH 50: CPT | Mod: GC

## 2019-11-09 RX ORDER — AMLODIPINE BESYLATE 2.5 MG/1
10 TABLET ORAL DAILY
Refills: 0 | Status: DISCONTINUED | OUTPATIENT
Start: 2019-11-09 | End: 2019-11-18

## 2019-11-09 RX ORDER — OLANZAPINE 15 MG/1
1 TABLET, FILM COATED ORAL
Qty: 0 | Refills: 0 | DISCHARGE

## 2019-11-09 RX ORDER — POTASSIUM CHLORIDE 20 MEQ
10 PACKET (EA) ORAL
Refills: 0 | Status: COMPLETED | OUTPATIENT
Start: 2019-11-09 | End: 2019-11-09

## 2019-11-09 RX ORDER — METOPROLOL TARTRATE 50 MG
25 TABLET ORAL
Refills: 0 | Status: DISCONTINUED | OUTPATIENT
Start: 2019-11-09 | End: 2019-11-10

## 2019-11-09 RX ORDER — POTASSIUM CHLORIDE 20 MEQ
40 PACKET (EA) ORAL EVERY 4 HOURS
Refills: 0 | Status: COMPLETED | OUTPATIENT
Start: 2019-11-09 | End: 2019-11-09

## 2019-11-09 RX ADMIN — Medication 40 MILLIEQUIVALENT(S): at 09:07

## 2019-11-09 RX ADMIN — Medication 25 MILLIGRAM(S): at 05:24

## 2019-11-09 RX ADMIN — FENTANYL CITRATE 1 PATCH: 50 INJECTION INTRAVENOUS at 07:31

## 2019-11-09 RX ADMIN — Medication 25 MILLIGRAM(S): at 17:24

## 2019-11-09 RX ADMIN — NYSTATIN CREAM 1 APPLICATION(S): 100000 CREAM TOPICAL at 17:23

## 2019-11-09 RX ADMIN — Medication 100 MILLIEQUIVALENT(S): at 11:56

## 2019-11-09 RX ADMIN — FENTANYL CITRATE 1 PATCH: 50 INJECTION INTRAVENOUS at 14:56

## 2019-11-09 RX ADMIN — Medication 1 APPLICATION(S): at 17:24

## 2019-11-09 RX ADMIN — CEFEPIME 100 MILLIGRAM(S): 1 INJECTION, POWDER, FOR SOLUTION INTRAMUSCULAR; INTRAVENOUS at 15:01

## 2019-11-09 RX ADMIN — AMLODIPINE BESYLATE 10 MILLIGRAM(S): 2.5 TABLET ORAL at 07:55

## 2019-11-09 RX ADMIN — Medication 100 MILLIEQUIVALENT(S): at 10:24

## 2019-11-09 RX ADMIN — Medication 1 APPLICATION(S): at 05:23

## 2019-11-09 RX ADMIN — Medication 40 MILLIEQUIVALENT(S): at 15:01

## 2019-11-09 RX ADMIN — Medication 50 MILLIGRAM(S): at 05:24

## 2019-11-09 RX ADMIN — ENOXAPARIN SODIUM 60 MILLIGRAM(S): 100 INJECTION SUBCUTANEOUS at 11:57

## 2019-11-09 RX ADMIN — NYSTATIN CREAM 1 APPLICATION(S): 100000 CREAM TOPICAL at 05:25

## 2019-11-09 RX ADMIN — CEFEPIME 100 MILLIGRAM(S): 1 INJECTION, POWDER, FOR SOLUTION INTRAMUSCULAR; INTRAVENOUS at 21:34

## 2019-11-09 RX ADMIN — Medication 1 DROP(S): at 17:24

## 2019-11-09 RX ADMIN — Medication 1 DROP(S): at 05:23

## 2019-11-09 RX ADMIN — CEFEPIME 100 MILLIGRAM(S): 1 INJECTION, POWDER, FOR SOLUTION INTRAMUSCULAR; INTRAVENOUS at 05:22

## 2019-11-09 RX ADMIN — FENTANYL CITRATE 1 PATCH: 50 INJECTION INTRAVENOUS at 20:18

## 2019-11-09 RX ADMIN — Medication 100 MILLIEQUIVALENT(S): at 09:07

## 2019-11-09 RX ADMIN — FENTANYL CITRATE 1 PATCH: 50 INJECTION INTRAVENOUS at 15:01

## 2019-11-09 NOTE — DISCHARGE NOTE PROVIDER - NSDCCPCAREPLAN_GEN_ALL_CORE_FT
PRINCIPAL DISCHARGE DIAGNOSIS  Diagnosis: Overdose  Assessment and Plan of Treatment:       SECONDARY DISCHARGE DIAGNOSES  Diagnosis: Acute respiratory distress syndrome (ARDS)  Assessment and Plan of Treatment:     Diagnosis: Acute hypoxemic respiratory failure  Assessment and Plan of Treatment:

## 2019-11-09 NOTE — DISCHARGE NOTE PROVIDER - HOSPITAL COURSE
55 y/o male pmhx HTN, HLD severe depression and anxiety admitted on 10/28 w/ acute hypoxic hypercapnic respiratory failure related to suspected drug overdose. Was able to be extubated on the morning of 10/28. Hospital course complicated by developing aspiration pneumonia. Received patient on BiPAP 18/6 60%. Hypoxia worsened and subsequently went into ARDS required re intubation.     ECMO team from [facility] notfied and pt transferred to [facility] for further care and management.         For full account of hospital course please refer to actual medical records. As this is a brief summery.

## 2019-11-09 NOTE — DISCHARGE NOTE PROVIDER - NSDCMRMEDTOKEN_GEN_ALL_CORE_FT
Ambien 10 mg oral tablet: 1 tab(s) orally once a day (at bedtime) x 30 days MDD:10 mg   atorvastatin 10 mg oral tablet: 1 tab(s) orally once a day (at bedtime) x 30 days   buPROPion 300 mg/24 hours (XL) oral tablet, extended release: 1 tab(s) orally once a day x 30 days   clonazePAM 1 mg oral tablet: 1 tab(s) orally 3 times a day MDD:3 mg  gabapentin 300 mg oral capsule: 1 cap(s) orally 2 times a day x 30 days   OLANZapine 5 mg oral tablet: 1 tab(s) orally once a day  QUEtiapine 400 mg oral tablet: 1 tab(s) orally once a day (at bedtime) x 30 days   traZODone 100 mg oral tablet: 1 tab(s) orally once a day (at bedtime) x 30 days   venlafaxine 150 mg oral capsule, extended release: 1 cap(s) orally once a day x 30 days

## 2019-11-09 NOTE — PROGRESS NOTE ADULT - ASSESSMENT
54M with severe depression who presented to  10/28 with lethargy and hypoxemic/hypercapnic respiratory failure possibly related to overdose +/- pneumonia extubated but required reintubation 10/30 but with persistent hypoxemia refractory to conventional management and ultimately cannulated for VV ECMO 10/30/2019 for hypoxemic respiratory failure with ARDS and transferred to [facility] for further management. Decannulated 11/1, now extubated to BIPAP.

## 2019-11-09 NOTE — PROGRESS NOTE ADULT - ATTENDING COMMENTS
Was on ECMO for respiratory failure  Now on NC  Was febrile again persistently, pansensitive enterobacter now on cefepime  hemodyn stable  hypernatremia, hypokalemia, on free H20, k repletion  psych follow up apprec

## 2019-11-09 NOTE — PROGRESS NOTE ADULT - SUBJECTIVE AND OBJECTIVE BOX
CC: Patient is a 54y old  Male who presents with a chief complaint of ARDS (09 Nov 2019 07:46)    ID following for fever    Interval History/ROS: Patient with last fever yesterday evening 100.4F. WBC WNL.    Rest of ROS negative.    Allergies  No Known Allergies    ANTIMICROBIALS:  cefepime   IVPB 2000 every 8 hours    OTHER MEDS:  acetaminophen    Suspension .. 650 milliGRAM(s) Oral every 6 hours PRN  amLODIPine   Tablet 10 milliGRAM(s) Oral daily  artificial  tears Solution 1 Drop(s) Both EYES two times a day PRN  artificial tears (preservative free) Ophthalmic Solution 1 Drop(s) Both EYES two times a day  dextrose 40% Gel 15 Gram(s) Oral once PRN  dextrose 5%. 1000 milliLiter(s) IV Continuous <Continuous>  dextrose 5%. 1000 milliLiter(s) IV Continuous <Continuous>  dextrose 50% Injectable 12.5 Gram(s) IV Push once  enoxaparin Injectable 60 milliGRAM(s) SubCutaneous daily  fentaNYL   Patch  25 MICROgram(s)/Hr 1 Patch Transdermal every 72 hours  glucagon  Injectable 1 milliGRAM(s) IntraMuscular once PRN  haloperidol    Injectable 2.5 milliGRAM(s) IV Push every 6 hours PRN  ibuprofen  Suspension. 400 milliGRAM(s) Oral every 8 hours PRN  insulin lispro (HumaLOG) corrective regimen sliding scale   SubCutaneous three times a day before meals  insulin lispro (HumaLOG) corrective regimen sliding scale   SubCutaneous at bedtime  metoprolol tartrate 25 milliGRAM(s) Oral two times a day  nystatin Powder 1 Application(s) Topical every 12 hours  petrolatum Ophthalmic Ointment 1 Application(s) Both EYES two times a day      PE:    Vital Signs Last 24 Hrs  T(C): 37.5 (09 Nov 2019 14:55), Max: 38 (08 Nov 2019 20:09)  T(F): 99.5 (09 Nov 2019 14:55), Max: 100.4 (08 Nov 2019 20:09)  HR: 89 (09 Nov 2019 14:55) (75 - 89)  BP: 144/89 (09 Nov 2019 14:55) (144/89 - 173/100)  BP(mean): --  RR: 20 (09 Nov 2019 14:55) (19 - 20)  SpO2: 98% (09 Nov 2019 14:55) (98% - 100%)    Gen: Awake, NAD, non-toxic  HEENT: NGT  CV: S1+S2 normal, no murmurs  Resp: Coarse breath sounds  Abd: Soft, nontender, +BS  Ext: + LE edema, no wounds  : No CVA tenderness  IV/Skin: No thrombophlebitis  Neuro: no focal deficits    LABS:                          9.0    8.31  )-----------( 226      ( 09 Nov 2019 05:57 )             28.4       11-09    147<H>  |  111<H>  |  21  ----------------------------<  122<H>  3.3<L>   |  21<L>  |  0.76    Ca    9.6      09 Nov 2019 05:57  Phos  2.7     11-08  Mg     2.6     11-08            MICROBIOLOGY:  v  BLOOD PERIPHERAL  11-08-19 --  --  --      SPUTUM  11-05-19 --  --  Enterobacter cloacae      BLOOD VENOUS  11-05-19 --  --  --      BLOOD VENOUS  10-30-19 --  --  --      BRONCHIAL LAVAGE  10-30-19 --  --  Staphylococcus aureus      BLOOD PERIPHERAL  10-30-19 --  --  BLOOD CULTURE PCR  Staphylococcus sp.,coag neg      .Blood Blood  10-29-19   No growth at 5 days.  --  --      .Blood Blood-Peripheral  10-27-19   No growth at 5 days.  --  --    RADIOLOGY:    < from: Xray Chest 1 View- PORTABLE-Urgent (11.07.19 @ 08:25) >  IMPRESSION:  Statuspost enteric tube placement and extubation.    < end of copied text > good balance

## 2019-11-09 NOTE — PROGRESS NOTE ADULT - PROBLEM SELECTOR PLAN 2
- fevers improved overnight  - previous blood culture coag negative staph contaminant, and BAL with MSSA  - s/p course of vanco/zosyn   - DVT study negative   - most recent sputum with enterobacter, pan sensitive  - ID note appreciated, cont cefepime for now  - follow blood cultures from 11/8

## 2019-11-09 NOTE — PROGRESS NOTE ADULT - ASSESSMENT
54M with severe MDD with prior psych admissions/ECT who was initially admitted to  10/28/19 after apparent Barbituate overdose with respiratory depression, ARDS. [facility] for ECMO, respiratory support.  Decannulated 11/1/19  Extubated 11/5/19  febrile since 11/5/19  Airway colonized with MSSA and Enterobacter aerogenes though most recent sputum gram stain unimpressive.  no leukocytosis - fever may be related to atelectasis, lung injury, aspiration pneumonitis without infection  Blood cultures negative to date  Legionella urine ag negative    Antibiotics  Ceftriaxone 10/28 -->29  Azithromycin 10/28, 10/30 -->11/2  Vanco 10/20 -->11/5  Zosyn 10/30 --> 11/5  Cefepime 10/29 --> 30 11/7 -->11/8-->  Ertapenem 11/8    Suggest  Continue Cefepime 2 every 8 hours  F/U HIV    Patient will be followed by ID over weekend

## 2019-11-09 NOTE — PROGRESS NOTE ADULT - SUBJECTIVE AND OBJECTIVE BOX
CHIEF COMPLAINT: Patient is a 54y old  Male who presents with a chief complaint of ARDS (08 Nov 2019 16:05)    Interval Events:      REVIEW OF SYSTEMS:  Constitutional:   Eyes:  ENT:  CV:  Resp:  GI:  :  MSK:  Integumentary:  Neurological:  Psychiatric:  Endocrine:  Hematologic/Lymphatic:  Allergic/Immunologic:  [ ] All other systems negative  [ ] Unable to assess ROS because ________      OBJECTIVE:  ICU Vital Signs Last 24 Hrs  T(C): 37.4 (09 Nov 2019 05:16), Max: 38.6 (08 Nov 2019 14:39)  T(F): 99.4 (09 Nov 2019 05:16), Max: 101.4 (08 Nov 2019 14:39)  HR: 75 (09 Nov 2019 05:16) (75 - 87)  BP: 159/92 (09 Nov 2019 07:19) (150/75 - 173/100)  BP(mean): --  ABP: --  ABP(mean): --  RR: 19 (09 Nov 2019 05:16) (19 - 19)  SpO2: 99% (09 Nov 2019 05:16) (96% - 100%)    11-08 @ 07:01  -  11-09 @ 07:00  --------------------------------------------------------  IN: 850 mL / OUT: 800 mL / NET: 50 mL    POCT Blood Glucose.: 126 mg/dL (08 Nov 2019 21:30)    HOSPITAL MEDICATIONS:  MEDICATIONS  (STANDING):  amLODIPine   Tablet 10 milliGRAM(s) Oral daily  artificial tears (preservative free) Ophthalmic Solution 1 Drop(s) Both EYES two times a day  cefepime   IVPB 2000 milliGRAM(s) IV Intermittent every 8 hours  dextrose 5%. 1000 milliLiter(s) (50 mL/Hr) IV Continuous <Continuous>  dextrose 5%. 1000 milliLiter(s) (50 mL/Hr) IV Continuous <Continuous>  dextrose 50% Injectable 12.5 Gram(s) IV Push once  enoxaparin Injectable 60 milliGRAM(s) SubCutaneous daily  fentaNYL   Patch  25 MICROgram(s)/Hr 1 Patch Transdermal every 72 hours  insulin lispro (HumaLOG) corrective regimen sliding scale   SubCutaneous three times a day before meals  insulin lispro (HumaLOG) corrective regimen sliding scale   SubCutaneous at bedtime  metoprolol tartrate 25 milliGRAM(s) Oral daily  nystatin Powder 1 Application(s) Topical every 12 hours  petrolatum Ophthalmic Ointment 1 Application(s) Both EYES two times a day    MEDICATIONS  (PRN):  acetaminophen    Suspension .. 650 milliGRAM(s) Oral every 6 hours PRN Temp greater or equal to 38C (100.4F), Mild Pain (1 - 3), Moderate Pain (4 - 6), Severe Pain (7 - 10)  artificial  tears Solution 1 Drop(s) Both EYES two times a day PRN Dry Eyes  dextrose 40% Gel 15 Gram(s) Oral once PRN Blood Glucose LESS THAN 70 milliGRAM(s)/deciliter  glucagon  Injectable 1 milliGRAM(s) IntraMuscular once PRN Glucose LESS THAN 70 milligrams/deciliter  haloperidol    Injectable 2.5 milliGRAM(s) IV Push every 6 hours PRN Agitation  ibuprofen  Suspension. 400 milliGRAM(s) Oral every 8 hours PRN Temp greater or equal to 38C (100.4F)      LABS:                        8.6    7.24  )-----------( 256      ( 08 Nov 2019 06:00 )             27.1     11-08    150<H>  |  116<H>  |  21  ----------------------------<  128<H>  3.7   |  22  |  0.86    Ca    9.2      08 Nov 2019 16:19  Phos  2.7     11-08  Mg     2.6     11-08                MICROBIOLOGY:     RADIOLOGY:  [ ] Reviewed and interpreted by me    PULMONARY FUNCTION TESTS:    EKG: CHIEF COMPLAINT: Patient is a 54y old  Male who presents with a chief complaint of ARDS (08 Nov 2019 16:05)    Interval Events: none overnight - afebrile - more awake this am       REVIEW OF SYSTEMS:  Constitutional: no complaints, feels ok  CV: denies   Resp: denies   GI: denies   [x] All other systems negative  [ ] Unable to assess ROS because ________      OBJECTIVE:  ICU Vital Signs Last 24 Hrs  T(C): 37.4 (09 Nov 2019 05:16), Max: 38.6 (08 Nov 2019 14:39)  T(F): 99.4 (09 Nov 2019 05:16), Max: 101.4 (08 Nov 2019 14:39)  HR: 75 (09 Nov 2019 05:16) (75 - 87)  BP: 159/92 (09 Nov 2019 07:19) (150/75 - 173/100)  BP(mean): --  ABP: --  ABP(mean): --  RR: 19 (09 Nov 2019 05:16) (19 - 19)  SpO2: 99% (09 Nov 2019 05:16) (96% - 100%)    11-08 @ 07:01  -  11-09 @ 07:00  --------------------------------------------------------  IN: 850 mL / OUT: 800 mL / NET: 50 mL    POCT Blood Glucose.: 126 mg/dL (08 Nov 2019 21:30)    HOSPITAL MEDICATIONS:  MEDICATIONS  (STANDING):  amLODIPine   Tablet 10 milliGRAM(s) Oral daily  artificial tears (preservative free) Ophthalmic Solution 1 Drop(s) Both EYES two times a day  cefepime   IVPB 2000 milliGRAM(s) IV Intermittent every 8 hours  dextrose 5%. 1000 milliLiter(s) (50 mL/Hr) IV Continuous <Continuous>  dextrose 5%. 1000 milliLiter(s) (50 mL/Hr) IV Continuous <Continuous>  dextrose 50% Injectable 12.5 Gram(s) IV Push once  enoxaparin Injectable 60 milliGRAM(s) SubCutaneous daily  fentaNYL   Patch  25 MICROgram(s)/Hr 1 Patch Transdermal every 72 hours  insulin lispro (HumaLOG) corrective regimen sliding scale   SubCutaneous three times a day before meals  insulin lispro (HumaLOG) corrective regimen sliding scale   SubCutaneous at bedtime  metoprolol tartrate 25 milliGRAM(s) Oral daily  nystatin Powder 1 Application(s) Topical every 12 hours  petrolatum Ophthalmic Ointment 1 Application(s) Both EYES two times a day    MEDICATIONS  (PRN):  acetaminophen    Suspension .. 650 milliGRAM(s) Oral every 6 hours PRN Temp greater or equal to 38C (100.4F), Mild Pain (1 - 3), Moderate Pain (4 - 6), Severe Pain (7 - 10)  artificial  tears Solution 1 Drop(s) Both EYES two times a day PRN Dry Eyes  dextrose 40% Gel 15 Gram(s) Oral once PRN Blood Glucose LESS THAN 70 milliGRAM(s)/deciliter  glucagon  Injectable 1 milliGRAM(s) IntraMuscular once PRN Glucose LESS THAN 70 milligrams/deciliter  haloperidol    Injectable 2.5 milliGRAM(s) IV Push every 6 hours PRN Agitation  ibuprofen  Suspension. 400 milliGRAM(s) Oral every 8 hours PRN Temp greater or equal to 38C (100.4F)      LABS:             Complete Blood Count in AM (11.09.19 @ 05:57)    WBC Count: 8.31 K/uL    RBC Count: 2.89 M/uL    Hemoglobin: 9.0 g/dL    Hematocrit: 28.4 %    Mean Cell Volume: 98.3 fL    Mean Cell Hemoglobin: 31.1 pg    Mean Cell Hemoglobin Conc: 31.7 %    Red Cell Distrib Width: 13.1 %    Platelet Count - Automated: 226 K/uL    MPV: 10.4 fl    Nucleated RBC #: 0.04 K/uL    Basic Metabolic Panel in AM (11.09.19 @ 05:57)    Sodium, Serum: 147 mmol/L    Potassium, Serum: 3.3 mmol/L    Chloride, Serum: 111 mmol/L    Carbon Dioxide, Serum: 21 mmol/L    Anion Gap, Serum: 15 mmo/L    Blood Urea Nitrogen, Serum: 21 mg/dL    Creatinine, Serum: 0.76 mg/dL    Glucose, Serum: 122 mg/dL    Calcium, Total Serum: 9.6 mg/dL    eGFR if Non : 102: The units for eGFR are ml/min/1.73m2 (normalized body  surface area). The eGFR is calculated from a serum  creatinine using the CKD-EPI equation. Other variables  required for calculation are race, age and sex. Among  patients with chronic kidney disease (CKD), the eGFR is  useful in determining the stage of disease according to  KDOQI CKD classification. All eGFR results are reported  numerically with the following interpretation.    GFR  (ml/min/1.73 m2)          W/KIDNEY DAMAGE    W/O KIDNEY DMG  ==========================================================  >= 90.......................Stage 1..............Normal  60-89.......................Stage 2...........Decreased GFR  30-59.......................Stage 3..............Stage 3  15-29.......................Stage 4..............Stage 4  < 15........................Stage 5..............Stage 5    Each stage of CKD assumes that the associated GFR level  has been in effect for at least 3 months. Determination of  stages one and two (with eGFR > 59ml/min/m2) requires  estimation of kidney damage for at least 3 months as  defined by structural or functional abnormalities.    Limitations: All estimates of GFR will be less accurate  for patients at extremes of muscle mass (including but  not limited to frail elderly, critically ill, or cancer  patients), those with unusual diets, and those with  conditions associated with reduced secretion or  extrarenal elimination of creatinine. The eGFR equation  is not recommended for use in patients with unstable  creatinine levels. mL/min    eGFR if : 118 mL/min      MICROBIOLOGY:     Culture - Blood (11.08.19 @ 07:55)    Culture - Blood:   NO ORGANISMS ISOLATED  NO ORGANISMS ISOLATED AT 24 HOURS    Specimen Source: BLOOD VENOUS    Culture - Blood (11.08.19 @ 07:55)    Culture - Blood:   NO ORGANISMS ISOLATED  NO ORGANISMS ISOLATED AT 24 HOURS    Specimen Source: BLOOD PERIPHERAL    Culture - Respiratory with Gram Stain (11.05.19 @ 18:38)    -  Gentamicin: S <=1 CARLY    -  Imipenem: S <=1 CARLY    -  Levofloxacin: S <=1 CARLY    -  Amikacin: S <=8 CARLY    -  Ampicillin: R    -  Ampicillin/Sulbactam: R 16/8 CARLY    -  Aztreonam: S <=4 CARLY    -  Cefazolin: R >16 CARLY    -  Cefepime: S <=2 CARLY    -  Cefoxitin: R >16 CARLY    -  Ceftazidime: S <=1 CARLY    -  Ceftriaxone: S <=1 CARLY    Gram Stain Sputum:   WBC White Blood Cells  QNTY CELLS IN GRAM STAIN: RARE (1+)  NOS^No Organisms Seen    -  Ertapenem: S <=0.5 CARLY    -  Trimethoprim/Sulfamethoxazole: S <=0.5/9.5 CARLY    Culture - Respiratory:   Normal Respiratory Leticia Also Present    -  Meropenem: S <=1 CARLY    -  Piperacillin/Tazobactam: S <=8 CARLY    -  Tigecycline: S <=1 CARLY    -  Tobramycin: S <=2 CARLY    Specimen Source: SPUTUM    Organism Identification: Enterobacter cloacae    Organism: Enterobacter cloacae  QUANTITY OF GROWTH: MODERATE    Method Type: NEGATIVE CARLY 43

## 2019-11-10 LAB
ANION GAP SERPL CALC-SCNC: 15 MMO/L — HIGH (ref 7–14)
BACTERIA BLD CULT: SIGNIFICANT CHANGE UP
BUN SERPL-MCNC: 22 MG/DL — SIGNIFICANT CHANGE UP (ref 7–23)
CALCIUM SERPL-MCNC: 9.6 MG/DL — SIGNIFICANT CHANGE UP (ref 8.4–10.5)
CHLORIDE SERPL-SCNC: 110 MMOL/L — HIGH (ref 98–107)
CO2 SERPL-SCNC: 21 MMOL/L — LOW (ref 22–31)
CREAT SERPL-MCNC: 0.72 MG/DL — SIGNIFICANT CHANGE UP (ref 0.5–1.3)
GLUCOSE BLDC GLUCOMTR-MCNC: 112 MG/DL — HIGH (ref 70–99)
GLUCOSE BLDC GLUCOMTR-MCNC: 121 MG/DL — HIGH (ref 70–99)
GLUCOSE BLDC GLUCOMTR-MCNC: 138 MG/DL — HIGH (ref 70–99)
GLUCOSE BLDC GLUCOMTR-MCNC: 145 MG/DL — HIGH (ref 70–99)
GLUCOSE SERPL-MCNC: 134 MG/DL — HIGH (ref 70–99)
POTASSIUM SERPL-MCNC: 3.4 MMOL/L — LOW (ref 3.5–5.3)
POTASSIUM SERPL-SCNC: 3.4 MMOL/L — LOW (ref 3.5–5.3)
SODIUM SERPL-SCNC: 146 MMOL/L — HIGH (ref 135–145)

## 2019-11-10 PROCEDURE — 99233 SBSQ HOSP IP/OBS HIGH 50: CPT | Mod: GC

## 2019-11-10 RX ORDER — VANCOMYCIN HCL 1 G
VIAL (EA) INTRAVENOUS
Refills: 0 | Status: DISCONTINUED | OUTPATIENT
Start: 2019-11-10 | End: 2019-11-11

## 2019-11-10 RX ORDER — VANCOMYCIN HCL 1 G
1250 VIAL (EA) INTRAVENOUS EVERY 8 HOURS
Refills: 0 | Status: DISCONTINUED | OUTPATIENT
Start: 2019-11-11 | End: 2019-11-11

## 2019-11-10 RX ORDER — VANCOMYCIN HCL 1 G
1250 VIAL (EA) INTRAVENOUS ONCE
Refills: 0 | Status: COMPLETED | OUTPATIENT
Start: 2019-11-10 | End: 2019-11-10

## 2019-11-10 RX ORDER — POTASSIUM CHLORIDE 20 MEQ
40 PACKET (EA) ORAL EVERY 4 HOURS
Refills: 0 | Status: COMPLETED | OUTPATIENT
Start: 2019-11-10 | End: 2019-11-10

## 2019-11-10 RX ORDER — VANCOMYCIN HCL 1 G
VIAL (EA) INTRAVENOUS
Refills: 0 | Status: DISCONTINUED | OUTPATIENT
Start: 2019-11-10 | End: 2019-11-10

## 2019-11-10 RX ORDER — METOPROLOL TARTRATE 50 MG
50 TABLET ORAL
Refills: 0 | Status: DISCONTINUED | OUTPATIENT
Start: 2019-11-10 | End: 2019-11-14

## 2019-11-10 RX ORDER — SODIUM CHLORIDE 9 MG/ML
4 INJECTION INTRAMUSCULAR; INTRAVENOUS; SUBCUTANEOUS THREE TIMES A DAY
Refills: 0 | Status: DISCONTINUED | OUTPATIENT
Start: 2019-11-10 | End: 2019-11-11

## 2019-11-10 RX ADMIN — Medication 400 MILLIGRAM(S): at 14:07

## 2019-11-10 RX ADMIN — Medication 25 MILLIGRAM(S): at 05:07

## 2019-11-10 RX ADMIN — Medication 166.67 MILLIGRAM(S): at 23:20

## 2019-11-10 RX ADMIN — CEFEPIME 100 MILLIGRAM(S): 1 INJECTION, POWDER, FOR SOLUTION INTRAMUSCULAR; INTRAVENOUS at 22:35

## 2019-11-10 RX ADMIN — Medication 1 DROP(S): at 18:00

## 2019-11-10 RX ADMIN — Medication 40 MILLIEQUIVALENT(S): at 12:29

## 2019-11-10 RX ADMIN — Medication 1 DROP(S): at 18:01

## 2019-11-10 RX ADMIN — FENTANYL CITRATE 1 PATCH: 50 INJECTION INTRAVENOUS at 18:05

## 2019-11-10 RX ADMIN — Medication 650 MILLIGRAM(S): at 10:35

## 2019-11-10 RX ADMIN — Medication 400 MILLIGRAM(S): at 15:00

## 2019-11-10 RX ADMIN — Medication 650 MILLIGRAM(S): at 19:28

## 2019-11-10 RX ADMIN — Medication 1 APPLICATION(S): at 05:08

## 2019-11-10 RX ADMIN — Medication 1 APPLICATION(S): at 18:00

## 2019-11-10 RX ADMIN — AMLODIPINE BESYLATE 10 MILLIGRAM(S): 2.5 TABLET ORAL at 05:07

## 2019-11-10 RX ADMIN — Medication 40 MILLIEQUIVALENT(S): at 16:08

## 2019-11-10 RX ADMIN — CEFEPIME 100 MILLIGRAM(S): 1 INJECTION, POWDER, FOR SOLUTION INTRAMUSCULAR; INTRAVENOUS at 14:08

## 2019-11-10 RX ADMIN — NYSTATIN CREAM 1 APPLICATION(S): 100000 CREAM TOPICAL at 05:07

## 2019-11-10 RX ADMIN — Medication 25 MILLIGRAM(S): at 05:09

## 2019-11-10 RX ADMIN — ENOXAPARIN SODIUM 60 MILLIGRAM(S): 100 INJECTION SUBCUTANEOUS at 12:24

## 2019-11-10 RX ADMIN — Medication 50 MILLIGRAM(S): at 18:03

## 2019-11-10 RX ADMIN — Medication 650 MILLIGRAM(S): at 09:40

## 2019-11-10 RX ADMIN — NYSTATIN CREAM 1 APPLICATION(S): 100000 CREAM TOPICAL at 18:04

## 2019-11-10 RX ADMIN — FENTANYL CITRATE 1 PATCH: 50 INJECTION INTRAVENOUS at 07:00

## 2019-11-10 RX ADMIN — Medication 400 MILLIGRAM(S): at 19:47

## 2019-11-10 RX ADMIN — Medication 400 MILLIGRAM(S): at 22:00

## 2019-11-10 RX ADMIN — CEFEPIME 100 MILLIGRAM(S): 1 INJECTION, POWDER, FOR SOLUTION INTRAMUSCULAR; INTRAVENOUS at 05:09

## 2019-11-10 RX ADMIN — Medication 1 DROP(S): at 05:08

## 2019-11-10 RX ADMIN — Medication 650 MILLIGRAM(S): at 18:00

## 2019-11-10 NOTE — PROVIDER CONTACT NOTE (OTHER) - SITUATION
Patient has elevate temp, 101.1, no distress noted, NP made aware, Tylenol given as ordered, cold packs applied, will continue to monitor

## 2019-11-10 NOTE — PROGRESS NOTE ADULT - ATTENDING COMMENTS
Was on ECMO for respiratory failure  Now on NC  Was febrile again persistently, pansensitive enterobacter now on cefepime  hemodyn stable  hypernatremia, hypokalemia, on free H20, k repletion  psych follow up

## 2019-11-10 NOTE — PROVIDER CONTACT NOTE (OTHER) - SITUATION
PA made aware patient continues to have elevated temp 101.3 rectal, rechecked after Tylenol was given, PA made aware patient continues to have elevated temp 101.3 rectal, rechecked after Tylenol was given, as per PA motrin given, will continue to monitor

## 2019-11-10 NOTE — PROGRESS NOTE ADULT - ASSESSMENT
54M with severe depression who presented to  10/28 with lethargy and hypoxemic/hypercapnic respiratory failure possibly related to overdose +/- pneumonia extubated but required reintubation 10/30 but with persistent hypoxemia refractory to conventional management and ultimately cannulated for VV ECMO 10/30/2019 for hypoxemic respiratory failure with ARDS and transferred to [facility] for further management. Decannulated 11/1, now extubated to BIPAP.

## 2019-11-10 NOTE — PROVIDER CONTACT NOTE (OTHER) - SITUATION
Patient temp 101.1 rectal, NP made aware, Tylenol given as ordered, cold packs applied, will continue to monitor

## 2019-11-10 NOTE — PROVIDER CONTACT NOTE (OTHER) - SITUATION
Patient continues to have elevated temp, 100.8, NP made aware, ibuprofen given as ordered, will continue to monitor

## 2019-11-10 NOTE — PROGRESS NOTE ADULT - SUBJECTIVE AND OBJECTIVE BOX
CHIEF COMPLAINT: Patient is a 54y old  Male who presents with a chief complaint of ARDS (09 Nov 2019 15:41)    Interval Events:      REVIEW OF SYSTEMS:  Constitutional:   Eyes:  ENT:  CV:  Resp:  GI:  :  MSK:  Integumentary:  Neurological:  Psychiatric:  Endocrine:  Hematologic/Lymphatic:  Allergic/Immunologic:  [ ] All other systems negative  [ ] Unable to assess ROS because ________      OBJECTIVE:  ICU Vital Signs Last 24 Hrs  T(C): 37.2 (10 Nov 2019 04:43), Max: 37.5 (09 Nov 2019 14:55)  T(F): 99 (10 Nov 2019 04:43), Max: 99.5 (09 Nov 2019 14:55)  HR: 79 (10 Nov 2019 04:43) (77 - 89)  BP: 156/89 (10 Nov 2019 04:43) (144/89 - 158/90)  BP(mean): --  ABP: --  ABP(mean): --  RR: 18 (10 Nov 2019 04:43) (18 - 20)  SpO2: 99% (10 Nov 2019 04:43) (98% - 100%)    11-09 @ 07:01  -  11-10 @ 07:00  --------------------------------------------------------  IN: 2626 mL / OUT: 1950 mL / NET: 676 mL    POCT Blood Glucose.: 143 mg/dL (09 Nov 2019 21:26)    HOSPITAL MEDICATIONS:  MEDICATIONS  (STANDING):  amLODIPine   Tablet 10 milliGRAM(s) Oral daily  artificial tears (preservative free) Ophthalmic Solution 1 Drop(s) Both EYES two times a day  cefepime   IVPB 2000 milliGRAM(s) IV Intermittent every 8 hours  dextrose 5%. 1000 milliLiter(s) (50 mL/Hr) IV Continuous <Continuous>  dextrose 5%. 1000 milliLiter(s) (50 mL/Hr) IV Continuous <Continuous>  dextrose 50% Injectable 12.5 Gram(s) IV Push once  enoxaparin Injectable 60 milliGRAM(s) SubCutaneous daily  fentaNYL   Patch  25 MICROgram(s)/Hr 1 Patch Transdermal every 72 hours  hydrocortisone sodium succinate Injectable 25 milliGRAM(s) IV Push daily  insulin lispro (HumaLOG) corrective regimen sliding scale   SubCutaneous three times a day before meals  insulin lispro (HumaLOG) corrective regimen sliding scale   SubCutaneous at bedtime  metoprolol tartrate 25 milliGRAM(s) Oral two times a day  nystatin Powder 1 Application(s) Topical every 12 hours  petrolatum Ophthalmic Ointment 1 Application(s) Both EYES two times a day    MEDICATIONS  (PRN):  acetaminophen    Suspension .. 650 milliGRAM(s) Oral every 6 hours PRN Temp greater or equal to 38C (100.4F), Mild Pain (1 - 3), Moderate Pain (4 - 6), Severe Pain (7 - 10)  artificial  tears Solution 1 Drop(s) Both EYES two times a day PRN Dry Eyes  dextrose 40% Gel 15 Gram(s) Oral once PRN Blood Glucose LESS THAN 70 milliGRAM(s)/deciliter  glucagon  Injectable 1 milliGRAM(s) IntraMuscular once PRN Glucose LESS THAN 70 milligrams/deciliter  haloperidol    Injectable 2.5 milliGRAM(s) IV Push every 6 hours PRN Agitation  ibuprofen  Suspension. 400 milliGRAM(s) Oral every 8 hours PRN Temp greater or equal to 38C (100.4F)      LABS:                        9.0    8.31  )-----------( 226      ( 09 Nov 2019 05:57 )             28.4     11-09    147<H>  |  111<H>  |  21  ----------------------------<  122<H>  3.3<L>   |  21<L>  |  0.76    Ca    9.6      09 Nov 2019 05:57                MICROBIOLOGY:     RADIOLOGY:  [ ] Reviewed and interpreted by me    PULMONARY FUNCTION TESTS:    EKG: CHIEF COMPLAINT: Patient is a 54y old  Male who presents with a chief complaint of ARDS (09 Nov 2019 15:41)    Interval Events: febrile this am to 101      REVIEW OF SYSTEMS:  Constitutional: no complaints   CV: denies  Resp: denies   GI: denies   [x] All other systems negative  [ ] Unable to assess ROS because ________      OBJECTIVE:  ICU Vital Signs Last 24 Hrs  T(C): 37.2 (10 Nov 2019 04:43), Max: 37.5 (09 Nov 2019 14:55)  T(F): 99 (10 Nov 2019 04:43), Max: 99.5 (09 Nov 2019 14:55)  HR: 79 (10 Nov 2019 04:43) (77 - 89)  BP: 156/89 (10 Nov 2019 04:43) (144/89 - 158/90)  BP(mean): --  ABP: --  ABP(mean): --  RR: 18 (10 Nov 2019 04:43) (18 - 20)  SpO2: 99% (10 Nov 2019 04:43) (98% - 100%)    11-09 @ 07:01  -  11-10 @ 07:00  --------------------------------------------------------  IN: 2626 mL / OUT: 1950 mL / NET: 676 mL    POCT Blood Glucose.: 143 mg/dL (09 Nov 2019 21:26)    HOSPITAL MEDICATIONS:  MEDICATIONS  (STANDING):  amLODIPine   Tablet 10 milliGRAM(s) Oral daily  artificial tears (preservative free) Ophthalmic Solution 1 Drop(s) Both EYES two times a day  cefepime   IVPB 2000 milliGRAM(s) IV Intermittent every 8 hours  dextrose 5%. 1000 milliLiter(s) (50 mL/Hr) IV Continuous <Continuous>  dextrose 5%. 1000 milliLiter(s) (50 mL/Hr) IV Continuous <Continuous>  dextrose 50% Injectable 12.5 Gram(s) IV Push once  enoxaparin Injectable 60 milliGRAM(s) SubCutaneous daily  fentaNYL   Patch  25 MICROgram(s)/Hr 1 Patch Transdermal every 72 hours  hydrocortisone sodium succinate Injectable 25 milliGRAM(s) IV Push daily  insulin lispro (HumaLOG) corrective regimen sliding scale   SubCutaneous three times a day before meals  insulin lispro (HumaLOG) corrective regimen sliding scale   SubCutaneous at bedtime  metoprolol tartrate 25 milliGRAM(s) Oral two times a day  nystatin Powder 1 Application(s) Topical every 12 hours  petrolatum Ophthalmic Ointment 1 Application(s) Both EYES two times a day    MEDICATIONS  (PRN):  acetaminophen    Suspension .. 650 milliGRAM(s) Oral every 6 hours PRN Temp greater or equal to 38C (100.4F), Mild Pain (1 - 3), Moderate Pain (4 - 6), Severe Pain (7 - 10)  artificial  tears Solution 1 Drop(s) Both EYES two times a day PRN Dry Eyes  dextrose 40% Gel 15 Gram(s) Oral once PRN Blood Glucose LESS THAN 70 milliGRAM(s)/deciliter  glucagon  Injectable 1 milliGRAM(s) IntraMuscular once PRN Glucose LESS THAN 70 milligrams/deciliter  haloperidol    Injectable 2.5 milliGRAM(s) IV Push every 6 hours PRN Agitation  ibuprofen  Suspension. 400 milliGRAM(s) Oral every 8 hours PRN Temp greater or equal to 38C (100.4F)      LABS:             Basic Metabolic Panel in AM (11.10.19 @ 08:38)    Sodium, Serum: 146 mmol/L    Potassium, Serum: 3.4 mmol/L    Chloride, Serum: 110 mmol/L    Carbon Dioxide, Serum: 21 mmol/L    Anion Gap, Serum: 15 mmo/L    Blood Urea Nitrogen, Serum: 22 mg/dL    Creatinine, Serum: 0.72 mg/dL    Glucose, Serum: 134 mg/dL    Calcium, Total Serum: 9.6 mg/dL    eGFR if Non : 104: The units for eGFR are ml/min/1.73m2 (normalized body  surface area). The eGFR is calculated from a serum  creatinine using the CKD-EPI equation. Other variables  required for calculation are race, age and sex. Among  patients with chronic kidney disease (CKD), the eGFR is  useful in determining the stage of disease according to  KDOQI CKD classification. All eGFR results are reported  numerically with the following interpretation.    GFR  (ml/min/1.73 m2)          W/KIDNEY DAMAGE    W/O KIDNEY DMG  ==========================================================  >= 90.......................Stage 1..............Normal  60-89.......................Stage 2...........Decreased GFR  30-59.......................Stage 3..............Stage 3  15-29.......................Stage 4..............Stage 4  < 15........................Stage 5..............Stage 5    Each stage of CKD assumes that the associated GFR level  has been in effect for at least 3 months. Determination of  stages one and two (with eGFR > 59ml/min/m2) requires  estimation of kidney damage for at least 3 months as  defined by structural or functional abnormalities.    Limitations: All estimates of GFR will be less accurate  for patients at extremes of muscle mass (including but  not limited to frail elderly, critically ill, or cancer  patients), those with unusual diets, and those with  conditions associated with reduced secretion or  extrarenal elimination of creatinine. The eGFR equation  is not recommended for use in patients with unstable  creatinine levels. mL/min    eGFR if : 121 mL/min

## 2019-11-10 NOTE — PROGRESS NOTE ADULT - PROBLEM SELECTOR PLAN 2
- febrile this am to 101  - previous blood culture coag negative staph contaminant, and BAL with MSSA  - s/p full course of vanco/zosyn   - DVT study negative   - most recent sputum with enterobacter, pan sensitive  - will check another bcx today  - ID note appreciated, cont cefepime for now  - hypothermia blanket PRN

## 2019-11-11 DIAGNOSIS — E87.0 HYPEROSMOLALITY AND HYPERNATREMIA: ICD-10-CM

## 2019-11-11 LAB
ALBUMIN SERPL ELPH-MCNC: 3.8 G/DL — SIGNIFICANT CHANGE UP (ref 3.3–5)
ALP SERPL-CCNC: 41 U/L — SIGNIFICANT CHANGE UP (ref 40–120)
ALT FLD-CCNC: 69 U/L — HIGH (ref 4–41)
ANION GAP SERPL CALC-SCNC: 13 MMO/L — SIGNIFICANT CHANGE UP (ref 7–14)
ANION GAP SERPL CALC-SCNC: 13 MMO/L — SIGNIFICANT CHANGE UP (ref 7–14)
APPEARANCE UR: CLEAR — SIGNIFICANT CHANGE UP
AST SERPL-CCNC: 33 U/L — SIGNIFICANT CHANGE UP (ref 4–40)
BACTERIA # UR AUTO: NEGATIVE — SIGNIFICANT CHANGE UP
BILIRUB DIRECT SERPL-MCNC: < 0.2 MG/DL — SIGNIFICANT CHANGE UP (ref 0.1–0.2)
BILIRUB SERPL-MCNC: 0.5 MG/DL — SIGNIFICANT CHANGE UP (ref 0.2–1.2)
BILIRUB UR-MCNC: NEGATIVE — SIGNIFICANT CHANGE UP
BLOOD UR QL VISUAL: NEGATIVE — SIGNIFICANT CHANGE UP
BUN SERPL-MCNC: 17 MG/DL — SIGNIFICANT CHANGE UP (ref 7–23)
BUN SERPL-MCNC: 18 MG/DL — SIGNIFICANT CHANGE UP (ref 7–23)
C DIFF TOX GENS STL QL NAA+PROBE: SIGNIFICANT CHANGE UP
CALCIUM SERPL-MCNC: 9.2 MG/DL — SIGNIFICANT CHANGE UP (ref 8.4–10.5)
CALCIUM SERPL-MCNC: 9.5 MG/DL — SIGNIFICANT CHANGE UP (ref 8.4–10.5)
CHLORIDE SERPL-SCNC: 112 MMOL/L — HIGH (ref 98–107)
CHLORIDE SERPL-SCNC: 116 MMOL/L — HIGH (ref 98–107)
CHLORIDE UR-SCNC: 82 MMOL/L — SIGNIFICANT CHANGE UP
CO2 SERPL-SCNC: 20 MMOL/L — LOW (ref 22–31)
CO2 SERPL-SCNC: 20 MMOL/L — LOW (ref 22–31)
COLOR SPEC: YELLOW — SIGNIFICANT CHANGE UP
CREAT ?TM UR-MCNC: 106.8 MG/DL — SIGNIFICANT CHANGE UP
CREAT SERPL-MCNC: 0.61 MG/DL — SIGNIFICANT CHANGE UP (ref 0.5–1.3)
CREAT SERPL-MCNC: 0.63 MG/DL — SIGNIFICANT CHANGE UP (ref 0.5–1.3)
GLUCOSE BLDC GLUCOMTR-MCNC: 115 MG/DL — HIGH (ref 70–99)
GLUCOSE BLDC GLUCOMTR-MCNC: 147 MG/DL — HIGH (ref 70–99)
GLUCOSE BLDC GLUCOMTR-MCNC: 160 MG/DL — HIGH (ref 70–99)
GLUCOSE BLDC GLUCOMTR-MCNC: 164 MG/DL — HIGH (ref 70–99)
GLUCOSE SERPL-MCNC: 149 MG/DL — HIGH (ref 70–99)
GLUCOSE SERPL-MCNC: 152 MG/DL — HIGH (ref 70–99)
GLUCOSE UR-MCNC: NEGATIVE — SIGNIFICANT CHANGE UP
HCT VFR BLD CALC: 31.8 % — LOW (ref 39–50)
HGB BLD-MCNC: 10.1 G/DL — LOW (ref 13–17)
HYALINE CASTS # UR AUTO: HIGH
KETONES UR-MCNC: SIGNIFICANT CHANGE UP
LEUKOCYTE ESTERASE UR-ACNC: NEGATIVE — SIGNIFICANT CHANGE UP
MCHC RBC-ENTMCNC: 31.2 PG — SIGNIFICANT CHANGE UP (ref 27–34)
MCHC RBC-ENTMCNC: 31.8 % — LOW (ref 32–36)
MCV RBC AUTO: 98.1 FL — SIGNIFICANT CHANGE UP (ref 80–100)
NITRITE UR-MCNC: NEGATIVE — SIGNIFICANT CHANGE UP
NRBC # FLD: 0 K/UL — SIGNIFICANT CHANGE UP (ref 0–0)
OSMOLALITY UR: 792 MOSMO/KG — SIGNIFICANT CHANGE UP (ref 50–1200)
PH UR: 6 — SIGNIFICANT CHANGE UP (ref 5–8)
PLATELET # BLD AUTO: 312 K/UL — SIGNIFICANT CHANGE UP (ref 150–400)
PMV BLD: 9 FL — SIGNIFICANT CHANGE UP (ref 7–13)
POTASSIUM SERPL-MCNC: 3.4 MMOL/L — LOW (ref 3.5–5.3)
POTASSIUM SERPL-MCNC: 3.5 MMOL/L — SIGNIFICANT CHANGE UP (ref 3.5–5.3)
POTASSIUM SERPL-SCNC: 3.4 MMOL/L — LOW (ref 3.5–5.3)
POTASSIUM SERPL-SCNC: 3.5 MMOL/L — SIGNIFICANT CHANGE UP (ref 3.5–5.3)
POTASSIUM UR-SCNC: 53.1 MMOL/L — SIGNIFICANT CHANGE UP
PROT SERPL-MCNC: 6.8 G/DL — SIGNIFICANT CHANGE UP (ref 6–8.3)
PROT UR-MCNC: 50 — SIGNIFICANT CHANGE UP
RBC # BLD: 3.24 M/UL — LOW (ref 4.2–5.8)
RBC # FLD: 13.7 % — SIGNIFICANT CHANGE UP (ref 10.3–14.5)
RBC CASTS # UR COMP ASSIST: SIGNIFICANT CHANGE UP (ref 0–?)
SODIUM SERPL-SCNC: 145 MMOL/L — SIGNIFICANT CHANGE UP (ref 135–145)
SODIUM SERPL-SCNC: 149 MMOL/L — HIGH (ref 135–145)
SODIUM UR-SCNC: 80 MMOL/L — SIGNIFICANT CHANGE UP
SP GR SPEC: 1.03 — SIGNIFICANT CHANGE UP (ref 1–1.04)
SPECIMEN SOURCE: SIGNIFICANT CHANGE UP
SQUAMOUS # UR AUTO: SIGNIFICANT CHANGE UP
UROBILINOGEN FLD QL: NORMAL — SIGNIFICANT CHANGE UP
VANCOMYCIN TROUGH SERPL-MCNC: 10.8 UG/ML — SIGNIFICANT CHANGE UP (ref 10–20)
VANCOMYCIN TROUGH SERPL-MCNC: 8.8 UG/ML — LOW (ref 10–20)
WBC # BLD: 12.21 K/UL — HIGH (ref 3.8–10.5)
WBC # FLD AUTO: 12.21 K/UL — HIGH (ref 3.8–10.5)
WBC UR QL: SIGNIFICANT CHANGE UP (ref 0–?)

## 2019-11-11 PROCEDURE — 99233 SBSQ HOSP IP/OBS HIGH 50: CPT | Mod: GC

## 2019-11-11 PROCEDURE — 99233 SBSQ HOSP IP/OBS HIGH 50: CPT

## 2019-11-11 PROCEDURE — 99232 SBSQ HOSP IP/OBS MODERATE 35: CPT

## 2019-11-11 RX ORDER — POTASSIUM CHLORIDE 20 MEQ
40 PACKET (EA) ORAL EVERY 4 HOURS
Refills: 0 | Status: DISCONTINUED | OUTPATIENT
Start: 2019-11-11 | End: 2019-11-11

## 2019-11-11 RX ORDER — SODIUM CHLORIDE 9 MG/ML
1000 INJECTION, SOLUTION INTRAVENOUS
Refills: 0 | Status: DISCONTINUED | OUTPATIENT
Start: 2019-11-11 | End: 2019-11-12

## 2019-11-11 RX ORDER — VANCOMYCIN HCL 1 G
VIAL (EA) INTRAVENOUS
Refills: 0 | Status: DISCONTINUED | OUTPATIENT
Start: 2019-11-11 | End: 2019-11-13

## 2019-11-11 RX ORDER — VANCOMYCIN HCL 1 G
1250 VIAL (EA) INTRAVENOUS EVERY 8 HOURS
Refills: 0 | Status: DISCONTINUED | OUTPATIENT
Start: 2019-11-11 | End: 2019-11-13

## 2019-11-11 RX ORDER — POTASSIUM CHLORIDE 20 MEQ
40 PACKET (EA) ORAL EVERY 4 HOURS
Refills: 0 | Status: COMPLETED | OUTPATIENT
Start: 2019-11-11 | End: 2019-11-11

## 2019-11-11 RX ORDER — VANCOMYCIN HCL 1 G
1250 VIAL (EA) INTRAVENOUS ONCE
Refills: 0 | Status: COMPLETED | OUTPATIENT
Start: 2019-11-11 | End: 2019-11-11

## 2019-11-11 RX ADMIN — Medication 1 DROP(S): at 05:07

## 2019-11-11 RX ADMIN — Medication 50 MILLIGRAM(S): at 05:06

## 2019-11-11 RX ADMIN — NYSTATIN CREAM 1 APPLICATION(S): 100000 CREAM TOPICAL at 17:45

## 2019-11-11 RX ADMIN — Medication 400 MILLIGRAM(S): at 07:29

## 2019-11-11 RX ADMIN — CEFEPIME 100 MILLIGRAM(S): 1 INJECTION, POWDER, FOR SOLUTION INTRAMUSCULAR; INTRAVENOUS at 22:37

## 2019-11-11 RX ADMIN — ENOXAPARIN SODIUM 60 MILLIGRAM(S): 100 INJECTION SUBCUTANEOUS at 11:33

## 2019-11-11 RX ADMIN — FENTANYL CITRATE 1 PATCH: 50 INJECTION INTRAVENOUS at 07:33

## 2019-11-11 RX ADMIN — Medication 40 MILLIEQUIVALENT(S): at 18:48

## 2019-11-11 RX ADMIN — SODIUM CHLORIDE 50 MILLILITER(S): 9 INJECTION, SOLUTION INTRAVENOUS at 10:45

## 2019-11-11 RX ADMIN — Medication 1 DROP(S): at 05:20

## 2019-11-11 RX ADMIN — Medication 650 MILLIGRAM(S): at 05:19

## 2019-11-11 RX ADMIN — Medication 1 DROP(S): at 17:46

## 2019-11-11 RX ADMIN — AMLODIPINE BESYLATE 10 MILLIGRAM(S): 2.5 TABLET ORAL at 05:06

## 2019-11-11 RX ADMIN — Medication 1: at 11:33

## 2019-11-11 RX ADMIN — FENTANYL CITRATE 1 PATCH: 50 INJECTION INTRAVENOUS at 19:33

## 2019-11-11 RX ADMIN — Medication 166.67 MILLIGRAM(S): at 23:20

## 2019-11-11 RX ADMIN — Medication 25 MILLIGRAM(S): at 05:07

## 2019-11-11 RX ADMIN — CEFEPIME 100 MILLIGRAM(S): 1 INJECTION, POWDER, FOR SOLUTION INTRAMUSCULAR; INTRAVENOUS at 05:06

## 2019-11-11 RX ADMIN — Medication 400 MILLIGRAM(S): at 06:21

## 2019-11-11 RX ADMIN — Medication 1: at 08:22

## 2019-11-11 RX ADMIN — Medication 650 MILLIGRAM(S): at 07:29

## 2019-11-11 RX ADMIN — Medication 650 MILLIGRAM(S): at 15:51

## 2019-11-11 RX ADMIN — Medication 50 MILLIGRAM(S): at 17:46

## 2019-11-11 RX ADMIN — NYSTATIN CREAM 1 APPLICATION(S): 100000 CREAM TOPICAL at 05:07

## 2019-11-11 RX ADMIN — Medication 1 APPLICATION(S): at 17:45

## 2019-11-11 RX ADMIN — Medication 650 MILLIGRAM(S): at 13:24

## 2019-11-11 RX ADMIN — Medication 40 MILLIEQUIVALENT(S): at 22:37

## 2019-11-11 RX ADMIN — Medication 166.67 MILLIGRAM(S): at 06:21

## 2019-11-11 RX ADMIN — CEFEPIME 100 MILLIGRAM(S): 1 INJECTION, POWDER, FOR SOLUTION INTRAMUSCULAR; INTRAVENOUS at 13:25

## 2019-11-11 RX ADMIN — Medication 166.67 MILLIGRAM(S): at 15:51

## 2019-11-11 RX ADMIN — Medication 1 APPLICATION(S): at 05:07

## 2019-11-11 NOTE — PROGRESS NOTE ADULT - SUBJECTIVE AND OBJECTIVE BOX
CHIEF COMPLAINT:Patient is a 54y old  Male who presents with a chief complaint of ARDS (10 Nov 2019 07:19)      Interval Events:    REVIEW OF SYSTEMS:  Constitutional:   Eyes:  ENT:  CV:  Resp:  GI:  :  MSK:  Integumentary:  Neurological:  Psychiatric:  Endocrine:  Hematologic/Lymphatic:  Allergic/Immunologic:  [ ] All other systems negative  [ ] Unable to assess ROS because ________    OBJECTIVE:  ICU Vital Signs Last 24 Hrs  T(C): 37.8 (2019 07:29), Max: 38.6 (10 Nov 2019 22:00)  T(F): 100 (2019 07:29), Max: 101.5 (10 Nov 2019 22:00)  HR: 84 (2019 05:00) (84 - 93)  BP: 133/82 (2019 05:00) (133/82 - 170/91)  BP(mean): --  ABP: --  ABP(mean): --  RR: 22 (2019 05:00) (18 - 22)  SpO2: 93% (2019 05:00) (91% - 98%)        11-10 @ 07:01  -   @ 07:00  --------------------------------------------------------  IN: 2920 mL / OUT: 1300 mL / NET: 1620 mL      CAPILLARY BLOOD GLUCOSE      POCT Blood Glucose.: 160 mg/dL (2019 08:17)      PHYSICAL EXAM:  General:   HEENT:   Lymph Nodes:  Neck:   Respiratory:   Cardiovascular:   Abdomen:   Extremities:   Skin:   Neurological:  Psychiatry:    HOSPITAL MEDICATIONS:  MEDICATIONS  (STANDING):  amLODIPine   Tablet 10 milliGRAM(s) Oral daily  artificial tears (preservative free) Ophthalmic Solution 1 Drop(s) Both EYES two times a day  cefepime   IVPB 2000 milliGRAM(s) IV Intermittent every 8 hours  dextrose 5%. 1000 milliLiter(s) (50 mL/Hr) IV Continuous <Continuous>  dextrose 5%. 1000 milliLiter(s) (50 mL/Hr) IV Continuous <Continuous>  dextrose 50% Injectable 12.5 Gram(s) IV Push once  enoxaparin Injectable 60 milliGRAM(s) SubCutaneous daily  fentaNYL   Patch  25 MICROgram(s)/Hr 1 Patch Transdermal every 72 hours  insulin lispro (HumaLOG) corrective regimen sliding scale   SubCutaneous three times a day before meals  insulin lispro (HumaLOG) corrective regimen sliding scale   SubCutaneous at bedtime  metoprolol tartrate 50 milliGRAM(s) Oral two times a day  nystatin Powder 1 Application(s) Topical every 12 hours  petrolatum Ophthalmic Ointment 1 Application(s) Both EYES two times a day  sodium chloride 3%  Inhalation 4 milliLiter(s) Inhalation three times a day  vancomycin  IVPB 1250 milliGRAM(s) IV Intermittent every 8 hours  vancomycin  IVPB        MEDICATIONS  (PRN):  acetaminophen    Suspension .. 650 milliGRAM(s) Oral every 6 hours PRN Temp greater or equal to 38C (100.4F), Mild Pain (1 - 3), Moderate Pain (4 - 6), Severe Pain (7 - 10)  artificial  tears Solution 1 Drop(s) Both EYES two times a day PRN Dry Eyes  dextrose 40% Gel 15 Gram(s) Oral once PRN Blood Glucose LESS THAN 70 milliGRAM(s)/deciliter  glucagon  Injectable 1 milliGRAM(s) IntraMuscular once PRN Glucose LESS THAN 70 milligrams/deciliter  haloperidol    Injectable 2.5 milliGRAM(s) IV Push every 6 hours PRN Agitation  ibuprofen  Suspension. 400 milliGRAM(s) Oral every 8 hours PRN Temp greater or equal to 38C (100.4F)      LABS:                        10.1   12.21 )-----------( 312      ( 2019 06:15 )             31.8     11-    149<H>  |  116<H>  |  18  ----------------------------<  149<H>  3.5   |  20<L>  |  0.63    Ca    9.5      2019 06:15        Urinalysis Basic - ( 2019 00:30 )    Color: YELLOW / Appearance: CLEAR / S.033 / pH: 6.0  Gluc: NEGATIVE / Ketone: TRACE  / Bili: NEGATIVE / Urobili: NORMAL   Blood: NEGATIVE / Protein: 50 / Nitrite: NEGATIVE   Leuk Esterase: NEGATIVE / RBC: 3-5 / WBC 3-5   Sq Epi: OCC / Non Sq Epi: x / Bacteria: NEGATIVE            MICROBIOLOGY:     RADIOLOGY:  [ ] Reviewed and interpreted by me    PULMONARY FUNCTION TESTS:    EKG: CHIEF COMPLAINT:Patient is a 54y old  Male who presents with a chief complaint of ARDS (10 Nov 2019 07:19)      Interval Events: febrile overnight     REVIEW OF SYSTEMS:  [x ] Unable to assess ROS because ________    OBJECTIVE:  ICU Vital Signs Last 24 Hrs  T(C): 37.8 (2019 07:29), Max: 38.6 (10 Nov 2019 22:00)  T(F): 100 (2019 07:29), Max: 101.5 (10 Nov 2019 22:00)  HR: 84 (2019 05:00) (84 - 93)  BP: 133/82 (2019 05:00) (133/82 - 170/91)  BP(mean): --  ABP: --  ABP(mean): --  RR: 22 (2019 05:00) (18 - 22)  SpO2: 93% (2019 05:00) (91% - 98%)        11-10 @ 07:01  -   @ 07:00  --------------------------------------------------------  IN: 2920 mL / OUT: 1300 mL / NET: 1620 mL      CAPILLARY BLOOD GLUCOSE      POCT Blood Glucose.: 160 mg/dL (2019 08:17)        HOSPITAL MEDICATIONS:  MEDICATIONS  (STANDING):  amLODIPine   Tablet 10 milliGRAM(s) Oral daily  artificial tears (preservative free) Ophthalmic Solution 1 Drop(s) Both EYES two times a day  cefepime   IVPB 2000 milliGRAM(s) IV Intermittent every 8 hours  dextrose 5%. 1000 milliLiter(s) (50 mL/Hr) IV Continuous <Continuous>  dextrose 5%. 1000 milliLiter(s) (50 mL/Hr) IV Continuous <Continuous>  dextrose 50% Injectable 12.5 Gram(s) IV Push once  enoxaparin Injectable 60 milliGRAM(s) SubCutaneous daily  fentaNYL   Patch  25 MICROgram(s)/Hr 1 Patch Transdermal every 72 hours  insulin lispro (HumaLOG) corrective regimen sliding scale   SubCutaneous three times a day before meals  insulin lispro (HumaLOG) corrective regimen sliding scale   SubCutaneous at bedtime  metoprolol tartrate 50 milliGRAM(s) Oral two times a day  nystatin Powder 1 Application(s) Topical every 12 hours  petrolatum Ophthalmic Ointment 1 Application(s) Both EYES two times a day  sodium chloride 3%  Inhalation 4 milliLiter(s) Inhalation three times a day  vancomycin  IVPB 1250 milliGRAM(s) IV Intermittent every 8 hours  vancomycin  IVPB        MEDICATIONS  (PRN):  acetaminophen    Suspension .. 650 milliGRAM(s) Oral every 6 hours PRN Temp greater or equal to 38C (100.4F), Mild Pain (1 - 3), Moderate Pain (4 - 6), Severe Pain (7 - 10)  artificial  tears Solution 1 Drop(s) Both EYES two times a day PRN Dry Eyes  dextrose 40% Gel 15 Gram(s) Oral once PRN Blood Glucose LESS THAN 70 milliGRAM(s)/deciliter  glucagon  Injectable 1 milliGRAM(s) IntraMuscular once PRN Glucose LESS THAN 70 milligrams/deciliter  haloperidol    Injectable 2.5 milliGRAM(s) IV Push every 6 hours PRN Agitation  ibuprofen  Suspension. 400 milliGRAM(s) Oral every 8 hours PRN Temp greater or equal to 38C (100.4F)      LABS:                        10.1   12.21 )-----------( 312      ( 2019 06:15 )             31.8     11-11    149<H>  |  116<H>  |  18  ----------------------------<  149<H>  3.5   |  20<L>  |  0.63    Ca    9.5      2019 06:15        Urinalysis Basic - ( 2019 00:30 )    Color: YELLOW / Appearance: CLEAR / S.033 / pH: 6.0  Gluc: NEGATIVE / Ketone: TRACE  / Bili: NEGATIVE / Urobili: NORMAL   Blood: NEGATIVE / Protein: 50 / Nitrite: NEGATIVE   Leuk Esterase: NEGATIVE / RBC: 3-5 / WBC 3-5   Sq Epi: OCC / Non Sq Epi: x / Bacteria: NEGATIVE            MICROBIOLOGY:     RADIOLOGY:  [ ] Reviewed and interpreted by me    PULMONARY FUNCTION TESTS:    EKG:

## 2019-11-11 NOTE — PROGRESS NOTE BEHAVIORAL HEALTH - NSBHFUPINTERVALHXFT_PSY_A_CORE
Met with the patient. Lethargic, does not hold a conversation. Used Burmese  for the interview. Does not answer questions.   Checked UE- no cogwheeling or rigidity when examined.   Attempted to contact wife at number in chart- number is incorrect.

## 2019-11-11 NOTE — PROGRESS NOTE ADULT - PROBLEM SELECTOR PLAN 1
- severe hypoxemic respiratory failure likely severe ARDS with P/F cannulated for VV-ECMO on 10/30 possibly related to aspiration pneumonia after possible benzo withdrawal seizure  - decannulated 11/1, extubated on 11/5  - tolerating NC currently  - will need a lot of pulmonary toilet  - chest PT, metanebs, suction PRN - severe hypoxemic respiratory failure likely severe ARDS with P/F cannulated for VV-ECMO on 10/30 possibly related to aspiration pneumonia after possible benzo withdrawal seizure  - decannulated 11/1, extubated on 11/5  - tolerating NC currently, weaning nasal cannula   - will need a lot of pulmonary toilet unable to tolerate metaneb - chest vest started   - chest PT, metanebs, suction PRN

## 2019-11-11 NOTE — PROGRESS NOTE BEHAVIORAL HEALTH - SUMMARY
Patient is a 53 y/o [place] male that is employed, , lives with his wife, has a supportive family, hx of severe depression and anxiety, multiple hx of psychiatry admissions (last in June 2019), tx refractory to many antidepressant meds, no hx of prior SA, hx of alcohol use- none recently, no hx of psychosis, received 5 tx of ECT (while in inpatient psych unit at [facility]), then stopped with no outpatient treatments in June 2019, PMHx of hepatitis presenting with increased lethargy and hypoxemia. As per records- patient was s/p fall at home. In ed was obtunded, noted to be in hypercarbic RF- requiring intubation. LL consolidation on CXray- started on antibiotics. Found to have barbiturates+ on urine toxicology. Course complicated by seizure. Extubated on 10/28- placed on BIPAP. Course continued to be complicated by fevers, worsening hypoxemia requiring reintubation. Was started on propofol, precedex and paralyzed but remained difficult to oxygenate with traditional mechanical ventilation requiring frequent bagging and ALI consult for ECMO was placed. The patient was cannulated for VV-ECMO and transfer to [facility]. Decannulated on Friday last week. Remained intubated on multiple sedative- Midazolam, Precedex, Propofol, Fentanyl. Psychiatry has been consulted as sedatives will slowly be weaned off- agitation management and also for baseline depression (was on multiple psychotropics)    11/8- patient is able to converse minimally. Remains delirious.    11/11- delirium, lethargic upon approach    Recommendations-  - Not starting a standing home psychotropic at this time considering his mentation and delirium. Will monitor symptoms for now. PRN meds as below for now.   - As mentation improves, will gradually start on Seroquel, effexor   - If qtc< 500, haldol 2.5 mg q 6 hours PRN IV/IM for agitation.

## 2019-11-11 NOTE — PROGRESS NOTE ADULT - ASSESSMENT
54M with severe MDD with prior psych admissions/ECT who was initially admitted to  10/28/19 after apparent Barbituate overdose with respiratory depression, ARDS. Patient admitted to [facility] on 10/30/19 for ECMO, respiratory support.  Decannulated 11/1/19  Extubated 11/5/19  febrile since 11/5/19  Airway colonized with MSSA and Enterobacter aerogenes though most recent sputum gram stain unimpressive.  no leukocytosis - fever may be related to atelectasis, lung injury, aspiration pneumonitis without infection  Blood cultures negative to date  Legionella urine ag negative  11/9: HIV negative    Vanco added for fever - cultures pending    Antibiotics  Ceftriaxone 10/28 -->29  Azithromycin 10/28, 10/30 -->11/2  Vanco 10/20 -->11/5; 11/10-->  Zosyn 10/30 --> 11/5  Cefepime 10/29 --> 30 11/7 -->11/8-->  Ertapenem 11/8    Suggest  Continue Cefepime 2 every 8 hours  Continue Vanco  will monitor repeat cultures, clinical course

## 2019-11-11 NOTE — PROGRESS NOTE ADULT - PROBLEM SELECTOR PLAN 5
- lovenox receiving free water via ngt   - will start short course of D5W and recheck bmp after 6 hours

## 2019-11-11 NOTE — PROGRESS NOTE ADULT - PROBLEM SELECTOR PLAN 2
- febrile this am to 101  - previous blood culture coag negative staph contaminant, and BAL with MSSA  - s/p full course of vanco/zosyn   - DVT study negative   - most recent sputum with enterobacter, pan sensitive  - will check another bcx today  - ID note appreciated, cont cefepime for now  - hypothermia blanket PRN - febrile this am to 101  - previous blood culture coag negative staph contaminant, and BAL with MSSA  - s/p full course of vanco/zosyn   - DVT study negative   - most recent sputum with enterobacter, pan sensitive  - will check another bcx today  - febrile overnight cultures sent - vanco started again   - ID note appreciated, cont cefepime for now- ? sec to lung injury   - Loose stools - c-diff negative PCR GI pending   - hypothermia blanket PRN

## 2019-11-11 NOTE — PROGRESS NOTE BEHAVIORAL HEALTH - NSBHCHARTREVIEWLAB_PSY_A_CORE FT
CBC Full  -  ( 11 Nov 2019 06:15 )  WBC Count : 12.21 K/uL  RBC Count : 3.24 M/uL  Hemoglobin : 10.1 g/dL  Hematocrit : 31.8 %  Platelet Count - Automated : 312 K/uL  Mean Cell Volume : 98.1 fL  Mean Cell Hemoglobin : 31.2 pg  Mean Cell Hemoglobin Concentration : 31.8 %  Auto Neutrophil # : x  Auto Lymphocyte # : x  Auto Monocyte # : x  Auto Eosinophil # : x  Auto Basophil # : x  Auto Neutrophil % : x  Auto Lymphocyte % : x  Auto Monocyte % : x  Auto Eosinophil % : x  Auto Basophil % : x  11-11    149<H>  |  116<H>  |  18  ----------------------------<  149<H>  3.5   |  20<L>  |  0.63    Ca    9.5      11 Nov 2019 06:15    TPro  6.8  /  Alb  3.8  /  TBili  0.5  /  DBili  < 0.2  /  AST  33  /  ALT  69<H>  /  AlkPhos  41  11-11

## 2019-11-11 NOTE — PROGRESS NOTE ADULT - ASSESSMENT
54M with severe depression who presented to  10/28 with lethargy and hypoxemic/hypercapnic respiratory failure possibly related to overdose +/- pneumonia extubated but required reintubation 10/30 but with persistent hypoxemia refractory to conventional management and ultimately cannulated for VV ECMO 10/30/2019 for hypoxemic respiratory failure with ARDS and transferred to [facility] for further management. Decannulated 11/1, now extubated to BIPAP.

## 2019-11-11 NOTE — PROGRESS NOTE ADULT - SUBJECTIVE AND OBJECTIVE BOX
Follow Up:  fevers    Interval History/ROS: no resp distress    Allergies  No Known Allergies    ANTIMICROBIALS:  cefepime   IVPB 2000 every 8 hours  vancomycin  IVPB    vancomycin  IVPB 1250 every 8 hours      OTHER MEDS:  MEDICATIONS  (STANDING):  acetaminophen    Suspension .. 650 every 6 hours PRN  amLODIPine   Tablet 10 daily  dextrose 40% Gel 15 once PRN  dextrose 50% Injectable 12.5 once  enoxaparin Injectable 60 daily  fentaNYL   Patch  25 MICROgram(s)/Hr 1 every 72 hours  glucagon  Injectable 1 once PRN  haloperidol    Injectable 2.5 every 6 hours PRN  ibuprofen  Suspension. 400 every 8 hours PRN  insulin lispro (HumaLOG) corrective regimen sliding scale  three times a day before meals  insulin lispro (HumaLOG) corrective regimen sliding scale  at bedtime  metoprolol tartrate 50 two times a day      Vital Signs Last 24 Hrs  T(C): 37.7 (2019 15:51), Max: 38.6 (10 Nov 2019 22:00)  T(F): 99.8 (2019 15:51), Max: 101.5 (10 Nov 2019 22:00)  HR: 96 (2019 13:19) (84 - 96)  BP: 142/82 (2019 13:19) (133/82 - 154/94)  BP(mean): --  RR: 23 (2019 13:19) (20 - 23)  SpO2: 97% (2019 13:19) (91% - 97%)    PHYSICAL EXAM:  General: WN/WD NAD, Non-toxic  Neurology: A&Ox3, nonfocal  Respiratory: Clear to auscultation bilaterally with fine crackles  CV: RRR, S1S2, no murmurs, rubs or gallops  Abdominal: Soft, Non-tender, non-distended, normal bowel sounds  Extremities: No edema,   Line Sites: Clear  Skin: No rash                        10.1   12.21 )-----------( 312      ( 2019 06:15 )             31.8   WBC Count: 12.21 ( @ 06:15)  WBC Count: 8.31 ( @ 05:57)  WBC Count: 7.24 ( @ 06:00)  WBC Count: 6.41 ( @ 03:30)          145  |  112<H>  |  17  ----------------------------<  152<H>  3.4<L>   |  20<L>  |  0.61    Ca    9.2      2019 16:22    TPro  6.8  /  Alb  3.8  /  TBili  0.5  /  DBili  < 0.2  /  AST  33  /  ALT  69<H>  /  AlkPhos  41  11-11      Urinalysis Basic - ( 2019 00:30 )    Color: YELLOW / Appearance: CLEAR / S.033 / pH: 6.0  Gluc: NEGATIVE / Ketone: TRACE  / Bili: NEGATIVE / Urobili: NORMAL   Blood: NEGATIVE / Protein: 50 / Nitrite: NEGATIVE   Leuk Esterase: NEGATIVE / RBC: 3-5 / WBC 3-5   Sq Epi: OCC / Non Sq Epi: x / Bacteria: NEGATIVE    Procalcitonin, Serum (19 @ 05:57)    Procalcitonin, Serum: 0.16    MICROBIOLOGY:  BLOOD  11-10-19 --  --  --      BLOOD PERIPHERAL  19 --  --  --      SPUTUM  19 --  --  Enterobacter cloacae      BLOOD VENOUS  19 --  --  --      BLOOD VENOUS  10-30-19 --  --  --      BRONCHIAL LAVAGE  10-30-19 --  --  Staphylococcus aureus      BLOOD PERIPHERAL  10-30-19 --  --  BLOOD CULTURE PCR  Staphylococcus sp.,coag neg      .Blood Blood  10-29-19   No growth at 5 days.  --  --      .Blood Blood-Peripheral  10-27-19   No growth at 5 days.  --  --    Vancomycin Level, Trough: 8.8 ug/mL (19 @ 13:12)    RADIOLOGY:    HIV-1/2 Antigen/Antibody Screen by [facility] (19 @ 05:57)    HIV-1/2 Combo Result: Nonreactive         Shan Preciado MD; Division of Infectious Disease; Pager: [phone]; nights and weekends: [phone]

## 2019-11-11 NOTE — PROGRESS NOTE ADULT - ATTENDING COMMENTS
Was on ECMO for respiratory failure  Now on NC  Was febrile again persistently, pansensitive enterobacter now on cefepime and vancomycin  New cultures sent  Chest PT, pulm hygiene  Hypernatremia, on D5W  Frequent loose BMs, sending C.diff

## 2019-11-11 NOTE — PROGRESS NOTE BEHAVIORAL HEALTH - NSBHCHARTREVIEWVS_PSY_A_CORE FT
Vital Signs Last 24 Hrs  T(C): 38.3 (11 Nov 2019 13:19), Max: 38.6 (10 Nov 2019 22:00)  T(F): 100.9 (11 Nov 2019 13:19), Max: 101.5 (10 Nov 2019 22:00)  HR: 96 (11 Nov 2019 13:19) (84 - 96)  BP: 142/82 (11 Nov 2019 13:19) (133/82 - 154/94)  BP(mean): --  RR: 23 (11 Nov 2019 13:19) (20 - 23)  SpO2: 97% (11 Nov 2019 13:19) (91% - 97%)

## 2019-11-12 DIAGNOSIS — R19.7 DIARRHEA, UNSPECIFIED: ICD-10-CM

## 2019-11-12 LAB
ANION GAP SERPL CALC-SCNC: 12 MMO/L — SIGNIFICANT CHANGE UP (ref 7–14)
BASE EXCESS BLDV CALC-SCNC: -0.6 MMOL/L — SIGNIFICANT CHANGE UP
BASOPHILS # BLD AUTO: 0.04 K/UL — SIGNIFICANT CHANGE UP (ref 0–0.2)
BASOPHILS NFR BLD AUTO: 0.3 % — SIGNIFICANT CHANGE UP (ref 0–2)
BLOOD GAS VENOUS - CREATININE: 0.67 MG/DL — SIGNIFICANT CHANGE UP (ref 0.5–1.3)
BUN SERPL-MCNC: 15 MG/DL — SIGNIFICANT CHANGE UP (ref 7–23)
CALCIUM SERPL-MCNC: 9.3 MG/DL — SIGNIFICANT CHANGE UP (ref 8.4–10.5)
CHLORIDE BLDV-SCNC: 115 MMOL/L — HIGH (ref 96–108)
CHLORIDE SERPL-SCNC: 111 MMOL/L — HIGH (ref 98–107)
CO2 SERPL-SCNC: 20 MMOL/L — LOW (ref 22–31)
CREAT SERPL-MCNC: 0.6 MG/DL — SIGNIFICANT CHANGE UP (ref 0.5–1.3)
EOSINOPHIL # BLD AUTO: 0.45 K/UL — SIGNIFICANT CHANGE UP (ref 0–0.5)
EOSINOPHIL NFR BLD AUTO: 3.1 % — SIGNIFICANT CHANGE UP (ref 0–6)
GAS PNL BLDV: 143 MMOL/L — SIGNIFICANT CHANGE UP (ref 136–146)
GI PCR PANEL, STOOL: SIGNIFICANT CHANGE UP
GLUCOSE BLDC GLUCOMTR-MCNC: 104 MG/DL — HIGH (ref 70–99)
GLUCOSE BLDC GLUCOMTR-MCNC: 120 MG/DL — HIGH (ref 70–99)
GLUCOSE BLDC GLUCOMTR-MCNC: 129 MG/DL — HIGH (ref 70–99)
GLUCOSE BLDC GLUCOMTR-MCNC: 130 MG/DL — HIGH (ref 70–99)
GLUCOSE BLDV-MCNC: 126 MG/DL — HIGH (ref 70–99)
GLUCOSE SERPL-MCNC: 130 MG/DL — HIGH (ref 70–99)
HCO3 BLDV-SCNC: 24 MMOL/L — SIGNIFICANT CHANGE UP (ref 20–27)
HCT VFR BLD CALC: 31.1 % — LOW (ref 39–50)
HCT VFR BLDV CALC: 24 % — LOW (ref 39–51)
HGB BLD-MCNC: 9.7 G/DL — LOW (ref 13–17)
HGB BLDV-MCNC: 7.7 G/DL — LOW (ref 13–17)
IMM GRANULOCYTES NFR BLD AUTO: 1.5 % — SIGNIFICANT CHANGE UP (ref 0–1.5)
LACTATE BLDV-MCNC: 2 MMOL/L — SIGNIFICANT CHANGE UP (ref 0.5–2)
LYMPHOCYTES # BLD AUTO: 1.08 K/UL — SIGNIFICANT CHANGE UP (ref 1–3.3)
LYMPHOCYTES # BLD AUTO: 7.5 % — LOW (ref 13–44)
MAGNESIUM SERPL-MCNC: 2.3 MG/DL — SIGNIFICANT CHANGE UP (ref 1.6–2.6)
MCHC RBC-ENTMCNC: 30.7 PG — SIGNIFICANT CHANGE UP (ref 27–34)
MCHC RBC-ENTMCNC: 31.2 % — LOW (ref 32–36)
MCV RBC AUTO: 98.4 FL — SIGNIFICANT CHANGE UP (ref 80–100)
MONOCYTES # BLD AUTO: 0.66 K/UL — SIGNIFICANT CHANGE UP (ref 0–0.9)
MONOCYTES NFR BLD AUTO: 4.6 % — SIGNIFICANT CHANGE UP (ref 2–14)
NEUTROPHILS # BLD AUTO: 11.97 K/UL — HIGH (ref 1.8–7.4)
NEUTROPHILS NFR BLD AUTO: 83 % — HIGH (ref 43–77)
NRBC # FLD: 0.02 K/UL — SIGNIFICANT CHANGE UP (ref 0–0)
PCO2 BLDV: 35 MMHG — LOW (ref 41–51)
PH BLDV: 7.44 PH — HIGH (ref 7.32–7.43)
PHOSPHATE SERPL-MCNC: 2.9 MG/DL — SIGNIFICANT CHANGE UP (ref 2.5–4.5)
PLATELET # BLD AUTO: 294 K/UL — SIGNIFICANT CHANGE UP (ref 150–400)
PMV BLD: 9.5 FL — SIGNIFICANT CHANGE UP (ref 7–13)
PO2 BLDV: 52 MMHG — HIGH (ref 35–40)
POTASSIUM BLDV-SCNC: 3.7 MMOL/L — SIGNIFICANT CHANGE UP (ref 3.4–4.5)
POTASSIUM SERPL-MCNC: 3.9 MMOL/L — SIGNIFICANT CHANGE UP (ref 3.5–5.3)
POTASSIUM SERPL-SCNC: 3.9 MMOL/L — SIGNIFICANT CHANGE UP (ref 3.5–5.3)
RBC # BLD: 3.16 M/UL — LOW (ref 4.2–5.8)
RBC # FLD: 13.7 % — SIGNIFICANT CHANGE UP (ref 10.3–14.5)
SAO2 % BLDV: 85.4 % — HIGH (ref 60–85)
SODIUM SERPL-SCNC: 143 MMOL/L — SIGNIFICANT CHANGE UP (ref 135–145)
SPECIMEN SOURCE: SIGNIFICANT CHANGE UP
WBC # BLD: 14.42 K/UL — HIGH (ref 3.8–10.5)
WBC # FLD AUTO: 14.42 K/UL — HIGH (ref 3.8–10.5)

## 2019-11-12 PROCEDURE — 99232 SBSQ HOSP IP/OBS MODERATE 35: CPT

## 2019-11-12 PROCEDURE — 99233 SBSQ HOSP IP/OBS HIGH 50: CPT

## 2019-11-12 PROCEDURE — 99232 SBSQ HOSP IP/OBS MODERATE 35: CPT | Mod: GC

## 2019-11-12 PROCEDURE — 99233 SBSQ HOSP IP/OBS HIGH 50: CPT | Mod: GC

## 2019-11-12 RX ADMIN — CEFEPIME 100 MILLIGRAM(S): 1 INJECTION, POWDER, FOR SOLUTION INTRAMUSCULAR; INTRAVENOUS at 21:21

## 2019-11-12 RX ADMIN — FENTANYL CITRATE 1 PATCH: 50 INJECTION INTRAVENOUS at 14:05

## 2019-11-12 RX ADMIN — Medication 166.67 MILLIGRAM(S): at 15:21

## 2019-11-12 RX ADMIN — NYSTATIN CREAM 1 APPLICATION(S): 100000 CREAM TOPICAL at 05:11

## 2019-11-12 RX ADMIN — Medication 1 APPLICATION(S): at 17:16

## 2019-11-12 RX ADMIN — Medication 1 DROP(S): at 05:12

## 2019-11-12 RX ADMIN — Medication 400 MILLIGRAM(S): at 17:59

## 2019-11-12 RX ADMIN — Medication 1 DROP(S): at 17:16

## 2019-11-12 RX ADMIN — Medication 166.67 MILLIGRAM(S): at 06:31

## 2019-11-12 RX ADMIN — Medication 166.67 MILLIGRAM(S): at 23:24

## 2019-11-12 RX ADMIN — ENOXAPARIN SODIUM 60 MILLIGRAM(S): 100 INJECTION SUBCUTANEOUS at 11:44

## 2019-11-12 RX ADMIN — NYSTATIN CREAM 1 APPLICATION(S): 100000 CREAM TOPICAL at 17:15

## 2019-11-12 RX ADMIN — Medication 50 MILLIGRAM(S): at 17:16

## 2019-11-12 RX ADMIN — FENTANYL CITRATE 1 PATCH: 50 INJECTION INTRAVENOUS at 21:19

## 2019-11-12 RX ADMIN — Medication 50 MILLIGRAM(S): at 05:12

## 2019-11-12 RX ADMIN — Medication 400 MILLIGRAM(S): at 13:56

## 2019-11-12 RX ADMIN — AMLODIPINE BESYLATE 10 MILLIGRAM(S): 2.5 TABLET ORAL at 05:12

## 2019-11-12 RX ADMIN — Medication 1 APPLICATION(S): at 05:11

## 2019-11-12 RX ADMIN — FENTANYL CITRATE 1 PATCH: 50 INJECTION INTRAVENOUS at 07:44

## 2019-11-12 RX ADMIN — CEFEPIME 100 MILLIGRAM(S): 1 INJECTION, POWDER, FOR SOLUTION INTRAMUSCULAR; INTRAVENOUS at 05:11

## 2019-11-12 RX ADMIN — CEFEPIME 100 MILLIGRAM(S): 1 INJECTION, POWDER, FOR SOLUTION INTRAMUSCULAR; INTRAVENOUS at 13:55

## 2019-11-12 RX ADMIN — HALOPERIDOL DECANOATE 2.5 MILLIGRAM(S): 100 INJECTION INTRAMUSCULAR at 21:04

## 2019-11-12 NOTE — PROGRESS NOTE BEHAVIORAL HEALTH - NSBHCHARTREVIEWVS_PSY_A_CORE FT
Vital Signs Last 24 Hrs  T(C): 37.6 (12 Nov 2019 17:13), Max: 38.3 (12 Nov 2019 13:36)  T(F): 99.6 (12 Nov 2019 17:13), Max: 101 (12 Nov 2019 13:36)  HR: 85 (12 Nov 2019 17:13) (78 - 94)  BP: 146/75 (12 Nov 2019 17:13) (146/75 - 165/89)  BP(mean): --  RR: 20 (12 Nov 2019 17:13) (19 - 20)  SpO2: 97% (12 Nov 2019 17:13) (95% - 98%) detailed exam

## 2019-11-12 NOTE — PROGRESS NOTE ADULT - PROBLEM SELECTOR PLAN 3
Patient with history of severe depression refractory to multiple medications/ECT with prior psych hospitalizations. Appreciate Psychiatry involvement.

## 2019-11-12 NOTE — PROGRESS NOTE ADULT - SUBJECTIVE AND OBJECTIVE BOX
CHIEF COMPLAINT: Patient is a 54y old  Male who presents with a chief complaint of ARDS (2019 17:30)      Interval Events: none overnight     REVIEW OF SYSTEMS:  Constitutional:   Eyes:  ENT:  CV:  Resp:  GI:  :  MSK:  Integumentary:  Neurological:  Psychiatric:  Endocrine:  Hematologic/Lymphatic:  Allergic/Immunologic:  [ ] All other systems negative  [ ] Unable to assess ROS because ________    OBJECTIVE:  ICU Vital Signs Last 24 Hrs  T(C): 37.1 (2019 05:07), Max: 38.3 (2019 13:19)  T(F): 98.7 (2019 05:07), Max: 100.9 (2019 13:19)  HR: 93 (2019 05:07) (78 - 96)  BP: 165/89 (2019 05:07) (142/82 - 165/89)  BP(mean): --  ABP: --  ABP(mean): --  RR: 20 (2019 05:07) (19 - 23)  SpO2: 97% (2019 05:07) (96% - 100%)         @ 07:01  -   @ 07:00  --------------------------------------------------------  IN: 2452 mL / OUT: 2050 mL / NET: 402 mL      CAPILLARY BLOOD GLUCOSE      POCT Blood Glucose.: 115 mg/dL (2019 22:10)      PHYSICAL EXAM:  General:   HEENT:   Lymph Nodes:  Neck:   Respiratory:   Cardiovascular:   Abdomen:   Extremities:   Skin:   Neurological:  Psychiatry:    HOSPITAL MEDICATIONS:  MEDICATIONS  (STANDING):  amLODIPine   Tablet 10 milliGRAM(s) Oral daily  artificial tears (preservative free) Ophthalmic Solution 1 Drop(s) Both EYES two times a day  cefepime   IVPB 2000 milliGRAM(s) IV Intermittent every 8 hours  dextrose 5%. 1000 milliLiter(s) (50 mL/Hr) IV Continuous <Continuous>  dextrose 5%. 1000 milliLiter(s) (50 mL/Hr) IV Continuous <Continuous>  dextrose 5%. 1000 milliLiter(s) (50 mL/Hr) IV Continuous <Continuous>  dextrose 50% Injectable 12.5 Gram(s) IV Push once  enoxaparin Injectable 60 milliGRAM(s) SubCutaneous daily  fentaNYL   Patch  25 MICROgram(s)/Hr 1 Patch Transdermal every 72 hours  insulin lispro (HumaLOG) corrective regimen sliding scale   SubCutaneous three times a day before meals  insulin lispro (HumaLOG) corrective regimen sliding scale   SubCutaneous at bedtime  metoprolol tartrate 50 milliGRAM(s) Oral two times a day  nystatin Powder 1 Application(s) Topical every 12 hours  petrolatum Ophthalmic Ointment 1 Application(s) Both EYES two times a day  vancomycin  IVPB      vancomycin  IVPB 1250 milliGRAM(s) IV Intermittent every 8 hours    MEDICATIONS  (PRN):  acetaminophen    Suspension .. 650 milliGRAM(s) Oral every 6 hours PRN Temp greater or equal to 38C (100.4F), Mild Pain (1 - 3), Moderate Pain (4 - 6), Severe Pain (7 - 10)  artificial  tears Solution 1 Drop(s) Both EYES two times a day PRN Dry Eyes  dextrose 40% Gel 15 Gram(s) Oral once PRN Blood Glucose LESS THAN 70 milliGRAM(s)/deciliter  glucagon  Injectable 1 milliGRAM(s) IntraMuscular once PRN Glucose LESS THAN 70 milligrams/deciliter  haloperidol    Injectable 2.5 milliGRAM(s) IV Push every 6 hours PRN Agitation  ibuprofen  Suspension. 400 milliGRAM(s) Oral every 8 hours PRN Temp greater or equal to 38C (100.4F)      LABS:                        10.1   12.21 )-----------( 312      ( 2019 06:15 )             31.8     11-12    143  |  111<H>  |  15  ----------------------------<  130<H>  3.9   |  20<L>  |  0.60    Ca    9.3      2019 06:14  Phos  2.9     11-12  Mg     2.3     -12    TPro  6.8  /  Alb  3.8  /  TBili  0.5  /  DBili  < 0.2  /  AST  33  /  ALT  69<H>  /  AlkPhos  41  11-11      Urinalysis Basic - ( 2019 00:30 )    Color: YELLOW / Appearance: CLEAR / S.033 / pH: 6.0  Gluc: NEGATIVE / Ketone: TRACE  / Bili: NEGATIVE / Urobili: NORMAL   Blood: NEGATIVE / Protein: 50 / Nitrite: NEGATIVE   Leuk Esterase: NEGATIVE / RBC: 3-5 / WBC 3-5   Sq Epi: OCC / Non Sq Epi: x / Bacteria: NEGATIVE        Venous Blood Gas:   @ 06:14  7.44/35/52/24/85.4  VBG Lactate: 2.0      MICROBIOLOGY:     RADIOLOGY:  [ ] Reviewed and interpreted by me    PULMONARY FUNCTION TESTS:    EKG: CHIEF COMPLAINT: Patient is a 54y old  Male who presents with a chief complaint of ARDS (2019 17:30)      Interval Events: none overnight     REVIEW OF SYSTEMS:  Constitutional: no fevers /pain   CV: Denies   Resp: + SHINE  GI: loose stool / chronic per wife with stress   [x ] All other systems negative  [ ] Unable to assess ROS because ________    OBJECTIVE:  ICU Vital Signs Last 24 Hrs  T(C): 37.1 (2019 05:07), Max: 38.3 (2019 13:19)  T(F): 98.7 (2019 05:07), Max: 100.9 (2019 13:19)  HR: 93 (2019 05:07) (78 - 96)  BP: 165/89 (2019 05:07) (142/82 - 165/89)  BP(mean): --  ABP: --  ABP(mean): --  RR: 20 (2019 05:07) (19 - 23)  SpO2: 97% (2019 05:07) (96% - 100%)         @ 07:01  -   @ 07:00  --------------------------------------------------------  IN: 2452 mL / OUT: 2050 mL / NET: 402 mL      CAPILLARY BLOOD GLUCOSE      POCT Blood Glucose.: 115 mg/dL (2019 22:10)  :    HOSPITAL MEDICATIONS:  MEDICATIONS  (STANDING):  amLODIPine   Tablet 10 milliGRAM(s) Oral daily  artificial tears (preservative free) Ophthalmic Solution 1 Drop(s) Both EYES two times a day  cefepime   IVPB 2000 milliGRAM(s) IV Intermittent every 8 hours  dextrose 5%. 1000 milliLiter(s) (50 mL/Hr) IV Continuous <Continuous>  dextrose 5%. 1000 milliLiter(s) (50 mL/Hr) IV Continuous <Continuous>  dextrose 5%. 1000 milliLiter(s) (50 mL/Hr) IV Continuous <Continuous>  dextrose 50% Injectable 12.5 Gram(s) IV Push once  enoxaparin Injectable 60 milliGRAM(s) SubCutaneous daily  fentaNYL   Patch  25 MICROgram(s)/Hr 1 Patch Transdermal every 72 hours  insulin lispro (HumaLOG) corrective regimen sliding scale   SubCutaneous three times a day before meals  insulin lispro (HumaLOG) corrective regimen sliding scale   SubCutaneous at bedtime  metoprolol tartrate 50 milliGRAM(s) Oral two times a day  nystatin Powder 1 Application(s) Topical every 12 hours  petrolatum Ophthalmic Ointment 1 Application(s) Both EYES two times a day  vancomycin  IVPB      vancomycin  IVPB 1250 milliGRAM(s) IV Intermittent every 8 hours    MEDICATIONS  (PRN):  acetaminophen    Suspension .. 650 milliGRAM(s) Oral every 6 hours PRN Temp greater or equal to 38C (100.4F), Mild Pain (1 - 3), Moderate Pain (4 - 6), Severe Pain (7 - 10)  artificial  tears Solution 1 Drop(s) Both EYES two times a day PRN Dry Eyes  dextrose 40% Gel 15 Gram(s) Oral once PRN Blood Glucose LESS THAN 70 milliGRAM(s)/deciliter  glucagon  Injectable 1 milliGRAM(s) IntraMuscular once PRN Glucose LESS THAN 70 milligrams/deciliter  haloperidol    Injectable 2.5 milliGRAM(s) IV Push every 6 hours PRN Agitation  ibuprofen  Suspension. 400 milliGRAM(s) Oral every 8 hours PRN Temp greater or equal to 38C (100.4F)      LABS:                        10.1   12.21 )-----------( 312      ( 2019 06:15 )             31.8     11-12    143  |  111<H>  |  15  ----------------------------<  130<H>  3.9   |  20<L>  |  0.60    Ca    9.3      2019 06:14  Phos  2.9     11-12  Mg     2.3     -12    TPro  6.8  /  Alb  3.8  /  TBili  0.5  /  DBili  < 0.2  /  AST  33  /  ALT  69<H>  /  AlkPhos  41  11-11      Urinalysis Basic - ( 2019 00:30 )    Color: YELLOW / Appearance: CLEAR / S.033 / pH: 6.0  Gluc: NEGATIVE / Ketone: TRACE  / Bili: NEGATIVE / Urobili: NORMAL   Blood: NEGATIVE / Protein: 50 / Nitrite: NEGATIVE   Leuk Esterase: NEGATIVE / RBC: 3-5 / WBC 3-5   Sq Epi: OCC / Non Sq Epi: x / Bacteria: NEGATIVE        Venous Blood Gas:   @ 06:14  7.44/35/52/24/85.4  VBG Lactate: 2.0      MICROBIOLOGY:     RADIOLOGY:  [ ] Reviewed and interpreted by me    PULMONARY FUNCTION TESTS:    EKG:

## 2019-11-12 NOTE — PROVIDER CONTACT NOTE (OTHER) - ACTION/TREATMENT ORDERED:
give tylenol and motrin
provider aware, will give Tylenol as ordered and keep cooling blanket on
hyperthermia blanket
provider aware, will give motrin and ice packs will continue to monitor

## 2019-11-12 NOTE — PROGRESS NOTE BEHAVIORAL HEALTH - SUMMARY
Patient is a 55 y/o [place] male that is employed, , lives with his wife, has a supportive family, hx of severe depression and anxiety, multiple hx of psychiatry admissions (last in June 2019), tx refractory to many antidepressant meds, no hx of prior SA, hx of alcohol use- none recently, no hx of psychosis, received 5 tx of ECT (while in inpatient psych unit at [facility]), then stopped with no outpatient treatments in June 2019, PMHx of hepatitis presenting with increased lethargy and hypoxemia. As per records- patient was s/p fall at home. In ed was obtunded, noted to be in hypercarbic RF- requiring intubation. LL consolidation on CXray- started on antibiotics. Found to have barbiturates+ on urine toxicology. Course complicated by seizure. Extubated on 10/28- placed on BIPAP. Course continued to be complicated by fevers, worsening hypoxemia requiring reintubation. Was started on propofol, precedex and paralyzed but remained difficult to oxygenate with traditional mechanical ventilation requiring frequent bagging and ALI consult for ECMO was placed. The patient was cannulated for VV-ECMO and transfer to [facility]. Decannulated on Friday last week. Remained intubated on multiple sedative- Midazolam, Precedex, Propofol, Fentanyl. Psychiatry has been consulted as sedatives will slowly be weaned off- agitation management and also for baseline depression (was on multiple psychotropics)    11/8- patient is able to converse minimally. Remains delirious.    11/11- delirium, lethargic upon approach    11/12- still delirious, febrile    Recommendations-  - Not starting a standing home psychotropic at this time considering his mentation and delirium. Will monitor symptoms for now. PRN meds as below for now.   - As mentation improves, will gradually start on Seroquel, effexor   - If qtc< 500, haldol 2.5 mg q 6 hours PRN IV/IM for agitation.

## 2019-11-12 NOTE — PROGRESS NOTE ADULT - PROBLEM SELECTOR PLAN 1
Patient was on ECMO for respiratory failure, off ECMO now. Currently Extubated and in the RCU. Continue management as per RCU team.

## 2019-11-12 NOTE — PROGRESS NOTE ADULT - PROBLEM SELECTOR PLAN 2
- febrile this am to 101  - previous blood culture coag negative staph contaminant, and BAL with MSSA  - s/p full course of vanco/zosyn   - DVT study negative   - most recent sputum with enterobacter, pan sensitive  - will check another bcx today  - febrile overnight cultures sent - vanco started again   - ID note appreciated, cont cefepime for now- ? sec to lung injury   - Loose stools - c-diff negative PCR GI pending   - hypothermia blanket PRN

## 2019-11-12 NOTE — PROGRESS NOTE ADULT - SUBJECTIVE AND OBJECTIVE BOX
Follow Up:  fever    Interval History/ROS: oob to chair - wife cheered by improved appearance, patient modestly confused  -nurse at bedside notes soft stools and buttock erythema- no skin breakdown    Allergies  No Known Allergies      ANTIMICROBIALS:  cefepime   IVPB 2000 every 8 hours  vancomycin  IVPB    vancomycin  IVPB 1250 every 8 hours      OTHER MEDS:  MEDICATIONS  (STANDING):  acetaminophen    Suspension .. 650 every 6 hours PRN  amLODIPine   Tablet 10 daily  dextrose 40% Gel 15 once PRN  dextrose 50% Injectable 12.5 once  enoxaparin Injectable 60 daily  fentaNYL   Patch  25 MICROgram(s)/Hr 1 every 72 hours  glucagon  Injectable 1 once PRN  haloperidol    Injectable 2.5 every 6 hours PRN  ibuprofen  Suspension. 400 every 8 hours PRN  insulin lispro (HumaLOG) corrective regimen sliding scale  three times a day before meals  insulin lispro (HumaLOG) corrective regimen sliding scale  at bedtime  metoprolol tartrate 50 two times a day      Vital Signs Last 24 Hrs  T(C): 37.1 (2019 05:07), Max: 38.3 (2019 13:19)  T(F): 98.7 (2019 05:07), Max: 100.9 (2019 13:19)  HR: 94 (2019 11:27) (78 - 96)  BP: 165/89 (2019 05:07) (142/82 - 165/89)  BP(mean): --  RR: 20 (2019 11:27) (19 - 23)  SpO2: 95% (2019 11:27) (95% - 100%)    PHYSICAL EXAM:  General: WN/WD NAD, Non-toxic  Neurology: awake and responsive  Respiratory: Clear to auscultation bilaterally, fine crackles, mildy tachypneic  CV: RRR, S1S2, no murmurs, rubs or gallops  Abdominal: Soft, Non-tender, non-distended, normal bowel sounds  Extremities: No edema,   Line Sites: Clear  Skin: No rash                        9.7    14.42 )-----------( 294      ( 2019 06:14 )             31.1   WBC Count: 14.42 ( @ 06:14)  WBC Count: 12.21 ( @ 06:15)  WBC Count: 8.31 ( @ 05:57)  WBC Count: 7.24 ( @ 06:00)          143  |  111<H>  |  15  ----------------------------<  130<H>  3.9   |  20<L>  |  0.60    Ca    9.3      2019 06:14  Phos  2.9       Mg     2.3         TPro  6.8  /  Alb  3.8  /  TBili  0.5  /  DBili  < 0.2  /  AST  33  /  ALT  69<H>  /  AlkPhos  41      Urinalysis Basic - ( 2019 00:30 )    Color: YELLOW / Appearance: CLEAR / S.033 / pH: 6.0  Gluc: NEGATIVE / Ketone: TRACE  / Bili: NEGATIVE / Urobili: NORMAL   Blood: NEGATIVE / Protein: 50 / Nitrite: NEGATIVE   Leuk Esterase: NEGATIVE / RBC: 3-5 / WBC 3-5   Sq Epi: OCC / Non Sq Epi: x / Bacteria: NEGATIVE      MICROBIOLOGY:  BLOOD  11-10-19 --  --  --      BLOOD PERIPHERAL  19 --  --  --      SPUTUM  19 --  --  Enterobacter cloacae      BLOOD VENOUS  19 --  --  --      BLOOD VENOUS  10-30-19 --  --  --      BRONCHIAL LAVAGE  10-30-19 --  --  Staphylococcus aureus      BLOOD PERIPHERAL  10-30-19 --  --  BLOOD CULTURE PCR  Staphylococcus sp.,coag neg      .Blood Blood  10-29-19   No growth at 5 days.  --  --      .Blood Blood-Peripheral  10-27-19   No growth at 5 days.  --  --        BLOOD    Vancomycin Level, Trough: 10.8 ug/mL (19 @ 21:21)  Vancomycin Level, Trough: 8.8 ug/mL (19 @ 13:12)    RADIOLOGY:  < from: Xray Chest 1 View- PORTABLE-Urgent (19 @ 08:25) >  Endotracheal tube has been removed since the last exam. Enteric tube   courses down the esophagus with its tip in the distal stomach. Left IJ   catheter are unchanged. Streaky atelectasis at the left lung base.      COMPARISON:        IMPRESSION:  Statuspost enteric tube placement and extubation.    < end of copied text >      Shan Preciado MD; Division of Infectious Disease; Pager: [phone]; nights and weekends: [phone]

## 2019-11-12 NOTE — PROGRESS NOTE ADULT - ASSESSMENT
54M with severe depression who presented to  10/28 with lethargy and hypoxemic/hypercapnic respiratory failure possibly related to overdose +/- pneumonia extubated but required reintubation 10/30 but with persistent hypoxemia refractory to conventional management and ultimately cannulated for VV ECMO 10/30/2019 for hypoxemic respiratory failure with ARDS and transferred to [facility] for further management. Decannulated 11/1, now extubated to BIPAP.

## 2019-11-12 NOTE — PROGRESS NOTE ADULT - PROBLEM SELECTOR PLAN 5
receiving free water via ngt   - will start short course of D5W and recheck bmp after 6 hours receiving free water via ngt   - will start short course of D5W and recheck bmp after 6 hours  - Na normalized

## 2019-11-12 NOTE — PROGRESS NOTE BEHAVIORAL HEALTH - NSBHFUPINTERVALHXFT_PSY_A_CORE
met with the patient along with wife. Patient still lethargic, confused, does not engage in conversation. No rigidity, no tremors.   Care coordinated closely with wife, and below plan discussed and agreed upon  Na [phone]

## 2019-11-12 NOTE — PROVIDER CONTACT NOTE (OTHER) - ASSESSMENT
pt temp 100.9 oral, all other vitals stable
Pt is confused. No other s/s of distress. Hyperthermia blanket in place.
Pt is confused. No other s/s of distress. Motrin already given
oral temp 101 all other vital stable

## 2019-11-12 NOTE — PROGRESS NOTE ADULT - ASSESSMENT
54M with severe MDD with prior psych admissions/ECT who was initially admitted to  10/28/19 after apparent Barbituate overdose with respiratory depression, ARDS. Patient admitted to [facility] on 10/30/19 for ECMO, respiratory support.  Decannulated 11/1/19  Extubated 11/5/19  Airway colonized with MSSA and Enterobacter aerogenes though most recent sputum gram stain unimpressive.  Fever  increased leukocytosis   Blood cultures negative to date  Legionella urine ag negative  11/9: HIV negative  Vanco added for fever - height of fever diminished  cultures negative, Cdiff negative    Antibiotics  Ceftriaxone 10/28 -->29  Azithromycin 10/28, 10/30 -->11/2  Vanco 10/20 -->11/5; 11/10-->  Zosyn 10/30 --> 11/5  Cefepime 10/29 --> 30 11/7 -->11/8-->  Ertapenem 11/8    Suggest  Continue Cefepime 2 every 8 hours  Continue Vanco  will monitor repeat cultures, clinical course: if toxic would consider Meropenem

## 2019-11-12 NOTE — PROGRESS NOTE ADULT - PROBLEM SELECTOR PLAN 4
Patient goals are established at this time. Given patient was on ECMO he will need Follow up with Dr. Edita Garcia at the [address] [place] [address], Geriatrics and Palliative Care Office. Please schedule an appointment for him at [phone]. Palliative care will sign off at this time. Please reconsult as needed. Patient goals are established at this time. Given patient was on ECMO he will need Follow up with Dr. Edita Garcia at the [address] [place] [address], Geriatrics and Palliative Care Office. Please schedule an appointment for him at [phone] upon discharge if patient is being transferred to rehab facility please include in d/c summary. Palliative care will sign off at this time. Please reconsult as needed.

## 2019-11-12 NOTE — PROGRESS NOTE ADULT - SUBJECTIVE AND OBJECTIVE BOX
INTERVAL HPI/OVERNIGHT EVENTS:    Patient smiling and responding to one word answers. Wife at bedside and started to go back to work.     Code Status: Full code  Allergies    No Known Allergies    Intolerances    MEDICATIONS  (STANDING):  amLODIPine   Tablet 10 milliGRAM(s) Oral daily  artificial tears (preservative free) Ophthalmic Solution 1 Drop(s) Both EYES two times a day  cefepime   IVPB 2000 milliGRAM(s) IV Intermittent every 8 hours  dextrose 5%. 1000 milliLiter(s) (50 mL/Hr) IV Continuous <Continuous>  dextrose 5%. 1000 milliLiter(s) (50 mL/Hr) IV Continuous <Continuous>  dextrose 50% Injectable 12.5 Gram(s) IV Push once  enoxaparin Injectable 60 milliGRAM(s) SubCutaneous daily  fentaNYL   Patch  25 MICROgram(s)/Hr 1 Patch Transdermal every 72 hours  insulin lispro (HumaLOG) corrective regimen sliding scale   SubCutaneous three times a day before meals  insulin lispro (HumaLOG) corrective regimen sliding scale   SubCutaneous at bedtime  metoprolol tartrate 50 milliGRAM(s) Oral two times a day  nystatin Powder 1 Application(s) Topical every 12 hours  petrolatum Ophthalmic Ointment 1 Application(s) Both EYES two times a day  vancomycin  IVPB      vancomycin  IVPB 1250 milliGRAM(s) IV Intermittent every 8 hours    MEDICATIONS  (PRN):  acetaminophen    Suspension .. 650 milliGRAM(s) Oral every 6 hours PRN Temp greater or equal to 38C (100.4F), Mild Pain (1 - 3), Moderate Pain (4 - 6), Severe Pain (7 - 10)  artificial  tears Solution 1 Drop(s) Both EYES two times a day PRN Dry Eyes  dextrose 40% Gel 15 Gram(s) Oral once PRN Blood Glucose LESS THAN 70 milliGRAM(s)/deciliter  glucagon  Injectable 1 milliGRAM(s) IntraMuscular once PRN Glucose LESS THAN 70 milligrams/deciliter  haloperidol    Injectable 2.5 milliGRAM(s) IV Push every 6 hours PRN Agitation  ibuprofen  Suspension. 400 milliGRAM(s) Oral every 8 hours PRN Temp greater or equal to 38C (100.4F)      PRESENT SYMPTOMS: [ ]Unable to obtain due to poor mentation   Source if other than patient:  [ ]Family   [ ]Team     Pain (Impact on QOL):  No pain  Location:  Severity:  Minimal acceptable level (0-10 scale):       Quality:       Onset:  Duration:  Aggravating factors:  Relieving Factors  Radiation:    Dyspnea:  Yes [ ] No [x ] - [ ]Mild [ ]Moderate [ ]Severe  Anxiety:    Yes [ ] No [ x] - [ ]Mild [ ]Moderate [ ]Severe  Fatigue:    Yes [x ] No [ ] - [ ]Mild [x ]Moderate [ ]Severe  Nausea:    Yes [ ] No [ x] - [ ]Mild [ ]Moderate [ ]Severe                         Loss of appetite: Yes [ ] No [ x] - [ ]Mild [ ]Moderate [ ]Severe             Constipation:  Yes [ ] No [ x] - [ ]Mild [ ]Moderate [ ]Severe  Grief:  Yes [ ] No [x ]     PAIN AD Score:	  [email or web address]/[email or web address] (Ctrl + left click to view)    Other Symptoms:  [x ]All other review of systems negative     Karnofsky Performance Score/Palliative Performance Status Version 2:       50  %    http://palliative.info/resource_material/[email or web address].pdf    PHYSICAL EXAM:  Vital Signs Last 24 Hrs  T(C): 38.3 (2019 13:36), Max: 38.3 (2019 13:36)  T(F): 101 (2019 13:36), Max: 101 (2019 13:36)  HR: 94 (2019 13:36) (78 - 94)  BP: 146/77 (2019 13:36) (146/77 - 165/89)  BP(mean): --  RR: 19 (2019 13:36) (19 - 20)  SpO2: 98% (2019 13:36) (95% - 100%) I&O's Summary    2019 07:01  -  2019 07:00  --------------------------------------------------------  IN: 2452 mL / OUT: 2050 mL / NET: 402 mL     GENERAL:  [ x]Alert  [x ]Oriented x 1-2   [ x]Lethargic  [ ]Cachexia  [ ]Unarousable  [ ]Verbal  [ ]Non-Verbal  Behavioral:   [ ] Anxiety  [ ] Delirium [ ] Agitation [ ] Other  HEENT:  [ ]Normal   [x ]Dry mouth   [ ]ET Tube/Trach  [ ]Oral lesions  PULMONARY:   [ ]Clear [ ]Tachypnea  [ ]Audible excessive secretions   [ ]Rhonchi        [ ]Right [ ]Left [ ]Bilateral  [ ]Crackles        [ ]Right [ ]Left [ ]Bilateral  [ ]Wheezing     [ ]Right [ ]Left [ ]Bilateral  CARDIOVASCULAR:    [ ]Regular [ ]Irregular [ ]Tachy  [ ]Luis Fernando [ ]Murmur [ ]Other  GASTROINTESTINAL:  [ ]Soft  [ ]Distended   [ ]+BS  [ ]Non tender [ ]Tender  [ ]PEG [x ] NGT   Last BM:      GENITOURINARY:  [ ]Normal [ ] Incontinent   [ ]Oliguria/Anuria   [ ]Meredith  MUSCULOSKELETAL:   [ ]Normal   [ ]Weakness  [ ]Bed/Wheelchair bound [ ]Edema  NEUROLOGIC:   [ ]No focal deficits  [ ] Cognitive impairment  [ ] Dysphagia [ ]Dysarthria [ ] Paresis [ x]Other - withdrawn  SKIN:   [ x]Normal   [ ]Pressure ulcer(s)  [ ]Rash    CRITICAL CARE:  [ ] Shock Present  [ ]Septic [ ]Cardiogenic [ ]Neurologic [ ]Hypovolemic  [ ]  Vasopressors [ ]  Inotropes   [ ] Respiratory failure present  [ ] Acute  [ ] Chronic [ ] Hypoxic  [ ] Hypercarbic [ ] Other  [ ] Other organ failure     LABS:                        9.7    14.42 )-----------( 294      ( 2019 06:14 )             31.1       143  |  111<H>  |  15  ----------------------------<  130<H>  3.9   |  20<L>  |  0.60    Ca    9.3      2019 06:14  Phos  2.9       Mg     2.3         TPro  6.8  /  Alb  3.8  /  TBili  0.5  /  DBili  < 0.2  /  AST  33  /  ALT  69<H>  /  AlkPhos  41        Urinalysis Basic - ( 2019 00:30 )    Color: YELLOW / Appearance: CLEAR / S.033 / pH: 6.0  Gluc: NEGATIVE / Ketone: TRACE  / Bili: NEGATIVE / Urobili: NORMAL   Blood: NEGATIVE / Protein: 50 / Nitrite: NEGATIVE   Leuk Esterase: NEGATIVE / RBC: 3-5 / WBC 3-5   Sq Epi: OCC / Non Sq Epi: x / Bacteria: NEGATIVE      RADIOLOGY & ADDITIONAL STUDIES:    Protein Calorie Malnutrition Present: [ ] yes [ ] no  [ ] PPSV2 < or = 30%  [ ] significant weight loss [ ] poor nutritional intake [ ] anasarca [ ] catabolic state Albumin, Serum: 3.8 g/dL (19 @ 06:15)      REFERRALS:   [ ]Chaplaincy  [ ] Hospice  [ ]Child Life  [ ]Social Work  [ ]Case management [ ]Holistic Therapy   Goals of Care Document: INTERVAL HPI/OVERNIGHT EVENTS:  Patient smiling and responding to one word answers.   Wife at bedside and started to go back to work.     Code Status: Full code  Allergies    No Known Allergies    Intolerances    MEDICATIONS  (STANDING):  amLODIPine   Tablet 10 milliGRAM(s) Oral daily  artificial tears (preservative free) Ophthalmic Solution 1 Drop(s) Both EYES two times a day  cefepime   IVPB 2000 milliGRAM(s) IV Intermittent every 8 hours  dextrose 5%. 1000 milliLiter(s) (50 mL/Hr) IV Continuous <Continuous>  dextrose 5%. 1000 milliLiter(s) (50 mL/Hr) IV Continuous <Continuous>  dextrose 50% Injectable 12.5 Gram(s) IV Push once  enoxaparin Injectable 60 milliGRAM(s) SubCutaneous daily  fentaNYL   Patch  25 MICROgram(s)/Hr 1 Patch Transdermal every 72 hours  insulin lispro (HumaLOG) corrective regimen sliding scale   SubCutaneous three times a day before meals  insulin lispro (HumaLOG) corrective regimen sliding scale   SubCutaneous at bedtime  metoprolol tartrate 50 milliGRAM(s) Oral two times a day  nystatin Powder 1 Application(s) Topical every 12 hours  petrolatum Ophthalmic Ointment 1 Application(s) Both EYES two times a day  vancomycin  IVPB      vancomycin  IVPB 1250 milliGRAM(s) IV Intermittent every 8 hours    MEDICATIONS  (PRN):  acetaminophen    Suspension .. 650 milliGRAM(s) Oral every 6 hours PRN Temp greater or equal to 38C (100.4F), Mild Pain (1 - 3), Moderate Pain (4 - 6), Severe Pain (7 - 10)  artificial  tears Solution 1 Drop(s) Both EYES two times a day PRN Dry Eyes  dextrose 40% Gel 15 Gram(s) Oral once PRN Blood Glucose LESS THAN 70 milliGRAM(s)/deciliter  glucagon  Injectable 1 milliGRAM(s) IntraMuscular once PRN Glucose LESS THAN 70 milligrams/deciliter  haloperidol    Injectable 2.5 milliGRAM(s) IV Push every 6 hours PRN Agitation  ibuprofen  Suspension. 400 milliGRAM(s) Oral every 8 hours PRN Temp greater or equal to 38C (100.4F)    PRESENT SYMPTOMS: [ ]Unable to obtain due to poor mentation   Source if other than patient:  [ ]Family   [ ]Team     Pain (Impact on QOL):  No pain  Location:  Severity:  Minimal acceptable level (0-10 scale):       Quality:       Onset:  Duration:  Aggravating factors:  Relieving Factors  Radiation:    Dyspnea:  Yes [ ] No [x ] - [ ]Mild [ ]Moderate [ ]Severe  Anxiety:    Yes [ ] No [ x] - [ ]Mild [ ]Moderate [ ]Severe  Fatigue:    Yes [x ] No [ ] - [ ]Mild [x ]Moderate [ ]Severe  Nausea:    Yes [ ] No [ x] - [ ]Mild [ ]Moderate [ ]Severe                         Loss of appetite: Yes [ ] No [ x] - [ ]Mild [ ]Moderate [ ]Severe             Constipation:  Yes [ ] No [ x] - [ ]Mild [ ]Moderate [ ]Severe  Grief:  Yes [ ] No [x ]     PAIN AD Score:	  [email or web address] (Ctrl + left click to view)    Other Symptoms:  [x ]All other review of systems negative     Karnofsky Performance Score/Palliative Performance Status Version 2:       40  %    http://palliative.info/resource_material/[email or web address].pdf    PHYSICAL EXAM:  Vital Signs Last 24 Hrs  T(C): 38.3 (2019 13:36), Max: 38.3 (2019 13:36)  T(F): 101 (2019 13:36), Max: 101 (2019 13:36)  HR: 94 (2019 13:36) (78 - 94)  BP: 146/77 (2019 13:36) (146/77 - 165/89)  BP(mean): --  RR: 19 (2019 13:36) (19 - 20)  SpO2: 98% (2019 13:36) (95% - 100%) I&O's Summary    2019 07:01  -  2019 07:00  --------------------------------------------------------  IN: 2452 mL / OUT: 2050 mL / NET: 402 mL    GENERAL:  [ x]Alert  [x ]Oriented x 1-2   [ x]Lethargic  [ ]Cachexia  [ ]Unarousable  [ x]Verbal  [ ]Non-Verbal  Behavioral:   [ ] Anxiety  [ ] Delirium [ ] Agitation [ ] Other  HEENT:  [ ]Normal   [x ]Dry mouth   [ ]ET Tube/Trach  [ ]Oral lesions  PULMONARY:   [ ]Clear [ ]Tachypnea  [ ]Audible excessive secretions   [ x]Rhonchi        [ ]Right [ ]Left [x ]Bilateral  [ ]Crackles        [ ]Right [ ]Left [ ]Bilateral  [ ]Wheezing     [ ]Right [ ]Left [ ]Bilateral  CARDIOVASCULAR:    [x ]Regular [ ]Irregular [ ]Tachy  [ ]Luis Fernando [ ]Murmur [ ]Other  GASTROINTESTINAL:  [x ]Soft  [ ]Distended   [x ]+BS  [ ]Non tender [ ]Tender  [ ]PEG [x ] NGT   Last BM:      GENITOURINARY:  [ ]Normal [x ] Incontinent   [ ]Oliguria/Anuria   [ ]Meredith  MUSCULOSKELETAL:   [ ]Normal   [x ]Weakness  [ ]Bed/Wheelchair bound [ ]Edema  NEUROLOGIC:   [ ]No focal deficits  [ ] Cognitive impairment  [ ] Dysphagia [ ]Dysarthria [ ] Paresis [ x]Other - withdrawn  SKIN:   [ x]Normal   [ ]Pressure ulcer(s)  [ ]Rash    CRITICAL CARE:  [ ] Shock Present  [ ]Septic [ ]Cardiogenic [ ]Neurologic [ ]Hypovolemic  [ ]  Vasopressors [ ]  Inotropes   [ ] Respiratory failure present  [ ] Acute  [ ] Chronic [ ] Hypoxic  [ ] Hypercarbic [ ] Other  [ ] Other organ failure     LABS:                        9.7    14.42 )-----------( 294      ( 2019 06:14 )             31.1   11-12    143  |  111<H>  |  15  ----------------------------<  130<H>  3.9   |  20<L>  |  0.60    Ca    9.3      2019 06:14  Phos  2.9     -  Mg     2.3         TPro  6.8  /  Alb  3.8  /  TBili  0.5  /  DBili  < 0.2  /  AST  33  /  ALT  69<H>  /  AlkPhos  41  11-11    Urinalysis Basic - ( 2019 00:30 )    Color: YELLOW / Appearance: CLEAR / S.033 / pH: 6.0  Gluc: NEGATIVE / Ketone: TRACE  / Bili: NEGATIVE / Urobili: NORMAL   Blood: NEGATIVE / Protein: 50 / Nitrite: NEGATIVE   Leuk Esterase: NEGATIVE / RBC: 3-5 / WBC 3-5   Sq Epi: OCC / Non Sq Epi: x / Bacteria: NEGATIVE    RADIOLOGY & ADDITIONAL STUDIES: reviewed.     Protein Calorie Malnutrition Present: [ ] yes [ ] no  [ ] PPSV2 < or = 30%  [ ] significant weight loss [ ] poor nutritional intake [ ] anasarca [ ] catabolic state Albumin, Serum: 3.8 g/dL (19 @ 06:15)    REFERRALS:   [ ]Chaplaincy  [ ] Hospice  [ ]Child Life  [ ]Social Work  [ ]Case management [ ]Holistic Therapy   Goals of Care Document:

## 2019-11-12 NOTE — PROGRESS NOTE ADULT - ASSESSMENT
54 year old man who  had respiratory failure and was placed on ECMO, currently off ECMO and trying to be optimized medically.

## 2019-11-12 NOTE — PROGRESS NOTE BEHAVIORAL HEALTH - NSBHCHARTREVIEWLAB_PSY_A_CORE FT
CBC Full  -  ( 12 Nov 2019 06:14 )  WBC Count : 14.42 K/uL  RBC Count : 3.16 M/uL  Hemoglobin : 9.7 g/dL  Hematocrit : 31.1 %  Platelet Count - Automated : 294 K/uL  Mean Cell Volume : 98.4 fL  Mean Cell Hemoglobin : 30.7 pg  Mean Cell Hemoglobin Concentration : 31.2 %  Auto Neutrophil # : 11.97 K/uL  Auto Lymphocyte # : 1.08 K/uL  Auto Monocyte # : 0.66 K/uL  Auto Eosinophil # : 0.45 K/uL  Auto Basophil # : 0.04 K/uL  Auto Neutrophil % : 83.0 %  Auto Lymphocyte % : 7.5 %  Auto Monocyte % : 4.6 %  Auto Eosinophil % : 3.1 %  Auto Basophil % : 0.3 %  11-12    143  |  111<H>  |  15  ----------------------------<  130<H>  3.9   |  20<L>  |  0.60    Ca    9.3      12 Nov 2019 06:14  Phos  2.9     11-12  Mg     2.3     11-12    TPro  6.8  /  Alb  3.8  /  TBili  0.5  /  DBili  < 0.2  /  AST  33  /  ALT  69<H>  /  AlkPhos  41  11-11

## 2019-11-12 NOTE — PROGRESS NOTE ADULT - PROBLEM SELECTOR PLAN 1
- severe hypoxemic respiratory failure likely severe ARDS with P/F cannulated for VV-ECMO on 10/30 possibly related to aspiration pneumonia after possible benzo withdrawal seizure  - decannulated 11/1, extubated on 11/5  - tolerating NC currently, weaning nasal cannula   - will need a lot of pulmonary toilet unable to tolerate metaneb - chest vest started   - chest PT, metanebs, suction PRN - severe hypoxemic respiratory failure likely severe ARDS with P/F cannulated for VV-ECMO on 10/30 possibly related to aspiration pneumonia after possible benzo withdrawal seizure  - decannulated 11/1, extubated on 11/5  - tolerating NC currently, weaning nasal cannula now 2L   - will need a lot of pulmonary toilet unable to tolerate metaneb - chest vest started   - chest PT, metanebs, suction PRN

## 2019-11-12 NOTE — PROGRESS NOTE ADULT - ATTENDING COMMENTS
Was on ECMO for respiratory failure  Now on NC  Was febrile again persistently, pansensitive enterobacter now on cefepime and vancomycin  New cultures sent  Chest PT, pulm hygiene  Hypernatremia improved on D5W  Frequent loose BMs, C.diff negative

## 2019-11-13 LAB
ANION GAP SERPL CALC-SCNC: 11 MMO/L — SIGNIFICANT CHANGE UP (ref 7–14)
ANION GAP SERPL CALC-SCNC: 13 MMO/L — SIGNIFICANT CHANGE UP (ref 7–14)
BACTERIA BLD CULT: SIGNIFICANT CHANGE UP
BACTERIA BLD CULT: SIGNIFICANT CHANGE UP
BUN SERPL-MCNC: 11 MG/DL — SIGNIFICANT CHANGE UP (ref 7–23)
BUN SERPL-MCNC: 13 MG/DL — SIGNIFICANT CHANGE UP (ref 7–23)
CALCIUM SERPL-MCNC: 8.9 MG/DL — SIGNIFICANT CHANGE UP (ref 8.4–10.5)
CALCIUM SERPL-MCNC: 9.1 MG/DL — SIGNIFICANT CHANGE UP (ref 8.4–10.5)
CHLORIDE SERPL-SCNC: 109 MMOL/L — HIGH (ref 98–107)
CHLORIDE SERPL-SCNC: 109 MMOL/L — HIGH (ref 98–107)
CO2 SERPL-SCNC: 21 MMOL/L — LOW (ref 22–31)
CO2 SERPL-SCNC: 22 MMOL/L — SIGNIFICANT CHANGE UP (ref 22–31)
CREAT SERPL-MCNC: 0.55 MG/DL — SIGNIFICANT CHANGE UP (ref 0.5–1.3)
CREAT SERPL-MCNC: 0.56 MG/DL — SIGNIFICANT CHANGE UP (ref 0.5–1.3)
GLUCOSE BLDC GLUCOMTR-MCNC: 104 MG/DL — HIGH (ref 70–99)
GLUCOSE BLDC GLUCOMTR-MCNC: 108 MG/DL — HIGH (ref 70–99)
GLUCOSE BLDC GLUCOMTR-MCNC: 110 MG/DL — HIGH (ref 70–99)
GLUCOSE BLDC GLUCOMTR-MCNC: 136 MG/DL — HIGH (ref 70–99)
GLUCOSE SERPL-MCNC: 121 MG/DL — HIGH (ref 70–99)
GLUCOSE SERPL-MCNC: 122 MG/DL — HIGH (ref 70–99)
HCT VFR BLD CALC: 29.5 % — LOW (ref 39–50)
HGB BLD-MCNC: 9.9 G/DL — LOW (ref 13–17)
L PNEUMO AG SPEC QL IF: NEGATIVE — SIGNIFICANT CHANGE UP
L PNEUMO AG SPEC QL IF: SIGNIFICANT CHANGE UP
MAGNESIUM SERPL-MCNC: 2.3 MG/DL — SIGNIFICANT CHANGE UP (ref 1.6–2.6)
MCHC RBC-ENTMCNC: 31.9 PG — SIGNIFICANT CHANGE UP (ref 27–34)
MCHC RBC-ENTMCNC: 33.6 % — SIGNIFICANT CHANGE UP (ref 32–36)
MCV RBC AUTO: 95.2 FL — SIGNIFICANT CHANGE UP (ref 80–100)
NRBC # FLD: 0 K/UL — SIGNIFICANT CHANGE UP (ref 0–0)
PLATELET # BLD AUTO: 250 K/UL — SIGNIFICANT CHANGE UP (ref 150–400)
PMV BLD: 9.2 FL — SIGNIFICANT CHANGE UP (ref 7–13)
POTASSIUM SERPL-MCNC: 3.4 MMOL/L — LOW (ref 3.5–5.3)
POTASSIUM SERPL-MCNC: 3.6 MMOL/L — SIGNIFICANT CHANGE UP (ref 3.5–5.3)
POTASSIUM SERPL-SCNC: 3.4 MMOL/L — LOW (ref 3.5–5.3)
POTASSIUM SERPL-SCNC: 3.6 MMOL/L — SIGNIFICANT CHANGE UP (ref 3.5–5.3)
RBC # BLD: 3.1 M/UL — LOW (ref 4.2–5.8)
RBC # FLD: 13.3 % — SIGNIFICANT CHANGE UP (ref 10.3–14.5)
SODIUM SERPL-SCNC: 142 MMOL/L — SIGNIFICANT CHANGE UP (ref 135–145)
SODIUM SERPL-SCNC: 143 MMOL/L — SIGNIFICANT CHANGE UP (ref 135–145)
VANCOMYCIN TROUGH SERPL-MCNC: 12.4 UG/ML — SIGNIFICANT CHANGE UP (ref 10–20)
WBC # BLD: 8.28 K/UL — SIGNIFICANT CHANGE UP (ref 3.8–10.5)
WBC # FLD AUTO: 8.28 K/UL — SIGNIFICANT CHANGE UP (ref 3.8–10.5)

## 2019-11-13 PROCEDURE — 99233 SBSQ HOSP IP/OBS HIGH 50: CPT

## 2019-11-13 PROCEDURE — 99232 SBSQ HOSP IP/OBS MODERATE 35: CPT

## 2019-11-13 PROCEDURE — 99233 SBSQ HOSP IP/OBS HIGH 50: CPT | Mod: GC

## 2019-11-13 RX ORDER — QUETIAPINE FUMARATE 200 MG/1
50 TABLET, FILM COATED ORAL EVERY 6 HOURS
Refills: 0 | Status: DISCONTINUED | OUTPATIENT
Start: 2019-11-13 | End: 2019-11-18

## 2019-11-13 RX ORDER — SODIUM CHLORIDE 9 MG/ML
1000 INJECTION, SOLUTION INTRAVENOUS
Refills: 0 | Status: DISCONTINUED | OUTPATIENT
Start: 2019-11-13 | End: 2019-11-13

## 2019-11-13 RX ORDER — SODIUM CHLORIDE 9 MG/ML
1000 INJECTION, SOLUTION INTRAVENOUS
Refills: 0 | Status: DISCONTINUED | OUTPATIENT
Start: 2019-11-13 | End: 2019-11-14

## 2019-11-13 RX ORDER — CHLORHEXIDINE GLUCONATE 213 G/1000ML
1 SOLUTION TOPICAL DAILY
Refills: 0 | Status: DISCONTINUED | OUTPATIENT
Start: 2019-11-13 | End: 2019-11-18

## 2019-11-13 RX ORDER — QUETIAPINE FUMARATE 200 MG/1
50 TABLET, FILM COATED ORAL
Refills: 0 | Status: DISCONTINUED | OUTPATIENT
Start: 2019-11-13 | End: 2019-11-14

## 2019-11-13 RX ORDER — POTASSIUM CHLORIDE 20 MEQ
40 PACKET (EA) ORAL EVERY 4 HOURS
Refills: 0 | Status: COMPLETED | OUTPATIENT
Start: 2019-11-13 | End: 2019-11-13

## 2019-11-13 RX ADMIN — Medication 40 MILLIEQUIVALENT(S): at 11:07

## 2019-11-13 RX ADMIN — SODIUM CHLORIDE 50 MILLILITER(S): 9 INJECTION, SOLUTION INTRAVENOUS at 15:19

## 2019-11-13 RX ADMIN — ENOXAPARIN SODIUM 60 MILLIGRAM(S): 100 INJECTION SUBCUTANEOUS at 11:07

## 2019-11-13 RX ADMIN — CHLORHEXIDINE GLUCONATE 1 APPLICATION(S): 213 SOLUTION TOPICAL at 13:47

## 2019-11-13 RX ADMIN — Medication 40 MILLIEQUIVALENT(S): at 13:49

## 2019-11-13 RX ADMIN — Medication 50 MILLIGRAM(S): at 05:59

## 2019-11-13 RX ADMIN — QUETIAPINE FUMARATE 50 MILLIGRAM(S): 200 TABLET, FILM COATED ORAL at 13:45

## 2019-11-13 RX ADMIN — NYSTATIN CREAM 1 APPLICATION(S): 100000 CREAM TOPICAL at 05:58

## 2019-11-13 RX ADMIN — AMLODIPINE BESYLATE 10 MILLIGRAM(S): 2.5 TABLET ORAL at 05:59

## 2019-11-13 RX ADMIN — QUETIAPINE FUMARATE 50 MILLIGRAM(S): 200 TABLET, FILM COATED ORAL at 18:49

## 2019-11-13 RX ADMIN — FENTANYL CITRATE 1 PATCH: 50 INJECTION INTRAVENOUS at 19:22

## 2019-11-13 RX ADMIN — Medication 1 APPLICATION(S): at 05:59

## 2019-11-13 RX ADMIN — Medication 1 DROP(S): at 05:58

## 2019-11-13 RX ADMIN — FENTANYL CITRATE 1 PATCH: 50 INJECTION INTRAVENOUS at 08:29

## 2019-11-13 RX ADMIN — NYSTATIN CREAM 1 APPLICATION(S): 100000 CREAM TOPICAL at 18:49

## 2019-11-13 RX ADMIN — CEFEPIME 100 MILLIGRAM(S): 1 INJECTION, POWDER, FOR SOLUTION INTRAMUSCULAR; INTRAVENOUS at 05:59

## 2019-11-13 RX ADMIN — Medication 166.67 MILLIGRAM(S): at 07:39

## 2019-11-13 RX ADMIN — Medication 50 MILLIGRAM(S): at 18:49

## 2019-11-13 NOTE — PROGRESS NOTE BEHAVIORAL HEALTH - NSBHCHARTREVIEWVS_PSY_A_CORE FT
Vital Signs Last 24 Hrs  T(C): 37.2 (13 Nov 2019 05:52), Max: 38.3 (12 Nov 2019 13:36)  T(F): 99 (13 Nov 2019 05:52), Max: 101 (12 Nov 2019 13:36)  HR: 67 (13 Nov 2019 08:22) (67 - 95)  BP: 153/90 (13 Nov 2019 05:52) (139/62 - 153/90)  BP(mean): --  RR: 20 (13 Nov 2019 08:22) (19 - 20)  SpO2: 100% (13 Nov 2019 08:22) (97% - 100%)

## 2019-11-13 NOTE — PROGRESS NOTE BEHAVIORAL HEALTH - SUMMARY
Patient is a 53 y/o Citizen of [place] male that is employed, , lives with his wife, has a supportive family, hx of severe depression and anxiety, multiple hx of psychiatry admissions (last in June 2019), tx refractory to many antidepressant meds, no hx of prior SA, hx of alcohol use- none recently, no hx of psychosis, received 5 tx of ECT (while in inpatient psych unit at [facility]), then stopped with no outpatient treatments in June 2019, PMHx of hepatitis presenting with increased lethargy and hypoxemia. As per records- patient was s/p fall at home. In ed was obtunded, noted to be in hypercarbic RF- requiring intubation. LL consolidation on CXray- started on antibiotics. Found to have barbiturates+ on urine toxicology. Course complicated by seizure. Extubated on 10/28- placed on BIPAP. Course continued to be complicated by fevers, worsening hypoxemia requiring reintubation. Was started on propofol, precedex and paralyzed but remained difficult to oxygenate with traditional mechanical ventilation requiring frequent bagging and ALI consult for ECMO was placed. The patient was cannulated for VV-ECMO and transfer to [facility]. Decannulated on Friday last week. Remained intubated on multiple sedative- Midazolam, Precedex, Propofol, Fentanyl. Psychiatry has been consulted as sedatives will slowly be weaned off- agitation management and also for baseline depression (was on multiple psychotropics)    11/8- patient is able to converse minimally. Remains delirious.    11/11- delirium, lethargic upon approach    11/12- still delirious, febrile    11/13- delirium, and now with agitation    Recommendations-  - Start Seroquel 50mg BID PO- 7AM, 5PM . Will aim to increase dose.   - Check an EKG tomorrow to monitor qtc  - AGITATION- Seroquel 50mg q 6hrs prn po  - If qtc< 500, haldol 2.5 mg q 6 hours PRN IV/IM for agitation.   - Frequent orientation  - Avoid bzd, anticholinergics and antihistamines

## 2019-11-13 NOTE — PROGRESS NOTE ADULT - ASSESSMENT
54M with severe MDD with prior psych admissions/ECT who was initially admitted to  10/28/19 after apparent Barbituate overdose with respiratory depression, ARDS. Patient admitted to [facility] on 10/30/19 for ECMO, respiratory support.  Decannulated 11/1/19  Extubated 11/5/19  Airway colonized with MSSA and Enterobacter aerogenes though most recent sputum gram stain unimpressive.  Fever  increased leukocytosis   Blood cultures negative to date  Legionella urine ag negative  11/9: HIV negative   height of fever diminished  cultures negative, Cdiff negative  I suspect non infectious lung injury --- improving  agree with Pulmonary to stop antibiotics    Antibiotics  Ceftriaxone 10/28 -->29  Azithromycin 10/28, 10/30 -->11/2  Vanco 10/20 -->11/5; 11/10--> 11/13  Zosyn 10/30 --> 11/5  Cefepime 10/29 --> 30 11/7 -->11/8-->11/13  Ertapenem 11/8    Suggest  Monitor off antibiotics    discussed with primary team

## 2019-11-13 NOTE — PROGRESS NOTE ADULT - SUBJECTIVE AND OBJECTIVE BOX
Follow Up:  ARDS    Interval History/ROS: wife at bedside - patient being fed pudding consistency - more interactive    Allergies  No Known Allergies    ANTIMICROBIALS:      OTHER MEDS:  MEDICATIONS  (STANDING):  acetaminophen    Suspension .. 650 every 6 hours PRN  amLODIPine   Tablet 10 daily  dextrose 40% Gel 15 once PRN  dextrose 50% Injectable 12.5 once  enoxaparin Injectable 60 daily  fentaNYL   Patch  25 MICROgram(s)/Hr 1 every 72 hours  glucagon  Injectable 1 once PRN  haloperidol    Injectable 2.5 every 6 hours PRN  ibuprofen  Suspension. 400 every 8 hours PRN  insulin lispro (HumaLOG) corrective regimen sliding scale  three times a day before meals  insulin lispro (HumaLOG) corrective regimen sliding scale  at bedtime  metoprolol tartrate 50 two times a day  QUEtiapine 50 <User Schedule>  QUEtiapine 50 every 6 hours PRN      Vital Signs Last 24 Hrs  T(C): 37.2 (13 Nov 2019 05:52), Max: 37.6 (12 Nov 2019 17:13)  T(F): 99 (13 Nov 2019 05:52), Max: 99.6 (12 Nov 2019 17:13)  HR: 67 (13 Nov 2019 08:22) (67 - 95)  BP: 153/90 (13 Nov 2019 05:52) (139/62 - 153/90)  BP(mean): --  RR: 20 (13 Nov 2019 08:22) (20 - 20)  SpO2: 100% (13 Nov 2019 08:22) (97% - 100%)    PHYSICAL EXAM:  General: WN/WD NAD, Non-toxic  soft feeding tube in nares  Neurology: Alert  Respiratory: Clear to auscultation bilaterally, fine crackles - no wheezing  CV: RRR, S1S2, no murmurs, rubs or gallops  Abdominal: Soft, Non-tender, non-distended  Extremities: No edema  Line Sites: Clear  Skin: No rash                        9.9    8.28  )-----------( 250      ( 13 Nov 2019 05:30 )             29.5   WBC Count: 8.28 (11-13 @ 05:30)  WBC Count: 14.42 (11-12 @ 06:14)  WBC Count: 12.21 (11-11 @ 06:15)  WBC Count: 8.31 (11-09 @ 05:57)    11-13    143  |  109<H>  |  13  ----------------------------<  121<H>  3.4<L>   |  21<L>  |  0.56    Ca    9.1      13 Nov 2019 05:30  Phos  2.9     11-12  Mg     2.3     11-13      MICROBIOLOGY:  FECES  11-11-19 --  --  --      BLOOD  11-10-19 --  --  --      BLOOD PERIPHERAL  11-08-19 --  --  --      SPUTUM  11-05-19 --  --  Enterobacter cloacae      BLOOD VENOUS  11-05-19 --  --  --      BLOOD VENOUS  10-30-19 --  --  --      BRONCHIAL LAVAGE  10-30-19 --  --  Staphylococcus aureus      BLOOD PERIPHERAL  10-30-19 --  --  BLOOD CULTURE PCR  Staphylococcus sp.,coag neg      .Blood Blood  10-29-19   No growth at 5 days.  --  --      .Blood Blood-Peripheral  10-27-19   No growth at 5 days.  --  --    Vancomycin Level, Trough: 12.4 ug/mL (11-13-19 @ 05:30)      RADIOLOGY:      Shan Preciado MD; Division of Infectious Disease; Pager: [phone]; nights and weekends: [phone]

## 2019-11-13 NOTE — PROGRESS NOTE ADULT - SUBJECTIVE AND OBJECTIVE BOX
CHIEF COMPLAINT: Patient is a 54y old  Male who presents with a chief complaint of ARDS (12 Nov 2019 13:41)      Interval Events: agitated over night     REVIEW OF SYSTEMS:  Constitutional:   Eyes:  ENT:  CV:  Resp:  GI:  :  MSK:  Integumentary:  Neurological:  Psychiatric:  Endocrine:  Hematologic/Lymphatic:  Allergic/Immunologic:  [ ] All other systems negative  [ ] Unable to assess ROS because ________    OBJECTIVE:  ICU Vital Signs Last 24 Hrs  T(C): 37.2 (13 Nov 2019 05:52), Max: 38.3 (12 Nov 2019 13:36)  T(F): 99 (13 Nov 2019 05:52), Max: 101 (12 Nov 2019 13:36)  HR: 67 (13 Nov 2019 08:22) (67 - 95)  BP: 153/90 (13 Nov 2019 05:52) (139/62 - 153/90)  BP(mean): --  ABP: --  ABP(mean): --  RR: 20 (13 Nov 2019 08:22) (19 - 20)  SpO2: 100% (13 Nov 2019 08:22) (95% - 100%)        11-12 @ 07:01  -  11-13 @ 07:00  --------------------------------------------------------  IN: 3410 mL / OUT: 1000 mL / NET: 2410 mL      CAPILLARY BLOOD GLUCOSE      POCT Blood Glucose.: 136 mg/dL (13 Nov 2019 07:54)        HOSPITAL MEDICATIONS:  MEDICATIONS  (STANDING):  amLODIPine   Tablet 10 milliGRAM(s) Oral daily  artificial tears (preservative free) Ophthalmic Solution 1 Drop(s) Both EYES two times a day  cefepime   IVPB 2000 milliGRAM(s) IV Intermittent every 8 hours  dextrose 5%. 1000 milliLiter(s) (50 mL/Hr) IV Continuous <Continuous>  dextrose 5%. 1000 milliLiter(s) (50 mL/Hr) IV Continuous <Continuous>  dextrose 50% Injectable 12.5 Gram(s) IV Push once  enoxaparin Injectable 60 milliGRAM(s) SubCutaneous daily  fentaNYL   Patch  25 MICROgram(s)/Hr 1 Patch Transdermal every 72 hours  insulin lispro (HumaLOG) corrective regimen sliding scale   SubCutaneous three times a day before meals  insulin lispro (HumaLOG) corrective regimen sliding scale   SubCutaneous at bedtime  metoprolol tartrate 50 milliGRAM(s) Oral two times a day  nystatin Powder 1 Application(s) Topical every 12 hours  petrolatum Ophthalmic Ointment 1 Application(s) Both EYES two times a day  potassium chloride   Powder 40 milliEquivalent(s) Oral every 4 hours  vancomycin  IVPB      vancomycin  IVPB 1250 milliGRAM(s) IV Intermittent every 8 hours    MEDICATIONS  (PRN):  acetaminophen    Suspension .. 650 milliGRAM(s) Oral every 6 hours PRN Temp greater or equal to 38C (100.4F), Mild Pain (1 - 3), Moderate Pain (4 - 6), Severe Pain (7 - 10)  artificial  tears Solution 1 Drop(s) Both EYES two times a day PRN Dry Eyes  dextrose 40% Gel 15 Gram(s) Oral once PRN Blood Glucose LESS THAN 70 milliGRAM(s)/deciliter  glucagon  Injectable 1 milliGRAM(s) IntraMuscular once PRN Glucose LESS THAN 70 milligrams/deciliter  haloperidol    Injectable 2.5 milliGRAM(s) IV Push every 6 hours PRN Agitation  ibuprofen  Suspension. 400 milliGRAM(s) Oral every 8 hours PRN Temp greater or equal to 38C (100.4F)      LABS:                        9.9    8.28  )-----------( 250      ( 13 Nov 2019 05:30 )             29.5     11-13    143  |  109<H>  |  13  ----------------------------<  121<H>  3.4<L>   |  21<L>  |  0.56    Ca    9.1      13 Nov 2019 05:30  Phos  2.9     11-12  Mg     2.3     11-13            Venous Blood Gas:  11-12 @ 06:14  7.44/35/52/24/85.4  VBG Lactate: 2.0      MICROBIOLOGY:     RADIOLOGY:  [ ] Reviewed and interpreted by me    PULMONARY FUNCTION TESTS:    EKG: CHIEF COMPLAINT: Patient is a 54y old  Male who presents with a chief complaint of ARDS (12 Nov 2019 13:41)      Interval Events: agitated over night     REVIEW OF SYSTEMS:  Constitutional: baseline confusion /disoreinted   CV: Denies   Resp: Denies   GI: denies     [x ] All other systems negative  [ ] Unable to assess ROS because ________    OBJECTIVE:  ICU Vital Signs Last 24 Hrs  T(C): 37.2 (13 Nov 2019 05:52), Max: 38.3 (12 Nov 2019 13:36)  T(F): 99 (13 Nov 2019 05:52), Max: 101 (12 Nov 2019 13:36)  HR: 67 (13 Nov 2019 08:22) (67 - 95)  BP: 153/90 (13 Nov 2019 05:52) (139/62 - 153/90)  BP(mean): --  ABP: --  ABP(mean): --  RR: 20 (13 Nov 2019 08:22) (19 - 20)  SpO2: 100% (13 Nov 2019 08:22) (95% - 100%)        11-12 @ 07:01  -  11-13 @ 07:00  --------------------------------------------------------  IN: 3410 mL / OUT: 1000 mL / NET: 2410 mL      CAPILLARY BLOOD GLUCOSE      POCT Blood Glucose.: 136 mg/dL (13 Nov 2019 07:54)        HOSPITAL MEDICATIONS:  MEDICATIONS  (STANDING):  amLODIPine   Tablet 10 milliGRAM(s) Oral daily  artificial tears (preservative free) Ophthalmic Solution 1 Drop(s) Both EYES two times a day  cefepime   IVPB 2000 milliGRAM(s) IV Intermittent every 8 hours  dextrose 5%. 1000 milliLiter(s) (50 mL/Hr) IV Continuous <Continuous>  dextrose 5%. 1000 milliLiter(s) (50 mL/Hr) IV Continuous <Continuous>  dextrose 50% Injectable 12.5 Gram(s) IV Push once  enoxaparin Injectable 60 milliGRAM(s) SubCutaneous daily  fentaNYL   Patch  25 MICROgram(s)/Hr 1 Patch Transdermal every 72 hours  insulin lispro (HumaLOG) corrective regimen sliding scale   SubCutaneous three times a day before meals  insulin lispro (HumaLOG) corrective regimen sliding scale   SubCutaneous at bedtime  metoprolol tartrate 50 milliGRAM(s) Oral two times a day  nystatin Powder 1 Application(s) Topical every 12 hours  petrolatum Ophthalmic Ointment 1 Application(s) Both EYES two times a day  potassium chloride   Powder 40 milliEquivalent(s) Oral every 4 hours  vancomycin  IVPB      vancomycin  IVPB 1250 milliGRAM(s) IV Intermittent every 8 hours    MEDICATIONS  (PRN):  acetaminophen    Suspension .. 650 milliGRAM(s) Oral every 6 hours PRN Temp greater or equal to 38C (100.4F), Mild Pain (1 - 3), Moderate Pain (4 - 6), Severe Pain (7 - 10)  artificial  tears Solution 1 Drop(s) Both EYES two times a day PRN Dry Eyes  dextrose 40% Gel 15 Gram(s) Oral once PRN Blood Glucose LESS THAN 70 milliGRAM(s)/deciliter  glucagon  Injectable 1 milliGRAM(s) IntraMuscular once PRN Glucose LESS THAN 70 milligrams/deciliter  haloperidol    Injectable 2.5 milliGRAM(s) IV Push every 6 hours PRN Agitation  ibuprofen  Suspension. 400 milliGRAM(s) Oral every 8 hours PRN Temp greater or equal to 38C (100.4F)      LABS:                        9.9    8.28  )-----------( 250      ( 13 Nov 2019 05:30 )             29.5     11-13    143  |  109<H>  |  13  ----------------------------<  121<H>  3.4<L>   |  21<L>  |  0.56    Ca    9.1      13 Nov 2019 05:30  Phos  2.9     11-12  Mg     2.3     11-13            Venous Blood Gas:  11-12 @ 06:14  7.44/35/52/24/85.4  VBG Lactate: 2.0      MICROBIOLOGY:     RADIOLOGY:  [ ] Reviewed and interpreted by me    PULMONARY FUNCTION TESTS:    EKG:

## 2019-11-13 NOTE — PROGRESS NOTE BEHAVIORAL HEALTH - NSBHFUPINTERVALHXFT_PSY_A_CORE
met with the patient. Does not recognize wife, disoriented, confused, no tremors, or rigidity.  Care coordinated with wife in detail, and below plan was discussed and agreed upon.

## 2019-11-13 NOTE — PROGRESS NOTE ADULT - PROBLEM SELECTOR PLAN 3
- hx of severe depression refractory to multiple medications/ECT with prior psych hospitalizations  - concern for poss withdrawal seizure at previous hospital, no seizures noted here  - CT head and EEG (at previous hospital) negative   - psych following  - cont haldol PRN, though pt is not agitated currently - hx of severe depression refractory to multiple medications/ECT with prior psych hospitalizations  - concern for poss withdrawal seizure at previous hospital, no seizures noted here  - CT head and EEG (at previous hospital) negative   - psych following- no antidepressants yet   -  seroquel restarted bid and will titrate /also prn

## 2019-11-13 NOTE — PROGRESS NOTE ADULT - PROBLEM SELECTOR PLAN 1
- severe hypoxemic respiratory failure likely severe ARDS with P/F cannulated for VV-ECMO on 10/30 possibly related to aspiration pneumonia after possible benzo withdrawal seizure  - decannulated 11/1, extubated on 11/5  - tolerating NC currently, weaning nasal cannula now 2L   - will need a lot of pulmonary toilet unable to tolerate metaneb - chest vest started   - chest PT, metanebs, suction PRN

## 2019-11-13 NOTE — PROGRESS NOTE BEHAVIORAL HEALTH - NSBHCHARTREVIEWLAB_PSY_A_CORE FT
CBC Full  -  ( 13 Nov 2019 05:30 )  WBC Count : 8.28 K/uL  RBC Count : 3.10 M/uL  Hemoglobin : 9.9 g/dL  Hematocrit : 29.5 %  Platelet Count - Automated : 250 K/uL  Mean Cell Volume : 95.2 fL  Mean Cell Hemoglobin : 31.9 pg  Mean Cell Hemoglobin Concentration : 33.6 %  Auto Neutrophil # : x  Auto Lymphocyte # : x  Auto Monocyte # : x  Auto Eosinophil # : x  Auto Basophil # : x  Auto Neutrophil % : x  Auto Lymphocyte % : x  Auto Monocyte % : x  Auto Eosinophil % : x  Auto Basophil % : x  11-13    143  |  109<H>  |  13  ----------------------------<  121<H>  3.4<L>   |  21<L>  |  0.56    Ca    9.1      13 Nov 2019 05:30  Phos  2.9     11-12  Mg     2.3     11-13

## 2019-11-13 NOTE — PROGRESS NOTE BEHAVIORAL HEALTH - NSBHFUPINTERVALCCFT_PSY_A_CORE
confused, disoriented, and was agitated last night- attempting to pull out tubings. Moved closer to rn station

## 2019-11-13 NOTE — PROGRESS NOTE ADULT - ASSESSMENT
54M with severe depression who presented to  10/28 with lethargy and hypoxemic/hypercapnic respiratory failure possibly related to overdose +/- pneumonia extubated but required reintubation 10/30 but with persistent hypoxemia refractory to conventional management and ultimately cannulated for VV ECMO 10/30/2019 for hypoxemic respiratory failure with ARDS and transferred to [facility] for further management. Decannulated 11/1, now extubated to BIPAP.

## 2019-11-13 NOTE — PROGRESS NOTE ADULT - ATTENDING COMMENTS
Was on ECMO for respiratory failure  Now on NC  Was febrile again persistently, pansensitive enterobacter now on cefepime and vancomycin  Recent cultures negative   Chest PT, pulm hygiene  Hypernatremia improved   Frequent loose BMs, C.diff negative  Psych to see -- has history of severe depression  Mental status is waxing and waning but overall he has been more alert

## 2019-11-13 NOTE — PROGRESS NOTE BEHAVIORAL HEALTH - ADDITIONAL DETAILS / COMMENTS
unable to assess mse or mmse
still unable to do mmse or mse
unable to assess mse or mmse
unable to assess mse or mmse
unable to fully complete mse or mmse due to mentation

## 2019-11-13 NOTE — PROGRESS NOTE ADULT - PROBLEM SELECTOR PLAN 5
receiving free water via ngt   - will start short course of D5W and recheck bmp after 6 hours  - Na normalized receiving free water via ngt then pt pulled out NGT x 2   - po intake improved   - will start short course of D5W and recheck bmp after 6 hours again for free water missed   - Na normalized this am   - continue po intake of thickened water - follow labs

## 2019-11-14 LAB
ANION GAP SERPL CALC-SCNC: 12 MMO/L — SIGNIFICANT CHANGE UP (ref 7–14)
BUN SERPL-MCNC: 10 MG/DL — SIGNIFICANT CHANGE UP (ref 7–23)
CALCIUM SERPL-MCNC: 8.9 MG/DL — SIGNIFICANT CHANGE UP (ref 8.4–10.5)
CHLORIDE SERPL-SCNC: 109 MMOL/L — HIGH (ref 98–107)
CO2 SERPL-SCNC: 22 MMOL/L — SIGNIFICANT CHANGE UP (ref 22–31)
CREAT SERPL-MCNC: 0.55 MG/DL — SIGNIFICANT CHANGE UP (ref 0.5–1.3)
GLUCOSE BLDC GLUCOMTR-MCNC: 105 MG/DL — HIGH (ref 70–99)
GLUCOSE BLDC GLUCOMTR-MCNC: 119 MG/DL — HIGH (ref 70–99)
GLUCOSE SERPL-MCNC: 109 MG/DL — HIGH (ref 70–99)
HCT VFR BLD CALC: 30.5 % — LOW (ref 39–50)
HGB BLD-MCNC: 9.9 G/DL — LOW (ref 13–17)
MAGNESIUM SERPL-MCNC: 2.3 MG/DL — SIGNIFICANT CHANGE UP (ref 1.6–2.6)
MCHC RBC-ENTMCNC: 31.4 PG — SIGNIFICANT CHANGE UP (ref 27–34)
MCHC RBC-ENTMCNC: 32.5 % — SIGNIFICANT CHANGE UP (ref 32–36)
MCV RBC AUTO: 96.8 FL — SIGNIFICANT CHANGE UP (ref 80–100)
NRBC # FLD: 0 K/UL — SIGNIFICANT CHANGE UP (ref 0–0)
PHOSPHATE SERPL-MCNC: 3.3 MG/DL — SIGNIFICANT CHANGE UP (ref 2.5–4.5)
PLATELET # BLD AUTO: 267 K/UL — SIGNIFICANT CHANGE UP (ref 150–400)
PMV BLD: 9.5 FL — SIGNIFICANT CHANGE UP (ref 7–13)
POTASSIUM SERPL-MCNC: 3.6 MMOL/L — SIGNIFICANT CHANGE UP (ref 3.5–5.3)
POTASSIUM SERPL-SCNC: 3.6 MMOL/L — SIGNIFICANT CHANGE UP (ref 3.5–5.3)
RBC # BLD: 3.15 M/UL — LOW (ref 4.2–5.8)
RBC # FLD: 13.6 % — SIGNIFICANT CHANGE UP (ref 10.3–14.5)
SODIUM SERPL-SCNC: 143 MMOL/L — SIGNIFICANT CHANGE UP (ref 135–145)
WBC # BLD: 7.43 K/UL — SIGNIFICANT CHANGE UP (ref 3.8–10.5)
WBC # FLD AUTO: 7.43 K/UL — SIGNIFICANT CHANGE UP (ref 3.8–10.5)

## 2019-11-14 PROCEDURE — 93970 EXTREMITY STUDY: CPT | Mod: 26

## 2019-11-14 PROCEDURE — 99232 SBSQ HOSP IP/OBS MODERATE 35: CPT

## 2019-11-14 PROCEDURE — 99233 SBSQ HOSP IP/OBS HIGH 50: CPT | Mod: GC

## 2019-11-14 PROCEDURE — 99232 SBSQ HOSP IP/OBS MODERATE 35: CPT | Mod: GC

## 2019-11-14 PROCEDURE — 99233 SBSQ HOSP IP/OBS HIGH 50: CPT

## 2019-11-14 RX ORDER — METOPROLOL TARTRATE 50 MG
75 TABLET ORAL
Refills: 0 | Status: DISCONTINUED | OUTPATIENT
Start: 2019-11-14 | End: 2019-11-18

## 2019-11-14 RX ORDER — POTASSIUM CHLORIDE 20 MEQ
40 PACKET (EA) ORAL EVERY 4 HOURS
Refills: 0 | Status: COMPLETED | OUTPATIENT
Start: 2019-11-14 | End: 2019-11-14

## 2019-11-14 RX ORDER — QUETIAPINE FUMARATE 200 MG/1
75 TABLET, FILM COATED ORAL
Refills: 0 | Status: DISCONTINUED | OUTPATIENT
Start: 2019-11-14 | End: 2019-11-18

## 2019-11-14 RX ORDER — FENTANYL CITRATE 50 UG/ML
1 INJECTION INTRAVENOUS
Refills: 0 | Status: DISCONTINUED | OUTPATIENT
Start: 2019-11-15 | End: 2019-11-15

## 2019-11-14 RX ORDER — PSYLLIUM SEED (WITH DEXTROSE)
1 POWDER (GRAM) ORAL DAILY
Refills: 0 | Status: DISCONTINUED | OUTPATIENT
Start: 2019-11-14 | End: 2019-11-18

## 2019-11-14 RX ORDER — LACTOBACILLUS ACIDOPHILUS 100MM CELL
1 CAPSULE ORAL THREE TIMES A DAY
Refills: 0 | Status: DISCONTINUED | OUTPATIENT
Start: 2019-11-14 | End: 2019-11-18

## 2019-11-14 RX ADMIN — NYSTATIN CREAM 1 APPLICATION(S): 100000 CREAM TOPICAL at 06:04

## 2019-11-14 RX ADMIN — Medication 40 MILLIEQUIVALENT(S): at 13:13

## 2019-11-14 RX ADMIN — FENTANYL CITRATE 1 PATCH: 50 INJECTION INTRAVENOUS at 07:54

## 2019-11-14 RX ADMIN — Medication 50 MILLIGRAM(S): at 06:04

## 2019-11-14 RX ADMIN — FENTANYL CITRATE 1 PATCH: 50 INJECTION INTRAVENOUS at 19:17

## 2019-11-14 RX ADMIN — Medication 40 MILLIEQUIVALENT(S): at 11:19

## 2019-11-14 RX ADMIN — Medication 1 PACKET(S): at 13:13

## 2019-11-14 RX ADMIN — QUETIAPINE FUMARATE 75 MILLIGRAM(S): 200 TABLET, FILM COATED ORAL at 18:46

## 2019-11-14 RX ADMIN — AMLODIPINE BESYLATE 10 MILLIGRAM(S): 2.5 TABLET ORAL at 06:04

## 2019-11-14 RX ADMIN — Medication 1 TABLET(S): at 22:21

## 2019-11-14 RX ADMIN — ENOXAPARIN SODIUM 60 MILLIGRAM(S): 100 INJECTION SUBCUTANEOUS at 11:19

## 2019-11-14 RX ADMIN — NYSTATIN CREAM 1 APPLICATION(S): 100000 CREAM TOPICAL at 18:46

## 2019-11-14 RX ADMIN — CHLORHEXIDINE GLUCONATE 1 APPLICATION(S): 213 SOLUTION TOPICAL at 11:20

## 2019-11-14 RX ADMIN — QUETIAPINE FUMARATE 50 MILLIGRAM(S): 200 TABLET, FILM COATED ORAL at 06:04

## 2019-11-14 RX ADMIN — Medication 1 TABLET(S): at 13:13

## 2019-11-14 RX ADMIN — Medication 75 MILLIGRAM(S): at 18:46

## 2019-11-14 RX ADMIN — Medication 1 TABLET(S): at 11:19

## 2019-11-14 NOTE — PROGRESS NOTE BEHAVIORAL HEALTH - NSBHCHARTREVIEWLAB_PSY_A_CORE FT
CBC Full  -  ( 14 Nov 2019 06:45 )  WBC Count : 7.43 K/uL  RBC Count : 3.15 M/uL  Hemoglobin : 9.9 g/dL  Hematocrit : 30.5 %  Platelet Count - Automated : 267 K/uL  Mean Cell Volume : 96.8 fL  Mean Cell Hemoglobin : 31.4 pg  Mean Cell Hemoglobin Concentration : 32.5 %  Auto Neutrophil # : x  Auto Lymphocyte # : x  Auto Monocyte # : x  Auto Eosinophil # : x  Auto Basophil # : x  Auto Neutrophil % : x  Auto Lymphocyte % : x  Auto Monocyte % : x  Auto Eosinophil % : x  Auto Basophil % : x  11-14    143  |  109<H>  |  10  ----------------------------<  109<H>  3.6   |  22  |  0.55    Ca    8.9      14 Nov 2019 06:45  Phos  3.3     11-14  Mg     2.3     11-14

## 2019-11-14 NOTE — SWALLOW BEDSIDE ASSESSMENT ADULT - SWALLOW EVAL: RECOMMENDED FEEDING/EATING TECHNIQUES
position upright (90 degrees)/small sips/bites/maintain upright posture during/after eating for 30 mins/oral hygiene

## 2019-11-14 NOTE — PROGRESS NOTE ADULT - MENTAL STATUS
alert, oriented x2-3  follows commands, responds appropriately
alert, oriented x2-3  follows commands and responds appropriately
alert, oriented x2-3  follows commands and responds appropriately
lethargic, responds to name and pain  does not follow all commands
More awake confused to time /place
Eyes open, intermittent tracking, not verbal
Awake alert/agitated pulled out NGT x 2

## 2019-11-14 NOTE — PROGRESS NOTE ADULT - ASSESSMENT
54M with severe depression who presented to  10/28 with lethargy and hypoxemic/hypercapnic respiratory failure possibly related to overdose +/- pneumonia extubated but required reintubation 10/30 but with persistent hypoxemia refractory to conventional management and ultimately cannulated for VV ECMO 10/30/2019 for hypoxemic respiratory failure with ARDS and transferred to [facility] for further management. Decannulated 11/1, now extubated.

## 2019-11-14 NOTE — PROGRESS NOTE ADULT - ASSESSMENT
54M with severe MDD with prior psych admissions/ECT who was initially admitted to  10/28/19 after apparent Barbituate overdose with respiratory depression, ARDS. Patient admitted to [facility] on 10/30/19 for ECMO, respiratory support.  Decannulated 11/1/19  Extubated 11/5/19  Airway colonized with MSSA and Enterobacter aerogenes though most recent sputum gram stain unimpressive.  Fever - afebrile since 11/12 - 1336  leukocytosis resolved  Blood cultures negative to date  Legionella urine ag negative  11/9: HIV negative   fever resolved  cultures negative, Cdiff negative  improved off antibiotics  improving non infectious lung injury      Antibiotics  Ceftriaxone 10/28 -->29  Azithromycin 10/28, 10/30 -->11/2  Vanco 10/20 -->11/5; 11/10--> 11/13  Zosyn 10/30 --> 11/5  Cefepime 10/29 --> 30 11/7 -->11/8-->11/13  Ertapenem 11/8    Suggest  Maintain off antibiotics  Pneumococcal/Influenza vaccinations if not up to date    signing off case  Please reconsult ID if needed

## 2019-11-14 NOTE — PROGRESS NOTE ADULT - SUBJECTIVE AND OBJECTIVE BOX
CHIEF COMPLAINT: Patient is a 54y old  Male who presents with a chief complaint of ARDS (13 Nov 2019 14:11)    Interval Events:      REVIEW OF SYSTEMS:  Constitutional:   Eyes:  ENT:  CV:  Resp:  GI:  :  MSK:  Integumentary:  Neurological:  Psychiatric:  Endocrine:  Hematologic/Lymphatic:  Allergic/Immunologic:  [ ] All other systems negative  [ ] Unable to assess ROS because ________      OBJECTIVE:  ICU Vital Signs Last 24 Hrs  T(C): 36.8 (14 Nov 2019 05:58), Max: 37.7 (13 Nov 2019 14:00)  T(F): 98.3 (14 Nov 2019 05:58), Max: 99.9 (13 Nov 2019 14:00)  HR: 74 (14 Nov 2019 05:58) (67 - 106)  BP: 141/67 (14 Nov 2019 05:58) (140/93 - 178/90)  BP(mean): --  ABP: --  ABP(mean): --  RR: 18 (14 Nov 2019 05:58) (18 - 20)  SpO2: 99% (14 Nov 2019 05:58) (96% - 100%)    11-13 @ 07:01  -  11-14 @ 07:00  --------------------------------------------------------  IN: 2530 mL / OUT: 750 mL / NET: 1780 mL    POCT Blood Glucose.: 110 mg/dL (13 Nov 2019 21:20)    HOSPITAL MEDICATIONS:  MEDICATIONS  (STANDING):  amLODIPine   Tablet 10 milliGRAM(s) Oral daily  chlorhexidine 4% Liquid 1 Application(s) Topical daily  dextrose 5%. 1000 milliLiter(s) (50 mL/Hr) IV Continuous <Continuous>  dextrose 5%. 1000 milliLiter(s) (50 mL/Hr) IV Continuous <Continuous>  dextrose 5%. 1000 milliLiter(s) (50 mL/Hr) IV Continuous <Continuous>  dextrose 50% Injectable 12.5 Gram(s) IV Push once  enoxaparin Injectable 60 milliGRAM(s) SubCutaneous daily  fentaNYL   Patch  25 MICROgram(s)/Hr 1 Patch Transdermal every 72 hours  insulin lispro (HumaLOG) corrective regimen sliding scale   SubCutaneous three times a day before meals  insulin lispro (HumaLOG) corrective regimen sliding scale   SubCutaneous at bedtime  metoprolol tartrate 75 milliGRAM(s) Oral two times a day  nystatin Powder 1 Application(s) Topical every 12 hours  QUEtiapine 50 milliGRAM(s) Oral <User Schedule>    MEDICATIONS  (PRN):  acetaminophen    Suspension .. 650 milliGRAM(s) Oral every 6 hours PRN Temp greater or equal to 38C (100.4F), Mild Pain (1 - 3), Moderate Pain (4 - 6), Severe Pain (7 - 10)  artificial  tears Solution 1 Drop(s) Both EYES two times a day PRN Dry Eyes  dextrose 40% Gel 15 Gram(s) Oral once PRN Blood Glucose LESS THAN 70 milliGRAM(s)/deciliter  glucagon  Injectable 1 milliGRAM(s) IntraMuscular once PRN Glucose LESS THAN 70 milligrams/deciliter  haloperidol    Injectable 2.5 milliGRAM(s) IV Push every 6 hours PRN Agitation  ibuprofen  Suspension. 400 milliGRAM(s) Oral every 8 hours PRN Temp greater or equal to 38C (100.4F)  QUEtiapine 50 milliGRAM(s) Oral every 6 hours PRN agitation      LABS:                        9.9    7.43  )-----------( 267      ( 14 Nov 2019 06:45 )             30.5     11-13    142  |  109<H>  |  11  ----------------------------<  122<H>  3.6   |  22  |  0.55    Ca    8.9      13 Nov 2019 21:15  Mg     2.3     11-13                MICROBIOLOGY:     RADIOLOGY:  [ ] Reviewed and interpreted by me    PULMONARY FUNCTION TESTS:    EKG: CHIEF COMPLAINT: Patient is a 54y old  Male who presents with a chief complaint of ARDS (13 Nov 2019 14:11)    Interval Events: none overnight - awake and alert this am       REVIEW OF SYSTEMS:  Constitutional: no complaints   CV: denies   Resp: denies   GI: denies   [x] All other systems negative  [ ] Unable to assess ROS because ________      OBJECTIVE:  ICU Vital Signs Last 24 Hrs  T(C): 36.8 (14 Nov 2019 05:58), Max: 37.7 (13 Nov 2019 14:00)  T(F): 98.3 (14 Nov 2019 05:58), Max: 99.9 (13 Nov 2019 14:00)  HR: 74 (14 Nov 2019 05:58) (67 - 106)  BP: 141/67 (14 Nov 2019 05:58) (140/93 - 178/90)  BP(mean): --  ABP: --  ABP(mean): --  RR: 18 (14 Nov 2019 05:58) (18 - 20)  SpO2: 99% (14 Nov 2019 05:58) (96% - 100%)    11-13 @ 07:01  -  11-14 @ 07:00  --------------------------------------------------------  IN: 2530 mL / OUT: 750 mL / NET: 1780 mL    POCT Blood Glucose.: 110 mg/dL (13 Nov 2019 21:20)    HOSPITAL MEDICATIONS:  MEDICATIONS  (STANDING):  amLODIPine   Tablet 10 milliGRAM(s) Oral daily  chlorhexidine 4% Liquid 1 Application(s) Topical daily  dextrose 5%. 1000 milliLiter(s) (50 mL/Hr) IV Continuous <Continuous>  dextrose 5%. 1000 milliLiter(s) (50 mL/Hr) IV Continuous <Continuous>  dextrose 5%. 1000 milliLiter(s) (50 mL/Hr) IV Continuous <Continuous>  dextrose 50% Injectable 12.5 Gram(s) IV Push once  enoxaparin Injectable 60 milliGRAM(s) SubCutaneous daily  fentaNYL   Patch  25 MICROgram(s)/Hr 1 Patch Transdermal every 72 hours  insulin lispro (HumaLOG) corrective regimen sliding scale   SubCutaneous three times a day before meals  insulin lispro (HumaLOG) corrective regimen sliding scale   SubCutaneous at bedtime  metoprolol tartrate 75 milliGRAM(s) Oral two times a day  nystatin Powder 1 Application(s) Topical every 12 hours  QUEtiapine 50 milliGRAM(s) Oral <User Schedule>    MEDICATIONS  (PRN):  acetaminophen    Suspension .. 650 milliGRAM(s) Oral every 6 hours PRN Temp greater or equal to 38C (100.4F), Mild Pain (1 - 3), Moderate Pain (4 - 6), Severe Pain (7 - 10)  artificial  tears Solution 1 Drop(s) Both EYES two times a day PRN Dry Eyes  dextrose 40% Gel 15 Gram(s) Oral once PRN Blood Glucose LESS THAN 70 milliGRAM(s)/deciliter  glucagon  Injectable 1 milliGRAM(s) IntraMuscular once PRN Glucose LESS THAN 70 milligrams/deciliter  haloperidol    Injectable 2.5 milliGRAM(s) IV Push every 6 hours PRN Agitation  ibuprofen  Suspension. 400 milliGRAM(s) Oral every 8 hours PRN Temp greater or equal to 38C (100.4F)  QUEtiapine 50 milliGRAM(s) Oral every 6 hours PRN agitation      LABS:                        9.9    7.43  )-----------( 267      ( 14 Nov 2019 06:45 )             30.5     Basic Metabolic Panel w/Mg &amp; Inorg Phos (11.14.19 @ 06:45)    Sodium, Serum: 143 mmol/L    Potassium, Serum: 3.6 mmol/L    Chloride, Serum: 109 mmol/L    Carbon Dioxide, Serum: 22 mmol/L    Anion Gap, Serum: 12 mmo/L    Blood Urea Nitrogen, Serum: 10 mg/dL    Creatinine, Serum: 0.55 mg/dL    Glucose, Serum: 109 mg/dL    Calcium, Total Serum: 8.9 mg/dL    Magnesium, Serum: 2.3 mg/dL    Phosphorus Level, Serum: 3.3 mg/dL    eGFR if Non : 116: The units for eGFR are ml/min/1.73m2 (normalized body  surface area). The eGFR is calculated from a serum  creatinine using the CKD-EPI equation. Other variables  required for calculation are race, age and sex. Among  patients with chronic kidney disease (CKD), the eGFR is  useful in determining the stage of disease according to  KDOQI CKD classification. All eGFR results are reported  numerically with the following interpretation.    GFR  (ml/min/1.73 m2)          W/KIDNEY DAMAGE    W/O KIDNEY DMG  ==========================================================  >= 90.......................Stage 1..............Normal  60-89.......................Stage 2...........Decreased GFR  30-59.......................Stage 3..............Stage 3  15-29.......................Stage 4..............Stage 4  < 15........................Stage 5..............Stage 5    Each stage of CKD assumes that the associated GFR level  has been in effect for at least 3 months. Determination of  stages one and two (with eGFR > 59ml/min/m2) requires  estimation of kidney damage for at least 3 months as  defined by structural or functional abnormalities.    Limitations: All estimates of GFR will be less accurate  for patients at extremes of muscle mass (including but  not limited to frail elderly, critically ill, or cancer  patients), those with unusual diets, and those with  conditions associated with reduced secretion or  extrarenal elimination of creatinine. The eGFR equation  is not recommended for use in patients with unstable  creatinine levels. mL/min    eGFR if : 135 mL/min      Culture - Blood (11.10.19 @ 12:35)    Culture - Blood:   NO ORGANISMS ISOLATED  NO ORGANISMS ISOLATED AT 72 HRS.    Specimen Source: BLOOD    Culture - Blood (11.08.19 @ 07:55)    Culture - Blood:   NO ORGANISMS ISOLATED    Specimen Source: BLOOD VENOUS

## 2019-11-14 NOTE — PROGRESS NOTE ADULT - SUBJECTIVE AND OBJECTIVE BOX
Follow Up:  ARDS    Interval History/ROS: no distress, eating, wife at bedside pleased with his progress    Allergies  No Known Allergies        ANTIMICROBIALS:      OTHER MEDS:  MEDICATIONS  (STANDING):  acetaminophen    Suspension .. 650 every 6 hours PRN  amLODIPine   Tablet 10 daily  enoxaparin Injectable 60 daily  fentaNYL   Patch  25 MICROgram(s)/Hr 1 every 72 hours  glucagon  Injectable 1 once PRN  haloperidol    Injectable 2.5 every 6 hours PRN  ibuprofen  Suspension. 400 every 8 hours PRN  metoprolol tartrate 75 two times a day  psyllium Powder 1 daily  QUEtiapine 75 <User Schedule>  QUEtiapine 50 every 6 hours PRN      Vital Signs Last 24 Hrs  T(C): 37.2 (14 Nov 2019 13:24), Max: 37.2 (14 Nov 2019 13:24)  T(F): 98.9 (14 Nov 2019 13:24), Max: 98.9 (14 Nov 2019 13:24)  HR: 114 (14 Nov 2019 13:24) (74 - 114)  BP: 160/90 (14 Nov 2019 13:24) (140/93 - 178/90)  BP(mean): --  RR: 20 (14 Nov 2019 13:24) (18 - 20)  SpO2: 95% (14 Nov 2019 13:24) (95% - 100%)    PHYSICAL EXAM:  General: WN/WD NAD, Non-toxic, NG feeding tube has been removed  Neurology: A&Ox3, nonfocal  Respiratory: Clear to auscultation bilaterally, no resp distress, fine crackles  CV: RRR, S1S2, no murmurs, rubs or gallops  Abdominal: Soft, Non-tender, non-distended, normal bowel sounds  Extremities: No edema, + peripheral pulses  Line Sites: Clear  Skin: No rash                        9.9    7.43  )-----------( 267      ( 14 Nov 2019 06:45 )             30.5   WBC Count: 7.43 (11-14 @ 06:45)  WBC Count: 8.28 (11-13 @ 05:30)  WBC Count: 14.42 (11-12 @ 06:14)  WBC Count: 12.21 (11-11 @ 06:15)       11-14    143  |  109<H>  |  10  ----------------------------<  109<H>  3.6   |  22  |  0.55    Ca    8.9      14 Nov 2019 06:45  Phos  3.3     11-14  Mg     2.3     11-14      MICROBIOLOGY:  FECES  11-11-19 --  --  --      BLOOD  11-10-19 --  --  --      BLOOD PERIPHERAL  11-08-19 --  --  --      SPUTUM  11-05-19 --  --  Enterobacter cloacae      BLOOD VENOUS  11-05-19 --  --  --      BLOOD VENOUS  10-30-19 --  --  --      BRONCHIAL LAVAGE  10-30-19 --  --  Staphylococcus aureus      BLOOD PERIPHERAL  10-30-19 --  --  BLOOD CULTURE PCR  Staphylococcus sp.,coag neg      .Blood Blood  10-29-19   No growth at 5 days.  --  --      .Blood Blood-Peripheral  10-27-19   No growth at 5 days.  --  --    RADIOLOGY:  < from: US Duplex Venous Lower Ext Complete, Bilateral (11.14.19 @ 09:02) >  No evidence of deep venous thrombosis in either lower extremity.    < end of copied text >      Shan Preciado MD; Division of Infectious Disease; Pager: [phone]; nights and weekends: [phone]

## 2019-11-14 NOTE — PROGRESS NOTE ADULT - PROBLEM SELECTOR PLAN 6
- cdiff negative  - will add probiotics and metamucil for bulking  - has flexiseal currently, will try to d/c when diarrhea improves

## 2019-11-14 NOTE — PROGRESS NOTE ADULT - PROBLEM SELECTOR PLAN 3
- hx of severe depression refractory to multiple medications/ECT with prior psych hospitalizations  - concern for poss withdrawal seizure at previous hospital, no seizures noted here  - CT head and EEG (at previous hospital) negative   - psych following, seroquel restarted and pt tolerating

## 2019-11-14 NOTE — SWALLOW BEDSIDE ASSESSMENT ADULT - ADDITIONAL RECOMMENDATIONS
Monitor for PO tolerance/intake
1. Monitor for PO tolerance/intake  2. Reconsult this service for possible diet advancement when overall medical status improves

## 2019-11-14 NOTE — SWALLOW BEDSIDE ASSESSMENT ADULT - COMMENTS
Patient is a "54M with severe depression who presented to  10/28 with lethargy and hypoxemic/hypercapnic respiratory failure possibly related to overdose +/- pneumonia extubated but required reintubation 10/30 but with persistent hypoxemia refractory to conventional management and ultimately cannulated for VV ECMO 10/30/2019 for hypoxemic respiratory failure with ARDS and transferred to [facility] for further management. Decannulated on 11/1". As per chart review, patient extubated 11/6/19.     Clinical Bedside Swallow Evaluation completed on 11/7/19 recommended puree consistency and nectar thick liquids (See report for details).     Patient was seen upright at bedside with spouse present and nasal canula in place. Patient was alert/awake and verbally responsive to simple questions. Patient able to follow simple directions.
Patient is a "54M with severe depression who presented to  10/28 with lethargy and hypoxemic/hypercapnic respiratory failure possibly related to overdose +/- pneumonia extubated but required reintubation 10/30 but with persistent hypoxemia refractory to conventional management and ultimately cannulated for VV ECMO 10/30/2019 for hypoxemic respiratory failure with ARDS and transferred to [facility] for further management. Decannulated on 11/1". As per chart review, patient extubated 11/6/19. As per RN report, patient demonstrated coughing after thin liquids during dysphagia screen.     Patient seen upright at bedside with spouse and NGT in place. Patient was alert/awake with minimal vocalizations/ verbalizations. Patient able to follow simple directions.

## 2019-11-14 NOTE — SWALLOW BEDSIDE ASSESSMENT ADULT - SWALLOW EVAL: DIAGNOSIS
1. Functional oral phase for puree consistency, mechanical soft solids, regular solids, nectar thick liquids and thin liquids marked by adequate mastication for solids, adequate bolus manipulation and functional oral transit time 2. Functional pharyngeal phase for all consistencies presented marked by adequate laryngeal elevation upon digital palpation and NO overt s/s of laryngeal penetration/aspiration noted

## 2019-11-14 NOTE — PROGRESS NOTE ADULT - PROBLEM SELECTOR PLAN 1
- severe hypoxemic respiratory failure likely severe ARDS with P/F cannulated for VV-ECMO on 10/30 possibly related to aspiration pneumonia after possible benzo withdrawal seizure  - decannulated 11/1, extubated on 11/5  - tolerating NC currently, weaning nasal cannula now 2L   - will need a lot of pulmonary toilet    - chest PT, suction PRN

## 2019-11-14 NOTE — PROGRESS NOTE ADULT - SUBJECTIVE AND OBJECTIVE BOX
Subjective: Patient seen with wife at bedside. Patient reports that he is getting better, but wants to be able to get out of bed and move around. Denies chest pain, SOB, or pain.     FUNCTIONAL PROGRESS 11/13/19    Therapeutic Interventions      Bed-Chair Transfer Training  Bed-to-Chair Transfer Training Symptoms Noted During/After Treatment: none  Transfer Training Chair-to-Bed Transfer: with use of total assist lift, 2-3 person assistance     Therapeutic Exercise  Therapeutic Exercise Rehab Effort: fair  Therapeutic Exercise Symptoms Noted During/After Treatment: none  Therapeutic Exercise Detail: Therapeutic exercise performed supine in bed; active assist ROM; bilateral knee flexion/extension 1 x 5, ankle pumps 1 x 5     REVIEW OF SYSTEMS  Constitutional - No fever,  +generalized fatigue  HEENT - No vertigo, No neck pain  Cardiology/Pulm - No chest pain, No SOB  Neurological - No headaches, +loss of strength, No tremors  Skin - No rashes, No itching  Musculoskeletal - No joint pain, No joint swelling, No muscle pain      VITALS  T(C): 36.8 (11-14-19 @ 05:58), Max: 37.7 (11-13-19 @ 14:00)  HR: 74 (11-14-19 @ 05:58) (74 - 106)  BP: 141/67 (11-14-19 @ 05:58) (140/93 - 178/90)  RR: 18 (11-14-19 @ 05:58) (18 - 20)  SpO2: 99% (11-14-19 @ 05:58) (96% - 100%)  Wt(kg): --    MEDICATIONS   acetaminophen    Suspension .. 650 milliGRAM(s) every 6 hours PRN  amLODIPine   Tablet 10 milliGRAM(s) daily  artificial  tears Solution 1 Drop(s) two times a day PRN  chlorhexidine 4% Liquid 1 Application(s) daily  enoxaparin Injectable 60 milliGRAM(s) daily  fentaNYL   Patch  25 MICROgram(s)/Hr 1 Patch every 72 hours  glucagon  Injectable 1 milliGRAM(s) once PRN  haloperidol    Injectable 2.5 milliGRAM(s) every 6 hours PRN  ibuprofen  Suspension. 400 milliGRAM(s) every 8 hours PRN  metoprolol tartrate 75 milliGRAM(s) two times a day  nystatin Powder 1 Application(s) every 12 hours  potassium chloride   Powder 40 milliEquivalent(s) every 4 hours  QUEtiapine 50 milliGRAM(s) <User Schedule>  QUEtiapine 50 milliGRAM(s) every 6 hours PRN      RECENT LABS/IMAGING  CBC Full  -  ( 14 Nov 2019 06:45 )  WBC Count : 7.43 K/uL  RBC Count : 3.15 M/uL  Hemoglobin : 9.9 g/dL  Hematocrit : 30.5 %  Platelet Count - Automated : 267 K/uL  Mean Cell Volume : 96.8 fL  Mean Cell Hemoglobin : 31.4 pg  Mean Cell Hemoglobin Concentration : 32.5 %  Auto Neutrophil # : x  Auto Lymphocyte # : x  Auto Monocyte # : x  Auto Eosinophil # : x  Auto Basophil # : x  Auto Neutrophil % : x  Auto Lymphocyte % : x  Auto Monocyte % : x  Auto Eosinophil % : x  Auto Basophil % : x    11-14    143  |  109<H>  |  10  ----------------------------<  109<H>  3.6   |  22  |  0.55    Ca    8.9      14 Nov 2019 06:45  Phos  3.3     11-14  Mg     2.3     11-14    ----------------------------------------------------------------------------------------  PHYSICAL EXAM-  Constitutional - NAD  HEENT - NCAT, wearing nasal cannula  Chest - On supplemental oxygen, symmetric chest rise  Cardiovascular - warm, well perfused  Abdomen - Soft, NTND  Extremities - + mild swelling left hand, No calf tenderness   Neurologic Exam -                    Cognitive - Awake, Alert, AAO to self, place and date. Answering questions appropriately, following simple commands     Communication - fluent, short sentences     Cranial Nerves - No facial asymmetry     Motor -                     LEFT    UE - ShAB 4/5, EF 4/5, EE 4/5, WE 4/5,  4/5                    RIGHT UE - ShAB 4/5, EF 4/5, EE 4/5, WE 4/5,  4/5                    LEFT    LE - HF 4/5, KE 4/5, DF 5/5, PF 5/5                    RIGHT LE - HF 4/5, KE 4/5, DF 5/5, PF 5/5        Sensory - Intact to LT  Psychiatric - Flat affect  ---------------------------------------------------------------------------------------- Subjective: Patient seen with wife at bedside. Patient reports that he is getting better, but wants to be able to get out of bed and move around. Denies chest pain, SOB, or pain. Reports that he would like to have the rectal tube removed    FUNCTIONAL PROGRESS 11/14/19  Therapeutic Interventions      Bed Mobility  Bed Mobility Training Rehab Potential: good, to achieve stated therapy goals  Bed Mobility Training Symptoms Noted During/After Treatment: none  Bed Mobility Training Supine-to-Sit: moderate assist (50% patient effort);  1 person assist;  nonverbal cues (demo/gestures);  verbal cues  Bed Mobility Training Limitations: decreased strength;  impaired balance    Sit-Stand Transfer Training  Sit-to-Stand Transfer Training Rehab Potential: good, to achieve stated therapy goals  Sit-to-Stand Transfer Training Symptoms Noted During/After Treatment: none  Transfer Training Sit-to-Stand Transfer: moderate assist (50% patient effort);  2 person assist;  verbal cues;  nonverbal cues (demo/gestures);  full weight-bearing   rolling walker  Transfer Training Stand-to-Sit Transfer: moderate assist (50% patient effort);  2 person assist;  nonverbal cues (demo/gestures);  verbal cues;  full weight-bearing   rolling walker  Sit-to-Stand Transfer Training Transfer Safety Analysis: impaired balance;  decreased strength;  rolling walker    Gait Training  Gait Training Rehab Potential: good, to achieve stated therapy goals  Gait Training Symptoms Noted During/After Treatment: none  Gait Training: moderate assist (50% patient effort);  1 person assist;  nonverbal cues (demo/gestures);  verbal cues;  full weight-bearing   rolling walker;  bed to chair;  4 steps. On O2.   Gait Analysis: swing-to gait   decreased ridge;  increased time in double stance;  decreased step length;  decreased stride length;  decreased strength;  impaired balance;  Bed to Chair;  4 steps ;  rolling walker        REVIEW OF SYSTEMS  Constitutional - No fever,  +generalized fatigue  HEENT - No vertigo, No neck pain  Cardiology/Pulm - No chest pain, No SOB  Abdomen - +Rectal tube  Neurological - No headaches, +loss of strength, No tremors  Skin - No rashes, No itching  Musculoskeletal - No joint pain, No joint swelling, No muscle pain      VITALS  T(C): 36.8 (11-14-19 @ 05:58), Max: 37.7 (11-13-19 @ 14:00)  HR: 74 (11-14-19 @ 05:58) (74 - 106)  BP: 141/67 (11-14-19 @ 05:58) (140/93 - 178/90)  RR: 18 (11-14-19 @ 05:58) (18 - 20)  SpO2: 99% (11-14-19 @ 05:58) (96% - 100%)  Wt(kg): --    MEDICATIONS   acetaminophen    Suspension .. 650 milliGRAM(s) every 6 hours PRN  amLODIPine   Tablet 10 milliGRAM(s) daily  artificial  tears Solution 1 Drop(s) two times a day PRN  chlorhexidine 4% Liquid 1 Application(s) daily  enoxaparin Injectable 60 milliGRAM(s) daily  fentaNYL   Patch  25 MICROgram(s)/Hr 1 Patch every 72 hours  glucagon  Injectable 1 milliGRAM(s) once PRN  haloperidol    Injectable 2.5 milliGRAM(s) every 6 hours PRN  ibuprofen  Suspension. 400 milliGRAM(s) every 8 hours PRN  metoprolol tartrate 75 milliGRAM(s) two times a day  nystatin Powder 1 Application(s) every 12 hours  potassium chloride   Powder 40 milliEquivalent(s) every 4 hours  QUEtiapine 50 milliGRAM(s) <User Schedule>  QUEtiapine 50 milliGRAM(s) every 6 hours PRN      RECENT LABS/IMAGING  CBC Full  -  ( 14 Nov 2019 06:45 )  WBC Count : 7.43 K/uL  RBC Count : 3.15 M/uL  Hemoglobin : 9.9 g/dL  Hematocrit : 30.5 %  Platelet Count - Automated : 267 K/uL  Mean Cell Volume : 96.8 fL  Mean Cell Hemoglobin : 31.4 pg  Mean Cell Hemoglobin Concentration : 32.5 %  Auto Neutrophil # : x  Auto Lymphocyte # : x  Auto Monocyte # : x  Auto Eosinophil # : x  Auto Basophil # : x  Auto Neutrophil % : x  Auto Lymphocyte % : x  Auto Monocyte % : x  Auto Eosinophil % : x  Auto Basophil % : x    11-14    143  |  109<H>  |  10  ----------------------------<  109<H>  3.6   |  22  |  0.55    Ca    8.9      14 Nov 2019 06:45  Phos  3.3     11-14  Mg     2.3     11-14    ----------------------------------------------------------------------------------------  PHYSICAL EXAM-  Constitutional - NAD  HEENT - NCAT, wearing nasal cannula  Chest - On supplemental oxygen, symmetric chest rise  Cardiovascular - warm, well perfused  Abdomen - Soft, NTND  Extremities - + mild swelling left hand, No calf tenderness   Neurologic Exam -                    Cognitive - Awake, Alert, AAO to self, place and date. Answering questions appropriately, following simple commands     Communication - fluent, short sentences     Cranial Nerves - No facial asymmetry     Motor -                     LEFT    UE - ShAB 4/5, EF 4/5, EE 4/5, WE 4/5,  4/5                    RIGHT UE - ShAB 4/5, EF 4/5, EE 4/5, WE 4/5,  4/5                    LEFT    LE - HF 4/5, KE 4/5, DF 5/5, PF 5/5                    RIGHT LE - HF 4/5, KE 4/5, DF 5/5, PF 5/5        Sensory - Intact to LT  Psychiatric - Flat affect  ---------------------------------------------------------------------------------------- Subjective: Patient seen with wife at bedside. Patient reports that he is getting better, but wants to be able to get out of bed and move around. Denies chest pain, SOB, or pain. Reports that he would like to have the rectal tube removed    FUNCTIONAL PROGRESS 11/14/19  Therapeutic Interventions      Bed Mobility  Bed Mobility Training Rehab Potential: good, to achieve stated therapy goals  Bed Mobility Training Symptoms Noted During/After Treatment: none  Bed Mobility Training Supine-to-Sit: moderate assist (50% patient effort);  1 person assist;  nonverbal cues (demo/gestures);  verbal cues  Bed Mobility Training Limitations: decreased strength;  impaired balance    Sit-Stand Transfer Training  Sit-to-Stand Transfer Training Rehab Potential: good, to achieve stated therapy goals  Sit-to-Stand Transfer Training Symptoms Noted During/After Treatment: none  Transfer Training Sit-to-Stand Transfer: moderate assist (50% patient effort);  2 person assist;  verbal cues;  nonverbal cues (demo/gestures);  full weight-bearing   rolling walker  Transfer Training Stand-to-Sit Transfer: moderate assist (50% patient effort);  2 person assist;  nonverbal cues (demo/gestures);  verbal cues;  full weight-bearing   rolling walker  Sit-to-Stand Transfer Training Transfer Safety Analysis: impaired balance;  decreased strength;  rolling walker    Gait Training  Gait Training Rehab Potential: good, to achieve stated therapy goals  Gait Training Symptoms Noted During/After Treatment: none  Gait Training: moderate assist (50% patient effort);  1 person assist;  nonverbal cues (demo/gestures);  verbal cues;  full weight-bearing   rolling walker;  bed to chair;  4 steps. On O2.   Gait Analysis: swing-to gait   decreased ridge;  increased time in double stance;  decreased step length;  decreased stride length;  decreased strength;  impaired balance;  Bed to Chair;  4 steps ;  rolling walker        REVIEW OF SYSTEMS  Constitutional - No fever,  +generalized fatigue  HEENT - No vertigo, No neck pain  Cardiology/Pulm - No chest pain, No SOB  Abdomen - +Rectal tube  Neurological - No headaches, +loss of strength, No tremors  Skin - No rashes, No itching  Musculoskeletal - No joint pain, No joint swelling, No muscle pain      VITALS  T(C): 36.8 (11-14-19 @ 05:58), Max: 37.7 (11-13-19 @ 14:00)  HR: 74 (11-14-19 @ 05:58) (74 - 106)  BP: 141/67 (11-14-19 @ 05:58) (140/93 - 178/90)  RR: 18 (11-14-19 @ 05:58) (18 - 20)  SpO2: 99% (11-14-19 @ 05:58) (96% - 100%)  Wt(kg): --    MEDICATIONS   acetaminophen    Suspension .. 650 milliGRAM(s) every 6 hours PRN  amLODIPine   Tablet 10 milliGRAM(s) daily  artificial  tears Solution 1 Drop(s) two times a day PRN  chlorhexidine 4% Liquid 1 Application(s) daily  enoxaparin Injectable 60 milliGRAM(s) daily  fentaNYL   Patch  25 MICROgram(s)/Hr 1 Patch every 72 hours  glucagon  Injectable 1 milliGRAM(s) once PRN  haloperidol    Injectable 2.5 milliGRAM(s) every 6 hours PRN  ibuprofen  Suspension. 400 milliGRAM(s) every 8 hours PRN  metoprolol tartrate 75 milliGRAM(s) two times a day  nystatin Powder 1 Application(s) every 12 hours  potassium chloride   Powder 40 milliEquivalent(s) every 4 hours  QUEtiapine 50 milliGRAM(s) <User Schedule>  QUEtiapine 50 milliGRAM(s) every 6 hours PRN      RECENT LABS/IMAGING  CBC Full  -  ( 14 Nov 2019 06:45 )  WBC Count : 7.43 K/uL  RBC Count : 3.15 M/uL  Hemoglobin : 9.9 g/dL  Hematocrit : 30.5 %  Platelet Count - Automated : 267 K/uL  Mean Cell Volume : 96.8 fL  Mean Cell Hemoglobin : 31.4 pg  Mean Cell Hemoglobin Concentration : 32.5 %  Auto Neutrophil # : x  Auto Lymphocyte # : x  Auto Monocyte # : x  Auto Eosinophil # : x  Auto Basophil # : x  Auto Neutrophil % : x  Auto Lymphocyte % : x  Auto Monocyte % : x  Auto Eosinophil % : x  Auto Basophil % : x    11-14    143  |  109<H>  |  10  ----------------------------<  109<H>  3.6   |  22  |  0.55    Ca    8.9      14 Nov 2019 06:45  Phos  3.3     11-14  Mg     2.3     11-14    ----------------------------------------------------------------------------------------  PHYSICAL EXAM-  Constitutional - NAD  HEENT - NCAT, wearing nasal cannula  Chest - On supplemental oxygen, symmetric chest rise  Cardiovascular - warm, well perfused  Abdomen - Soft, NTND, +_ rectal tube  Extremities - + mild swelling left hand, No calf tenderness   Neurologic Exam -                    Cognitive - Awake, Alert, AAO to self, place and date. Answering questions appropriately, following simple commands     Communication - fluent, short sentences     Cranial Nerves - No facial asymmetry     Motor -                     LEFT    UE - ShAB 4/5, EF 4/5, EE 4/5, WE 4/5,  4/5                    RIGHT UE - ShAB 4/5, EF 4/5, EE 4/5, WE 4/5,  4/5                    LEFT    LE - HF 4/5, KE 4/5, DF 5/5, PF 5/5                    RIGHT LE - HF 4/5, KE 4/5, DF 5/5, PF 5/5        Sensory - Intact to LT  Psychiatric - Flat affect  ----------------------------------------------------------------------------------------

## 2019-11-14 NOTE — PROGRESS NOTE ADULT - ATTENDING COMMENTS
agree with note and plan  discussed options for rehab with patient's wife. Patient showing improvement with PT today.

## 2019-11-14 NOTE — PROGRESS NOTE ADULT - ATTENDING COMMENTS
Was on ECMO for respiratory failure  Now on NC  Was febrile again persistently, pansensitive enterobacter now on cefepime and vancomycin  Recent cultures negative   Chest PT, pulm hygiene  Hypernatremia improved   Frequent loose BMs, C.diff negative  Mental status is waxing and waning but overall he has been more alert, especially since restarting seroquel

## 2019-11-14 NOTE — PROGRESS NOTE BEHAVIORAL HEALTH - NSBHCHARTREVIEWVS_PSY_A_CORE FT
Vital Signs Last 24 Hrs  T(C): 36.8 (14 Nov 2019 05:58), Max: 37.7 (13 Nov 2019 14:00)  T(F): 98.3 (14 Nov 2019 05:58), Max: 99.9 (13 Nov 2019 14:00)  HR: 74 (14 Nov 2019 05:58) (74 - 106)  BP: 141/67 (14 Nov 2019 05:58) (140/93 - 178/90)  BP(mean): --  RR: 18 (14 Nov 2019 05:58) (18 - 20)  SpO2: 99% (14 Nov 2019 05:58) (96% - 100%)

## 2019-11-14 NOTE — PROGRESS NOTE ADULT - PROBLEM SELECTOR PLAN 2
- afebrile x 24 hours now  - completed courses of abx, monitor off  - DVT study negative  - cdiff negative

## 2019-11-14 NOTE — PROGRESS NOTE ADULT - ASSESSMENT
54yMale with functional deficits after barbituate overdose with respiratory depression and ARDS s/p VV ECMO  Mood - Seroquel, Haldol  Pain - Tylenol, Fentanyl Patch, Ibuprofen  DVT PPX - Lovenox, SCDs  PT- ROM, Bed Mob, Transfers, Amb w AD and bracing as needed  OT- ADLs, bracing as needed  Prec- Falls  Skin- Turn q2 h  Dispo- Recommend [facility], patient DOES NOT meet acute inpatient rehabilitation criteria. FINAL DISPO RECS INCOMPLETE UNTIL ATTENDING ROUNDS 54yMale with functional deficits after barbituate overdose with respiratory depression and ARDS s/p VV ECMO  Mood - Seroquel, Haldol  Pain - Tylenol, Fentanyl Patch, Ibuprofen  DVT PPX - Lovenox, SCDs  PT- ROM, Bed Mob, Transfers, Amb w AD and bracing as needed  OT- ADLs, bracing as needed  Prec- Falls  Skin- Turn q2 h  Dispo- 54yMale with functional deficits after barbituate overdose with respiratory depression and ARDS s/p VV ECMO  Mood - Seroquel, Haldol  Pain - Tylenol, Fentanyl Patch, Ibuprofen  DVT PPX - Lovenox, SCDs  PT- ROM, Bed Mob, Transfers, Amb w AD and bracing as needed  OT- ADLs, bracing as needed  Prec- Falls  Skin- Turn q2 h  Dispo- Once medically stabilized, recommend ACUTE inpatient rehabilitation for the functional deficits consisting of 3 hours of therapy/day & 24 hour RN/daily PMR physician for comorbid medical management. Will continue to follow for ongoing rehab needs and recommendations. Patient will be able to tolerate 3 hours a day.

## 2019-11-14 NOTE — PROGRESS NOTE BEHAVIORAL HEALTH - NSBHFUPINTERVALHXFT_PSY_A_CORE
met with the patient. Much awake, articulate, speaks english. Can struggles with year, but is getting better. Mistakes wife for daughter Rachana. He in fact denies any depressive symptoms, denies any si or hi when asked, is hoping to ' not live in the hospital with tubes'. Denies any a.vh or paranoia.   Care coordinated closely with wife at bedside, and below plan was discussed and she is in agreement

## 2019-11-14 NOTE — PROGRESS NOTE BEHAVIORAL HEALTH - SUMMARY
Patient is a 55 y/o Citizen of [place] male that is employed, , lives with his wife, has a supportive family, hx of severe depression and anxiety, multiple hx of psychiatry admissions (last in June 2019), tx refractory to many antidepressant meds, no hx of prior SA, hx of alcohol use- none recently, no hx of psychosis, received 5 tx of ECT (while in inpatient psych unit at [facility]), then stopped with no outpatient treatments in June 2019, PMHx of hepatitis presenting with increased lethargy and hypoxemia. As per records- patient was s/p fall at home. In ed was obtunded, noted to be in hypercarbic RF- requiring intubation. LL consolidation on CXray- started on antibiotics. Found to have barbiturates+ on urine toxicology. Course complicated by seizure. Extubated on 10/28- placed on BIPAP. Course continued to be complicated by fevers, worsening hypoxemia requiring reintubation. Was started on propofol, precedex and paralyzed but remained difficult to oxygenate with traditional mechanical ventilation requiring frequent bagging and ALI consult for ECMO was placed. The patient was cannulated for VV-ECMO and transfer to [facility]. Decannulated on Friday last week. Remained intubated on multiple sedative- Midazolam, Precedex, Propofol, Fentanyl. Psychiatry has been consulted as sedatives will slowly be weaned off- agitation management and also for baseline depression (was on multiple psychotropics)    11/8- patient is able to converse minimally. Remains delirious.    11/11- delirium, lethargic upon approach    11/12- still delirious, febrile    11/13- delirium, and now with agitation  11/14- delirium slowly resolving    Recommendations-  - INCREASE dose of Seroquel to 75mg BID PO- 7AM, 5PM .   - Check an EKG tomorrow to monitor qtc  - AGITATION- Seroquel 50mg q 6hrs prn po  - If qtc< 500, haldol 2.5 mg q 6 hours PRN IV/IM for agitation.   - Frequent orientation  - Avoid bzd, anticholinergics and antihistamines.

## 2019-11-15 LAB — BACTERIA BLD CULT: SIGNIFICANT CHANGE UP

## 2019-11-15 PROCEDURE — 99232 SBSQ HOSP IP/OBS MODERATE 35: CPT | Mod: GC

## 2019-11-15 PROCEDURE — 99232 SBSQ HOSP IP/OBS MODERATE 35: CPT

## 2019-11-15 PROCEDURE — 99233 SBSQ HOSP IP/OBS HIGH 50: CPT

## 2019-11-15 RX ORDER — VENLAFAXINE HCL 75 MG
37.5 CAPSULE, EXT RELEASE 24 HR ORAL DAILY
Refills: 0 | Status: DISCONTINUED | OUTPATIENT
Start: 2019-11-15 | End: 2019-11-18

## 2019-11-15 RX ADMIN — Medication 75 MILLIGRAM(S): at 18:43

## 2019-11-15 RX ADMIN — QUETIAPINE FUMARATE 75 MILLIGRAM(S): 200 TABLET, FILM COATED ORAL at 06:33

## 2019-11-15 RX ADMIN — Medication 75 MILLIGRAM(S): at 06:31

## 2019-11-15 RX ADMIN — Medication 37.5 MILLIGRAM(S): at 13:07

## 2019-11-15 RX ADMIN — AMLODIPINE BESYLATE 10 MILLIGRAM(S): 2.5 TABLET ORAL at 06:30

## 2019-11-15 RX ADMIN — ENOXAPARIN SODIUM 60 MILLIGRAM(S): 100 INJECTION SUBCUTANEOUS at 13:08

## 2019-11-15 RX ADMIN — Medication 1 PACKET(S): at 13:08

## 2019-11-15 RX ADMIN — CHLORHEXIDINE GLUCONATE 1 APPLICATION(S): 213 SOLUTION TOPICAL at 13:09

## 2019-11-15 RX ADMIN — Medication 1 TABLET(S): at 06:30

## 2019-11-15 RX ADMIN — Medication 1 TABLET(S): at 13:08

## 2019-11-15 RX ADMIN — FENTANYL CITRATE 1 PATCH: 50 INJECTION INTRAVENOUS at 07:10

## 2019-11-15 RX ADMIN — NYSTATIN CREAM 1 APPLICATION(S): 100000 CREAM TOPICAL at 06:30

## 2019-11-15 RX ADMIN — FENTANYL CITRATE 1 PATCH: 50 INJECTION INTRAVENOUS at 13:07

## 2019-11-15 RX ADMIN — NYSTATIN CREAM 1 APPLICATION(S): 100000 CREAM TOPICAL at 18:43

## 2019-11-15 RX ADMIN — Medication 1 TABLET(S): at 22:06

## 2019-11-15 RX ADMIN — FENTANYL CITRATE 1 PATCH: 50 INJECTION INTRAVENOUS at 13:10

## 2019-11-15 RX ADMIN — QUETIAPINE FUMARATE 50 MILLIGRAM(S): 200 TABLET, FILM COATED ORAL at 18:43

## 2019-11-15 RX ADMIN — QUETIAPINE FUMARATE 75 MILLIGRAM(S): 200 TABLET, FILM COATED ORAL at 18:43

## 2019-11-15 RX ADMIN — FENTANYL CITRATE 1 PATCH: 50 INJECTION INTRAVENOUS at 20:34

## 2019-11-15 NOTE — PROGRESS NOTE ADULT - PROBLEM SELECTOR PLAN 6
- cdiff negative  - will add probiotics and metamucil for bulking  - remove flexi today (at the request of patient) and cont to monitor

## 2019-11-15 NOTE — PROGRESS NOTE BEHAVIORAL HEALTH - NSBHCHARTREVIEWLAB_PSY_A_CORE FT
9.9    7.43  )-----------( 267      ( 14 Nov 2019 06:45 )             30.5     11-14    143  |  109<H>  |  10  ----------------------------<  109<H>  3.6   |  22  |  0.55    Ca    8.9      14 Nov 2019 06:45  Phos  3.3     11-14  Mg     2.3     11-14

## 2019-11-15 NOTE — PROGRESS NOTE BEHAVIORAL HEALTH - SUMMARY
Patient is a 53 y/o [place] male that is employed, , lives with his wife, has a supportive family, hx of severe depression and anxiety, multiple hx of psychiatry admissions (last in June 2019), tx refractory to many antidepressant meds, no hx of prior SA, hx of alcohol use- none recently, no hx of psychosis, received 5 tx of ECT (while in inpatient psych unit at [facility]), then stopped with no outpatient treatments in June 2019, PMHx of hepatitis presenting with increased lethargy and hypoxemia. As per records- patient was s/p fall at home. In ed was obtunded, noted to be in hypercarbic RF- requiring intubation. LL consolidation on CXray- started on antibiotics. Found to have barbiturates+ on urine toxicology. Course complicated by seizure. Extubated on 10/28- placed on BIPAP. Course continued to be complicated by fevers, worsening hypoxemia requiring reintubation. Was started on propofol, precedex and paralyzed but remained difficult to oxygenate with traditional mechanical ventilation requiring frequent bagging and ALI consult for ECMO was placed. The patient was cannulated for VV-ECMO and transfer to [facility]. Decannulated on Friday last week. Remained intubated on multiple sedative- Midazolam, Precedex, Propofol, Fentanyl. Psychiatry has been consulted as sedatives will slowly be weaned off- agitation management and also for baseline depression (was on multiple psychotropics)    11/8- patient is able to converse minimally. Remains delirious.  11/11- delirium, lethargic upon approach  11/12- still delirious, febrile  11/13- delirium, and now with agitation  11/14- delirium slowly resolving  11/15- delirium resolving, significant improvement, future oriented    Recommendations-  - CONTINUE dose of Seroquel to 75mg BID PO- 7AM, 5PM .   - START 37.5 mg of Effexor today, and continue at rehab  - Check an EKG tomorrow to monitor qtc  - AGITATION- Seroquel 50mg q 6hrs prn po  - If qtc< 500, haldol 2.5 mg q 6 hours PRN IV/IM for agitation.   - Frequent orientation  - Avoid bzd, anticholinergics and antihistamines.  - Stable for discharge to rehab

## 2019-11-15 NOTE — PROGRESS NOTE ADULT - ATTENDING COMMENTS
Was on ECMO for respiratory failure  Now on NC  Was febrile again persistently, pansensitive enterobacter now on cefepime and vancomycin  Recent cultures negative   Chest PT, pulm hygiene  Hypernatremia improved   Frequent loose BMs, C.diff negative  Overall he has been more alert, especially since restarting seroquel

## 2019-11-15 NOTE — PROGRESS NOTE ADULT - PROBLEM SELECTOR PLAN 1
- severe hypoxemic respiratory failure likely severe ARDS with P/F cannulated for VV-ECMO on 10/30 possibly related to aspiration pneumonia after possible benzo withdrawal seizure  - decannulated 11/1, extubated on 11/5  - tolerating room air currently   - chest PT, suction PRN

## 2019-11-15 NOTE — PROGRESS NOTE ADULT - SUBJECTIVE AND OBJECTIVE BOX
CHIEF COMPLAINT: Patient is a 54y old  Male who presents with a chief complaint of ARDS (14 Nov 2019 14:57)    Interval Events:      REVIEW OF SYSTEMS:  Constitutional:   Eyes:  ENT:  CV:  Resp:  GI:  :  MSK:  Integumentary:  Neurological:  Psychiatric:  Endocrine:  Hematologic/Lymphatic:  Allergic/Immunologic:  [ ] All other systems negative  [ ] Unable to assess ROS because ________      OBJECTIVE:  ICU Vital Signs Last 24 Hrs  T(C): 37.6 (15 Nov 2019 06:00), Max: 37.7 (14 Nov 2019 21:24)  T(F): 99.7 (15 Nov 2019 06:00), Max: 99.8 (14 Nov 2019 21:24)  HR: 96 (15 Nov 2019 06:00) (83 - 114)  BP: 153/83 (15 Nov 2019 06:00) (115/79 - 160/90)  BP(mean): --  ABP: --  ABP(mean): --  RR: 18 (15 Nov 2019 06:00) (18 - 20)  SpO2: 100% (15 Nov 2019 06:00) (95% - 100%)    11-13 @ 07:01 - 11-14 @ 07:00  --------------------------------------------------------  IN: 2530 mL / OUT: 750 mL / NET: 1780 mL    11-14 @ 07:01 - 11-15 @ 06:58  --------------------------------------------------------  IN: 1310 mL / OUT: 0 mL / NET: 1310 mL    POCT Blood Glucose.: 119 mg/dL (14 Nov 2019 11:34)    HOSPITAL MEDICATIONS:  MEDICATIONS  (STANDING):  amLODIPine   Tablet 10 milliGRAM(s) Oral daily  chlorhexidine 4% Liquid 1 Application(s) Topical daily  enoxaparin Injectable 60 milliGRAM(s) SubCutaneous daily  fentaNYL   Patch  12 MICROgram(s)/Hr 1 Patch Transdermal every 72 hours  lactobacillus acidophilus 1 Tablet(s) Oral three times a day  metoprolol tartrate 75 milliGRAM(s) Oral two times a day  nystatin Powder 1 Application(s) Topical every 12 hours  psyllium Powder 1 Packet(s) Oral daily  QUEtiapine 75 milliGRAM(s) Oral <User Schedule>    MEDICATIONS  (PRN):  acetaminophen    Suspension .. 650 milliGRAM(s) Oral every 6 hours PRN Temp greater or equal to 38C (100.4F), Mild Pain (1 - 3), Moderate Pain (4 - 6), Severe Pain (7 - 10)  artificial  tears Solution 1 Drop(s) Both EYES two times a day PRN Dry Eyes  glucagon  Injectable 1 milliGRAM(s) IntraMuscular once PRN Glucose LESS THAN 70 milligrams/deciliter  haloperidol    Injectable 2.5 milliGRAM(s) IV Push every 6 hours PRN Agitation  ibuprofen  Suspension. 400 milliGRAM(s) Oral every 8 hours PRN Temp greater or equal to 38C (100.4F)  QUEtiapine 50 milliGRAM(s) Oral every 6 hours PRN agitation      LABS:                        9.9    7.43  )-----------( 267      ( 14 Nov 2019 06:45 )             30.5     11-14    143  |  109<H>  |  10  ----------------------------<  109<H>  3.6   |  22  |  0.55    Ca    8.9      14 Nov 2019 06:45  Phos  3.3     11-14  Mg     2.3     11-14                MICROBIOLOGY:     RADIOLOGY:  [ ] Reviewed and interpreted by me    PULMONARY FUNCTION TESTS:    EKG: CHIEF COMPLAINT: Patient is a 54y old  Male who presents with a chief complaint of ARDS (14 Nov 2019 14:57)    Interval Events: none overnight       REVIEW OF SYSTEMS:  Constitutional: no complaints   CV: denies   Resp: denies   GI: denies   [x] All other systems negative  [ ] Unable to assess ROS because ________      OBJECTIVE:  ICU Vital Signs Last 24 Hrs  T(C): 37.6 (15 Nov 2019 06:00), Max: 37.7 (14 Nov 2019 21:24)  T(F): 99.7 (15 Nov 2019 06:00), Max: 99.8 (14 Nov 2019 21:24)  HR: 96 (15 Nov 2019 06:00) (83 - 114)  BP: 153/83 (15 Nov 2019 06:00) (115/79 - 160/90)  BP(mean): --  ABP: --  ABP(mean): --  RR: 18 (15 Nov 2019 06:00) (18 - 20)  SpO2: 100% (15 Nov 2019 06:00) (95% - 100%)    11-13 @ 07:01  -  11-14 @ 07:00  --------------------------------------------------------  IN: 2530 mL / OUT: 750 mL / NET: 1780 mL    11-14 @ 07:01  -  11-15 @ 06:58  --------------------------------------------------------  IN: 1310 mL / OUT: 0 mL / NET: 1310 mL    POCT Blood Glucose.: 119 mg/dL (14 Nov 2019 11:34)    HOSPITAL MEDICATIONS:  MEDICATIONS  (STANDING):  amLODIPine   Tablet 10 milliGRAM(s) Oral daily  chlorhexidine 4% Liquid 1 Application(s) Topical daily  enoxaparin Injectable 60 milliGRAM(s) SubCutaneous daily  fentaNYL   Patch  12 MICROgram(s)/Hr 1 Patch Transdermal every 72 hours  lactobacillus acidophilus 1 Tablet(s) Oral three times a day  metoprolol tartrate 75 milliGRAM(s) Oral two times a day  nystatin Powder 1 Application(s) Topical every 12 hours  psyllium Powder 1 Packet(s) Oral daily  QUEtiapine 75 milliGRAM(s) Oral <User Schedule>    MEDICATIONS  (PRN):  acetaminophen    Suspension .. 650 milliGRAM(s) Oral every 6 hours PRN Temp greater or equal to 38C (100.4F), Mild Pain (1 - 3), Moderate Pain (4 - 6), Severe Pain (7 - 10)  artificial  tears Solution 1 Drop(s) Both EYES two times a day PRN Dry Eyes  glucagon  Injectable 1 milliGRAM(s) IntraMuscular once PRN Glucose LESS THAN 70 milligrams/deciliter  haloperidol    Injectable 2.5 milliGRAM(s) IV Push every 6 hours PRN Agitation  ibuprofen  Suspension. 400 milliGRAM(s) Oral every 8 hours PRN Temp greater or equal to 38C (100.4F)  QUEtiapine 50 milliGRAM(s) Oral every 6 hours PRN agitation

## 2019-11-15 NOTE — PROGRESS NOTE ADULT - SUBJECTIVE AND OBJECTIVE BOX
Subjective: Patient reports that he is doing well. Patient reports that he is working with therapy and getting stronger. Reports that he feels like he is getting better and moving better. Denies chest pain, SOB, or urinary sx.    FUNCTIONAL PROGRESS: 11/15/19  Sit-Stand Transfer Training  Sit-to-Stand Transfer Training Rehab Potential: good, to achieve stated therapy goals  Transfer Training Sit-to-Stand Transfer: moderate assist (50% patient effort);  nonverbal cues (demo/gestures);  1 person assist;  verbal cues;  weight-bearing as tolerated   rolling walker  Transfer Training Stand-to-Sit Transfer: moderate assist (50% patient effort);  1 person assist;  nonverbal cues (demo/gestures);  verbal cues;  weight-bearing as tolerated   rolling walker  Sit-to-Stand Transfer Training Transfer Safety Analysis: impaired balance;  decreased strength;  rolling walker    Gait Training  Gait Training Rehab Potential: good, to achieve stated therapy goals  Gait Training: minimum assist (75% patient effort);  1 person assist;  nonverbal cues (demo/gestures);  verbal cues;  full weight-bearing   rolling walker;  3 steps   Gait Analysis: swing-to gait   decreased ridge;  increased time in double stance;  shuffling;  decreased step length;  decreased stride length;  impaired balance;  decreased strength;  3 steps ;  rolling walker    REVIEW OF SYSTEMS  Constitutional - No fever,  +generalized fatigue  HEENT - No vertigo, No neck pain  Cardiology/Pulm - No chest pain, No SOB  Abdomen - +Rectal tube  Neurological - No headaches, +loss of strength, No tremors  Skin - No rashes, No itching  Musculoskeletal - No joint pain, No joint swelling, No muscle pain      VITALS  T(C): 37.6 (11-15-19 @ 06:00), Max: 37.7 (11-14-19 @ 21:24)  HR: 96 (11-15-19 @ 06:00) (83 - 114)  BP: 153/83 (11-15-19 @ 06:00) (115/79 - 160/90)  RR: 18 (11-15-19 @ 06:00) (18 - 20)  SpO2: 100% (11-15-19 @ 06:00) (95% - 100%)  Wt(kg): --    MEDICATIONS   acetaminophen    Suspension .. 650 milliGRAM(s) every 6 hours PRN  amLODIPine   Tablet 10 milliGRAM(s) daily  artificial  tears Solution 1 Drop(s) two times a day PRN  chlorhexidine 4% Liquid 1 Application(s) daily  enoxaparin Injectable 60 milliGRAM(s) daily  fentaNYL   Patch  12 MICROgram(s)/Hr 1 Patch every 72 hours  glucagon  Injectable 1 milliGRAM(s) once PRN  haloperidol    Injectable 2.5 milliGRAM(s) every 6 hours PRN  lactobacillus acidophilus 1 Tablet(s) three times a day  metoprolol tartrate 75 milliGRAM(s) two times a day  nystatin Powder 1 Application(s) every 12 hours  psyllium Powder 1 Packet(s) daily  QUEtiapine 75 milliGRAM(s) <User Schedule>  QUEtiapine 50 milliGRAM(s) every 6 hours PRN      RECENT LABS/IMAGING  CBC Full  -  ( 14 Nov 2019 06:45 )  WBC Count : 7.43 K/uL  RBC Count : 3.15 M/uL  Hemoglobin : 9.9 g/dL  Hematocrit : 30.5 %  Platelet Count - Automated : 267 K/uL  Mean Cell Volume : 96.8 fL  Mean Cell Hemoglobin : 31.4 pg  Mean Cell Hemoglobin Concentration : 32.5 %  Auto Neutrophil # : x  Auto Lymphocyte # : x  Auto Monocyte # : x  Auto Eosinophil # : x  Auto Basophil # : x  Auto Neutrophil % : x  Auto Lymphocyte % : x  Auto Monocyte % : x  Auto Eosinophil % : x  Auto Basophil % : x    11-14    143  |  109<H>  |  10  ----------------------------<  109<H>  3.6   |  22  |  0.55    Ca    8.9      14 Nov 2019 06:45  Phos  3.3     11-14  Mg     2.3     11-14    ----------------------------------------------------------------------------------------  PHYSICAL EXAM-  Constitutional - NAD  HEENT - NCAT  Chest - Symmetric chest rise, no increased of breathing  Cardiovascular - warm, well perfused  Abdomen - Soft, NTND, +rectal tube  Extremities - + mild swelling left hand, No calf tenderness   Neurologic Exam -                    Cognitive - Awake, Alert, AAO to self, place and date. Answering questions appropriately, following simple commands     Communication - fluent, short sentences     Cranial Nerves - No facial asymmetry     Motor -                     LEFT    UE - ShAB 4/5, EF 4/5, EE 4/5, WE 4/5,  4/5                    RIGHT UE - ShAB 4/5, EF 4/5, EE 4/5, WE 4/5,  4/5                    LEFT    LE - HF 4/5, KE 4/5, DF 5/5, PF 5/5                    RIGHT LE - HF 4/5, KE 4/5, DF 5/5, PF 5/5        Sensory - Intact to LT  Psychiatric - Flat affect  ----------------------------------------------------------------------------------------

## 2019-11-15 NOTE — PROGRESS NOTE ADULT - ASSESSMENT
54M with severe depression who presented to  10/28 with lethargy and hypoxemic/hypercapnic respiratory failure possibly related to overdose +/- pneumonia extubated but required reintubation 10/30 but with persistent hypoxemia refractory to conventional management and ultimately cannulated for VV ECMO 10/30/2019 for hypoxemic respiratory failure with ARDS and transferred to [facility] for further management. Decannulated 11/1, now extubated.

## 2019-11-15 NOTE — PROGRESS NOTE ADULT - PROBLEM SELECTOR PLAN 2
- last fever 11/12  - completed multiple courses of abx, monitor off  - DVT study negative  - cdiff negative

## 2019-11-15 NOTE — PROGRESS NOTE BEHAVIORAL HEALTH - RISK ASSESSMENT
Slowly improving= older male, depression, now delirium and agitation with improvement. has chronic risks but at this time is not in imminent risk to hurt self or other- older male, depression, prior admissions, delirium= which is improving, mood stable, denies any si or hi, denies any psychosis, is future oriented, has good family supports, has a psychiatrist, family engages in safety, discharge and treatment planning

## 2019-11-15 NOTE — PROGRESS NOTE BEHAVIORAL HEALTH - NSBHCHARTREVIEWVS_PSY_A_CORE FT
Vital Signs Last 24 Hrs  T(C): 37.6 (15 Nov 2019 06:00), Max: 37.7 (14 Nov 2019 21:24)  T(F): 99.7 (15 Nov 2019 06:00), Max: 99.8 (14 Nov 2019 21:24)  HR: 96 (15 Nov 2019 06:00) (83 - 114)  BP: 153/83 (15 Nov 2019 06:00) (115/79 - 160/90)  BP(mean): --  RR: 18 (15 Nov 2019 06:00) (18 - 20)  SpO2: 100% (15 Nov 2019 06:00) (95% - 100%)

## 2019-11-15 NOTE — PROGRESS NOTE ADULT - PROBLEM SELECTOR PLAN 3
- hx of severe depression refractory to multiple medications/ECT with prior psych hospitalizations  - concern for poss withdrawal seizure at previous hospital, no seizures noted here  - CT head and EEG (at previous hospital) negative   - psych following, seroquel restarted and pt tolerating  - will also restart effexor today

## 2019-11-15 NOTE — PROGRESS NOTE ADULT - ASSESSMENT
54yMale with functional deficits after barbituate overdose with respiratory depression and ARDS s/p VV ECMO  Mood - Seroquel, Haldol  Pain - Tylenol, Fentanyl Patch, Ibuprofen  DVT PPX - Lovenox, SCDs  PT- ROM, Bed Mob, Transfers, Amb w AD and bracing as needed  OT- ADLs, bracing as needed  Prec- Falls  Skin- Turn q2 h  Dispo- Once medically stabilized, recommend ACUTE inpatient rehabilitation for the functional deficits consisting of 3 hours of therapy/day & 24 hour RN/daily PMR physician for comorbid medical management. Will continue to follow for ongoing rehab needs and recommendations. Patient will be able to tolerate 3 hours a day. DISPO RECS INCOMPLETE UNTIL ATTENDING ROUNDS 54yMale with functional deficits after barbituate overdose with respiratory depression and ARDS s/p VV ECMO  Mood - Seroquel, Haldol  Pain - Tylenol, Fentanyl Patch, Ibuprofen  DVT PPX - Lovenox, SCDs  PT- ROM, Bed Mob, Transfers, Amb w AD and bracing as needed  OT- ADLs, bracing as needed  Prec- Falls  Skin- Turn q2 h  Dispo- Once medically stabilized, recommend ACUTE inpatient rehabilitation for the functional deficits consisting of 3 hours of therapy/day & 24 hour RN/daily PMR physician for comorbid medical management. Will continue to follow for ongoing rehab needs and recommendations. Patient will be able to tolerate 3 hours a day.

## 2019-11-15 NOTE — PROGRESS NOTE BEHAVIORAL HEALTH - NSBHFUPINTERVALHXFT_PSY_A_CORE
Pt seen and evaluated, AAOx3. Pt is awake, articulate, speaks English. Pt says he is in a good mood, very future oriented, looking forward to going to rehab. Pt is aware of correct time and date, knows his wife's name and did not mistake her for his daughter as he did previously. Pt denies depressive symptoms, denies SI, HI, a/vh, or paranoia.  Care coordinated closely with wife at bedside, and below plan was discussed and she is in agreement. Discussed with wife how patient will require psychiatry follow up in rehab and as soon as he is discharged from rehab. Discussed safety plan with wife.

## 2019-11-15 NOTE — PROGRESS NOTE ADULT - ATTENDING COMMENTS
agree with note and plan above.  plan discussed with patient, he is agreeable to inpatient acute rehab.

## 2019-11-16 LAB
ANION GAP SERPL CALC-SCNC: 12 MMO/L — SIGNIFICANT CHANGE UP (ref 7–14)
BUN SERPL-MCNC: 12 MG/DL — SIGNIFICANT CHANGE UP (ref 7–23)
CALCIUM SERPL-MCNC: 9.1 MG/DL — SIGNIFICANT CHANGE UP (ref 8.4–10.5)
CHLORIDE SERPL-SCNC: 106 MMOL/L — SIGNIFICANT CHANGE UP (ref 98–107)
CO2 SERPL-SCNC: 22 MMOL/L — SIGNIFICANT CHANGE UP (ref 22–31)
CREAT SERPL-MCNC: 0.58 MG/DL — SIGNIFICANT CHANGE UP (ref 0.5–1.3)
GLUCOSE SERPL-MCNC: 109 MG/DL — HIGH (ref 70–99)
POTASSIUM SERPL-MCNC: 3.6 MMOL/L — SIGNIFICANT CHANGE UP (ref 3.5–5.3)
POTASSIUM SERPL-SCNC: 3.6 MMOL/L — SIGNIFICANT CHANGE UP (ref 3.5–5.3)
SODIUM SERPL-SCNC: 140 MMOL/L — SIGNIFICANT CHANGE UP (ref 135–145)

## 2019-11-16 PROCEDURE — 99232 SBSQ HOSP IP/OBS MODERATE 35: CPT

## 2019-11-16 RX ADMIN — QUETIAPINE FUMARATE 75 MILLIGRAM(S): 200 TABLET, FILM COATED ORAL at 19:00

## 2019-11-16 RX ADMIN — AMLODIPINE BESYLATE 10 MILLIGRAM(S): 2.5 TABLET ORAL at 06:41

## 2019-11-16 RX ADMIN — Medication 75 MILLIGRAM(S): at 06:41

## 2019-11-16 RX ADMIN — NYSTATIN CREAM 1 APPLICATION(S): 100000 CREAM TOPICAL at 06:40

## 2019-11-16 RX ADMIN — FENTANYL CITRATE 1 PATCH: 50 INJECTION INTRAVENOUS at 07:55

## 2019-11-16 RX ADMIN — Medication 1 TABLET(S): at 06:41

## 2019-11-16 RX ADMIN — ENOXAPARIN SODIUM 60 MILLIGRAM(S): 100 INJECTION SUBCUTANEOUS at 13:13

## 2019-11-16 RX ADMIN — QUETIAPINE FUMARATE 75 MILLIGRAM(S): 200 TABLET, FILM COATED ORAL at 06:42

## 2019-11-16 RX ADMIN — Medication 1 PACKET(S): at 13:13

## 2019-11-16 RX ADMIN — Medication 1 TABLET(S): at 13:13

## 2019-11-16 RX ADMIN — FENTANYL CITRATE 1 PATCH: 50 INJECTION INTRAVENOUS at 20:39

## 2019-11-16 RX ADMIN — Medication 75 MILLIGRAM(S): at 19:00

## 2019-11-16 RX ADMIN — QUETIAPINE FUMARATE 50 MILLIGRAM(S): 200 TABLET, FILM COATED ORAL at 06:41

## 2019-11-16 RX ADMIN — NYSTATIN CREAM 1 APPLICATION(S): 100000 CREAM TOPICAL at 19:00

## 2019-11-16 RX ADMIN — CHLORHEXIDINE GLUCONATE 1 APPLICATION(S): 213 SOLUTION TOPICAL at 13:20

## 2019-11-16 RX ADMIN — Medication 1 TABLET(S): at 21:10

## 2019-11-16 RX ADMIN — Medication 37.5 MILLIGRAM(S): at 13:13

## 2019-11-16 NOTE — PROGRESS NOTE ADULT - PROBLEM SELECTOR PLAN 1
- severe hypoxemic respiratory failure likely severe ARDS with P/F cannulated for VV-ECMO on 10/30 possibly related to aspiration pneumonia after possible benzo withdrawal seizure  - decannulated 11/1, extubated on 11/5  - tolerating room air currently   - chest PT, suction PRN - Transferred from  to [facility] for possible ECMO. f/w ARDS second to PNA vs OD. Re-Intubated and Cannulated 10/30. Decannulated 11/1. Extubated 11/5.   - Bronched 10/30. BAL with MSSA.   - SCx 11/5 with Enterobacter Cloacae .   - BCx from 10/30 with Coag Neg Staph. RPT NTD.   - s/p Vancomycin and Zosyn from 10/30 - 11/5  - Currently tolerating RA well.   - Chest PT, Metanebs and Suction PRN.

## 2019-11-16 NOTE — PROGRESS NOTE ADULT - PROBLEM SELECTOR PLAN 2
- last fever 11/12  - completed multiple courses of abx, monitor off  - DVT study negative  - cdiff negative - Last fever spike 11/12. Completed multiple courses of ABX. Monitor OFF ABX for now. DVT Studies NTD. CDIFF NTD.

## 2019-11-16 NOTE — PROGRESS NOTE ADULT - PROBLEM SELECTOR PLAN 5
- now resolved  - pt is tolerating PO diet, encourage fluids - Severe Depression history refractory to multiple medications/ ECT in the past with prior psych hospitalizations. Concern of withdrawal on admission.   - CTH and EEG (at ) NTD   - Psych following and adjusting medications.   - Continue on Effexor and Seroquel.

## 2019-11-16 NOTE — PROGRESS NOTE ADULT - PROBLEM SELECTOR PLAN 6
- cdiff negative  - will add probiotics and metamucil for bulking  - remove flexi today (at the request of patient) and cont to monitor - PT as tolerated. Supportive care  - PM&R recommends Acute Rehab.

## 2019-11-16 NOTE — PROGRESS NOTE ADULT - SUBJECTIVE AND OBJECTIVE BOX
CHIEF COMPLAINT:    SUBJECTIVE:     [ ] All other systems negative  [ ] Unable to assess ROS because ________    OBJECTIVE:  ICU Vital Signs Last 24 Hrs  T(C): 36.8 (16 Nov 2019 06:38), Max: 37.1 (15 Nov 2019 13:07)  T(F): 98.2 (16 Nov 2019 06:38), Max: 98.8 (15 Nov 2019 13:07)  HR: 91 (16 Nov 2019 06:38) (72 - 105)  BP: 143/93 (16 Nov 2019 06:38) (116/71 - 143/93)  BP(mean): --  ABP: --  ABP(mean): --  RR: 20 (16 Nov 2019 06:38) (17 - 20)  SpO2: 96% (16 Nov 2019 06:38) (96% - 100%)        CAPILLARY BLOOD GLUCOSE      POCT Blood Glucose.: 119 mg/dL (14 Nov 2019 11:34)      PHYSICAL EXAM:  General:   HEENT:   Lymph Nodes:  Neck:   Respiratory:   Cardiovascular:   Abdomen:   Extremities:   Skin:   Neurological:  Psychiatry:    HOSPITAL MEDICATIONS:  MEDICATIONS  (STANDING):  amLODIPine   Tablet 10 milliGRAM(s) Oral daily  chlorhexidine 4% Liquid 1 Application(s) Topical daily  enoxaparin Injectable 60 milliGRAM(s) SubCutaneous daily  lactobacillus acidophilus 1 Tablet(s) Oral three times a day  metoprolol tartrate 75 milliGRAM(s) Oral two times a day  nystatin Powder 1 Application(s) Topical every 12 hours  psyllium Powder 1 Packet(s) Oral daily  QUEtiapine 75 milliGRAM(s) Oral <User Schedule>  venlafaxine 37.5 milliGRAM(s) Oral daily    MEDICATIONS  (PRN):  acetaminophen    Suspension .. 650 milliGRAM(s) Oral every 6 hours PRN Temp greater or equal to 38C (100.4F), Mild Pain (1 - 3), Moderate Pain (4 - 6), Severe Pain (7 - 10)  artificial  tears Solution 1 Drop(s) Both EYES two times a day PRN Dry Eyes  glucagon  Injectable 1 milliGRAM(s) IntraMuscular once PRN Glucose LESS THAN 70 milligrams/deciliter  haloperidol    Injectable 2.5 milliGRAM(s) IV Push every 6 hours PRN Agitation  QUEtiapine 50 milliGRAM(s) Oral every 6 hours PRN agitation      LABS:    11-16    140  |  106  |  12  ----------------------------<  109<H>  3.6   |  22  |  0.58    Ca    9.1      16 Nov 2019 06:30                MICROBIOLOGY:     RADIOLOGY:  [ ] Reviewed and interpreted by me    PULMONARY FUNCTION TESTS:    EKG: CHIEF COMPLAINT: Patient is a 54y old  Male who presents with a chief complaint of ARDS (16 Nov 2019 08:19)    SUBJECTIVE:   No interval events overnight. Tolerates ambulating to restroom without complications. Patient denies fever, chills, chest pain, palpitation, SOB, wheezing, dyspnea, abdominal pain, N/V/D/C.   [x ] All other systems negative    OBJECTIVE:  ICU Vital Signs Last 24 Hrs  T(C): 36.8 (16 Nov 2019 06:38), Max: 37.1 (15 Nov 2019 13:07)  T(F): 98.2 (16 Nov 2019 06:38), Max: 98.8 (15 Nov 2019 13:07)  HR: 91 (16 Nov 2019 06:38) (72 - 105)  BP: 143/93 (16 Nov 2019 06:38) (116/71 - 143/93)  BP(mean): --  ABP: --  ABP(mean): --  RR: 20 (16 Nov 2019 06:38) (17 - 20)  SpO2: 96% (16 Nov 2019 06:38) (96% - 100%)    CAPILLARY BLOOD GLUCOSE  POCT Blood Glucose.: 119 mg/dL (14 Nov 2019 11:34)    PHYSICAL EXAM:  General: NAD   Neck: R IJ ECMO Cannula site sutures out, clean dry and intact. Healing well.   Cards: S1/S2, no murmurs   Pulm: CTA bilaterally. No wheezes.   Abdomen: Soft, NTND. BS (+)   Extremities: No pedal edema. (+) R Fem Vein ECMO Cannula site sutures out, clean dry and intact. Healing well. JARRETT of BL upper and lower extremities.  Neurology: AOx3, CN 2-12 intact. Sensation intact.   Psych: Appears calm and cooperative.     HOSPITAL MEDICATIONS:  MEDICATIONS  (STANDING):  amLODIPine   Tablet 10 milliGRAM(s) Oral daily  chlorhexidine 4% Liquid 1 Application(s) Topical daily  enoxaparin Injectable 60 milliGRAM(s) SubCutaneous daily  lactobacillus acidophilus 1 Tablet(s) Oral three times a day  metoprolol tartrate 75 milliGRAM(s) Oral two times a day  nystatin Powder 1 Application(s) Topical every 12 hours  psyllium Powder 1 Packet(s) Oral daily  QUEtiapine 75 milliGRAM(s) Oral <User Schedule>  venlafaxine 37.5 milliGRAM(s) Oral daily    MEDICATIONS  (PRN):  acetaminophen    Suspension .. 650 milliGRAM(s) Oral every 6 hours PRN Temp greater or equal to 38C (100.4F), Mild Pain (1 - 3), Moderate Pain (4 - 6), Severe Pain (7 - 10)  artificial  tears Solution 1 Drop(s) Both EYES two times a day PRN Dry Eyes  glucagon  Injectable 1 milliGRAM(s) IntraMuscular once PRN Glucose LESS THAN 70 milligrams/deciliter  haloperidol    Injectable 2.5 milliGRAM(s) IV Push every 6 hours PRN Agitation  QUEtiapine 50 milliGRAM(s) Oral every 6 hours PRN agitation    LABS:    11-16    140  |  106  |  12  ----------------------------<  109<H>  3.6   |  22  |  0.58    Ca    9.1      16 Nov 2019 06:30    MICROBIOLOGY:     RADIOLOGY:  [ ] Reviewed and interpreted by me    PULMONARY FUNCTION TESTS:    EKG:

## 2019-11-16 NOTE — PROGRESS NOTE ADULT - ASSESSMENT
54M with severe depression who presented to  10/28 with lethargy and hypoxemic/hypercapnic respiratory failure possibly related to overdose +/- pneumonia extubated but required reintubation 10/30 but with persistent hypoxemia refractory to conventional management and ultimately cannulated for VV ECMO 10/30/2019 for hypoxemic respiratory failure with ARDS and transferred to [facility] for further management. Decannulated 11/1, now extubated. Mr. Barth is a 53 YO M with Severe Depression who presented to  10/28 with lethargy and acute hypoxemic/ hypercapnic respiratory failure possibly related to overdose +/- PNA extubated but required reintubation 10/30 but with persistent hypoxemia refractory to conventional management and ultimately cannulated for VV ECMO 10/30 for hypoxemic respiratory failure with ARDS and transferred to [facility] for further management. Fever spikes noted. SCx with Enterobacter Cloacae s/p ABX with Zosyn x 7 days from 10/30 - 11/5 and Vancomycin x 7 days from 10/30 - 11/5. Decannulated on 11/1. Weaned off pressors. s/p VDR + Nimbex for synchronization Extubated 11/6.

## 2019-11-16 NOTE — PROGRESS NOTE ADULT - PROBLEM SELECTOR PLAN 4
- PT as tolerated  - supportive care  - PM&R recs acute rehab - Hypernatremia likely from Dehydration from Sepsis and GI Losses.   - Currently resolved  - Patient is tolerating PO diet.   - Encourage fluids

## 2019-11-16 NOTE — PROGRESS NOTE ADULT - ATTENDING COMMENTS
as above-  Resp failure--s/p ECMO for respiratory failure-on NC  ID- pansensitive enterobacter now on cefepime and vancomycin-Recent cultures negative   Chest PT, pulm hygiene  Hypernatremia improved   GI-Frequent loose BMs, C.diff negative  MS-Overall he has been more alert, especially since restarting seroquel  DVT/GI prophylaxis, RONNELL Epps MD-Pulmonary   [phone] as above-  s/p Resp failure--s/p ECMO for respiratory failure-ff NC--chest pt/pulm hygeine  ID- pansensitive enterobacter now on cefepime and vancomycin-Recent cultures negative   Hypernatremia improved   GI-Frequent loose BMs, C.diff negative  MS-Overall he has been more alert, especially since restarting seroquel/effexor-psych f/up  DVT/GI prophylaxis, OOB       Lucas Epps MD-Pulmonary   [phone]

## 2019-11-16 NOTE — PROGRESS NOTE ADULT - PROBLEM SELECTOR PLAN 3
- hx of severe depression refractory to multiple medications/ECT with prior psych hospitalizations  - concern for poss withdrawal seizure at previous hospital, no seizures noted here  - CT head and EEG (at previous hospital) negative   - psych following, seroquel restarted and pt tolerating  - will also restart effexor today - CDIFF negative. Probiotics and Psyllium added for bulking.   - Diarrhea improved and flexiseal removed.

## 2019-11-17 PROCEDURE — 99232 SBSQ HOSP IP/OBS MODERATE 35: CPT

## 2019-11-17 RX ADMIN — Medication 37.5 MILLIGRAM(S): at 15:18

## 2019-11-17 RX ADMIN — AMLODIPINE BESYLATE 10 MILLIGRAM(S): 2.5 TABLET ORAL at 05:58

## 2019-11-17 RX ADMIN — Medication 1 TABLET(S): at 05:59

## 2019-11-17 RX ADMIN — Medication 1 TABLET(S): at 15:17

## 2019-11-17 RX ADMIN — NYSTATIN CREAM 1 APPLICATION(S): 100000 CREAM TOPICAL at 05:59

## 2019-11-17 RX ADMIN — FENTANYL CITRATE 1 PATCH: 50 INJECTION INTRAVENOUS at 22:32

## 2019-11-17 RX ADMIN — ENOXAPARIN SODIUM 60 MILLIGRAM(S): 100 INJECTION SUBCUTANEOUS at 15:17

## 2019-11-17 RX ADMIN — CHLORHEXIDINE GLUCONATE 1 APPLICATION(S): 213 SOLUTION TOPICAL at 15:28

## 2019-11-17 RX ADMIN — Medication 1 PACKET(S): at 15:17

## 2019-11-17 RX ADMIN — Medication 75 MILLIGRAM(S): at 05:58

## 2019-11-17 RX ADMIN — NYSTATIN CREAM 1 APPLICATION(S): 100000 CREAM TOPICAL at 19:20

## 2019-11-17 RX ADMIN — FENTANYL CITRATE 1 PATCH: 50 INJECTION INTRAVENOUS at 07:33

## 2019-11-17 RX ADMIN — Medication 75 MILLIGRAM(S): at 19:20

## 2019-11-17 RX ADMIN — Medication 1 TABLET(S): at 22:34

## 2019-11-17 RX ADMIN — QUETIAPINE FUMARATE 75 MILLIGRAM(S): 200 TABLET, FILM COATED ORAL at 06:00

## 2019-11-17 RX ADMIN — QUETIAPINE FUMARATE 75 MILLIGRAM(S): 200 TABLET, FILM COATED ORAL at 19:20

## 2019-11-17 NOTE — PROGRESS NOTE ADULT - SUBJECTIVE AND OBJECTIVE BOX
CHIEF COMPLAINT: ARDS    Interval Events:    REVIEW OF SYSTEMS:  Constitutional:   Eyes:  ENT:  CV:  Resp:  GI:  :  MSK:  Integumentary:  Neurological:  Psychiatric:  Endocrine:  Hematologic/Lymphatic:  Allergic/Immunologic:  [ ] All other systems negative  [ ] Unable to assess ROS because ________    OBJECTIVE:  ICU Vital Signs Last 24 Hrs  T(C): 36.8 (17 Nov 2019 05:55), Max: 37.2 (16 Nov 2019 14:00)  T(F): 98.3 (17 Nov 2019 05:55), Max: 98.9 (16 Nov 2019 14:00)  HR: 107 (17 Nov 2019 05:55) (78 - 107)  BP: 150/92 (17 Nov 2019 05:55) (127/80 - 150/92)  RR: 18 (17 Nov 2019 05:55) (18 - 20)  SpO2: 96% (17 Nov 2019 05:55) (96% - 97%)    HOSPITAL MEDICATIONS:  MEDICATIONS  (STANDING):  amLODIPine   Tablet 10 milliGRAM(s) Oral daily  chlorhexidine 4% Liquid 1 Application(s) Topical daily  enoxaparin Injectable 60 milliGRAM(s) SubCutaneous daily  lactobacillus acidophilus 1 Tablet(s) Oral three times a day  metoprolol tartrate 75 milliGRAM(s) Oral two times a day  nystatin Powder 1 Application(s) Topical every 12 hours  psyllium Powder 1 Packet(s) Oral daily  QUEtiapine 75 milliGRAM(s) Oral <User Schedule>  venlafaxine 37.5 milliGRAM(s) Oral daily    MEDICATIONS  (PRN):  acetaminophen    Suspension .. 650 milliGRAM(s) Oral every 6 hours PRN Temp greater or equal to 38C (100.4F), Mild Pain (1 - 3), Moderate Pain (4 - 6), Severe Pain (7 - 10)  artificial  tears Solution 1 Drop(s) Both EYES two times a day PRN Dry Eyes  glucagon  Injectable 1 milliGRAM(s) IntraMuscular once PRN Glucose LESS THAN 70 milligrams/deciliter  haloperidol    Injectable 2.5 milliGRAM(s) IV Push every 6 hours PRN Agitation  QUEtiapine 50 milliGRAM(s) Oral every 6 hours PRN agitation    LABS:    11-16    140  |  106  |  12  ----------------------------<  109<H>  3.6   |  22  |  0.58    Ca    9.1      16 Nov 2019 06:30 CHIEF COMPLAINT: Follow-up for ARDS    Interval Events: None     REVIEW OF SYSTEMS:  Endorses continued, but improved, LE weakness   [X] All other systems negative    OBJECTIVE:  ICU Vital Signs Last 24 Hrs  T(C): 36.8 (17 Nov 2019 05:55), Max: 37.2 (16 Nov 2019 14:00)  T(F): 98.3 (17 Nov 2019 05:55), Max: 98.9 (16 Nov 2019 14:00)  HR: 107 (17 Nov 2019 05:55) (78 - 107)  BP: 150/92 (17 Nov 2019 05:55) (127/80 - 150/92)  RR: 18 (17 Nov 2019 05:55) (18 - 20)  SpO2: 96% (17 Nov 2019 05:55) (96% - 97%)    HOSPITAL MEDICATIONS:  MEDICATIONS  (STANDING):  amLODIPine   Tablet 10 milliGRAM(s) Oral daily  chlorhexidine 4% Liquid 1 Application(s) Topical daily  enoxaparin Injectable 60 milliGRAM(s) SubCutaneous daily  lactobacillus acidophilus 1 Tablet(s) Oral three times a day  metoprolol tartrate 75 milliGRAM(s) Oral two times a day  nystatin Powder 1 Application(s) Topical every 12 hours  psyllium Powder 1 Packet(s) Oral daily  QUEtiapine 75 milliGRAM(s) Oral <User Schedule>  venlafaxine 37.5 milliGRAM(s) Oral daily    MEDICATIONS  (PRN):  acetaminophen    Suspension .. 650 milliGRAM(s) Oral every 6 hours PRN Temp greater or equal to 38C (100.4F), Mild Pain (1 - 3), Moderate Pain (4 - 6), Severe Pain (7 - 10)  artificial  tears Solution 1 Drop(s) Both EYES two times a day PRN Dry Eyes  glucagon  Injectable 1 milliGRAM(s) IntraMuscular once PRN Glucose LESS THAN 70 milligrams/deciliter  haloperidol    Injectable 2.5 milliGRAM(s) IV Push every 6 hours PRN Agitation  QUEtiapine 50 milliGRAM(s) Oral every 6 hours PRN agitation    LABS:    11-16    140  |  106  |  12  ----------------------------<  109<H>  3.6   |  22  |  0.58    Ca    9.1      16 Nov 2019 06:30

## 2019-11-17 NOTE — PROGRESS NOTE ADULT - PROBLEM SELECTOR PLAN 3
- CDIFF negative. Probiotics and Psyllium added for bulking.   - Diarrhea improved and flexiseal removed.

## 2019-11-17 NOTE — PROGRESS NOTE ADULT - ATTENDING COMMENTS
as above-  s/p Resp failure--s/p ECMO for respiratory failure-ff NC--chest pt/pulm hygiene  ID- pansensitive enterobacter now on cefepime and vancomycin-Recent cultures negative   Hypernatremia improved   GI-Frequent loose BMs, C.diff negative  MS-Overall he has been more alert, especially since restarting seroquel/effexor-psych f/up  DVT/GI prophylaxis, OOB       Lucas Epps MD-Pulmonary   [phone]

## 2019-11-17 NOTE — PROGRESS NOTE ADULT - PROBLEM SELECTOR PLAN 2
- Last fever spike 11/12. Completed multiple courses of ABX. Monitor OFF ABX for now. DVT Studies NTD. CDIFF NTD.

## 2019-11-17 NOTE — PROGRESS NOTE ADULT - ASSESSMENT
Mr. Barth is a 55 YO M with Severe Depression who presented to  10/28 with lethargy and acute hypoxemic/ hypercapnic respiratory failure possibly related to overdose +/- PNA extubated but required reintubation 10/30 but with persistent hypoxemia refractory to conventional management and ultimately cannulated for VV ECMO 10/30 for hypoxemic respiratory failure with ARDS and transferred to [facility] for further management. Fever spikes noted. SCx with Enterobacter Cloacae s/p ABX with Zosyn x 7 days from 10/30 - 11/5 and Vancomycin x 7 days from 10/30 - 11/5. Decannulated on 11/1. Weaned off pressors. s/p VDR + Nimbex for synchronization Extubated 11/6. Mr. Barth is a 55 YO M with Severe Depression who presented to  10/28 with lethargy and acute hypoxemic/ hypercapnic respiratory failure possibly related to overdose +/- PNA extubated but required reintubation 10/30 but with persistent hypoxemia refractory to conventional management and ultimately cannulated for VV ECMO 10/30 for hypoxemic respiratory failure with ARDS and transferred to [facility] for further management. Fever spikes noted. SCx with Enterobacter Cloacae s/p ABX with Zosyn x 7 days from 10/30 - 11/5 and Vancomycin x 7 days from 10/30 - 11/5. Decannulated on 11/1. Weaned off pressors. s/p VDR + Nimbex for synchronization Extubated 11/6.   11/17-no new events

## 2019-11-17 NOTE — PROGRESS NOTE ADULT - PROBLEM SELECTOR PLAN 4
- Hypernatremia likely from Dehydration from Sepsis and GI Losses.   - Currently resolved  - Patient is tolerating PO diet.   - Encourage fluids

## 2019-11-17 NOTE — PROGRESS NOTE ADULT - PROBLEM SELECTOR PLAN 1
- Transferred from  to [facility] for possible ECMO p/w ARDS second to PNA vs OD. Re-Intubated and Cannulated 10/30. Decannulated 11/1. Extubated 11/5.   - Bronched 10/30. BAL with MSSA  - SCx 11/5 with Enterobacter Cloacae   - BCx from 10/30 with Coag Neg Staph. RPT NTD.   - s/p Vancomycin and Zosyn from 10/30 - 11/5  - Currently tolerating RA well.   - Chest PT, Metanebs and Suction PRN.

## 2019-11-17 NOTE — PROGRESS NOTE ADULT - PSYCHIATRIC
Affect and characteristics of appearance, verbalizations, behaviors are appropriate
detailed exam

## 2019-11-17 NOTE — PROGRESS NOTE ADULT - MS EXT PE MLT D E PC
no cyanosis/no pedal edema/no clubbing
no cyanosis/no pedal edema/no clubbing
pedal edema/no clubbing/no cyanosis
no clubbing/no cyanosis/no pedal edema
no clubbing/no pedal edema/no cyanosis
no clubbing/bilat 1+ pedal edema
no cyanosis/bilat pedal edema no calf pain
no cyanosis/no clubbing/bilat edema 2+
pedal edema

## 2019-11-17 NOTE — PROGRESS NOTE ADULT - NEUROLOGICAL DETAILS
responds to pain/alert and oriented x 3/responds to verbal commands
responds to pain/strength decreased/responds to verbal commands
responds to pain/responds to verbal commands/strength decreased
strength decreased/disoriented
strength decreased/responds to pain/responds to verbal commands/disoriented
alert and oriented x 3

## 2019-11-17 NOTE — PROGRESS NOTE ADULT - NSHPATTENDINGPLANDISCUSS_GEN_ALL_CORE
primary team
resident/fellow/nurse/wife/cticu
ICU team, fellow
resident/fellow/nurse/wife/cticu
wife at bedside
ICU team, fellow
RCU

## 2019-11-17 NOTE — PROGRESS NOTE ADULT - PROBLEM SELECTOR PLAN 5
- Severe Depression history refractory to multiple medications/ ECT in the past with prior psych hospitalizations. Concern of withdrawal on admission.   - CTH and EEG (at ) NTD   - Psych following and adjusting medications.   - Continue on Effexor and Seroquel.

## 2019-11-18 ENCOUNTER — TRANSCRIPTION ENCOUNTER (OUTPATIENT)
Age: 54
End: 2019-11-18

## 2019-11-18 ENCOUNTER — INPATIENT (INPATIENT)
Facility: HOSPITAL | Age: 54
LOS: 7 days | Discharge: HOME | DRG: 949 | End: 2019-11-26
Attending: PHYSICAL MEDICINE & REHABILITATION | Admitting: PHYSICAL MEDICINE & REHABILITATION
Payer: COMMERCIAL

## 2019-11-18 VITALS
RESPIRATION RATE: 16 BRPM | TEMPERATURE: 98 F | HEART RATE: 84 BPM | OXYGEN SATURATION: 98 % | DIASTOLIC BLOOD PRESSURE: 82 MMHG | SYSTOLIC BLOOD PRESSURE: 130 MMHG

## 2019-11-18 VITALS — HEART RATE: 96 BPM | DIASTOLIC BLOOD PRESSURE: 81 MMHG | SYSTOLIC BLOOD PRESSURE: 140 MMHG

## 2019-11-18 DIAGNOSIS — J95.3 CHRONIC PULMONARY INSUFFICIENCY FOLLOWING SURGERY: ICD-10-CM

## 2019-11-18 DIAGNOSIS — R41.0 DISORIENTATION, UNSPECIFIED: ICD-10-CM

## 2019-11-18 DIAGNOSIS — J80 ACUTE RESPIRATORY DISTRESS SYNDROME: ICD-10-CM

## 2019-11-18 DIAGNOSIS — F32.9 MAJOR DEPRESSIVE DISORDER, SINGLE EPISODE, UNSPECIFIED: ICD-10-CM

## 2019-11-18 PROCEDURE — 99232 SBSQ HOSP IP/OBS MODERATE 35: CPT

## 2019-11-18 PROCEDURE — 99233 SBSQ HOSP IP/OBS HIGH 50: CPT

## 2019-11-18 PROCEDURE — 99238 HOSP IP/OBS DSCHRG MGMT 30/<: CPT | Mod: GC

## 2019-11-18 RX ORDER — VENLAFAXINE HCL 75 MG
37.5 CAPSULE, EXT RELEASE 24 HR ORAL DAILY
Refills: 0 | Status: DISCONTINUED | OUTPATIENT
Start: 2019-11-18 | End: 2019-11-20

## 2019-11-18 RX ORDER — VENLAFAXINE HCL 75 MG
1 CAPSULE, EXT RELEASE 24 HR ORAL
Qty: 0 | Refills: 0 | DISCHARGE
Start: 2019-11-18

## 2019-11-18 RX ORDER — QUETIAPINE FUMARATE 200 MG/1
50 TABLET, FILM COATED ORAL EVERY 6 HOURS
Refills: 0 | Status: DISCONTINUED | OUTPATIENT
Start: 2019-11-18 | End: 2019-11-26

## 2019-11-18 RX ORDER — QUETIAPINE FUMARATE 200 MG/1
1 TABLET, FILM COATED ORAL
Qty: 0 | Refills: 0 | DISCHARGE
Start: 2019-11-18

## 2019-11-18 RX ORDER — INFLUENZA VIRUS VACCINE 15; 15; 15; 15 UG/.5ML; UG/.5ML; UG/.5ML; UG/.5ML
0.5 SUSPENSION INTRAMUSCULAR ONCE
Refills: 0 | Status: COMPLETED | OUTPATIENT
Start: 2019-11-18 | End: 2019-11-18

## 2019-11-18 RX ORDER — PSYLLIUM SEED (WITH DEXTROSE)
1 POWDER (GRAM) ORAL DAILY
Refills: 0 | Status: DISCONTINUED | OUTPATIENT
Start: 2019-11-18 | End: 2019-11-26

## 2019-11-18 RX ORDER — QUETIAPINE FUMARATE 200 MG/1
75 TABLET, FILM COATED ORAL
Refills: 0 | Status: DISCONTINUED | OUTPATIENT
Start: 2019-11-18 | End: 2019-11-20

## 2019-11-18 RX ORDER — NYSTATIN CREAM 100000 [USP'U]/G
1 CREAM TOPICAL EVERY 12 HOURS
Refills: 0 | Status: DISCONTINUED | OUTPATIENT
Start: 2019-11-18 | End: 2019-11-26

## 2019-11-18 RX ORDER — AMLODIPINE BESYLATE 2.5 MG/1
1 TABLET ORAL
Qty: 0 | Refills: 0 | DISCHARGE
Start: 2019-11-18

## 2019-11-18 RX ORDER — LACTOBACILLUS ACIDOPHILUS 100MM CELL
1 CAPSULE ORAL THREE TIMES A DAY
Refills: 0 | Status: DISCONTINUED | OUTPATIENT
Start: 2019-11-18 | End: 2019-11-26

## 2019-11-18 RX ORDER — QUETIAPINE FUMARATE 200 MG/1
3 TABLET, FILM COATED ORAL
Qty: 0 | Refills: 0 | DISCHARGE
Start: 2019-11-18

## 2019-11-18 RX ORDER — HALOPERIDOL DECANOATE 100 MG/ML
2.5 INJECTION INTRAMUSCULAR
Qty: 0 | Refills: 0 | DISCHARGE
Start: 2019-11-18

## 2019-11-18 RX ORDER — ENOXAPARIN SODIUM 100 MG/ML
60 INJECTION SUBCUTANEOUS
Qty: 0 | Refills: 0 | DISCHARGE
Start: 2019-11-18

## 2019-11-18 RX ORDER — AMLODIPINE BESYLATE 2.5 MG/1
10 TABLET ORAL DAILY
Refills: 0 | Status: DISCONTINUED | OUTPATIENT
Start: 2019-11-18 | End: 2019-11-26

## 2019-11-18 RX ORDER — CHLORHEXIDINE GLUCONATE 213 G/1000ML
1 SOLUTION TOPICAL DAILY
Refills: 0 | Status: DISCONTINUED | OUTPATIENT
Start: 2019-11-18 | End: 2019-11-26

## 2019-11-18 RX ORDER — NYSTATIN CREAM 100000 [USP'U]/G
1 CREAM TOPICAL
Qty: 0 | Refills: 0 | DISCHARGE
Start: 2019-11-18

## 2019-11-18 RX ORDER — ENOXAPARIN SODIUM 100 MG/ML
60 INJECTION SUBCUTANEOUS DAILY
Refills: 0 | Status: DISCONTINUED | OUTPATIENT
Start: 2019-11-18 | End: 2019-11-20

## 2019-11-18 RX ORDER — LACTOBACILLUS ACIDOPHILUS 100MM CELL
0 CAPSULE ORAL
Qty: 0 | Refills: 0 | DISCHARGE
Start: 2019-11-18

## 2019-11-18 RX ORDER — ACETAMINOPHEN 500 MG
650 TABLET ORAL EVERY 6 HOURS
Refills: 0 | Status: DISCONTINUED | OUTPATIENT
Start: 2019-11-18 | End: 2019-11-26

## 2019-11-18 RX ORDER — METOPROLOL TARTRATE 50 MG
75 TABLET ORAL
Refills: 0 | Status: DISCONTINUED | OUTPATIENT
Start: 2019-11-18 | End: 2019-11-26

## 2019-11-18 RX ORDER — METOPROLOL TARTRATE 50 MG
1 TABLET ORAL
Qty: 0 | Refills: 0 | DISCHARGE
Start: 2019-11-18

## 2019-11-18 RX ADMIN — FENTANYL CITRATE 1 PATCH: 50 INJECTION INTRAVENOUS at 14:28

## 2019-11-18 RX ADMIN — ENOXAPARIN SODIUM 60 MILLIGRAM(S): 100 INJECTION SUBCUTANEOUS at 12:24

## 2019-11-18 RX ADMIN — FENTANYL CITRATE 1 PATCH: 50 INJECTION INTRAVENOUS at 07:15

## 2019-11-18 RX ADMIN — CHLORHEXIDINE GLUCONATE 1 APPLICATION(S): 213 SOLUTION TOPICAL at 12:24

## 2019-11-18 RX ADMIN — QUETIAPINE FUMARATE 75 MILLIGRAM(S): 200 TABLET, FILM COATED ORAL at 17:17

## 2019-11-18 RX ADMIN — Medication 1 PACKET(S): at 12:24

## 2019-11-18 RX ADMIN — Medication 1 TABLET(S): at 21:01

## 2019-11-18 RX ADMIN — Medication 75 MILLIGRAM(S): at 06:28

## 2019-11-18 RX ADMIN — INFLUENZA VIRUS VACCINE 0.5 MILLILITER(S): 15; 15; 15; 15 SUSPENSION INTRAMUSCULAR at 15:46

## 2019-11-18 RX ADMIN — AMLODIPINE BESYLATE 10 MILLIGRAM(S): 2.5 TABLET ORAL at 06:29

## 2019-11-18 RX ADMIN — Medication 75 MILLIGRAM(S): at 17:17

## 2019-11-18 RX ADMIN — NYSTATIN CREAM 1 APPLICATION(S): 100000 CREAM TOPICAL at 17:16

## 2019-11-18 RX ADMIN — NYSTATIN CREAM 1 APPLICATION(S): 100000 CREAM TOPICAL at 06:28

## 2019-11-18 RX ADMIN — QUETIAPINE FUMARATE 75 MILLIGRAM(S): 200 TABLET, FILM COATED ORAL at 06:28

## 2019-11-18 RX ADMIN — Medication 1 TABLET(S): at 06:28

## 2019-11-18 RX ADMIN — Medication 37.5 MILLIGRAM(S): at 12:24

## 2019-11-18 RX ADMIN — Medication 1 TABLET(S): at 14:29

## 2019-11-18 NOTE — PROGRESS NOTE ADULT - RS GEN PE MLT RESP DETAILS PC
breath sounds equal/good air movement/airway patent
diminished breath sounds, L/airway patent/diminished breath sounds, R
good air movement/airway patent/breath sounds equal
airway patent/diminished breath sounds, L/diminished breath sounds, R/rhonchi
airway patent/rhonchi/diminished breath sounds, R/diminished breath sounds, L
breath sounds equal/airway patent
few rhonchi/airway patent/breath sounds equal
airway patent/breath sounds equal/clear to auscultation bilaterally
clear to auscultation bilaterally/breath sounds equal/airway patent
good air movement/clear to auscultation bilaterally/respirations non-labored/breath sounds equal

## 2019-11-18 NOTE — H&P ADULT - HISTORY OF PRESENT ILLNESS
Patient is a 54y old  Male who presents with a chief complaint of ARDS    HPI:  54M with severe MDD with prior psych admissions/ECT who was initially admitted to  10/28 after presenting with lethargy and hypoxemia. Patient reportedly was dealing with severe depression and expressed SI to his wife. His wife reported he was sleeping throughout the day and in the evening she heard a thump and found him on the floor. At  he became obtunded with hypercapnic respiratory failure and was intubated and admitted to the CCU. Initial imaging showed lower lobe consolidations and he was treated with CTX/Azithromycin. Poison control was contacted and he was received NaHCO3 in the setting of mild QRS widening on ECG and suspected overdose. His Utox returned positive for barbiturates; he reportedly had positive testing for barbiturates in the past thought secondary to OTC supplementations he takes from a [employer] for sleep. On the morning of admission, he was noted to have a 30 second tonic clonic seizure and received IV benzo and keppra load. Subsequent EEG did not reveal epileptiform discharges. He was extubated in the morning of 10/28 but required BiPAP in the setting of increased oxygenation needs. On 10/29 he was agitated and was started on Precedex. He developed fevers and was broadened to cefepime. Overnight (10/30), he had a Tmax of 102.6 and worsening hypoxemia requiring reintubation. Was started on propofol, precedex and paralyzed but remained difficult to oxygenate with traditional mechanical ventilation requiring frequent bagging and consult for ECMO was placed. He was steroids, nimbex gtt, vancomycin and received Lasix 40mg IVP. The patient was cannulated for VV-ECMO and transferred to [facility].  After transfer, pt's lung compliance and oxygenation improved and he was decannulated 11/1 and extubated 11/5. He completed a 7 day course of vancomycin and zosyn.    While at [facility], patient had persistent fevers.  Sputum culture with enterobacter - ID consulted and pt was placed on cefepime. Vancomycin was added for persistent fevers, but subsequently d/c'd as without source.  Fevers then resolved.  Blood cultures remained negative.  Pt passed S&S, diet advanced. Psych following - medications restarted for depression, tolerated seroquel and effexor.  Patient was seen by PM&R and deemed appropriate for acute inpatient rehab.     Patient medically optimized and cleared for discharge to acute rehab at [facility] on 11/18/19.  Patient seen and examined upon arrival at [facility].     PAST MEDICAL & SURGICAL HISTORY  Depression  Hepatitis  Left knee pain  No significant past surgical history      ALLERGIES  No Known Allergies    FAMILY HISTORY:  non-contributory 54M with severe MDD with prior psych admissions/ECT who was initially admitted to  10/28/19 after presenting with lethargy and hypoxemia. Patient reportedly was dealing with severe depression and expressed SI to his wife. His wife reported he was sleeping throughout the day and in the evening she heard a thump and found him on the floor. At  he became obtunded with hypercapnic respiratory failure and was intubated and admitted to the CCU. Initial imaging showed lower lobe consolidations and he was treated with CTX/Azithromycin. Poison control was contacted and he was received NaHCO3 in the setting of mild QRS widening on ECG and suspected overdose. His Utox returned positive for barbiturates; he reportedly had positive testing for barbiturates in the past thought secondary to OTC supplementations he takes from a [employer] for sleep.    On the morning of admission, he was noted to have a 30 second tonic clonic seizure and received IV benzo and keppra load. Subsequent EEG did not reveal epileptiform discharges. He was extubated in the morning of 10/28 but required BiPAP in the setting of increased oxygenation needs. On 10/29 he was agitated and was started on Precedex. He developed fevers and was broadened to cefepime, later developed fever 102.6 and worsening hypoxemia requiring reintubation. Was started on propofol, precedex and paralyzed but remained difficult to oxygenate with traditional mechanical ventilation requiring frequent bagging and consult for ECMO was placed. He was steroids, nimbex gtt, vancomycin and received Lasix 40mg IVP. The patient was cannulated for VV-ECMO and transferred to [facility] 10/30/19.      After transfer, pt's lung compliance and oxygenation improved and he was decannulated 11/1 and extubated 11/5. He completed a 7 day course of vancomycin and zosyn.    While at [facility], patient had persistent fevers.  Airway colonized with MSSA and enterobacter aerogenes.  Blood Cx negative, Legiionella urine Ag negative, HIV negative. ID consulted and pt was placed on cefepime. Vancomycin was added for persistent fevers, but subsequently d/c'd as without source.  Fevers and leukocytosis then resolved.      Pt passed S&S, diet advanced. Psych following - medications restarted for depression, tolerated seroquel and effexor.  Patient was seen by PM&R and deemed appropriate for acute inpatient rehab. Transferred to [facility] 11/18/19.

## 2019-11-18 NOTE — PROGRESS NOTE BEHAVIORAL HEALTH - NSBHCHARTREVIEWVS_PSY_A_CORE FT
Vital Signs Last 24 Hrs  T(C): 36.9 (18 Nov 2019 14:27), Max: 37.2 (17 Nov 2019 22:23)  T(F): 98.5 (18 Nov 2019 14:27), Max: 98.9 (17 Nov 2019 22:23)  HR: 97 (18 Nov 2019 14:27) (90 - 97)  BP: 133/82 (18 Nov 2019 14:27) (124/75 - 141/85)  BP(mean): --  RR: 20 (18 Nov 2019 14:27) (18 - 20)  SpO2: 96% (18 Nov 2019 14:27) (95% - 96%)

## 2019-11-18 NOTE — PROGRESS NOTE BEHAVIORAL HEALTH - NSBHADMITCOUNSEL_PSY_A_CORE
risks and benefits of treatment options/instructions for management, treatment and follow up/risk factor reduction/importance of adherence to chosen treatment/client/family/caregiver education
instructions for management, treatment and follow up/risk factor reduction/importance of adherence to chosen treatment/client/family/caregiver education/risks and benefits of treatment options
risks and benefits of treatment options/importance of adherence to chosen treatment/instructions for management, treatment and follow up/client/family/caregiver education/risk factor reduction
importance of adherence to chosen treatment/risks and benefits of treatment options/client/family/caregiver education/risk factor reduction/instructions for management, treatment and follow up
risks and benefits of treatment options/instructions for management, treatment and follow up/risk factor reduction/client/family/caregiver education/importance of adherence to chosen treatment
risks and benefits of treatment options/importance of adherence to chosen treatment/client/family/caregiver education/instructions for management, treatment and follow up/risk factor reduction
instructions for management, treatment and follow up/risk factor reduction/risks and benefits of treatment options/client/family/caregiver education/importance of adherence to chosen treatment
risk factor reduction/instructions for management, treatment and follow up/client/family/caregiver education/importance of adherence to chosen treatment/risks and benefits of treatment options

## 2019-11-18 NOTE — DISCHARGE NOTE NURSING/CASE MANAGEMENT/SOCIAL WORK - PATIENT PORTAL LINK FT
You can access the FollowMyHealth Patient Portal offered by [facility] by registering at the following website: [email or web address]. By joining [facility]’s FollowMyHealth portal, you will also be able to view your health information using other applications (apps) compatible with our system.

## 2019-11-18 NOTE — PROGRESS NOTE BEHAVIORAL HEALTH - NSBHLOC_PSY_A_CORE
Alert
Lethargic, arousable to verbal stimulus
Alert
Lethargic, arousable to verbal stimulus
Lethargic, arousable to tactile stimulus
Lethargic, arousable to verbal stimulus

## 2019-11-18 NOTE — PROGRESS NOTE ADULT - GASTROINTESTINAL DETAILS
no distention/soft/nontender
nontender/no distention/soft
nontender/no distention/soft
nontender/soft/no distention
soft/nontender/no distention
no distention/soft
soft/no distention/nontender/obese
no distention/soft
soft/no distention
bowel sounds normal/soft/nontender

## 2019-11-18 NOTE — PROGRESS NOTE ADULT - NS NEC GEN PE MLT EXAM PC
No bruits; no thyromegaly or nodules
No bruits; no thyromegaly or nodules
detailed exam

## 2019-11-18 NOTE — PROGRESS NOTE BEHAVIORAL HEALTH - RISK ASSESSMENT
has chronic risks but at this time is not in imminent risk to hurt self or other- older male, depression, prior admissions, delirium= which is improving, mood stable, denies any si or hi, denies any psychosis, is future oriented, has good family supports, has a psychiatrist, family engages in safety, discharge and treatment planning.

## 2019-11-18 NOTE — H&P ADULT - NSHPPHYSICALEXAM_GEN_ALL_CORE
PHYSICAL EXAM  VITALS  T(C): 36.9 (11-18-19 @ 14:27), Max: 37.2 (11-17-19 @ 22:23)  HR: 97 (11-18-19 @ 14:27) (90 - 97)  BP: 133/82 (11-18-19 @ 14:27) (124/75 - 141/85)  RR: 20 (11-18-19 @ 14:27) (18 - 20)  SpO2: 96% (11-18-19 @ 14:27) (95% - 96%)    Gen - NAD, Comfortable  HEENT - NCAT, EOMI, MMM  Neck - Supple, No limited ROM  Pulm - CTAB, No wheeze, No rhonchi, No crackles  Cardiovascular - RRR, S1S2, No murmurs  Abdomen - Soft, NT/ND, +BS  Extremities - No C/C/E, No calf tenderness  Neuro-     Cognitive - AAOx3     Communication - Fluent, No dysarthria     Attention: Intact      Judgement: Good evidence of judgement     Memory: Recall 3 objects immediate and 3 min later         Cranial Nerves - CN 2-12 intact     Motor -                     LEFT    UE - ShAB 5/5, EF 5/5, EE 5/5, WE 5/5,  5/5                    RIGHT UE - ShAB 5/5, EF 5/5, EE 5/5, WE 5/5,  5/5                    LEFT    LE - HF 5/5, KE 5/5, DF 5/5, PF 5/5                    RIGHT LE - HF 5/5, KE 5/5, DF 5/5, PF 5/5        Sensory - Intact to LT     Reflexes - DTR Intact, No primitive reflexive     Coordination - FTN intact     Tone - normal  Psychiatric - Mood stable, Affect WNL  Skin:  all skin intact PHYSICAL EXAM  VITALS  T(C): 36.9 (11-18-19 @ 14:27), Max: 37.2 (11-17-19 @ 22:23)  HR: 97 (11-18-19 @ 14:27) (90 - 97)  BP: 133/82 (11-18-19 @ 14:27) (124/75 - 141/85)  RR: 20 (11-18-19 @ 14:27) (18 - 20)  SpO2: 96% (11-18-19 @ 14:27) (95% - 96%)    Gen - NAD, Comfortable  HEENT - NCAT, EOMI, MMM  Neck - Supple, No limited ROM  Pulm - CTAB, No wheeze, No rhonchi, No crackles  Cardiovascular - RRR, S1S2, No murmurs  Abdomen - Soft, NT/ND, +BS  Extremities - No C/C/E, No calf tenderness  Neuro-     Cognitive - AAOx3     Communication - Fluent, No dysarthria     Attention: Intact      Judgement: Good evidence of judgement     Memory: Recall 3 objects immediate and 3 min later         Cranial Nerves - CN 2-12 intact     Motor - No Focal deficits         Sensory - Intact to LT     Reflexes - DTR Intact, No primitive reflexive     Coordination - FTN intact     Tone - normal  Psychiatric - Mood stable, Affect WNL  Skin:  all skin intact PHYSICAL EXAM  VITALS  T(C): 36.9 (11-18-19 @ 14:27), Max: 37.2 (11-17-19 @ 22:23)  HR: 97 (11-18-19 @ 14:27) (90 - 97)  BP: 133/82 (11-18-19 @ 14:27) (124/75 - 141/85)  RR: 20 (11-18-19 @ 14:27) (18 - 20)  SpO2: 96% (11-18-19 @ 14:27) (95% - 96%)    Gen - NAD, Irritable, mildly restless, reduced simple and sustained attnetion  eye opening sustained, arousal good-. Reduced insight, reduced mental flexibility. follows simple commands consisently    HEENT - NCAT, EOMI, MMM  Pulm - CTAB, No wheeze, No rhonchi, No crackles  Cardiovascular - RRR, S1S2, No murmurs  Abdomen - Soft, NT/ND, +BS  Extremities - No C/C/E, No calf tenderness. Normal tone, no pain with ROM BUE and LE  Neuro-     Cognitive - AAOx3 grossly. Reduced insight into diagnoses     Communication - Fluent, No dysarthria     Attention: reduced as above. Reduced insight     Memory: Recall 3 objects immediate and 3 min later         Cranial Nerves - CN 2-12 intact     Motor - No Focal deficits. Moptor 5/5 bialteral shoulder, elbow flexion and extension, gross grasp  bilateral HF, quad, ham and anlke PF and DF 5/5  no tremors         Sensory - Intact to LT     Reflexes - DTR Intact, No primitive reflexive     Coordination - FTN intact     Tone - normal  Psychiatric - Mood stable, Affect WNL  Skin:  all skin intact

## 2019-11-18 NOTE — PROGRESS NOTE ADULT - PROBLEM SELECTOR PROBLEM 2
Debility
Debility
Fever
Functional quadriplegia
Fever

## 2019-11-18 NOTE — PROGRESS NOTE BEHAVIORAL HEALTH - NSBHADMITCOORDWITH_PSY_A_CORE
medical staff/family/Caregiver
medical staff
medical staff/family/Caregiver
medical staff/family/Caregiver
family/Caregiver/medical staff/outpatient provider
family/Caregiver/medical staff
medical staff/family/Caregiver
medical staff/family/Caregiver

## 2019-11-18 NOTE — CHART NOTE - NSCHARTNOTEFT_GEN_A_CORE
Source: Patient & EMR  Diet : Regular     Patient reports [good appetite, PO nutrition , noted pt. initiated on PO nutrition > 7 days , continues w/ > 75% intake , no wt. since 11/7/19 for assessment . Skin intact , noted 3+ R & L foot edema .    Current Weight: - 136.7 kg on 11/7/19; was 143.6 kg on 11/6/19 , Admit wt. - 135 kg     Pertinent Medications: MEDICATIONS  (STANDING):  amLODIPine   Tablet 10 milliGRAM(s) Oral daily  chlorhexidine 4% Liquid 1 Application(s) Topical daily  enoxaparin Injectable 60 milliGRAM(s) SubCutaneous daily  lactobacillus acidophilus 1 Tablet(s) Oral three times a day  metoprolol tartrate 75 milliGRAM(s) Oral two times a day  nystatin Powder 1 Application(s) Topical every 12 hours  psyllium Powder 1 Packet(s) Oral daily  QUEtiapine 75 milliGRAM(s) Oral <User Schedule>  venlafaxine 37.5 milliGRAM(s) Oral daily      Pertinent Labs:  11-16 Na140 mmol/L Glu 109 mg/dL<H> K+ 3.6 mmol/L Cr  0.58 mg/dL BUN 12 mg/dL 11-14 Phos 3.3 mg/dL 10-29 [id number] 5.2 %      Recommend to continue current nutrition support . Request current wt. & weekly wt.     Monitoring and Evaluation:  PO intake , Tolerance to diet prescription , weights as available  & follow up per protocol

## 2019-11-18 NOTE — PROGRESS NOTE ADULT - SUBJECTIVE AND OBJECTIVE BOX
CHIEF COMPLAINT: Patient is a 54y old  Male who presents with a chief complaint of ARDS (17 Nov 2019 08:28)      Interval Events: none overnight     REVIEW OF SYSTEMS:  Constitutional:   Eyes:  ENT:  CV:  Resp:  GI:  :  MSK:  Integumentary:  Neurological:  Psychiatric:  Endocrine:  Hematologic/Lymphatic:  Allergic/Immunologic:  [ ] All other systems negative  [ ] Unable to assess ROS because ________    OBJECTIVE:  ICU Vital Signs Last 24 Hrs  T(C): 36.6 (18 Nov 2019 06:25), Max: 37.2 (17 Nov 2019 22:23)  T(F): 97.9 (18 Nov 2019 06:25), Max: 98.9 (17 Nov 2019 22:23)  HR: 91 (18 Nov 2019 06:25) (90 - 96)  BP: 141/85 (18 Nov 2019 06:25) (124/75 - 141/85)  BP(mean): --  ABP: --  ABP(mean): --  RR: 18 (18 Nov 2019 06:25) (18 - 18)  SpO2: 95% (18 Nov 2019 06:25) (95% - 96%)        CAPILLARY BLOOD GLUCOSE        HOSPITAL MEDICATIONS:  MEDICATIONS  (STANDING):  amLODIPine   Tablet 10 milliGRAM(s) Oral daily  chlorhexidine 4% Liquid 1 Application(s) Topical daily  enoxaparin Injectable 60 milliGRAM(s) SubCutaneous daily  lactobacillus acidophilus 1 Tablet(s) Oral three times a day  metoprolol tartrate 75 milliGRAM(s) Oral two times a day  nystatin Powder 1 Application(s) Topical every 12 hours  psyllium Powder 1 Packet(s) Oral daily  QUEtiapine 75 milliGRAM(s) Oral <User Schedule>  venlafaxine 37.5 milliGRAM(s) Oral daily    MEDICATIONS  (PRN):  acetaminophen    Suspension .. 650 milliGRAM(s) Oral every 6 hours PRN Temp greater or equal to 38C (100.4F), Mild Pain (1 - 3), Moderate Pain (4 - 6), Severe Pain (7 - 10)  artificial  tears Solution 1 Drop(s) Both EYES two times a day PRN Dry Eyes  glucagon  Injectable 1 milliGRAM(s) IntraMuscular once PRN Glucose LESS THAN 70 milligrams/deciliter  haloperidol    Injectable 2.5 milliGRAM(s) IV Push every 6 hours PRN Agitation  QUEtiapine 50 milliGRAM(s) Oral every 6 hours PRN agitation      LABS:                    MICROBIOLOGY:     RADIOLOGY:  [ ] Reviewed and interpreted by me    PULMONARY FUNCTION TESTS:    EKG: CHIEF COMPLAINT: Patient is a 54y old  Male who presents with a chief complaint of ARDS (17 Nov 2019 08:28)      Interval Events: none overnight     REVIEW OF SYSTEMS:  Constitutional: no pain /fevers  CV: Denies   Resp: Denies   GI: Denies   MSK: Weak   [x ] All other systems negative  [ ] Unable to assess ROS because ________    OBJECTIVE:  ICU Vital Signs Last 24 Hrs  T(C): 36.6 (18 Nov 2019 06:25), Max: 37.2 (17 Nov 2019 22:23)  T(F): 97.9 (18 Nov 2019 06:25), Max: 98.9 (17 Nov 2019 22:23)  HR: 91 (18 Nov 2019 06:25) (90 - 96)  BP: 141/85 (18 Nov 2019 06:25) (124/75 - 141/85)  BP(mean): --  ABP: --  ABP(mean): --  RR: 18 (18 Nov 2019 06:25) (18 - 18)  SpO2: 95% (18 Nov 2019 06:25) (95% - 96%)        CAPILLARY BLOOD GLUCOSE        HOSPITAL MEDICATIONS:  MEDICATIONS  (STANDING):  amLODIPine   Tablet 10 milliGRAM(s) Oral daily  chlorhexidine 4% Liquid 1 Application(s) Topical daily  enoxaparin Injectable 60 milliGRAM(s) SubCutaneous daily  lactobacillus acidophilus 1 Tablet(s) Oral three times a day  metoprolol tartrate 75 milliGRAM(s) Oral two times a day  nystatin Powder 1 Application(s) Topical every 12 hours  psyllium Powder 1 Packet(s) Oral daily  QUEtiapine 75 milliGRAM(s) Oral <User Schedule>  venlafaxine 37.5 milliGRAM(s) Oral daily    MEDICATIONS  (PRN):  acetaminophen    Suspension .. 650 milliGRAM(s) Oral every 6 hours PRN Temp greater or equal to 38C (100.4F), Mild Pain (1 - 3), Moderate Pain (4 - 6), Severe Pain (7 - 10)  artificial  tears Solution 1 Drop(s) Both EYES two times a day PRN Dry Eyes  glucagon  Injectable 1 milliGRAM(s) IntraMuscular once PRN Glucose LESS THAN 70 milligrams/deciliter  haloperidol    Injectable 2.5 milliGRAM(s) IV Push every 6 hours PRN Agitation  QUEtiapine 50 milliGRAM(s) Oral every 6 hours PRN agitation      LABS:                    MICROBIOLOGY:     RADIOLOGY:  [ ] Reviewed and interpreted by me    PULMONARY FUNCTION TESTS:    EKG:

## 2019-11-18 NOTE — PROGRESS NOTE BEHAVIORAL HEALTH - SUMMARY
Patient is a 55 y/o [place] male that is employed, , lives with his wife, has a supportive family, hx of severe depression and anxiety, multiple hx of psychiatry admissions (last in June 2019), tx refractory to many antidepressant meds, no hx of prior SA, hx of alcohol use- none recently, no hx of psychosis, received 5 tx of ECT (while in inpatient psych unit at [facility]), then stopped with no outpatient treatments in June 2019, PMHx of hepatitis presenting with increased lethargy and hypoxemia. As per records- patient was s/p fall at home. In ed was obtunded, noted to be in hypercarbic RF- requiring intubation. LL consolidation on CXray- started on antibiotics. Found to have barbiturates+ on urine toxicology. Course complicated by seizure. Extubated on 10/28- placed on BIPAP. Course continued to be complicated by fevers, worsening hypoxemia requiring reintubation. Was started on propofol, precedex and paralyzed but remained difficult to oxygenate with traditional mechanical ventilation requiring frequent bagging and ALI consult for ECMO was placed. The patient was cannulated for VV-ECMO and transfer to [facility]. Decannulated on Friday last week. Remained intubated on multiple sedative- Midazolam, Precedex, Propofol, Fentanyl. Psychiatry has been consulted as sedatives will slowly be weaned off- agitation management and also for baseline depression (was on multiple psychotropics)    11/8- patient is able to converse minimally. Remains delirious.  11/11- delirium, lethargic upon approach  11/12- still delirious, febrile  11/13- delirium, and now with agitation  11/14- delirium slowly resolving  11/15- delirium resolving, significant improvement, future oriented  11/18- delirium mostly resolved, significant improvement, future oriented, cheerful     Recommendations-  - CONTINUE dose of Seroquel to 75mg BID PO- 7AM, 5PM .   - START 37.5 mg of Effexor today, and continue at rehab  - Check an EKG tomorrow to monitor qtc  - AGITATION- Seroquel 50mg q 6hrs prn po  - If qtc< 500, haldol 2.5 mg q 6 hours PRN IV/IM for agitation.   - Frequent orientation  - Avoid bzd, anticholinergics and antihistamines.  - Stable for discharge to rehab. Patient is a 53 y/o [place] male that is employed, , lives with his wife, has a supportive family, hx of severe depression and anxiety, multiple hx of psychiatry admissions (last in June 2019), tx refractory to many antidepressant meds, no hx of prior SA, hx of alcohol use- none recently, no hx of psychosis, received 5 tx of ECT (while in inpatient psych unit at [facility]), then stopped with no outpatient treatments in June 2019, PMHx of hepatitis presenting with increased lethargy and hypoxemia. As per records- patient was s/p fall at home. In ed was obtunded, noted to be in hypercarbic RF- requiring intubation. LL consolidation on CXray- started on antibiotics. Found to have barbiturates+ on urine toxicology. Course complicated by seizure. Extubated on 10/28- placed on BIPAP. Course continued to be complicated by fevers, worsening hypoxemia requiring reintubation. Was started on propofol, precedex and paralyzed but remained difficult to oxygenate with traditional mechanical ventilation requiring frequent bagging and ALI consult for ECMO was placed. The patient was cannulated for VV-ECMO and transfer to [facility]. Decannulated on Friday last week. Remained intubated on multiple sedative- Midazolam, Precedex, Propofol, Fentanyl. Psychiatry has been consulted as sedatives will slowly be weaned off- agitation management and also for baseline depression (was on multiple psychotropics)    11/8- patient is able to converse minimally. Remains delirious.  11/11- delirium, lethargic upon approach  11/12- still delirious, febrile  11/13- delirium, and now with agitation  11/14- delirium slowly resolving  11/15- delirium resolving, significant improvement, future oriented  11/18- delirium mostly resolved, significant improvement, future oriented, cheerful     Recommendations-  - CONTINUE dose of Seroquel to 75mg BID PO- 7AM, 5PM .   - Continue 37.5 mg Effexor q am po  - AGITATION- Seroquel 50mg q 6hrs prn po  - If qtc< 500, haldol 2.5 mg q 6 hours PRN IV/IM for agitation.   - Frequent orientation  - Avoid bzd, anticholinergics and antihistamines.  - Stable for discharge to rehab.

## 2019-11-18 NOTE — PATIENT PROFILE ADULT - OVER THE PAST TWO WEEKS, HAVE YOU FELT LITTLE INTEREST OR PLEASURE IN DOING THINGS?
unable to obtain info from patient as he is intubated and sedated no/unable to obtain info from patient as he is intubated and sedated

## 2019-11-18 NOTE — H&P ADULT - NSHPOUTPATIENTPROVIDERS_GEN_ALL_CORE
Tessy Corbin)  Internal Medicine; Pulmonary Disease  [address], [address]  [place] [address]  Phone: [phone]  Fax: [phone]

## 2019-11-18 NOTE — PROGRESS NOTE BEHAVIORAL HEALTH - SECONDARY DX1
Major depressive disorder without psychotic features
Delirium in remission

## 2019-11-18 NOTE — PROGRESS NOTE ADULT - PROBLEM SELECTOR PLAN 1
- Transferred from  to [facility] for possible ECMO p/w ARDS second to PNA vs OD. Re-Intubated and Cannulated 10/30. Decannulated 11/1. Extubated 11/5.   - Bronched 10/30. BAL with MSSA  - SCx 11/5 with Enterobacter Cloacae   - BCx from 10/30 with Coag Neg Staph. RPT NTD.   - s/p Vancomycin and Zosyn from 10/30 - 11/5  - Currently tolerating RA well.   - Chest PT, Metanebs and Suction PRN.

## 2019-11-18 NOTE — PROGRESS NOTE BEHAVIORAL HEALTH - NSBHFUPINTERVALCCFT_PSY_A_CORE
Significant improvement with residual delirium Significant improvement with residual delirium, no agitation, calm, compliant, sleep fair, no prn needs.

## 2019-11-18 NOTE — PATIENT PROFILE ADULT - FUNCTIONAL SCREEN CURRENT LEVEL: COMMUNICATION, MLM
3 = unable to understand or speak (not related to language barrier)/intubated and sedated 0 = understands/communicates without difficulty/intubated and sedated

## 2019-11-18 NOTE — PROGRESS NOTE ADULT - ASSESSMENT
Mr. Barth is a 55 YO M with Severe Depression who presented to  10/28 with lethargy and acute hypoxemic/ hypercapnic respiratory failure possibly related to overdose +/- PNA extubated but required reintubation 10/30 but with persistent hypoxemia refractory to conventional management and ultimately cannulated for VV ECMO 10/30 for hypoxemic respiratory failure with ARDS and transferred to [facility] for further management. Fever spikes noted. SCx with Enterobacter Cloacae s/p ABX with Zosyn x 7 days from 10/30 - 11/5 and Vancomycin x 7 days from 10/30 - 11/5. Decannulated on 11/1. Weaned off pressors. s/p VDR + Nimbex for synchronization Extubated 11/6.   11/17-no new events

## 2019-11-18 NOTE — PROGRESS NOTE ADULT - REASON FOR ADMISSION
ARDS

## 2019-11-18 NOTE — H&P ADULT - ASSESSMENT
ASSESSMENT/PLAN  EBONI CARRASCO is a 55yo Male with hx of MDD recently admitted to [facility] with ARDS requiring ECMO, now with decreased functional mobility, gait instability and ADL impairments.    COMORBIDITES/ACTIVE MEDICAL ISSUES     #Gait Instability, ADL impairments and Functional impairments: start Comprehensive Rehab Program of PT/OT/SLP     #ARDS requiring ECMO  - therapies as above  - continue chest PT/PD  - incentive spirometer     #MDD  - pt has hx of psych hospitalizations for SI.  Initial U-tox during admission positive for barbiturates.   - continue effexor, seroquel  - pt was requiring IV haldol at [facility]. Would likely be best option of he becomes acutely agitated again.     #HTN  - continue metoprolol, emlodipine iwth hold parameters    #Constipation  - continue metamucil    Pain Mgmt - Tylenol PRN  GI/Bowel Mgmt - Colace, Senna,  Miralax PRN  /Bladder Mgmt - Voiding independently, PVRx1      Precautions / PROPHYLAXIS:   - Falls  - Ortho: Weight bearing status: WBAT   - Lungs: Aspiration, Incentive Spirometer   - Pressure injury/Skin: Turn Q2hrs while in bed, OOB to Chair, PT/OT    - DVT: Lovenox, SCDs, TEDs     MEDICAL PROGNOSIS: GOOD            REHAB POTENTIAL: GOOD             ESTIMATED DISPOSITION: HOME WITH HOME CARE            ELOS: 10-14 Days   EXPECTED THERAPY:     P.T. 1hr/day       O.T. 1hr/day      S.L.P. 1hr/day     P&O Unnecessary     EXP FREQUENCY: 5 days per 7 day period     PRESCREEN COMPARISON:   I have reviewed the prescreen information and I have found no relevant changes between the preadmission screening and my post admission evaluation     RATIONALE FOR INPATIENT ADMISSION - Patient demonstrates the following: (check all that apply)  [X] Medically appropriate for rehabilitation admission  [X] Has attainable rehab goals with an appropriate initial discharge plan  [X] Has rehabilitation potential (expected to make a significant improvement within a reasonable period of time)   [X] Requires close medical management by a rehab physician, rehab nursing care, Hospitalist and comprehensive interdisciplinary team (including PT, OT, & or SLP, Prosthetics and Orthotics) ASSESSMENT/PLAN  EBONI CARRASCO is a 55yo Male with hx of MDD recently admitted to [facility] with ARDS requiring ECMO, now with decreased functional mobility, gait instability and ADL impairments.    COMORBIDITES/ACTIVE MEDICAL ISSUES     #Gait Instability, ADL impairments and Functional impairments: start Comprehensive Rehab Program of PT/OT/SLP     #ARDS requiring ECMO  - therapies as above  - continue chest PT/PD  - incentive spirometer     #MDD  - pt has hx of psych hospitalizations for SI.  Initial U-tox during admission positive for barbiturates.   - continue effexor, seroquel      #HTN  - continue metoprolol, emlodipine iwth hold parameters    #Constipation  - continue metamucil    Pain Mgmt - Tylenol PRN  GI/Bowel Mgmt - Colace, Senna,  Miralax PRN  /Bladder Mgmt - Voiding independently, PVRx1      Precautions / PROPHYLAXIS:   - Falls  - Ortho: Weight bearing status: WBAT   - Lungs: Aspiration, Incentive Spirometer   - Pressure injury/Skin: Turn Q2hrs while in bed, OOB to Chair, PT/OT    - DVT: Lovenox, SCDs, TEDs     MEDICAL PROGNOSIS: GOOD            REHAB POTENTIAL: GOOD             ESTIMATED DISPOSITION: HOME WITH HOME CARE            ELOS: 10-14 Days   EXPECTED THERAPY:     P.T. 1hr/day       O.T. 1hr/day      S.L.P. 1hr/day     P&O Unnecessary     EXP FREQUENCY: 5 days per 7 day period     PRESCREEN COMPARISON:   I have reviewed the prescreen information and I have found no relevant changes between the preadmission screening and my post admission evaluation     RATIONALE FOR INPATIENT ADMISSION - Patient demonstrates the following: (check all that apply)  [X] Medically appropriate for rehabilitation admission  [X] Has attainable rehab goals with an appropriate initial discharge plan  [X] Has rehabilitation potential (expected to make a significant improvement within a reasonable period of time)   [X] Requires close medical management by a rehab physician, rehab nursing care, Hospitalist and comprehensive interdisciplinary team (including PT, OT, & or SLP, Prosthetics and Orthotics) ASSESSMENT/PLAN  EBONI CARRASCO is a 55yo Male with hx of MDD recently admitted to [facility] with ARDS requiring ECMO, now with decreased functional mobility, gait instability and ADL impairments.    COMORBIDITES/ACTIVE MEDICAL ISSUES     #Debility  Gait Instability, ADL impairments and Functional impairments: start Comprehensive Rehab Program of PT/OT/SLP   -neuropsychology consult for MDD and possible anoxia due to respiratory failure  -SLP evaluation swallow and cognitive linguistic skills    #ARDS requiring ECMO  - therapies as above  - continue chest PT/PD, breathing exercises  - incentive spirometer   -hosptialist consult  Per palliative care consult at [facility]:  Given patient was on ECMO he will need Follow up with Dr. Edita Garcia at the [address] [place] [address], Geriatrics and Palliative Care Office.  Please schedule an appointment for him at [phone] upon discharge if  patient is being transferred to rehab facility please include in d/c summary.      #MDD  - pt has hx of psych hospitalizations for SI.  Initial U-tox during admission positive for barbiturates.   - continue effexor, seroquel  -psychology consult  -SW consult and possible referral on dc for substance abuse services      #HTN  - continue metoprolol, emlodipine iwth hold parameters  -hospitalist consult    #Constipation  - continue metamucil    #Pain Mgmt   - Tylenol PRN    #- DVT PPX:   Lovenox, SCDs, TEDs       Precautions / PROPHYLAXIS:   - Falls  - Ortho: Weight bearing status: WBAT   - Lungs: Aspiration, Incentive Spirometer   - Pressure injury/Skin: Turn Q2hrs while in bed, OOB to Chair, PT/OT        MEDICAL PROGNOSIS: GOOD            REHAB POTENTIAL: GOOD             ESTIMATED DISPOSITION: HOME WITH HOME CARE            ELOS: 10-14 Days   EXPECTED THERAPY:     P.T. 1hr/day       O.T. 1hr/day      S.L.P. 1hr/day     P&O Unnecessary     EXP FREQUENCY: 5 days per 7 day period     PRESCREEN COMPARISON:   I have reviewed the prescreen information and I have found no relevant changes between the preadmission screening and my post admission evaluation     RATIONALE FOR INPATIENT ADMISSION - Patient demonstrates the following: (check all that apply)  [X] Medically appropriate for rehabilitation admission  [X] Has attainable rehab goals with an appropriate initial discharge plan  [X] Has rehabilitation potential (expected to make a significant improvement within a reasonable period of time)   [X] Requires close medical management by a rehab physician, rehab nursing care, Hospitalist and comprehensive interdisciplinary team (including PT, OT, & or SLP, Prosthetics and Orthotics)      RECENT LABS    Vital Signs Last 24 Hrs  T(C): 36.9 (19 Nov 2019 07:43), Max: 36.9 (18 Nov 2019 18:56)  T(F): 98.4 (19 Nov 2019 07:43), Max: 98.4 (18 Nov 2019 18:56)  HR: 96 (19 Nov 2019 07:43) (84 - 110)  BP: 145/89 (19 Nov 2019 07:43) (130/82 - 147/95)  BP(mean): --  RR: 14 (19 Nov 2019 07:43) (14 - 16)  SpO2: 99% (19 Nov 2019 07:43) (96% - 99%)                          10.9   7.42  )-----------( 321      ( 19 Nov 2019 05:55 )             32.5     11-19    141  |  108  |  11  ----------------------------<  130<H>  3.2<L>   |  23  |  0.62    Ca    9.3      19 Nov 2019 05:55    TPro  7.0  /  Alb  3.4  /  TBili  0.5  /  DBili  x   /  AST  20  /  ALT  45  /  AlkPhos  52  11-19        CAPILLARY BLOOD GLUCOSE    hypokalemia  -supplement K+ 40 meq x 1 dose  BMP and Mg++ 11/20 ASSESSMENT/PLAN  EBONI CARRASCO is a 55yo Male with hx of MDD recently admitted to [facility] with ARDS requiring ECMO, now with decreased functional mobility, gait instability and ADL impairments.    COMORBIDITES/ACTIVE MEDICAL ISSUES     #Debility  Gait Instability, ADL impairments and Functional impairments: start Comprehensive Rehab Program of PT/OT/SLP   -neuropsychology consult for MDD and possible anoxia due to respiratory failure  -SLP evaluation swallow and cognitive linguistic skills    #ARDS requiring ECMO  - therapies as above  - continue chest PT/PD, breathing exercises  - incentive spirometer   -hosptialist consult  Per palliative care consult at [facility]:  Given patient was on ECMO he will need Follow up with Dr. Edita Garcia at the [address] [place] [address], Geriatrics and Palliative Care Office.  Please schedule an appointment for him at [phone] upon discharge if  patient is being transferred to rehab facility please include in d/c summary.      #MDD  - pt has hx of psych hospitalizations for SI.  Initial U-tox during admission positive for barbiturates.   - continue effexor, seroquel  -psychology consult  -psychiatry consult (patient agreeable tomorrow 11/20, refuses for today) due to history and substance abuse and persistent insomnia  -SW consult and possible referral on dc for substance abuse services      #HTN  - continue metoprolol, emlodipine iwth hold parameters  -hospitalist consult    #Constipation  - continue metamucil    #Pain Mgmt   - Tylenol PRN    #- DVT PPX:   Lovenox, SCDs, TEDs       Precautions / PROPHYLAXIS:   - Falls  - Ortho: Weight bearing status: WBAT   - Lungs: Aspiration, Incentive Spirometer   - Pressure injury/Skin: Turn Q2hrs while in bed, OOB to Chair, PT/OT        MEDICAL PROGNOSIS: GOOD            REHAB POTENTIAL: GOOD             ESTIMATED DISPOSITION: HOME WITH HOME CARE            ELOS: 10-14 Days   EXPECTED THERAPY:     P.T. 1hr/day       O.T. 1hr/day      S.L.P. 1hr/day     P&O Unnecessary     EXP FREQUENCY: 5 days per 7 day period     PRESCREEN COMPARISON:   I have reviewed the prescreen information and I have found no relevant changes between the preadmission screening and my post admission evaluation     RATIONALE FOR INPATIENT ADMISSION - Patient demonstrates the following: (check all that apply)  [X] Medically appropriate for rehabilitation admission  [X] Has attainable rehab goals with an appropriate initial discharge plan  [X] Has rehabilitation potential (expected to make a significant improvement within a reasonable period of time)   [X] Requires close medical management by a rehab physician, rehab nursing care, Hospitalist and comprehensive interdisciplinary team (including PT, OT, & or SLP, Prosthetics and Orthotics)      RECENT LABS    Vital Signs Last 24 Hrs  T(C): 36.9 (19 Nov 2019 07:43), Max: 36.9 (18 Nov 2019 18:56)  T(F): 98.4 (19 Nov 2019 07:43), Max: 98.4 (18 Nov 2019 18:56)  HR: 96 (19 Nov 2019 07:43) (84 - 110)  BP: 145/89 (19 Nov 2019 07:43) (130/82 - 147/95)  BP(mean): --  RR: 14 (19 Nov 2019 07:43) (14 - 16)  SpO2: 99% (19 Nov 2019 07:43) (96% - 99%)                          10.9   7.42  )-----------( 321      ( 19 Nov 2019 05:55 )             32.5     11-19    141  |  108  |  11  ----------------------------<  130<H>  3.2<L>   |  23  |  0.62    Ca    9.3      19 Nov 2019 05:55    TPro  7.0  /  Alb  3.4  /  TBili  0.5  /  DBili  x   /  AST  20  /  ALT  45  /  AlkPhos  52  11-19        CAPILLARY BLOOD GLUCOSE    hypokalemia  -supplement K+ 40 meq x 1 dose  BMP and Mg++ 11/20

## 2019-11-18 NOTE — H&P ADULT - NSHPREVIEWOFSYSTEMS_GEN_ALL_CORE
REVIEW OF SYSTEMS  Constitutional - Denies fevers, chills  HEENT - Denies changes in vision or hearing  Respiratory - Denies cough, dyspnea  Cardiovascular - Denies chest pain, palpitations  Gastrointestinal - Denies n/v, constipation, bowel incontinence  Genitourinary - Denies dysuria, urinary incontinence  Neurological - Denies weakness, numbness, headaches  Skin - Denies rashes  Musculoskeletal - Denies arthralgia, myalgias, back pain  Psychiatric - Denies depressed mood, anxiety REVIEW OF SYSTEMS  Constitutional - Denies fevers, chills, + fatigue   HEENT - Denies changes in vision or hearing, no dysphagia   Respiratory - Denies cough, dyspnea  Cardiovascular - Denies chest pain, palpitations  Gastrointestinal - Denies n/v, constipation, bowel incontinence  Genitourinary - Denies dysuria, urinary incontinence  Neurological - Denies weakness, numbness, headaches  Skin - Denies rashes  Musculoskeletal - Denies arthralgia, myalgias, back pain, overall weakness   Psychiatric - Denies depressed mood, anxiety REVIEW OF SYSTEMS  Constitutional - Denies fevers, chills, + fatigue   HEENT - Denies changes in vision or hearing, no dysphagia   Respiratory - Denies cough, dyspnea  Cardiovascular - Denies chest pain, palpitations  Gastrointestinal - Denies n/v, constipation, bowel incontinence. Last BM 11/18  Genitourinary - Denies dysuria, urinary incontinence. No hematuria  Neurological - Denies weakness, numbness, headaches  Skin - Denies rashes  Musculoskeletal - Denies arthralgia, myalgias, back pain, overall weakness   Psychiatric - Denies depressed mood, anxiety +difficulty sleeping, does not know why. Admits to being frustrated and slightly irritable with being asked the same quesitons over and over

## 2019-11-18 NOTE — DISCHARGE NOTE NURSING/CASE MANAGEMENT/SOCIAL WORK - NSDCVIVACCINE_GEN_ALL_CORE_FT
No Vaccines Administered. Influenza , 2019/11/18 15:46 , Ycoasta Aldana (RN) Influenza , 2019/11/18 15:46 , Yocasta Aldana (RN)

## 2019-11-18 NOTE — PROGRESS NOTE BEHAVIORAL HEALTH - NSBHCONSFOLLOWNEEDS_PSY_A_CORE
Patient needs further psychiatric safety assessment prior to discharge
no psychiatric contraindications to discharge
no psychiatric contraindications to discharge
Patient needs further psychiatric safety assessment prior to discharge

## 2019-11-18 NOTE — H&P ADULT - NSHPSOCIALHISTORY_GEN_ALL_CORE
SOCIAL HISTORY  Drugs - Utox + for barbituates (possibly from supplement patient takes)    FUNCTIONAL HISTORY  Lives with his wife in coop apartment  Independent at baseline for ambulation and ADLs    CURRENT FUNCTIONAL STATUS  T: min of 1   gait: 50' min of 1 with RW

## 2019-11-18 NOTE — PROGRESS NOTE BEHAVIORAL HEALTH - NSBHCONSULTFOLLOWAFTERCARE_PSY_A_CORE FT
psychiatric f/u at rehab, and psychiatric f/u with Dr Morales upon d/c from rehab
educated wife that psychiatry f/u at rehab will be beneficial. Wife will ensure that patient f/u with outpatient psychiatrist upon d/c from rehab. She knows the symptoms of depression and will monitor him for it. Safety, discharge and treatment planning done with wife. No safety concerns reported by wife

## 2019-11-18 NOTE — PATIENT PROFILE ADULT - OVER THE PAST TWO WEEKS HAVE YOU FELT DOWN, DEPRESSED OR HOPELESS?
unable to ask patient as he is intubated and sedated. Patient was being treated as an outpatient for depression unable to ask patient as he is intubated and sedated. Patient was being treated as an outpatient for depression/no

## 2019-11-18 NOTE — PROGRESS NOTE ADULT - CVS HE PE MLT D E PC
no murmur/regular rate and rhythm/no rub
no rub/regular rate and rhythm/no murmur
regular rate and rhythm/no murmur/no rub
no rub/no murmur/regular rate and rhythm
regular rate and rhythm/no murmur/no rub
regular rate and rhythm
regular rate and rhythm/no murmur

## 2019-11-18 NOTE — PROGRESS NOTE ADULT - PROBLEM SELECTOR PROBLEM 1
Acute respiratory distress syndrome (ARDS)
Respiratory failure
Acute respiratory distress syndrome (ARDS)

## 2019-11-18 NOTE — PROGRESS NOTE BEHAVIORAL HEALTH - NSBHFUPINTERVALHXFT_PSY_A_CORE
Pt is seen and evaluated at bedside. Pt is awake, articulate, responding to questions and is in a good mood. Patient is future oriented and states the lives for his wife and his daughter Rachana. Pt is aware of the correct time, date, location, wife and daughter's name and is looking forward to going to rehab. Pt also remembers that he was sad before he came into the hospital and expresses displeasure with his current job, stating that it "feels repetitive" day after day. However, at this time, pt denies depressive symptoms, denies SI, HI, A/VH or paranoia    Pt's care is coordinated closely with wife at bedside. Pt is seen and evaluated at bedside. Pt is awake, articulate, responding to questions and is in a good mood. Patient is future oriented and states he lives for his wife and his daughter Rachana. Pt is aware of the correct time, date, location, wife and daughter's name and is looking forward to going to rehab. Pt also remembers that he was sad before he came into the hospital and expresses displeasure with his current job as triggering factors, stating that it "feels repetitive" day after day. However, at this time, pt denies depressive symptoms, denies SI, HI, A/VH or paranoia    Pt's care is coordinated closely with wife at bedside.

## 2019-11-19 LAB
ALBUMIN SERPL ELPH-MCNC: 3.4 G/DL — SIGNIFICANT CHANGE UP (ref 3.3–5)
ALP SERPL-CCNC: 52 U/L — SIGNIFICANT CHANGE UP (ref 40–120)
ALT FLD-CCNC: 45 U/L — SIGNIFICANT CHANGE UP (ref 10–45)
ANION GAP SERPL CALC-SCNC: 10 MMOL/L — SIGNIFICANT CHANGE UP (ref 5–17)
AST SERPL-CCNC: 20 U/L — SIGNIFICANT CHANGE UP (ref 10–40)
BASOPHILS # BLD AUTO: 0.02 K/UL — SIGNIFICANT CHANGE UP (ref 0–0.2)
BASOPHILS NFR BLD AUTO: 0.3 % — SIGNIFICANT CHANGE UP (ref 0–2)
BILIRUB SERPL-MCNC: 0.5 MG/DL — SIGNIFICANT CHANGE UP (ref 0.2–1.2)
BUN SERPL-MCNC: 11 MG/DL — SIGNIFICANT CHANGE UP (ref 7–23)
CALCIUM SERPL-MCNC: 9.3 MG/DL — SIGNIFICANT CHANGE UP (ref 8.4–10.5)
CHLORIDE SERPL-SCNC: 108 MMOL/L — SIGNIFICANT CHANGE UP (ref 96–108)
CO2 SERPL-SCNC: 23 MMOL/L — SIGNIFICANT CHANGE UP (ref 22–31)
CREAT SERPL-MCNC: 0.62 MG/DL — SIGNIFICANT CHANGE UP (ref 0.5–1.3)
EOSINOPHIL # BLD AUTO: 0.25 K/UL — SIGNIFICANT CHANGE UP (ref 0–0.5)
EOSINOPHIL NFR BLD AUTO: 3.4 % — SIGNIFICANT CHANGE UP (ref 0–6)
GLUCOSE SERPL-MCNC: 130 MG/DL — HIGH (ref 70–99)
HCT VFR BLD CALC: 32.5 % — LOW (ref 39–50)
HGB BLD-MCNC: 10.9 G/DL — LOW (ref 13–17)
IMM GRANULOCYTES NFR BLD AUTO: 0.9 % — SIGNIFICANT CHANGE UP (ref 0–1.5)
LYMPHOCYTES # BLD AUTO: 1.12 K/UL — SIGNIFICANT CHANGE UP (ref 1–3.3)
LYMPHOCYTES # BLD AUTO: 15.1 % — SIGNIFICANT CHANGE UP (ref 13–44)
MCHC RBC-ENTMCNC: 31.4 PG — SIGNIFICANT CHANGE UP (ref 27–34)
MCHC RBC-ENTMCNC: 33.5 GM/DL — SIGNIFICANT CHANGE UP (ref 32–36)
MCV RBC AUTO: 93.7 FL — SIGNIFICANT CHANGE UP (ref 80–100)
MONOCYTES # BLD AUTO: 0.49 K/UL — SIGNIFICANT CHANGE UP (ref 0–0.9)
MONOCYTES NFR BLD AUTO: 6.6 % — SIGNIFICANT CHANGE UP (ref 2–14)
NEUTROPHILS # BLD AUTO: 5.47 K/UL — SIGNIFICANT CHANGE UP (ref 1.8–7.4)
NEUTROPHILS NFR BLD AUTO: 73.7 % — SIGNIFICANT CHANGE UP (ref 43–77)
NRBC # BLD: 0 /100 WBCS — SIGNIFICANT CHANGE UP (ref 0–0)
PLATELET # BLD AUTO: 321 K/UL — SIGNIFICANT CHANGE UP (ref 150–400)
POTASSIUM SERPL-MCNC: 3.2 MMOL/L — LOW (ref 3.5–5.3)
POTASSIUM SERPL-SCNC: 3.2 MMOL/L — LOW (ref 3.5–5.3)
PROT SERPL-MCNC: 7 G/DL — SIGNIFICANT CHANGE UP (ref 6–8.3)
RBC # BLD: 3.47 M/UL — LOW (ref 4.2–5.8)
RBC # FLD: 13.3 % — SIGNIFICANT CHANGE UP (ref 10.3–14.5)
SODIUM SERPL-SCNC: 141 MMOL/L — SIGNIFICANT CHANGE UP (ref 135–145)
WBC # BLD: 7.42 K/UL — SIGNIFICANT CHANGE UP (ref 3.8–10.5)
WBC # FLD AUTO: 7.42 K/UL — SIGNIFICANT CHANGE UP (ref 3.8–10.5)

## 2019-11-19 PROCEDURE — 96116 NUBHVL XM PHYS/QHP 1ST HR: CPT

## 2019-11-19 PROCEDURE — 93010 ELECTROCARDIOGRAM REPORT: CPT

## 2019-11-19 PROCEDURE — 99223 1ST HOSP IP/OBS HIGH 75: CPT

## 2019-11-19 PROCEDURE — 99255 IP/OBS CONSLTJ NEW/EST HI 80: CPT

## 2019-11-19 RX ORDER — POTASSIUM CHLORIDE 20 MEQ
40 PACKET (EA) ORAL EVERY 4 HOURS
Refills: 0 | Status: COMPLETED | OUTPATIENT
Start: 2019-11-19 | End: 2019-11-19

## 2019-11-19 RX ADMIN — Medication 75 MILLIGRAM(S): at 17:33

## 2019-11-19 RX ADMIN — NYSTATIN CREAM 1 APPLICATION(S): 100000 CREAM TOPICAL at 05:42

## 2019-11-19 RX ADMIN — Medication 37.5 MILLIGRAM(S): at 11:00

## 2019-11-19 RX ADMIN — CHLORHEXIDINE GLUCONATE 1 APPLICATION(S): 213 SOLUTION TOPICAL at 11:04

## 2019-11-19 RX ADMIN — ENOXAPARIN SODIUM 60 MILLIGRAM(S): 100 INJECTION SUBCUTANEOUS at 11:00

## 2019-11-19 RX ADMIN — Medication 75 MILLIGRAM(S): at 05:36

## 2019-11-19 RX ADMIN — NYSTATIN CREAM 1 APPLICATION(S): 100000 CREAM TOPICAL at 17:33

## 2019-11-19 RX ADMIN — QUETIAPINE FUMARATE 50 MILLIGRAM(S): 200 TABLET, FILM COATED ORAL at 20:32

## 2019-11-19 RX ADMIN — Medication 40 MILLIEQUIVALENT(S): at 11:00

## 2019-11-19 RX ADMIN — QUETIAPINE FUMARATE 75 MILLIGRAM(S): 200 TABLET, FILM COATED ORAL at 06:26

## 2019-11-19 RX ADMIN — QUETIAPINE FUMARATE 75 MILLIGRAM(S): 200 TABLET, FILM COATED ORAL at 17:32

## 2019-11-19 RX ADMIN — Medication 1 TABLET(S): at 05:38

## 2019-11-19 RX ADMIN — AMLODIPINE BESYLATE 10 MILLIGRAM(S): 2.5 TABLET ORAL at 05:38

## 2019-11-19 RX ADMIN — Medication 40 MILLIEQUIVALENT(S): at 15:12

## 2019-11-19 RX ADMIN — Medication 1 TABLET(S): at 20:32

## 2019-11-19 NOTE — CONSULT NOTE ADULT - ASSESSMENT
54M with MDD and prior history of ECT had a prolonged hospital course for ARDS requiring ventilator and ECMO support is now post extubation and has been been admitted to Acute IP Rehab.     HTN  Amlodipine + Metoprolol     Major Depression   Seroquel + Effexor    Hypokalemia  K repletion  Repeat BMP    ARDS/Respiratory Failure  S/P Vent and ECMO and now on nasal canula with minimal oxygen support   Monitor for now; Nothing to do acutely   Continue Chest PT     History of Seizure  Patient did have a seizure during his prolonged hospital course  EEG showed slowing with no focus; Thought to be related to Benzo withdrawal from abrupt cessation of Klonipin  Monitor for now; No history of epilepsy; No need for antiepileptics     Diet  Regular    DVT Prophylaxis  Lovenox    Disposition  Full Code/Inpatient  Discharge planning pending hospital course

## 2019-11-19 NOTE — CONSULT NOTE ADULT - SUBJECTIVE AND OBJECTIVE BOX
54M with severe MDD with prior psych admissions/ECT who was initially admitted to  10/28 after presenting with lethargy and hypoxemia. Patient reportedly was dealing with severe depression and expressed SI to his wife. His wife reported he was sleeping throughout the day and in the evening she heard a thump and found him on the floor. At  he became obtunded with hypercapnic respiratory failure and was intubated and admitted to the CCU. Initial imaging showed lower lobe consolidations and he was treated with CTX/Azithromycin. Poison control was contacted and he was received NaHCO3 in the setting of mild QRS widening on ECG and suspected overdose. His Utox returned positive for barbiturates; he reportedly had positive testing for barbiturates in the past thought secondary to OTC supplementations he takes from a [employer] for sleep. On the morning of admission, he was noted to have a 30 second tonic clonic seizure and received IV benzo and keppra load. Subsequent EEG did not reveal epileptiform discharges. He was extubated in the morning of 10/28 but required BiPAP in the setting of increased oxygenation needs. On 10/29 he was agitated and was started on Precedex. He developed fevers and was broadened to cefepime. Overnight (10/30), he had a Tmax of 102.6 and worsening hypoxemia requiring reintubation. Was started on propofol, precedex and paralyzed but remained difficult to oxygenate with traditional mechanical ventilation requiring frequent bagging and consult for ECMO was placed. He was steroids, nimbex gtt, vancomycin and received Lasix 40mg IVP. The patient was cannulated for VV-ECMO and transferred to [facility].  After transfer, pt's lung compliance and oxygenation improved and he was decannulated 11/1 and extubated 11/5. He completed a 7 day course of vancomycin and zosyn.    While at [facility], patient had persistent fevers.  Sputum culture with enterobacter - ID consulted and pt was placed on cefepime. Vancomycin was added for persistent fevers, but subsequently d/c'd as without source.  Fevers then resolved.  Blood cultures remained negative.  Pt passed S&S, diet advanced. Psych following - medications restarted for depression, tolerated seroquel and effexor.  Patient was seen by PM&R and deemed appropriate for acute inpatient rehab.     REVIEW OF SYSTEMS:  CONSTITUTIONAL: No fever, weight loss, or fatigue  EYES: No eye pain, visual disturbances, or discharge  ENMT:  No difficulty hearing, tinnitus, vertigo; No sinus or throat pain  NECK: No pain or stiffness  RESPIRATORY: No cough, wheezing, chills or hemoptysis; No shortness of breath  CARDIOVASCULAR: No chest pain, palpitations, dizziness, or leg swelling  GASTROINTESTINAL: No abdominal or epigastric pain. No nausea, vomiting, or hematemesis; No diarrhea or constipation. No melena or hematochezia.  GENITOURINARY: No dysuria, frequency, hematuria, or incontinence  NEUROLOGICAL: No headaches, memory loss, loss of strength, numbness, or tremors  MUSCULOSKELETAL: No muscle or back pain      PAST MEDICAL & SURGICAL HISTORY:  Depression  Hepatitis: &quot;as a child&quot; unsure of type  Left knee pain  No significant past surgical history      SOCIAL HISTORY:  Tobacco; EtOH; Illicit Drugs    Allergies    No Known Allergies    Intolerances        MEDICATIONS  (STANDING):  amLODIPine   Tablet 10 milliGRAM(s) Oral daily  chlorhexidine 4% Liquid 1 Application(s) Topical daily  enoxaparin Injectable 60 milliGRAM(s) SubCutaneous daily  lactobacillus acidophilus 1 Tablet(s) Oral three times a day  metoprolol tartrate 75 milliGRAM(s) Oral two times a day  nystatin Powder 1 Application(s) Topical every 12 hours  potassium chloride    Tablet ER 40 milliEquivalent(s) Oral every 4 hours  psyllium Powder 1 Packet(s) Oral daily  QUEtiapine 75 milliGRAM(s) Oral <User Schedule>  venlafaxine 37.5 milliGRAM(s) Oral daily    MEDICATIONS  (PRN):  acetaminophen    Suspension .. 650 milliGRAM(s) Oral every 6 hours PRN Temp greater or equal to 38C (100.4F), Mild Pain (1 - 3), Moderate Pain (4 - 6), Severe Pain (7 - 10)  artificial  tears Solution 1 Drop(s) Both EYES two times a day PRN Dry Eyes  QUEtiapine 50 milliGRAM(s) Oral every 6 hours PRN agitation      FAMILY HISTORY:      Vital Signs Last 24 Hrs  T(C): 36.9 (19 Nov 2019 07:43), Max: 36.9 (18 Nov 2019 14:27)  T(F): 98.4 (19 Nov 2019 07:43), Max: 98.5 (18 Nov 2019 14:27)  HR: 96 (19 Nov 2019 07:43) (84 - 110)  BP: 145/89 (19 Nov 2019 07:43) (130/82 - 147/95)  BP(mean): --  RR: 14 (19 Nov 2019 07:43) (14 - 20)  SpO2: 99% (19 Nov 2019 07:43) (96% - 99%)    PHYSICAL EXAM:    GENERAL: NAD, well-developed  HEAD:  Atraumatic, Normocephalic  EYES: EOMI, PERRLA, conjunctiva and sclera clear  ENMT: No tonsillar erythema, exudates, or enlargement; Moist mucous membranes, Good dentition, No lesions  NECK: Supple, No JVD, Normal thyroid  NERVOUS SYSTEM:  Alert & Oriented X3, Good concentration;  CHEST/LUNG: Clear to auscultation bilaterally; No rales, rhonchi, wheezing, or rubs  HEART: Regular rate and rhythm; No murmurs, rubs, or gallops  ABDOMEN: Soft, Nontender, Nondistended; Bowel sounds present  EXTREMITIES:  2+ Peripheral Pulses, No clubbing, cyanosis, or edema      LABS:                        10.9   7.42  )-----------( 321      ( 19 Nov 2019 05:55 )             32.5     11-19    141  |  108  |  11  ----------------------------<  130<H>  3.2<L>   |  23  |  0.62    Ca    9.3      19 Nov 2019 05:55    TPro  7.0  /  Alb  3.4  /  TBili  0.5  /  DBili  x   /  AST  20  /  ALT  45  /  AlkPhos  52  11-19        CAPILLARY BLOOD GLUCOSE          RADIOLOGY & ADDITIONAL STUDIES:    EKG:   Personally Reviewed:  [ ] YES     Imaging:   Personally Reviewed:  [ ] YES     Consultant(s) Notes Reviewed:      Care Discussed with Consultants/Other Providers:

## 2019-11-19 NOTE — DIETITIAN INITIAL EVALUATION ADULT. - DIET TYPE
on Regular Diet w/ Thin Liquids   Education Provided on Proper Nutrition   Patient Does Not Follow Diet @Home But Does Try to Eat Healthy & Doesn't Take Vitamin/Supplements @Home

## 2019-11-20 LAB
ANION GAP SERPL CALC-SCNC: 15 MMOL/L — SIGNIFICANT CHANGE UP (ref 5–17)
BUN SERPL-MCNC: 9 MG/DL — SIGNIFICANT CHANGE UP (ref 7–23)
CALCIUM SERPL-MCNC: 9.2 MG/DL — SIGNIFICANT CHANGE UP (ref 8.4–10.5)
CHLORIDE SERPL-SCNC: 107 MMOL/L — SIGNIFICANT CHANGE UP (ref 96–108)
CO2 SERPL-SCNC: 21 MMOL/L — LOW (ref 22–31)
CREAT SERPL-MCNC: 0.59 MG/DL — SIGNIFICANT CHANGE UP (ref 0.5–1.3)
GLUCOSE SERPL-MCNC: 120 MG/DL — HIGH (ref 70–99)
MAGNESIUM SERPL-MCNC: 2 MG/DL — SIGNIFICANT CHANGE UP (ref 1.6–2.6)
POTASSIUM SERPL-MCNC: 3.8 MMOL/L — SIGNIFICANT CHANGE UP (ref 3.5–5.3)
POTASSIUM SERPL-SCNC: 3.8 MMOL/L — SIGNIFICANT CHANGE UP (ref 3.5–5.3)
SODIUM SERPL-SCNC: 143 MMOL/L — SIGNIFICANT CHANGE UP (ref 135–145)

## 2019-11-20 PROCEDURE — 99232 SBSQ HOSP IP/OBS MODERATE 35: CPT

## 2019-11-20 PROCEDURE — 99233 SBSQ HOSP IP/OBS HIGH 50: CPT

## 2019-11-20 RX ORDER — CLONAZEPAM 1 MG
0.5 TABLET ORAL THREE TIMES A DAY
Refills: 0 | Status: DISCONTINUED | OUTPATIENT
Start: 2019-11-20 | End: 2019-11-23

## 2019-11-20 RX ORDER — VENLAFAXINE HCL 75 MG
150 CAPSULE, EXT RELEASE 24 HR ORAL DAILY
Refills: 0 | Status: DISCONTINUED | OUTPATIENT
Start: 2019-11-20 | End: 2019-11-22

## 2019-11-20 RX ORDER — ENOXAPARIN SODIUM 100 MG/ML
40 INJECTION SUBCUTANEOUS DAILY
Refills: 0 | Status: DISCONTINUED | OUTPATIENT
Start: 2019-11-20 | End: 2019-11-26

## 2019-11-20 RX ORDER — CLONAZEPAM 1 MG
1 TABLET ORAL AT BEDTIME
Refills: 0 | Status: DISCONTINUED | OUTPATIENT
Start: 2019-11-20 | End: 2019-11-23

## 2019-11-20 RX ORDER — VENLAFAXINE HCL 75 MG
37.5 CAPSULE, EXT RELEASE 24 HR ORAL ONCE
Refills: 0 | Status: COMPLETED | OUTPATIENT
Start: 2019-11-20 | End: 2019-11-20

## 2019-11-20 RX ADMIN — CHLORHEXIDINE GLUCONATE 1 APPLICATION(S): 213 SOLUTION TOPICAL at 12:34

## 2019-11-20 RX ADMIN — Medication 1 TABLET(S): at 12:34

## 2019-11-20 RX ADMIN — QUETIAPINE FUMARATE 75 MILLIGRAM(S): 200 TABLET, FILM COATED ORAL at 05:27

## 2019-11-20 RX ADMIN — Medication 1 TABLET(S): at 20:10

## 2019-11-20 RX ADMIN — Medication 1 TABLET(S): at 05:28

## 2019-11-20 RX ADMIN — Medication 37.5 MILLIGRAM(S): at 12:33

## 2019-11-20 RX ADMIN — Medication 75 MILLIGRAM(S): at 17:35

## 2019-11-20 RX ADMIN — NYSTATIN CREAM 1 APPLICATION(S): 100000 CREAM TOPICAL at 17:35

## 2019-11-20 RX ADMIN — Medication 37.5 MILLIGRAM(S): at 20:10

## 2019-11-20 RX ADMIN — AMLODIPINE BESYLATE 10 MILLIGRAM(S): 2.5 TABLET ORAL at 05:28

## 2019-11-20 RX ADMIN — Medication 75 MILLIGRAM(S): at 05:28

## 2019-11-20 RX ADMIN — NYSTATIN CREAM 1 APPLICATION(S): 100000 CREAM TOPICAL at 05:28

## 2019-11-20 RX ADMIN — Medication 1 MILLIGRAM(S): at 20:10

## 2019-11-20 RX ADMIN — ENOXAPARIN SODIUM 60 MILLIGRAM(S): 100 INJECTION SUBCUTANEOUS at 12:33

## 2019-11-20 RX ADMIN — Medication 0.5 MILLIGRAM(S): at 20:10

## 2019-11-20 NOTE — PROGRESS NOTE ADULT - ASSESSMENT
ASSESSMENT/PLAN  EBONI CARRASCO is a 53yo Male with hx of MDD recently admitted to [facility] with ARDS requiring ECMO, now with decreased functional mobility, gait instability and ADL impairments.    COMORBIDITES/ACTIVE MEDICAL ISSUES     #Debility  -continue Comprehensive Rehab Program of PT/OT  -neuropsychology consult for MDD , supportive counseling, caregiver support and education      #ARDS requiring ECMO  - therapies as above  - continue chest PT/PD, breathing exercises  - incentive spirometer   Per palliative care consult at [facility]:  Given patient was on ECMO he will need Follow up with Dr. Edita Garcia at the [address] [place] [address], Geriatrics and Palliative Care Office.  Please schedule an appointment for him at [phone] upon discharge if  patient is being transferred to rehab facility please include in d/c summary.      #MDD  - pt has hx of psych hospitalizations for SI.  Initial U-tox during admission positive for barbiturates.   - continue effexor, seroquel  -psychology consult  -psychiatry consult (patient agreeable tomorrow 11/20, refuses for today) due to history and substance abuse and persistent insomnia. Pateint and wife agreeable  -SW consult and possible referral on dc for substance abuse services      #HTN  - continue metoprolol, amlodipine  -143/88 - 153/94 11/20    #Constipation  - continue metamucil    #Pain Mgmt   - Tylenol PRN    #- DVT PPX:   Lovenox, SCDs, TEDs     #hypokalemia  -supplement K+ 40 meq x 1 dose  -K+ 3.5 11/20    #Case discussed in IDT rounds 11/20:  patient currently requires CG grooming and bathing, supervision bADLs. Decreased safety, impulsive. Ambualtes 100 feet with RW but fatigues and reduced endurance, HR up to 130  -continue program, target dc home with caregiver support 11/26/19 with outpatient cardiopulmonary rehab program  -caregiver education    LABS:  psychiatry

## 2019-11-20 NOTE — PROGRESS NOTE ADULT - SUBJECTIVE AND OBJECTIVE BOX
Patient is a 54y old  Male who presents with a chief complaint of ARDS, debility (2019 11:39)      HPI:  54M with severe MDD with prior psych admissions/ECT who was initially admitted to  10/28/19 after presenting with lethargy and hypoxemia. Patient reportedly was dealing with severe depression and expressed SI to his wife. His wife reported he was sleeping throughout the day and in the evening she heard a thump and found him on the floor. At  he became obtunded with hypercapnic respiratory failure and was intubated and admitted to the CCU. Initial imaging showed lower lobe consolidations and he was treated with CTX/Azithromycin. Poison control was contacted and he was received NaHCO3 in the setting of mild QRS widening on ECG and suspected overdose. His Utox returned positive for barbiturates; he reportedly had positive testing for barbiturates in the past thought secondary to OTC supplementations he takes from a "[employer] for sleep.    On the morning of admission, he was noted to have a 30 second tonic clonic seizure and received IV benzo and keppra load. Subsequent EEG did not reveal epileptiform discharges. He was extubated in the morning of 10/28 but required BiPAP in the setting of increased oxygenation needs. On 10/29 he was agitated and was started on Precedex. He developed fevers and was broadened to cefepime, later developed fever 102.6 and worsening hypoxemia requiring reintubation. Was started on propofol, precedex and paralyzed but remained difficult to oxygenate with traditional mechanical ventilation requiring frequent bagging and consult for ECMO was placed. He was steroids, nimbex gtt, vancomycin and received Lasix 40mg IVP. The patient was cannulated for VV-ECMO and transferred to [facility] 10/30/19.      After transfer, pt's lung compliance and oxygenation improved and he was decannulated  and extubated . He completed a 7 day course of vancomycin and zosyn.    While at [facility], patient had persistent fevers.  Airway colonized with MSSA and enterobacter aerogenes.  Blood Cx negative, Legiionella urine Ag negative, HIV negative. ID consulted and pt was placed on cefepime. Vancomycin was added for persistent fevers, but subsequently d/c'd as without source.  Fevers and leukocytosis then resolved.      Pt passed S&S, diet advanced. Psych following - medications restarted for depression, tolerated seroquel and effexor.  Patient was seen by PM&R and deemed appropriate for acute inpatient rehab. Transferred to [facility] 19. (2019 16:15)      PAST MEDICAL & SURGICAL HISTORY:  Depression  Hepatitis: &quot;as a child&quot; unsure of type  Left knee pain  No significant past surgical history      MEDICATIONS  (STANDING):  amLODIPine   Tablet 10 milliGRAM(s) Oral daily  chlorhexidine 4% Liquid 1 Application(s) Topical daily  enoxaparin Injectable 60 milliGRAM(s) SubCutaneous daily  lactobacillus acidophilus 1 Tablet(s) Oral three times a day  metoprolol tartrate 75 milliGRAM(s) Oral two times a day  nystatin Powder 1 Application(s) Topical every 12 hours  psyllium Powder 1 Packet(s) Oral daily  QUEtiapine 75 milliGRAM(s) Oral <User Schedule>  venlafaxine 37.5 milliGRAM(s) Oral daily    MEDICATIONS  (PRN):  acetaminophen    Suspension .. 650 milliGRAM(s) Oral every 6 hours PRN Temp greater or equal to 38C (100.4F), Mild Pain (1 - 3), Moderate Pain (4 - 6), Severe Pain (7 - 10)  artificial  tears Solution 1 Drop(s) Both EYES two times a day PRN Dry Eyes  QUEtiapine 50 milliGRAM(s) Oral every 6 hours PRN agitation      Allergies    No Known Allergies    Intolerances          VITALS  54y  Vital Signs Last 24 Hrs  T(C): 36.9 (2019 07:47), Max: 37.1 (2019 19:10)  T(F): 98.5 (2019 07:47), Max: 98.7 (2019 19:10)  HR: 94 (2019 07:47) (94 - 100)  BP: 143/88 (2019 07:47) (143/88 - 153/94)  BP(mean): --  RR: 14 (2019 07:47) (14 - 15)  SpO2: 99% (2019 07:47) (99% - 99%)  Daily     Daily Weight in k (2019 14:30)        RECENT LABS:                          10.9   7.42  )-----------( 321      ( 2019 05:55 )             32.5     11-20    143  |  107  |  9   ----------------------------<  120<H>  3.8   |  21<L>  |  0.59    Ca    9.2      2019 05:55  Mg     2.0     -    TPro  7.0  /  Alb  3.4  /  TBili  0.5  /  DBili  x   /  AST  20  /  ALT  45  /  AlkPhos  52  11    LIVER FUNCTIONS - ( 2019 05:55 )  Alb: 3.4 g/dL / Pro: 7.0 g/dL / ALK PHOS: 52 U/L / ALT: 45 U/L / AST: 20 U/L / GGT: x                   CAPILLARY BLOOD GLUCOSE
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
## 2019-11-20 NOTE — PROGRESS NOTE ADULT - GENERAL COMMENTS
Patient seen with wife in PT. Patient still with very poor sleep, also appears more frustrated, irritable, wife notes some more anxiety, feeling down. no expression of active suicidal ideation but did leave portion in quiestionnaire off when asked about thoughts.

## 2019-11-20 NOTE — PROGRESS NOTE ADULT - SUBJECTIVE AND OBJECTIVE BOX
Patient is a 54y old  Male who presents with a chief complaint of ARDS, debility (18 Nov 2019 16:15)    HPI:  54M with severe MDD with prior psych admissions/ECT who was initially admitted to  10/28/19 after presenting with lethargy and hypoxemia. Patient reportedly was dealing with severe depression and expressed SI to his wife. His wife reported he was sleeping throughout the day and in the evening she heard a thump and found him on the floor. At  he became obtunded with hypercapnic respiratory failure and was intubated and admitted to the CCU. Initial imaging showed lower lobe consolidations and he was treated with CTX/Azithromycin. Poison control was contacted and he was received NaHCO3 in the setting of mild QRS widening on ECG and suspected overdose. His Utox returned positive for barbiturates; he reportedly had positive testing for barbiturates in the past thought secondary to OTC supplementations he takes from a [employer] for sleep.    On the morning of admission, he was noted to have a 30 second tonic clonic seizure and received IV benzo and keppra load. Subsequent EEG did not reveal epileptiform discharges. He was extubated in the morning of 10/28 but required BiPAP in the setting of increased oxygenation needs. On 10/29 he was agitated and was started on Precedex. He developed fevers and was broadened to cefepime, later developed fever 102.6 and worsening hypoxemia requiring reintubation. Was started on propofol, precedex and paralyzed but remained difficult to oxygenate with traditional mechanical ventilation requiring frequent bagging and consult for ECMO was placed. He was steroids, nimbex gtt, vancomycin and received Lasix 40mg IVP. The patient was cannulated for VV-ECMO and transferred to [facility] 10/30/19.      After transfer, pt's lung compliance and oxygenation improved and he was decannulated 11/1 and extubated 11/5. He completed a 7 day course of vancomycin and zosyn.    While at [facility], patient had persistent fevers.  Airway colonized with MSSA and enterobacter aerogenes.  Blood Cx negative, Legiionella urine Ag negative, HIV negative. ID consulted and pt was placed on cefepime. Vancomycin was added for persistent fevers, but subsequently d/c'd as without source.  Fevers and leukocytosis then resolved.      Pt passed S&S, diet advanced. Psych following - medications restarted for depression, tolerated seroquel and effexor.  Patient was seen by PM&R and deemed appropriate for acute inpatient rehab. Transferred to [facility] IRF 11/18/19. (18 Nov 2019 16:15)    Patient seen and examined at bedside.  Patient denies any chest pain or shortness of breath.  Reports no difficulty moving his bowels     ALLERGIES:  No Known Allergies    MEDICATIONS:    Vital Signs Last 24 Hrs  T(F): 98.5 (20 Nov 2019 07:47), Max: 98.7 (19 Nov 2019 19:10)  HR: 94 (20 Nov 2019 07:47) (94 - 100)  BP: 143/88 (20 Nov 2019 07:47) (143/88 - 153/94)  RR: 14 (20 Nov 2019 07:47) (14 - 15)  SpO2: 99% (20 Nov 2019 07:47) (99% - 99%)  I&O's Summary    19 Nov 2019 07:01  -  20 Nov 2019 07:00  --------------------------------------------------------  IN: 200 mL / OUT: 0 mL / NET: 200 mL        PHYSICAL EXAM:  General: NAD, A/O x 3  ENT: MMM  Neck: Supple, No JVD  Lungs: Clear to auscultation bilaterally  Cardio: RRR, S1/S2, No murmurs  Abdomen: Soft, Nontender, Nondistended; obese   Extremities: No cyanosis, No edema    LABS:                        10.9   7.42  )-----------( 321      ( 19 Nov 2019 05:55 )             32.5     11-20    143  |  107  |  9   ----------------------------<  120  3.8   |  21  |  0.59    Ca    9.2      20 Nov 2019 05:55  Mg     2.0     11-20    TPro  7.0  /  Alb  3.4  /  TBili  0.5  /  DBili  x   /  AST  20  /  ALT  45  /  AlkPhos  52  11-19    eGFR if Non African American: 113 mL/min/1.73M2 (11-20-19 @ 05:55)  eGFR if African American: 131 mL/min/1.73M2 (11-20-19 @ 05:55)                            10-29 [id number] 5.2          RADIOLOGY & ADDITIONAL TESTS:    Care Discussed with Consultants/Other Providers:

## 2019-11-20 NOTE — PROGRESS NOTE ADULT - ASSESSMENT
54M with MDD and prior history of ECT had a prolonged hospital course for ARDS requiring ventilator and ECMO support is now post extubation and has been been admitted to Acute IP Rehab.     HTN  -last 24 hours sbp-153-145  Amlodipine + Metoprolol   -continue to monitor    Major Depression   Seroquel + Effexor  Neuropsych is consulting     Hypokalemia  K repleted  continue to monitor intermittently     ARDS/Respiratory Failure  S/P Vent and ECMO   Monitor for now; Nothing to do acutely   Continue Chest PT     History of Seizure  Patient did have a seizure during his prolonged hospital course  EEG showed slowing with no focus; Thought to be related to Benzo withdrawal from abrupt cessation of Klonipin  Monitor for now; No history of epilepsy; No need for antiepileptics     Diet  Regular    DVT Prophylaxis  Lovenox    Disposition  Full Code/Inpatient  Discharge planning pending hospital course

## 2019-11-21 PROCEDURE — 99231 SBSQ HOSP IP/OBS SF/LOW 25: CPT

## 2019-11-21 PROCEDURE — 99232 SBSQ HOSP IP/OBS MODERATE 35: CPT

## 2019-11-21 RX ORDER — LANOLIN ALCOHOL/MO/W.PET/CERES
3 CREAM (GRAM) TOPICAL AT BEDTIME
Refills: 0 | Status: DISCONTINUED | OUTPATIENT
Start: 2019-11-21 | End: 2019-11-22

## 2019-11-21 RX ADMIN — Medication 75 MILLIGRAM(S): at 06:16

## 2019-11-21 RX ADMIN — ENOXAPARIN SODIUM 40 MILLIGRAM(S): 100 INJECTION SUBCUTANEOUS at 11:08

## 2019-11-21 RX ADMIN — Medication 1 TABLET(S): at 21:16

## 2019-11-21 RX ADMIN — Medication 0.5 MILLIGRAM(S): at 21:16

## 2019-11-21 RX ADMIN — Medication 75 MILLIGRAM(S): at 17:08

## 2019-11-21 RX ADMIN — Medication 0.5 MILLIGRAM(S): at 14:08

## 2019-11-21 RX ADMIN — NYSTATIN CREAM 1 APPLICATION(S): 100000 CREAM TOPICAL at 17:08

## 2019-11-21 RX ADMIN — Medication 3 MILLIGRAM(S): at 22:06

## 2019-11-21 RX ADMIN — Medication 150 MILLIGRAM(S): at 11:07

## 2019-11-21 RX ADMIN — CHLORHEXIDINE GLUCONATE 1 APPLICATION(S): 213 SOLUTION TOPICAL at 11:08

## 2019-11-21 RX ADMIN — Medication 0.5 MILLIGRAM(S): at 06:16

## 2019-11-21 RX ADMIN — Medication 1 MILLIGRAM(S): at 21:16

## 2019-11-21 RX ADMIN — AMLODIPINE BESYLATE 10 MILLIGRAM(S): 2.5 TABLET ORAL at 06:16

## 2019-11-21 RX ADMIN — Medication 1 TABLET(S): at 06:16

## 2019-11-21 RX ADMIN — Medication 1 TABLET(S): at 14:08

## 2019-11-21 NOTE — PROGRESS NOTE ADULT - SUBJECTIVE AND OBJECTIVE BOX
Patient is a 54y old  Male who presents with a chief complaint of ARDS, debility (21 Nov 2019 13:31)      HPI:  54M with severe MDD with prior psych admissions/ECT who was initially admitted to  10/28/19 after presenting with lethargy and hypoxemia. Patient reportedly was dealing with severe depression and expressed SI to his wife. His wife reported he was sleeping throughout the day and in the evening she heard a thump and found him on the floor. At  he became obtunded with hypercapnic respiratory failure and was intubated and admitted to the CCU. Initial imaging showed lower lobe consolidations and he was treated with CTX/Azithromycin. Poison control was contacted and he was received NaHCO3 in the setting of mild QRS widening on ECG and suspected overdose. His Utox returned positive for barbiturates; he reportedly had positive testing for barbiturates in the past thought secondary to OTC supplementations he takes from a [employer] for sleep.    On the morning of admission, he was noted to have a 30 second tonic clonic seizure and received IV benzo and keppra load. Subsequent EEG did not reveal epileptiform discharges. He was extubated in the morning of 10/28 but required BiPAP in the setting of increased oxygenation needs. On 10/29 he was agitated and was started on Precedex. He developed fevers and was broadened to cefepime, later developed fever 102.6 and worsening hypoxemia requiring reintubation. Was started on propofol, precedex and paralyzed but remained difficult to oxygenate with traditional mechanical ventilation requiring frequent bagging and consult for ECMO was placed. He was steroids, nimbex gtt, vancomycin and received Lasix 40mg IVP. The patient was cannulated for VV-ECMO and transferred to [facility] 10/30/19.      After transfer, pt's lung compliance and oxygenation improved and he was decannulated 11/1 and extubated 11/5. He completed a 7 day course of vancomycin and zosyn.    While at [facility], patient had persistent fevers.  Airway colonized with MSSA and enterobacter aerogenes.  Blood Cx negative, Legiionella urine Ag negative, HIV negative. ID consulted and pt was placed on cefepime. Vancomycin was added for persistent fevers, but subsequently d/c'd as without source.  Fevers and leukocytosis then resolved.      Pt passed S&S, diet advanced. Psych following - medications restarted for depression, tolerated seroquel and effexor.  Patient was seen by PM&R and deemed appropriate for acute inpatient rehab. Transferred to [facility] IRF 11/18/19. (18 Nov 2019 16:15)      PAST MEDICAL & SURGICAL HISTORY:  Depression  Hepatitis: &quot;as a child&quot; unsure of type  Left knee pain  No significant past surgical history      MEDICATIONS  (STANDING):  amLODIPine   Tablet 10 milliGRAM(s) Oral daily  chlorhexidine 4% Liquid 1 Application(s) Topical daily  clonazePAM  Tablet 1 milliGRAM(s) Oral at bedtime  clonazePAM  Tablet 0.5 milliGRAM(s) Oral three times a day  enoxaparin Injectable 40 milliGRAM(s) SubCutaneous daily  lactobacillus acidophilus 1 Tablet(s) Oral three times a day  metoprolol tartrate 75 milliGRAM(s) Oral two times a day  nystatin Powder 1 Application(s) Topical every 12 hours  psyllium Powder 1 Packet(s) Oral daily  venlafaxine XR. 150 milliGRAM(s) Oral daily    MEDICATIONS  (PRN):  acetaminophen    Suspension .. 650 milliGRAM(s) Oral every 6 hours PRN Temp greater or equal to 38C (100.4F), Mild Pain (1 - 3), Moderate Pain (4 - 6), Severe Pain (7 - 10)  artificial  tears Solution 1 Drop(s) Both EYES two times a day PRN Dry Eyes  QUEtiapine 50 milliGRAM(s) Oral every 6 hours PRN agitation      Allergies    No Known Allergies    Intolerances        TODAY'S SUBJECTIVE & REVIEW OF SYMPTOMS:  Pt seen and examined at bedside. Reports continued poor sleep; states he feels "restless." No pain, SOB, cough, CP.  He notes depressed mood, but feels it is improving.  No new complaints.     PHYSICAL EXAM  54y  Vital Signs Last 24 Hrs  T(C): 36.2 (21 Nov 2019 07:49), Max: 36.6 (20 Nov 2019 20:12)  T(F): 97.1 (21 Nov 2019 07:49), Max: 97.9 (20 Nov 2019 20:12)  HR: 81 (21 Nov 2019 07:49) (81 - 92)  BP: 145/87 (21 Nov 2019 07:49) (135/89 - 146/100)  BP(mean): --  RR: 14 (21 Nov 2019 07:49) (14 - 14)  SpO2: 97% (21 Nov 2019 07:49) (97% - 99%)  Daily     Daily     General: no acute distress  HEENT: NCAT, EOMI  Cardio: Regular rate and rhythm  Resp: Clear bilaterally, good inspirational effort, no R/R/W  Abdomen: +BS. Soft, NT, ND  Extremities: no clubbing, cyanosis  Neuro: stable          RECENT LABS:      11-20    143  |  107  |  9   ----------------------------<  120<H>  3.8   |  21<L>  |  0.59    Ca    9.2      20 Nov 2019 05:55  Mg     2.0     11-20 Patient is a 54y old  Male who presents with a chief complaint of ARDS, debility (21 Nov 2019 13:31)      HPI:  54M with severe MDD with prior psych admissions/ECT who was initially admitted to  10/28/19 after presenting with lethargy and hypoxemia. Patient reportedly was dealing with severe depression and expressed SI to his wife. His wife reported he was sleeping throughout the day and in the evening she heard a thump and found him on the floor. At  he became obtunded with hypercapnic respiratory failure and was intubated and admitted to the CCU. Initial imaging showed lower lobe consolidations and he was treated with CTX/Azithromycin. Poison control was contacted and he was received NaHCO3 in the setting of mild QRS widening on ECG and suspected overdose. His Utox returned positive for barbiturates; he reportedly had positive testing for barbiturates in the past thought secondary to OTC supplementations he takes from a [employer] for sleep.    On the morning of admission, he was noted to have a 30 second tonic clonic seizure and received IV benzo and keppra load. Subsequent EEG did not reveal epileptiform discharges. He was extubated in the morning of 10/28 but required BiPAP in the setting of increased oxygenation needs. On 10/29 he was agitated and was started on Precedex. He developed fevers and was broadened to cefepime, later developed fever 102.6 and worsening hypoxemia requiring reintubation. Was started on propofol, precedex and paralyzed but remained difficult to oxygenate with traditional mechanical ventilation requiring frequent bagging and consult for ECMO was placed. He was steroids, nimbex gtt, vancomycin and received Lasix 40mg IVP. The patient was cannulated for VV-ECMO and transferred to [facility] 10/30/19.      After transfer, pt's lung compliance and oxygenation improved and he was decannulated 11/1 and extubated 11/5. He completed a 7 day course of vancomycin and zosyn.    While at [facility], patient had persistent fevers.  Airway colonized with MSSA and enterobacter aerogenes.  Blood Cx negative, Legiionella urine Ag negative, HIV negative. ID consulted and pt was placed on cefepime. Vancomycin was added for persistent fevers, but subsequently d/c'd as without source.  Fevers and leukocytosis then resolved.      Pt passed S&S, diet advanced. Psych following - medications restarted for depression, tolerated seroquel and effexor.  Patient was seen by PM&R and deemed appropriate for acute inpatient rehab. Transferred to [facility] IR[facility] 11/18/19. (18 Nov 2019 16:15)      PAST MEDICAL & SURGICAL HISTORY:  Depression  Hepatitis: &quot;as a child&quot; unsure of type  Left knee pain  No significant past surgical history      MEDICATIONS  (STANDING):  amLODIPine   Tablet 10 milliGRAM(s) Oral daily  chlorhexidine 4% Liquid 1 Application(s) Topical daily  clonazePAM  Tablet 1 milliGRAM(s) Oral at bedtime  clonazePAM  Tablet 0.5 milliGRAM(s) Oral three times a day  enoxaparin Injectable 40 milliGRAM(s) SubCutaneous daily  lactobacillus acidophilus 1 Tablet(s) Oral three times a day  metoprolol tartrate 75 milliGRAM(s) Oral two times a day  nystatin Powder 1 Application(s) Topical every 12 hours  psyllium Powder 1 Packet(s) Oral daily  venlafaxine XR. 150 milliGRAM(s) Oral daily    MEDICATIONS  (PRN):  acetaminophen    Suspension .. 650 milliGRAM(s) Oral every 6 hours PRN Temp greater or equal to 38C (100.4F), Mild Pain (1 - 3), Moderate Pain (4 - 6), Severe Pain (7 - 10)  artificial  tears Solution 1 Drop(s) Both EYES two times a day PRN Dry Eyes  QUEtiapine 50 milliGRAM(s) Oral every 6 hours PRN agitation      Allergies    No Known Allergies    Intolerances            PHYSICAL EXAM  54y  Vital Signs Last 24 Hrs  T(C): 36.2 (21 Nov 2019 07:49), Max: 36.6 (20 Nov 2019 20:12)  T(F): 97.1 (21 Nov 2019 07:49), Max: 97.9 (20 Nov 2019 20:12)  HR: 81 (21 Nov 2019 07:49) (81 - 92)  BP: 145/87 (21 Nov 2019 07:49) (135/89 - 146/100)  BP(mean): --  RR: 14 (21 Nov 2019 07:49) (14 - 14)  SpO2: 97% (21 Nov 2019 07:49) (97% - 99%)  Daily     Daily             RECENT LABS:      11-20    143  |  107  |  9   ----------------------------<  120<H>  3.8   |  21<L>  |  0.59    Ca    9.2      20 Nov 2019 05:55  Mg     2.0     11-20

## 2019-11-21 NOTE — PROGRESS NOTE ADULT - ASSESSMENT
55yo Male with hx of MDD recently admitted to [facility] with ARDS requiring ECMO, now with decreased functional mobility, gait instability and ADL impairments- PT/OT    MDD- Effexor Seroquel, clonazepam, psychiatry recc noted    HTN- metoprolol    Constipation- continue metamucil    Pain Mgmt -Tylenol PRN     DVT PPX: Lovenox, SCDs, TEDs       will follow

## 2019-11-21 NOTE — PROGRESS NOTE ADULT - SUBJECTIVE AND OBJECTIVE BOX
Patient is a 54y old  Male who presents with a chief complaint of ARDS, debility  seen at the bedside, no n/v, no sob.    Vital Signs Last 24 Hrs  T(C): 36.2 (21 Nov 2019 07:49), Max: 36.6 (20 Nov 2019 20:12)  T(F): 97.1 (21 Nov 2019 07:49), Max: 97.9 (20 Nov 2019 20:12)  HR: 81 (21 Nov 2019 07:49) (81 - 92)  BP: 145/87 (21 Nov 2019 07:49) (135/89 - 146/100)  BP(mean): --  RR: 14 (21 Nov 2019 07:49) (14 - 14)  SpO2: 97% (21 Nov 2019 07:49) (97% - 99%)    GENERAL- NAD  EAR/NOSE/MOUTH/THROAT - no pharyngeal exudates, no oral leisions,  MMM  EYES- PAULINE, conjunctiva and Sclera clear  NECK- supple  RESPIRATORY-  clear to auscultation bilaterally  CARDIOVASCULAR - SIS2, RRR  GI - soft NT BS present  EXTREMITIES- no pedal edema  NEUROLOGY- no gross focal deficits  PSYCHIATRY- AAO X 3  MUSCULOSKELETAL- ROM normal                x                    143  | 21   | 9            x     >-----------< x       ------------------------< 120                   x                    3.8  | 107  | 0.59                                         Ca 9.2   Mg 2.0   Ph x

## 2019-11-21 NOTE — PROGRESS NOTE ADULT - ASSESSMENT
ASSESSMENT/PLAN  EBONI CARRASCO is a 55yo Male with hx of MDD recently admitted to [facility] with ARDS requiring ECMO, now with decreased functional mobility, gait instability and ADL impairments.    COMORBIDITES/ACTIVE MEDICAL ISSUES     #Debility  -continue Comprehensive Rehab Program of PT/OT  -neuropsychology consult for MDD , supportive counseling, caregiver support and education      #ARDS requiring ECMO  - therapies as above  - continue chest PT/PD, breathing exercises  - incentive spirometer   Per palliative care consult at [facility]:  Given patient was on ECMO he will need Follow up with Dr. Edita Garcia at the [address] [place] [address], Geriatrics and Palliative Care Office.  Please schedule an appointment for him at [phone] upon discharge if  patient is being transferred to rehab facility please include in d/c summary.      #MDD  - pt has hx of psych hospitalizations for SI.  Initial U-tox during admission positive for barbiturates.   - continue effexor, seroquel  -psychology consult  -psychiatry consult due to history and substance abuse and persistent insomnia. Pateint and wife agreeable. Pt seen by Dr. Wilson 11/20. Medications adjusted. Continue to increase effexor dose slowly to 225mg.  added klonopin 1mg qhs and 0.5mg TID. Seroquel PRN.  May require further treatment with ECT if mood does not improve.   -SW consult and possible referral on dc for substance abuse services      #HTN  - continue metoprolol, amlodipine  -(135/89 - 146/100) 11/21 stable    #Constipation  - continue metamucil    #Pain Mgmt   - Tylenol PRN    #- DVT PPX:   Lovenox, SCDs, TEDs     #hypokalemia  -supplement K+ 40 meq x 1 dose  -K+ 3.8 11/20    #Case discussed in IDT rounds 11/20:  patient currently requires CG grooming and bathing, supervision bADLs. Decreased safety, impulsive. Ambualtes 100 feet with RW but fatigues and reduced endurance, HR up to 130  -continue program, target dc home with caregiver support 11/26/19 with outpatient cardiopulmonary rehab program  -caregiver education    LABS: ASSESSMENT/PLAN  EBONI CARRASCO is a 53yo Male with hx of MDD recently admitted to [facility] with ARDS requiring ECMO, now with decreased functional mobility, gait instability and ADL impairments.    COMORBIDITES/ACTIVE MEDICAL ISSUES     #Debility  -continue Comprehensive Rehab Program of PT/OT  -neuropsychology consult for MDD , supportive counseling, caregiver support and education  -psychiatry as below. Read and appreciated    #ARDS requiring ECMO  - therapies as above  - continue chest PT/PD, breathing exercises  - incentive spirometer   Per palliative care consult at [facility]:  Given patient was on ECMO he will need Follow up with Dr. Edita Garcia at the [address] [place] [address], Geriatrics and Palliative Care Office.  Please schedule an appointment for him at [phone] upon discharge if  patient is being transferred to rehab facility please include in d/c summary.      #MDD  - pt has hx of psych hospitalizations for SI.  Initial U-tox during admission positive for barbiturates.   - continue effexor, seroquel  -psychology consult  -psychiatry consult read and appreciated . Pt seen by Dr. Wilson 11/20. Medications adjusted. Continue to increase effexor dose slowly to 225mg. Dose 11/21 at 150 mg daily. Added klonopin 1mg qhs and 0.5mg TID 11/20. Seroquel PRN.  May require further treatment with ECT if mood does not improve.   -SW consult and possible referral on dc for substance abuse services      #HTN  - continue metoprolol, amlodipine  -(135/89 - 146/100) 11/21 stable    #Constipation  - continue metamucil    #Pain Mgmt   - Tylenol PRN    #- DVT PPX:   Lovenox, SCDs, TEDs     #hypokalemia  -supplement K+ 40 meq x 1 dose  -K+ 3.8 11/20    #Case discussed in IDT rounds 11/20:  patient currently requires CG grooming and bathing, supervision bADLs. Decreased safety, impulsive. Ambualtes 100 feet with RW but fatigues and reduced endurance, HR up to 130  -continue program, target dc home with caregiver support 11/26/19 with outpatient cardiopulmonary rehab program  -caregiver education    LABS:

## 2019-11-21 NOTE — PROGRESS NOTE ADULT - RS GEN PE MLT RESP DETAILS PC
respirations non-labored/clear to auscultation bilaterally
clear to auscultation bilaterally/respirations non-labored/good air movement

## 2019-11-21 NOTE — PROGRESS NOTE ADULT - EXTREMITIES COMMENTS
calves soft, NT  no tremors  motor 5/5 BUE and LE
normal tone  BUE and LE motor 5/5  no calf swelling or pedal edema, NT bilaterally

## 2019-11-21 NOTE — PROGRESS NOTE ADULT - CONSTITUTIONAL COMMENTS
Patient still reduced initiation and processing but reduced attention and impulsive, reduced insight. Flat affect, irritable
O x 3 grossly. Has reduced insight/awareness/mental flexilbity and coping. Follows 1-2 step commands . continues to be irritable. no tearfulness.

## 2019-11-22 LAB
ANION GAP SERPL CALC-SCNC: 13 MMOL/L — SIGNIFICANT CHANGE UP (ref 5–17)
BUN SERPL-MCNC: 10 MG/DL — SIGNIFICANT CHANGE UP (ref 7–23)
CALCIUM SERPL-MCNC: 9.8 MG/DL — SIGNIFICANT CHANGE UP (ref 8.4–10.5)
CHLORIDE SERPL-SCNC: 105 MMOL/L — SIGNIFICANT CHANGE UP (ref 96–108)
CO2 SERPL-SCNC: 22 MMOL/L — SIGNIFICANT CHANGE UP (ref 22–31)
CREAT SERPL-MCNC: 0.56 MG/DL — SIGNIFICANT CHANGE UP (ref 0.5–1.3)
GLUCOSE SERPL-MCNC: 112 MG/DL — HIGH (ref 70–99)
POTASSIUM SERPL-MCNC: 4.4 MMOL/L — SIGNIFICANT CHANGE UP (ref 3.5–5.3)
POTASSIUM SERPL-SCNC: 4.4 MMOL/L — SIGNIFICANT CHANGE UP (ref 3.5–5.3)
SODIUM SERPL-SCNC: 140 MMOL/L — SIGNIFICANT CHANGE UP (ref 135–145)

## 2019-11-22 PROCEDURE — 90832 PSYTX W PT 30 MINUTES: CPT

## 2019-11-22 PROCEDURE — 99231 SBSQ HOSP IP/OBS SF/LOW 25: CPT

## 2019-11-22 PROCEDURE — 99232 SBSQ HOSP IP/OBS MODERATE 35: CPT | Mod: GC

## 2019-11-22 RX ORDER — QUETIAPINE FUMARATE 200 MG/1
50 TABLET, FILM COATED ORAL
Refills: 0 | Status: DISCONTINUED | OUTPATIENT
Start: 2019-11-22 | End: 2019-11-23

## 2019-11-22 RX ORDER — LANOLIN ALCOHOL/MO/W.PET/CERES
6 CREAM (GRAM) TOPICAL AT BEDTIME
Refills: 0 | Status: DISCONTINUED | OUTPATIENT
Start: 2019-11-22 | End: 2019-11-23

## 2019-11-22 RX ORDER — VENLAFAXINE HCL 75 MG
37.5 CAPSULE, EXT RELEASE 24 HR ORAL DAILY
Refills: 0 | Status: DISCONTINUED | OUTPATIENT
Start: 2019-11-22 | End: 2019-11-22

## 2019-11-22 RX ORDER — VENLAFAXINE HCL 75 MG
37.5 CAPSULE, EXT RELEASE 24 HR ORAL DAILY
Refills: 0 | Status: DISCONTINUED | OUTPATIENT
Start: 2019-11-22 | End: 2019-11-23

## 2019-11-22 RX ORDER — VENLAFAXINE HCL 75 MG
150 CAPSULE, EXT RELEASE 24 HR ORAL DAILY
Refills: 0 | Status: DISCONTINUED | OUTPATIENT
Start: 2019-11-22 | End: 2019-11-23

## 2019-11-22 RX ADMIN — Medication 0.5 MILLIGRAM(S): at 05:44

## 2019-11-22 RX ADMIN — AMLODIPINE BESYLATE 10 MILLIGRAM(S): 2.5 TABLET ORAL at 05:44

## 2019-11-22 RX ADMIN — Medication 75 MILLIGRAM(S): at 05:44

## 2019-11-22 RX ADMIN — ENOXAPARIN SODIUM 40 MILLIGRAM(S): 100 INJECTION SUBCUTANEOUS at 11:48

## 2019-11-22 RX ADMIN — Medication 1 TABLET(S): at 05:44

## 2019-11-22 RX ADMIN — CHLORHEXIDINE GLUCONATE 1 APPLICATION(S): 213 SOLUTION TOPICAL at 11:47

## 2019-11-22 RX ADMIN — Medication 6 MILLIGRAM(S): at 22:13

## 2019-11-22 RX ADMIN — Medication 75 MILLIGRAM(S): at 17:29

## 2019-11-22 RX ADMIN — Medication 150 MILLIGRAM(S): at 11:49

## 2019-11-22 RX ADMIN — Medication 0.5 MILLIGRAM(S): at 14:08

## 2019-11-22 RX ADMIN — Medication 1 TABLET(S): at 22:13

## 2019-11-22 RX ADMIN — QUETIAPINE FUMARATE 50 MILLIGRAM(S): 200 TABLET, FILM COATED ORAL at 20:30

## 2019-11-22 RX ADMIN — Medication 1 MILLIGRAM(S): at 22:13

## 2019-11-22 RX ADMIN — Medication 0.5 MILLIGRAM(S): at 22:13

## 2019-11-22 RX ADMIN — Medication 1 TABLET(S): at 14:06

## 2019-11-22 NOTE — PROGRESS NOTE ADULT - SUBJECTIVE AND OBJECTIVE BOX
Patient is a 54y old  Male who presents with a chief complaint of ARDS, debility (21 Nov 2019 15:11)      HPI:  54M with severe MDD with prior psych admissions/ECT who was initially admitted to  10/28/19 after presenting with lethargy and hypoxemia. Patient reportedly was dealing with severe depression and expressed SI to his wife. His wife reported he was sleeping throughout the day and in the evening she heard a thump and found him on the floor. At  he became obtunded with hypercapnic respiratory failure and was intubated and admitted to the CCU. Initial imaging showed lower lobe consolidations and he was treated with CTX/Azithromycin. Poison control was contacted and he was received NaHCO3 in the setting of mild QRS widening on ECG and suspected overdose. His Utox returned positive for barbiturates; he reportedly had positive testing for barbiturates in the past thought secondary to OTC supplementations he takes from a [employer] for sleep.    On the morning of admission, he was noted to have a 30 second tonic clonic seizure and received IV benzo and keppra load. Subsequent EEG did not reveal epileptiform discharges. He was extubated in the morning of 10/28 but required BiPAP in the setting of increased oxygenation needs. On 10/29 he was agitated and was started on Precedex. He developed fevers and was broadened to cefepime, later developed fever 102.6 and worsening hypoxemia requiring reintubation. Was started on propofol, precedex and paralyzed but remained difficult to oxygenate with traditional mechanical ventilation requiring frequent bagging and consult for ECMO was placed. He was steroids, nimbex gtt, vancomycin and received Lasix 40mg IVP. The patient was cannulated for VV-ECMO and transferred to [facility] 10/30/19.      After transfer, pt's lung compliance and oxygenation improved and he was decannulated 11/1 and extubated 11/5. He completed a 7 day course of vancomycin and zosyn.    While at [facility], patient had persistent fevers.  Airway colonized with MSSA and enterobacter aerogenes.  Blood Cx negative, Legiionella urine Ag negative, HIV negative. ID consulted and pt was placed on cefepime. Vancomycin was added for persistent fevers, but subsequently d/c'd as without source.  Fevers and leukocytosis then resolved.      Pt passed S&S, diet advanced. Psych following - medications restarted for depression, tolerated seroquel and effexor.  Patient was seen by PM&R and deemed appropriate for acute inpatient rehab. Transferred to [facility]F 11/18/19. (18 Nov 2019 16:15)      PAST MEDICAL & SURGICAL HISTORY:  Depression  Hepatitis: &quot;as a child&quot; unsure of type  Left knee pain  No significant past surgical history      MEDICATIONS  (STANDING):  amLODIPine   Tablet 10 milliGRAM(s) Oral daily  chlorhexidine 4% Liquid 1 Application(s) Topical daily  clonazePAM  Tablet 1 milliGRAM(s) Oral at bedtime  clonazePAM  Tablet 0.5 milliGRAM(s) Oral three times a day  enoxaparin Injectable 40 milliGRAM(s) SubCutaneous daily  lactobacillus acidophilus 1 Tablet(s) Oral three times a day  melatonin 3 milliGRAM(s) Oral at bedtime  metoprolol tartrate 75 milliGRAM(s) Oral two times a day  nystatin Powder 1 Application(s) Topical every 12 hours  psyllium Powder 1 Packet(s) Oral daily  venlafaxine XR. 150 milliGRAM(s) Oral daily    MEDICATIONS  (PRN):  acetaminophen    Suspension .. 650 milliGRAM(s) Oral every 6 hours PRN Temp greater or equal to 38C (100.4F), Mild Pain (1 - 3), Moderate Pain (4 - 6), Severe Pain (7 - 10)  artificial  tears Solution 1 Drop(s) Both EYES two times a day PRN Dry Eyes  QUEtiapine 50 milliGRAM(s) Oral every 6 hours PRN agitation      Allergies    No Known Allergies    Intolerances        TODAY'S SUBJECTIVE & REVIEW OF SYMPTOMS:  Patient seen and examined.  No acute events overnight.  Denies HA, dizziness, vision changes, pain, CP/SOB, cough, n/v/d, dysuria, new or worsening numbness, new or worsening muscle weakness, calf pain.  Still with poor sleep, which has not improved with medication adjustment.  Depression "comes in waves," but he endorses continued anxiety, mostly related to his hospitalization and rehab stay.  No other new complaints.     PHYSICAL EXAM  54y  Vital Signs Last 24 Hrs  T(C): 36.9 (22 Nov 2019 07:31), Max: 36.9 (22 Nov 2019 07:31)  T(F): 98.4 (22 Nov 2019 07:31), Max: 98.4 (22 Nov 2019 07:31)  HR: 77 (22 Nov 2019 07:31) (77 - 93)  BP: 137/84 (22 Nov 2019 07:31) (128/90 - 158/105)  BP(mean): --  RR: 12 (22 Nov 2019 07:31) (12 - 14)  SpO2: 99% (22 Nov 2019 07:31) (99% - 99%)  Daily     Daily     General: no acute distress, mood fair  HEENT: NCAT  Cardio: +S1S2  Resp: Clear bilaterally, good inspirational effort, no R/R/W  Abdomen: +BS. Soft, NT, ND  Extremities: no clubbing, cyanosis, or edema  Neuro: A/O x3; no new focal deficits        RECENT LABS:      11-22    140  |  105  |  10  ----------------------------<  112<H>  4.4   |  22  |  0.56    Ca    9.8      22 Nov 2019 06:10

## 2019-11-22 NOTE — PROGRESS NOTE ADULT - ASSESSMENT
ASSESSMENT/PLAN  EBONI CARRASCO is a 53yo Male with hx of MDD recently admitted to [facility] with ARDS requiring ECMO, now with decreased functional mobility, gait instability and ADL impairments.    COMORBIDITES/ACTIVE MEDICAL ISSUES     #Debility  -continue Comprehensive Rehab Program of PT/OT  -neuropsychology consult for MDD , supportive counseling, caregiver support and education  -psychiatry as below. Read and appreciated    #ARDS requiring ECMO  - therapies as above  - continue chest PT/PD, breathing exercises  - incentive spirometer   Per palliative care consult at [facility]:  Given patient was on ECMO he will need Follow up with Dr. Edita Garcia at the [address] [place] [address], Geriatrics and Palliative Care Office.  Please schedule an appointment for him at [phone] upon discharge if  patient is being transferred to rehab facility please include in d/c summary.      #MDD  - pt has hx of psych hospitalizations for SI.  Initial U-tox during admission positive for barbiturates.   - continue effexor, seroquel  -psychology consult  -psychiatry consult read and appreciated . Pt seen by Dr. Wilson 11/20. Medications adjusted. Continue to increase effexor dose slowly to 225mg. Dose 11/21 at 150 mg daily. Added klonopin 1mg qhs and 0.5mg TID 11/20. Seroquel PRN.  May require further treatment with ECT if mood does not improve.   -SW consult and possible referral on dc for substance abuse services  -per pt's pharmacy ([facility]): home seroquel dose is 400mg qhs    #HTN  - continue metoprolol, amlodipine  - had 1 episode of HTN 11/21; monitor     #Constipation  - continue metamucil    #Pain Mgmt   - Tylenol PRN    #- DVT PPX:   Lovenox, SCDs, TEDs     #hypokalemia  -K 4.4 11/22. Resolved.     #Case discussed in IDT rounds 11/20:  patient currently requires CG grooming and bathing, supervision bADLs. Decreased safety, impulsive. Ambualtes 100 feet with RW but fatigues and reduced endurance, HR up to 130  -continue program, target dc home with caregiver support 11/26/19 with outpatient cardiopulmonary rehab program  -caregiver education    LABS:  CBC, BMP mondays and thursdays ASSESSMENT/PLAN  EBONI CARRASCO is a 55yo Male with hx of MDD recently admitted to [facility] with ARDS requiring ECMO, now with decreased functional mobility, gait instability and ADL impairments.    COMORBIDITES/ACTIVE MEDICAL ISSUES     #Debility  -continue Comprehensive Rehab Program of PT/OT  -neuropsychology consult for MDD , supportive counseling, caregiver support and education  -psychiatry as below. Read and appreciated    #ARDS requiring ECMO  - therapies as above  - continue chest PT/PD, breathing exercises  - incentive spirometer   Per palliative care consult at [facility]:  Given patient was on ECMO he will need Follow up with Dr. Edtia Garcia at the [address] [place] [address], Geriatrics and Palliative Care Office.  Please schedule an appointment for him at [phone] upon discharge if  patient is being transferred to rehab facility please include in d/c summary.      #MDD  - pt has hx of psych hospitalizations for SI.  Initial U-tox during admission positive for barbiturates.   - continue effexor, seroquel  -psychology consult  -psychiatry consult read and appreciated . Pt seen by Dr. Wilson 11/20. Medications adjusted. Continue to increase effexor dose slowly to 225mg. Dose 11/21 at 150 mg daily. Incrased dose to 187.5 on 11/22. Plan to increase to 225 on 11/26 of QT interval permits. Added klonopin 1mg qhs and 0.5mg TID 11/20. Seroquel added at 0900 and 2100 for improved sleep.  May require further treatment with ECT if mood does not improve.   -increased melatonin to 6mg qhs on 11/22  -f/u EKG on 11/26 re: QTc   -SW consult and possible referral on dc for substance abuse services  -per pt's pharmacy ([facility]): home seroquel dose is 400mg qhs    #HTN  - continue metoprolol, amlodipine  - had 1 episode of HTN 11/21; monitor     #Constipation  - continue metamucil    #Pain Mgmt   - Tylenol PRN    #- DVT PPX:   Lovenox, SCDs, TEDs     #hypokalemia  -K 4.4 11/22. Resolved.     #Case discussed in IDT rounds 11/20:  patient currently requires CG grooming and bathing, supervision bADLs. Decreased safety, impulsive. Ambualtes 100 feet with RW but fatigues and reduced endurance, HR up to 130  -continue program, target dc home with caregiver support 11/26/19 with outpatient cardiopulmonary rehab program  -caregiver education    LABS:  CBC, BMP mondays and thursdays  EKG 11/26  increase effexor dose to 225 on 11/26 if QTc WNL

## 2019-11-22 NOTE — CONSULT NOTE ADULT - SUBJECTIVE AND OBJECTIVE BOX
Pt seen and evaluated today, well known to writer from initial psychiatric evaluation done on Pt seen and evaluated today, well known to writer from initial psychiatric evaluation done on   October 28th. At that time he was seen in the ICU s/p overdose with urine tox + for barbiturates, which was secondary to a supplement he was taking   over the counter.   Pt is in treatment with Dr. Cline. Private psychiatrist concerned about his overdose and his continued risk.   Pt is ambulating with rolling walker, and may not meet criteria for BRITTANY, with walker ? if allowed to go to inpatient unit due to   walker as a risk. ( May be able to do so with hospital based unit).  Pt dismissive with writer, but English is not his first language, has been seen by Dr. Wilson, who will evaluate the patient in the am  there is concern that he will require in patient care- despite being transferred back to rehab (pt had pulmonary decompensation and needed ECMO and was transferred to [facility]).  Pt with chronic insomnia which is a risk factor.   Plan to change over Seroquel to Abili.   Dr. Wilson will also speak with family regarding their comfort level with d/c planning. Pt told writer he was going  home on Tuesday of next week and did not recall his overdose.   Diagnosis- MDD recurrent severe   s/p overdose on sedative hypnotic. ( Ambien).   Await consultant interview in [place] for clarification of treatment goals and plan of care.   Should 2 PC be needed writer will fill one out.

## 2019-11-23 PROCEDURE — 99232 SBSQ HOSP IP/OBS MODERATE 35: CPT

## 2019-11-23 RX ORDER — ARIPIPRAZOLE 15 MG/1
5 TABLET ORAL DAILY
Refills: 0 | Status: DISCONTINUED | OUTPATIENT
Start: 2019-11-23 | End: 2019-11-26

## 2019-11-23 RX ORDER — CLONAZEPAM 1 MG
2 TABLET ORAL AT BEDTIME
Refills: 0 | Status: DISCONTINUED | OUTPATIENT
Start: 2019-11-23 | End: 2019-11-26

## 2019-11-23 RX ORDER — QUETIAPINE FUMARATE 200 MG/1
100 TABLET, FILM COATED ORAL AT BEDTIME
Refills: 0 | Status: DISCONTINUED | OUTPATIENT
Start: 2019-11-23 | End: 2019-11-24

## 2019-11-23 RX ORDER — VENLAFAXINE HCL 75 MG
150 CAPSULE, EXT RELEASE 24 HR ORAL DAILY
Refills: 0 | Status: DISCONTINUED | OUTPATIENT
Start: 2019-11-23 | End: 2019-11-26

## 2019-11-23 RX ORDER — CLONAZEPAM 1 MG
0.5 TABLET ORAL
Refills: 0 | Status: DISCONTINUED | OUTPATIENT
Start: 2019-11-23 | End: 2019-11-26

## 2019-11-23 RX ORDER — VENLAFAXINE HCL 75 MG
75 CAPSULE, EXT RELEASE 24 HR ORAL DAILY
Refills: 0 | Status: DISCONTINUED | OUTPATIENT
Start: 2019-11-23 | End: 2019-11-26

## 2019-11-23 RX ADMIN — NYSTATIN CREAM 1 APPLICATION(S): 100000 CREAM TOPICAL at 17:13

## 2019-11-23 RX ADMIN — ENOXAPARIN SODIUM 40 MILLIGRAM(S): 100 INJECTION SUBCUTANEOUS at 12:00

## 2019-11-23 RX ADMIN — Medication 1 TABLET(S): at 21:27

## 2019-11-23 RX ADMIN — Medication 0.5 MILLIGRAM(S): at 05:27

## 2019-11-23 RX ADMIN — Medication 150 MILLIGRAM(S): at 12:00

## 2019-11-23 RX ADMIN — QUETIAPINE FUMARATE 50 MILLIGRAM(S): 200 TABLET, FILM COATED ORAL at 08:01

## 2019-11-23 RX ADMIN — Medication 37.5 MILLIGRAM(S): at 13:57

## 2019-11-23 RX ADMIN — Medication 75 MILLIGRAM(S): at 17:06

## 2019-11-23 RX ADMIN — AMLODIPINE BESYLATE 10 MILLIGRAM(S): 2.5 TABLET ORAL at 05:27

## 2019-11-23 RX ADMIN — Medication 1 TABLET(S): at 13:58

## 2019-11-23 RX ADMIN — Medication 0.5 MILLIGRAM(S): at 13:59

## 2019-11-23 RX ADMIN — NYSTATIN CREAM 1 APPLICATION(S): 100000 CREAM TOPICAL at 05:28

## 2019-11-23 RX ADMIN — Medication 2 MILLIGRAM(S): at 21:28

## 2019-11-23 RX ADMIN — QUETIAPINE FUMARATE 100 MILLIGRAM(S): 200 TABLET, FILM COATED ORAL at 21:28

## 2019-11-23 RX ADMIN — Medication 0.5 MILLIGRAM(S): at 17:13

## 2019-11-23 RX ADMIN — CHLORHEXIDINE GLUCONATE 1 APPLICATION(S): 213 SOLUTION TOPICAL at 11:59

## 2019-11-23 RX ADMIN — Medication 75 MILLIGRAM(S): at 05:27

## 2019-11-23 RX ADMIN — Medication 1 TABLET(S): at 05:27

## 2019-11-23 NOTE — PROGRESS NOTE ADULT - SUBJECTIVE AND OBJECTIVE BOX
Patient is a 54y old  Male who presents with a chief complaint of ARDS, debility (21 Nov 2019 15:11)      HPI:  54M with severe MDD with prior psych admissions/ECT who was initially admitted to  10/28/19 after presenting with lethargy and hypoxemia. Patient reportedly was dealing with severe depression and expressed SI to his wife. His wife reported he was sleeping throughout the day and in the evening she heard a thump and found him on the floor. At  he became obtunded with hypercapnic respiratory failure and was intubated and admitted to the CCU. Initial imaging showed lower lobe consolidations and he was treated with CTX/Azithromycin. Poison control was contacted and he was received NaHCO3 in the setting of mild QRS widening on ECG and suspected overdose. His Utox returned positive for barbiturates; he reportedly had positive testing for barbiturates in the past thought secondary to OTC supplementations he takes from a [employer] for sleep.    On the morning of admission, he was noted to have a 30 second tonic clonic seizure and received IV benzo and keppra load. Subsequent EEG did not reveal epileptiform discharges. He was extubated in the morning of 10/28 but required BiPAP in the setting of increased oxygenation needs. On 10/29 he was agitated and was started on Precedex. He developed fevers and was broadened to cefepime, later developed fever 102.6 and worsening hypoxemia requiring reintubation. Was started on propofol, precedex and paralyzed but remained difficult to oxygenate with traditional mechanical ventilation requiring frequent bagging and consult for ECMO was placed. He was steroids, nimbex gtt, vancomycin and received Lasix 40mg IVP. The patient was cannulated for VV-ECMO and transferred to [facility] 10/30/19.      After transfer, pt's lung compliance and oxygenation improved and he was decannulated 11/1 and extubated 11/5. He completed a 7 day course of vancomycin and zosyn.    While at [facility], patient had persistent fevers.  Airway colonized with MSSA and enterobacter aerogenes.  Blood Cx negative, Legiionella urine Ag negative, HIV negative. ID consulted and pt was placed on cefepime. Vancomycin was added for persistent fevers, but subsequently d/c'd as without source.  Fevers and leukocytosis then resolved.      Pt passed S&S, diet advanced. Psych following - medications restarted for depression, tolerated seroquel and effexor.  Patient was seen by PM&R and deemed appropriate for acute inpatient rehab. Transferred to [facility] IRF 11/18/19. (18 Nov 2019 16:15)    Subjective: Patient seen in room. NO acute events overnigt. Denies any new complaints.  Tolerating therapy well.     PAST MEDICAL & SURGICAL HISTORY:  Depression  Hepatitis: &quot;as a child&quot; unsure of type  Left knee pain  No significant past surgical history      MEDICATIONS  (STANDING):  amLODIPine   Tablet 10 milliGRAM(s) Oral daily  chlorhexidine 4% Liquid 1 Application(s) Topical daily  clonazePAM  Tablet 1 milliGRAM(s) Oral at bedtime  clonazePAM  Tablet 0.5 milliGRAM(s) Oral three times a day  enoxaparin Injectable 40 milliGRAM(s) SubCutaneous daily  lactobacillus acidophilus 1 Tablet(s) Oral three times a day  melatonin 3 milliGRAM(s) Oral at bedtime  metoprolol tartrate 75 milliGRAM(s) Oral two times a day  nystatin Powder 1 Application(s) Topical every 12 hours  psyllium Powder 1 Packet(s) Oral daily  venlafaxine XR. 150 milliGRAM(s) Oral daily    MEDICATIONS  (PRN):  acetaminophen    Suspension .. 650 milliGRAM(s) Oral every 6 hours PRN Temp greater or equal to 38C (100.4F), Mild Pain (1 - 3), Moderate Pain (4 - 6), Severe Pain (7 - 10)  artificial  tears Solution 1 Drop(s) Both EYES two times a day PRN Dry Eyes  QUEtiapine 50 milliGRAM(s) Oral every 6 hours PRN agitation      Allergies    No Known Allergies    Intolerances         PHYSICAL EXAM   Vital Signs Last 24 Hrs  T(C): 36.5 (23 Nov 2019 07:45), Max: 37.2 (22 Nov 2019 22:00)  T(F): 97.7 (23 Nov 2019 07:45), Max: 98.9 (22 Nov 2019 22:00)  HR: 76 (23 Nov 2019 07:45) (76 - 105)  BP: 140/88 (23 Nov 2019 07:45) (138/92 - 147/99)  BP(mean): --  RR: 14 (23 Nov 2019 07:45) (14 - 14)  SpO2: 97% (23 Nov 2019 07:45) (95% - 97%)  General: no acute distress, mood fair  HEENT: NCAT  Cardio: +S1S2  Resp: Clear bilaterally, good inspirational effort, no R/R/W  Abdomen: +BS. Soft, NT, ND  Extremities: no clubbing, cyanosis, or edema  Neuro: A/O x3; no new focal deficits        RECENT LABS:      11-22    140  |  105  |  10  ----------------------------<  112<H>  4.4   |  22  |  0.56    Ca    9.8      22 Nov 2019 06:10

## 2019-11-23 NOTE — PROGRESS NOTE ADULT - ASSESSMENT
ASSESSMENT/PLAN  EBONI CARRASCO is a 55yo Male with hx of MDD recently admitted to [facility] with ARDS requiring ECMO, now with decreased functional mobility, gait instability and ADL impairments.    COMORBIDITES/ACTIVE MEDICAL ISSUES     #Debility  -continue Comprehensive Rehab Program of PT/OT  -neuropsychology consult for MDD , supportive counseling, caregiver support and education  -psychiatry as below. Read and appreciated    #ARDS requiring ECMO  - therapies as above  - continue chest PT/PD, breathing exercises  - incentive spirometer   Per palliative care consult at [facility]:  Given patient was on ECMO he will need Follow up with Dr. Edita Garcia at the [address] [place] [address], Geriatrics and Palliative Care Office.  Please schedule an appointment for him at [phone] upon discharge if  patient is being transferred to rehab facility please include in d/c summary.      #MDD  - pt has hx of psych hospitalizations for SI.  Initial U-tox during admission positive for barbiturates.   - continue effexor, seroquel  -psychology consult  -psychiatry consult read and appreciated . Pt seen by Dr. Wilson 11/20. Medications adjusted. Continue to increase effexor dose slowly to 225mg. Dose 11/21 at 150 mg daily. Incrased dose to 187.5 on 11/22. Plan to increase to 225 on 11/26 of QT interval permits. Added klonopin 1mg qhs and 0.5mg TID 11/20. Seroquel added at 0900 and 2100 for improved sleep.  May require further treatment with ECT if mood does not improve.   -increased melatonin to 6mg qhs on 11/22  -f/u EKG on 11/26 re: QTc   -SW consult and possible referral on dc for substance abuse services  -per pt's pharmacy ([facility]): home seroquel dose is 400mg qhs    #HTN  - continue metoprolol, amlodipine  - had 1 episode of HTN 11/21; monitor     #Constipation  - continue metamucil    #Pain Mgmt   - Tylenol PRN    #- DVT PPX:   Lovenox, SCDs, TEDs     #hypokalemia  -K 4.4 11/22. Resolved.     #Case discussed in IDT rounds 11/20:  patient currently requires CG grooming and bathing, supervision bADLs. Decreased safety, impulsive. Ambualtes 100 feet with RW but fatigues and reduced endurance, HR up to 130  -continue program, target dc home with caregiver support 11/26/19 with outpatient cardiopulmonary rehab program  -caregiver education    LABS:  CBC, BMP mondays and thursdays  EKG 11/26  increase effexor dose to 225 on 11/26 if QTc WNL

## 2019-11-23 NOTE — PROGRESS NOTE ADULT - SUBJECTIVE AND OBJECTIVE BOX
Psychiatric Consult Follow up note:11/23/19    Identifying Data: 54y    History of Present Illness:  HPI:  54M with severe MDD with prior psych admissions/ECT who was initially admitted to  10/28/19 after presenting with lethargy and hypoxemia. Patient reportedly was dealing with severe depression and expressed SI to his wife. His wife reported he was sleeping throughout the day and in the evening she heard a thump and found him on the floor. At  he became obtunded with hypercapnic respiratory failure and was intubated and admitted to the CCU. Initial imaging showed lower lobe consolidations and he was treated with CTX/Azithromycin. Poison control was contacted and he was received NaHCO3 in the setting of mild QRS widening on ECG and suspected overdose. His Utox returned positive for barbiturates; he reportedly had positive testing for barbiturates in the past thought secondary to OTC supplementations he takes from a [employer] for sleep.    On the morning of admission, he was noted to have a 30 second tonic clonic seizure and received IV benzo and keppra load. Subsequent EEG did not reveal epileptiform discharges. He was extubated in the morning of 10/28 but required BiPAP in the setting of increased oxygenation needs. On 10/29 he was agitated and was started on Precedex. He developed fevers and was broadened to cefepime, later developed fever 102.6 and worsening hypoxemia requiring reintubation. Was started on propofol, precedex and paralyzed but remained difficult to oxygenate with traditional mechanical ventilation requiring frequent bagging and consult for ECMO was placed. He was steroids, nimbex gtt, vancomycin and received Lasix 40mg IVP. The patient was cannulated for VV-ECMO and transferred to [facility] 10/30/19.      After transfer, pt's lung compliance and oxygenation improved and he was decannulated 11/1 and extubated 11/5. He completed a 7 day course of vancomycin and zosyn.    While at [facility], patient had persistent fevers.  Airway colonized with MSSA and enterobacter aerogenes.  Blood Cx negative, Legiionella urine Ag negative, HIV negative. ID consulted and pt was placed on cefepime. Vancomycin was added for persistent fevers, but subsequently d/c'd as without source.  Fevers and leukocytosis then resolved.      Pt passed S&S, diet advanced. Psych following - medications restarted for depression, tolerated seroquel and effexor.  Patient was seen by PM&R and deemed appropriate for acute inpatient rehab. Transferred to [facility] IRF 11/18/19. (18 Nov 2019 16:15)      Chief Complaint/Reason for Consult: Patient seen today with wife at his bedside. he reported that still sleeps poorly and that is upsetting. He remembers now why he took  bottle of ambien, denies that wanted to die ,was just frustrated with poor sleep and was taking one pill after another wanted to fall asleep He denies feeling suicidal now and wife agrees that will take care of his medications after his  D/C.     Health Issues: Undefined  Chronic pulmonary insufficiency following surgery  No significant family history  Handoff  MEWS Score  Depression  Hepatitis  Left knee pain  No significant past surgical history      Psychiatric Review of Systems:    Current medications: acetaminophen    Suspension .. 650 milliGRAM(s) Oral every 6 hours PRN  amLODIPine   Tablet 10 milliGRAM(s) Oral daily  ARIPiprazole 5 milliGRAM(s) Oral daily  artificial  tears Solution 1 Drop(s) Both EYES two times a day PRN  chlorhexidine 4% Liquid 1 Application(s) Topical daily  clonazePAM  Tablet 0.5 milliGRAM(s) Oral two times a day  clonazePAM  Tablet 2 milliGRAM(s) Oral at bedtime  enoxaparin Injectable 40 milliGRAM(s) SubCutaneous daily  lactobacillus acidophilus 1 Tablet(s) Oral three times a day  metoprolol tartrate 75 milliGRAM(s) Oral two times a day  nystatin Powder 1 Application(s) Topical every 12 hours  psyllium Powder 1 Packet(s) Oral daily  QUEtiapine 50 milliGRAM(s) Oral every 6 hours PRN  QUEtiapine 50 milliGRAM(s) Oral <User Schedule>  venlafaxine XR. 150 milliGRAM(s) Oral daily  venlafaxine XR. 75 milliGRAM(s) Oral daily      Labs:      Vital Signs Last 24 hours: T(C): 36.5 (11-23-19 @ 07:45), Max: 37.2 (11-22-19 @ 22:00)  HR: 76 (11-23-19 @ 07:45) (76 - 105)  BP: 140/88 (11-23-19 @ 07:45) (138/92 - 147/99)  RR: 14 (11-23-19 @ 07:45) (14 - 14)  SpO2: 97% (11-23-19 @ 07:45) (95% - 97%)    Allergies: No Known Allergies      Past Psychiatric or Substance use History:     Current Mental Status Exam:  Appearance:- well groomed,    Attitude: - cooperative,   Gait/Station: - , per physiatry or PT notes.  Motor Activity: - calm,   Affect: - appropriate,   Mood: - depressed,   Speech: -normal,   Thought process: -intact, .  Thought content/perceptions:   Hallucinations: auditory, visual, tactile, olfactory, not present.  Delusions: not present,  Suicidal ideation: none  Homicidal ideation:  none   Sensorium: alert,   Orientationx3  Attention;fair  Concentration;fair  Memory: improved, but not STM  Insight/Judgment; fair    Assessment and Plan: Will adjust medications: clonopin 0,5 BID and 2mg QHS, Effexor 225 mg , abilify 5 mg, Seroquel 100 mg qhs, D/C Melatonin, D/C seroquel bid    Follow up status: daily,       Other recommendations:  may be D/C when med stable    Level of observation:

## 2019-11-24 PROCEDURE — 99232 SBSQ HOSP IP/OBS MODERATE 35: CPT

## 2019-11-24 RX ORDER — QUETIAPINE FUMARATE 200 MG/1
200 TABLET, FILM COATED ORAL AT BEDTIME
Refills: 0 | Status: DISCONTINUED | OUTPATIENT
Start: 2019-11-24 | End: 2019-11-26

## 2019-11-24 RX ADMIN — ENOXAPARIN SODIUM 40 MILLIGRAM(S): 100 INJECTION SUBCUTANEOUS at 11:53

## 2019-11-24 RX ADMIN — NYSTATIN CREAM 1 APPLICATION(S): 100000 CREAM TOPICAL at 18:01

## 2019-11-24 RX ADMIN — CHLORHEXIDINE GLUCONATE 1 APPLICATION(S): 213 SOLUTION TOPICAL at 11:52

## 2019-11-24 RX ADMIN — Medication 150 MILLIGRAM(S): at 11:53

## 2019-11-24 RX ADMIN — Medication 75 MILLIGRAM(S): at 11:53

## 2019-11-24 RX ADMIN — Medication 75 MILLIGRAM(S): at 17:43

## 2019-11-24 RX ADMIN — Medication 75 MILLIGRAM(S): at 06:23

## 2019-11-24 RX ADMIN — QUETIAPINE FUMARATE 200 MILLIGRAM(S): 200 TABLET, FILM COATED ORAL at 21:27

## 2019-11-24 RX ADMIN — Medication 1 TABLET(S): at 11:53

## 2019-11-24 RX ADMIN — Medication 2 MILLIGRAM(S): at 21:26

## 2019-11-24 RX ADMIN — ARIPIPRAZOLE 5 MILLIGRAM(S): 15 TABLET ORAL at 11:52

## 2019-11-24 RX ADMIN — Medication 0.5 MILLIGRAM(S): at 06:23

## 2019-11-24 RX ADMIN — Medication 1 TABLET(S): at 06:24

## 2019-11-24 RX ADMIN — AMLODIPINE BESYLATE 10 MILLIGRAM(S): 2.5 TABLET ORAL at 06:23

## 2019-11-24 RX ADMIN — Medication 1 TABLET(S): at 21:26

## 2019-11-24 RX ADMIN — QUETIAPINE FUMARATE 50 MILLIGRAM(S): 200 TABLET, FILM COATED ORAL at 17:42

## 2019-11-24 RX ADMIN — NYSTATIN CREAM 1 APPLICATION(S): 100000 CREAM TOPICAL at 06:24

## 2019-11-24 RX ADMIN — Medication 0.5 MILLIGRAM(S): at 17:45

## 2019-11-24 NOTE — PROGRESS NOTE ADULT - ASSESSMENT
ASSESSMENT/PLAN  EBONI CARRASCO is a 53yo Male with hx of MDD recently admitted to [facility] with ARDS requiring ECMO, now with decreased functional mobility, gait instability and ADL impairments.    COMORBIDITES/ACTIVE MEDICAL ISSUES     #Debility  -continue Comprehensive Rehab Program of PT/OT  -neuropsychology consult for MDD , supportive counseling, caregiver support and education  -psychiatry as below. Read and appreciated    #ARDS requiring ECMO  - therapies as above  - continue chest PT/PD, breathing exercises  - incentive spirometer   Per palliative care consult at [facility]:  Given patient was on ECMO he will need Follow up with Dr. Edita Garcia at the [address] [place] [address], Geriatrics and Palliative Care Office.  Please schedule an appointment for him at [phone] upon discharge if  patient is being transferred to rehab facility please include in d/c summary.      #MDD  - pt has hx of psych hospitalizations for SI.  Initial U-tox during admission positive for barbiturates.   - continue effexor, seroquel  -psychology consult  -psychiatry consult read and appreciated . Pt seen by Dr. Wilson 11/20. Medications adjusted. Continue to increase effexor dose slowly to 225mg. Dose 11/21 at 150 mg daily. Incrased dose to 187.5 on 11/22. Plan to increase to 225 on 11/26 of QT interval permits. Added klonopin 1mg qhs and 0.5mg TID 11/20. Seroquel added at 0900 and 2100 for improved sleep.  May require further treatment with ECT if mood does not improve.   -increased melatonin to 6mg qhs on 11/22  -f/u EKG on 11/26 re: QTc   -SW consult and possible referral on dc for substance abuse services  -per pt's pharmacy ([facility]): home seroquel dose is 400mg qhs    #HTN  - continue metoprolol, amlodipine  - had 1 episode of HTN 11/21; monitor     #Constipation  - continue metamucil    #Pain Mgmt   - Tylenol PRN    #- DVT PPX:   Lovenox, SCDs, TEDs     #hypokalemia  -K 4.4 11/22. Resolved.     #Case discussed in IDT rounds 11/20:  patient currently requires CG grooming and bathing, supervision bADLs. Decreased safety, impulsive. Ambualtes 100 feet with RW but fatigues and reduced endurance, HR up to 130  -continue program, target dc home with caregiver support 11/26/19 with outpatient cardiopulmonary rehab program  -caregiver education    LABS:  CBC, BMP mondays and thursdays  EKG 11/26  increase effexor dose to 225 on 11/26 if QTc WNL

## 2019-11-24 NOTE — PROGRESS NOTE ADULT - SUBJECTIVE AND OBJECTIVE BOX
Psychiatric Consult Follow up note:11/24/19    Identifying Data: 54y    History of Present Illness:  HPI:  54M with severe MDD with prior psych admissions/ECT who was initially admitted to  10/28/19 after presenting with lethargy and hypoxemia. Patient reportedly was dealing with severe depression and expressed SI to his wife. His wife reported he was sleeping throughout the day and in the evening she heard a thump and found him on the floor. At  he became obtunded with hypercapnic respiratory failure and was intubated and admitted to the CCU. Initial imaging showed lower lobe consolidations and he was treated with CTX/Azithromycin. Poison control was contacted and he was received NaHCO3 in the setting of mild QRS widening on ECG and suspected overdose. His Utox returned positive for barbiturates; he reportedly had positive testing for barbiturates in the past thought secondary to OTC supplementations he takes from a [employer] for sleep.    On the morning of admission, he was noted to have a 30 second tonic clonic seizure and received IV benzo and keppra load. Subsequent EEG did not reveal epileptiform discharges. He was extubated in the morning of 10/28 but required BiPAP in the setting of increased oxygenation needs. On 10/29 he was agitated and was started on Precedex. He developed fevers and was broadened to cefepime, later developed fever 102.6 and worsening hypoxemia requiring reintubation. Was started on propofol, precedex and paralyzed but remained difficult to oxygenate with traditional mechanical ventilation requiring frequent bagging and consult for ECMO was placed. He was steroids, nimbex gtt, vancomycin and received Lasix 40mg IVP. The patient was cannulated for VV-ECMO and transferred to [facility] 10/30/19.      After transfer, pt's lung compliance and oxygenation improved and he was decannulated 11/1 and extubated 11/5. He completed a 7 day course of vancomycin and zosyn.    While at [facility], patient had persistent fevers.  Airway colonized with MSSA and enterobacter aerogenes.  Blood Cx negative, Legiionella urine Ag negative, HIV negative. ID consulted and pt was placed on cefepime. Vancomycin was added for persistent fevers, but subsequently d/c'd as without source.  Fevers and leukocytosis then resolved.      Pt passed S&S, diet advanced. Psych following - medications restarted for depression, tolerated seroquel and effexor.  Patient was seen by PM&R and deemed appropriate for acute inpatient rehab. Transferred to [facility] IRF 11/18/19. (18 Nov 2019 16:15)      Chief Complaint/Reason for Consult: Patient seen today alone,. He reported that still sleept poorly , could not fall asleep and was up most of the night.  He denies feeling suicidal , but feeling depressed and looks depressed. ,not eager to go home.    Health Issues:  Chronic pulmonary insufficiency following surgery  No significant family history  Handoff  MEWS Score  Depression  Hepatitis  Left knee pain  No significant past surgical history      Psychiatric Review of Systems:    Current medications: acetaminophen    Suspension .. 650 milliGRAM(s) Oral every 6 hours PRN  amLODIPine   Tablet 10 milliGRAM(s) Oral daily  ARIPiprazole 5 milliGRAM(s) Oral daily  artificial  tears Solution 1 Drop(s) Both EYES two times a day PRN  chlorhexidine 4% Liquid 1 Application(s) Topical daily  clonazePAM  Tablet 0.5 milliGRAM(s) Oral two times a day  clonazePAM  Tablet 2 milliGRAM(s) Oral at bedtime  enoxaparin Injectable 40 milliGRAM(s) SubCutaneous daily  lactobacillus acidophilus 1 Tablet(s) Oral three times a day  metoprolol tartrate 75 milliGRAM(s) Oral two times a day  nystatin Powder 1 Application(s) Topical every 12 hours  psyllium Powder 1 Packet(s) Oral daily  QUEtiapine 50 milliGRAM(s) Oral every 6 hours PRN  QUEtiapine 50 milliGRAM(s) Oral <User Schedule>  venlafaxine XR. 150 milliGRAM(s) Oral daily  venlafaxine XR. 75 milliGRAM(s) Oral daily      Labs:      Vital Signs Last 24 hours: T(C): 36.5 (11-23-19 @ 07:45), Max: 37.2 (11-22-19 @ 22:00)  HR: 76 (11-23-19 @ 07:45) (76 - 105)  BP: 140/88 (11-23-19 @ 07:45) (138/92 - 147/99)  RR: 14 (11-23-19 @ 07:45) (14 - 14)  SpO2: 97% (11-23-19 @ 07:45) (95% - 97%)    Allergies: No Known Allergies          Current Mental Status Exam:  Appearance:- well groomed, overweight,    Attitude: - cooperative,   Gait/Station: - , per physiatry or PT notes.  Motor Activity: - calm,   Affect: - appropriate,   Mood: - depressed,   Speech: -low voice   Thought process: -intact, .  Thought content/perceptions:   Hallucinations: auditory, visual, tactile, olfactory, not present.  Delusions: not present,  Suicidal ideation: none  Homicidal ideation:  none   Sensorium: alert,   Orientationx3  Attention; fair  Concentration; fair  Memory: improved, but not STM  Insight/Judgment; fair    Assessment and Plan: Will adjust medications: clonopin 0,5 BID and 2mg QHS, Effexor 225 mg , abilify 5 mg, increase  Seroquel 200 mg qhs, He was on 400mg after hospitalization    Follow up status: daily,       Other recommendations:  may be D/C when med stable    Level of observation:

## 2019-11-24 NOTE — PROGRESS NOTE ADULT - SUBJECTIVE AND OBJECTIVE BOX
Patient is a 54y old  Male who presents with a chief complaint of ARDS, debility (21 Nov 2019 15:11)      HPI:  54M with severe MDD with prior psych admissions/ECT who was initially admitted to  10/28/19 after presenting with lethargy and hypoxemia. Patient reportedly was dealing with severe depression and expressed SI to his wife. His wife reported he was sleeping throughout the day and in the evening she heard a thump and found him on the floor. At  he became obtunded with hypercapnic respiratory failure and was intubated and admitted to the CCU. Initial imaging showed lower lobe consolidations and he was treated with CTX/Azithromycin. Poison control was contacted and he was received NaHCO3 in the setting of mild QRS widening on ECG and suspected overdose. His Utox returned positive for barbiturates; he reportedly had positive testing for barbiturates in the past thought secondary to OTC supplementations he takes from a [employer] for sleep.    On the morning of admission, he was noted to have a 30 second tonic clonic seizure and received IV benzo and keppra load. Subsequent EEG did not reveal epileptiform discharges. He was extubated in the morning of 10/28 but required BiPAP in the setting of increased oxygenation needs. On 10/29 he was agitated and was started on Precedex. He developed fevers and was broadened to cefepime, later developed fever 102.6 and worsening hypoxemia requiring reintubation. Was started on propofol, precedex and paralyzed but remained difficult to oxygenate with traditional mechanical ventilation requiring frequent bagging and consult for ECMO was placed. He was steroids, nimbex gtt, vancomycin and received Lasix 40mg IVP. The patient was cannulated for VV-ECMO and transferred to [facility] 10/30/19.      After transfer, pt's lung compliance and oxygenation improved and he was decannulated 11/1 and extubated 11/5. He completed a 7 day course of vancomycin and zosyn.    While at [facility], patient had persistent fevers.  Airway colonized with MSSA and enterobacter aerogenes.  Blood Cx negative, Legiionella urine Ag negative, HIV negative. ID consulted and pt was placed on cefepime. Vancomycin was added for persistent fevers, but subsequently d/c'd as without source.  Fevers and leukocytosis then resolved.      Pt passed S&S, diet advanced. Psych following - medications restarted for depression, tolerated seroquel and effexor.  Patient was seen by PM&R and deemed appropriate for acute inpatient rehab. Transferred to [facility] IRF 11/18/19. (18 Nov 2019 16:15)    Subjective: Patient seen in room. NO acute events overnigt. Denies any new complaints.  Tolerating therapy well.     PAST MEDICAL & SURGICAL HISTORY:  Depression  Hepatitis: &quot;as a child&quot; unsure of type  Left knee pain  No significant past surgical history      MEDICATIONS  (STANDING):  amLODIPine   Tablet 10 milliGRAM(s) Oral daily  chlorhexidine 4% Liquid 1 Application(s) Topical daily  clonazePAM  Tablet 1 milliGRAM(s) Oral at bedtime  clonazePAM  Tablet 0.5 milliGRAM(s) Oral three times a day  enoxaparin Injectable 40 milliGRAM(s) SubCutaneous daily  lactobacillus acidophilus 1 Tablet(s) Oral three times a day  melatonin 3 milliGRAM(s) Oral at bedtime  metoprolol tartrate 75 milliGRAM(s) Oral two times a day  nystatin Powder 1 Application(s) Topical every 12 hours  psyllium Powder 1 Packet(s) Oral daily  venlafaxine XR. 150 milliGRAM(s) Oral daily    MEDICATIONS  (PRN):  acetaminophen    Suspension .. 650 milliGRAM(s) Oral every 6 hours PRN Temp greater or equal to 38C (100.4F), Mild Pain (1 - 3), Moderate Pain (4 - 6), Severe Pain (7 - 10)  artificial  tears Solution 1 Drop(s) Both EYES two times a day PRN Dry Eyes  QUEtiapine 50 milliGRAM(s) Oral every 6 hours PRN agitation      Allergies    No Known Allergies    Intolerances         PHYSICAL EXAM   Vital Signs Last 24 Hrs  T(C): 36.7 (24 Nov 2019 07:43), Max: 36.8 (23 Nov 2019 21:20)  T(F): 98.1 (24 Nov 2019 07:43), Max: 98.2 (23 Nov 2019 21:20)  HR: 98 (24 Nov 2019 07:43) (82 - 98)  BP: 126/88 (24 Nov 2019 07:43) (126/88 - 132/87)  BP(mean): --  RR: 12 (24 Nov 2019 07:43) (12 - 14)  SpO2: 98% (24 Nov 2019 07:43) (95% - 98%)        RECENT LABS:      11-22    140  |  105  |  10  ----------------------------<  112<H>  4.4   |  22  |  0.56    Ca    9.8      22 Nov 2019 06:10

## 2019-11-25 ENCOUNTER — TRANSCRIPTION ENCOUNTER (OUTPATIENT)
Age: 54
End: 2019-11-25

## 2019-11-25 LAB
ANION GAP SERPL CALC-SCNC: 11 MMOL/L — SIGNIFICANT CHANGE UP (ref 5–17)
BUN SERPL-MCNC: 10 MG/DL — SIGNIFICANT CHANGE UP (ref 7–23)
CALCIUM SERPL-MCNC: 9.4 MG/DL — SIGNIFICANT CHANGE UP (ref 8.4–10.5)
CHLORIDE SERPL-SCNC: 104 MMOL/L — SIGNIFICANT CHANGE UP (ref 96–108)
CO2 SERPL-SCNC: 24 MMOL/L — SIGNIFICANT CHANGE UP (ref 22–31)
CREAT SERPL-MCNC: 0.7 MG/DL — SIGNIFICANT CHANGE UP (ref 0.5–1.3)
GLUCOSE SERPL-MCNC: 111 MG/DL — HIGH (ref 70–99)
HCT VFR BLD CALC: 33.7 % — LOW (ref 39–50)
HGB BLD-MCNC: 10.9 G/DL — LOW (ref 13–17)
MCHC RBC-ENTMCNC: 31 PG — SIGNIFICANT CHANGE UP (ref 27–34)
MCHC RBC-ENTMCNC: 32.3 GM/DL — SIGNIFICANT CHANGE UP (ref 32–36)
MCV RBC AUTO: 95.7 FL — SIGNIFICANT CHANGE UP (ref 80–100)
NRBC # BLD: 0 /100 WBCS — SIGNIFICANT CHANGE UP (ref 0–0)
PLATELET # BLD AUTO: 198 K/UL — SIGNIFICANT CHANGE UP (ref 150–400)
POTASSIUM SERPL-MCNC: 4.4 MMOL/L — SIGNIFICANT CHANGE UP (ref 3.5–5.3)
POTASSIUM SERPL-SCNC: 4.4 MMOL/L — SIGNIFICANT CHANGE UP (ref 3.5–5.3)
RBC # BLD: 3.52 M/UL — LOW (ref 4.2–5.8)
RBC # FLD: 13.6 % — SIGNIFICANT CHANGE UP (ref 10.3–14.5)
SODIUM SERPL-SCNC: 139 MMOL/L — SIGNIFICANT CHANGE UP (ref 135–145)
WBC # BLD: 4.7 K/UL — SIGNIFICANT CHANGE UP (ref 3.8–10.5)
WBC # FLD AUTO: 4.7 K/UL — SIGNIFICANT CHANGE UP (ref 3.8–10.5)

## 2019-11-25 PROCEDURE — 99232 SBSQ HOSP IP/OBS MODERATE 35: CPT

## 2019-11-25 PROCEDURE — 93010 ELECTROCARDIOGRAM REPORT: CPT

## 2019-11-25 RX ORDER — VENLAFAXINE HCL 75 MG
1 CAPSULE, EXT RELEASE 24 HR ORAL
Qty: 0 | Refills: 0 | DISCHARGE
Start: 2019-11-25

## 2019-11-25 RX ORDER — AMLODIPINE BESYLATE 2.5 MG/1
1 TABLET ORAL
Qty: 0 | Refills: 0 | DISCHARGE
Start: 2019-11-25

## 2019-11-25 RX ORDER — QUETIAPINE FUMARATE 200 MG/1
1 TABLET, FILM COATED ORAL
Qty: 0 | Refills: 0 | DISCHARGE
Start: 2019-11-25

## 2019-11-25 RX ORDER — METOPROLOL TARTRATE 50 MG
1 TABLET ORAL
Qty: 0 | Refills: 0 | DISCHARGE
Start: 2019-11-25

## 2019-11-25 RX ADMIN — NYSTATIN CREAM 1 APPLICATION(S): 100000 CREAM TOPICAL at 06:21

## 2019-11-25 RX ADMIN — Medication 0.5 MILLIGRAM(S): at 17:26

## 2019-11-25 RX ADMIN — Medication 150 MILLIGRAM(S): at 11:56

## 2019-11-25 RX ADMIN — Medication 1 TABLET(S): at 13:13

## 2019-11-25 RX ADMIN — Medication 1 TABLET(S): at 21:06

## 2019-11-25 RX ADMIN — ARIPIPRAZOLE 5 MILLIGRAM(S): 15 TABLET ORAL at 11:56

## 2019-11-25 RX ADMIN — QUETIAPINE FUMARATE 200 MILLIGRAM(S): 200 TABLET, FILM COATED ORAL at 21:05

## 2019-11-25 RX ADMIN — Medication 75 MILLIGRAM(S): at 17:26

## 2019-11-25 RX ADMIN — Medication 75 MILLIGRAM(S): at 06:20

## 2019-11-25 RX ADMIN — Medication 2 MILLIGRAM(S): at 21:05

## 2019-11-25 RX ADMIN — CHLORHEXIDINE GLUCONATE 1 APPLICATION(S): 213 SOLUTION TOPICAL at 12:00

## 2019-11-25 RX ADMIN — Medication 1 TABLET(S): at 06:20

## 2019-11-25 RX ADMIN — AMLODIPINE BESYLATE 10 MILLIGRAM(S): 2.5 TABLET ORAL at 06:20

## 2019-11-25 RX ADMIN — ENOXAPARIN SODIUM 40 MILLIGRAM(S): 100 INJECTION SUBCUTANEOUS at 11:56

## 2019-11-25 RX ADMIN — Medication 0.5 MILLIGRAM(S): at 06:20

## 2019-11-25 RX ADMIN — NYSTATIN CREAM 1 APPLICATION(S): 100000 CREAM TOPICAL at 17:26

## 2019-11-25 RX ADMIN — Medication 75 MILLIGRAM(S): at 11:56

## 2019-11-25 NOTE — PROGRESS NOTE ADULT - SUBJECTIVE AND OBJECTIVE BOX
Patient is a 54y old  Male who presents with a chief complaint of ARDS, debility (21 Nov 2019 15:11)      HPI:  54M with severe MDD with prior psych admissions/ECT who was initially admitted to  10/28/19 after presenting with lethargy and hypoxemia. Patient reportedly was dealing with severe depression and expressed SI to his wife. His wife reported he was sleeping throughout the day and in the evening she heard a thump and found him on the floor. At  he became obtunded with hypercapnic respiratory failure and was intubated and admitted to the CCU. Initial imaging showed lower lobe consolidations and he was treated with CTX/Azithromycin. Poison control was contacted and he was received NaHCO3 in the setting of mild QRS widening on ECG and suspected overdose. His Utox returned positive for barbiturates; he reportedly had positive testing for barbiturates in the past thought secondary to OTC supplementations he takes from a [employer] for sleep.    On the morning of admission, he was noted to have a 30 second tonic clonic seizure and received IV benzo and keppra load. Subsequent EEG did not reveal epileptiform discharges. He was extubated in the morning of 10/28 but required BiPAP in the setting of increased oxygenation needs. On 10/29 he was agitated and was started on Precedex. He developed fevers and was broadened to cefepime, later developed fever 102.6 and worsening hypoxemia requiring reintubation. Was started on propofol, precedex and paralyzed but remained difficult to oxygenate with traditional mechanical ventilation requiring frequent bagging and consult for ECMO was placed. He was steroids, nimbex gtt, vancomycin and received Lasix 40mg IVP. The patient was cannulated for VV-ECMO and transferred to [facility] 10/30/19.      After transfer, pt's lung compliance and oxygenation improved and he was decannulated 11/1 and extubated 11/5. He completed a 7 day course of vancomycin and zosyn.    While at [facility], patient had persistent fevers.  Airway colonized with MSSA and enterobacter aerogenes.  Blood Cx negative, Legiionella urine Ag negative, HIV negative. ID consulted and pt was placed on cefepime. Vancomycin was added for persistent fevers, but subsequently d/c'd as without source.  Fevers and leukocytosis then resolved.    Pt passed S&S, diet advanced. Psych following - medications restarted for depression, tolerated seroquel and effexor.  Patient was seen by PM&R and deemed appropriate for acute inpatient rehab. Transferred to [facility] IRF 11/18/19. (18 Nov 2019 16:15)      PAST MEDICAL & SURGICAL HISTORY:  Depression  Hepatitis: &quot;as a child&quot; unsure of type  Left knee pain  No significant past surgical history      MEDICATIONS  (STANDING):  amLODIPine   Tablet 10 milliGRAM(s) Oral daily  ARIPiprazole 5 milliGRAM(s) Oral daily  chlorhexidine 4% Liquid 1 Application(s) Topical daily  clonazePAM  Tablet 0.5 milliGRAM(s) Oral two times a day  clonazePAM  Tablet 2 milliGRAM(s) Oral at bedtime  enoxaparin Injectable 40 milliGRAM(s) SubCutaneous daily  lactobacillus acidophilus 1 Tablet(s) Oral three times a day  metoprolol tartrate 75 milliGRAM(s) Oral two times a day  nystatin Powder 1 Application(s) Topical every 12 hours  psyllium Powder 1 Packet(s) Oral daily  QUEtiapine 200 milliGRAM(s) Oral at bedtime  venlafaxine XR. 150 milliGRAM(s) Oral daily  venlafaxine XR. 75 milliGRAM(s) Oral daily    MEDICATIONS  (PRN):  acetaminophen    Suspension .. 650 milliGRAM(s) Oral every 6 hours PRN Temp greater or equal to 38C (100.4F), Mild Pain (1 - 3), Moderate Pain (4 - 6), Severe Pain (7 - 10)  artificial  tears Solution 1 Drop(s) Both EYES two times a day PRN Dry Eyes  QUEtiapine 50 milliGRAM(s) Oral every 6 hours PRN agitation      Allergies    No Known Allergies    Intolerances        TODAY'S SUBJECTIVE & REVIEW OF SYMPTOMS:  Patient seen and examined with wife.  He continues to have what he reports as poor sleep, but does note that he sleeps about the same amount here as at home (~5hrs).  Wife does offer, however, that she still notices his anxiety.  He has been seen by psychiatry today/yesterday and medications have been adjusted.        PHYSICAL EXAM  54y  Vital Signs Last 24 Hrs  T(C): 36.7 (25 Nov 2019 08:14), Max: 36.7 (24 Nov 2019 21:00)  T(F): 98.1 (25 Nov 2019 08:14), Max: 98.1 (25 Nov 2019 08:14)  HR: 88 (25 Nov 2019 08:14) (86 - 93)  BP: 128/87 (25 Nov 2019 08:14) (128/87 - 136/87)  BP(mean): --  RR: 14 (25 Nov 2019 08:14) (14 - 14)  SpO2: 99% (25 Nov 2019 08:14) (98% - 99%)    General: no acute distress, mood fair  HEENT: NCAT  Cardio: +S1S2  Resp: Clear bilaterally, good inspirational effort, no R/R/W  Abdomen: +BS. Soft, NT, ND  Extremities: no clubbing, cyanosis, or edema  Neuro: A/O x3; no new focal deficits        RECENT LABS:    LABS:                        10.9   4.70  )-----------( 198      ( 25 Nov 2019 06:25 )             33.7     25 Nov 2019 06:25    139    |  104    |  10     ----------------------------<  111    4.4     |  24     |  0.70     Ca    9.4        25 Nov 2019 06:25 Patient is a 54y old  Male who presents with a chief complaint of ARDS, debility (21 Nov 2019 15:11)      HPI:  54M with severe MDD with prior psych admissions/ECT who was initially admitted to  10/28/19 after presenting with lethargy and hypoxemia. Patient reportedly was dealing with severe depression and expressed SI to his wife. His wife reported he was sleeping throughout the day and in the evening she heard a thump and found him on the floor. At  he became obtunded with hypercapnic respiratory failure and was intubated and admitted to the CCU. Initial imaging showed lower lobe consolidations and he was treated with CTX/Azithromycin. Poison control was contacted and he was received NaHCO3 in the setting of mild QRS widening on ECG and suspected overdose. His Utox returned positive for barbiturates; he reportedly had positive testing for barbiturates in the past thought secondary to OTC supplementations he takes from a [employer] for sleep.    On the morning of admission, he was noted to have a 30 second tonic clonic seizure and received IV benzo and keppra load. Subsequent EEG did not reveal epileptiform discharges. He was extubated in the morning of 10/28 but required BiPAP in the setting of increased oxygenation needs. On 10/29 he was agitated and was started on Precedex. He developed fevers and was broadened to cefepime, later developed fever 102.6 and worsening hypoxemia requiring reintubation. Was started on propofol, precedex and paralyzed but remained difficult to oxygenate with traditional mechanical ventilation requiring frequent bagging and consult for ECMO was placed. He was steroids, nimbex gtt, vancomycin and received Lasix 40mg IVP. The patient was cannulated for VV-ECMO and transferred to [facility] 10/30/19.      After transfer, pt's lung compliance and oxygenation improved and he was decannulated 11/1 and extubated 11/5. He completed a 7 day course of vancomycin and zosyn.    While at [facility], patient had persistent fevers.  Airway colonized with MSSA and enterobacter aerogenes.  Blood Cx negative, Legiionella urine Ag negative, HIV negative. ID consulted and pt was placed on cefepime. Vancomycin was added for persistent fevers, but subsequently d/c'd as without source.  Fevers and leukocytosis then resolved.    Pt passed S&S, diet advanced. Psych following - medications restarted for depression, tolerated seroquel and effexor.  Patient was seen by PM&R and deemed appropriate for acute inpatient rehab. Transferred to [facility] 11/18/19. (18 Nov 2019 16:15)      PAST MEDICAL & SURGICAL HISTORY:  Depression  Hepatitis: &quot;as a child&quot; unsure of type  Left knee pain  No significant past surgical history      MEDICATIONS  (STANDING):  amLODIPine   Tablet 10 milliGRAM(s) Oral daily  ARIPiprazole 5 milliGRAM(s) Oral daily  chlorhexidine 4% Liquid 1 Application(s) Topical daily  clonazePAM  Tablet 0.5 milliGRAM(s) Oral two times a day  clonazePAM  Tablet 2 milliGRAM(s) Oral at bedtime  enoxaparin Injectable 40 milliGRAM(s) SubCutaneous daily  lactobacillus acidophilus 1 Tablet(s) Oral three times a day  metoprolol tartrate 75 milliGRAM(s) Oral two times a day  nystatin Powder 1 Application(s) Topical every 12 hours  psyllium Powder 1 Packet(s) Oral daily  QUEtiapine 200 milliGRAM(s) Oral at bedtime  venlafaxine XR. 150 milliGRAM(s) Oral daily  venlafaxine XR. 75 milliGRAM(s) Oral daily    MEDICATIONS  (PRN):  acetaminophen    Suspension .. 650 milliGRAM(s) Oral every 6 hours PRN Temp greater or equal to 38C (100.4F), Mild Pain (1 - 3), Moderate Pain (4 - 6), Severe Pain (7 - 10)  artificial  tears Solution 1 Drop(s) Both EYES two times a day PRN Dry Eyes  QUEtiapine 50 milliGRAM(s) Oral every 6 hours PRN agitation      Allergies    No Known Allergies    Intolerances        TODAY'S SUBJECTIVE & REVIEW OF SYMPTOMS:  Patient seen and examined with wife.  He continues to have what he reports as poor sleep, but does note that he sleeps about the same amount here as at home (~5hrs).  Wife does offer, however, that she still notices his anxiety.  He has been seen by psychiatry today/yesterday and medications have been adjusted.      OPatient at this time is refusing outpatient therapy for endurance/cardiopulmonary rehab. He does have an appointment with his psychiatrist after discharge. States goes to sleep aroudn 10 PM and wakes up 5 hours later and can't get back to sleep. Denies anxiety or pain as reason for awakening      PHYSICAL EXAM  54y  Vital Signs Last 24 Hrs  T(C): 36.7 (25 Nov 2019 08:14), Max: 36.7 (24 Nov 2019 21:00)  T(F): 98.1 (25 Nov 2019 08:14), Max: 98.1 (25 Nov 2019 08:14)  HR: 88 (25 Nov 2019 08:14) (86 - 93)  BP: 128/87 (25 Nov 2019 08:14) (128/87 - 136/87)  BP(mean): --  RR: 14 (25 Nov 2019 08:14) (14 - 14)  SpO2: 99% (25 Nov 2019 08:14) (98% - 99%)    General: no acute distress, mood fair. seen with wife at bedside    HEENT: NCAT  Cardio: +S1S2  Resp: Clear bilaterally, good inspirational effort, no R/R/W  Abdomen: +BS. Soft, NT, ND  Extremities: no clubbing, cyanosis, or edema  Neuro: A/O x3; no new focal deficits  BUE and LE motor 5/5        RECENT LABS:    LABS:                        10.9   4.70  )-----------( 198      ( 25 Nov 2019 06:25 )             33.7     25 Nov 2019 06:25    139    |  104    |  10     ----------------------------<  111    4.4     |  24     |  0.70     Ca    9.4        25 Nov 2019 06:25

## 2019-11-25 NOTE — PROGRESS NOTE ADULT - SUBJECTIVE AND OBJECTIVE BOX
Psychiatric Consult Follow up note:11/25/19    Identifying Data: 54y    History of Present Illness:  HPI:  54M with severe MDD with prior psych admissions/ECT who was initially admitted to  10/28/19 after presenting with lethargy and hypoxemia. Patient reportedly was dealing with severe depression and expressed SI to his wife. His wife reported he was sleeping throughout the day and in the evening she heard a thump and found him on the floor. At  he became obtunded with hypercapnic respiratory failure and was intubated and admitted to the CCU. Initial imaging showed lower lobe consolidations and he was treated with CTX/Azithromycin. Poison control was contacted and he was received NaHCO3 in the setting of mild QRS widening on ECG and suspected overdose. His Utox returned positive for barbiturates; he reportedly had positive testing for barbiturates in the past thought secondary to OTC supplementations he takes from a [employer] for sleep.    On the morning of admission, he was noted to have a 30 second tonic clonic seizure and received IV benzo and keppra load. Subsequent EEG did not reveal epileptiform discharges. He was extubated in the morning of 10/28 but required BiPAP in the setting of increased oxygenation needs. On 10/29 he was agitated and was started on Precedex. He developed fevers and was broadened to cefepime, later developed fever 102.6 and worsening hypoxemia requiring reintubation. Was started on propofol, precedex and paralyzed but remained difficult to oxygenate with traditional mechanical ventilation requiring frequent bagging and consult for ECMO was placed. He was steroids, nimbex gtt, vancomycin and received Lasix 40mg IVP. The patient was cannulated for VV-ECMO and transferred to [facility] 10/30/19.      After transfer, pt's lung compliance and oxygenation improved and he was decannulated 11/1 and extubated 11/5. He completed a 7 day course of vancomycin and zosyn.    While at [facility], patient had persistent fevers.  Airway colonized with MSSA and enterobacter aerogenes.  Blood Cx negative, Legiionella urine Ag negative, HIV negative. ID consulted and pt was placed on cefepime. Vancomycin was added for persistent fevers, but subsequently d/c'd as without source.  Fevers and leukocytosis then resolved.      Pt passed S&S, diet advanced. Psych following - medications restarted for depression, tolerated seroquel and effexor.  Patient was seen by PM&R and deemed appropriate for acute inpatient rehab. Transferred to [facility] IRF 11/18/19. (18 Nov 2019 16:15)      Chief Complaint/Reason for Consult: Patient seen today alone,. He reported that still sleept poorly , could not fall asleep and was up most of the night.  He denies feeling suicidal , but feeling depressed and looks depressed. ,not eager to go home.    Health Issues:  Chronic pulmonary insufficiency following surgery  No significant family history  Handoff  MEWS Score  Depression  Hepatitis  Left knee pain  No significant past surgical history      Psychiatric Review of Systems: patient slept better with increase seroquel, c/o feeling tired after PT, admits that still depressed, denies any S/I . Ok wit D/C tomorrow, will continue PT at home, able to walk without walker, but unstable     Current medications: acetaminophen    Suspension .. 650 milliGRAM(s) Oral every 6 hours PRN  amLODIPine   Tablet 10 milliGRAM(s) Oral daily  ARIPiprazole 5 milliGRAM(s) Oral daily  artificial  tears Solution 1 Drop(s) Both EYES two times a day PRN  chlorhexidine 4% Liquid 1 Application(s) Topical daily  clonazePAM  Tablet 0.5 milliGRAM(s) Oral two times a day  clonazePAM  Tablet 2 milliGRAM(s) Oral at bedtime  enoxaparin Injectable 40 milliGRAM(s) SubCutaneous daily  lactobacillus acidophilus 1 Tablet(s) Oral three times a day  metoprolol tartrate 75 milliGRAM(s) Oral two times a day  nystatin Powder 1 Application(s) Topical every 12 hours  psyllium Powder 1 Packet(s) Oral daily  QUEtiapine 200 mg QHS    venlafaxine XR. 150 milliGRAM(s) Oral daily  venlafaxine XR. 75 milliGRAM(s) Oral daily      Allergies: No Known Allergies          Current Mental Status Exam:  Appearance:- well groomed, overweight,    Attitude: - cooperative,   Gait/Station: - , per physiatry or PT notes.  Motor Activity: - calm,   Affect: - appropriate,   Mood: - depressed,   Speech: -low voice   Thought process: -intact, .  Thought content/perceptions:   Hallucinations: auditory, visual, tactile, olfactory, not present.  Delusions: not present,  Suicidal ideation: none  Homicidal ideation:  none   Sensorium: alert,   Orientationx3  Attention; fair  Concentration; fair  Memory: improved, but not STM  Insight/Judgment; fair    Assessment and Plan: Will continue  medications: clonopin 0,5 BID and 2mg QHS, Effexor 225 mg , abilify 5 mg,  Seroquel 200 mg qhs,  He has chinmay tomorrow after D/C with DR Cline at 8.30 pm, No need for psychiatric hospitalization.          Other recommendations:  may be D/C when med stable    Level of observation:

## 2019-11-25 NOTE — DISCHARGE NOTE PROVIDER - NSDCACTIVITY_GEN_ALL_CORE
Walking - Indoors allowed/Do not drive or operate machinery/Do not make important decisions/Walking - Outdoors allowed

## 2019-11-25 NOTE — DISCHARGE NOTE PROVIDER - PROVIDER TOKENS
PROVIDER:[TOKEN:[[id number]],FOLLOWUP:[1-3 days],ESTABLISHEDPATIENT:[T]] PROVIDER:[TOKEN:[[id number]],FOLLOWUP:[1-3 days],ESTABLISHEDPATIENT:[T]],PROVIDER:[TOKEN:[[id number]],FOLLOWUP:[1 week]],PROVIDER:[TOKEN:[[id number]],FOLLOWUP:[1 month]],FREE:[LAST:[Chudasama],FIRST:[Edita],PHONE:[phone]],FAX:[(   )    -],ADDRESS:[[address] [place] [address], Geriatrics and Palliative Care Office.],FOLLOWUP:[2 weeks]],PROVIDER:[TOKEN:[[id number]],FOLLOWUP:[2 weeks]] PROVIDER:[TOKEN:[[id number]],FOLLOWUP:[1-3 days],ESTABLISHEDPATIENT:[T]],PROVIDER:[TOKEN:[[id number]],FOLLOWUP:[1 week]],PROVIDER:[TOKEN:[[id number]],FOLLOWUP:[1 month]],PROVIDER:[TOKEN:[[id number]],FOLLOWUP:[2 weeks]]

## 2019-11-25 NOTE — DISCHARGE NOTE PROVIDER - CARE PROVIDER_API CALL
Hugh Cline)  Psychiatry  [address] [id number]  Phone: [phone]  Fax: [phone]  Established Patient  Follow Up Time: 1-3 days Hugh Cline)  Psychiatry  [address] [place] [address]  Phone: [phone]  Fax: [phone]  Established Patient  Follow Up Time: 1-3 days    Tessy Corbin)  Internal Medicine; Pulmonary Disease  [address] [place] [address], [address]  [place] [address]  Phone: [phone]  Fax: [phone]  Follow Up Time: 1 week    Alyse Olmedo)  Brain Injury Medicine; PhysicalRehab Medicine  [address] [place] [address]  Phone: [phone]  Fax: [phone]  Follow Up Time: 1 month    Edita Garcia  [address] [facility], Geriatrics and Palliative Care Office.  Phone: [phone]  Fax: (   )    -  Follow Up Time: 2 weeks    Edita Garcia)  Medicine Palliative Care  [address] [facility], [address]  [place] [address]  Phone: [phone]  Follow Up Time: 2 weeks Hugh Cline)  Psychiatry  [address] [place] [address]  Phone: [phone]  Fax: [phone]  Established Patient  Follow Up Time: 1-3 days    Tessy Corbin)  Internal Medicine; Pulmonary Disease  [address] [place] [address], [address] [place] [address]  Phone: [phone]  Fax: [phone]  Follow Up Time: 1 week    Alyse Olmedo)  Brain Injury Medicine; PhysicalRehab Medicine  [address] [place] [address]  Phone: [phone]  Fax: [phone]  Follow Up Time: 1 month    Edita Garcia)  Medicine Palliative Care  [address] [place] [address], [place] [address]  Phone: [phone]  Follow Up Time: 2 weeks

## 2019-11-25 NOTE — DISCHARGE NOTE PROVIDER - CARE PROVIDERS DIRECT ADDRESSES
,DirectAddress_Unknown ,DirectAddress_Unknown,DirectAddress_Unknown,[email or web address],DirectAddress_Unknown,[email or web address] ,DirectAddress_Unknown,DirectAddress_Unknown,[email or web address]

## 2019-11-25 NOTE — DISCHARGE NOTE PROVIDER - HOSPITAL COURSE
54M with severe MDD with prior psych admissions/ECT who was initially admitted to  10/28/19 after presenting with lethargy and hypoxemia. Patient reportedly was dealing with severe depression and expressed SI to his wife. His wife reported he was sleeping throughout the day and in the evening she heard a thump and found him on the floor. At  he became obtunded with hypercapnic respiratory failure and was intubated and admitted to the CCU. Initial imaging showed lower lobe consolidations and he was treated with CTX/Azithromycin. Poison control was contacted and he was received NaHCO3 in the setting of mild QRS widening on ECG and suspected overdose. His Utox returned positive for barbiturates; he reportedly had positive testing for barbiturates in the past thought secondary to OTC supplementations he takes from a [employer] for sleep.        On the morning of admission, he was noted to have a 30 second tonic clonic seizure and received IV benzo and keppra load. Subsequent EEG did not reveal epileptiform discharges. He was extubated in the morning of 10/28 but required BiPAP in the setting of increased oxygenation needs. On 10/29 he was agitated and was started on Precedex. He developed fevers and was broadened to cefepime, later developed fever 102.6 and worsening hypoxemia requiring reintubation. Was started on propofol, precedex and paralyzed but remained difficult to oxygenate with traditional mechanical ventilation requiring frequent bagging and consult for ECMO was placed. He was steroids, nimbex gtt, vancomycin and received Lasix 40mg IVP. The patient was cannulated for VV-ECMO and transferred to [facility] 10/30/19.          After transfer, pt's lung compliance and oxygenation improved and he was decannulated 11/1 and extubated 11/5. He completed a 7 day course of vancomycin and zosyn.        While at [facility], patient had persistent fevers.  Airway colonized with MSSA and enterobacter aerogenes.  Blood Cx negative, Legiionella urine Ag negative, HIV negative. ID consulted and pt was placed on cefepime. Vancomycin was added for persistent fevers, but subsequently d/c'd as without source.  Fevers and leukocytosis then resolved.          Pt passed S&S, diet advanced. Psych following - medications restarted for depression, tolerated seroquel and effexor.  Patient was seen by PM&R and deemed appropriate for acute inpatient rehab. Transferred to [facility] IRF 11/18/19.        Patient participated in daily therapies and made good functional gains.  Rehab course notable for persistent anxiety/depression.  Patient was seen by psychiatry, Dr. Wilson, and medications were uptitrated.  Effexor was increased to patient's home dose of 225mg qD, seroquel was gradually increased to 200 mg qhs (though not to patient's home dose of 400mg qhs).  Klonopin was also increased to 2mg qhs and 0.5mg BID.  Patient was started on abilify, 5mg qD.  EKG noted QTc of 490 on 11/25/19.  Patient had an appointment with his outpatient psychiatrist, Dr. Cline, at 2030 on the day of discharge, 11/26/19.          Patient seen and examined on day of discharge.  Medications, medication side effects, and discharge instructions were reviewed with the patient, who expressed understanding of all information.  Patient was medically and functionally optimized and cleared for discharge. 54M with severe MDD with prior psych admissions/ECT who was initially admitted to  10/28/19 after presenting with lethargy and hypoxemia. Patient reportedly was dealing with severe depression and expressed SI to his wife. His wife reported he was sleeping throughout the day and in the evening she heard a thump and found him on the floor. At  he became obtunded with hypercapnic respiratory failure and was intubated and admitted to the CCU. Initial imaging showed lower lobe consolidations and he was treated with CTX/Azithromycin. Poison control was contacted and he was received NaHCO3 in the setting of mild QRS widening on ECG and suspected overdose. His Utox returned positive for barbiturates; he reportedly had positive testing for barbiturates in the past thought secondary to OTC supplementations he takes from a [employer] for sleep.        On the morning of admission, he was noted to have a 30 second tonic clonic seizure and received IV benzo and keppra load. Subsequent EEG did not reveal epileptiform discharges. He was extubated in the morning of 10/28 but required BiPAP in the setting of increased oxygenation needs. On 10/29 he was agitated and was started on Precedex. He developed fevers and was broadened to cefepime, later developed fever 102.6 and worsening hypoxemia requiring reintubation. Was started on propofol, precedex and paralyzed but remained difficult to oxygenate with traditional mechanical ventilation requiring frequent bagging and consult for ECMO was placed. He was steroids, nimbex gtt, vancomycin and received Lasix 40mg IVP. The patient was cannulated for VV-ECMO and transferred to [facility] 10/30/19.          After transfer, pt's lung compliance and oxygenation improved and he was decannulated 11/1 and extubated 11/5. He completed a 7 day course of vancomycin and zosyn.        While at [facility], patient had persistent fevers.  Airway colonized with MSSA and enterobacter aerogenes.  Blood Cx negative, Legiionella urine Ag negative, HIV negative. ID consulted and pt was placed on cefepime. Vancomycin was added for persistent fevers, but subsequently d/c'd as without source.  Fevers and leukocytosis then resolved.          Pt passed S&S, diet advanced. Psych following - medications restarted for depression, tolerated seroquel and effexor.  Patient was seen by PM&R and deemed appropriate for acute inpatient rehab. Transferred to [facility] IRF 11/18/19.        Patient participated in daily therapies and made good functional gains.  Rehab course notable for persistent anxiety/depression.  Patient was seen by psychiatry, Dr. Wilson, and medications were uptitrated.  Effexor was increased to patient's home dose of 225mg qD, seroquel was gradually increased to 200 mg qhs (though not to patient's home dose of 400mg qhs).  Klonopin was also increased to 2mg qhs and 0.5mg BID.  Patient was started on abilify, 5mg qD.  EKG noted QTc of 490 on 11/25/19.  Patient had an appointment with his outpatient psychiatrist, Dr. Cline, at 2030 on the day of discharge, 11/26/19.  Rehab physician was in contact with Dr. Cline on day of discharge.  Per that discussion, patient will receive 1 week of psychiatric medications from rehab.  He will receive subsequent medications from Dr. Cline.  He will receive 1 month of BP meds from rehab and will follow up with his primary care provider/pulmonologist for medication refills.          Patient seen and examined on day of discharge.  Medications, medication side effects, and discharge instructions were reviewed with the patient and his wife, who expressed understanding of all information.  Patient was medically and functionally optimized and cleared for discharge. 54M with severe MDD with prior psych admissions/ECT who was initially admitted to  10/28/19 after presenting with lethargy and hypoxemia. Patient reportedly was dealing with severe depression and expressed SI to his wife. His wife reported he was sleeping throughout the day and in the evening she heard a thump and found him on the floor. At  he became obtunded with hypercapnic respiratory failure and was intubated and admitted to the CCU. Initial imaging showed lower lobe consolidations and he was treated with CTX/Azithromycin. Poison control was contacted and he was received NaHCO3 in the setting of mild QRS widening on ECG and suspected overdose. His Utox returned positive for barbiturates; he reportedly had positive testing for barbiturates in the past thought secondary to OTC supplementations he takes from a [employer] for sleep.        On the morning of admission, he was noted to have a 30 second tonic clonic seizure and received IV benzo and keppra load. Subsequent EEG did not reveal epileptiform discharges. He was extubated in the morning of 10/28 but required BiPAP in the setting of increased oxygenation needs. On 10/29 he was agitated and was started on Precedex. He developed fevers and was broadened to cefepime, later developed fever 102.6 and worsening hypoxemia requiring reintubation. Was started on propofol, precedex and paralyzed but remained difficult to oxygenate with traditional mechanical ventilation requiring frequent bagging and consult for ECMO was placed. He was steroids, nimbex gtt, vancomycin and received Lasix 40mg IVP. The patient was cannulated for VV-ECMO and transferred to [facility] 10/30/19.          After transfer, pt's lung compliance and oxygenation improved and he was decannulated 11/1 and extubated 11/5. He completed a 7 day course of vancomycin and zosyn.        While at [facility], patient had persistent fevers.  Airway colonized with MSSA and enterobacter aerogenes.  Blood Cx negative, Legiionella urine Ag negative, HIV negative. ID consulted and pt was placed on cefepime. Vancomycin was added for persistent fevers, but subsequently d/c'd as without source.  Fevers and leukocytosis then resolved.          Pt passed S&S, diet advanced. Psych following - medications restarted for depression, tolerated seroquel and effexor.  Patient was seen by PM&R and deemed appropriate for acute inpatient rehab. Transferred to [facility] IRF 11/18/19.        Patient participated in daily therapies and made good functional gains.  Rehab course notable for persistent anxiety/depression.  Patient was seen by psychiatry, Dr. Wilson, and medications were uptitrated.  Effexor was increased to patient's home dose of 225mg qD, seroquel was gradually increased to 200 mg qhs (though not to patient's home dose of 400mg qhs).  Klonopin was also increased to 2mg qhs and 0.5mg BID.  Patient was started on abilify, 5mg qD.  EKG noted QTc of 490 on 11/25/19.  Patient had an appointment with his outpatient psychiatrist, Dr. Cline, at 2030 on the day of discharge, 11/26/19.  Rehab physician was in contact with Dr. Cline on day of discharge.  Per that discussion, patient will receive 1 week of psychiatric medications from rehab.  However, patient will take his remaining home klonopin 1mg tablets (0.5 tablets BID; 2 tablets at bedtime).  This was discussed with wife and patient, who expressed understanding and agreement with plan.  Patient will receive subsequent medications from Dr. Cline.  He will receive 1 month of BP meds from rehab and will follow up with his primary care provider/pulmonologist for medication refills.          Patient seen and examined on day of discharge.  Medications, medication side effects, and discharge instructions were reviewed with the patient and his wife, who expressed understanding of all information.  Patient was medically and functionally optimized and cleared for discharge.

## 2019-11-25 NOTE — PROGRESS NOTE ADULT - ASSESSMENT
ASSESSMENT/PLAN  EBONI CARRASCO is a 53yo Male with hx of MDD recently admitted to [facility] with ARDS requiring ECMO, now with decreased functional mobility, gait instability and ADL impairments.    COMORBIDITES/ACTIVE MEDICAL ISSUES     #Debility  -continue Comprehensive Rehab Program of PT/OT  -neuropsychology consult for MDD , supportive counseling, caregiver support and education  -psychiatry as below. Read and appreciated    #ARDS requiring ECMO  - therapies as above  - continue chest PT/PD, breathing exercises  - incentive spirometer   Per palliative care consult at [facility]:  Given patient was on ECMO he will need Follow up with Dr. Edita Garcia at the [address] [place] [address], Geriatrics and Palliative Care Office.  Please schedule an appointment for him at [phone] upon discharge if  patient is being transferred to rehab facility please include in d/c summary.      #MDD  - pt has hx of psych hospitalizations for SI.  Initial U-tox during admission positive for barbiturates.   - continue effexor, seroquel  -psychology consult  -psychiatry consult read and appreciated . Pt being followed by Dr. Wilson, who continues to adjust medication. Pt has f/u with Dr. Cline (outpt psych) 11/26 at 8:30pm.   -increased melatonin to 6mg qhs on 11/22  -11/25 QTc 490  -SW consult and possible referral on dc for substance abuse services  -per pt's pharmacy ([facility]): home seroquel dose is 400mg qhs    #HTN  - continue metoprolol, amlodipine  - had 1 episode of HTN 11/21; monitor     #Constipation  - continue metamucil    #Pain Mgmt   - Tylenol PRN    #- DVT PPX:   Lovenox, SCDs, TEDs     #hypokalemia  -K 4.4 11/22. Resolved.     #Case discussed in IDT rounds 11/20:  patient currently requires CG grooming and bathing, supervision bADLs. Decreased safety, impulsive. Ambualtes 100 feet with RW but fatigues and reduced endurance, HR up to 130  -continue program, target dc home with caregiver support 11/26/19 with outpatient cardiopulmonary rehab program  -caregiver education    LABS:  CBC, BMP mondays and thursdays ASSESSMENT/PLAN  EBONI CARRASCO is a 55yo Male with hx of MDD recently admitted to [facility] with ARDS requiring ECMO, now with decreased functional mobility, gait instability and ADL impairments.    #Debility  -continue Comprehensive Rehab Program of PT/OT  -neuropsychology consult for MDD , supportive counseling, caregiver support and education  -psychiatry as below. Read and appreciated    #ARDS requiring ECMO  - therapies as above  - continue chest PT/PD, breathing exercises  - incentive spirometer   Per palliative care consult at [facility]:  Given patient was on ECMO he will need Follow up with Dr. Edita Garcia at the [address] [place] [address], Geriatrics and Palliative Care Office.  Please schedule an appointment for him at [phone] upon discharge if  patient is being transferred to rehab facility please include in d/c summary.      #MDD  - pt has hx of psych hospitalizations for SI.  Initial U-tox during admission positive for barbiturates.   - continue effexor, seroquel  -psychology consult  -psychiatry consult read and appreciated . Pt being followed by Dr. Wilson, who continues to adjust medication. Pt has f/u with Dr. Cline (outpt psych) 11/26 at 8:30pm.   -increased melatonin to 6mg qhs on 11/22  -11/25 QTc 490  -SW consult and possible referral on dc for substance abuse services  -per pt's pharmacy ([facility]): home seroquel dose is 400mg qhs    #HTN  - continue metoprolol, amlodipine  -(128/87 - 136/87) controlled 11/25    #Constipation  - continue metamucil    #Pain Mgmt   - Tylenol PRN    #- DVT PPX:   Lovenox, SCDs, TEDs     #hypokalemia  -K 4.4 11/22. Resolved.     #Case discussed in IDT rounds 11/20:  patient currently requires CG grooming and bathing, supervision bADLs. Decreased safety, impulsive. Ambualtes 100 feet with RW but fatigues and reduced endurance, HR up to 130  -continue program, target dc home with caregiver support 11/26/19 with outpatient cardiopulmonary rehab program  -caregiver education    LABS:  family to bring in medical disability forms

## 2019-11-25 NOTE — DISCHARGE NOTE PROVIDER - INSTRUCTIONS
Please resume your regular diet.  You should consume a diet that is high in vegetables, fruits, and whole grains.  Limit the amount of sugar and saturated fats you eat.  Drink plenty of fluids and avoid sugary beverages.

## 2019-11-25 NOTE — CHART NOTE - NSCHARTNOTEFT_GEN_A_CORE
Nutrition Follow Up Note  Hospital Course   (Per Electronic Medical Record):     Source:  Wife [X]  Patient [X]  Medical Record [X]      Diet:   Regular Diet w/ Thin Liquids  Tolerates Diet Well  No Chewing/Swallowing Difficulties  No Recent Nausea, Vomiting, Diarrhea or Constipation   Consumes % of Meals (as Per Documentation)     Enteral/Parenteral Nutrition: Not Applicable    Current Weight: 269.1lb on 11/18  Obtain New Weight  Obtain Weights Weekly     Pertinent Medications: MEDICATIONS  (STANDING):  amLODIPine   Tablet 10 milliGRAM(s) Oral daily  ARIPiprazole 5 milliGRAM(s) Oral daily  chlorhexidine 4% Liquid 1 Application(s) Topical daily  clonazePAM  Tablet 0.5 milliGRAM(s) Oral two times a day  clonazePAM  Tablet 2 milliGRAM(s) Oral at bedtime  enoxaparin Injectable 40 milliGRAM(s) SubCutaneous daily  lactobacillus acidophilus 1 Tablet(s) Oral three times a day  metoprolol tartrate 75 milliGRAM(s) Oral two times a day  nystatin Powder 1 Application(s) Topical every 12 hours  psyllium Powder 1 Packet(s) Oral daily  QUEtiapine 200 milliGRAM(s) Oral at bedtime  venlafaxine XR. 150 milliGRAM(s) Oral daily  venlafaxine XR. 75 milliGRAM(s) Oral daily    MEDICATIONS  (PRN):  acetaminophen    Suspension .. 650 milliGRAM(s) Oral every 6 hours PRN Temp greater or equal to 38C (100.4F), Mild Pain (1 - 3), Moderate Pain (4 - 6), Severe Pain (7 - 10)  artificial  tears Solution 1 Drop(s) Both EYES two times a day PRN Dry Eyes  QUEtiapine 50 milliGRAM(s) Oral every 6 hours PRN agitation    Pertinent Labs:  11-25 Na139 mmol/L Glu 111 mg/dL<H> K+ 4.4 mmol/L Cr  0.70 mg/dL BUN 10 mg/dL 11-19 Alb 3.4 g/dL 10-29 [id number] 5.2 %    Skin: No Pressure Ulcers   Surgical Incision on Right Neck  (as Per Nursing Flow Sheet)     Edema: None Noted     Last Bowel Movement: on 11/21    Estimated Needs:   [X] No Change Since Previous Assessment    Previous Nutrition Diagnosis:   Obese    Nutrition Diagnosis is [X] Not Applicable     New Nutrition Diagnosis: [X] Not Applicable    Interventions:   1. Recommend Continue Nutrition Plan of Care     Monitoring & Evaluation:   [X] Weights   [X] PO Intake   [X] Skin Integrity   [X] Follow Up (Per Protocol)  [X] Tolerance to Diet Prescription   [X] Other: Labs     Registered Dietitian/Nutritionist Remains Available.  Young Munroe RDN    Pager # [phone]  Phone# [phone]

## 2019-11-25 NOTE — DISCHARGE NOTE PROVIDER - NSDCMRMEDTOKEN_GEN_ALL_CORE_FT
amLODIPine 10 mg oral tablet: 1 tab(s) orally once a day  enoxaparin: 60 milligram(s) subcutaneous once a day  haloperidol: 2.5 milligram(s) intravenous every 6 hours, As Needed  lactobacillus acidophilus oral capsule:  orally   metoprolol tartrate 75 mg oral tablet: 1 tab(s) orally 2 times a day  nystatin 100,000 units/g topical powder: 1 application topically every 12 hours  QUEtiapine 25 mg oral tablet: 3 tab(s) orally   QUEtiapine 50 mg oral tablet: 1 tab(s) orally every 6 hours, As needed, agitation  venlafaxine 37.5 mg oral tablet: 1 tab(s) orally once a day amLODIPine 10 mg oral tablet: 1 tab(s) orally once a day  metoprolol tartrate 75 mg oral tablet: 1 tab(s) orally 2 times a day  QUEtiapine 200 mg oral tablet: 1 tab(s) orally once a day (at bedtime)  QUEtiapine 25 mg oral tablet: 3 tab(s) orally   QUEtiapine 50 mg oral tablet: 1 tab(s) orally every 6 hours, As needed, agitation  venlafaxine 150 mg oral capsule, extended release: 1 cap(s) orally once a day  venlafaxine 75 mg oral capsule, extended release: 1 cap(s) orally once a day amLODIPine 10 mg oral tablet: 1 tab(s) orally once a day  ARIPiprazole 5 mg oral tablet: 1 tab(s) orally once a day  metoprolol tartrate 75 mg oral tablet: 1 tab(s) orally 2 times a day  QUEtiapine 200 mg oral tablet: 1 tab(s) orally once a day (at bedtime)  venlafaxine 150 mg oral capsule, extended release: 1 cap(s) orally once a day  venlafaxine 75 mg oral capsule, extended release: 1 cap(s) orally once a day

## 2019-11-25 NOTE — DISCHARGE NOTE PROVIDER - NSDCCPCAREPLAN_GEN_ALL_CORE_FT
PRINCIPAL DISCHARGE DIAGNOSIS  Diagnosis: History of ARDS  Assessment and Plan of Treatment: You were admitted to rehab because of debility caused by Acute Respiratory Distress Syndrome.  Beause your lungs were not functioning, you were placed on extracorporeal membrane oxygenation (ECMO) at [facility].  This led to your weakness and poor endurance.  Please continue medications as instructed and follow up with all physicians listed in this document (your primary care provider, Dr. Cline, Dr. Corbin, and the palliative care doctor who saw you at [facility]).  If you have trouble breathing, please seek immediate medical attention.      SECONDARY DISCHARGE DIAGNOSES  Diagnosis: Depression  Assessment and Plan of Treatment: Continue medications as instructed. Follow up with Dr. Cline on 11/26/19 at 8:30pm.    Diagnosis: Anxiety  Assessment and Plan of Treatment: Continue medications as instructed. Follow up with Dr. Cline on 11/26/19 at 8:30pm.

## 2019-11-26 ENCOUNTER — TRANSCRIPTION ENCOUNTER (OUTPATIENT)
Age: 54
End: 2019-11-26

## 2019-11-26 VITALS
DIASTOLIC BLOOD PRESSURE: 84 MMHG | HEART RATE: 75 BPM | RESPIRATION RATE: 16 BRPM | OXYGEN SATURATION: 97 % | TEMPERATURE: 98 F | SYSTOLIC BLOOD PRESSURE: 124 MMHG

## 2019-11-26 PROCEDURE — 80048 BASIC METABOLIC PNL TOTAL CA: CPT

## 2019-11-26 PROCEDURE — 92523 SPEECH SOUND LANG COMPREHEN: CPT

## 2019-11-26 PROCEDURE — 97535 SELF CARE MNGMENT TRAINING: CPT

## 2019-11-26 PROCEDURE — 93005 ELECTROCARDIOGRAM TRACING: CPT

## 2019-11-26 PROCEDURE — 97163 PT EVAL HIGH COMPLEX 45 MIN: CPT

## 2019-11-26 PROCEDURE — 97112 NEUROMUSCULAR REEDUCATION: CPT

## 2019-11-26 PROCEDURE — 97110 THERAPEUTIC EXERCISES: CPT

## 2019-11-26 PROCEDURE — 97530 THERAPEUTIC ACTIVITIES: CPT

## 2019-11-26 PROCEDURE — 97167 OT EVAL HIGH COMPLEX 60 MIN: CPT

## 2019-11-26 PROCEDURE — 83735 ASSAY OF MAGNESIUM: CPT

## 2019-11-26 PROCEDURE — 99238 HOSP IP/OBS DSCHRG MGMT 30/<: CPT

## 2019-11-26 PROCEDURE — 92507 TX SP LANG VOICE COMM INDIV: CPT

## 2019-11-26 PROCEDURE — 80053 COMPREHEN METABOLIC PANEL: CPT

## 2019-11-26 PROCEDURE — 97116 GAIT TRAINING THERAPY: CPT

## 2019-11-26 PROCEDURE — 85027 COMPLETE CBC AUTOMATED: CPT

## 2019-11-26 RX ORDER — CLONAZEPAM 1 MG
1 TABLET ORAL
Qty: 7 | Refills: 0
Start: 2019-11-26 | End: 2019-12-02

## 2019-11-26 RX ORDER — ARIPIPRAZOLE 15 MG/1
1 TABLET ORAL
Qty: 7 | Refills: 0
Start: 2019-11-26 | End: 2019-12-02

## 2019-11-26 RX ORDER — METOPROLOL TARTRATE 50 MG
1 TABLET ORAL
Qty: 60 | Refills: 0
Start: 2019-11-26 | End: 2019-12-25

## 2019-11-26 RX ORDER — CLONAZEPAM 1 MG
1 TABLET ORAL
Qty: 14 | Refills: 0
Start: 2019-11-26 | End: 2019-12-02

## 2019-11-26 RX ORDER — VENLAFAXINE HCL 75 MG
1 CAPSULE, EXT RELEASE 24 HR ORAL
Qty: 7 | Refills: 0
Start: 2019-11-26 | End: 2019-12-02

## 2019-11-26 RX ORDER — QUETIAPINE FUMARATE 200 MG/1
1 TABLET, FILM COATED ORAL
Qty: 7 | Refills: 0
Start: 2019-11-26 | End: 2019-12-02

## 2019-11-26 RX ORDER — AMLODIPINE BESYLATE 2.5 MG/1
1 TABLET ORAL
Qty: 30 | Refills: 0
Start: 2019-11-26 | End: 2019-12-25

## 2019-11-26 RX ADMIN — Medication 75 MILLIGRAM(S): at 11:14

## 2019-11-26 RX ADMIN — NYSTATIN CREAM 1 APPLICATION(S): 100000 CREAM TOPICAL at 05:48

## 2019-11-26 RX ADMIN — AMLODIPINE BESYLATE 10 MILLIGRAM(S): 2.5 TABLET ORAL at 05:46

## 2019-11-26 RX ADMIN — ARIPIPRAZOLE 5 MILLIGRAM(S): 15 TABLET ORAL at 11:14

## 2019-11-26 RX ADMIN — Medication 1 TABLET(S): at 13:17

## 2019-11-26 RX ADMIN — Medication 0.5 MILLIGRAM(S): at 05:46

## 2019-11-26 RX ADMIN — Medication 150 MILLIGRAM(S): at 11:14

## 2019-11-26 RX ADMIN — Medication 1 TABLET(S): at 05:46

## 2019-11-26 RX ADMIN — CHLORHEXIDINE GLUCONATE 1 APPLICATION(S): 213 SOLUTION TOPICAL at 11:15

## 2019-11-26 RX ADMIN — Medication 75 MILLIGRAM(S): at 05:47

## 2019-11-26 NOTE — DISCHARGE NOTE NURSING/CASE MANAGEMENT/SOCIAL WORK - PATIENT PORTAL LINK FT
You can access the FollowMyHealth Patient Portal offered by [facility] by registering at the following website: [email or web address]. By joining [facility]’s FollowMyHealth portal, you will also be able to view your health information using other applications (apps) compatible with our system.

## 2019-11-26 NOTE — PROGRESS NOTE ADULT - REASON FOR ADMISSION
ARDS, debility

## 2019-11-26 NOTE — PROGRESS NOTE ADULT - ASSESSMENT
ASSESSMENT/PLAN  EBONI CARRASCO is a 53yo Male with hx of MDD recently admitted to [facility] with ARDS requiring ECMO, now with decreased functional mobility, gait instability and ADL impairments.    #Debility  -continue Comprehensive Rehab Program of PT/OT  -neuropsychology consult for MDD , supportive counseling, caregiver support and education  -psychiatry as below. Read and appreciated    #ARDS requiring ECMO  - therapies as above  - continue chest PT/PD, breathing exercises  - incentive spirometer   Per palliative care consult at [facility]:  Given patient was on ECMO he will need Follow up with Dr. Edita Garcia at the [address] [place] [address], Geriatrics and Palliative Care Office.  Please schedule an appointment for him at [phone] upon discharge if  patient is being transferred to rehab facility please include in d/c summary.      #MDD  - pt has hx of psych hospitalizations for SI.  Initial U-tox during admission positive for barbiturates.   - continue effexor, seroquel  -psychology consult  -psychiatry consult read and appreciated . Pt being followed by Dr. Wilson, who continues to adjust medication. Pt has f/u with Dr. Cline (outpt psych) 11/26 at 8:30pm.   -increased melatonin to 6mg qhs on 11/22  -11/25 QTc 490  -SW consult and possible referral on dc for substance abuse services  -per pt's pharmacy ([facility]): home seroquel dose is 400mg qhs    #HTN  - continue metoprolol, amlodipine  -controlled    #Constipation  - continue metamucil    #Pain Mgmt   - Tylenol PRN    #- DVT PPX:   Lovenox, SCDs, TEDs     #hypokalemia  -K 4.4 11/22. Resolved.     #Case discussed in IDT rounds 11/20:  patient currently requires CG grooming and bathing, supervision bADLs. Decreased safety, impulsive. Ambualtes 100 feet with RW but fatigues and reduced endurance, HR up to 130  -continue program, target dc home with caregiver support 11/26/19 with outpatient cardiopulmonary rehab program  -caregiver education    LABS:  family to bring in medical disability forms ASSESSMENT/PLAN  EBONI CARRASCO is a 55yo Male with hx of MDD recently admitted to [facility] with ARDS requiring ECMO, now with decreased functional mobility, gait instability and ADL impairments.      #ARDS requiring ECMO  - breathing exercises, incentive spirometer  -pateitn refuses cardiopulmonary rehab on dc  Per palliative care consult at [facility]:  Given patient was on ECMO he will need Follow up with Dr. Edita Garcia at the [address] [place] [address], Geriatrics and Palliative Care Office.  Please schedule an appointment for him at [phone] upon discharge if  patient is being transferred to rehab facility please include in d/c summary.      #MDD  - pt has hx of psych hospitalizations for SI.  Initial U-tox during admission positive for barbiturates.   - continue effexor, seroquel  -EKg reviewed with patient and wife  -psychiatry consult read and appreciated . Pt being followed by Dr. Wilson, who continues to adjust medication. Pt has f/u with Dr. Cline (outpt psych) 11/26 at 8:30pm.   -increased melatonin to 6mg qhs on 11/22      #HTN  - continue metoprolol, amlodipine  -PCP f/u on dc      Patient is medically cleared for discharge. Medications, f/u reviewed with patient and family who are also in agreement

## 2019-11-26 NOTE — PROGRESS NOTE ADULT - SUBJECTIVE AND OBJECTIVE BOX
Patient is a 54y old  Male who presents with a chief complaint of ARDS, debility (21 Nov 2019 15:11)      HPI:  54M with severe MDD with prior psych admissions/ECT who was initially admitted to  10/28/19 after presenting with lethargy and hypoxemia. Patient reportedly was dealing with severe depression and expressed SI to his wife. His wife reported he was sleeping throughout the day and in the evening she heard a thump and found him on the floor. At  he became obtunded with hypercapnic respiratory failure and was intubated and admitted to the CCU. Initial imaging showed lower lobe consolidations and he was treated with CTX/Azithromycin. Poison control was contacted and he was received NaHCO3 in the setting of mild QRS widening on ECG and suspected overdose. His Utox returned positive for barbiturates; he reportedly had positive testing for barbiturates in the past thought secondary to OTC supplementations he takes from a [employer] for sleep.    On the morning of admission, he was noted to have a 30 second tonic clonic seizure and received IV benzo and keppra load. Subsequent EEG did not reveal epileptiform discharges. He was extubated in the morning of 10/28 but required BiPAP in the setting of increased oxygenation needs. On 10/29 he was agitated and was started on Precedex. He developed fevers and was broadened to cefepime, later developed fever 102.6 and worsening hypoxemia requiring reintubation. Was started on propofol, precedex and paralyzed but remained difficult to oxygenate with traditional mechanical ventilation requiring frequent bagging and consult for ECMO was placed. He was steroids, nimbex gtt, vancomycin and received Lasix 40mg IVP. The patient was cannulated for VV-ECMO and transferred to [facility] 10/30/19.      After transfer, pt's lung compliance and oxygenation improved and he was decannulated 11/1 and extubated 11/5. He completed a 7 day course of vancomycin and zosyn.    While at [facility], patient had persistent fevers.  Airway colonized with MSSA and enterobacter aerogenes.  Blood Cx negative, Legiionella urine Ag negative, HIV negative. ID consulted and pt was placed on cefepime. Vancomycin was added for persistent fevers, but subsequently d/c'd as without source.  Fevers and leukocytosis then resolved.    Pt passed S&S, diet advanced. Psych following - medications restarted for depression, tolerated seroquel and effexor.  Patient was seen by PM&R and deemed appropriate for acute inpatient rehab. Transferred to [facility]F 11/18/19. (18 Nov 2019 16:15)      PAST MEDICAL & SURGICAL HISTORY:  Depression  Hepatitis: &quot;as a child&quot; unsure of type  Left knee pain  No significant past surgical history      MEDICATIONS  (STANDING):  amLODIPine   Tablet 10 milliGRAM(s) Oral daily  ARIPiprazole 5 milliGRAM(s) Oral daily  chlorhexidine 4% Liquid 1 Application(s) Topical daily  clonazePAM  Tablet 0.5 milliGRAM(s) Oral two times a day  clonazePAM  Tablet 2 milliGRAM(s) Oral at bedtime  enoxaparin Injectable 40 milliGRAM(s) SubCutaneous daily  lactobacillus acidophilus 1 Tablet(s) Oral three times a day  metoprolol tartrate 75 milliGRAM(s) Oral two times a day  nystatin Powder 1 Application(s) Topical every 12 hours  psyllium Powder 1 Packet(s) Oral daily  QUEtiapine 200 milliGRAM(s) Oral at bedtime  venlafaxine XR. 150 milliGRAM(s) Oral daily  venlafaxine XR. 75 milliGRAM(s) Oral daily    MEDICATIONS  (PRN):  acetaminophen    Suspension .. 650 milliGRAM(s) Oral every 6 hours PRN Temp greater or equal to 38C (100.4F), Mild Pain (1 - 3), Moderate Pain (4 - 6), Severe Pain (7 - 10)  artificial  tears Solution 1 Drop(s) Both EYES two times a day PRN Dry Eyes  QUEtiapine 50 milliGRAM(s) Oral every 6 hours PRN agitation      Allergies    No Known Allergies    Intolerances        TODAY'S SUBJECTIVE & REVIEW OF SYMPTOMS:  Patient seen and examined.  No acute events overnight; no new complaints.  Wife is at bedside.  Discussed discharge plan with patient and wife; they express understanding and agreement.  Pt is to see Dr. Marlyn rosales at 8:30pm.    OPatient at this time is refusing outpatient therapy for endurance/cardiopulmonary rehab. He does have an appointment with his psychiatrist after discharge. States goes to sleep aroudn 10 PM and wakes up 5 hours later and can't get back to sleep. Denies anxiety or pain as reason for awakening      PHYSICAL EXAM  54y  Vital Signs Last 24 Hrs  T(C): 36.8 (26 Nov 2019 07:46), Max: 36.9 (25 Nov 2019 19:48)  T(F): 98.3 (26 Nov 2019 07:46), Max: 98.5 (25 Nov 2019 19:48)  HR: 75 (26 Nov 2019 07:46) (75 - 102)  BP: 124/84 (26 Nov 2019 07:46) (124/84 - 146/91)  RR: 16 (26 Nov 2019 07:46) (16 - 16)  SpO2: 97% (26 Nov 2019 07:46) (97% - 98%)    General: no acute distress, mood fair. seen with wife at bedside    HEENT: NCAT  Cardio: +S1S2  Resp: Clear bilaterally, good inspirational effort, no R/R/W  Abdomen: +BS. Soft, NT, ND  Extremities: no clubbing, cyanosis, or edema  Neuro: A/O x3; no new focal deficits  BUE and LE motor 5/5        RECENT LABS:    LABS:                        10.9   4.70  )-----------( 198      ( 25 Nov 2019 06:25 )             33.7     25 Nov 2019 06:25    139    |  104    |  10     ----------------------------<  111    4.4     |  24     |  0.70     Ca    9.4        25 Nov 2019 06:25 Patient is a 54y old  Male who presents with a chief complaint of ARDS, debility (21 Nov 2019 15:11)      HPI:  54M with severe MDD with prior psych admissions/ECT who was initially admitted to  10/28/19 after presenting with lethargy and hypoxemia. Patient reportedly was dealing with severe depression and expressed SI to his wife. His wife reported he was sleeping throughout the day and in the evening she heard a thump and found him on the floor. At  he became obtunded with hypercapnic respiratory failure and was intubated and admitted to the CCU. Initial imaging showed lower lobe consolidations and he was treated with CTX/Azithromycin. Poison control was contacted and he was received NaHCO3 in the setting of mild QRS widening on ECG and suspected overdose. His Utox returned positive for barbiturates; he reportedly had positive testing for barbiturates in the past thought secondary to OTC supplementations he takes from a [employer] for sleep.    On the morning of admission, he was noted to have a 30 second tonic clonic seizure and received IV benzo and keppra load. Subsequent EEG did not reveal epileptiform discharges. He was extubated in the morning of 10/28 but required BiPAP in the setting of increased oxygenation needs. On 10/29 he was agitated and was started on Precedex. He developed fevers and was broadened to cefepime, later developed fever 102.6 and worsening hypoxemia requiring reintubation. Was started on propofol, precedex and paralyzed but remained difficult to oxygenate with traditional mechanical ventilation requiring frequent bagging and consult for ECMO was placed. He was steroids, nimbex gtt, vancomycin and received Lasix 40mg IVP. The patient was cannulated for VV-ECMO and transferred to [facility] 10/30/19.      After transfer, pt's lung compliance and oxygenation improved and he was decannulated 11/1 and extubated 11/5. He completed a 7 day course of vancomycin and zosyn.    While at [facility], patient had persistent fevers.  Airway colonized with MSSA and enterobacter aerogenes.  Blood Cx negative, Legiionella urine Ag negative, HIV negative. ID consulted and pt was placed on cefepime. Vancomycin was added for persistent fevers, but subsequently d/c'd as without source.  Fevers and leukocytosis then resolved.    Pt passed S&S, diet advanced. Psych following - medications restarted for depression, tolerated seroquel and effexor.  Patient was seen by PM&R and deemed appropriate for acute inpatient rehab. Transferred to [facility] IRF 11/18/19. (18 Nov 2019 16:15)      PAST MEDICAL & SURGICAL HISTORY:  Depression  Hepatitis: &quot;as a child&quot; unsure of type  Left knee pain  No significant past surgical history      MEDICATIONS  (STANDING):  amLODIPine   Tablet 10 milliGRAM(s) Oral daily  ARIPiprazole 5 milliGRAM(s) Oral daily  chlorhexidine 4% Liquid 1 Application(s) Topical daily  clonazePAM  Tablet 0.5 milliGRAM(s) Oral two times a day  clonazePAM  Tablet 2 milliGRAM(s) Oral at bedtime  enoxaparin Injectable 40 milliGRAM(s) SubCutaneous daily  lactobacillus acidophilus 1 Tablet(s) Oral three times a day  metoprolol tartrate 75 milliGRAM(s) Oral two times a day  nystatin Powder 1 Application(s) Topical every 12 hours  psyllium Powder 1 Packet(s) Oral daily  QUEtiapine 200 milliGRAM(s) Oral at bedtime  venlafaxine XR. 150 milliGRAM(s) Oral daily  venlafaxine XR. 75 milliGRAM(s) Oral daily    MEDICATIONS  (PRN):  acetaminophen    Suspension .. 650 milliGRAM(s) Oral every 6 hours PRN Temp greater or equal to 38C (100.4F), Mild Pain (1 - 3), Moderate Pain (4 - 6), Severe Pain (7 - 10)  artificial  tears Solution 1 Drop(s) Both EYES two times a day PRN Dry Eyes  QUEtiapine 50 milliGRAM(s) Oral every 6 hours PRN agitation      Allergies    No Known Allergies    Intolerances        TODAY'S SUBJECTIVE & REVIEW OF SYMPTOMS:  Patient seen and examined.  No acute events overnight; no new complaints.  Wife is at bedside.  Discussed discharge plan with patient and wife; they express understanding and agreement.  Pt is to see Dr. Marlyn rosales at 8:30pm.    OPatient at this time is refusing outpatient therapy for endurance/cardiopulmonary rehab. He does have an appointment with his psychiatrist after discharge. Difference between inpatient PT and cardiopulmonary program discussed, however patient adamant that he does not want the services. wife also at bedside    questions re: RTW, medical follow up, discussed      PHYSICAL EXAM  54y  Vital Signs Last 24 Hrs  T(C): 36.8 (26 Nov 2019 07:46), Max: 36.9 (25 Nov 2019 19:48)  T(F): 98.3 (26 Nov 2019 07:46), Max: 98.5 (25 Nov 2019 19:48)  HR: 75 (26 Nov 2019 07:46) (75 - 102)  BP: 124/84 (26 Nov 2019 07:46) (124/84 - 146/91)  RR: 16 (26 Nov 2019 07:46) (16 - 16)  SpO2: 97% (26 Nov 2019 07:46) (97% - 98%)    General: no acute distress, mood fair. seen with wife at bedside    HEENT: NCAT  Cardio: +S1S2  Resp: Clear bilaterally, good inspirational effort, no R/R/W  Abdomen: +BS. Soft, NT, ND  Extremities: no clubbing, cyanosis, or edema  Neuro: A/O x3; no new focal deficits  BUE and LE motor 5/5        RECENT LABS:    LABS:                        10.9   4.70  )-----------( 198      ( 25 Nov 2019 06:25 )             33.7     25 Nov 2019 06:25    139    |  104    |  10     ----------------------------<  111    4.4     |  24     |  0.70     Ca    9.4        25 Nov 2019 06:25

## 2019-12-19 ENCOUNTER — APPOINTMENT (OUTPATIENT)
Dept: ORTHOPEDIC SURGERY | Facility: CLINIC | Age: 54
End: 2019-12-19
Payer: COMMERCIAL

## 2019-12-19 PROCEDURE — 20610 DRAIN/INJ JOINT/BURSA W/O US: CPT | Mod: LT

## 2019-12-19 PROCEDURE — 99213 OFFICE O/P EST LOW 20 MIN: CPT | Mod: 25

## 2019-12-19 NOTE — ADDENDUM
[FreeTextEntry1] : I, Emili Armstrong, acted solely as a scribe for Dr. Kolby Young on this date 12/19/2019.

## 2019-12-19 NOTE — DISCUSSION/SUMMARY
[de-identified] : The underlying pathophysiology was reviewed in great detail with the patient as well as the various treatment options, including ice, analgesics, NSAIDs, Physical therapy, steroid injections, hyaluronic acid injections. \par \par The patient wishes to proceed with a MONOVISC injection of the left knee. \par \par FU PRN.

## 2019-12-19 NOTE — PROCEDURE
[de-identified] : At this point I recommended a therapeutic injection and under sterile precautions an injection of 4 cc of Monovisc (Lot: [id number], Expiration: 04/2022), was placed into the joint of the Left knee without complication.

## 2019-12-19 NOTE — HISTORY OF PRESENT ILLNESS
[de-identified] : 54 year old male presents for an evaluation of chronic left knee pain s/p left knee arthroscopy on 1/20/2016 and has since been diagnosed with advanced medial compartment osteoarthritis of her left knee. The patient reports that in November 2019 he was in a coma for a month, noting exacerbation of his since awaking. The patient has been experiencing an aching pain located along the medial aspect of his left knee and that is intermittent in nature. His symptoms are exacerbated with extended periods of walking, deep bending, and with climbing stairs. Today he presents for a Monovisc injection of his left knee.

## 2019-12-19 NOTE — END OF VISIT
[FreeTextEntry3] : All medical record entries made by the Scribe were at my, Dr. Kolby Young, direction and personally dictated by me on 12/19/2019. I have reviewed the chart and agree that the record accurately reflects my personal performance of the history, physical exam, assessment and plan. I have also personally directed, reviewed, and agreed with the chart.

## 2019-12-19 NOTE — PHYSICAL EXAM
[Normal RLE] : Right Lower Extremity: No scars, rashes, lesions, ulcers, skin intact [Normal LLE] : Left Lower Extremity: No scars, rashes, lesions, ulcers, skin intact [Normal] : No swelling, no edema, normal pedal pulses and normal temperature [Normal Touch] : sensation intact for touch [Obese] : obese [Poor Appearance] : well-appearing [Acute Distress] : not in acute distress [de-identified] : Left Lower Extremity \par o Knee :\par ¦ Inspection/Palpation : no tenderness along the joint lines, no swelling, mild varus alignment\par ¦ Range of Motion : 0 - 120 degrees\par ¦ Stability : no valgus or varus instability present on provocative testing\par ¦ Strength : quadriceps strength 4+/5\par o Muscle Bulk : normal muscle bulk present \par o Skin : no erythema or ecchymosis present \par o Sensation : sensation to pin intact\par o Vascular Exam : no edema, no cyanosis, dorsalis pedis artery pulse 2+, posterior tibial artery pulse 2+

## 2019-12-31 ENCOUNTER — APPOINTMENT (OUTPATIENT)
Dept: PULMONOLOGY | Facility: CLINIC | Age: 54
End: 2019-12-31
Payer: COMMERCIAL

## 2019-12-31 VITALS — HEIGHT: 69 IN | BODY MASS INDEX: 40.29 KG/M2 | WEIGHT: 272 LBS

## 2019-12-31 VITALS
TEMPERATURE: 97.7 F | DIASTOLIC BLOOD PRESSURE: 93 MMHG | RESPIRATION RATE: 17 BRPM | BODY MASS INDEX: 40.28 KG/M2 | HEIGHT: 69 IN | SYSTOLIC BLOOD PRESSURE: 143 MMHG | OXYGEN SATURATION: 98 % | HEART RATE: 90 BPM | WEIGHT: 271.98 LBS

## 2019-12-31 DIAGNOSIS — J69.0 PNEUMONITIS DUE TO INHALATION OF FOOD AND VOMIT: ICD-10-CM

## 2019-12-31 PROCEDURE — 94726 PLETHYSMOGRAPHY LUNG VOLUMES: CPT

## 2019-12-31 PROCEDURE — 99204 OFFICE O/P NEW MOD 45 MIN: CPT | Mod: 25

## 2019-12-31 PROCEDURE — 99215 OFFICE O/P EST HI 40 MIN: CPT | Mod: 25

## 2019-12-31 PROCEDURE — 94729 DIFFUSING CAPACITY: CPT

## 2019-12-31 PROCEDURE — 94010 BREATHING CAPACITY TEST: CPT

## 2019-12-31 PROCEDURE — ZZZZZ: CPT

## 2019-12-31 RX ORDER — VENLAFAXINE HCL 75 MG
75 TABLET ORAL
Refills: 0 | Status: ACTIVE | COMMUNITY

## 2019-12-31 RX ORDER — QUETIAPINE FUMARATE 400 MG/1
TABLET ORAL
Refills: 0 | Status: ACTIVE | COMMUNITY

## 2019-12-31 RX ORDER — DULOXETINE HYDROCHLORIDE 30 MG/1
30 CAPSULE, DELAYED RELEASE ORAL
Refills: 0 | Status: ACTIVE | COMMUNITY

## 2019-12-31 RX ORDER — DICLOFENAC SODIUM 75 MG/1
75 TABLET, DELAYED RELEASE ORAL TWICE DAILY
Qty: 60 | Refills: 3 | Status: DISCONTINUED | COMMUNITY
Start: 2019-04-09 | End: 2019-12-31

## 2019-12-31 RX ORDER — CLONAZEPAM 1 MG
1 TABLET ORAL
Refills: 0 | Status: ACTIVE | COMMUNITY

## 2019-12-31 RX ORDER — TRAZODONE HYDROCHLORIDE 150 MG/1
150 TABLET ORAL
Refills: 0 | Status: ACTIVE | COMMUNITY

## 2019-12-31 NOTE — HISTORY OF PRESENT ILLNESS
[FreeTextEntry1] : 54 M with severe depression who was hospitalized on 10/28 for aspiration pneumonia possibly due to overdose and subsequent ARDS requiring VVECMO at [facility], decannulated on 11/1, following up care here.\par \par Patient presented to  on 10/28 with lethargy and hypercapnic/hypoxic respiratory failure, concern for drug overdose.  Wife thinks he had stopped some of his anti-depression medications.  He was intubated and then extubated, but may have aspirated due to a seizure (?withdrawal seizure), and had to be reintubated.  At that point, he had ARDS and decision was made to put on VVECMO and transferred to [facility].  He was cannulated on 10/30 and decannulated on 11/1.  He was extubated on 11/6 and transferred to RCU there after.  Then, he was started on his anti-depression/anxiety medications.  DVT study was negative.  He was eventually transferred to  rehab and discharged from there at end of December.\par \par Today, he is here with his wife.  He is doing well overall - his sob that he had after rehab continues to improve.  He feels it is related to deconditioning, but his strength and aches from ECMO/hospitalization continue to improve each day.  No sob, wheezing, cough, hemoptysis, no fevers or chills.  No cp.  Had blood work at his PCP office that was normal, per him and his wife.  \par \par He does endorse some sleep problems - he goes to bed at 10 pm, sleeps by 11 pm, but wakes up at 4 am every day.  This is new since his hospitalization.  Does snore, but no witnessed apneas.  +AM headaches and dry mouth.  He always has some level of anxiety, but his anxiety and depression have been controlled with medication.  He has been seeing his psychiatrist, Dr. Cline, regularly.  \par \par No memory problems post-ECMO.  Wife states that his personality is otherwise back to the way it has been when his depression and axiety are well controllled.

## 2019-12-31 NOTE — REVIEW OF SYSTEMS
[Chills] : no chills [Fever] : no fever [Nasal Congestion] : no nasal congestion [Dry Eyes] : no dryness of the eyes [Hemoptysis] : no hemoptysis [Dyspnea] : no dyspnea [Cough] : no cough [Chest Tightness] : no chest tightness [Hypertension] : no ~T hypertension [Orthopnea] : no orthopnea [Pleuritic Pain] : no pleuritic pain [Heartburn] : no heartburn [Dysphagia] : no dysphagia [Watery Eyes] : no discharge from the eyes [Nausea] : no nausea [Vomiting] : no vomiting [Nocturia] : no nocturia [Rash] : no [unfilled] rash [Frequency] : no change in urinary frequency [Fracture] : no fracture [Itch] : no itching [Anemia] : no anemia [Headache] : no headache [Dizziness] : no dizziness [Diabetes] : no diabetes mellitus [Syncope] : no fainting [Diet Meds] : not taking dietary supplements [Difficulty Maintaining Sleep] : difficulty maintaining sleep [As Noted in HPI] : as noted in HPI

## 2019-12-31 NOTE — ASSESSMENT
[FreeTextEntry1] : 54 M with severe depression who was hospitalized on 10/28 for aspiration pneumonia possibly due to overdose and subsequent ARDS requiring VVECMO at [facility], decannulated on 11/1/19 here for post-ECMO pulmonary follow up.\par \par #ARDS/aspiration pneumonia\par - oxygen saturation normal on room air, no active pulmonary issues as patient is recovering well post-ECMO and aspiration pneumonia\par - PFTs reviewed, wnl\par - will get CT chest in next 1-2 weeks, patient/wife given rx and instructed to notify me when it is performed\par \par #post-ECMO\par - no cognitive issues, but is having sleep issues\par - could be anxiety related given event, but will refer to sleep specialist for further evaluation\par \par #anxiety and depression\par - c/w medication as per Dr. Cline, his psychiatrist\par \par #HCM\par - received flu vaccine in November\par \par RTC in 3-6 months

## 2019-12-31 NOTE — PHYSICAL EXAM
[General Appearance - In No Acute Distress] : no acute distress [General Appearance - Well Developed] : well developed [Jugular Venous Distention Increased] : there was no jugular-venous distention [Neck Appearance] : the appearance of the neck was normal [III] : III [Heart Rate And Rhythm] : heart rate and rhythm were normal [Edema] : no peripheral edema present [Murmurs] : no murmurs present [Bowel Sounds] : normal bowel sounds [Auscultation Breath Sounds / Voice Sounds] : lungs were clear to auscultation bilaterally [] : no respiratory distress [Nail Clubbing] : no clubbing of the fingernails [Cyanosis, Localized] : no localized cyanosis [Abdomen Soft] : soft [Abnormal Walk] : normal gait [Skin Color & Pigmentation] : normal skin color and pigmentation [Skin Turgor] : normal skin turgor [No Focal Deficits] : no focal deficits [Oriented To Time, Place, And Person] : oriented to person, place, and time [Affect] : the affect was normal

## 2020-01-13 ENCOUNTER — APPOINTMENT (OUTPATIENT)
Dept: PULMONOLOGY | Facility: CLINIC | Age: 55
End: 2020-01-13
Payer: COMMERCIAL

## 2020-01-13 VITALS
BODY MASS INDEX: 40.29 KG/M2 | RESPIRATION RATE: 16 BRPM | HEART RATE: 81 BPM | WEIGHT: 272 LBS | SYSTOLIC BLOOD PRESSURE: 145 MMHG | HEIGHT: 69 IN | OXYGEN SATURATION: 99 % | DIASTOLIC BLOOD PRESSURE: 94 MMHG

## 2020-01-13 DIAGNOSIS — G47.33 OBSTRUCTIVE SLEEP APNEA (ADULT) (PEDIATRIC): ICD-10-CM

## 2020-01-13 PROCEDURE — 99244 OFF/OP CNSLTJ NEW/EST MOD 40: CPT | Mod: GC

## 2020-01-13 NOTE — PHYSICAL EXAM
[Normal Appearance] : normal appearance [General Appearance - Well Developed] : well developed [General Appearance - Well Nourished] : well nourished [No Deformities] : no deformities [Well Groomed] : well groomed [Eyelids - No Xanthelasma] : the eyelids demonstrated no xanthelasmas [Normal Conjunctiva] : the conjunctiva exhibited no abnormalities [General Appearance - In No Acute Distress] : no acute distress [Normal Oropharynx] : normal oropharynx [Neck Appearance] : the appearance of the neck was normal [Low Lying Soft Palate] : low lying soft palate [Thyroid Nodule] : there were no palpable thyroid nodules [Neck Cervical Mass (___cm)] : no neck mass was observed [Thyroid Diffuse Enlargement] : the thyroid was not enlarged [Jugular Venous Distention Increased] : there was no jugular-venous distention [Heart Sounds Gallop] : no gallops [Heart Sounds] : normal S1 and S2 [Heart Rate And Rhythm] : heart rate was normal and rhythm regular [Heart Sounds Pericardial Friction Rub] : no pericardial rub [Murmurs] : no murmurs [Abnormal Walk] : normal gait [Auscultation Breath Sounds / Voice Sounds] : lungs were clear to auscultation bilaterally [Musculoskeletal - Swelling] : no joint swelling seen [Nail Clubbing] : no clubbing of the fingernails [Motor Tone] : muscle strength and tone were normal [Cyanosis, Localized] : no localized cyanosis [Petechial Hemorrhages (___cm)] : no petechial hemorrhages [Skin Turgor] : normal skin turgor [Skin Color & Pigmentation] : normal skin color and pigmentation [Deep Tendon Reflexes (DTR)] : deep tendon reflexes were 2+ and symmetric [Sensation] : the sensory exam was normal to light touch and pinprick [] : no rash [No Focal Deficits] : no focal deficits [Affect] : the affect was normal [Oriented To Time, Place, And Person] : oriented to person, place, and time [Impaired Insight] : insight and judgment were intact

## 2020-01-13 NOTE — HISTORY OF PRESENT ILLNESS
[Obstructive Sleep Apnea] : obstructive sleep apnea [Snoring] : snoring [Frequent Nocturnal Awakening] : frequent nocturnal awakening [ESS: ___] : ESS score [unfilled] [Awakes Unrefreshed] : awakening unrefreshed [Awakes with Headache] : headache upon awakening [FreeTextEntry1] : 53 y/o M with PMH of depression and anxiety presented to the clinic for initial evaluation.\par \par Of note, patient was hospitalized in late October and developed ARDS requiring ECMO.  He was medically optimized and discharged home.  He presents to the sleep clinic with complaints of frequent nocturnal awakenings and non-restorative sleep.  He goes to sleep at 10-11 pm and wakes up 3-4 times at night requiring the need to use the bathroom.  Wife states he snores but no witnessed apneic events.  he wakes up at 5 and goes to work.  He denies EDS with an in-office ESS of 1 on today's encounter.  His symptoms have been ongoing for many months prior to his admission.  He has since recovered and denies any new sleep related symptoms after his complicated hospital stay.  Since admission he notes a significant weight loss.  He was c/o of muscle weakness but that is improving.  He has PFTs that were within normal limits after his prolonged hospital course.\par  [Witnessed Apneas] : no witnessed sleep apnea

## 2020-01-13 NOTE — ASSESSMENT
[FreeTextEntry1] : 55 y/o M with PMH of depression and anxiety presented to the clinic for initial evaluation.  He was recently admitted and treated for ARDS requiring ECMO support.  Now medically optimized presenting to the sleep clinic with complaints of frequent nocturnal awakenings and non-restorative sleep.  His wife notes snoring but does not report apneic events.  He wakes up with a dry mouth and AM headaches that last about one hour.  He denies EDS with an ESS of 1 on today's encounter.  He notes that all these symptoms were present prior to admission and he has not developed any new symptoms as a result of his hospitalization. On exam, his oropharynx is crowded due to retrognathia, enlarged base of tongue and low lying soft palate -- all of which increase risk of NICOLE. His BMI is 40.17 kg/m2 which along with the AM headaches increases the risk for nocturnal hypoventilation.  The patient was referred to the [facility] for diagnostic polysomnography with transcutaneous CO2 monitoring for further assessment. Will attempt to schedule a Friday appointment as this is the patient's preference. The ramifications of obstructive sleep apnea and its potential therapeutic modalities were discussed with the patient. The dangers of drowsy driving were discussed with the patient.  The patient was warned to avoid drowsy driving. The patient will followup after results of this study are available.\par  \par Joshua Babcock DO\par Sleep Fellow

## 2020-01-13 NOTE — REVIEW OF SYSTEMS
[Fatigue] : fatigue [Snoring] : snoring [A.M. Dry Mouth] : a.m. dry mouth [Negative] : Musculoskeletal

## 2020-01-13 NOTE — REASON FOR VISIT
[Consultation] : a consultation visit [FreeTextEntry1] : Dr. Tessy Mercado [FreeTextEntry2] : sleep difficulty

## 2020-04-18 NOTE — PROGRESS NOTE BEHAVIORAL HEALTH - NSBHFUPTYPE_PSY_A_CORE
Consult Follow Up DEANNA DAO  62M w/ ESRD on HD, prostate cancer, s/p recent fall( was hospitalized on 3/24, and went to rehab), found to have a L chest wall hematoma transffered from rehab to [facility] and is s/p IR drainage. While at rehab was noted to have progressively worsening weakness in RUE, and RLE. Became unable to ambulate, is having Ue b/l numbness and radiculopathy. No b/b dysfunction.  No AC/AP. plts 186, Cr 6.99,   Imaging: grade 2 anterolisthesis of C4 on 5, no acute fracture. multi level posterior disc osteophytes causing stenosis. Lumbar CT shows degen, poss L2/3 disc.   Exam: RUE HG 3/5, Tri/Bi 4/5 Delt 4/5, LUE HG 4-/5, Tri/Bi 4/5 Delt 4/5, RLE HF 4/5, KF/KE 4/5 PF/DF 2/5,  LLE HF 4/5, KF/KE 4/5 PF/DF 4-/5, no clonus, no hoffmans, SILT    -MRI c/t/l with anesthesia on Monday. OR Tuesday.   -Must be NPO after midnight for MRI monday.  -flexion extension and neutral X ray done   -Cervical Collar  -medical and cardiac clearance. Pre op for surgery Tuesday

## 2020-08-03 NOTE — PROGRESS NOTE BEHAVIORAL HEALTH - SUMMARY
Patient a 53 y/o   male, employed, domiciled with wife and daughter was BIB/Spouse due to ongoing depression.    Patient  is depressed, and has passive SI, with it responding to meds as ordered, and has been seeing his Psychiatrist /Therapist regularly. He feels that he needs higher level of care so he brought himself into ED for stability.  To continue meds as ordered. F/U in AM    Meds includes..  Klonopin 1 mg QID  Seroquel 400 mg HS  Zyprexa 5 mg HS  Ambien 10 mg HS  Effexor  mg daily  Remeron 30 mg HS Pt Dc per MD orders. Dc and prescription instructions given. Pt verbalizes understanding. PIV removed catheter tip intact. Vss. Pt transported by  to front of hospital for ride.

## 2020-08-24 ENCOUNTER — APPOINTMENT (OUTPATIENT)
Dept: ORTHOPEDIC SURGERY | Facility: CLINIC | Age: 55
End: 2020-08-24
Payer: COMMERCIAL

## 2020-08-24 PROCEDURE — 99213 OFFICE O/P EST LOW 20 MIN: CPT | Mod: 25

## 2020-08-24 PROCEDURE — 20610 DRAIN/INJ JOINT/BURSA W/O US: CPT | Mod: LT

## 2020-08-24 NOTE — PROCEDURE
[de-identified] : At this point I recommended a therapeutic injection and under sterile precautions an injection of 5 cc 1% lidocaine with 0.5 cc of Kenalog and 0.5 cc of Dexamethasone - was placed into the joint of the Left knee without complication, and after several minutes, the patient felt significant relief.\par \par \par

## 2020-08-24 NOTE — HISTORY OF PRESENT ILLNESS
[de-identified] : 55 year old male presents for an evaluation of chronic left knee pain s/p left knee arthroscopy on 1/20/2016 and has since been diagnosed with advanced medial compartment osteoarthritis of her left knee. The patient reports that in November 2019 he was in a coma for a month, noting exacerbation of his since awaking.  He had a Monovisc injection of his left knee on 12/19/2019 noting great relief in symptoms for over 6 months. His pain has since returned and the patient has been experiencing an aching pain located along the medial aspect of his left knee and that is intermittent in nature. His symptoms are exacerbated with extended periods of walking, deep bending, and with climbing stairs. Patient denies any other complaints at this time.

## 2020-08-24 NOTE — DISCUSSION/SUMMARY
[de-identified] : The underlying pathophysiology was reviewed in great detail with the patient as well as the various treatment options, including ice, analgesics, NSAIDs, Physical therapy, steroid injections, hyaluronic acid injections, TKR\par \par The patient wishes to proceed with a corticosteroid injection of the left knee.\par  \par A Monovisc gel injection was ordered. He  will follow-up once authorization has been approved. \par \par Activity modifications and restrictions were discussed. I advised avoiding deep bending, squatting and high intensity activity. I discussed the importance of weight loss to alleviate his knee pain\par \par FU once injections are obtained.

## 2020-08-24 NOTE — PHYSICAL EXAM
[Normal LLE] : Left Lower Extremity: No scars, rashes, lesions, ulcers, skin intact [Normal RLE] : Right Lower Extremity: No scars, rashes, lesions, ulcers, skin intact [Normal Touch] : sensation intact for touch [Normal] : No swelling, no edema, normal pedal pulses and normal temperature [Obese] : obese [Poor Appearance] : well-appearing [Acute Distress] : not in acute distress [de-identified] : Left Lower Extremity \par o Knee :\par ¦ Inspection/Palpation : no tenderness along the joint lines, no swelling, no effusion, varus alignment\par ¦ Range of Motion : 0 - 120 degrees\par ¦ Stability : no valgus or varus instability present on provocative testing\par ¦ Strength : quadriceps strength 5/5\par o Muscle Bulk : normal muscle bulk present \par o Skin : no erythema or ecchymosis present \par o Sensation : sensation to pin intact\par o Vascular Exam : no edema, no cyanosis, dorsalis pedis artery pulse 2+, posterior tibial artery pulse 2+

## 2020-08-26 ENCOUNTER — FORM ENCOUNTER (OUTPATIENT)
Age: 55
End: 2020-08-26

## 2020-09-14 ENCOUNTER — APPOINTMENT (OUTPATIENT)
Dept: ORTHOPEDIC SURGERY | Facility: CLINIC | Age: 55
End: 2020-09-14
Payer: COMMERCIAL

## 2020-09-14 PROCEDURE — 20610 DRAIN/INJ JOINT/BURSA W/O US: CPT | Mod: LT

## 2020-09-14 NOTE — PHYSICAL EXAM
[Normal RLE] : Right Lower Extremity: No scars, rashes, lesions, ulcers, skin intact [Normal LLE] : Left Lower Extremity: No scars, rashes, lesions, ulcers, skin intact [Normal] : No swelling, no edema, normal pedal pulses and normal temperature [Normal Touch] : sensation intact for touch [Obese] : obese [Poor Appearance] : well-appearing [de-identified] : Left Lower Extremity \par o Knee :\par ¦ Inspection/Palpation : no tenderness along the joint lines, no swelling, no effusion, varus alignment\par ¦ Range of Motion : 0 - 120 degrees\par ¦ Stability : no valgus or varus instability present on provocative testing\par ¦ Strength : quadriceps strength 5/5\par o Muscle Bulk : normal muscle bulk present \par o Skin : no erythema or ecchymosis present \par o Sensation : sensation to pin intact\par o Vascular Exam : no edema, no cyanosis, dorsalis pedis artery pulse 2+, posterior tibial artery pulse 2+ [Acute Distress] : not in acute distress

## 2020-09-14 NOTE — DISCUSSION/SUMMARY
[de-identified] : The underlying pathophysiology was reviewed in great detail with the patient as well as the various treatment options, including ice, analgesics, NSAIDs, Physical therapy, steroid injections, hyaluronic acid injections, TKR\par \par The patient wishes to proceed with a Monovisc  injection of the left knee.\par  \par Activity modifications and restrictions were discussed. I advised avoiding deep bending, squatting and high intensity activity. I discussed the importance of weight loss to alleviate his knee pain\par \par He may return in 6 months for another series of gel injections as per insurance guidelines. A cortisone injection may be administered during the interim period if he cannot tolerate such a wait.

## 2020-09-14 NOTE — PROCEDURE
[de-identified] : At this point I recommended a therapeutic injection and under sterile precautions an injection of 4 cc of Monovisc (Lot: [id number] , Expiration: 10/22 ), was placed into the joint of the Left knee without complication\par \par  \par \par \par

## 2020-09-14 NOTE — HISTORY OF PRESENT ILLNESS
[de-identified] : 55 year old male presents for an evaluation of chronic left knee pain s/p left knee arthroscopy on 1/20/2016 and has since been diagnosed with advanced medial compartment osteoarthritis of her left knee. The patient reports that in November 2019 he was in a coma for a month, noting exacerbation of his since awaking.  He had a Monovisc injection of his left knee on 12/19/2019 noting great relief in symptoms for over 6 months. His pain has since returned and the patient has been experiencing an aching pain located along the medial aspect of his left knee and that is intermittent in nature. His symptoms are exacerbated with extended periods of walking, deep bending, and with climbing stairs. At his last visit on 08/24/2020 patient received a corticosteroid injectio of the left knee noting mild relief in symptoms.  He is here today for a Monovisc injection of the left knee.  Patient denies any other complaints at this time.

## 2020-09-29 ENCOUNTER — APPOINTMENT (OUTPATIENT)
Dept: ORTHOPEDIC SURGERY | Facility: CLINIC | Age: 55
End: 2020-09-29
Payer: COMMERCIAL

## 2020-09-29 PROCEDURE — 99214 OFFICE O/P EST MOD 30 MIN: CPT

## 2020-09-29 PROCEDURE — 73564 X-RAY EXAM KNEE 4 OR MORE: CPT | Mod: LT

## 2020-09-29 NOTE — HISTORY OF PRESENT ILLNESS
[de-identified] : 55 year old male presents for an evaluation of chronic left knee pain s/p left knee arthroscopy on 1/20/2016 and has since been diagnosed with advanced medial compartment osteoarthritis of her left knee. The patient reports that in November 2019 he was in a coma for a month, noting exacerbation of his since awaking.  He had a Monovisc injection of his left knee on 12/19/2019 noting great relief in symptoms for over 6 months. His pain has since returned and the patient has been experiencing an aching pain located along the medial aspect of his left knee and that is intermittent in nature. His symptoms are exacerbated with extended periods of walking, deep bending, and with climbing stairs. At his last visit on 09/14/2020 patient received a Monovisc injection of his left knee. He reports increased pain and symptoms since the injection. Patient denies any other complaints at this time.

## 2020-09-29 NOTE — PHYSICAL EXAM
[Normal RLE] : Right Lower Extremity: No scars, rashes, lesions, ulcers, skin intact [Normal LLE] : Left Lower Extremity: No scars, rashes, lesions, ulcers, skin intact [Normal Touch] : sensation intact for touch [Normal] : No swelling, no edema, normal pedal pulses and normal temperature [Obese] : obese [Poor Appearance] : well-appearing [Acute Distress] : not in acute distress [de-identified] : Left Lower Extremity \par o Knee :\par ¦ Inspection/Palpation : no tenderness along the joint lines, no swelling, no effusion, varus alignment\par ¦ Range of Motion : 0 - 120 degrees\par ¦ Stability : no valgus or varus instability present on provocative testing\par ¦ Strength : quadriceps strength 5/5\par o Muscle Bulk : normal muscle bulk present \par o Skin : no erythema or ecchymosis present \par o Sensation : sensation to pin intact\par o Vascular Exam : no edema, no cyanosis, dorsalis pedis artery pulse 2+, posterior tibial artery pulse 2+ [de-identified] : o Left Knee : AP, lateral, sunrise, and Mcconnell views of the knee were obtained, there are no soft tissue abnormalities, no fractures, severe tricompartmental osteoarthritis with bone-on-bone apposition of the medial compartment, varus alignment. \par \par

## 2020-09-29 NOTE — DISCUSSION/SUMMARY
[de-identified] : The underlying pathophysiology was reviewed in great detail with the patient as well as the various treatment options, including ice, analgesics, NSAIDs, Physical therapy, steroid injections, hyaluronic acid injections, TKR\par \par The patient wishes to proceed with SURGICAL INTERVENTION at this time. The risks and benefits of a LEFT TOTAL KNEE ARTHROPLASTY were discussed in great detail today, including but not limited to bleeding, infection, nerve injury, DVT, allergy to the anesthetic or to the implants, persistent pain, stiffness, scarring, swelling or deformity.\par \par EBONI CARRASCO was seen face to face and needs a commode for beside use as the patient has no ability to access the bathroom in there home. The patient also requires a rolling walker as this is needed for activities of daily living within their home secondary to the diagnosis of osteoarthritis.\par \par Pre-surgical testing (PST) laboratory tests were ordered today.

## 2020-10-30 ENCOUNTER — TRANSCRIPTION ENCOUNTER (OUTPATIENT)
Age: 55
End: 2020-10-30

## 2020-10-31 ENCOUNTER — INPATIENT (INPATIENT)
Facility: HOSPITAL | Age: 55
LOS: 9 days | Discharge: PSYCHIATRIC OR SUBSTANCE USE FACILITY | DRG: 11 | End: 2020-11-10
Attending: SURGERY | Admitting: SURGERY
Payer: COMMERCIAL

## 2020-10-31 VITALS
HEART RATE: 81 BPM | RESPIRATION RATE: 20 BRPM | HEIGHT: 73 IN | OXYGEN SATURATION: 98 % | TEMPERATURE: 99 F | WEIGHT: 276.9 LBS

## 2020-10-31 DIAGNOSIS — T14.91XA SUICIDE ATTEMPT, INITIAL ENCOUNTER: ICD-10-CM

## 2020-10-31 LAB
ALBUMIN SERPL ELPH-MCNC: 4.6 G/DL — SIGNIFICANT CHANGE UP (ref 3.3–5)
ALP SERPL-CCNC: 47 U/L — SIGNIFICANT CHANGE UP (ref 40–120)
ALT FLD-CCNC: 54 U/L — HIGH (ref 10–45)
ANION GAP SERPL CALC-SCNC: 15 MMOL/L — SIGNIFICANT CHANGE UP (ref 5–17)
ANION GAP SERPL CALC-SCNC: 22 MMOL/L — HIGH (ref 5–17)
APTT BLD: 23.7 SEC — LOW (ref 27.5–35.5)
APTT BLD: 24.1 SEC — LOW (ref 27.5–35.5)
AST SERPL-CCNC: 31 U/L — SIGNIFICANT CHANGE UP (ref 10–40)
BASE EXCESS BLDA CALC-SCNC: -6.1 MMOL/L — LOW (ref -2–2)
BILIRUB SERPL-MCNC: 0.2 MG/DL — SIGNIFICANT CHANGE UP (ref 0.2–1.2)
BLD GP AB SCN SERPL QL: NEGATIVE — SIGNIFICANT CHANGE UP
BUN SERPL-MCNC: 11 MG/DL — SIGNIFICANT CHANGE UP (ref 7–23)
BUN SERPL-MCNC: 12 MG/DL — SIGNIFICANT CHANGE UP (ref 7–23)
CALCIUM SERPL-MCNC: 7.6 MG/DL — LOW (ref 8.4–10.5)
CALCIUM SERPL-MCNC: 9.2 MG/DL — SIGNIFICANT CHANGE UP (ref 8.4–10.5)
CHLORIDE SERPL-SCNC: 106 MMOL/L — SIGNIFICANT CHANGE UP (ref 96–108)
CHLORIDE SERPL-SCNC: 109 MMOL/L — HIGH (ref 96–108)
CO2 BLDA-SCNC: 19 MMOL/L — LOW (ref 22–30)
CO2 SERPL-SCNC: 16 MMOL/L — LOW (ref 22–31)
CO2 SERPL-SCNC: 20 MMOL/L — LOW (ref 22–31)
CREAT SERPL-MCNC: 0.94 MG/DL — SIGNIFICANT CHANGE UP (ref 0.5–1.3)
CREAT SERPL-MCNC: 1.14 MG/DL — SIGNIFICANT CHANGE UP (ref 0.5–1.3)
GAS PNL BLDA: SIGNIFICANT CHANGE UP
GAS PNL BLDA: SIGNIFICANT CHANGE UP
GLUCOSE SERPL-MCNC: 133 MG/DL — HIGH (ref 70–99)
GLUCOSE SERPL-MCNC: 154 MG/DL — HIGH (ref 70–99)
HCO3 BLDA-SCNC: 18 MMOL/L — LOW (ref 21–29)
HCT VFR BLD CALC: 31 % — LOW (ref 39–50)
HCT VFR BLD CALC: 40 % — SIGNIFICANT CHANGE UP (ref 39–50)
HGB BLD-MCNC: 10.4 G/DL — LOW (ref 13–17)
HGB BLD-MCNC: 13.5 G/DL — SIGNIFICANT CHANGE UP (ref 13–17)
INR BLD: 1.08 RATIO — SIGNIFICANT CHANGE UP (ref 0.88–1.16)
INR BLD: 1.12 RATIO — SIGNIFICANT CHANGE UP (ref 0.88–1.16)
MAGNESIUM SERPL-MCNC: 2.2 MG/DL — SIGNIFICANT CHANGE UP (ref 1.6–2.6)
MCHC RBC-ENTMCNC: 32 PG — SIGNIFICANT CHANGE UP (ref 27–34)
MCHC RBC-ENTMCNC: 32.4 PG — SIGNIFICANT CHANGE UP (ref 27–34)
MCHC RBC-ENTMCNC: 33.5 GM/DL — SIGNIFICANT CHANGE UP (ref 32–36)
MCHC RBC-ENTMCNC: 33.8 GM/DL — SIGNIFICANT CHANGE UP (ref 32–36)
MCV RBC AUTO: 95.4 FL — SIGNIFICANT CHANGE UP (ref 80–100)
MCV RBC AUTO: 95.9 FL — SIGNIFICANT CHANGE UP (ref 80–100)
NRBC # BLD: 0 /100 WBCS — SIGNIFICANT CHANGE UP (ref 0–0)
NRBC # BLD: 0 /100 WBCS — SIGNIFICANT CHANGE UP (ref 0–0)
PCO2 BLDA: 33 MMHG — SIGNIFICANT CHANGE UP (ref 32–46)
PH BLDA: 7.36 — SIGNIFICANT CHANGE UP (ref 7.35–7.45)
PHOSPHATE SERPL-MCNC: 3.1 MG/DL — SIGNIFICANT CHANGE UP (ref 2.5–4.5)
PLATELET # BLD AUTO: 228 K/UL — SIGNIFICANT CHANGE UP (ref 150–400)
PLATELET # BLD AUTO: 280 K/UL — SIGNIFICANT CHANGE UP (ref 150–400)
PO2 BLDA: 40 MMHG — CRITICAL LOW (ref 74–108)
POTASSIUM SERPL-MCNC: 3.8 MMOL/L — SIGNIFICANT CHANGE UP (ref 3.5–5.3)
POTASSIUM SERPL-MCNC: 4.5 MMOL/L — SIGNIFICANT CHANGE UP (ref 3.5–5.3)
POTASSIUM SERPL-SCNC: 3.8 MMOL/L — SIGNIFICANT CHANGE UP (ref 3.5–5.3)
POTASSIUM SERPL-SCNC: 4.5 MMOL/L — SIGNIFICANT CHANGE UP (ref 3.5–5.3)
PROT SERPL-MCNC: 6.6 G/DL — SIGNIFICANT CHANGE UP (ref 6–8.3)
PROTHROM AB SERPL-ACNC: 12.9 SEC — SIGNIFICANT CHANGE UP (ref 10.6–13.6)
PROTHROM AB SERPL-ACNC: 13.4 SEC — SIGNIFICANT CHANGE UP (ref 10.6–13.6)
RBC # BLD: 3.25 M/UL — LOW (ref 4.2–5.8)
RBC # BLD: 4.17 M/UL — LOW (ref 4.2–5.8)
RBC # FLD: 12.7 % — SIGNIFICANT CHANGE UP (ref 10.3–14.5)
RBC # FLD: 12.8 % — SIGNIFICANT CHANGE UP (ref 10.3–14.5)
RH IG SCN BLD-IMP: POSITIVE — SIGNIFICANT CHANGE UP
SAO2 % BLDA: 72 % — LOW (ref 92–96)
SARS-COV-2 RNA SPEC QL NAA+PROBE: SIGNIFICANT CHANGE UP
SODIUM SERPL-SCNC: 144 MMOL/L — SIGNIFICANT CHANGE UP (ref 135–145)
SODIUM SERPL-SCNC: 144 MMOL/L — SIGNIFICANT CHANGE UP (ref 135–145)
WBC # BLD: 10.4 K/UL — SIGNIFICANT CHANGE UP (ref 3.8–10.5)
WBC # BLD: 14.68 K/UL — HIGH (ref 3.8–10.5)
WBC # FLD AUTO: 10.4 K/UL — SIGNIFICANT CHANGE UP (ref 3.8–10.5)
WBC # FLD AUTO: 14.68 K/UL — HIGH (ref 3.8–10.5)

## 2020-10-31 PROCEDURE — 99291 CRITICAL CARE FIRST HOUR: CPT

## 2020-10-31 PROCEDURE — 99291 CRITICAL CARE FIRST HOUR: CPT | Mod: 57

## 2020-10-31 PROCEDURE — 31603 EMER TRACHEOSTOMY TTRACH: CPT | Mod: GC

## 2020-10-31 PROCEDURE — 15733 MUSC MYOQ/FSCQ FLP H&N PEDCL: CPT | Mod: GC

## 2020-10-31 PROCEDURE — 12002 RPR S/N/AX/GEN/TRNK2.6-7.5CM: CPT | Mod: 59

## 2020-10-31 PROCEDURE — 99222 1ST HOSP IP/OBS MODERATE 55: CPT | Mod: 57

## 2020-10-31 PROCEDURE — 13102 CMPLX RPR TRUNK ADDL 5CM/<: CPT | Mod: GC,59

## 2020-10-31 PROCEDURE — 31800 REPAIR OF WINDPIPE INJURY: CPT | Mod: GC

## 2020-10-31 PROCEDURE — 71045 X-RAY EXAM CHEST 1 VIEW: CPT | Mod: 26

## 2020-10-31 PROCEDURE — 13101 CMPLX RPR TRUNK 2.6-7.5 CM: CPT | Mod: GC,59

## 2020-10-31 RX ORDER — ACETAMINOPHEN 500 MG
1000 TABLET ORAL EVERY 6 HOURS
Refills: 0 | Status: DISCONTINUED | OUTPATIENT
Start: 2020-10-31 | End: 2020-11-01

## 2020-10-31 RX ORDER — SODIUM CHLORIDE 9 MG/ML
1000 INJECTION, SOLUTION INTRAVENOUS ONCE
Refills: 0 | Status: COMPLETED | OUTPATIENT
Start: 2020-10-31 | End: 2020-10-31

## 2020-10-31 RX ORDER — ENOXAPARIN SODIUM 100 MG/ML
40 INJECTION SUBCUTANEOUS DAILY
Refills: 0 | Status: DISCONTINUED | OUTPATIENT
Start: 2020-10-31 | End: 2020-11-10

## 2020-10-31 RX ORDER — CALCIUM GLUCONATE 100 MG/ML
2 VIAL (ML) INTRAVENOUS ONCE
Refills: 0 | Status: COMPLETED | OUTPATIENT
Start: 2020-10-31 | End: 2020-10-31

## 2020-10-31 RX ORDER — CHLORHEXIDINE GLUCONATE 213 G/1000ML
1 SOLUTION TOPICAL
Refills: 0 | Status: DISCONTINUED | OUTPATIENT
Start: 2020-11-01 | End: 2020-11-06

## 2020-10-31 RX ORDER — DEXMEDETOMIDINE HYDROCHLORIDE IN 0.9% SODIUM CHLORIDE 4 UG/ML
0.5 INJECTION INTRAVENOUS
Qty: 200 | Refills: 0 | Status: DISCONTINUED | OUTPATIENT
Start: 2020-10-31 | End: 2020-11-01

## 2020-10-31 RX ORDER — SODIUM CHLORIDE 9 MG/ML
1000 INJECTION INTRAMUSCULAR; INTRAVENOUS; SUBCUTANEOUS
Refills: 0 | Status: DISCONTINUED | OUTPATIENT
Start: 2020-10-31 | End: 2020-10-31

## 2020-10-31 RX ORDER — CHLORHEXIDINE GLUCONATE 213 G/1000ML
15 SOLUTION TOPICAL EVERY 12 HOURS
Refills: 0 | Status: DISCONTINUED | OUTPATIENT
Start: 2020-10-31 | End: 2020-11-01

## 2020-10-31 RX ORDER — PANTOPRAZOLE SODIUM 20 MG/1
40 TABLET, DELAYED RELEASE ORAL DAILY
Refills: 0 | Status: DISCONTINUED | OUTPATIENT
Start: 2020-11-01 | End: 2020-11-06

## 2020-10-31 RX ORDER — FENTANYL CITRATE 50 UG/ML
1 INJECTION INTRAVENOUS
Qty: 5000 | Refills: 0 | Status: DISCONTINUED | OUTPATIENT
Start: 2020-10-31 | End: 2020-11-01

## 2020-10-31 RX ORDER — CEFAZOLIN SODIUM 1 G
2000 VIAL (EA) INJECTION ONCE
Refills: 0 | Status: COMPLETED | OUTPATIENT
Start: 2020-10-31 | End: 2020-11-01

## 2020-10-31 RX ADMIN — DEXMEDETOMIDINE HYDROCHLORIDE IN 0.9% SODIUM CHLORIDE 12.5 MICROGRAM(S)/KG/HR: 4 INJECTION INTRAVENOUS at 23:20

## 2020-10-31 RX ADMIN — FENTANYL CITRATE 5 MICROGRAM(S)/KG/HR: 50 INJECTION INTRAVENOUS at 23:19

## 2020-10-31 RX ADMIN — Medication 200 GRAM(S): at 23:19

## 2020-10-31 RX ADMIN — SODIUM CHLORIDE 1000 MILLILITER(S): 9 INJECTION, SOLUTION INTRAVENOUS at 23:38

## 2020-10-31 RX ADMIN — Medication 400 MILLIGRAM(S): at 23:19

## 2020-10-31 NOTE — BRIEF OPERATIVE NOTE - NSICDXBRIEFPROCEDURE_GEN_ALL_CORE_FT
PROCEDURES:  Exploration of penetrating wound of chest 31-Oct-2020 23:51:16  Laly Faye  Exploration of abdominal wound 31-Oct-2020 23:51:04  Laly Faye  Open tracheostomy 31-Oct-2020 23:50:58  Laly Faye  Other tracheal repair 31-Oct-2020 23:50:49  Laly Faye  Exploration, wound, neck 31-Oct-2020 23:50:40  Laly Faye
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
## 2020-10-31 NOTE — ED PROVIDER NOTE - PHYSICAL EXAMINATION
alert and awake, follows command, moves all 4  dressing over neck, no hard signs  chest wound as described above  non peritonitic abd, normal peripheral pulses

## 2020-10-31 NOTE — H&P ADULT - NSHPLABSRESULTS_GEN_ALL_CORE
ICU Vital Signs Last 24 Hrs  T(C): --  T(F): --  HR: --  BP: --  BP(mean): --  ABP: --  ABP(mean): --  RR: --  SpO2: --                 13.5   10.40  )----------(  280       ( 31 Oct 2020 19:04 )               40.0      144    |  106    |  12     ----------------------------<  154        ( 31 Oct 2020 19:04 )  3.8     |  16     |  1.14     Ca    9.2        ( 31 Oct 2020 19:04 )    TPro  6.6    /  Alb  4.6    /  TBili  0.2    /  DBili  x      /  AST  31     /  ALT  54     /  AlkPhos  47     ( 31 Oct 2020 19:04 )    PT/INR -  12.9 sec / 1.08 ratio   ( 31 Oct 2020 19:04 )       PTT -  24.1 sec   ( 31 Oct 2020 19:04 )  CAPILLARY BLOOD GLUCOSE
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
## 2020-10-31 NOTE — PRE-ANESTHESIA EVALUATION ADULT - NSANTHOSAYNRD_GEN_A_CORE
unable to completely assess due to emergency/No. NICOLE screening performed.  STOP BANG Legend: 0-2 = LOW Risk; 3-4 = INTERMEDIATE Risk; 5-8 = HIGH Risk
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
## 2020-11-01 DIAGNOSIS — F33.2 MAJOR DEPRESSIVE DISORDER, RECURRENT SEVERE WITHOUT PSYCHOTIC FEATURES: ICD-10-CM

## 2020-11-01 LAB
GAS PNL BLDA: SIGNIFICANT CHANGE UP
GAS PNL BLDA: SIGNIFICANT CHANGE UP
HCT VFR BLD CALC: 29.7 % — LOW (ref 39–50)
HCV AB S/CO SERPL IA: 0.06 S/CO — SIGNIFICANT CHANGE UP (ref 0–0.99)
HCV AB SERPL-IMP: SIGNIFICANT CHANGE UP
HGB BLD-MCNC: 9.7 G/DL — LOW (ref 13–17)
MCHC RBC-ENTMCNC: 31.5 PG — SIGNIFICANT CHANGE UP (ref 27–34)
MCHC RBC-ENTMCNC: 32.7 GM/DL — SIGNIFICANT CHANGE UP (ref 32–36)
MCV RBC AUTO: 96.4 FL — SIGNIFICANT CHANGE UP (ref 80–100)
NRBC # BLD: 0 /100 WBCS — SIGNIFICANT CHANGE UP (ref 0–0)
PLATELET # BLD AUTO: 195 K/UL — SIGNIFICANT CHANGE UP (ref 150–400)
RBC # BLD: 3.08 M/UL — LOW (ref 4.2–5.8)
RBC # FLD: 13.2 % — SIGNIFICANT CHANGE UP (ref 10.3–14.5)
SARS-COV-2 IGG SERPL QL IA: POSITIVE
SARS-COV-2 IGM SERPL IA-ACNC: 39.6 INDEX — HIGH
WBC # BLD: 12.22 K/UL — HIGH (ref 3.8–10.5)
WBC # FLD AUTO: 12.22 K/UL — HIGH (ref 3.8–10.5)

## 2020-11-01 PROCEDURE — 99223 1ST HOSP IP/OBS HIGH 75: CPT | Mod: GC

## 2020-11-01 PROCEDURE — 99233 SBSQ HOSP IP/OBS HIGH 50: CPT

## 2020-11-01 PROCEDURE — 93010 ELECTROCARDIOGRAM REPORT: CPT

## 2020-11-01 RX ORDER — SODIUM CHLORIDE 9 MG/ML
1000 INJECTION, SOLUTION INTRAVENOUS
Refills: 0 | Status: DISCONTINUED | OUTPATIENT
Start: 2020-11-01 | End: 2020-11-02

## 2020-11-01 RX ORDER — TETANUS TOXOID, REDUCED DIPHTHERIA TOXOID AND ACELLULAR PERTUSSIS VACCINE, ADSORBED 5; 2.5; 8; 8; 2.5 [IU]/.5ML; [IU]/.5ML; UG/.5ML; UG/.5ML; UG/.5ML
0.5 SUSPENSION INTRAMUSCULAR ONCE
Refills: 0 | Status: COMPLETED | OUTPATIENT
Start: 2020-11-01 | End: 2020-11-01

## 2020-11-01 RX ORDER — CALCIUM GLUCONATE 100 MG/ML
2 VIAL (ML) INTRAVENOUS ONCE
Refills: 0 | Status: COMPLETED | OUTPATIENT
Start: 2020-11-01 | End: 2020-11-01

## 2020-11-01 RX ORDER — OXYCODONE HYDROCHLORIDE 5 MG/1
10 TABLET ORAL EVERY 4 HOURS
Refills: 0 | Status: DISCONTINUED | OUTPATIENT
Start: 2020-11-01 | End: 2020-11-04

## 2020-11-01 RX ORDER — HYDROMORPHONE HYDROCHLORIDE 2 MG/ML
0.5 INJECTION INTRAMUSCULAR; INTRAVENOUS; SUBCUTANEOUS ONCE
Refills: 0 | Status: DISCONTINUED | OUTPATIENT
Start: 2020-11-01 | End: 2020-11-01

## 2020-11-01 RX ORDER — ACETAMINOPHEN 500 MG
1000 TABLET ORAL EVERY 6 HOURS
Refills: 0 | Status: COMPLETED | OUTPATIENT
Start: 2020-11-01 | End: 2020-11-01

## 2020-11-01 RX ORDER — HYDROMORPHONE HYDROCHLORIDE 2 MG/ML
0.5 INJECTION INTRAMUSCULAR; INTRAVENOUS; SUBCUTANEOUS
Refills: 0 | Status: DISCONTINUED | OUTPATIENT
Start: 2020-11-01 | End: 2020-11-01

## 2020-11-01 RX ORDER — OLANZAPINE 15 MG/1
15 TABLET, FILM COATED ORAL AT BEDTIME
Refills: 0 | Status: DISCONTINUED | OUTPATIENT
Start: 2020-11-01 | End: 2020-11-06

## 2020-11-01 RX ORDER — CLONAZEPAM 1 MG
1 TABLET ORAL
Refills: 0 | Status: DISCONTINUED | OUTPATIENT
Start: 2020-11-01 | End: 2020-11-06

## 2020-11-01 RX ORDER — PHENYLEPHRINE HYDROCHLORIDE 10 MG/ML
0.7 INJECTION INTRAVENOUS
Qty: 40 | Refills: 0 | Status: DISCONTINUED | OUTPATIENT
Start: 2020-11-01 | End: 2020-11-01

## 2020-11-01 RX ORDER — OXYCODONE HYDROCHLORIDE 5 MG/1
5 TABLET ORAL EVERY 4 HOURS
Refills: 0 | Status: DISCONTINUED | OUTPATIENT
Start: 2020-11-01 | End: 2020-11-04

## 2020-11-01 RX ORDER — LANOLIN ALCOHOL/MO/W.PET/CERES
6 CREAM (GRAM) TOPICAL AT BEDTIME
Refills: 0 | Status: DISCONTINUED | OUTPATIENT
Start: 2020-11-01 | End: 2020-11-03

## 2020-11-01 RX ADMIN — PHENYLEPHRINE HYDROCHLORIDE 33 MICROGRAM(S)/KG/MIN: 10 INJECTION INTRAVENOUS at 08:22

## 2020-11-01 RX ADMIN — HYDROMORPHONE HYDROCHLORIDE 0.5 MILLIGRAM(S): 2 INJECTION INTRAMUSCULAR; INTRAVENOUS; SUBCUTANEOUS at 08:22

## 2020-11-01 RX ADMIN — HYDROMORPHONE HYDROCHLORIDE 0.5 MILLIGRAM(S): 2 INJECTION INTRAMUSCULAR; INTRAVENOUS; SUBCUTANEOUS at 15:45

## 2020-11-01 RX ADMIN — ENOXAPARIN SODIUM 40 MILLIGRAM(S): 100 INJECTION SUBCUTANEOUS at 11:05

## 2020-11-01 RX ADMIN — HYDROMORPHONE HYDROCHLORIDE 0.5 MILLIGRAM(S): 2 INJECTION INTRAMUSCULAR; INTRAVENOUS; SUBCUTANEOUS at 17:47

## 2020-11-01 RX ADMIN — HYDROMORPHONE HYDROCHLORIDE 0.5 MILLIGRAM(S): 2 INJECTION INTRAMUSCULAR; INTRAVENOUS; SUBCUTANEOUS at 19:00

## 2020-11-01 RX ADMIN — OLANZAPINE 15 MILLIGRAM(S): 15 TABLET, FILM COATED ORAL at 21:03

## 2020-11-01 RX ADMIN — Medication 400 MILLIGRAM(S): at 11:15

## 2020-11-01 RX ADMIN — HYDROMORPHONE HYDROCHLORIDE 0.5 MILLIGRAM(S): 2 INJECTION INTRAMUSCULAR; INTRAVENOUS; SUBCUTANEOUS at 11:15

## 2020-11-01 RX ADMIN — SODIUM CHLORIDE 125 MILLILITER(S): 9 INJECTION, SOLUTION INTRAVENOUS at 00:18

## 2020-11-01 RX ADMIN — Medication 400 MILLIGRAM(S): at 05:38

## 2020-11-01 RX ADMIN — OXYCODONE HYDROCHLORIDE 10 MILLIGRAM(S): 5 TABLET ORAL at 23:01

## 2020-11-01 RX ADMIN — CHLORHEXIDINE GLUCONATE 15 MILLILITER(S): 213 SOLUTION TOPICAL at 17:19

## 2020-11-01 RX ADMIN — CHLORHEXIDINE GLUCONATE 1 APPLICATION(S): 213 SOLUTION TOPICAL at 05:38

## 2020-11-01 RX ADMIN — TETANUS TOXOID, REDUCED DIPHTHERIA TOXOID AND ACELLULAR PERTUSSIS VACCINE, ADSORBED 0.5 MILLILITER(S): 5; 2.5; 8; 8; 2.5 SUSPENSION INTRAMUSCULAR at 11:05

## 2020-11-01 RX ADMIN — HYDROMORPHONE HYDROCHLORIDE 0.5 MILLIGRAM(S): 2 INJECTION INTRAMUSCULAR; INTRAVENOUS; SUBCUTANEOUS at 03:02

## 2020-11-01 RX ADMIN — PANTOPRAZOLE SODIUM 40 MILLIGRAM(S): 20 TABLET, DELAYED RELEASE ORAL at 11:05

## 2020-11-01 RX ADMIN — CHLORHEXIDINE GLUCONATE 15 MILLILITER(S): 213 SOLUTION TOPICAL at 05:38

## 2020-11-01 RX ADMIN — Medication 1000 MILLIGRAM(S): at 17:34

## 2020-11-01 RX ADMIN — Medication 400 MILLIGRAM(S): at 17:19

## 2020-11-01 RX ADMIN — Medication 6 MILLIGRAM(S): at 21:03

## 2020-11-01 RX ADMIN — Medication 1000 MILLIGRAM(S): at 00:00

## 2020-11-01 RX ADMIN — HYDROMORPHONE HYDROCHLORIDE 0.5 MILLIGRAM(S): 2 INJECTION INTRAMUSCULAR; INTRAVENOUS; SUBCUTANEOUS at 03:30

## 2020-11-01 RX ADMIN — Medication 1 MILLIGRAM(S): at 21:03

## 2020-11-01 RX ADMIN — HYDROMORPHONE HYDROCHLORIDE 0.5 MILLIGRAM(S): 2 INJECTION INTRAMUSCULAR; INTRAVENOUS; SUBCUTANEOUS at 16:00

## 2020-11-01 RX ADMIN — Medication 200 GRAM(S): at 02:11

## 2020-11-01 RX ADMIN — DEXMEDETOMIDINE HYDROCHLORIDE IN 0.9% SODIUM CHLORIDE 12.5 MICROGRAM(S)/KG/HR: 4 INJECTION INTRAVENOUS at 08:22

## 2020-11-01 RX ADMIN — HYDROMORPHONE HYDROCHLORIDE 0.5 MILLIGRAM(S): 2 INJECTION INTRAMUSCULAR; INTRAVENOUS; SUBCUTANEOUS at 08:37

## 2020-11-01 RX ADMIN — HYDROMORPHONE HYDROCHLORIDE 0.5 MILLIGRAM(S): 2 INJECTION INTRAMUSCULAR; INTRAVENOUS; SUBCUTANEOUS at 11:30

## 2020-11-01 RX ADMIN — PHENYLEPHRINE HYDROCHLORIDE 33 MICROGRAM(S)/KG/MIN: 10 INJECTION INTRAVENOUS at 00:18

## 2020-11-01 RX ADMIN — Medication 100 MILLIGRAM(S): at 01:23

## 2020-11-01 RX ADMIN — Medication 1000 MILLIGRAM(S): at 11:30

## 2020-11-01 RX ADMIN — Medication 400 MILLIGRAM(S): at 23:00

## 2020-11-01 RX ADMIN — SODIUM CHLORIDE 75 MILLILITER(S): 9 INJECTION, SOLUTION INTRAVENOUS at 19:29

## 2020-11-01 NOTE — BEHAVIORAL HEALTH ASSESSMENT NOTE - NSBHCHARTREVIEWLAB_PSY_A_CORE FT
9.7    12.22 )-----------( 195      ( 01 Nov 2020 01:53 )             29.7     10-31    144  |  109<H>  |  11  ----------------------------<  133<H>  4.5   |  20<L>  |  0.94    Ca    7.6<L>      31 Oct 2020 22:58  Phos  3.1     10-31  Mg     2.2     10-31    TPro  6.6  /  Alb  4.6  /  TBili  0.2  /  DBili  x   /  AST  31  /  ALT  54<H>  /  AlkPhos  47  10-31
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
## 2020-11-01 NOTE — CHART NOTE - NSCHARTNOTEFT_GEN_A_CORE
GENERAL SURGERY POST-OPERATIVE NOTE    JEFE [facility] | [id number] | [facility] 8ICU 16    Procedure: s/p exploration of penetrating chest wound, exploration of abdominal wound, open tracheostomy, open tracheal repair, neck wound exploration    SUBJECTIVE: Patient seen approximately 4 hours after procedure. Patient tolerated the procedure and was immediately transferred to SICU for observation. Patient is currently with tracheostomy and on restraints. Cannot communicate but appears uncomfortable with current restraining measures. Does not appear to be in acute distress or severe pain.     PAST MEDICAL & SURGICAL HISTORY:      PHYSICAL EXAM:  Gen: Restless, restrained, on MV, Meredith in place, on Precedex and Phenylphrine  HEENT: Blood stained hair without visible lacerations of scalp, forehead with 1x2cm abrasion, multiple small lacerations and abrasions along ear all dry and without active bleeding, EOMI, circumferential laceration along neck dressing with Steri-strips and CDI, left edge of neck laceration with CELINE drain in place CDI with sanguinous drainage.   Resp: breathing through tracheostomy, serosanguinous secretions on coughing  CV: RRR, no rubs, or murmurs  Chest: Multiple lacerations, CDI  Abdomen: soft, midline traverse laceration repair well covered and CDI, no signs of active bleeding or dressing saturation observed.   Skin: Incision c/d/i. Normal color, no rashes or cyanosis]  Exterminates: Lower extremities with Venodyne and ecchymosis along right knee.     Vital Signs Last 24 Hrs  T(C): 37.1 (31 Oct 2020 23:00), Max: 37.1 (31 Oct 2020 23:00)  T(F): 98.8 (31 Oct 2020 23:00), Max: 98.8 (31 Oct 2020 23:00)  HR: 64 (01 Nov 2020 02:15) (62 - 118)  BP: 76/52 (31 Oct 2020 23:30) (76/52 - 117/72)  BP(mean): 58 (31 Oct 2020 23:30) (58 - 88)  RR: 18 (01 Nov 2020 02:15) (12 - 25)  SpO2: 100% (01 Nov 2020 02:15) (97% - 100%)  I&O's Summary    31 Oct 2020 08:01  -  01 Nov 2020 03:22  --------------------------------------------------------  IN: 2303.9 mL / OUT: 505 mL / NET: 1798.9 mL      I&O's Detail    31 Oct 2020 08:01  -  01 Nov 2020 03:22  --------------------------------------------------------  IN:    Dexmedetomidine: 72.2 mL    Enteral Tube Flush: 50 mL    FentaNYL: 12.5 mL    IV PiggyBack: 200 mL    IV PiggyBack: 200 mL    Lactated Ringers Bolus: 1000 mL    multiple electrolytes Injection Type 1.: 375 mL    Phenylephrine: 94.2 mL    PRBCs (Packed Red Blood Cells): 300 mL  Total IN: 2303.9 mL    OUT:    Bulb (mL): 30 mL    Indwelling Catheter - Urethral (mL): 475 mL  Total OUT: 505 mL    Total NET: 1798.9 mL                              9.7    12.22 )-----------( 195      ( 01 Nov 2020 01:53 )             29.7     10-31    144  |  109<H>  |  11  ----------------------------<  133<H>  4.5   |  20<L>  |  0.94    Ca    7.6<L>      31 Oct 2020 22:58  Phos  3.1     10-31  Mg     2.2     10-31    TPro  6.6  /  Alb  4.6  /  TBili  0.2  /  DBili  x   /  AST  31  /  ALT  54<H>  /  AlkPhos  47  10-31   PT/INR - ( 31 Oct 2020 22:58 )   PT: 13.4 sec;   INR: 1.12 ratio         PTT - ( 31 Oct 2020 22:58 )  PTT:23.7 sec    Assessment:  The patient is a 55y M who is now several hours post-op from a  exploration of penetrating chest wound, exploration of abdominal wound, open tracheostomy, open tracheal repair, neck wound exploration. Patient recovering well in SICU after suicide attempt and surgical repair of self-inflected wounds. On restraints due to risk of harm to self or others.     Plan:  - Pain control as needed  - Assess if tolerating diet and advance as indicated  - DVT ppx  - Encourage OOB and ambulating as tolerated  - F/U Labs  - Wean MV as tolerated  - Wean of pressors as tolerated  - Agree with restraints for self-protection  - Appreciate excellent SICU care   exploration of penetrating chest wound, exploration of abdominal wound, open tracheostomy, open tracheal repair, neck wound exploration    Covering  [phone]  PGY 1 Pedro
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
## 2020-11-01 NOTE — BEHAVIORAL HEALTH ASSESSMENT NOTE - SUICIDE RISK FACTORS
Hopelessness or despair/Access to lethal methods (pills, firearm, etc.: Ask specifically about presence or absence of a firearm in the home or ease of accessing/Insomnia/Mood Disorder current/past/Current mood episode/Alcohol/Substance abuse disorders
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
## 2020-11-01 NOTE — PROGRESS NOTE ADULT - SUBJECTIVE AND OBJECTIVE BOX
24 HOUR EVENTS:  -PRBC x 1u  -LR bolus x 1L  -ancef 2g x 1 post op  -tetanus ADAcel vaccine given  -on nando gtt briefly for hypotension  -tolerating trach collar overnight, on 0.5 precedex    Patient seen and examined at bedside.  NGT in place to LCWS  Restrained to bed.   With tracheostomy and HFNC.   States that he is unable to sleep      Physical Exam  General Appearance: Resting comfortably, no acute distress  Chest: non-labored breathing, no respiratory distress  CV: Pulse regular presently  Abdomen: Soft, non-tender, non-distended, no peritonitis   Extremities: warm and well perfused    Vital Signs Last 24 Hrs  T(C): 36.7 (01 Nov 2020 07:00), Max: 37.1 (31 Oct 2020 23:00)  T(F): 98.1 (01 Nov 2020 07:00), Max: 98.8 (31 Oct 2020 23:00)  HR: 72 (01 Nov 2020 10:30) (59 - 118)  BP: 115/64 (01 Nov 2020 07:45) (76/52 - 117/72)  BP(mean): 84 (01 Nov 2020 07:45) (58 - 88)  RR: 15 (01 Nov 2020 10:30) (9 - 28)  SpO2: 98% (01 Nov 2020 10:30) (96% - 100%)    I&O's Detail    31 Oct 2020 08:01  -  01 Nov 2020 07:00  --------------------------------------------------------  IN:    Dexmedetomidine: 161.5 mL    Enteral Tube Flush: 100 mL    FentaNYL: 12.5 mL    IV PiggyBack: 200 mL    IV PiggyBack: 300 mL    Lactated Ringers Bolus: 1000 mL    multiple electrolytes Injection Type 1.: 1000 mL    Phenylephrine: 209.5 mL    PRBCs (Packed Red Blood Cells): 300 mL  Total IN: 3283.5 mL    OUT:    Bulb (mL): 50 mL    Indwelling Catheter - Urethral (mL): 860 mL    Nasogastric/Oral tube (mL): 200 mL  Total OUT: 1110 mL    Total NET: 2173.5 mL      01 Nov 2020 07:01  -  01 Nov 2020 12:48  --------------------------------------------------------  IN:    Dexmedetomidine: 62.8 mL    multiple electrolytes Injection Type 1.: 375 mL    Phenylephrine: 51.7 mL  Total IN: 489.5 mL    OUT:    Indwelling Catheter - Urethral (mL): 185 mL  Total OUT: 185 mL    Total NET: 304.5 mL          10-31    144  |  109<H>  |  11  ----------------------------<  133<H>  4.5   |  20<L>  |  0.94    Ca    7.6<L>      31 Oct 2020 22:58  Phos  3.1     10-31  Mg     2.2     10-31    TPro  6.6  /  Alb  4.6  /  TBili  0.2  /  DBili  x   /  AST  31  /  ALT  54<H>  /  AlkPhos  47  10-31                            9.7    12.22 )-----------( 195      ( 01 Nov 2020 01:53 )             29.7       PT/INR - ( 31 Oct 2020 22:58 )   PT: 13.4 sec;   INR: 1.12 ratio         PTT - ( 31 Oct 2020 22:58 )  PTT:23.7 sec    LABS:                         9.7    12.22 )-----------( 195      ( 01 Nov 2020 01:53 )             29.7     10-31    144  |  109<H>  |  11  ----------------------------<  133<H>  4.5   |  20<L>  |  0.94    Ca    7.6<L>      31 Oct 2020 22:58  Phos  3.1     10-31  Mg     2.2     10-31    TPro  6.6  /  Alb  4.6  /  TBili  0.2  /  DBili  x   /  AST  31  /  ALT  54<H>  /  AlkPhos  47  10-31    PT/INR - ( 31 Oct 2020 22:58 )   PT: 13.4 sec;   INR: 1.12 ratio         PTT - ( 31 Oct 2020 22:58 )  PTT:23.7 sec    < from: Xray Chest 1 View- PORTABLE-Urgent (Xray Chest 1 View- PORTABLE-Urgent .) (10.31.20 @ 23:37) >    EXAM:  XR CHEST PORTABLE URGENT 1V                            PROCEDURE DATE:  10/31/2020        INTERPRETATION:  Indication: Assess tracheostomy and nasogastric tube.    TECHNIQUE: Single portable view of the chest.    COMPARISON: 10/31/2020    FINDINGS /  IMPRESSION: There is a tracheostomy tube in place. There is a nasogastric tube with its tip in good position, within the stomach.    < end of copied text >    MEDICATIONS  (STANDING):  acetaminophen  IVPB .. 1000 milliGRAM(s) IV Intermittent every 6 hours  chlorhexidine 0.12% Liquid 15 milliLiter(s) Oral Mucosa every 12 hours  chlorhexidine 2% Cloths 1 Application(s) Topical <User Schedule>  enoxaparin Injectable 40 milliGRAM(s) SubCutaneous daily  multiple electrolytes Injection Type 1 1000 milliLiter(s) (125 mL/Hr) IV Continuous <Continuous>  pantoprazole  Injectable 40 milliGRAM(s) IV Push daily  phenylephrine    Infusion 0.7 MICROgram(s)/kG/Min (33 mL/Hr) IV Continuous <Continuous>    MEDICATIONS  (PRN):  HYDROmorphone  Injectable 0.5 milliGRAM(s) IV Push every 3 hours PRN Severe Pain (7 - 10)

## 2020-11-01 NOTE — PHYSICAL THERAPY INITIAL EVALUATION ADULT - GENERAL OBSERVATIONS, REHAB EVAL
Pt received semisupine in bed, PIV, trach collar, CELINE x1, guajardo, b/l wrist restraints, guajardo, A line, ICU monitoring

## 2020-11-01 NOTE — PROVIDER CONTACT NOTE (OTHER) - ASSESSMENT
VSS and hemodynamically stable. On trach collar at 40%. Pt follows commands and responds appropriately
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
## 2020-11-01 NOTE — BEHAVIORAL HEALTH ASSESSMENT NOTE - ACTIVATING EVENTS/STRESSORS
Non-compliant or not receiving treatment/Hopeless about or dissatisfied with provider or treatment/Substance intoxication or withdrawal

## 2020-11-01 NOTE — CHART NOTE - NSCHARTNOTEFT_GEN_A_CORE
Spoke with patients outpatient psychiatrist, Dr. Dominguez (cell: [phone])     Dr. Dominguez reported that he had an appointment on 10/15 and at that time reported no suicidal ideations. His suspicion from talking to the patient's wife as well as the history is that the patient has returned to alcohol abuse which led to this suicidal attempt. He recommends CIWA protocol given unknown amount of alcohol use.     Dr. Dominguez does not come to [facility], but recommends that the psychiatrist here to also reach him on his cell phone to discuss further. His treatment recommendations are inpatient psychiatry admission following medical stabilization and possible ECT as this is his second major suicide attempt in the past year.     Psychiatric medications per last appointment:  - Paroxetine 40 daily  - Olanzapine 15 qhs  - Clonazepam 1mg in AM, 2mg in PM  - MDMC 12.5mg at bedtime     Tosin Sexton, PGY2  [facility] [address] Spoke with patients outpatient psychiatrist, Dr. Dominguez (cell: [phone])     Dr. Dominguez reported that he had an appointment on 10/15 and at that time reported no suicidal ideations. His suspicion from talking to the patient's wife as well as the history is that the patient has returned to alcohol abuse which led to this suicidal attempt. He recommends CIWA protocol given unknown amount of alcohol use.     Dr. Dominguez does not come to [facility], but recommends that the psychiatrist here to also reach him on his cell phone to discuss further. His treatment recommendations are inpatient psychiatry admission following medical stabilization and possible ECT as this is his second major suicide attempt in the past year.     Psychiatric medications per last appointment:  - Paroxetine 40 daily  - Olanzapine 15 qhs  - Clonazepam 1mg in AM, 2mg in PM  - Zolpidem 12.5mg at bedtime     Tosin Sexton, PGY2  [facility] [id number]
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
## 2020-11-01 NOTE — PROGRESS NOTE ADULT - SUBJECTIVE AND OBJECTIVE BOX
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  3.1     10-31  Mg     2.2     10-31    TPro  6.6  /  Alb  4.6  /  TBili  0.2  /  DBili  x   /  AST  31  /  ALT  54<H>  /  AlkPhos  47  10-31    [x] Meredith catheter, indication: strict i/o's  Meds: calcium gluconate IVPB 2 Gram(s) IV Intermittent once  multiple electrolytes Injection Type 1 1000 milliLiter(s) IV Continuous <Continuous>        HEMATOLOGIC  Meds: enoxaparin Injectable 40 milliGRAM(s) SubCutaneous daily    [x] VTE Prophylaxis                        10.4   14.68 )-----------( 228      ( 31 Oct 2020 22:58 )             31.0     PT/INR - ( 31 Oct 2020 22:58 )   PT: 13.4 sec;   INR: 1.12 ratio         PTT - ( 31 Oct 2020 22:58 )  PTT:23.7 sec  Transfusion     [ ] PRBC   [ ] Platelets   [ ] FFP   [ ] Cryoprecipitate      INFECTIOUS DISEASES  WBC Count: 14.68 K/uL (10-31 @ 22:58)  WBC Count: 10.40 K/uL (10-31 @ 19:04)    RECENT CULTURES:    Meds: diphtheria/tetanus/pertussis (acellular) Vaccine (ADAcel) 0.5 milliLiter(s) IntraMuscular once        ENDOCRINE  CAPILLARY BLOOD GLUCOSE        Meds:       ACCESS DEVICES:  [x] Peripheral IV  [ ] Central Venous Line	[ ] R	[ ] L	[ ] IJ	[ ] Fem	[ ] SC	Placed:   [x] Arterial Line		[ ] R	[ ] L	[ ] Fem	[ ] Rad	[ ] Ax	Placed:   [ ] PICC:					[ ] Mediport  [x] Urinary Catheter, Date Placed: 10/31  [x] Necessity of urinary, arterial, and venous catheters discussed    OTHER MEDICATIONS:  chlorhexidine 0.12% Liquid 15 milliLiter(s) Oral Mucosa every 12 hours  chlorhexidine 2% Cloths 1 Application(s) Topical <User Schedule>      CODE STATUS:      IMAGING:

## 2020-11-01 NOTE — BEHAVIORAL HEALTH ASSESSMENT NOTE - NSBHCHARTREVIEWVS_PSY_A_CORE FT
Vital Signs Last 24 Hrs  T(C): 37 (01 Nov 2020 11:00), Max: 37.1 (31 Oct 2020 23:00)  T(F): 98.6 (01 Nov 2020 11:00), Max: 98.8 (31 Oct 2020 23:00)  HR: 81 (01 Nov 2020 13:00) (59 - 118)  BP: 115/64 (01 Nov 2020 07:45) (76/52 - 117/72)  BP(mean): 84 (01 Nov 2020 07:45) (58 - 88)  RR: 20 (01 Nov 2020 13:00) (9 - 28)  SpO2: 98% (01 Nov 2020 13:00) (95% - 100%)

## 2020-11-01 NOTE — OCCUPATIONAL THERAPY INITIAL EVALUATION ADULT - GENERAL OBSERVATIONS, REHAB EVAL
Pt presents semi-supine in NAD +trach collar, +IV, +ICU monitoring, +guajardo, +NGT, +b/l wrist restraints, +CELINE drain.

## 2020-11-01 NOTE — BEHAVIORAL HEALTH ASSESSMENT NOTE - RISK ASSESSMENT
High Acute Suicide Risk Patient is at high risk of harm to self. He has severe, treatment-resistant MDD, with multiple prior inpatient admissions, now with 2 serious suicide attempts, with persistent suicidality.

## 2020-11-02 LAB
ANION GAP SERPL CALC-SCNC: 9 MMOL/L — SIGNIFICANT CHANGE UP (ref 5–17)
APPEARANCE UR: CLEAR — SIGNIFICANT CHANGE UP
APTT BLD: 17.6 SEC — LOW (ref 27.5–35.5)
APTT BLD: 26.6 SEC — LOW (ref 27.5–35.5)
BACTERIA # UR AUTO: NEGATIVE — SIGNIFICANT CHANGE UP
BILIRUB UR-MCNC: NEGATIVE — SIGNIFICANT CHANGE UP
BUN SERPL-MCNC: 6 MG/DL — LOW (ref 7–23)
BUN SERPL-MCNC: 7 MG/DL — SIGNIFICANT CHANGE UP (ref 7–23)
BUN SERPL-MCNC: 9 MG/DL — SIGNIFICANT CHANGE UP (ref 7–23)
CALCIUM SERPL-MCNC: 8.3 MG/DL — LOW (ref 8.4–10.5)
CALCIUM SERPL-MCNC: 8.3 MG/DL — LOW (ref 8.4–10.5)
CALCIUM SERPL-MCNC: 8.8 MG/DL — SIGNIFICANT CHANGE UP (ref 8.4–10.5)
CHLORIDE SERPL-SCNC: 107 MMOL/L — SIGNIFICANT CHANGE UP (ref 96–108)
CHLORIDE SERPL-SCNC: 108 MMOL/L — SIGNIFICANT CHANGE UP (ref 96–108)
CHLORIDE SERPL-SCNC: 108 MMOL/L — SIGNIFICANT CHANGE UP (ref 96–108)
CO2 SERPL-SCNC: 23 MMOL/L — SIGNIFICANT CHANGE UP (ref 22–31)
CO2 SERPL-SCNC: 23 MMOL/L — SIGNIFICANT CHANGE UP (ref 22–31)
CO2 SERPL-SCNC: 25 MMOL/L — SIGNIFICANT CHANGE UP (ref 22–31)
COLOR SPEC: YELLOW — SIGNIFICANT CHANGE UP
CREAT SERPL-MCNC: 0.64 MG/DL — SIGNIFICANT CHANGE UP (ref 0.5–1.3)
CREAT SERPL-MCNC: 0.72 MG/DL — SIGNIFICANT CHANGE UP (ref 0.5–1.3)
CREAT SERPL-MCNC: 0.76 MG/DL — SIGNIFICANT CHANGE UP (ref 0.5–1.3)
DIFF PNL FLD: NEGATIVE — SIGNIFICANT CHANGE UP
EPI CELLS # UR: 1 /HPF — SIGNIFICANT CHANGE UP
GAS PNL BLDA: SIGNIFICANT CHANGE UP
GLUCOSE SERPL-MCNC: 117 MG/DL — HIGH (ref 70–99)
GLUCOSE SERPL-MCNC: 139 MG/DL — HIGH (ref 70–99)
GLUCOSE SERPL-MCNC: 155 MG/DL — HIGH (ref 70–99)
GLUCOSE UR QL: NEGATIVE — SIGNIFICANT CHANGE UP
HCT VFR BLD CALC: 26.2 % — LOW (ref 39–50)
HCT VFR BLD CALC: 26.5 % — LOW (ref 39–50)
HGB BLD-MCNC: 8.6 G/DL — LOW (ref 13–17)
HGB BLD-MCNC: 8.7 G/DL — LOW (ref 13–17)
HYALINE CASTS # UR AUTO: 0 /LPF — SIGNIFICANT CHANGE UP (ref 0–2)
INR BLD: 1.15 RATIO — SIGNIFICANT CHANGE UP (ref 0.88–1.16)
KETONES UR-MCNC: NEGATIVE — SIGNIFICANT CHANGE UP
LEUKOCYTE ESTERASE UR-ACNC: NEGATIVE — SIGNIFICANT CHANGE UP
MAGNESIUM SERPL-MCNC: 2.1 MG/DL — SIGNIFICANT CHANGE UP (ref 1.6–2.6)
MAGNESIUM SERPL-MCNC: 2.2 MG/DL — SIGNIFICANT CHANGE UP (ref 1.6–2.6)
MAGNESIUM SERPL-MCNC: 2.2 MG/DL — SIGNIFICANT CHANGE UP (ref 1.6–2.6)
MCHC RBC-ENTMCNC: 31.5 PG — SIGNIFICANT CHANGE UP (ref 27–34)
MCHC RBC-ENTMCNC: 31.6 PG — SIGNIFICANT CHANGE UP (ref 27–34)
MCHC RBC-ENTMCNC: 32.5 GM/DL — SIGNIFICANT CHANGE UP (ref 32–36)
MCHC RBC-ENTMCNC: 33.2 GM/DL — SIGNIFICANT CHANGE UP (ref 32–36)
MCV RBC AUTO: 95.3 FL — SIGNIFICANT CHANGE UP (ref 80–100)
MCV RBC AUTO: 97.1 FL — SIGNIFICANT CHANGE UP (ref 80–100)
NITRITE UR-MCNC: NEGATIVE — SIGNIFICANT CHANGE UP
NRBC # BLD: 0 /100 WBCS — SIGNIFICANT CHANGE UP (ref 0–0)
NRBC # BLD: 0 /100 WBCS — SIGNIFICANT CHANGE UP (ref 0–0)
PH UR: 8 — SIGNIFICANT CHANGE UP (ref 5–8)
PHOSPHATE SERPL-MCNC: 1.6 MG/DL — LOW (ref 2.5–4.5)
PHOSPHATE SERPL-MCNC: 1.8 MG/DL — LOW (ref 2.5–4.5)
PHOSPHATE SERPL-MCNC: 1.8 MG/DL — LOW (ref 2.5–4.5)
PLATELET # BLD AUTO: 114 K/UL — LOW (ref 150–400)
PLATELET # BLD AUTO: 123 K/UL — LOW (ref 150–400)
POTASSIUM SERPL-MCNC: 3.7 MMOL/L — SIGNIFICANT CHANGE UP (ref 3.5–5.3)
POTASSIUM SERPL-MCNC: 3.9 MMOL/L — SIGNIFICANT CHANGE UP (ref 3.5–5.3)
POTASSIUM SERPL-MCNC: 3.9 MMOL/L — SIGNIFICANT CHANGE UP (ref 3.5–5.3)
POTASSIUM SERPL-SCNC: 3.7 MMOL/L — SIGNIFICANT CHANGE UP (ref 3.5–5.3)
POTASSIUM SERPL-SCNC: 3.9 MMOL/L — SIGNIFICANT CHANGE UP (ref 3.5–5.3)
POTASSIUM SERPL-SCNC: 3.9 MMOL/L — SIGNIFICANT CHANGE UP (ref 3.5–5.3)
PROT UR-MCNC: ABNORMAL
PROTHROM AB SERPL-ACNC: 13.7 SEC — HIGH (ref 10.6–13.6)
RBC # BLD: 2.73 M/UL — LOW (ref 4.2–5.8)
RBC # BLD: 2.75 M/UL — LOW (ref 4.2–5.8)
RBC # FLD: 13.6 % — SIGNIFICANT CHANGE UP (ref 10.3–14.5)
RBC # FLD: 13.9 % — SIGNIFICANT CHANGE UP (ref 10.3–14.5)
RBC CASTS # UR COMP ASSIST: 3 /HPF — SIGNIFICANT CHANGE UP (ref 0–4)
SODIUM SERPL-SCNC: 139 MMOL/L — SIGNIFICANT CHANGE UP (ref 135–145)
SODIUM SERPL-SCNC: 140 MMOL/L — SIGNIFICANT CHANGE UP (ref 135–145)
SODIUM SERPL-SCNC: 142 MMOL/L — SIGNIFICANT CHANGE UP (ref 135–145)
SP GR SPEC: 1.02 — SIGNIFICANT CHANGE UP (ref 1.01–1.02)
UROBILINOGEN FLD QL: NEGATIVE — SIGNIFICANT CHANGE UP
WBC # BLD: 5.78 K/UL — SIGNIFICANT CHANGE UP (ref 3.8–10.5)
WBC # BLD: 6.1 K/UL — SIGNIFICANT CHANGE UP (ref 3.8–10.5)
WBC # FLD AUTO: 5.78 K/UL — SIGNIFICANT CHANGE UP (ref 3.8–10.5)
WBC # FLD AUTO: 6.1 K/UL — SIGNIFICANT CHANGE UP (ref 3.8–10.5)
WBC UR QL: 2 /HPF — SIGNIFICANT CHANGE UP (ref 0–5)

## 2020-11-02 PROCEDURE — 99024 POSTOP FOLLOW-UP VISIT: CPT

## 2020-11-02 PROCEDURE — 99233 SBSQ HOSP IP/OBS HIGH 50: CPT

## 2020-11-02 PROCEDURE — 71045 X-RAY EXAM CHEST 1 VIEW: CPT | Mod: 26,76

## 2020-11-02 PROCEDURE — 99232 SBSQ HOSP IP/OBS MODERATE 35: CPT

## 2020-11-02 RX ORDER — CLONAZEPAM 1 MG
1 TABLET ORAL ONCE
Refills: 0 | Status: DISCONTINUED | OUTPATIENT
Start: 2020-11-02 | End: 2020-11-02

## 2020-11-02 RX ORDER — BACITRACIN ZINC 500 UNIT/G
1 OINTMENT IN PACKET (EA) TOPICAL
Refills: 0 | Status: DISCONTINUED | OUTPATIENT
Start: 2020-11-02 | End: 2020-11-10

## 2020-11-02 RX ORDER — POTASSIUM PHOSPHATE, MONOBASIC POTASSIUM PHOSPHATE, DIBASIC 236; 224 MG/ML; MG/ML
30 INJECTION, SOLUTION INTRAVENOUS ONCE
Refills: 0 | Status: COMPLETED | OUTPATIENT
Start: 2020-11-02 | End: 2020-11-02

## 2020-11-02 RX ORDER — POTASSIUM PHOSPHATE, MONOBASIC POTASSIUM PHOSPHATE, DIBASIC 236; 224 MG/ML; MG/ML
15 INJECTION, SOLUTION INTRAVENOUS ONCE
Refills: 0 | Status: COMPLETED | OUTPATIENT
Start: 2020-11-02 | End: 2020-11-03

## 2020-11-02 RX ORDER — ACETAMINOPHEN 500 MG
1000 TABLET ORAL EVERY 6 HOURS
Refills: 0 | Status: COMPLETED | OUTPATIENT
Start: 2020-11-02 | End: 2020-11-03

## 2020-11-02 RX ORDER — ZOLPIDEM TARTRATE 10 MG/1
10 TABLET ORAL AT BEDTIME
Refills: 0 | Status: DISCONTINUED | OUTPATIENT
Start: 2020-11-02 | End: 2020-11-06

## 2020-11-02 RX ADMIN — Medication 1000 MILLIGRAM(S): at 12:16

## 2020-11-02 RX ADMIN — Medication 1 MILLIGRAM(S): at 02:48

## 2020-11-02 RX ADMIN — Medication 83.33 MILLIMOLE(S): at 23:31

## 2020-11-02 RX ADMIN — OXYCODONE HYDROCHLORIDE 10 MILLIGRAM(S): 5 TABLET ORAL at 14:08

## 2020-11-02 RX ADMIN — Medication 250 MILLIMOLE(S): at 11:28

## 2020-11-02 RX ADMIN — OXYCODONE HYDROCHLORIDE 5 MILLIGRAM(S): 5 TABLET ORAL at 02:26

## 2020-11-02 RX ADMIN — Medication 1000 MILLIGRAM(S): at 18:14

## 2020-11-02 RX ADMIN — CHLORHEXIDINE GLUCONATE 1 APPLICATION(S): 213 SOLUTION TOPICAL at 06:34

## 2020-11-02 RX ADMIN — OXYCODONE HYDROCHLORIDE 10 MILLIGRAM(S): 5 TABLET ORAL at 00:00

## 2020-11-02 RX ADMIN — OXYCODONE HYDROCHLORIDE 10 MILLIGRAM(S): 5 TABLET ORAL at 06:40

## 2020-11-02 RX ADMIN — OXYCODONE HYDROCHLORIDE 10 MILLIGRAM(S): 5 TABLET ORAL at 05:42

## 2020-11-02 RX ADMIN — PANTOPRAZOLE SODIUM 40 MILLIGRAM(S): 20 TABLET, DELAYED RELEASE ORAL at 12:00

## 2020-11-02 RX ADMIN — OXYCODONE HYDROCHLORIDE 10 MILLIGRAM(S): 5 TABLET ORAL at 13:35

## 2020-11-02 RX ADMIN — Medication 1 MILLIGRAM(S): at 17:33

## 2020-11-02 RX ADMIN — Medication 40 MILLIGRAM(S): at 21:52

## 2020-11-02 RX ADMIN — Medication 400 MILLIGRAM(S): at 17:34

## 2020-11-02 RX ADMIN — Medication 6 MILLIGRAM(S): at 21:52

## 2020-11-02 RX ADMIN — OLANZAPINE 15 MILLIGRAM(S): 15 TABLET, FILM COATED ORAL at 21:52

## 2020-11-02 RX ADMIN — OXYCODONE HYDROCHLORIDE 5 MILLIGRAM(S): 5 TABLET ORAL at 08:18

## 2020-11-02 RX ADMIN — Medication 1 MILLIGRAM(S): at 06:34

## 2020-11-02 RX ADMIN — OXYCODONE HYDROCHLORIDE 5 MILLIGRAM(S): 5 TABLET ORAL at 08:50

## 2020-11-02 RX ADMIN — POTASSIUM PHOSPHATE, MONOBASIC POTASSIUM PHOSPHATE, DIBASIC 83.33 MILLIMOLE(S): 236; 224 INJECTION, SOLUTION INTRAVENOUS at 11:28

## 2020-11-02 RX ADMIN — Medication 400 MILLIGRAM(S): at 12:00

## 2020-11-02 RX ADMIN — POTASSIUM PHOSPHATE, MONOBASIC POTASSIUM PHOSPHATE, DIBASIC 83.33 MILLIMOLE(S): 236; 224 INJECTION, SOLUTION INTRAVENOUS at 03:11

## 2020-11-02 RX ADMIN — ZOLPIDEM TARTRATE 10 MILLIGRAM(S): 10 TABLET ORAL at 21:51

## 2020-11-02 RX ADMIN — ENOXAPARIN SODIUM 40 MILLIGRAM(S): 100 INJECTION SUBCUTANEOUS at 12:00

## 2020-11-02 RX ADMIN — Medication 400 MILLIGRAM(S): at 23:31

## 2020-11-02 RX ADMIN — OXYCODONE HYDROCHLORIDE 5 MILLIGRAM(S): 5 TABLET ORAL at 03:00

## 2020-11-02 NOTE — DIETITIAN INITIAL EVALUATION ADULT. - PERTINENT MEDS FT
MEDICATIONS  (STANDING):  acetaminophen  IVPB .. 1000 milliGRAM(s) IV Intermittent every 6 hours  chlorhexidine 2% Cloths 1 Application(s) Topical <User Schedule>  clonazePAM  Tablet 1 milliGRAM(s) Oral two times a day  enoxaparin Injectable 40 milliGRAM(s) SubCutaneous daily  melatonin 6 milliGRAM(s) Oral at bedtime  OLANZapine 15 milliGRAM(s) Oral at bedtime  pantoprazole  Injectable 40 milliGRAM(s) IV Push daily  PARoxetine Suspension 40 milliGRAM(s) Oral at bedtime

## 2020-11-02 NOTE — SBIRT NOTE ADULT - NSSBIRTUNABLESCROTHER_GEN_A_CORE
Patient with Tracheostomy and unable to verbalize at this time. Patient's family denies ETOH abuse and reports social ETOH use on "some weekends".

## 2020-11-02 NOTE — DIETITIAN INITIAL EVALUATION ADULT. - REASON
Nutrition Focused Physical Assessment not appropriate at this time; pt appears well developed with no overt signs of muscle or fat depletion.

## 2020-11-02 NOTE — DIETITIAN INITIAL EVALUATION ADULT. - NAME AND PHONE
Jackelyn Combs, MS RD CDN [facility] (formerly [facility]), #[phone]
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
## 2020-11-02 NOTE — PROGRESS NOTE ADULT - SUBJECTIVE AND OBJECTIVE BOX
SURGERY PROGRESS NOTE    Interval/Overnight Events:   - Weaned off precedex  - Given 2U of PRBCs and 1L bolus and weaned off nando  - Received tetanus vaccine  - Discussed case with patients outpatient psychiatrist and house psych- to inpatient psych facility once medical stabilized and will receive ECT   - Febrile within 24 hour post op period- cooling blankets and tylenol given     Patient seen and examined at bedside.  NGT in place to LCWS  Restrained to bed.   With tracheostomy     Physical Exam  General Appearance: Resting comfortably, no acute distress  HEENT: Trach collar in place  Chest: non-labored breathing, no respiratory distress  CV: Pulse regular presently  Abdomen: Soft, non-tender, non-distended, no peritonitis   Extremities: warm and well perfused    ICU Vital Signs Last 24 Hrs  T(C): 36.8 (02 Nov 2020 12:00), Max: 38.5 (01 Nov 2020 19:00)  T(F): 98.2 (02 Nov 2020 12:00), Max: 101.3 (01 Nov 2020 19:00)  HR: 97 (02 Nov 2020 12:00) (72 - 101)  BP: --  BP(mean): --  ABP: 142/74 (02 Nov 2020 12:00) (83/65 - 174/74)  ABP(mean): 99 (02 Nov 2020 12:00) (75 - 108)  RR: 26 (02 Nov 2020 12:00) (10 - 32)  SpO2: 98% (02 Nov 2020 12:00) (93% - 99%)      I&O's Detail    01 Nov 2020 07:01  -  02 Nov 2020 07:00  --------------------------------------------------------  IN:    Dexmedetomidine: 62.8 mL    Enteral Tube Flush: 230 mL    IV PiggyBack: 700 mL    Jevity 1.5: 390 mL    multiple electrolytes Injection Type 1.: 2400 mL    Phenylephrine: 51.7 mL    PRBCs (Packed Red Blood Cells): 300 mL  Total IN: 4134.5 mL    OUT:    Bulb (mL): 115 mL    Indwelling Catheter - Urethral (mL): 1585 mL  Total OUT: 1700 mL    Total NET: 2434.5 mL      02 Nov 2020 07:01  -  02 Nov 2020 12:34  --------------------------------------------------------  IN:    IV PiggyBack: 183.3 mL    IV PiggyBack: 125 mL    Jevity 1.5: 310 mL    multiple electrolytes Injection Type 1.: 225 mL  Total IN: 843.3 mL    OUT:    Indwelling Catheter - Urethral (mL): 285 mL  Total OUT: 285 mL    Total NET: 558.3 mL        11-02    140  |  108  |  7   ----------------------------<  155<H>  3.9   |  23  |  0.64    Ca    8.3<L>      02 Nov 2020 10:25  Phos  1.8     11-02  Mg     2.1     11-02                          8.7    6.10  )-----------( 123      ( 02 Nov 2020 02:10 )             26.2     LIVER FUNCTIONS - ( 31 Oct 2020 19:04 )  Alb: 4.6 g/dL / Pro: 6.6 g/dL / ALK PHOS: 47 U/L / ALT: 54 U/L / AST: 31 U/L / GGT: x           PT/INR - ( 02 Nov 2020 02:10 )   PT: 13.7 sec;   INR: 1.15 ratio         PTT - ( 02 Nov 2020 02:10 )  PTT:26.6 sec          < from: Xray Chest 1 View- PORTABLE-Urgent (Xray Chest 1 View- PORTABLE-Urgent .) (10.31.20 @ 23:37) >    EXAM:  XR CHEST PORTABLE URGENT 1V                            PROCEDURE DATE:  10/31/2020        INTERPRETATION:  Indication: Assess tracheostomy and nasogastric tube.    TECHNIQUE: Single portable view of the chest.    COMPARISON: 10/31/2020    FINDINGS /  IMPRESSION: There is a tracheostomy tube in place. There is a nasogastric tube with its tip in good position, within the stomach.    < end of copied text >    MEDICATIONS  (STANDING):  acetaminophen  IVPB .. 1000 milliGRAM(s) IV Intermittent every 6 hours  chlorhexidine 0.12% Liquid 15 milliLiter(s) Oral Mucosa every 12 hours  chlorhexidine 2% Cloths 1 Application(s) Topical <User Schedule>  enoxaparin Injectable 40 milliGRAM(s) SubCutaneous daily  multiple electrolytes Injection Type 1 1000 milliLiter(s) (125 mL/Hr) IV Continuous <Continuous>  pantoprazole  Injectable 40 milliGRAM(s) IV Push daily  phenylephrine    Infusion 0.7 MICROgram(s)/kG/Min (33 mL/Hr) IV Continuous <Continuous>    MEDICATIONS  (PRN):  HYDROmorphone  Injectable 0.5 milliGRAM(s) IV Push every 3 hours PRN Severe Pain (7 - 10)
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
## 2020-11-02 NOTE — DIETITIAN INITIAL EVALUATION ADULT. - FACTORS AFF FOOD INTAKE
none/EN initiated 11/1, advanced to goal (Jevity1.5 @ 55 ml/hr x 24 hours) on 11/2; NGT now dislodged.

## 2020-11-02 NOTE — DIETITIAN INITIAL EVALUATION ADULT. - ADD RECOMMEND
1) Continue EN as ordered. 2) Add Add 3 No Carb Prosource (180cal, 4gm protein) for a total of 129 grams protein (1.5 gm/kg per IBW). 3) Monitor possible plans for swallow evaluation.
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
## 2020-11-02 NOTE — PROGRESS NOTE BEHAVIORAL HEALTH - NSBHCHARTREVIEWVS_PSY_A_CORE FT
ICU Vital Signs Last 24 Hrs  T(C): 36.8 (02 Nov 2020 12:00), Max: 38.5 (01 Nov 2020 19:00)  T(F): 98.2 (02 Nov 2020 12:00), Max: 101.3 (01 Nov 2020 19:00)  HR: 80 (02 Nov 2020 14:00) (80 - 101)  BP: 142/75 (02 Nov 2020 14:00) (142/75 - 142/75)  BP(mean): 101 (02 Nov 2020 14:00) (101 - 101)  ABP: 134/81 (02 Nov 2020 13:00) (83/65 - 174/74)  ABP(mean): 101 (02 Nov 2020 13:00) (75 - 108)  RR: 17 (02 Nov 2020 14:00) (10 - 32)  SpO2: 98% (02 Nov 2020 14:00) (93% - 99%)

## 2020-11-02 NOTE — DIETITIAN INITIAL EVALUATION ADULT. - REASON INDICATOR FOR ASSESSMENT
Nutrition Assessment warranted for length of stay on SICU  Information obtained from: medical record, communication with team. Pt observed in bed, on trach collar.

## 2020-11-02 NOTE — DIETITIAN INITIAL EVALUATION ADULT. - PERTINENT LABORATORY DATA
11-02 @ 10:25: Sodium 140, Potassium 3.9, Calcium 8.3<L>, Magnesium 2.1, Phosphorus 1.8<L>, BUN 7, Creatinine 0.64, Glucose 155<H>,   11-02 @ 02:10: Sodium 142, Potassium 3.7, Calcium 8.3<L>, Magnesium 2.2, Phosphorus 1.8<L>, BUN 9, Creatinine 0.76, Glucose 139<H>, Hemoglobin 8.7<L>, Hematocrit 26.2<L>,
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
## 2020-11-02 NOTE — PROGRESS NOTE BEHAVIORAL HEALTH - NSBHCHARTREVIEWLAB_PSY_A_CORE FT
8.7    6.10  )-----------( 123      ( 02 Nov 2020 02:10 )             26.2     11-02    140  |  108  |  7   ----------------------------<  155<H>  3.9   |  23  |  0.64    Ca    8.3<L>      02 Nov 2020 10:25  Phos  1.8     11-02  Mg     2.1     11-02    TPro  6.6  /  Alb  4.6  /  TBili  0.2  /  DBili  x   /  AST  31  /  ALT  54<H>  /  AlkPhos  47  10-31

## 2020-11-02 NOTE — DIETITIAN INITIAL EVALUATION ADULT. - OTHER INFO
GASTROINTESTINAL:  Last BM: none noted  Bowel Regimen: none    NUTRITION STATUS:  - Skin: no pressure injuries documented    WEIGHT HISTORY: unavailable

## 2020-11-02 NOTE — DIETITIAN INITIAL EVALUATION ADULT. - ENTERAL
If ongoing EN is warranted, continue Jevity1.5 @ 55 ml/hr x 24 hours to provide 1320 ml formula, 1980 calories (15.8 karlos/kg per dosing wt 125.6kg), 84 grams protein (1 gm/kg per IBW 83.4kg), 1003 ml free water. Add 3 No Carb Prosource (180cal, 4gm protein) for a total of 129 grams protein (1.5 gm/kg per IBW)

## 2020-11-02 NOTE — PROGRESS NOTE ADULT - ATTENDING COMMENTS
seen and examined 11-02-20 @ 1055    on trach collar (FiO2 = 40%)    soft / NT / ND    CELINE - 115 mL serosanguinous drainage / 24 hours    repair of tracheal and laryngeal lacerations using sternothyroid muscle flap and open tracheostomy on 10/31/2020  -will deflate tracheostomy cuff for safety reasons, but will keep tracheostomy uncapped for 3 days to allow repairs to heal

## 2020-11-03 LAB
ANION GAP SERPL CALC-SCNC: 8 MMOL/L — SIGNIFICANT CHANGE UP (ref 5–17)
ANION GAP SERPL CALC-SCNC: 9 MMOL/L — SIGNIFICANT CHANGE UP (ref 5–17)
APTT BLD: 29.2 SEC — SIGNIFICANT CHANGE UP (ref 27.5–35.5)
BUN SERPL-MCNC: 5 MG/DL — LOW (ref 7–23)
BUN SERPL-MCNC: 6 MG/DL — LOW (ref 7–23)
CALCIUM SERPL-MCNC: 8.7 MG/DL — SIGNIFICANT CHANGE UP (ref 8.4–10.5)
CALCIUM SERPL-MCNC: 8.9 MG/DL — SIGNIFICANT CHANGE UP (ref 8.4–10.5)
CHLORIDE SERPL-SCNC: 105 MMOL/L — SIGNIFICANT CHANGE UP (ref 96–108)
CHLORIDE SERPL-SCNC: 107 MMOL/L — SIGNIFICANT CHANGE UP (ref 96–108)
CO2 SERPL-SCNC: 23 MMOL/L — SIGNIFICANT CHANGE UP (ref 22–31)
CO2 SERPL-SCNC: 23 MMOL/L — SIGNIFICANT CHANGE UP (ref 22–31)
CREAT SERPL-MCNC: 0.69 MG/DL — SIGNIFICANT CHANGE UP (ref 0.5–1.3)
CREAT SERPL-MCNC: 0.72 MG/DL — SIGNIFICANT CHANGE UP (ref 0.5–1.3)
GLUCOSE SERPL-MCNC: 140 MG/DL — HIGH (ref 70–99)
GLUCOSE SERPL-MCNC: 142 MG/DL — HIGH (ref 70–99)
HCT VFR BLD CALC: 24.8 % — LOW (ref 39–50)
HGB BLD-MCNC: 8.2 G/DL — LOW (ref 13–17)
INR BLD: 1.31 RATIO — HIGH (ref 0.88–1.16)
MAGNESIUM SERPL-MCNC: 2.2 MG/DL — SIGNIFICANT CHANGE UP (ref 1.6–2.6)
MAGNESIUM SERPL-MCNC: 2.2 MG/DL — SIGNIFICANT CHANGE UP (ref 1.6–2.6)
MCHC RBC-ENTMCNC: 32 PG — SIGNIFICANT CHANGE UP (ref 27–34)
MCHC RBC-ENTMCNC: 33.1 GM/DL — SIGNIFICANT CHANGE UP (ref 32–36)
MCV RBC AUTO: 96.9 FL — SIGNIFICANT CHANGE UP (ref 80–100)
NRBC # BLD: 0 /100 WBCS — SIGNIFICANT CHANGE UP (ref 0–0)
PHOSPHATE SERPL-MCNC: 2.8 MG/DL — SIGNIFICANT CHANGE UP (ref 2.5–4.5)
PHOSPHATE SERPL-MCNC: 2.9 MG/DL — SIGNIFICANT CHANGE UP (ref 2.5–4.5)
PLATELET # BLD AUTO: 140 K/UL — LOW (ref 150–400)
POTASSIUM SERPL-MCNC: 3.8 MMOL/L — SIGNIFICANT CHANGE UP (ref 3.5–5.3)
POTASSIUM SERPL-MCNC: 4 MMOL/L — SIGNIFICANT CHANGE UP (ref 3.5–5.3)
POTASSIUM SERPL-SCNC: 3.8 MMOL/L — SIGNIFICANT CHANGE UP (ref 3.5–5.3)
POTASSIUM SERPL-SCNC: 4 MMOL/L — SIGNIFICANT CHANGE UP (ref 3.5–5.3)
PROCALCITONIN SERPL-MCNC: 0.11 NG/ML — HIGH (ref 0.02–0.1)
PROTHROM AB SERPL-ACNC: 15.5 SEC — HIGH (ref 10.6–13.6)
RBC # BLD: 2.56 M/UL — LOW (ref 4.2–5.8)
RBC # FLD: 13.2 % — SIGNIFICANT CHANGE UP (ref 10.3–14.5)
SODIUM SERPL-SCNC: 136 MMOL/L — SIGNIFICANT CHANGE UP (ref 135–145)
SODIUM SERPL-SCNC: 139 MMOL/L — SIGNIFICANT CHANGE UP (ref 135–145)
WBC # BLD: 5.1 K/UL — SIGNIFICANT CHANGE UP (ref 3.8–10.5)
WBC # FLD AUTO: 5.1 K/UL — SIGNIFICANT CHANGE UP (ref 3.8–10.5)

## 2020-11-03 PROCEDURE — 71045 X-RAY EXAM CHEST 1 VIEW: CPT | Mod: 26

## 2020-11-03 PROCEDURE — 99232 SBSQ HOSP IP/OBS MODERATE 35: CPT

## 2020-11-03 PROCEDURE — 99233 SBSQ HOSP IP/OBS HIGH 50: CPT

## 2020-11-03 PROCEDURE — 99024 POSTOP FOLLOW-UP VISIT: CPT

## 2020-11-03 PROCEDURE — 71045 X-RAY EXAM CHEST 1 VIEW: CPT | Mod: 26,77

## 2020-11-03 RX ORDER — HALOPERIDOL DECANOATE 100 MG/ML
5 INJECTION INTRAMUSCULAR ONCE
Refills: 0 | Status: COMPLETED | OUTPATIENT
Start: 2020-11-03 | End: 2020-11-03

## 2020-11-03 RX ORDER — LANOLIN ALCOHOL/MO/W.PET/CERES
8 CREAM (GRAM) TOPICAL AT BEDTIME
Refills: 0 | Status: DISCONTINUED | OUTPATIENT
Start: 2020-11-03 | End: 2020-11-06

## 2020-11-03 RX ORDER — ACETAMINOPHEN 500 MG
650 TABLET ORAL EVERY 4 HOURS
Refills: 0 | Status: DISCONTINUED | OUTPATIENT
Start: 2020-11-03 | End: 2020-11-06

## 2020-11-03 RX ORDER — ACETAMINOPHEN 500 MG
1000 TABLET ORAL ONCE
Refills: 0 | Status: COMPLETED | OUTPATIENT
Start: 2020-11-03 | End: 2020-11-03

## 2020-11-03 RX ADMIN — OXYCODONE HYDROCHLORIDE 10 MILLIGRAM(S): 5 TABLET ORAL at 12:36

## 2020-11-03 RX ADMIN — POTASSIUM PHOSPHATE, MONOBASIC POTASSIUM PHOSPHATE, DIBASIC 62.5 MILLIMOLE(S): 236; 224 INJECTION, SOLUTION INTRAVENOUS at 03:17

## 2020-11-03 RX ADMIN — Medication 250 MILLIMOLE(S): at 12:09

## 2020-11-03 RX ADMIN — Medication 400 MILLIGRAM(S): at 05:58

## 2020-11-03 RX ADMIN — Medication 1000 MILLIGRAM(S): at 00:01

## 2020-11-03 RX ADMIN — Medication 1 APPLICATION(S): at 05:02

## 2020-11-03 RX ADMIN — PANTOPRAZOLE SODIUM 40 MILLIGRAM(S): 20 TABLET, DELAYED RELEASE ORAL at 12:10

## 2020-11-03 RX ADMIN — Medication 1000 MILLIGRAM(S): at 06:28

## 2020-11-03 RX ADMIN — ENOXAPARIN SODIUM 40 MILLIGRAM(S): 100 INJECTION SUBCUTANEOUS at 12:09

## 2020-11-03 RX ADMIN — Medication 62.5 MILLIMOLE(S): at 06:50

## 2020-11-03 RX ADMIN — OXYCODONE HYDROCHLORIDE 10 MILLIGRAM(S): 5 TABLET ORAL at 03:17

## 2020-11-03 RX ADMIN — OXYCODONE HYDROCHLORIDE 10 MILLIGRAM(S): 5 TABLET ORAL at 08:55

## 2020-11-03 RX ADMIN — Medication 1 MILLIGRAM(S): at 05:02

## 2020-11-03 RX ADMIN — Medication 400 MILLIGRAM(S): at 19:58

## 2020-11-03 RX ADMIN — Medication 50 MILLIGRAM(S): at 21:45

## 2020-11-03 RX ADMIN — Medication 1 MILLIGRAM(S): at 17:18

## 2020-11-03 RX ADMIN — CHLORHEXIDINE GLUCONATE 1 APPLICATION(S): 213 SOLUTION TOPICAL at 05:02

## 2020-11-03 RX ADMIN — HALOPERIDOL DECANOATE 5 MILLIGRAM(S): 100 INJECTION INTRAMUSCULAR at 01:01

## 2020-11-03 RX ADMIN — OXYCODONE HYDROCHLORIDE 10 MILLIGRAM(S): 5 TABLET ORAL at 03:47

## 2020-11-03 RX ADMIN — OXYCODONE HYDROCHLORIDE 10 MILLIGRAM(S): 5 TABLET ORAL at 08:14

## 2020-11-03 RX ADMIN — OXYCODONE HYDROCHLORIDE 5 MILLIGRAM(S): 5 TABLET ORAL at 16:30

## 2020-11-03 RX ADMIN — Medication 8 MILLIGRAM(S): at 21:32

## 2020-11-03 RX ADMIN — Medication 250 MILLIMOLE(S): at 10:32

## 2020-11-03 RX ADMIN — Medication 1 APPLICATION(S): at 17:18

## 2020-11-03 RX ADMIN — Medication 650 MILLIGRAM(S): at 16:02

## 2020-11-03 RX ADMIN — ZOLPIDEM TARTRATE 10 MILLIGRAM(S): 10 TABLET ORAL at 21:45

## 2020-11-03 RX ADMIN — OLANZAPINE 15 MILLIGRAM(S): 15 TABLET, FILM COATED ORAL at 21:32

## 2020-11-03 RX ADMIN — OXYCODONE HYDROCHLORIDE 5 MILLIGRAM(S): 5 TABLET ORAL at 15:46

## 2020-11-03 RX ADMIN — OXYCODONE HYDROCHLORIDE 10 MILLIGRAM(S): 5 TABLET ORAL at 13:11

## 2020-11-03 RX ADMIN — Medication 650 MILLIGRAM(S): at 17:10

## 2020-11-03 NOTE — SWALLOW BEDSIDE ASSESSMENT ADULT - ADDITIONAL RECOMMENDATIONS
Maintain good oral hygiene. Maintain good oral hygiene.  This service will continue to follow. Will reassess 11/5, as per d/w NARCISO Ovalle.
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
## 2020-11-03 NOTE — SPEAKING VALVE EVALUATION - RECOMMENDATIONS
Defer PMV use at this time. Trials of PMV with SLP only.  This service will continue to follow, as d/w NARCISO Ovalle.

## 2020-11-03 NOTE — SWALLOW BEDSIDE ASSESSMENT ADULT - PHARYNGEAL PHASE
Multiple swallows/Delayed cough post oral intake/Throat clear post oral intake/Decreased laryngeal elevation
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
## 2020-11-03 NOTE — SPEAKING VALVE EVALUATION - CUFF PRE-EVALUATION: (EVALUATION OF TOLERANCE OF CUFF DEFLATION)
Phonation with digital occlusion/Pt with increased WOB with brief digital occlusion that was not observed with PMV use/Phonation with cuff deflation

## 2020-11-03 NOTE — PROGRESS NOTE ADULT - ATTENDING COMMENTS
seen and examined 11-03-20 @ 0913    on trach collar (FiO2 = 30%)    Tm 101.3 (11/2 @ 2300)  soft / NT / ND    CELINE - 50 mL serosanguinous drainage / 24 hours    repair of tracheal and laryngeal lacerations using sternothyroid muscle flap and open tracheostomy on 10/31/2020  -keep tracheostomy cuff deflated for safety reasons  -keep tracheostomy uncapped until Thurs 10/5 to allow repairs to heal seen and examined 11-03-20 @ 0913    on trach collar (FiO2 = 30%)  on Jevity 1.5 @ 45 mL/hr via NG feeding tube    Tm 101.3 (11/2 @ 2300)  soft / NT / ND  abdominal wounds without evidence of infection  neck wound without evidence of infection    CELINE - 50 mL serosanguinous drainage / 24 hours    repair of tracheal and laryngeal lacerations using sternothyroid muscle flap and open tracheostomy on 10/31/2020  -keep tracheostomy cuff deflated for safety reasons  -keep tracheostomy uncapped until Thurs 10/5 to allow repairs to heal

## 2020-11-03 NOTE — PROGRESS NOTE BEHAVIORAL HEALTH - NSBHCONSULTMEDS_PSY_A_CORE FT
continue home regimen   can increase paxil to 50mg daily.   -Klonopin 1mg BID  -Zyprexa 15mg qhs  -melatonin 6mg qhs. can give trazodone 50mg po qhs prn for insomnia

## 2020-11-03 NOTE — PROGRESS NOTE BEHAVIORAL HEALTH - NSBHFUPINTERVALHXFT_PSY_A_CORE
Difficult to understand pt's speech due to trach. Pt states he still feels depressed, upset he is still alive. pt denies current si/hi/i/p. pt ambivalent about recent suicide attempt. pt denies anxiety, psychosis, deysi. pt c/o poor sleep.
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
## 2020-11-03 NOTE — SPEAKING VALVE EVALUATION - DIAGNOSTIC IMPRESSIONS
Pt seen for passy-eric speaking valve evaluation. Pt maintained on 30% FIO2 via trach collar. Low pressure valve placed on hub of trach. Patient was dysphonic. Vocal quality mildly hoarse. Pt tolerated valve with VSS stable throughout assessment. No signs of respiratory distress. No increased work of breathing. No subjective complaints. No signs of air trapping. Valve removed after 10 minutes.

## 2020-11-03 NOTE — SPEAKING VALVE EVALUATION - SLP GENERAL OBSERVATIONS
Pt seen at bedside, awake and alert, oriented, verbally responsive, following directions for the purposes of the exam. Pt seen at bedside, awake and alert, oriented, verbally responsive, following directions for the purposes of the exam. Pt's daughter present, and assisted with interpretation for Arabic, as needed, as per Pt preference.

## 2020-11-03 NOTE — SWALLOW BEDSIDE ASSESSMENT ADULT - SLP GENERAL OBSERVATIONS
Pt seen at bedside, awake and alert, oriented, verbally responsive, following directions for the purposes of the exam. Pt seen at bedside with daughter present. Daughter provided interpretation for Indonesian as needed, as per Pt preference. Pt awake and alert, oriented, verbally responsive, following directions for the purposes of the exam.

## 2020-11-03 NOTE — PROGRESS NOTE BEHAVIORAL HEALTH - NSBHCHARTREVIEWVS_PSY_A_CORE FT
ICU Vital Signs Last 24 Hrs  T(C): 37.3 (03 Nov 2020 08:00), Max: 38.5 (02 Nov 2020 23:00)  T(F): 99.1 (03 Nov 2020 08:00), Max: 101.3 (02 Nov 2020 23:00)  HR: 98 (03 Nov 2020 11:00) (80 - 105)  BP: 131/77 (03 Nov 2020 11:00) (119/59 - 173/93)  BP(mean): 98 (03 Nov 2020 11:00) (80 - 124)  ABP: 134/81 (02 Nov 2020 13:00) (134/81 - 142/74)  ABP(mean): 101 (02 Nov 2020 13:00) (99 - 101)  RR: 28 (03 Nov 2020 11:00) (10 - 50)  SpO2: 98% (03 Nov 2020 11:00) (96% - 100%)
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
## 2020-11-03 NOTE — PROGRESS NOTE ADULT - SUBJECTIVE AND OBJECTIVE BOX
SURGERY DAILY PROGRESS NOTE:     24 HOUR EVENTS:  -Febrile at 102, UA sent (negative) and blood CX sent  -Agitated at night, given haldol 5mg  -Started TF  -Removed A-line  -Deflated cuff on trach  -Restarted home Ambien due to insomnia  - Meredith discontinue, passed TOV    SUBJECTIVE/ROS: Patient seen and evaluated on AM rounds.   Denies nausea, vomiting, chest pain, shortness of breath       OBJECTIVE:    Vital Signs Last 24 Hrs  T(C): 37.3 (2020 08:00), Max: 38.5 (2020 23:00)  T(F): 99.1 (2020 08:00), Max: 101.3 (2020 23:00)  HR: 100 (2020 08:00) (80 - 105)  BP: 143/84 (2020 08:00) (123/58 - 173/93)  BP(mean): 105 (2020 08:00) (81 - 124)  RR: 24 (2020 08:00) (10 - 50)  SpO2: 97% (2020 08:00) (96% - 100%)  I&O's Detail    2020 07:01  -  2020 07:00  --------------------------------------------------------  IN:    IV PiggyBack: 250 mL    IV PiggyBack: 1266.5 mL    Jevity 1.5: 810 mL    multiple electrolytes Injection Type 1.: 225 mL  Total IN: 2551.5 mL    OUT:    Bulb (mL): 50 mL    Indwelling Catheter - Urethral (mL): 285 mL    Voided (mL): 1625 mL  Total OUT: 1960 mL    Total NET: 591.5 mL        Daily Height in cm: 185.4 (2020 16:08)    Daily Weight in k.5 (2020 00:58)  MEDICATIONS  (STANDING):  BACItracin   Ointment 1 Application(s) Topical two times a day  chlorhexidine 2% Cloths 1 Application(s) Topical <User Schedule>  clonazePAM  Tablet 1 milliGRAM(s) Oral two times a day  enoxaparin Injectable 40 milliGRAM(s) SubCutaneous daily  melatonin 6 milliGRAM(s) Oral at bedtime  OLANZapine 15 milliGRAM(s) Oral at bedtime  pantoprazole  Injectable 40 milliGRAM(s) IV Push daily  PARoxetine Suspension 40 milliGRAM(s) Oral at bedtime    MEDICATIONS  (PRN):  oxyCODONE    Solution 5 milliGRAM(s) Oral every 4 hours PRN Moderate Pain (4 - 6)  oxyCODONE    Solution 10 milliGRAM(s) Oral every 4 hours PRN Severe Pain (7 - 10)  zolpidem 10 milliGRAM(s) Oral at bedtime PRN Insomnia      LABS:                        8.6    5.78  )-----------( 114      ( 2020 22:01 )             26.5     11-03    139  |  107  |  5<L>  ----------------------------<  142<H>  4.0   |  23  |  0.69    Ca    8.7      2020 05:53  Phos  2.9     11-03  Mg     2.2     11-03      PT/INR - ( 2020 02:10 )   PT: 13.7 sec;   INR: 1.15 ratio         PTT - ( 2020 22:01 )  PTT:17.6 sec  Urinalysis Basic - ( 2020 22:01 )    Color: Yellow / Appearance: Clear / S.020 / pH: x  Gluc: x / Ketone: Negative  / Bili: Negative / Urobili: Negative   Blood: x / Protein: Trace / Nitrite: Negative   Leuk Esterase: Negative / RBC: 3 /hpf / WBC 2 /HPF   Sq Epi: x / Non Sq Epi: 1 /hpf / Bacteria: Negative                PHYSICAL EXAM:  Constitutional: well developed, well nourished, NAD  Eyes: anicteric  ENMT: normal facies, symmetric  Neck: supple  Respiratory: CTA bilaterally  Cardiovascular: RRR  Gastrointestinal: abdomen soft, nontender, nondistended. No obvious masses. No peritonitis  Extremities: FROM, warm  Neurological: intact, non-focal  Skin: no gross lesions  Lymph Nodes: no gross adenopathy  Musculoskeletal: equal strength bilateral upper and lower extremities  Psychiatric: oriented x 3; appropriate         SURGERY DAILY PROGRESS NOTE:     24 HOUR EVENTS:  -Febrile at 102, UA sent (negative) and blood CX sent  -Agitated at night, given haldol 5mg  -Started TF  -Removed A-line  -Deflated cuff on trach  -Restarted home Ambien due to insomnia  - Meredith discontinue, passed TOV    SUBJECTIVE/ROS: Patient seen and evaluated on AM rounds. No acute complaints.   Denies nausea, vomiting, chest pain, shortness of breath       OBJECTIVE:    Vital Signs Last 24 Hrs  T(C): 37.3 (2020 08:00), Max: 38.5 (2020 23:00)  T(F): 99.1 (2020 08:00), Max: 101.3 (2020 23:00)  HR: 100 (2020 08:00) (80 - 105)  BP: 143/84 (2020 08:00) (123/58 - 173/93)  BP(mean): 105 (2020 08:00) (81 - 124)  RR: 24 (2020 08:00) (10 - 50)  SpO2: 97% (2020 08:00) (96% - 100%)  I&O's Detail    2020 07:01  -  2020 07:00  --------------------------------------------------------  IN:    IV PiggyBack: 250 mL    IV PiggyBack: 1266.5 mL    Jevity 1.5: 810 mL    multiple electrolytes Injection Type 1.: 225 mL  Total IN: 2551.5 mL    OUT:    Bulb (mL): 50 mL    Indwelling Catheter - Urethral (mL): 285 mL    Voided (mL): 1625 mL  Total OUT: 1960 mL    Total NET: 591.5 mL        Daily Height in cm: 185.4 (2020 16:08)    Daily Weight in k.5 (2020 00:58)  MEDICATIONS  (STANDING):  BACItracin   Ointment 1 Application(s) Topical two times a day  chlorhexidine 2% Cloths 1 Application(s) Topical <User Schedule>  clonazePAM  Tablet 1 milliGRAM(s) Oral two times a day  enoxaparin Injectable 40 milliGRAM(s) SubCutaneous daily  melatonin 6 milliGRAM(s) Oral at bedtime  OLANZapine 15 milliGRAM(s) Oral at bedtime  pantoprazole  Injectable 40 milliGRAM(s) IV Push daily  PARoxetine Suspension 40 milliGRAM(s) Oral at bedtime    MEDICATIONS  (PRN):  oxyCODONE    Solution 5 milliGRAM(s) Oral every 4 hours PRN Moderate Pain (4 - 6)  oxyCODONE    Solution 10 milliGRAM(s) Oral every 4 hours PRN Severe Pain (7 - 10)  zolpidem 10 milliGRAM(s) Oral at bedtime PRN Insomnia      LABS:                        8.6    5.78  )-----------( 114      ( 2020 22:01 )             26.5     11-03    139  |  107  |  5<L>  ----------------------------<  142<H>  4.0   |  23  |  0.69    Ca    8.7      2020 05:53  Phos  2.9     11-03  Mg     2.2     11-03      PT/INR - ( 2020 02:10 )   PT: 13.7 sec;   INR: 1.15 ratio         PTT - ( 2020 22:01 )  PTT:17.6 sec  Urinalysis Basic - ( 2020 22:01 )    Color: Yellow / Appearance: Clear / S.020 / pH: x  Gluc: x / Ketone: Negative  / Bili: Negative / Urobili: Negative   Blood: x / Protein: Trace / Nitrite: Negative   Leuk Esterase: Negative / RBC: 3 /hpf / WBC 2 /HPF   Sq Epi: x / Non Sq Epi: 1 /hpf / Bacteria: Negative            Physical Exam  General Appearance: Resting comfortably, no acute distress, KO feeding tube in place   Neck: CELINE serosanguineous  HEENT: Trach collar in place  Chest: non-labored breathing, no respiratory distress  CV: Pulse regular presently  Abdomen: Soft, non-tender, non-distended, no peritonitis, slash wounds with staple closures   Extremities: warm and well perfused             SURGERY DAILY PROGRESS NOTE:     24 HOUR EVENTS:  -Febrile at 102, UA sent (negative) and blood CX sent  -Agitated at night, given haldol 5mg  -Started TF  -Removed A-line  -Deflated cuff on trach  -Restarted home Ambien due to insomnia  - Meredith discontinue, passed TOV    SUBJECTIVE/ROS: Patient seen and evaluated on AM rounds. No acute complaints. Tube feeds via KO at 45 cc/hour.   Denies nausea, vomiting, chest pain, shortness of breath       OBJECTIVE:    Vital Signs Last 24 Hrs  T(C): 37.3 (2020 08:00), Max: 38.5 (2020 23:00)  T(F): 99.1 (2020 08:00), Max: 101.3 (2020 23:00)  HR: 100 (2020 08:00) (80 - 105)  BP: 143/84 (2020 08:00) (123/58 - 173/93)  BP(mean): 105 (2020 08:00) (81 - 124)  RR: 24 (2020 08:00) (10 - 50)  SpO2: 97% (2020 08:00) (96% - 100%)  I&O's Detail    2020 07:01  -  2020 07:00  --------------------------------------------------------  IN:    IV PiggyBack: 250 mL    IV PiggyBack: 1266.5 mL    Jevity 1.5: 810 mL    multiple electrolytes Injection Type 1.: 225 mL  Total IN: 2551.5 mL    OUT:    Bulb (mL): 50 mL    Indwelling Catheter - Urethral (mL): 285 mL    Voided (mL): 1625 mL  Total OUT: 1960 mL    Total NET: 591.5 mL        Daily Height in cm: 185.4 (2020 16:08)    Daily Weight in k.5 (2020 00:58)  MEDICATIONS  (STANDING):  BACItracin   Ointment 1 Application(s) Topical two times a day  chlorhexidine 2% Cloths 1 Application(s) Topical <User Schedule>  clonazePAM  Tablet 1 milliGRAM(s) Oral two times a day  enoxaparin Injectable 40 milliGRAM(s) SubCutaneous daily  melatonin 6 milliGRAM(s) Oral at bedtime  OLANZapine 15 milliGRAM(s) Oral at bedtime  pantoprazole  Injectable 40 milliGRAM(s) IV Push daily  PARoxetine Suspension 40 milliGRAM(s) Oral at bedtime    MEDICATIONS  (PRN):  oxyCODONE    Solution 5 milliGRAM(s) Oral every 4 hours PRN Moderate Pain (4 - 6)  oxyCODONE    Solution 10 milliGRAM(s) Oral every 4 hours PRN Severe Pain (7 - 10)  zolpidem 10 milliGRAM(s) Oral at bedtime PRN Insomnia      LABS:                        8.6    5.78  )-----------( 114      ( 2020 22:01 )             26.5     11-03    139  |  107  |  5<L>  ----------------------------<  142<H>  4.0   |  23  |  0.69    Ca    8.7      2020 05:53  Phos  2.9     11-03  Mg     2.2     11-03      PT/INR - ( 2020 02:10 )   PT: 13.7 sec;   INR: 1.15 ratio         PTT - ( 2020 22:01 )  PTT:17.6 sec  Urinalysis Basic - ( 2020 22:01 )    Color: Yellow / Appearance: Clear / S.020 / pH: x  Gluc: x / Ketone: Negative  / Bili: Negative / Urobili: Negative   Blood: x / Protein: Trace / Nitrite: Negative   Leuk Esterase: Negative / RBC: 3 /hpf / WBC 2 /HPF   Sq Epi: x / Non Sq Epi: 1 /hpf / Bacteria: Negative        Physical Exam  General Appearance: Resting comfortably, no acute distress, KO feeding tube in place   Neck: CELINE serosanguineous  HEENT: Trach collar in place  Chest: non-labored breathing, no respiratory distress  CV: Pulse regular presently  Abdomen: Soft, non-tender, non-distended, no peritonitis, slash wounds with staple closures, covered with Xeroform   Extremities: warm and well perfused
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
## 2020-11-03 NOTE — PROGRESS NOTE BEHAVIORAL HEALTH - NSBHCHARTREVIEWLAB_PSY_A_CORE FT
8.6    5.78  )-----------( 114      ( 02 Nov 2020 22:01 )             26.5     11-03    139  |  107  |  5<L>  ----------------------------<  142<H>  4.0   |  23  |  0.69    Ca    8.7      03 Nov 2020 05:53  Phos  2.9     11-03  Mg     2.2     11-03
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
## 2020-11-04 ENCOUNTER — APPOINTMENT (OUTPATIENT)
Dept: ORTHOPEDIC SURGERY | Facility: HOSPITAL | Age: 55
End: 2020-11-04

## 2020-11-04 LAB
ANION GAP SERPL CALC-SCNC: 11 MMOL/L — SIGNIFICANT CHANGE UP (ref 5–17)
APTT BLD: 19.9 SEC — LOW (ref 27.5–35.5)
BUN SERPL-MCNC: 10 MG/DL — SIGNIFICANT CHANGE UP (ref 7–23)
CALCIUM SERPL-MCNC: 9.6 MG/DL — SIGNIFICANT CHANGE UP (ref 8.4–10.5)
CHLORIDE SERPL-SCNC: 106 MMOL/L — SIGNIFICANT CHANGE UP (ref 96–108)
CO2 SERPL-SCNC: 23 MMOL/L — SIGNIFICANT CHANGE UP (ref 22–31)
CREAT SERPL-MCNC: 0.68 MG/DL — SIGNIFICANT CHANGE UP (ref 0.5–1.3)
GLUCOSE SERPL-MCNC: 122 MG/DL — HIGH (ref 70–99)
HCT VFR BLD CALC: 25.4 % — LOW (ref 39–50)
HGB BLD-MCNC: 8.3 G/DL — LOW (ref 13–17)
INR BLD: 1.18 RATIO — HIGH (ref 0.88–1.16)
MAGNESIUM SERPL-MCNC: 2.4 MG/DL — SIGNIFICANT CHANGE UP (ref 1.6–2.6)
MCHC RBC-ENTMCNC: 31.9 PG — SIGNIFICANT CHANGE UP (ref 27–34)
MCHC RBC-ENTMCNC: 32.7 GM/DL — SIGNIFICANT CHANGE UP (ref 32–36)
MCV RBC AUTO: 97.7 FL — SIGNIFICANT CHANGE UP (ref 80–100)
NRBC # BLD: 0 /100 WBCS — SIGNIFICANT CHANGE UP (ref 0–0)
PHOSPHATE SERPL-MCNC: 2.9 MG/DL — SIGNIFICANT CHANGE UP (ref 2.5–4.5)
PLATELET # BLD AUTO: 182 K/UL — SIGNIFICANT CHANGE UP (ref 150–400)
POTASSIUM SERPL-MCNC: 3.9 MMOL/L — SIGNIFICANT CHANGE UP (ref 3.5–5.3)
POTASSIUM SERPL-SCNC: 3.9 MMOL/L — SIGNIFICANT CHANGE UP (ref 3.5–5.3)
PROTHROM AB SERPL-ACNC: 14.1 SEC — HIGH (ref 10.6–13.6)
RBC # BLD: 2.6 M/UL — LOW (ref 4.2–5.8)
RBC # FLD: 13.2 % — SIGNIFICANT CHANGE UP (ref 10.3–14.5)
SODIUM SERPL-SCNC: 140 MMOL/L — SIGNIFICANT CHANGE UP (ref 135–145)
WBC # BLD: 5.04 K/UL — SIGNIFICANT CHANGE UP (ref 3.8–10.5)
WBC # FLD AUTO: 5.04 K/UL — SIGNIFICANT CHANGE UP (ref 3.8–10.5)

## 2020-11-04 PROCEDURE — 99233 SBSQ HOSP IP/OBS HIGH 50: CPT

## 2020-11-04 PROCEDURE — 71045 X-RAY EXAM CHEST 1 VIEW: CPT | Mod: 26

## 2020-11-04 PROCEDURE — 99024 POSTOP FOLLOW-UP VISIT: CPT

## 2020-11-04 PROCEDURE — 99232 SBSQ HOSP IP/OBS MODERATE 35: CPT

## 2020-11-04 RX ORDER — PETROLATUM,WHITE
1 JELLY (GRAM) TOPICAL
Refills: 0 | Status: COMPLETED | OUTPATIENT
Start: 2020-11-04 | End: 2020-11-07

## 2020-11-04 RX ORDER — IBUPROFEN 200 MG
600 TABLET ORAL EVERY 6 HOURS
Refills: 0 | Status: DISCONTINUED | OUTPATIENT
Start: 2020-11-04 | End: 2020-11-06

## 2020-11-04 RX ORDER — POTASSIUM CHLORIDE 20 MEQ
20 PACKET (EA) ORAL ONCE
Refills: 0 | Status: COMPLETED | OUTPATIENT
Start: 2020-11-04 | End: 2020-11-04

## 2020-11-04 RX ORDER — OXYCODONE HYDROCHLORIDE 5 MG/1
10 TABLET ORAL EVERY 4 HOURS
Refills: 0 | Status: DISCONTINUED | OUTPATIENT
Start: 2020-11-04 | End: 2020-11-06

## 2020-11-04 RX ORDER — POTASSIUM PHOSPHATE, MONOBASIC POTASSIUM PHOSPHATE, DIBASIC 236; 224 MG/ML; MG/ML
15 INJECTION, SOLUTION INTRAVENOUS ONCE
Refills: 0 | Status: COMPLETED | OUTPATIENT
Start: 2020-11-04 | End: 2020-11-05

## 2020-11-04 RX ORDER — OXYCODONE HYDROCHLORIDE 5 MG/1
5 TABLET ORAL EVERY 4 HOURS
Refills: 0 | Status: DISCONTINUED | OUTPATIENT
Start: 2020-11-04 | End: 2020-11-06

## 2020-11-04 RX ADMIN — OXYCODONE HYDROCHLORIDE 10 MILLIGRAM(S): 5 TABLET ORAL at 16:40

## 2020-11-04 RX ADMIN — Medication 8 MILLIGRAM(S): at 21:30

## 2020-11-04 RX ADMIN — Medication 600 MILLIGRAM(S): at 18:27

## 2020-11-04 RX ADMIN — Medication 1 APPLICATION(S): at 18:04

## 2020-11-04 RX ADMIN — Medication 600 MILLIGRAM(S): at 18:00

## 2020-11-04 RX ADMIN — CHLORHEXIDINE GLUCONATE 1 APPLICATION(S): 213 SOLUTION TOPICAL at 05:34

## 2020-11-04 RX ADMIN — OXYCODONE HYDROCHLORIDE 10 MILLIGRAM(S): 5 TABLET ORAL at 23:57

## 2020-11-04 RX ADMIN — Medication 20 MILLIEQUIVALENT(S): at 00:18

## 2020-11-04 RX ADMIN — ENOXAPARIN SODIUM 40 MILLIGRAM(S): 100 INJECTION SUBCUTANEOUS at 11:17

## 2020-11-04 RX ADMIN — OXYCODONE HYDROCHLORIDE 10 MILLIGRAM(S): 5 TABLET ORAL at 08:20

## 2020-11-04 RX ADMIN — OXYCODONE HYDROCHLORIDE 10 MILLIGRAM(S): 5 TABLET ORAL at 07:50

## 2020-11-04 RX ADMIN — OXYCODONE HYDROCHLORIDE 10 MILLIGRAM(S): 5 TABLET ORAL at 03:21

## 2020-11-04 RX ADMIN — PANTOPRAZOLE SODIUM 40 MILLIGRAM(S): 20 TABLET, DELAYED RELEASE ORAL at 11:17

## 2020-11-04 RX ADMIN — OXYCODONE HYDROCHLORIDE 10 MILLIGRAM(S): 5 TABLET ORAL at 03:51

## 2020-11-04 RX ADMIN — ZOLPIDEM TARTRATE 10 MILLIGRAM(S): 10 TABLET ORAL at 21:30

## 2020-11-04 RX ADMIN — Medication 1 MILLIGRAM(S): at 18:04

## 2020-11-04 RX ADMIN — OXYCODONE HYDROCHLORIDE 10 MILLIGRAM(S): 5 TABLET ORAL at 23:26

## 2020-11-04 RX ADMIN — Medication 600 MILLIGRAM(S): at 23:27

## 2020-11-04 RX ADMIN — Medication 1 APPLICATION(S): at 05:33

## 2020-11-04 RX ADMIN — OXYCODONE HYDROCHLORIDE 10 MILLIGRAM(S): 5 TABLET ORAL at 16:10

## 2020-11-04 RX ADMIN — Medication 600 MILLIGRAM(S): at 11:45

## 2020-11-04 RX ADMIN — Medication 600 MILLIGRAM(S): at 23:57

## 2020-11-04 RX ADMIN — Medication 50 MILLIGRAM(S): at 22:19

## 2020-11-04 RX ADMIN — OLANZAPINE 15 MILLIGRAM(S): 15 TABLET, FILM COATED ORAL at 21:31

## 2020-11-04 RX ADMIN — Medication 1 MILLIGRAM(S): at 05:33

## 2020-11-04 RX ADMIN — Medication 600 MILLIGRAM(S): at 11:15
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
## 2020-11-04 NOTE — PROVIDER CONTACT NOTE (OTHER) - ACTION/TREATMENT ORDERED:
sicu team and ming ariza aware. pa@[email or web address] assessing pt's status. gauze placed@this time. continue to monitor sicu team and ming ariza aware. pa@[email or web address] assessing pt's status. gauze placed@this time. drainage noted stopped. vaseline applied to site as ordered. continue to monitor

## 2020-11-04 NOTE — PROGRESS NOTE ADULT - ATTENDING COMMENTS
seen and examined 11-04-20 @ 0938    on trach collar (FiO2 = 30%)  on Jevity 1.5 @ 45 mL/hr via NG feeding tube    Tm 101.5 (11/3 @ 1900)  soft / NT / ND  abdominal wounds without evidence of infection  neck wound without evidence of infection    CELINE - 50 mL serosanguinous drainage / 24 hours    repair of tracheal and laryngeal lacerations using sternothyroid muscle flap and open tracheostomy on 10/31/2020  -keep tracheostomy cuff deflated for safety reasons  -keep tracheostomy uncapped. will plan to cap trach and decannulate tomorrow. seen and examined 11-04-20 @ 0938    on trach collar (FiO2 = 30%)  on Jevity 1.5 @ 55 mL/hr via NG feeding tube    Tm 101.5 (11/3 @ 1900)  soft / NT / ND  abdominal wounds without evidence of infection  neck wound without evidence of infection    CELINE - 50 mL serosanguinous drainage / 24 hours    repair of tracheal and laryngeal lacerations using sternothyroid muscle flap and open tracheostomy on 10/31/2020  -keep tracheostomy cuff deflated for safety reasons  -keep tracheostomy uncapped. will plan to cap trach and decannulate tomorrow.

## 2020-11-04 NOTE — PROGRESS NOTE BEHAVIORAL HEALTH - NSBHCHARTREVIEWLAB_PSY_A_CORE FT
8.2    5.10  )-----------( 140      ( 03 Nov 2020 22:53 )             24.8   11-03    136  |  105  |  6<L>  ----------------------------<  140<H>  3.8   |  23  |  0.72    Ca    8.9      03 Nov 2020 22:53  Phos  2.8     11-03  Mg     2.2     11-03
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
## 2020-11-04 NOTE — PROGRESS NOTE BEHAVIORAL HEALTH - NSBHFUPINTERVALHXFT_PSY_A_CORE
Pt seen, AOA x 3, remains depressed, upset about recent actions of self harm. currently denies si/hi, sleep and appetite fair. no behavioral issues reported. compliant with medications.

## 2020-11-04 NOTE — PROGRESS NOTE BEHAVIORAL HEALTH - MOOD
Returned from pacu sleepy but arousable  ivs running  Steri strips on all wounds dry and intact  No s/s of hematoma noted  Does not want anything to eat  Urostomy draining clear yellow urine,  ivs running  Pain level 2  Depressed

## 2020-11-04 NOTE — PROGRESS NOTE BEHAVIORAL HEALTH - NSBHCHARTREVIEWVS_PSY_A_CORE FT
T(C): 37.4 (11-04-20 @ 07:00), Max: 38.6 (11-03-20 @ 19:00)  HR: 102 (11-04-20 @ 09:00) (86 - 119)  BP: 147/78 (11-04-20 @ 09:00) (116/66 - 168/80)  RR: 24 (11-04-20 @ 09:00) (13 - 29)  SpO2: 96% (11-04-20 @ 09:00) (93% - 99%)  Wt(kg): --

## 2020-11-04 NOTE — PROGRESS NOTE BEHAVIORAL HEALTH - NSBHCONSULTMEDS_PSY_A_CORE FT
continue home regimen   -paxil 50mg daily.   -Klonopin 1mg BID  -Zyprexa 15mg qhs  -melatonin 6mg qhs. can give trazodone 50mg po qhs prn for insomnia
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
## 2020-11-05 LAB
GRAM STN FLD: SIGNIFICANT CHANGE UP
SPECIMEN SOURCE: SIGNIFICANT CHANGE UP

## 2020-11-05 PROCEDURE — 99232 SBSQ HOSP IP/OBS MODERATE 35: CPT

## 2020-11-05 PROCEDURE — 71045 X-RAY EXAM CHEST 1 VIEW: CPT | Mod: 26

## 2020-11-05 PROCEDURE — 99024 POSTOP FOLLOW-UP VISIT: CPT

## 2020-11-05 PROCEDURE — 99233 SBSQ HOSP IP/OBS HIGH 50: CPT

## 2020-11-05 RX ORDER — DOXAZOSIN MESYLATE 4 MG
1 TABLET ORAL AT BEDTIME
Refills: 0 | Status: DISCONTINUED | OUTPATIENT
Start: 2020-11-05 | End: 2020-11-06

## 2020-11-05 RX ORDER — AMPICILLIN SODIUM AND SULBACTAM SODIUM 250; 125 MG/ML; MG/ML
3 INJECTION, POWDER, FOR SUSPENSION INTRAMUSCULAR; INTRAVENOUS EVERY 6 HOURS
Refills: 0 | Status: DISCONTINUED | OUTPATIENT
Start: 2020-11-05 | End: 2020-11-06

## 2020-11-05 RX ORDER — AMPICILLIN SODIUM AND SULBACTAM SODIUM 250; 125 MG/ML; MG/ML
3 INJECTION, POWDER, FOR SUSPENSION INTRAMUSCULAR; INTRAVENOUS ONCE
Refills: 0 | Status: COMPLETED | OUTPATIENT
Start: 2020-11-05 | End: 2020-11-05

## 2020-11-05 RX ORDER — AMPICILLIN SODIUM AND SULBACTAM SODIUM 250; 125 MG/ML; MG/ML
INJECTION, POWDER, FOR SUSPENSION INTRAMUSCULAR; INTRAVENOUS
Refills: 0 | Status: DISCONTINUED | OUTPATIENT
Start: 2020-11-05 | End: 2020-11-06

## 2020-11-05 RX ADMIN — Medication 600 MILLIGRAM(S): at 18:30

## 2020-11-05 RX ADMIN — Medication 1 MILLIGRAM(S): at 21:50

## 2020-11-05 RX ADMIN — PANTOPRAZOLE SODIUM 40 MILLIGRAM(S): 20 TABLET, DELAYED RELEASE ORAL at 11:26

## 2020-11-05 RX ADMIN — Medication 1 APPLICATION(S): at 17:50

## 2020-11-05 RX ADMIN — ZOLPIDEM TARTRATE 10 MILLIGRAM(S): 10 TABLET ORAL at 02:49

## 2020-11-05 RX ADMIN — Medication 650 MILLIGRAM(S): at 21:48

## 2020-11-05 RX ADMIN — OXYCODONE HYDROCHLORIDE 5 MILLIGRAM(S): 5 TABLET ORAL at 19:57

## 2020-11-05 RX ADMIN — OXYCODONE HYDROCHLORIDE 5 MILLIGRAM(S): 5 TABLET ORAL at 11:27

## 2020-11-05 RX ADMIN — Medication 600 MILLIGRAM(S): at 05:36

## 2020-11-05 RX ADMIN — OLANZAPINE 15 MILLIGRAM(S): 15 TABLET, FILM COATED ORAL at 21:50

## 2020-11-05 RX ADMIN — Medication 600 MILLIGRAM(S): at 12:00

## 2020-11-05 RX ADMIN — OXYCODONE HYDROCHLORIDE 10 MILLIGRAM(S): 5 TABLET ORAL at 22:55

## 2020-11-05 RX ADMIN — CHLORHEXIDINE GLUCONATE 1 APPLICATION(S): 213 SOLUTION TOPICAL at 05:37

## 2020-11-05 RX ADMIN — Medication 50 MILLIGRAM(S): at 21:50

## 2020-11-05 RX ADMIN — Medication 600 MILLIGRAM(S): at 17:45

## 2020-11-05 RX ADMIN — POTASSIUM PHOSPHATE, MONOBASIC POTASSIUM PHOSPHATE, DIBASIC 62.5 MILLIMOLE(S): 236; 224 INJECTION, SOLUTION INTRAVENOUS at 02:45

## 2020-11-05 RX ADMIN — OXYCODONE HYDROCHLORIDE 10 MILLIGRAM(S): 5 TABLET ORAL at 06:14

## 2020-11-05 RX ADMIN — Medication 1 APPLICATION(S): at 05:36

## 2020-11-05 RX ADMIN — ENOXAPARIN SODIUM 40 MILLIGRAM(S): 100 INJECTION SUBCUTANEOUS at 11:26

## 2020-11-05 RX ADMIN — Medication 600 MILLIGRAM(S): at 11:27

## 2020-11-05 RX ADMIN — Medication 1 APPLICATION(S): at 05:37

## 2020-11-05 RX ADMIN — Medication 1 MILLIGRAM(S): at 17:49

## 2020-11-05 RX ADMIN — Medication 1 MILLIGRAM(S): at 05:36

## 2020-11-05 RX ADMIN — OXYCODONE HYDROCHLORIDE 5 MILLIGRAM(S): 5 TABLET ORAL at 12:00

## 2020-11-05 RX ADMIN — OXYCODONE HYDROCHLORIDE 5 MILLIGRAM(S): 5 TABLET ORAL at 21:48

## 2020-11-05 RX ADMIN — AMPICILLIN SODIUM AND SULBACTAM SODIUM 200 GRAM(S): 250; 125 INJECTION, POWDER, FOR SUSPENSION INTRAMUSCULAR; INTRAVENOUS at 11:27

## 2020-11-05 RX ADMIN — AMPICILLIN SODIUM AND SULBACTAM SODIUM 200 GRAM(S): 250; 125 INJECTION, POWDER, FOR SUSPENSION INTRAMUSCULAR; INTRAVENOUS at 17:50

## 2020-11-05 RX ADMIN — Medication 1 APPLICATION(S): at 17:51

## 2020-11-05 RX ADMIN — OXYCODONE HYDROCHLORIDE 10 MILLIGRAM(S): 5 TABLET ORAL at 06:44

## 2020-11-05 RX ADMIN — Medication 600 MILLIGRAM(S): at 06:06

## 2020-11-05 RX ADMIN — Medication 650 MILLIGRAM(S): at 20:27

## 2020-11-05 RX ADMIN — Medication 8 MILLIGRAM(S): at 21:51
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
## 2020-11-05 NOTE — PROGRESS NOTE BEHAVIORAL HEALTH - NSBHFUPINTERVALHXFT_PSY_A_CORE
Pt seen, AOA x 3, remains depressed, upset about recent actions of self harm. currently denies si/hi, sleep and appetite fair. no behavioral issues reported. compliant with medications. Pt seen, AOA x 3, remains depressed, currently denies si/hi. pt reports poor sleep. no behavioral issues reported. compliant with medications. as per staff pt calm. Spoke with pt's outpt psychiatrist Dr. Cline few days ago, states pt did not express SI recently to him. He was concerned about pt safety given recent attempt. Pt has been depressed for many years ago, has been more isolative, not talking as much recently. Pt denied wanting any med changes. Pt has tried several meds in the past including cymbalta, atarax, buspar, depakote, remeron, risperdal, trazodone, effexor, seroquel, abilify, ambien. Pt has been on ECT 2013 at [facility],was helpful. he would like pt to resume ect when admitted to inpt psych unit.

## 2020-11-05 NOTE — PROGRESS NOTE ADULT - ATTENDING COMMENTS
seen and examined 11-05-20 @ 0920    on trach collar (FiO2 = 30%)  on Jevity 1.5 @ 55 mL/hr via NG feeding tube    afeb  soft / NT / ND  abdominal wounds without evidence of infection  neck wound without evidence of infection    CELINE - 35 mL murky drainage / 24 hours      repair of tracheal and laryngeal lacerations using sternothyroid muscle flap and open tracheostomy on 10/31/2020  -tracheostomy capped this morning  -plan to decannulate this afternoon  -start Unasyn for possible wound infection

## 2020-11-05 NOTE — PROGRESS NOTE BEHAVIORAL HEALTH - NSBHCHARTREVIEWVS_PSY_A_CORE FT
T(C): 37.4 (11-04-20 @ 07:00), Max: 38.6 (11-03-20 @ 19:00)  HR: 102 (11-04-20 @ 09:00) (86 - 119)  BP: 147/78 (11-04-20 @ 09:00) (116/66 - 168/80)  RR: 24 (11-04-20 @ 09:00) (13 - 29)  SpO2: 96% (11-04-20 @ 09:00) (93% - 99%)  Wt(kg): -- ICU Vital Signs Last 24 Hrs  T(C): 36.9 (05 Nov 2020 07:00), Max: 37.1 (05 Nov 2020 03:00)  T(F): 98.4 (05 Nov 2020 07:00), Max: 98.8 (05 Nov 2020 03:00)  HR: 90 (05 Nov 2020 10:00) (79 - 101)  BP: 137/63 (05 Nov 2020 10:00) (103/59 - 159/83)  BP(mean): 90 (05 Nov 2020 10:00) (76 - 112)  ABP: --  ABP(mean): --  RR: 20 (05 Nov 2020 10:00) (18 - 31)  SpO2: 95% (05 Nov 2020 10:00) (92% - 99%)

## 2020-11-05 NOTE — PROGRESS NOTE BEHAVIORAL HEALTH - NSBHCHARTREVIEWLAB_PSY_A_CORE FT
8.2    5.10  )-----------( 140      ( 03 Nov 2020 22:53 )             24.8   11-03    136  |  105  |  6<L>  ----------------------------<  140<H>  3.8   |  23  |  0.72    Ca    8.9      03 Nov 2020 22:53  Phos  2.8     11-03  Mg     2.2     11-03 8.3    5.04  )-----------( 182      ( 04 Nov 2020 22:51 )             25.4     11-04    140  |  106  |  10  ----------------------------<  122<H>  3.9   |  23  |  0.68    Ca    9.6      04 Nov 2020 22:51  Phos  2.9     11-04  Mg     2.4     11-04
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
## 2020-11-05 NOTE — PROGRESS NOTE ADULT - SUBJECTIVE AND OBJECTIVE BOX
SURGERY PROGRESS NOTE    24 HOUR EVENTS:  - Added standing ibuprofen for better pain control  - Neck CELINE drain with purulent drainage  - Meredith placed for retention      Patient seen and examined at bedside. Speaking full sentences.   Denies CP, SOB, nausea, or vomiting. No fever or chills.       Physical Exam  General Appearance: Resting comfortably, no acute distress, KO feeding tube in place   Neck: CELINE serosanguineous with minimal output.  HEENT: Trach collar in place with deflated cuff.  Chest: non-labored breathing, no respiratory distress  CV: Pulse regular presently  Abdomen: Soft, non-tender, non-distended, no peritonitis, slash wounds with staple closures.        Vital Signs Last 24 Hrs  T(C): 36.9 (2020 03:00), Max: 38.6 (2020 19:00)  T(F): 98.4 (2020 03:00), Max: 101.5 (2020 19:00)  HR: 110 (2020 07:00) (81 - 119)  BP: 134/70 (2020 07:00) (119/59 - 168/80)  BP(mean): 94 (2020 07:00) (80 - 115)  RR: 23 (2020 07:00) (18 - 31)  SpO2: 97% (2020 07:00) (93% - 99%)      I&O's Detail    2020 07:01  -  2020 07:00  --------------------------------------------------------  IN:    IV PiggyBack: 312.5 mL    Jevity 1.5: 1265 mL  Total IN: 1577.5 mL    OUT:    Bulb (mL): 35 mL    Indwelling Catheter - Urethral (mL): 625 mL    Intermittent Catheterization - Urethral (mL): 1200 mL  Total OUT: 1860 mL    Total NET: -282.5 mL        11-04    140  |  106  |  10  ----------------------------<  122<H>  3.9   |  23  |  0.68    Ca    9.6      2020 22:51  Phos  2.9     11-04  Mg     2.4     11-04                          8.3    5.04  )-----------( 182      ( 2020 22:51 )             25.4       PT/INR - ( 2020 22:51 )   PT: 14.1 sec;   INR: 1.18 ratio         PTT - ( 2020 22:51 )  PTT:19.9 sec      Urinalysis Basic - ( 2020 22:01 )    Color: Yellow / Appearance: Clear / S.020 / pH: x  Gluc: x / Ketone: Negative  / Bili: Negative / Urobili: Negative   Blood: x / Protein: Trace / Nitrite: Negative   Leuk Esterase: Negative / RBC: 3 /hpf / WBC 2 /HPF   Sq Epi: x / Non Sq Epi: 1 /hpf / Bacteria: Negative      MEDICATIONS  (STANDING):  BACItracin   Ointment 1 Application(s) Topical two times a day  chlorhexidine 2% Cloths 1 Application(s) Topical <User Schedule>  clonazePAM  Tablet 1 milliGRAM(s) Oral two times a day  enoxaparin Injectable 40 milliGRAM(s) SubCutaneous daily  melatonin 8 milliGRAM(s) Oral at bedtime  OLANZapine 15 milliGRAM(s) Oral at bedtime  pantoprazole  Injectable 40 milliGRAM(s) IV Push daily  PARoxetine Suspension 50 milliGRAM(s) Oral at bedtime    MEDICATIONS  (PRN):  acetaminophen    Suspension .. 650 milliGRAM(s) Oral every 4 hours PRN Temp greater or equal to 38C (100.4F), Moderate Pain (4 - 6)  oxyCODONE    Solution 10 milliGRAM(s) Oral every 4 hours PRN Severe Pain (7 - 10)  oxyCODONE    Solution 5 milliGRAM(s) Oral every 4 hours PRN Moderate Pain (4 - 6)  zolpidem 10 milliGRAM(s) Oral at bedtime PRN Insomnia

## 2020-11-05 NOTE — CHART NOTE - NSCHARTNOTEFT_GEN_A_CORE
The patient tolerated trach capping for 4 hours, but cap was just removed for mild dyspnea.  I suspect mild dyspnea was related to size 7 trach in airway, so we decannulated him and left the prolene stay suture in place.

## 2020-11-06 LAB
ANION GAP SERPL CALC-SCNC: 10 MMOL/L — SIGNIFICANT CHANGE UP (ref 5–17)
ANION GAP SERPL CALC-SCNC: 9 MMOL/L — SIGNIFICANT CHANGE UP (ref 5–17)
APTT BLD: 28.9 SEC — SIGNIFICANT CHANGE UP (ref 27.5–35.5)
APTT BLD: 29.9 SEC — SIGNIFICANT CHANGE UP (ref 27.5–35.5)
BUN SERPL-MCNC: 10 MG/DL — SIGNIFICANT CHANGE UP (ref 7–23)
BUN SERPL-MCNC: 13 MG/DL — SIGNIFICANT CHANGE UP (ref 7–23)
CALCIUM SERPL-MCNC: 9.5 MG/DL — SIGNIFICANT CHANGE UP (ref 8.4–10.5)
CALCIUM SERPL-MCNC: 9.6 MG/DL — SIGNIFICANT CHANGE UP (ref 8.4–10.5)
CHLORIDE SERPL-SCNC: 106 MMOL/L — SIGNIFICANT CHANGE UP (ref 96–108)
CHLORIDE SERPL-SCNC: 108 MMOL/L — SIGNIFICANT CHANGE UP (ref 96–108)
CO2 SERPL-SCNC: 25 MMOL/L — SIGNIFICANT CHANGE UP (ref 22–31)
CO2 SERPL-SCNC: 25 MMOL/L — SIGNIFICANT CHANGE UP (ref 22–31)
CREAT SERPL-MCNC: 0.74 MG/DL — SIGNIFICANT CHANGE UP (ref 0.5–1.3)
CREAT SERPL-MCNC: 0.83 MG/DL — SIGNIFICANT CHANGE UP (ref 0.5–1.3)
CULTURE RESULTS: SIGNIFICANT CHANGE UP
GLUCOSE SERPL-MCNC: 108 MG/DL — HIGH (ref 70–99)
GLUCOSE SERPL-MCNC: 137 MG/DL — HIGH (ref 70–99)
HCT VFR BLD CALC: 25.3 % — LOW (ref 39–50)
HCT VFR BLD CALC: 25.8 % — LOW (ref 39–50)
HGB BLD-MCNC: 8.2 G/DL — LOW (ref 13–17)
HGB BLD-MCNC: 8.5 G/DL — LOW (ref 13–17)
INR BLD: 1.22 RATIO — HIGH (ref 0.88–1.16)
INR BLD: 1.26 RATIO — HIGH (ref 0.88–1.16)
MAGNESIUM SERPL-MCNC: 2.3 MG/DL — SIGNIFICANT CHANGE UP (ref 1.6–2.6)
MAGNESIUM SERPL-MCNC: 2.4 MG/DL — SIGNIFICANT CHANGE UP (ref 1.6–2.6)
MCHC RBC-ENTMCNC: 31.4 PG — SIGNIFICANT CHANGE UP (ref 27–34)
MCHC RBC-ENTMCNC: 31.8 PG — SIGNIFICANT CHANGE UP (ref 27–34)
MCHC RBC-ENTMCNC: 32.4 GM/DL — SIGNIFICANT CHANGE UP (ref 32–36)
MCHC RBC-ENTMCNC: 32.9 GM/DL — SIGNIFICANT CHANGE UP (ref 32–36)
MCV RBC AUTO: 96.6 FL — SIGNIFICANT CHANGE UP (ref 80–100)
MCV RBC AUTO: 96.9 FL — SIGNIFICANT CHANGE UP (ref 80–100)
NRBC # BLD: 0 /100 WBCS — SIGNIFICANT CHANGE UP (ref 0–0)
NRBC # BLD: 0 /100 WBCS — SIGNIFICANT CHANGE UP (ref 0–0)
PHOSPHATE SERPL-MCNC: 3.3 MG/DL — SIGNIFICANT CHANGE UP (ref 2.5–4.5)
PHOSPHATE SERPL-MCNC: 3.6 MG/DL — SIGNIFICANT CHANGE UP (ref 2.5–4.5)
PLATELET # BLD AUTO: 220 K/UL — SIGNIFICANT CHANGE UP (ref 150–400)
PLATELET # BLD AUTO: 243 K/UL — SIGNIFICANT CHANGE UP (ref 150–400)
POTASSIUM SERPL-MCNC: 3.9 MMOL/L — SIGNIFICANT CHANGE UP (ref 3.5–5.3)
POTASSIUM SERPL-MCNC: 4.1 MMOL/L — SIGNIFICANT CHANGE UP (ref 3.5–5.3)
POTASSIUM SERPL-SCNC: 3.9 MMOL/L — SIGNIFICANT CHANGE UP (ref 3.5–5.3)
POTASSIUM SERPL-SCNC: 4.1 MMOL/L — SIGNIFICANT CHANGE UP (ref 3.5–5.3)
PROTHROM AB SERPL-ACNC: 14.5 SEC — HIGH (ref 10.6–13.6)
PROTHROM AB SERPL-ACNC: 15 SEC — HIGH (ref 10.6–13.6)
RBC # BLD: 2.61 M/UL — LOW (ref 4.2–5.8)
RBC # BLD: 2.67 M/UL — LOW (ref 4.2–5.8)
RBC # FLD: 12.8 % — SIGNIFICANT CHANGE UP (ref 10.3–14.5)
RBC # FLD: 13 % — SIGNIFICANT CHANGE UP (ref 10.3–14.5)
SODIUM SERPL-SCNC: 140 MMOL/L — SIGNIFICANT CHANGE UP (ref 135–145)
SODIUM SERPL-SCNC: 143 MMOL/L — SIGNIFICANT CHANGE UP (ref 135–145)
SPECIMEN SOURCE: SIGNIFICANT CHANGE UP
WBC # BLD: 5.01 K/UL — SIGNIFICANT CHANGE UP (ref 3.8–10.5)
WBC # BLD: 5.35 K/UL — SIGNIFICANT CHANGE UP (ref 3.8–10.5)
WBC # FLD AUTO: 5.01 K/UL — SIGNIFICANT CHANGE UP (ref 3.8–10.5)
WBC # FLD AUTO: 5.35 K/UL — SIGNIFICANT CHANGE UP (ref 3.8–10.5)

## 2020-11-06 PROCEDURE — 99233 SBSQ HOSP IP/OBS HIGH 50: CPT

## 2020-11-06 PROCEDURE — 71045 X-RAY EXAM CHEST 1 VIEW: CPT | Mod: 26

## 2020-11-06 PROCEDURE — 99024 POSTOP FOLLOW-UP VISIT: CPT

## 2020-11-06 PROCEDURE — 99232 SBSQ HOSP IP/OBS MODERATE 35: CPT

## 2020-11-06 RX ORDER — LANOLIN ALCOHOL/MO/W.PET/CERES
8 CREAM (GRAM) TOPICAL AT BEDTIME
Refills: 0 | Status: DISCONTINUED | OUTPATIENT
Start: 2020-11-06 | End: 2020-11-06

## 2020-11-06 RX ORDER — SIMETHICONE 80 MG/1
80 TABLET, CHEWABLE ORAL ONCE
Refills: 0 | Status: COMPLETED | OUTPATIENT
Start: 2020-11-06 | End: 2020-11-06

## 2020-11-06 RX ORDER — LANOLIN ALCOHOL/MO/W.PET/CERES
8 CREAM (GRAM) TOPICAL AT BEDTIME
Refills: 0 | Status: DISCONTINUED | OUTPATIENT
Start: 2020-11-06 | End: 2020-11-10

## 2020-11-06 RX ORDER — ACETAMINOPHEN 500 MG
975 TABLET ORAL EVERY 6 HOURS
Refills: 0 | Status: DISCONTINUED | OUTPATIENT
Start: 2020-11-06 | End: 2020-11-06

## 2020-11-06 RX ORDER — POTASSIUM CHLORIDE 20 MEQ
20 PACKET (EA) ORAL ONCE
Refills: 0 | Status: COMPLETED | OUTPATIENT
Start: 2020-11-06 | End: 2020-11-06

## 2020-11-06 RX ORDER — IBUPROFEN 200 MG
400 TABLET ORAL EVERY 6 HOURS
Refills: 0 | Status: COMPLETED | OUTPATIENT
Start: 2020-11-06 | End: 2020-11-07

## 2020-11-06 RX ORDER — POLYETHYLENE GLYCOL 3350 17 G/17G
17 POWDER, FOR SOLUTION ORAL DAILY
Refills: 0 | Status: DISCONTINUED | OUTPATIENT
Start: 2020-11-06 | End: 2020-11-10

## 2020-11-06 RX ORDER — DOXAZOSIN MESYLATE 4 MG
1 TABLET ORAL AT BEDTIME
Refills: 0 | Status: DISCONTINUED | OUTPATIENT
Start: 2020-11-06 | End: 2020-11-06

## 2020-11-06 RX ORDER — SENNA PLUS 8.6 MG/1
2 TABLET ORAL AT BEDTIME
Refills: 0 | Status: DISCONTINUED | OUTPATIENT
Start: 2020-11-06 | End: 2020-11-10

## 2020-11-06 RX ORDER — OXYCODONE HYDROCHLORIDE 5 MG/1
5 TABLET ORAL EVERY 4 HOURS
Refills: 0 | Status: DISCONTINUED | OUTPATIENT
Start: 2020-11-06 | End: 2020-11-10

## 2020-11-06 RX ORDER — TAMSULOSIN HYDROCHLORIDE 0.4 MG/1
0.4 CAPSULE ORAL AT BEDTIME
Refills: 0 | Status: DISCONTINUED | OUTPATIENT
Start: 2020-11-06 | End: 2020-11-10

## 2020-11-06 RX ORDER — ZOLPIDEM TARTRATE 10 MG/1
10 TABLET ORAL AT BEDTIME
Refills: 0 | Status: DISCONTINUED | OUTPATIENT
Start: 2020-11-06 | End: 2020-11-10

## 2020-11-06 RX ORDER — ACETAMINOPHEN 500 MG
975 TABLET ORAL EVERY 6 HOURS
Refills: 0 | Status: DISCONTINUED | OUTPATIENT
Start: 2020-11-06 | End: 2020-11-10

## 2020-11-06 RX ORDER — CLONAZEPAM 1 MG
1 TABLET ORAL
Refills: 0 | Status: DISCONTINUED | OUTPATIENT
Start: 2020-11-06 | End: 2020-11-06

## 2020-11-06 RX ORDER — IBUPROFEN 200 MG
400 TABLET ORAL EVERY 6 HOURS
Refills: 0 | Status: DISCONTINUED | OUTPATIENT
Start: 2020-11-06 | End: 2020-11-06

## 2020-11-06 RX ORDER — AMPICILLIN SODIUM AND SULBACTAM SODIUM 250; 125 MG/ML; MG/ML
3 INJECTION, POWDER, FOR SUSPENSION INTRAMUSCULAR; INTRAVENOUS EVERY 6 HOURS
Refills: 0 | Status: DISCONTINUED | OUTPATIENT
Start: 2020-11-06 | End: 2020-11-08

## 2020-11-06 RX ORDER — OLANZAPINE 15 MG/1
15 TABLET, FILM COATED ORAL AT BEDTIME
Refills: 0 | Status: DISCONTINUED | OUTPATIENT
Start: 2020-11-06 | End: 2020-11-10

## 2020-11-06 RX ORDER — ZOLPIDEM TARTRATE 10 MG/1
10 TABLET ORAL AT BEDTIME
Refills: 0 | Status: DISCONTINUED | OUTPATIENT
Start: 2020-11-06 | End: 2020-11-06

## 2020-11-06 RX ORDER — CLONAZEPAM 1 MG
1 TABLET ORAL
Refills: 0 | Status: DISCONTINUED | OUTPATIENT
Start: 2020-11-06 | End: 2020-11-10

## 2020-11-06 RX ADMIN — POLYETHYLENE GLYCOL 3350 17 GRAM(S): 17 POWDER, FOR SOLUTION ORAL at 12:25

## 2020-11-06 RX ADMIN — OXYCODONE HYDROCHLORIDE 5 MILLIGRAM(S): 5 TABLET ORAL at 16:50

## 2020-11-06 RX ADMIN — ENOXAPARIN SODIUM 40 MILLIGRAM(S): 100 INJECTION SUBCUTANEOUS at 12:19

## 2020-11-06 RX ADMIN — Medication 600 MILLIGRAM(S): at 00:41

## 2020-11-06 RX ADMIN — Medication 975 MILLIGRAM(S): at 21:00

## 2020-11-06 RX ADMIN — Medication 1 MILLIGRAM(S): at 05:04

## 2020-11-06 RX ADMIN — TAMSULOSIN HYDROCHLORIDE 0.4 MILLIGRAM(S): 0.4 CAPSULE ORAL at 21:03

## 2020-11-06 RX ADMIN — Medication 1 APPLICATION(S): at 05:06

## 2020-11-06 RX ADMIN — OLANZAPINE 15 MILLIGRAM(S): 15 TABLET, FILM COATED ORAL at 21:03

## 2020-11-06 RX ADMIN — Medication 20 MILLIEQUIVALENT(S): at 05:05

## 2020-11-06 RX ADMIN — Medication 1 APPLICATION(S): at 17:43

## 2020-11-06 RX ADMIN — Medication 1 MILLIGRAM(S): at 21:03

## 2020-11-06 RX ADMIN — Medication 400 MILLIGRAM(S): at 17:42

## 2020-11-06 RX ADMIN — Medication 400 MILLIGRAM(S): at 12:19

## 2020-11-06 RX ADMIN — Medication 400 MILLIGRAM(S): at 23:38

## 2020-11-06 RX ADMIN — OXYCODONE HYDROCHLORIDE 10 MILLIGRAM(S): 5 TABLET ORAL at 01:40

## 2020-11-06 RX ADMIN — OXYCODONE HYDROCHLORIDE 5 MILLIGRAM(S): 5 TABLET ORAL at 17:20

## 2020-11-06 RX ADMIN — AMPICILLIN SODIUM AND SULBACTAM SODIUM 200 GRAM(S): 250; 125 INJECTION, POWDER, FOR SUSPENSION INTRAMUSCULAR; INTRAVENOUS at 05:05

## 2020-11-06 RX ADMIN — Medication 40 MILLIGRAM(S): at 21:03

## 2020-11-06 RX ADMIN — Medication 400 MILLIGRAM(S): at 05:05

## 2020-11-06 RX ADMIN — AMPICILLIN SODIUM AND SULBACTAM SODIUM 200 GRAM(S): 250; 125 INJECTION, POWDER, FOR SUSPENSION INTRAMUSCULAR; INTRAVENOUS at 17:42

## 2020-11-06 RX ADMIN — Medication 1 APPLICATION(S): at 05:05

## 2020-11-06 RX ADMIN — OXYCODONE HYDROCHLORIDE 10 MILLIGRAM(S): 5 TABLET ORAL at 07:03

## 2020-11-06 RX ADMIN — Medication 975 MILLIGRAM(S): at 19:41

## 2020-11-06 RX ADMIN — Medication 600 MILLIGRAM(S): at 02:49

## 2020-11-06 RX ADMIN — AMPICILLIN SODIUM AND SULBACTAM SODIUM 200 GRAM(S): 250; 125 INJECTION, POWDER, FOR SUSPENSION INTRAMUSCULAR; INTRAVENOUS at 23:14

## 2020-11-06 RX ADMIN — Medication 400 MILLIGRAM(S): at 06:14

## 2020-11-06 RX ADMIN — ZOLPIDEM TARTRATE 10 MILLIGRAM(S): 10 TABLET ORAL at 22:29

## 2020-11-06 RX ADMIN — CHLORHEXIDINE GLUCONATE 1 APPLICATION(S): 213 SOLUTION TOPICAL at 05:05

## 2020-11-06 RX ADMIN — SIMETHICONE 80 MILLIGRAM(S): 80 TABLET, CHEWABLE ORAL at 12:19

## 2020-11-06 RX ADMIN — AMPICILLIN SODIUM AND SULBACTAM SODIUM 200 GRAM(S): 250; 125 INJECTION, POWDER, FOR SUSPENSION INTRAMUSCULAR; INTRAVENOUS at 13:06

## 2020-11-06 RX ADMIN — SENNA PLUS 2 TABLET(S): 8.6 TABLET ORAL at 21:03

## 2020-11-06 RX ADMIN — Medication 8 MILLIGRAM(S): at 21:03

## 2020-11-06 RX ADMIN — AMPICILLIN SODIUM AND SULBACTAM SODIUM 200 GRAM(S): 250; 125 INJECTION, POWDER, FOR SUSPENSION INTRAMUSCULAR; INTRAVENOUS at 00:41

## 2020-11-06 RX ADMIN — OXYCODONE HYDROCHLORIDE 10 MILLIGRAM(S): 5 TABLET ORAL at 06:33

## 2020-11-06 RX ADMIN — Medication 1 APPLICATION(S): at 18:18
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
## 2020-11-06 NOTE — PROGRESS NOTE ADULT - ATTENDING COMMENTS
seen and examined 11-06-20 @ 1232      afeb  soft / NT / ND  abdominal wounds without evidence of infection  neck wound without evidence of infection    CELINE - 25 mL serous drainage / 24 hours      10/31/2020 - repair of tracheal and laryngeal lacerations using sternothyroid muscle flap and open tracheostomy  11/5/2020 - decannulated  -we removed the stay suture fom the trachea today  -Unasyn (11/5 - ?) for possible wound infection
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
## 2020-11-06 NOTE — PROGRESS NOTE BEHAVIORAL HEALTH - NSBHCHARTREVIEWVS_PSY_A_CORE FT
ICU Vital Signs Last 24 Hrs  T(C): 36.9 (05 Nov 2020 07:00), Max: 37.1 (05 Nov 2020 03:00)  T(F): 98.4 (05 Nov 2020 07:00), Max: 98.8 (05 Nov 2020 03:00)  HR: 90 (05 Nov 2020 10:00) (79 - 101)  BP: 137/63 (05 Nov 2020 10:00) (103/59 - 159/83)  BP(mean): 90 (05 Nov 2020 10:00) (76 - 112)  ABP: --  ABP(mean): --  RR: 20 (05 Nov 2020 10:00) (18 - 31)  SpO2: 95% (05 Nov 2020 10:00) (92% - 99%) ICU Vital Signs Last 24 Hrs  T(C): 36 (06 Nov 2020 11:00), Max: 36.9 (05 Nov 2020 19:00)  T(F): 96.8 (06 Nov 2020 11:00), Max: 98.4 (05 Nov 2020 19:00)  HR: 91 (06 Nov 2020 12:00) (78 - 98)  BP: 152/74 (06 Nov 2020 12:00) (118/56 - 160/77)  BP(mean): 105 (06 Nov 2020 12:00) (80 - 110)  ABP: --  ABP(mean): --  RR: 34 (06 Nov 2020 12:00) (16 - 38)  SpO2: 100% (06 Nov 2020 12:00) (85% - 100%)
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
## 2020-11-06 NOTE — PROGRESS NOTE BEHAVIORAL HEALTH - NSBHCHARTREVIEWLAB_PSY_A_CORE FT
8.3    5.04  )-----------( 182      ( 04 Nov 2020 22:51 )             25.4     11-04    140  |  106  |  10  ----------------------------<  122<H>  3.9   |  23  |  0.68    Ca    9.6      04 Nov 2020 22:51  Phos  2.9     11-04  Mg     2.4     11-04 8.5    5.01  )-----------( 220      ( 06 Nov 2020 00:59 )             25.8     11-06    143  |  108  |  10  ----------------------------<  137<H>  3.9   |  25  |  0.74    Ca    9.5      06 Nov 2020 00:59  Phos  3.6     11-06  Mg     2.4     11-06
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
## 2020-11-06 NOTE — PROVIDER CONTACT NOTE (CHANGE IN STATUS NOTIFICATION) - ASSESSMENT
Pt is hemodynamically stable, net positive since hospital stay. No signs of JESSIE on lab values. Urine output decreased from baseline.

## 2020-11-06 NOTE — PROGRESS NOTE ADULT - SUBJECTIVE AND OBJECTIVE BOX
SURGERY PROGRESS NOTE    24 HOUR EVENTS:  -Trach decannulated, pt on NC  -Meredith inserted due to persistent retention, started Cardura  -Unasyn started       Patient seen and examined at bedside. Speaking full sentences when pressure applied to tracheostomy dressing.   Denies CP, SOB, nausea, or vomiting. No fever or chills.       Physical Exam  General Appearance: Resting comfortably, no acute distress, KO feeding tube in place   Neck: CELINE serosanguineous with minimal output.  HEENT: Tracheostomy wound covered with dressing c/d/i  Chest: non-labored breathing, no respiratory distress  CV: Pulse regular presently  Abdomen: Soft, non-tender, non-distended, no peritonitis, slash wounds with staple closures.      ICU Vital Signs Last 24 Hrs  T(C): 36.3 (2020 07:00), Max: 36.9 (2020 11:00)  T(F): 97.3 (2020 07:00), Max: 98.4 (2020 11:00)  HR: 78 (2020 09:00) (78 - 98)  BP: 151/79 (2020 09:00) (118/56 - 153/72)  BP(mean): 109 (2020 09:00) (80 - 109)  ABP: --  ABP(mean): --  RR: 24 (2020 09:00) (16 - 38)  SpO2: 95% (2020 09:00) (85% - 97%)    I&O's Detail    2020 07:01  -  2020 07:00  --------------------------------------------------------  IN:    Enteral Tube Flush: 60 mL    IV PiggyBack: 462.5 mL    Jevity 1.5: 1320 mL  Total IN: 1842.5 mL    OUT:    Bulb (mL): 25 mL    Indwelling Catheter - Urethral (mL): 1160 mL  Total OUT: 1185 mL    Total NET: 657.5 mL      2020 07:01  -  2020 10:00  --------------------------------------------------------  IN:    Enteral Tube Flush: 50 mL    Jevity 1.5: 55 mL  Total IN: 105 mL    OUT:    Indwelling Catheter - Urethral (mL): 70 mL  Total OUT: 70 mL    Total NET: 35 mL          143  |  108  |  10  ----------------------------<  137<H>  3.9   |  25  |  0.74    Ca    9.5      2020 00:59  Phos  3.6     11-  Mg     2.4                               8.5    5.01  )-----------( 220      ( 2020 00:59 )             25.8     PT/INR - ( 2020 00:59 )   PT: 14.5 sec;   INR: 1.22 ratio         PTT - ( 2020 00:59 )  PTT:29.9 sec      Urinalysis Basic - ( 2020 22:01 )    Color: Yellow / Appearance: Clear / S.020 / pH: x  Gluc: x / Ketone: Negative  / Bili: Negative / Urobili: Negative   Blood: x / Protein: Trace / Nitrite: Negative   Leuk Esterase: Negative / RBC: 3 /hpf / WBC 2 /HPF   Sq Epi: x / Non Sq Epi: 1 /hpf / Bacteria: Negative    MEDICATIONS  (STANDING):  ampicillin/sulbactam  IVPB 3 Gram(s) IV Intermittent every 6 hours  BACItracin   Ointment 1 Application(s) Topical two times a day  chlorhexidine 2% Cloths 1 Application(s) Topical <User Schedule>  clonazePAM  Tablet 1 milliGRAM(s) Oral <User Schedule>  enoxaparin Injectable 40 milliGRAM(s) SubCutaneous daily  ibuprofen  Tablet. 400 milliGRAM(s) Oral every 6 hours  melatonin 8 milliGRAM(s) Oral at bedtime  OLANZapine 15 milliGRAM(s) Oral at bedtime  PARoxetine 40 milliGRAM(s) Oral at bedtime  petrolatum white Ointment 1 Application(s) Topical two times a day  polyethylene glycol 3350 17 Gram(s) Oral daily  senna 2 Tablet(s) Oral at bedtime  simethicone 80 milliGRAM(s) Chew once  tamsulosin 0.4 milliGRAM(s) Oral at bedtime    MEDICATIONS  (PRN):  acetaminophen   Tablet .. 975 milliGRAM(s) Oral every 6 hours PRN Mild Pain (1 - 3)  oxyCODONE    IR 5 milliGRAM(s) Oral every 4 hours PRN Moderate Pain (4 - 6)  zolpidem 10 milliGRAM(s) Oral at bedtime PRN Insomnia
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
## 2020-11-06 NOTE — CHART NOTE - NSCHARTNOTEFT_GEN_A_CORE
Pt being followed by this service, scheduled for followup today. As per discussion with NARCISO Dupree, Pt has now been started on Regular texture diet by SICU team, after passing nursing dysphagia screen. No further f/u is wanted at this time. Therefore, this service will no longer actively follow. Please reconsult if clinically indicated. Pt being followed by this service, scheduled for followup today. As per discussion with NARCISO Dupree, Pt has now been started on Regular texture diet by SICU team, after passing nursing dysphagia screen. No further f/u is wanted at this time, as per discussion. Therefore, this service will no longer actively follow. Please reconsult if clinically indicated.
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
## 2020-11-06 NOTE — PROGRESS NOTE BEHAVIORAL HEALTH - NSBHFUPINTERVALHXFT_PSY_A_CORE
Pt seen, AOA x 3, remains depressed, currently denies si/hi. pt reports poor sleep. no behavioral issues reported. compliant with medications. as per staff pt calm. Spoke with pt's outpt psychiatrist Dr. Cline few days ago, states pt did not express SI recently to him. He was concerned about pt safety given recent attempt. Pt has been depressed for many years ago, has been more isolative, not talking as much recently. Pt denied wanting any med changes. Pt has tried several meds in the past including cymbalta, atarax, buspar, depakote, remeron, risperdal, trazodone, effexor, seroquel, abilify, ambien. Pt has been on ECT 2013 at [facility],was helpful. he would like pt to resume ect when admitted to inpt psych unit. Pt states he is feeling a little better today, less depressed. Pt saw his wife and daughter yesterday, went well. Pt denies current si/hi. Pt remains ambivalent about his recent attempt, and still being alive. PT denies psychosis,deysi,anxiety. pt still reports difficulty sleeping.

## 2020-11-07 PROCEDURE — 99024 POSTOP FOLLOW-UP VISIT: CPT

## 2020-11-07 RX ORDER — MIRTAZAPINE 45 MG/1
15 TABLET, ORALLY DISINTEGRATING ORAL AT BEDTIME
Refills: 0 | Status: DISCONTINUED | OUTPATIENT
Start: 2020-11-07 | End: 2020-11-10

## 2020-11-07 RX ORDER — OLANZAPINE 15 MG/1
5 TABLET, FILM COATED ORAL ONCE
Refills: 0 | Status: COMPLETED | OUTPATIENT
Start: 2020-11-07 | End: 2020-11-07

## 2020-11-07 RX ADMIN — ENOXAPARIN SODIUM 40 MILLIGRAM(S): 100 INJECTION SUBCUTANEOUS at 11:32

## 2020-11-07 RX ADMIN — AMPICILLIN SODIUM AND SULBACTAM SODIUM 200 GRAM(S): 250; 125 INJECTION, POWDER, FOR SUSPENSION INTRAMUSCULAR; INTRAVENOUS at 17:44

## 2020-11-07 RX ADMIN — ZOLPIDEM TARTRATE 10 MILLIGRAM(S): 10 TABLET ORAL at 23:20

## 2020-11-07 RX ADMIN — TAMSULOSIN HYDROCHLORIDE 0.4 MILLIGRAM(S): 0.4 CAPSULE ORAL at 21:24

## 2020-11-07 RX ADMIN — Medication 8 MILLIGRAM(S): at 21:24

## 2020-11-07 RX ADMIN — OXYCODONE HYDROCHLORIDE 5 MILLIGRAM(S): 5 TABLET ORAL at 04:53

## 2020-11-07 RX ADMIN — Medication 400 MILLIGRAM(S): at 00:58

## 2020-11-07 RX ADMIN — Medication 20 MILLIGRAM(S): at 17:45

## 2020-11-07 RX ADMIN — SENNA PLUS 2 TABLET(S): 8.6 TABLET ORAL at 21:24

## 2020-11-07 RX ADMIN — Medication 400 MILLIGRAM(S): at 06:14

## 2020-11-07 RX ADMIN — MIRTAZAPINE 15 MILLIGRAM(S): 45 TABLET, ORALLY DISINTEGRATING ORAL at 21:24

## 2020-11-07 RX ADMIN — Medication 1 APPLICATION(S): at 06:15

## 2020-11-07 RX ADMIN — AMPICILLIN SODIUM AND SULBACTAM SODIUM 200 GRAM(S): 250; 125 INJECTION, POWDER, FOR SUSPENSION INTRAMUSCULAR; INTRAVENOUS at 11:32

## 2020-11-07 RX ADMIN — Medication 1 MILLIGRAM(S): at 21:24

## 2020-11-07 RX ADMIN — OXYCODONE HYDROCHLORIDE 5 MILLIGRAM(S): 5 TABLET ORAL at 19:44

## 2020-11-07 RX ADMIN — OXYCODONE HYDROCHLORIDE 5 MILLIGRAM(S): 5 TABLET ORAL at 20:15

## 2020-11-07 RX ADMIN — OLANZAPINE 15 MILLIGRAM(S): 15 TABLET, FILM COATED ORAL at 21:24

## 2020-11-07 RX ADMIN — AMPICILLIN SODIUM AND SULBACTAM SODIUM 200 GRAM(S): 250; 125 INJECTION, POWDER, FOR SUSPENSION INTRAMUSCULAR; INTRAVENOUS at 06:15

## 2020-11-07 RX ADMIN — Medication 1 APPLICATION(S): at 17:47

## 2020-11-07 RX ADMIN — Medication 1 MILLIGRAM(S): at 06:16

## 2020-11-07 NOTE — PROGRESS NOTE ADULT - ATTENDING COMMENTS
seen and examined 11-07-20 @ 0910    afeb  soft / NT / ND  abdominal wounds without evidence of infection  neck wound without evidence of infection    Jose Juan drain - 20 mL serous drainage / 24 hours      10/31/2020 - repair of tracheal and laryngeal lacerations using sternothyroid muscle flap and open tracheostomy  11/5/2020 - decannulated  -we removed the Jose Juan drain today  -we removed the staples from the abdominal wounds today  -Unasyn (11/5 - ?) for possible wound infection    attempted suicide  -dry dressing over tracheostomy wound  -plan transfer to inpatient psych on Monday

## 2020-11-07 NOTE — PROGRESS NOTE BEHAVIORAL HEALTH - NSBHFUPINTERVALHXFT_PSY_A_CORE
PT says he is still very depressed and says he could not sleep last night at all despite taking Ambien.   Pt denies current si/hi. Pt remains ambivalent about his recent attempt, and still being alive. PT denies psychosis, deysi, anxiety. Pt is requesting transfer to [facility] where he can have ECT as he did in the past. He understands that he can also go to [facility] where they would do ECT. PT says he is still very depressed and says he could not sleep last night at all despite taking Ambien.   Pt denies current si/hi. Pt remains ambivalent about his recent attempt, and still being alive. PT denies psychosis, deysi, anxiety. Pt is requesting transfer to [facility] where he can have ECT as he did in the past. He understands that he can also go to [facility] where they would do ECT.  Pt's wife, Coleen Barth ([phone]) is at his bedside and very concerned that her  is still severely depressed.  She is asking if he could take medication at night to help him sleep.  Discussed changing his antidepressant Paxil which she agrees has not been helpful. May begin Remeron 15mg and Melatonin at bedtime (and continue Zyprexa 15mg and Clonazepam 1mg BID)  to help with the severe insomnia.

## 2020-11-07 NOTE — PROGRESS NOTE BEHAVIORAL HEALTH - NSBHCONSULTMEDS_PSY_A_CORE FT
continue home regimen   -paxil 50mg daily.   -Klonopin 1mg BID  -Zyprexa 15mg qhs  -melatonin 6mg qhs. can give trazodone 50mg po qhs prn for insomnia continue home regimen   -Decrease Paxil to 20mg daily and start Remeron 15mg po qhs  -Klonopin 1mg BID  -Zyprexa 15mg qhs  -melatonin 6mg qhs  begin Remeron 15mg and Melatonin at bedtime (and continue Zyprexa 15mg and Clonazepam 1mg BID)

## 2020-11-07 NOTE — PROGRESS NOTE BEHAVIORAL HEALTH - NSBHCHARTREVIEWVS_PSY_A_CORE FT
Vital Signs Last 24 Hrs  T(C): 37 (07 Nov 2020 12:11), Max: 37 (07 Nov 2020 12:11)  T(F): 98.6 (07 Nov 2020 12:11), Max: 98.6 (07 Nov 2020 12:11)  HR: 87 (07 Nov 2020 12:11) (70 - 97)  BP: 154/88 (07 Nov 2020 12:11) (111/66 - 154/88)  BP(mean): 84 (06 Nov 2020 23:00) (84 - 108)  RR: 18 (07 Nov 2020 12:11) (16 - 25)  SpO2: 95% (07 Nov 2020 12:11) (95% - 100%)
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
## 2020-11-07 NOTE — PROGRESS NOTE BEHAVIORAL HEALTH - NSBHCHARTREVIEWLAB_PSY_A_CORE FT
8.5    5.01  )-----------( 220      ( 06 Nov 2020 00:59 )             25.8     11-06    143  |  108  |  10  ----------------------------<  137<H>  3.9   |  25  |  0.74    Ca    9.5      06 Nov 2020 00:59  Phos  3.6     11-06  Mg     2.4     11-06
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
## 2020-11-07 NOTE — PROGRESS NOTE ADULT - SUBJECTIVE AND OBJECTIVE BOX
SURGERY PROGRESS NOTE    SUBJECTIVE / 24H EVENTS:  Patient seen and examined on morning rounds. No acute events overnight. Patient transferred from SICU yesterday night. No complaints this morning. L neck CELINE and staples removed at bedside today.        OBJECTIVE:  VITAL SIGNS:  T(C): 37 (11-07-20 @ 12:11), Max: 37 (11-07-20 @ 12:11)  HR: 87 (11-07-20 @ 12:11) (70 - 97)  BP: 154/88 (11-07-20 @ 12:11) (111/66 - 154/88)  RR: 18 (11-07-20 @ 12:11) (16 - 26)  SpO2: 95% (11-07-20 @ 12:11) (95% - 100%)  Daily     Daily       Physical Exam  General Appearance: Resting comfortably, no acute distress, KO feeding tube in place   Neck: CELINE serosanguineous with minimal output, removed at bedside  HEENT: Tracheostomy wound covered with dressing c/d/i  Chest: non-labored breathing, no respiratory distress  CV: Pulse regular presently  Abdomen: Soft, non-tender, non-distended, no peritonitis. Slash wounds with staple closures, staples removed at bedside      11-06-20 @ 07:01  -  11-07-20 @ 07:00  --------------------------------------------------------  IN:    Enteral Tube Flush: 50 mL    IV PiggyBack: 200 mL    Jevity 1.5: 110 mL    Oral Fluid: 880 mL  Total IN: 1240 mL    OUT:    Bulb (mL): 20 mL    Indwelling Catheter - Urethral (mL): 1705 mL  Total OUT: 1725 mL    Total NET: -485 mL      11-07-20 @ 07:01  -  11-07-20 @ 12:35  --------------------------------------------------------  IN:    Oral Fluid: 540 mL  Total IN: 540 mL    OUT:    Bulb (mL): 10 mL    Indwelling Catheter - Urethral (mL): 200 mL  Total OUT: 210 mL    Total NET: 330 mL          LAB VALUES:  11-06    140  |  106  |  13  ----------------------------<  108<H>  4.1   |  25  |  0.83    Ca    9.6      06 Nov 2020 22:23  Phos  3.3     11-06  Mg     2.3     11-06                                 8.2    5.35  )-----------( 243      ( 06 Nov 2020 22:23 )             25.3       PT/INR - ( 06 Nov 2020 22:23 )   PT: 15.0 sec;   INR: 1.26 ratio         PTT - ( 06 Nov 2020 22:23 )  PTT:28.9 sec            MICROBIOLOGY:    Culture - Bronchial (collected 05 Nov 2020 01:20)  Source: Bronch Wash Combicath  Gram Stain (05 Nov 2020 06:41):    Few Squamous epithelial cells per low power field    Few polymorphonuclear leukocytes per low power field    Few Gram variable coccobacilli per oil power field  Final Report (06 Nov 2020 22:02):    Normal Respiratory Leticia present        RADIOLOGY:        MEDICATIONS  (STANDING):  ampicillin/sulbactam  IVPB 3 Gram(s) IV Intermittent every 6 hours  BACItracin   Ointment 1 Application(s) Topical two times a day  clonazePAM  Tablet 1 milliGRAM(s) Oral <User Schedule>  enoxaparin Injectable 40 milliGRAM(s) SubCutaneous daily  melatonin 8 milliGRAM(s) Oral at bedtime  OLANZapine 15 milliGRAM(s) Oral at bedtime  PARoxetine 40 milliGRAM(s) Oral at bedtime  polyethylene glycol 3350 17 Gram(s) Oral daily  senna 2 Tablet(s) Oral at bedtime  tamsulosin 0.4 milliGRAM(s) Oral at bedtime    MEDICATIONS  (PRN):  acetaminophen   Tablet .. 975 milliGRAM(s) Oral every 6 hours PRN Mild Pain (1 - 3)  oxyCODONE    IR 5 milliGRAM(s) Oral every 4 hours PRN Moderate Pain (4 - 6)  zolpidem 10 milliGRAM(s) Oral at bedtime PRN Insomnia        SURGERY PROGRESS NOTE    SUBJECTIVE / 24H EVENTS:  Patient seen and examined on morning rounds. No acute events overnight. Patient transferred from SICU yesterday night. No complaints this morning. L neck CELINE and staples removed at bedside today.        OBJECTIVE:  VITAL SIGNS:  T(C): 37 (11-07-20 @ 12:11), Max: 37 (11-07-20 @ 12:11)  HR: 87 (11-07-20 @ 12:11) (70 - 97)  BP: 154/88 (11-07-20 @ 12:11) (111/66 - 154/88)  RR: 18 (11-07-20 @ 12:11) (16 - 26)  SpO2: 95% (11-07-20 @ 12:11) (95% - 100%)  Daily     Daily       Physical Exam  General Appearance: Resting comfortably, no acute distress  Neck: CELINE serosanguineous with minimal output, removed at bedside  HEENT: Tracheostomy wound covered with dressing c/d/i  Chest: non-labored breathing, no respiratory distress  Abdomen: Soft, non-tender, non-distended, no peritonitis. Slash wounds with staple closures, staples removed at bedside      11-06-20 @ 07:01  -  11-07-20 @ 07:00  --------------------------------------------------------  IN:    Enteral Tube Flush: 50 mL    IV PiggyBack: 200 mL    Jevity 1.5: 110 mL    Oral Fluid: 880 mL  Total IN: 1240 mL    OUT:    Bulb (mL): 20 mL    Indwelling Catheter - Urethral (mL): 1705 mL  Total OUT: 1725 mL    Total NET: -485 mL      11-07-20 @ 07:01  -  11-07-20 @ 12:35  --------------------------------------------------------  IN:    Oral Fluid: 540 mL  Total IN: 540 mL    OUT:    Bulb (mL): 10 mL    Indwelling Catheter - Urethral (mL): 200 mL  Total OUT: 210 mL    Total NET: 330 mL          LAB VALUES:  11-06    140  |  106  |  13  ----------------------------<  108<H>  4.1   |  25  |  0.83    Ca    9.6      06 Nov 2020 22:23  Phos  3.3     11-06  Mg     2.3     11-06                                 8.2    5.35  )-----------( 243      ( 06 Nov 2020 22:23 )             25.3       PT/INR - ( 06 Nov 2020 22:23 )   PT: 15.0 sec;   INR: 1.26 ratio         PTT - ( 06 Nov 2020 22:23 )  PTT:28.9 sec            MICROBIOLOGY:    Culture - Bronchial (collected 05 Nov 2020 01:20)  Source: Bronch Wash Combicath  Gram Stain (05 Nov 2020 06:41):    Few Squamous epithelial cells per low power field    Few polymorphonuclear leukocytes per low power field    Few Gram variable coccobacilli per oil power field  Final Report (06 Nov 2020 22:02):    Normal Respiratory Leticia present        RADIOLOGY:        MEDICATIONS  (STANDING):  ampicillin/sulbactam  IVPB 3 Gram(s) IV Intermittent every 6 hours  BACItracin   Ointment 1 Application(s) Topical two times a day  clonazePAM  Tablet 1 milliGRAM(s) Oral <User Schedule>  enoxaparin Injectable 40 milliGRAM(s) SubCutaneous daily  melatonin 8 milliGRAM(s) Oral at bedtime  OLANZapine 15 milliGRAM(s) Oral at bedtime  PARoxetine 40 milliGRAM(s) Oral at bedtime  polyethylene glycol 3350 17 Gram(s) Oral daily  senna 2 Tablet(s) Oral at bedtime  tamsulosin 0.4 milliGRAM(s) Oral at bedtime    MEDICATIONS  (PRN):  acetaminophen   Tablet .. 975 milliGRAM(s) Oral every 6 hours PRN Mild Pain (1 - 3)  oxyCODONE    IR 5 milliGRAM(s) Oral every 4 hours PRN Moderate Pain (4 - 6)  zolpidem 10 milliGRAM(s) Oral at bedtime PRN Insomnia

## 2020-11-08 LAB
CULTURE RESULTS: SIGNIFICANT CHANGE UP
CULTURE RESULTS: SIGNIFICANT CHANGE UP
SARS-COV-2 RNA SPEC QL NAA+PROBE: SIGNIFICANT CHANGE UP
SPECIMEN SOURCE: SIGNIFICANT CHANGE UP
SPECIMEN SOURCE: SIGNIFICANT CHANGE UP

## 2020-11-08 PROCEDURE — 93010 ELECTROCARDIOGRAM REPORT: CPT

## 2020-11-08 PROCEDURE — 99233 SBSQ HOSP IP/OBS HIGH 50: CPT | Mod: GC

## 2020-11-08 PROCEDURE — 99024 POSTOP FOLLOW-UP VISIT: CPT

## 2020-11-08 RX ADMIN — Medication 975 MILLIGRAM(S): at 12:00

## 2020-11-08 RX ADMIN — Medication 975 MILLIGRAM(S): at 11:52

## 2020-11-08 RX ADMIN — OXYCODONE HYDROCHLORIDE 5 MILLIGRAM(S): 5 TABLET ORAL at 08:14

## 2020-11-08 RX ADMIN — Medication 1 MILLIGRAM(S): at 22:34

## 2020-11-08 RX ADMIN — ENOXAPARIN SODIUM 40 MILLIGRAM(S): 100 INJECTION SUBCUTANEOUS at 11:53

## 2020-11-08 RX ADMIN — Medication 20 MILLIGRAM(S): at 11:51

## 2020-11-08 RX ADMIN — Medication 1 APPLICATION(S): at 06:00

## 2020-11-08 RX ADMIN — Medication 1 APPLICATION(S): at 17:30

## 2020-11-08 RX ADMIN — Medication 8 MILLIGRAM(S): at 22:34

## 2020-11-08 RX ADMIN — TAMSULOSIN HYDROCHLORIDE 0.4 MILLIGRAM(S): 0.4 CAPSULE ORAL at 22:34

## 2020-11-08 RX ADMIN — OLANZAPINE 5 MILLIGRAM(S): 15 TABLET, FILM COATED ORAL at 00:19

## 2020-11-08 RX ADMIN — OLANZAPINE 15 MILLIGRAM(S): 15 TABLET, FILM COATED ORAL at 22:34

## 2020-11-08 RX ADMIN — AMPICILLIN SODIUM AND SULBACTAM SODIUM 200 GRAM(S): 250; 125 INJECTION, POWDER, FOR SUSPENSION INTRAMUSCULAR; INTRAVENOUS at 05:59

## 2020-11-08 RX ADMIN — OXYCODONE HYDROCHLORIDE 5 MILLIGRAM(S): 5 TABLET ORAL at 09:00

## 2020-11-08 RX ADMIN — Medication 1 MILLIGRAM(S): at 05:59

## 2020-11-08 RX ADMIN — MIRTAZAPINE 15 MILLIGRAM(S): 45 TABLET, ORALLY DISINTEGRATING ORAL at 22:34

## 2020-11-08 NOTE — PROGRESS NOTE BEHAVIORAL HEALTH - NSBHCHARTREVIEWVS_PSY_A_CORE FT
Vital Signs Last 24 Hrs  T(C): 36.9 (08 Nov 2020 08:10), Max: 37.3 (07 Nov 2020 21:00)  T(F): 98.5 (08 Nov 2020 08:10), Max: 99.2 (07 Nov 2020 21:00)  HR: 96 (08 Nov 2020 06:09) (85 - 99)  BP: 152/85 (08 Nov 2020 08:10) (143/84 - 168/100)  BP(mean): --  RR: 18 (08 Nov 2020 08:10) (18 - 18)  SpO2: 98% (08 Nov 2020 08:10) (94% - 99%)
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
## 2020-11-08 NOTE — PROGRESS NOTE BEHAVIORAL HEALTH - NSBHCHARTREVIEWLAB_PSY_A_CORE FT
8.2    5.35  )-----------( 243      ( 06 Nov 2020 22:23 )             25.3   11-06    140  |  106  |  13  ----------------------------<  108<H>  4.1   |  25  |  0.83    Ca    9.6      06 Nov 2020 22:23  Phos  3.3     11-06  Mg     2.3     11-06

## 2020-11-08 NOTE — PROGRESS NOTE BEHAVIORAL HEALTH - NSBHCONSULTMEDS_PSY_A_CORE FT
continue home regimen   - Paxil now at 20mg daily; continue to titrate down   - Remeron 15mg po qhs  - Klonopin 1mg BID  - Zyprexa 15mg qhs  - melatonin 6mg qhs

## 2020-11-08 NOTE — PROGRESS NOTE ADULT - SUBJECTIVE AND OBJECTIVE BOX
SUBJECTIVE / 24H EVENTS:  Patient seen and examined on morning rounds. Was agitated overnight and given Zyprexa.   No complaints this morning.       Physical Exam  General Appearance: Resting comfortably, no acute distress  Neck: Dressing changed, no signs of wound infection.   HEENT: Tracheostomy wound covered with dressing c/d/i  Chest: non-labored breathing, no respiratory distress  Abdomen: Soft, non-tender, non-distended, no peritonitis. Slash wounds off staples without skin infection.    Vital Signs Last 24 Hrs  T(C): 36.9 (08 Nov 2020 08:10), Max: 37.3 (07 Nov 2020 21:00)  T(F): 98.5 (08 Nov 2020 08:10), Max: 99.2 (07 Nov 2020 21:00)  HR: 96 (08 Nov 2020 06:09) (85 - 99)  BP: 152/85 (08 Nov 2020 08:10) (143/84 - 168/100)  BP(mean): --  RR: 18 (08 Nov 2020 08:10) (18 - 18)  SpO2: 98% (08 Nov 2020 08:10) (94% - 99%)    I&O's Detail    07 Nov 2020 07:01  -  08 Nov 2020 07:00  --------------------------------------------------------  IN:    IV PiggyBack: 300 mL    Oral Fluid: 990 mL  Total IN: 1290 mL    OUT:    Bulb (mL): 10 mL    Indwelling Catheter - Urethral (mL): 200 mL    Voided (mL): 1400 mL  Total OUT: 1610 mL    Total NET: -320 mL      08 Nov 2020 07:01  -  08 Nov 2020 11:29  --------------------------------------------------------  IN:    Oral Fluid: 595 mL  Total IN: 595 mL    OUT:  Total OUT: 0 mL    Total NET: 595 mL          11-06    140  |  106  |  13  ----------------------------<  108<H>  4.1   |  25  |  0.83    Ca    9.6      06 Nov 2020 22:23  Phos  3.3     11-06  Mg     2.3     11-06                              8.2    5.35  )-----------( 243      ( 06 Nov 2020 22:23 )             25.3       PT/INR - ( 06 Nov 2020 22:23 )   PT: 15.0 sec;   INR: 1.26 ratio         PTT - ( 06 Nov 2020 22:23 )  PTT:28.9 sec    LABS:                         8.2    5.35  )-----------( 243      ( 06 Nov 2020 22:23 )             25.3     11-06    140  |  106  |  13  ----------------------------<  108<H>  4.1   |  25  |  0.83    Ca    9.6      06 Nov 2020 22:23  Phos  3.3     11-06  Mg     2.3     11-06      PT/INR - ( 06 Nov 2020 22:23 )   PT: 15.0 sec;   INR: 1.26 ratio         PTT - ( 06 Nov 2020 22:23 )  PTT:28.9 sec        MEDICATIONS  (STANDING):  ampicillin/sulbactam  IVPB 3 Gram(s) IV Intermittent every 6 hours  BACItracin   Ointment 1 Application(s) Topical two times a day  clonazePAM  Tablet 1 milliGRAM(s) Oral <User Schedule>  enoxaparin Injectable 40 milliGRAM(s) SubCutaneous daily  melatonin 8 milliGRAM(s) Oral at bedtime  mirtazapine 15 milliGRAM(s) Oral at bedtime  OLANZapine 15 milliGRAM(s) Oral at bedtime  PARoxetine 20 milliGRAM(s) Oral daily  polyethylene glycol 3350 17 Gram(s) Oral daily  senna 2 Tablet(s) Oral at bedtime  tamsulosin 0.4 milliGRAM(s) Oral at bedtime    MEDICATIONS  (PRN):  acetaminophen   Tablet .. 975 milliGRAM(s) Oral every 6 hours PRN Mild Pain (1 - 3)  oxyCODONE    IR 5 milliGRAM(s) Oral every 4 hours PRN Moderate Pain (4 - 6)  zolpidem 10 milliGRAM(s) Oral at bedtime PRN Insomnia

## 2020-11-08 NOTE — PROGRESS NOTE BEHAVIORAL HEALTH - NSBHFUPINTERVALHXFT_PSY_A_CORE
Overnight staff reported that the patient was confused, trying to leave his room to "go to the bathroom." He was given 5mg PRN zyprexa.    On interview today the patient was sleeping, but able to be woken. He continued to be sleepy during the examination, and fell asleep at times. He says that he is still very depressed, and again could not sleep last night despite taking ambien, melatonin, and remeron. He continues to deny any current si/hi. He remembers the episode of overnight confusion, and admits to sometimes experiencing confusion.  he did not elaborate or clarify any further on this matter. He continues to deny psychosis, or symptoms of deysi. He reports feeling anxious and ruminating about his past and future. He is still requesting ECT, however it appears as if he is more amenable today to treatment at [facility]. On interview today the patient was sleeping, but able to be woken. He continued to be sleepy during the examination, and fell asleep at times. He says that he is still very depressed, and again could not sleep last night despite taking ambien, melatonin, and remeron. Vague regarding SI. He remembers feeling confused and agitated ON because he could not sleep, did not elaborate or clarify any further on this matter. He continues to deny psychosis, or symptoms of deysi. He reports feeling anxious and ruminating about his past and future. Amenable for ECT.  Overnight staff reported that the patient was confused, trying to leave his room to "go to the bathroom." He was given 5mg PRN zyprexa for agitation.

## 2020-11-08 NOTE — PROVIDER CONTACT NOTE (OTHER) - ASSESSMENT
pt. trying to get OOB, not cooperating with commands, pt. combative with staff and trying to "go upstairs" pt. then walking to window to try and open it. 1:1 at bedside.

## 2020-11-08 NOTE — PROGRESS NOTE ADULT - ATTENDING COMMENTS
seen and examined 11-08-20 @ 0920    afeb  soft / NT / ND  abdominal wounds without evidence of infection  neck wound without evidence of infection      10/31/2020 - repair of tracheal and laryngeal lacerations using sternothyroid muscle flap and open tracheostomy  11/5/2020 - decannulated  -Unasyn (11/5 - 11/8) for possible wound infection    attempted suicide  -dry dressing over tracheostomy wound  -plan transfer to inpatient psych on Monday seen and examined 11-08-20 @ 0920    afeb  soft / NT / ND  abdominal wounds without evidence of infection  neck wound without evidence of infection      10/31/2020 - repair of tracheal and laryngeal lacerations using sternothyroid muscle flap and open tracheostomy  11/5/2020 - decannulated  -Unasyn (11/5 - 11/8) for possible wound infection    attempted suicide  -dry dressing over tracheostomy wound  -clear for transfer to inpatient psych when a bed becomes available seen and examined 11-08-20 @ 0918    afeb  soft / NT / ND  abdominal wounds without evidence of infection  neck wound without evidence of infection      10/31/2020 - repair of tracheal and laryngeal lacerations using sternothyroid muscle flap and open tracheostomy  11/5/2020 - decannulated  -Unasyn (11/5 - 11/8) for possible wound infection    attempted suicide  -dry dressing over tracheostomy wound  -clear for transfer to inpatient psych when a bed becomes available
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
## 2020-11-08 NOTE — PROVIDER CONTACT NOTE (OTHER) - ASSESSMENT
pt ambulating w/ PT,  when ambulating, c/o mild dizziness as per PT.  when resting, pt back to bed, safety maint, call bell w/in reach, 1:1 maint.

## 2020-11-09 ENCOUNTER — TRANSCRIPTION ENCOUNTER (OUTPATIENT)
Age: 55
End: 2020-11-09

## 2020-11-09 LAB
ANION GAP SERPL CALC-SCNC: 11 MMOL/L — SIGNIFICANT CHANGE UP (ref 5–17)
BUN SERPL-MCNC: 14 MG/DL — SIGNIFICANT CHANGE UP (ref 7–23)
CALCIUM SERPL-MCNC: 9.8 MG/DL — SIGNIFICANT CHANGE UP (ref 8.4–10.5)
CHLORIDE SERPL-SCNC: 108 MMOL/L — SIGNIFICANT CHANGE UP (ref 96–108)
CO2 SERPL-SCNC: 25 MMOL/L — SIGNIFICANT CHANGE UP (ref 22–31)
CREAT SERPL-MCNC: 0.77 MG/DL — SIGNIFICANT CHANGE UP (ref 0.5–1.3)
GLUCOSE SERPL-MCNC: 111 MG/DL — HIGH (ref 70–99)
HCT VFR BLD CALC: 30.3 % — LOW (ref 39–50)
HGB BLD-MCNC: 9.7 G/DL — LOW (ref 13–17)
MAGNESIUM SERPL-MCNC: 2.4 MG/DL — SIGNIFICANT CHANGE UP (ref 1.6–2.6)
MCHC RBC-ENTMCNC: 31.1 PG — SIGNIFICANT CHANGE UP (ref 27–34)
MCHC RBC-ENTMCNC: 32 GM/DL — SIGNIFICANT CHANGE UP (ref 32–36)
MCV RBC AUTO: 97.1 FL — SIGNIFICANT CHANGE UP (ref 80–100)
NRBC # BLD: 0 /100 WBCS — SIGNIFICANT CHANGE UP (ref 0–0)
PHOSPHATE SERPL-MCNC: 3.3 MG/DL — SIGNIFICANT CHANGE UP (ref 2.5–4.5)
PLATELET # BLD AUTO: 377 K/UL — SIGNIFICANT CHANGE UP (ref 150–400)
POTASSIUM SERPL-MCNC: 4 MMOL/L — SIGNIFICANT CHANGE UP (ref 3.5–5.3)
POTASSIUM SERPL-SCNC: 4 MMOL/L — SIGNIFICANT CHANGE UP (ref 3.5–5.3)
RBC # BLD: 3.12 M/UL — LOW (ref 4.2–5.8)
RBC # FLD: 13.1 % — SIGNIFICANT CHANGE UP (ref 10.3–14.5)
SODIUM SERPL-SCNC: 144 MMOL/L — SIGNIFICANT CHANGE UP (ref 135–145)
WBC # BLD: 8.51 K/UL — SIGNIFICANT CHANGE UP (ref 3.8–10.5)
WBC # FLD AUTO: 8.51 K/UL — SIGNIFICANT CHANGE UP (ref 3.8–10.5)

## 2020-11-09 PROCEDURE — 99233 SBSQ HOSP IP/OBS HIGH 50: CPT

## 2020-11-09 RX ADMIN — Medication 1 APPLICATION(S): at 05:49

## 2020-11-09 RX ADMIN — Medication 975 MILLIGRAM(S): at 22:14

## 2020-11-09 RX ADMIN — ZOLPIDEM TARTRATE 10 MILLIGRAM(S): 10 TABLET ORAL at 22:03

## 2020-11-09 RX ADMIN — SENNA PLUS 2 TABLET(S): 8.6 TABLET ORAL at 21:02

## 2020-11-09 RX ADMIN — POLYETHYLENE GLYCOL 3350 17 GRAM(S): 17 POWDER, FOR SOLUTION ORAL at 11:21

## 2020-11-09 RX ADMIN — Medication 20 MILLIGRAM(S): at 11:21

## 2020-11-09 RX ADMIN — TAMSULOSIN HYDROCHLORIDE 0.4 MILLIGRAM(S): 0.4 CAPSULE ORAL at 21:02

## 2020-11-09 RX ADMIN — Medication 1 APPLICATION(S): at 18:28

## 2020-11-09 RX ADMIN — Medication 1 MILLIGRAM(S): at 21:02

## 2020-11-09 RX ADMIN — Medication 8 MILLIGRAM(S): at 21:03

## 2020-11-09 RX ADMIN — OLANZAPINE 15 MILLIGRAM(S): 15 TABLET, FILM COATED ORAL at 21:07

## 2020-11-09 RX ADMIN — Medication 975 MILLIGRAM(S): at 21:04

## 2020-11-09 RX ADMIN — MIRTAZAPINE 15 MILLIGRAM(S): 45 TABLET, ORALLY DISINTEGRATING ORAL at 21:07

## 2020-11-09 RX ADMIN — ENOXAPARIN SODIUM 40 MILLIGRAM(S): 100 INJECTION SUBCUTANEOUS at 11:24

## 2020-11-09 RX ADMIN — Medication 1 MILLIGRAM(S): at 05:49

## 2020-11-09 NOTE — DISCHARGE NOTE PROVIDER - NSDCCPTREATMENT_GEN_ALL_CORE_FT
PRINCIPAL PROCEDURE  Procedure: Exploration of penetrating wound of chest  Findings and Treatment: · Operative Findings	penetrating wounds to neck, chest, and abdomen  neck explored no major vascular injury identified, tracheal injury x2 repaired and covered with muscle flap  open tracheostomy performed  abdominal and chest penetrating wounds explored and repaired        SECONDARY PROCEDURE  Procedure: Open tracheostomy  Findings and Treatment:     Procedure: Other tracheal repair  Findings and Treatment:     Procedure: Exploration, wound, neck  Findings and Treatment:     Procedure: Exploration of abdominal wound  Findings and Treatment:
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
## 2020-11-09 NOTE — PROGRESS NOTE ADULT - ATTENDING COMMENTS
Quiet, comfortable  wounds are healing well  1 to 1 at bedside  will need inpatient psychiatric admission

## 2020-11-09 NOTE — DISCHARGE NOTE PROVIDER - CARE PROVIDER_API CALL
Leonardo Bradley  SURGERY  [address], [address], [place] [id number]  Phone: [phone]  Fax: [phone]  Follow Up Time:

## 2020-11-09 NOTE — PROGRESS NOTE BEHAVIORAL HEALTH - NSBHCHARTREVIEWVS_PSY_A_CORE FT
ICU Vital Signs Last 24 Hrs  T(C): 36.9 (09 Nov 2020 11:19), Max: 37.2 (08 Nov 2020 14:25)  T(F): 98.5 (09 Nov 2020 11:19), Max: 98.9 (08 Nov 2020 14:25)  HR: 95 (09 Nov 2020 11:19) (82 - 101)  BP: 137/81 (09 Nov 2020 11:19) (131/80 - 158/93)  BP(mean): --  ABP: --  ABP(mean): --  RR: 18 (09 Nov 2020 11:19) (16 - 18)  SpO2: 96% (09 Nov 2020 11:19) (96% - 98%)

## 2020-11-09 NOTE — PROGRESS NOTE BEHAVIORAL HEALTH - NSBHCONSULTMEDS_PSY_A_CORE FT
continue home regimen   - Paxil now at 20mg daily; continue to titrate down   - Remeron 15mg po qhs  - Klonopin 1mg BID  - Zyprexa 15mg qhs  - melatonin 6mg qhs would raise paxil back up to 50mg daily, plan is to maximize paxil first before switching to another antidepressant. as noted in prior progress notes, pt has tried remeron,not much effect for depression.   - can cont Remeron 15mg po qhs to help with sleep.   - Klonopin 1mg BID  - Zyprexa 15mg qhs  - melatonin 6mg qhs
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
## 2020-11-09 NOTE — DISCHARGE NOTE PROVIDER - NSFOLLOWUPCLINICS_GEN_ALL_ED_FT
[facility] Psychiatry  Psychiatry  [address] [place] [address]  Phone: [phone]  Fax:   Follow Up Time:
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
## 2020-11-09 NOTE — PROGRESS NOTE BEHAVIORAL HEALTH - NSBHFUPINTERVALHXFT_PSY_A_CORE
On interview today the patient was sleeping, but able to be woken. pt states he still feels severely depressed, had SI over the weekend, no intent, plan. Pt denies current si/hi. Pt reports poor sleep, pt now off trach, drain removed. Pt c/o pain aches everywhere. Pt denies psychosis,deysi, anxiety. no prns given.

## 2020-11-09 NOTE — PROGRESS NOTE BEHAVIORAL HEALTH - NSBHCHARTREVIEWLAB_PSY_A_CORE FT
9.7    8.51  )-----------( 377      ( 09 Nov 2020 06:05 )             30.3     11-09    144  |  108  |  14  ----------------------------<  111<H>  4.0   |  25  |  0.77    Ca    9.8      09 Nov 2020 06:05  Phos  3.3     11-09  Mg     2.4     11-09

## 2020-11-09 NOTE — DISCHARGE NOTE PROVIDER - NSDCFUADDINST_GEN_ALL_CORE_FT
WOUND CARE: Please cover your tracheostomy and prior neck CELINE drain wounds with dry gauze and paper tape. Replace as needed. You may cover your abdominal wounds with antibiotics ointment (bacitracin) twice a day as needed.     BATHING: Please do not submerge wound underwater. You may shower and/or sponge bathe.    ACTIVITY: No heavy lifting anything more than 10-15lbs or straining. Otherwise, you may return to your usual level of physical activity. If you are taking narcotic pain medication (such as Percocet), do NOT drive a car, operate machinery or make important decisions.    DIET: Regular diet    NOTIFY YOUR SURGEON IF: You have any bleeding that does not stop, any pus draining from your wound, any fever (over 100.4 F) or chills, persistent nausea/vomiting with inability to tolerate food or liquids, persistent diarrhea, or if your pain is not controlled on your discharge pain medications.    FOLLOW-UP:  1. Please call to make a follow-up appointment with your surgeon, Dr. Bradley,  one week of discharge   2. Please follow up with your primary care physician in one week regarding your hospitalization.

## 2020-11-09 NOTE — DISCHARGE NOTE PROVIDER - NSDCACTIVITY_GEN_ALL_CORE
Stairs allowed/Do not drive or operate machinery/Walking - Indoors allowed/Walking - Outdoors allowed/No heavy lifting/straining/Showering allowed/Do not make important decisions
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
## 2020-11-09 NOTE — PROGRESS NOTE ADULT - SUBJECTIVE AND OBJECTIVE BOX
No acute events reported over the past 24hrs.    Patient seen and examined at bedside. Doing ok from surgical stand point, but still depressed.    Tolerating diet without nausea or vomiting.   GI function: No nausea or vomiting. Passing flatus, and having BM.      Physical Exam  General Appearance: Resting comfortably, no acute distress  Neck: Dressing changed, no signs of wound infection.   HEENT: Tracheostomy wound covered with dressing c/d/i  Chest: non-labored breathing, no respiratory distress  Abdomen: Soft, non-tender, non-distended, no peritonitis. Slash wounds off staples without skin infection.      Vital Signs Last 24 Hrs  T(C): 36.6 (09 Nov 2020 08:47), Max: 37.2 (08 Nov 2020 14:25)  T(F): 97.9 (09 Nov 2020 08:47), Max: 98.9 (08 Nov 2020 14:25)  HR: 101 (09 Nov 2020 08:47) (82 - 101)  BP: 150/96 (09 Nov 2020 08:47) (131/80 - 158/93)  BP(mean): --  RR: 18 (09 Nov 2020 08:47) (16 - 18)  SpO2: 98% (09 Nov 2020 08:47) (96% - 98%)    I&O's Detail    08 Nov 2020 07:01  -  09 Nov 2020 07:00  --------------------------------------------------------  IN:    Oral Fluid: 1284 mL  Total IN: 1284 mL    OUT:    Voided (mL): 350 mL  Total OUT: 350 mL    Total NET: 934 mL      09 Nov 2020 07:01  -  09 Nov 2020 09:29  --------------------------------------------------------  IN:    Oral Fluid: 360 mL  Total IN: 360 mL    OUT:  Total OUT: 0 mL    Total NET: 360 mL          11-09    144  |  108  |  14  ----------------------------<  111<H>  4.0   |  25  |  0.77    Ca    9.8      09 Nov 2020 06:05  Phos  3.3     11-09  Mg     2.4     11-09                              9.7    8.51  )-----------( 377      ( 09 Nov 2020 06:05 )             30.3           LABS:                         9.7    8.51  )-----------( 377      ( 09 Nov 2020 06:05 )             30.3     11-09    144  |  108  |  14  ----------------------------<  111<H>  4.0   |  25  |  0.77    Ca    9.8      09 Nov 2020 06:05  Phos  3.3     11-09  Mg     2.4     11-09      MEDICATIONS  (STANDING):  BACItracin   Ointment 1 Application(s) Topical two times a day  clonazePAM  Tablet 1 milliGRAM(s) Oral <User Schedule>  enoxaparin Injectable 40 milliGRAM(s) SubCutaneous daily  melatonin 8 milliGRAM(s) Oral at bedtime  mirtazapine 15 milliGRAM(s) Oral at bedtime  OLANZapine 15 milliGRAM(s) Oral at bedtime  PARoxetine 20 milliGRAM(s) Oral daily  polyethylene glycol 3350 17 Gram(s) Oral daily  senna 2 Tablet(s) Oral at bedtime  tamsulosin 0.4 milliGRAM(s) Oral at bedtime    MEDICATIONS  (PRN):  acetaminophen   Tablet .. 975 milliGRAM(s) Oral every 6 hours PRN Mild Pain (1 - 3)  oxyCODONE    IR 5 milliGRAM(s) Oral every 4 hours PRN Moderate Pain (4 - 6)  zolpidem 10 milliGRAM(s) Oral at bedtime PRN Insomnia

## 2020-11-09 NOTE — DISCHARGE NOTE PROVIDER - NSDCMRMEDTOKEN_GEN_ALL_CORE_FT
clonazePAM: 3 milligram(s) orally once a day (at bedtime)  clonazePAM 1 mg oral tablet: 1 milligram(s) orally once a day (in the morning)  OLANZapine 15 mg oral tablet: 1 tab(s) orally once a day (at bedtime)  PARoxetine 40 mg oral tablet: 1 tab(s) orally once a day  zolpidem 12.5 mg oral tablet, extended release: 1 tab(s) orally once a day (at bedtime)   acetaminophen 325 mg oral tablet: 3 tab(s) orally every 6 hours, As needed, Mild Pain (1 - 3)  bacitracin 500 units/g topical ointment: 1 application topically 2 times a day  clonazePAM 1 mg oral tablet: 1 tab(s) orally 2 times a day  melatonin 1 mg oral tablet: 8 tab(s) orally once a day (at bedtime)  mirtazapine 15 mg oral tablet: 1 tab(s) orally once a day (at bedtime)  OLANZapine 15 mg oral tablet: 1 tab(s) orally once a day (at bedtime)  oxyCODONE 5 mg oral tablet: 1 tab(s) orally every 4 hours, As needed, Moderate Pain (4 - 6)  PARoxetine 40 mg oral tablet: 1 tab(s) orally once a day  polyethylene glycol 3350 oral powder for reconstitution: 17 gram(s) orally once a day  senna oral tablet: 2 tab(s) orally once a day (at bedtime)  tamsulosin 0.4 mg oral capsule: 1 cap(s) orally once a day (at bedtime)  zolpidem 10 mg oral tablet: 1 tab(s) orally once a day (at bedtime), As needed, Insomnia

## 2020-11-10 ENCOUNTER — INPATIENT (INPATIENT)
Facility: HOSPITAL | Age: 55
LOS: 24 days | Discharge: ANOTHER HOSPITAL | End: 2020-12-05
Attending: PSYCHIATRY & NEUROLOGY | Admitting: PSYCHIATRY & NEUROLOGY
Payer: COMMERCIAL

## 2020-11-10 ENCOUNTER — TRANSCRIPTION ENCOUNTER (OUTPATIENT)
Age: 55
End: 2020-11-10

## 2020-11-10 VITALS
TEMPERATURE: 99 F | DIASTOLIC BLOOD PRESSURE: 97 MMHG | OXYGEN SATURATION: 97 % | SYSTOLIC BLOOD PRESSURE: 134 MMHG | RESPIRATION RATE: 18 BRPM

## 2020-11-10 VITALS
RESPIRATION RATE: 18 BRPM | DIASTOLIC BLOOD PRESSURE: 96 MMHG | HEART RATE: 104 BPM | TEMPERATURE: 97 F | SYSTOLIC BLOOD PRESSURE: 143 MMHG

## 2020-11-10 DIAGNOSIS — F33.2 MAJOR DEPRESSIVE DISORDER, RECURRENT SEVERE WITHOUT PSYCHOTIC FEATURES: ICD-10-CM

## 2020-11-10 PROCEDURE — 87070 CULTURE OTHR SPECIMN AEROBIC: CPT

## 2020-11-10 PROCEDURE — 82330 ASSAY OF CALCIUM: CPT

## 2020-11-10 PROCEDURE — 97535 SELF CARE MNGMENT TRAINING: CPT

## 2020-11-10 PROCEDURE — 97166 OT EVAL MOD COMPLEX 45 MIN: CPT

## 2020-11-10 PROCEDURE — C1769: CPT

## 2020-11-10 PROCEDURE — 85610 PROTHROMBIN TIME: CPT

## 2020-11-10 PROCEDURE — U0003: CPT

## 2020-11-10 PROCEDURE — 85730 THROMBOPLASTIN TIME PARTIAL: CPT

## 2020-11-10 PROCEDURE — 97110 THERAPEUTIC EXERCISES: CPT

## 2020-11-10 PROCEDURE — 94003 VENT MGMT INPAT SUBQ DAY: CPT

## 2020-11-10 PROCEDURE — 82803 BLOOD GASES ANY COMBINATION: CPT

## 2020-11-10 PROCEDURE — 86900 BLOOD TYPING SEROLOGIC ABO: CPT

## 2020-11-10 PROCEDURE — 93005 ELECTROCARDIOGRAM TRACING: CPT

## 2020-11-10 PROCEDURE — 84295 ASSAY OF SERUM SODIUM: CPT

## 2020-11-10 PROCEDURE — 85018 HEMOGLOBIN: CPT

## 2020-11-10 PROCEDURE — 82435 ASSAY OF BLOOD CHLORIDE: CPT

## 2020-11-10 PROCEDURE — 86901 BLOOD TYPING SEROLOGIC RH(D): CPT

## 2020-11-10 PROCEDURE — 85014 HEMATOCRIT: CPT

## 2020-11-10 PROCEDURE — 83735 ASSAY OF MAGNESIUM: CPT

## 2020-11-10 PROCEDURE — 84132 ASSAY OF SERUM POTASSIUM: CPT

## 2020-11-10 PROCEDURE — 86769 SARS-COV-2 COVID-19 ANTIBODY: CPT

## 2020-11-10 PROCEDURE — 36430 TRANSFUSION BLD/BLD COMPNT: CPT

## 2020-11-10 PROCEDURE — P9016: CPT

## 2020-11-10 PROCEDURE — 82947 ASSAY GLUCOSE BLOOD QUANT: CPT

## 2020-11-10 PROCEDURE — 94002 VENT MGMT INPAT INIT DAY: CPT

## 2020-11-10 PROCEDURE — 97116 GAIT TRAINING THERAPY: CPT

## 2020-11-10 PROCEDURE — 85027 COMPLETE CBC AUTOMATED: CPT

## 2020-11-10 PROCEDURE — 80048 BASIC METABOLIC PNL TOTAL CA: CPT

## 2020-11-10 PROCEDURE — 86850 RBC ANTIBODY SCREEN: CPT

## 2020-11-10 PROCEDURE — 80053 COMPREHEN METABOLIC PANEL: CPT

## 2020-11-10 PROCEDURE — 81001 URINALYSIS AUTO W/SCOPE: CPT

## 2020-11-10 PROCEDURE — 90715 TDAP VACCINE 7 YRS/> IM: CPT

## 2020-11-10 PROCEDURE — 86923 COMPATIBILITY TEST ELECTRIC: CPT

## 2020-11-10 PROCEDURE — L8699: CPT

## 2020-11-10 PROCEDURE — 84145 PROCALCITONIN (PCT): CPT

## 2020-11-10 PROCEDURE — 99233 SBSQ HOSP IP/OBS HIGH 50: CPT

## 2020-11-10 PROCEDURE — 83605 ASSAY OF LACTIC ACID: CPT

## 2020-11-10 PROCEDURE — 86803 HEPATITIS C AB TEST: CPT

## 2020-11-10 PROCEDURE — 87040 BLOOD CULTURE FOR BACTERIA: CPT

## 2020-11-10 PROCEDURE — 84100 ASSAY OF PHOSPHORUS: CPT

## 2020-11-10 PROCEDURE — 99285 EMERGENCY DEPT VISIT HI MDM: CPT

## 2020-11-10 PROCEDURE — 97163 PT EVAL HIGH COMPLEX 45 MIN: CPT

## 2020-11-10 PROCEDURE — 87635 SARS-COV-2 COVID-19 AMP PRB: CPT

## 2020-11-10 PROCEDURE — 71045 X-RAY EXAM CHEST 1 VIEW: CPT

## 2020-11-10 RX ORDER — SENNA PLUS 8.6 MG/1
2 TABLET ORAL
Qty: 0 | Refills: 0 | DISCHARGE
Start: 2020-11-10

## 2020-11-10 RX ORDER — ACETAMINOPHEN 500 MG
3 TABLET ORAL
Qty: 0 | Refills: 0 | DISCHARGE
Start: 2020-11-10

## 2020-11-10 RX ORDER — TAMSULOSIN HYDROCHLORIDE 0.4 MG/1
0.4 CAPSULE ORAL AT BEDTIME
Refills: 0 | Status: DISCONTINUED | OUTPATIENT
Start: 2020-11-10 | End: 2020-12-05

## 2020-11-10 RX ORDER — OLANZAPINE 15 MG/1
15 TABLET, FILM COATED ORAL AT BEDTIME
Refills: 0 | Status: DISCONTINUED | OUTPATIENT
Start: 2020-11-10 | End: 2020-11-12

## 2020-11-10 RX ORDER — OXYCODONE AND ACETAMINOPHEN 5; 325 MG/1; MG/1
2 TABLET ORAL EVERY 4 HOURS
Refills: 0 | Status: DISCONTINUED | OUTPATIENT
Start: 2020-11-10 | End: 2020-11-12

## 2020-11-10 RX ORDER — LANOLIN ALCOHOL/MO/W.PET/CERES
8 CREAM (GRAM) TOPICAL
Qty: 0 | Refills: 0 | DISCHARGE
Start: 2020-11-10

## 2020-11-10 RX ORDER — SENNA PLUS 8.6 MG/1
2 TABLET ORAL AT BEDTIME
Refills: 0 | Status: DISCONTINUED | OUTPATIENT
Start: 2020-11-10 | End: 2020-12-05

## 2020-11-10 RX ORDER — LANOLIN ALCOHOL/MO/W.PET/CERES
3 CREAM (GRAM) TOPICAL AT BEDTIME
Refills: 0 | Status: DISCONTINUED | OUTPATIENT
Start: 2020-11-10 | End: 2020-11-10

## 2020-11-10 RX ORDER — BACITRACIN ZINC 500 UNIT/G
1 OINTMENT IN PACKET (EA) TOPICAL
Qty: 0 | Refills: 0 | DISCHARGE
Start: 2020-11-10

## 2020-11-10 RX ORDER — LANOLIN ALCOHOL/MO/W.PET/CERES
8 CREAM (GRAM) TOPICAL AT BEDTIME
Refills: 0 | Status: DISCONTINUED | OUTPATIENT
Start: 2020-11-10 | End: 2020-11-12

## 2020-11-10 RX ORDER — LANOLIN ALCOHOL/MO/W.PET/CERES
5 CREAM (GRAM) TOPICAL AT BEDTIME
Refills: 0 | Status: DISCONTINUED | OUTPATIENT
Start: 2020-11-10 | End: 2020-11-10

## 2020-11-10 RX ORDER — ZOLPIDEM TARTRATE 10 MG/1
1 TABLET ORAL
Qty: 0 | Refills: 0 | DISCHARGE
Start: 2020-11-10

## 2020-11-10 RX ORDER — BACITRACIN ZINC 500 UNIT/G
1 OINTMENT IN PACKET (EA) TOPICAL
Refills: 0 | Status: DISCONTINUED | OUTPATIENT
Start: 2020-11-10 | End: 2020-11-11

## 2020-11-10 RX ORDER — TAMSULOSIN HYDROCHLORIDE 0.4 MG/1
1 CAPSULE ORAL
Qty: 0 | Refills: 0 | DISCHARGE
Start: 2020-11-10

## 2020-11-10 RX ORDER — OXYCODONE HYDROCHLORIDE 5 MG/1
1 TABLET ORAL
Qty: 0 | Refills: 0 | DISCHARGE
Start: 2020-11-10

## 2020-11-10 RX ORDER — POLYETHYLENE GLYCOL 3350 17 G/17G
17 POWDER, FOR SOLUTION ORAL
Qty: 0 | Refills: 0 | DISCHARGE
Start: 2020-11-10

## 2020-11-10 RX ORDER — ASPIRIN/CALCIUM CARB/MAGNESIUM 324 MG
975 TABLET ORAL
Refills: 0 | Status: DISCONTINUED | OUTPATIENT
Start: 2020-11-10 | End: 2020-11-10

## 2020-11-10 RX ORDER — ACETAMINOPHEN 500 MG
975 TABLET ORAL EVERY 6 HOURS
Refills: 0 | Status: DISCONTINUED | OUTPATIENT
Start: 2020-11-10 | End: 2020-12-05

## 2020-11-10 RX ORDER — CLONAZEPAM 1 MG
1 TABLET ORAL
Refills: 0 | Status: DISCONTINUED | OUTPATIENT
Start: 2020-11-10 | End: 2020-11-12

## 2020-11-10 RX ORDER — ASPIRIN/CALCIUM CARB/MAGNESIUM 324 MG
325 TABLET ORAL
Refills: 0 | Status: DISCONTINUED | OUTPATIENT
Start: 2020-11-10 | End: 2020-11-10

## 2020-11-10 RX ORDER — LANOLIN ALCOHOL/MO/W.PET/CERES
6 CREAM (GRAM) TOPICAL
Qty: 0 | Refills: 0 | DISCHARGE
Start: 2020-11-10

## 2020-11-10 RX ORDER — CLONAZEPAM 1 MG
1 TABLET ORAL
Qty: 0 | Refills: 0 | DISCHARGE
Start: 2020-11-10

## 2020-11-10 RX ORDER — MIRTAZAPINE 45 MG/1
15 TABLET, ORALLY DISINTEGRATING ORAL AT BEDTIME
Refills: 0 | Status: DISCONTINUED | OUTPATIENT
Start: 2020-11-10 | End: 2020-11-12

## 2020-11-10 RX ORDER — OLANZAPINE 15 MG/1
5 TABLET, FILM COATED ORAL EVERY 6 HOURS
Refills: 0 | Status: DISCONTINUED | OUTPATIENT
Start: 2020-11-10 | End: 2020-11-27

## 2020-11-10 RX ORDER — MIRTAZAPINE 45 MG/1
1 TABLET, ORALLY DISINTEGRATING ORAL
Qty: 0 | Refills: 0 | DISCHARGE
Start: 2020-11-10

## 2020-11-10 RX ADMIN — TAMSULOSIN HYDROCHLORIDE 0.4 MILLIGRAM(S): 0.4 CAPSULE ORAL at 22:04

## 2020-11-10 RX ADMIN — Medication 1 APPLICATION(S): at 06:27

## 2020-11-10 RX ADMIN — Medication 8 MILLIGRAM(S): at 22:04

## 2020-11-10 RX ADMIN — SENNA PLUS 2 TABLET(S): 8.6 TABLET ORAL at 22:04

## 2020-11-10 RX ADMIN — ENOXAPARIN SODIUM 40 MILLIGRAM(S): 100 INJECTION SUBCUTANEOUS at 12:04

## 2020-11-10 RX ADMIN — Medication 1 MILLIGRAM(S): at 06:27

## 2020-11-10 RX ADMIN — OXYCODONE HYDROCHLORIDE 5 MILLIGRAM(S): 5 TABLET ORAL at 10:30

## 2020-11-10 RX ADMIN — Medication 1 MILLIGRAM(S): at 22:03

## 2020-11-10 RX ADMIN — OXYCODONE HYDROCHLORIDE 5 MILLIGRAM(S): 5 TABLET ORAL at 09:49

## 2020-11-10 RX ADMIN — Medication 50 MILLIGRAM(S): at 13:51

## 2020-11-10 RX ADMIN — MIRTAZAPINE 15 MILLIGRAM(S): 45 TABLET, ORALLY DISINTEGRATING ORAL at 22:04

## 2020-11-10 RX ADMIN — OLANZAPINE 15 MILLIGRAM(S): 15 TABLET, FILM COATED ORAL at 22:04

## 2020-11-10 RX ADMIN — Medication 2 MILLIGRAM(S): at 18:11

## 2020-11-10 NOTE — PROGRESS NOTE BEHAVIORAL HEALTH - NSBHCONSULTWRKUPYES_PSY_A_CORE
EKG/TSH, B12, folate, syphilis, HIV if consenting, head CT, Utox, EKG/urine toxicology/imaging/labs
EKG/TSH, B12, folate, syphilis, HIV if consenting, head CT, Utox, EKG/labs/urine toxicology/imaging
imaging/urine toxicology/labs/EKG/TSH, B12, folate, syphilis, HIV if consenting, head CT, Utox, EKG
labs/TSH, B12, folate, syphilis, HIV if consenting, head CT, Utox, EKG/imaging/urine toxicology/EKG
labs/urine toxicology/imaging/EKG/TSH, B12, folate, syphilis, HIV if consenting, head CT, Utox, EKG
labs/urine toxicology/imaging/EKG/TSH, B12, folate, syphilis, HIV if consenting, head CT, Utox, EKG
labs/imaging/urine toxicology/EKG/TSH, B12, folate, syphilis, HIV if consenting, head CT, Utox, EKG
labs/urine toxicology/TSH, B12, folate, syphilis, HIV if consenting, head CT, Utox, EKG/imaging/EKG
labs/urine toxicology/imaging/EKG/TSH, B12, folate, syphilis, HIV if consenting, head CT, Utox, EKG

## 2020-11-10 NOTE — PROGRESS NOTE BEHAVIORAL HEALTH - NSBHCONSULTFOLLOWDETAILS_PSY_A_CORE FT
Patient will require inpatient psychiatric admission once medically stable.
Patient will require inpatient psychiatric admission once medically stable, 2pc status
Patient will require inpatient psychiatric admission once medically stable.

## 2020-11-10 NOTE — PROGRESS NOTE BEHAVIORAL HEALTH - PRIMARY DX
Severe episode of recurrent major depressive disorder, without psychotic features

## 2020-11-10 NOTE — PROGRESS NOTE BEHAVIORAL HEALTH - NSBHCONSULTOBSREASON_PSY_A_CORE FT
suicide attempt

## 2020-11-10 NOTE — DISCHARGE NOTE NURSING/CASE MANAGEMENT/SOCIAL WORK - PATIENT PORTAL LINK FT
You can access the FollowMyHealth Patient Portal offered by [facility] by registering at the following website: [email or web address]. By joining [employer]’s FollowMyHealth portal, you will also be able to view your health information using other applications (apps) compatible with our system.

## 2020-11-10 NOTE — PROGRESS NOTE BEHAVIORAL HEALTH - GAIT / STATION
Abnormal gait / station

## 2020-11-10 NOTE — PROGRESS NOTE ADULT - SUBJECTIVE AND OBJECTIVE BOX
SURGERY DAILY PROGRESS NOTE:       SUBJECTIVE/ROS: Patient seen and evaluated on AM rounds. Doing ok from surgical stand point, but patient remains depressed.  Tolerating diet without nausea or vomiting. No nausea or vomiting. Passing flatus, and having BM.         OBJECTIVE:    Vital Signs Last 24 Hrs  T(C): 36.9 (10 Nov 2020 06:00), Max: 37 (09 Nov 2020 14:30)  T(F): 98.4 (10 Nov 2020 06:00), Max: 98.6 (09 Nov 2020 14:30)  HR: 90 (10 Nov 2020 06:00) (90 - 103)  BP: 135/84 (10 Nov 2020 06:26) (135/84 - 155/86)  BP(mean): --  RR: 18 (10 Nov 2020 06:00) (18 - 18)  SpO2: 98% (10 Nov 2020 06:00) (96% - 98%)  I&O's Detail    09 Nov 2020 07:01  -  10 Nov 2020 07:00  --------------------------------------------------------  IN:    Oral Fluid: 1360 mL  Total IN: 1360 mL    OUT:    Voided (mL): 650 mL  Total OUT: 650 mL    Total NET: 710 mL        Daily     Daily   MEDICATIONS  (STANDING):  BACItracin   Ointment 1 Application(s) Topical two times a day  clonazePAM  Tablet 1 milliGRAM(s) Oral <User Schedule>  enoxaparin Injectable 40 milliGRAM(s) SubCutaneous daily  melatonin 8 milliGRAM(s) Oral at bedtime  mirtazapine 15 milliGRAM(s) Oral at bedtime  OLANZapine 15 milliGRAM(s) Oral at bedtime  PARoxetine 50 milliGRAM(s) Oral daily  polyethylene glycol 3350 17 Gram(s) Oral daily  senna 2 Tablet(s) Oral at bedtime  tamsulosin 0.4 milliGRAM(s) Oral at bedtime    MEDICATIONS  (PRN):  acetaminophen   Tablet .. 975 milliGRAM(s) Oral every 6 hours PRN Mild Pain (1 - 3)  oxyCODONE    IR 5 milliGRAM(s) Oral every 4 hours PRN Moderate Pain (4 - 6)  zolpidem 10 milliGRAM(s) Oral at bedtime PRN Insomnia      LABS:                        9.7    8.51  )-----------( 377      ( 09 Nov 2020 06:05 )             30.3     11-09    144  |  108  |  14  ----------------------------<  111<H>  4.0   |  25  |  0.77    Ca    9.8      09 Nov 2020 06:05  Phos  3.3     11-09  Mg     2.4     11-09        Physical Exam  General Appearance: Resting comfortably, no acute distress  Neck: Dressing changed, no signs of wound infection.   HEENT: Tracheostomy wound covered with dressing c/d/i  Chest: non-labored breathing, no respiratory distress  Abdomen: Soft, non-tender, non-distended, no peritonitis. Slash wounds without skin infection.

## 2020-11-10 NOTE — PROGRESS NOTE BEHAVIORAL HEALTH - NSBHCONSULTMEDS_PSY_A_CORE FT
cont paxil 50mg daily, plan is to maximize paxil first before switching to another antidepressant  - can cont Remeron 15mg po qhs to help with sleep.   - Klonopin 1mg BID  - Zyprexa 15mg qhs  - melatonin 6mg qhs
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
## 2020-11-10 NOTE — PROGRESS NOTE BEHAVIORAL HEALTH - NSBHCONSFOLLOWNEEDS_PSY_A_CORE
Patient needs further psychiatric safety assessment prior to discharge
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
## 2020-11-10 NOTE — PROGRESS NOTE BEHAVIORAL HEALTH - NSBHFUPINTERVALHXFT_PSY_A_CORE
pt states he still feels severely depressed. Pt denies current si/hi. Pt reports poor sleep, fair appetite. Pt denies psychosis,deysi, anxiety. no prns given.

## 2020-11-10 NOTE — PROGRESS NOTE ADULT - ATTENDING COMMENTS
Resting comfortable  Flat affect on my exam  all wounds healing well,  all staples, sutures and drains removed  disposition to inpatient psychiatric
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
## 2020-11-10 NOTE — PROGRESS NOTE BEHAVIORAL HEALTH - NSBHCHARTREVIEWINVESTIGATE_PSY_A_CORE FT
Systolic  mmHg    Diastolic BP 54 mmHg    Ventricular Rate 65 BPM    Atrial Rate 65 BPM    P-R Interval 154 ms    QRS Duration 92 ms    Q-T Interval 426 ms    QTC Calculation(Bazett) 443 ms    P Axis 44 degrees    R Axis 51 degrees    T Axis 53 degrees    Diagnosis Line NORMAL SINUS RHYTHM  NORMAL ECG  NO PREVIOUS ECGS AVAILABLE  Confirmed by JUANITA CREWS ADAM [id number]) on 11/1/2020 9:16:41 PM
Ventricular Rate 117 BPM    Atrial Rate 117 BPM    P-R Interval 160 ms    QRS Duration 92 ms    Q-T Interval 332 ms    QTC Calculation(Bazett) 463 ms    P Axis 33 degrees    R Axis 27 degrees    T Axis 40 degrees    Diagnosis Line SINUS TACHYCARDIAWITH FUSION COMPLEXES  OTHERWISE NORMAL ECG  WHEN COMPARED WITH ECG OF 01-NOV-2020 03:16,  SIGNIFICANT CHANGES HAVE OCCURRED  increase HR  Confirmed by TOMMY DAVIS MD ([id number]) on 11/8/2020 1:05:39 PM
Ventricular Rate 117 BPM    Atrial Rate 117 BPM    P-R Interval 160 ms    QRS Duration 92 ms    Q-T Interval 332 ms    QTC Calculation(Bazett) 463 ms    P Axis 33 degrees    R Axis 27 degrees    T Axis 40 degrees    Diagnosis Line SINUS TACHYCARDIAWITH FUSION COMPLEXES  OTHERWISE NORMAL ECG  WHEN COMPARED WITH ECG OF 01-NOV-2020 03:16,  SIGNIFICANT CHANGES HAVE OCCURRED  increase HR  Confirmed by TOMMY DAVIS MD ([id number]) on 11/8/2020 1:05:39 PM
Systolic  mmHg    Diastolic BP 54 mmHg    Ventricular Rate 65 BPM    Atrial Rate 65 BPM    P-R Interval 154 ms    QRS Duration 92 ms    Q-T Interval 426 ms    QTC Calculation(Bazett) 443 ms    P Axis 44 degrees    R Axis 51 degrees    T Axis 53 degrees    Diagnosis Line NORMAL SINUS RHYTHM  NORMAL ECG  NO PREVIOUS ECGS AVAILABLE  Confirmed by JUANITA CREWS ADAM ([id number]) on 11/1/2020 9:16:41 PM
Systolic  mmHg    Diastolic BP 54 mmHg    Ventricular Rate 65 BPM    Atrial Rate 65 BPM    P-R Interval 154 ms    QRS Duration 92 ms    Q-T Interval 426 ms    QTC Calculation(Bazett) 443 ms    P Axis 44 degrees    R Axis 51 degrees    T Axis 53 degrees    Diagnosis Line NORMAL SINUS RHYTHM  NORMAL ECG  NO PREVIOUS ECGS AVAILABLE  Confirmed by JUANITA CREWS ADAM ([id number]) on 11/1/2020 9:16:41 PM
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
## 2020-11-11 PROCEDURE — 99223 1ST HOSP IP/OBS HIGH 75: CPT

## 2020-11-11 PROCEDURE — 99233 SBSQ HOSP IP/OBS HIGH 50: CPT

## 2020-11-11 RX ORDER — CLONAZEPAM 1 MG
3 TABLET ORAL
Qty: 0 | Refills: 0 | DISCHARGE

## 2020-11-11 RX ORDER — DIPHENHYDRAMINE HCL 50 MG
50 CAPSULE ORAL EVERY 6 HOURS
Refills: 0 | Status: DISCONTINUED | OUTPATIENT
Start: 2020-11-11 | End: 2020-11-26

## 2020-11-11 RX ORDER — CLONAZEPAM 1 MG
1 TABLET ORAL
Qty: 0 | Refills: 0 | DISCHARGE

## 2020-11-11 RX ORDER — DIPHENHYDRAMINE HCL 50 MG
50 CAPSULE ORAL ONCE
Refills: 0 | Status: DISCONTINUED | OUTPATIENT
Start: 2020-11-11 | End: 2020-12-02

## 2020-11-11 RX ORDER — BACITRACIN ZINC 500 UNIT/G
1 OINTMENT IN PACKET (EA) TOPICAL
Refills: 0 | Status: DISCONTINUED | OUTPATIENT
Start: 2020-11-11 | End: 2020-12-04

## 2020-11-11 RX ORDER — ZOLPIDEM TARTRATE 10 MG/1
1 TABLET ORAL
Qty: 0 | Refills: 0 | DISCHARGE

## 2020-11-11 RX ADMIN — OLANZAPINE 15 MILLIGRAM(S): 15 TABLET, FILM COATED ORAL at 21:29

## 2020-11-11 RX ADMIN — TAMSULOSIN HYDROCHLORIDE 0.4 MILLIGRAM(S): 0.4 CAPSULE ORAL at 21:29

## 2020-11-11 RX ADMIN — MIRTAZAPINE 15 MILLIGRAM(S): 45 TABLET, ORALLY DISINTEGRATING ORAL at 21:29

## 2020-11-11 RX ADMIN — Medication 1 APPLICATION(S): at 21:29

## 2020-11-11 RX ADMIN — Medication 8 MILLIGRAM(S): at 21:29

## 2020-11-11 RX ADMIN — Medication 1 MILLIGRAM(S): at 21:29

## 2020-11-11 RX ADMIN — SENNA PLUS 2 TABLET(S): 8.6 TABLET ORAL at 21:29

## 2020-11-11 RX ADMIN — Medication 50 MILLIGRAM(S): at 08:31

## 2020-11-11 RX ADMIN — Medication 2 MILLIGRAM(S): at 13:00

## 2020-11-11 RX ADMIN — Medication 1 MILLIGRAM(S): at 08:31
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
## 2020-11-12 LAB
ALBUMIN SERPL ELPH-MCNC: 4.3 G/DL — SIGNIFICANT CHANGE UP (ref 3.3–5)
ALP SERPL-CCNC: 55 U/L — SIGNIFICANT CHANGE UP (ref 40–120)
ALT FLD-CCNC: 67 U/L — HIGH (ref 4–41)
ANION GAP SERPL CALC-SCNC: 13 MMO/L — SIGNIFICANT CHANGE UP (ref 7–14)
APPEARANCE UR: CLEAR — SIGNIFICANT CHANGE UP
AST SERPL-CCNC: 38 U/L — SIGNIFICANT CHANGE UP (ref 4–40)
BASOPHILS # BLD AUTO: 0.04 K/UL — SIGNIFICANT CHANGE UP (ref 0–0.2)
BASOPHILS NFR BLD AUTO: 0.6 % — SIGNIFICANT CHANGE UP (ref 0–2)
BILIRUB SERPL-MCNC: 0.3 MG/DL — SIGNIFICANT CHANGE UP (ref 0.2–1.2)
BILIRUB UR-MCNC: NEGATIVE — SIGNIFICANT CHANGE UP
BLOOD UR QL VISUAL: NEGATIVE — SIGNIFICANT CHANGE UP
BUN SERPL-MCNC: 14 MG/DL — SIGNIFICANT CHANGE UP (ref 7–23)
CALCIUM SERPL-MCNC: 9.7 MG/DL — SIGNIFICANT CHANGE UP (ref 8.4–10.5)
CHLORIDE SERPL-SCNC: 106 MMOL/L — SIGNIFICANT CHANGE UP (ref 98–107)
CHOLEST SERPL-MCNC: 208 MG/DL — HIGH (ref 120–199)
CO2 SERPL-SCNC: 22 MMOL/L — SIGNIFICANT CHANGE UP (ref 22–31)
COLOR SPEC: YELLOW — SIGNIFICANT CHANGE UP
CREAT SERPL-MCNC: 1 MG/DL — SIGNIFICANT CHANGE UP (ref 0.5–1.3)
DIRECT LDL: 156 MG/DL — SIGNIFICANT CHANGE UP
EOSINOPHIL # BLD AUTO: 0.12 K/UL — SIGNIFICANT CHANGE UP (ref 0–0.5)
EOSINOPHIL NFR BLD AUTO: 1.7 % — SIGNIFICANT CHANGE UP (ref 0–6)
GLUCOSE SERPL-MCNC: 206 MG/DL — HIGH (ref 70–99)
GLUCOSE UR-MCNC: NEGATIVE — SIGNIFICANT CHANGE UP
HBA1C BLD-MCNC: 5 % — SIGNIFICANT CHANGE UP (ref 4–5.6)
HCT VFR BLD CALC: 33.4 % — LOW (ref 39–50)
HDLC SERPL-MCNC: 46 MG/DL — SIGNIFICANT CHANGE UP (ref 35–55)
HGB BLD-MCNC: 10.8 G/DL — LOW (ref 13–17)
IMM GRANULOCYTES NFR BLD AUTO: 4.4 % — HIGH (ref 0–1.5)
KETONES UR-MCNC: NEGATIVE — SIGNIFICANT CHANGE UP
LEUKOCYTE ESTERASE UR-ACNC: NEGATIVE — SIGNIFICANT CHANGE UP
LYMPHOCYTES # BLD AUTO: 0.94 K/UL — LOW (ref 1–3.3)
LYMPHOCYTES # BLD AUTO: 13.7 % — SIGNIFICANT CHANGE UP (ref 13–44)
MAGNESIUM SERPL-MCNC: 2.2 MG/DL — SIGNIFICANT CHANGE UP (ref 1.6–2.6)
MCHC RBC-ENTMCNC: 31.3 PG — SIGNIFICANT CHANGE UP (ref 27–34)
MCHC RBC-ENTMCNC: 32.3 % — SIGNIFICANT CHANGE UP (ref 32–36)
MCV RBC AUTO: 96.8 FL — SIGNIFICANT CHANGE UP (ref 80–100)
MONOCYTES # BLD AUTO: 0.3 K/UL — SIGNIFICANT CHANGE UP (ref 0–0.9)
MONOCYTES NFR BLD AUTO: 4.4 % — SIGNIFICANT CHANGE UP (ref 2–14)
NEUTROPHILS # BLD AUTO: 5.16 K/UL — SIGNIFICANT CHANGE UP (ref 1.8–7.4)
NEUTROPHILS NFR BLD AUTO: 75.2 % — SIGNIFICANT CHANGE UP (ref 43–77)
NITRITE UR-MCNC: NEGATIVE — SIGNIFICANT CHANGE UP
NRBC # FLD: 0 K/UL — SIGNIFICANT CHANGE UP (ref 0–0)
PH UR: 6 — SIGNIFICANT CHANGE UP (ref 5–8)
PLATELET # BLD AUTO: 415 K/UL — HIGH (ref 150–400)
PMV BLD: 8.6 FL — SIGNIFICANT CHANGE UP (ref 7–13)
POTASSIUM SERPL-MCNC: 3.7 MMOL/L — SIGNIFICANT CHANGE UP (ref 3.5–5.3)
POTASSIUM SERPL-SCNC: 3.7 MMOL/L — SIGNIFICANT CHANGE UP (ref 3.5–5.3)
PROT SERPL-MCNC: 7.3 G/DL — SIGNIFICANT CHANGE UP (ref 6–8.3)
PROT UR-MCNC: 10 — SIGNIFICANT CHANGE UP
RBC # BLD: 3.45 M/UL — LOW (ref 4.2–5.8)
RBC # FLD: 13.6 % — SIGNIFICANT CHANGE UP (ref 10.3–14.5)
SODIUM SERPL-SCNC: 141 MMOL/L — SIGNIFICANT CHANGE UP (ref 135–145)
SP GR SPEC: 1.02 — SIGNIFICANT CHANGE UP (ref 1–1.04)
TRIGL SERPL-MCNC: 147 MG/DL — SIGNIFICANT CHANGE UP (ref 10–149)
UROBILINOGEN FLD QL: NORMAL — SIGNIFICANT CHANGE UP
WBC # BLD: 6.86 K/UL — SIGNIFICANT CHANGE UP (ref 3.8–10.5)
WBC # FLD AUTO: 6.86 K/UL — SIGNIFICANT CHANGE UP (ref 3.8–10.5)

## 2020-11-12 PROCEDURE — 99233 SBSQ HOSP IP/OBS HIGH 50: CPT

## 2020-11-12 PROCEDURE — 93010 ELECTROCARDIOGRAM REPORT: CPT

## 2020-11-12 RX ORDER — CLONAZEPAM 1 MG
2 TABLET ORAL
Refills: 0 | Status: DISCONTINUED | OUTPATIENT
Start: 2020-11-12 | End: 2020-11-17

## 2020-11-12 RX ORDER — LISINOPRIL 2.5 MG/1
10 TABLET ORAL DAILY
Refills: 0 | Status: DISCONTINUED | OUTPATIENT
Start: 2020-11-12 | End: 2020-12-05

## 2020-11-12 RX ORDER — MIRTAZAPINE 45 MG/1
30 TABLET, ORALLY DISINTEGRATING ORAL AT BEDTIME
Refills: 0 | Status: DISCONTINUED | OUTPATIENT
Start: 2020-11-12 | End: 2020-11-13

## 2020-11-12 RX ORDER — OLANZAPINE 15 MG/1
20 TABLET, FILM COATED ORAL AT BEDTIME
Refills: 0 | Status: DISCONTINUED | OUTPATIENT
Start: 2020-11-12 | End: 2020-11-14

## 2020-11-12 RX ADMIN — LISINOPRIL 10 MILLIGRAM(S): 2.5 TABLET ORAL at 08:57

## 2020-11-12 RX ADMIN — Medication 1 MILLIGRAM(S): at 08:32

## 2020-11-12 RX ADMIN — OLANZAPINE 20 MILLIGRAM(S): 15 TABLET, FILM COATED ORAL at 20:49

## 2020-11-12 RX ADMIN — TAMSULOSIN HYDROCHLORIDE 0.4 MILLIGRAM(S): 0.4 CAPSULE ORAL at 20:49

## 2020-11-12 RX ADMIN — Medication 2 MILLIGRAM(S): at 12:38

## 2020-11-12 RX ADMIN — Medication 1 APPLICATION(S): at 08:54

## 2020-11-12 RX ADMIN — Medication 1 APPLICATION(S): at 20:49

## 2020-11-12 RX ADMIN — MIRTAZAPINE 30 MILLIGRAM(S): 45 TABLET, ORALLY DISINTEGRATING ORAL at 20:49

## 2020-11-12 RX ADMIN — Medication 50 MILLIGRAM(S): at 08:33

## 2020-11-12 RX ADMIN — Medication 2 MILLIGRAM(S): at 20:49

## 2020-11-12 RX ADMIN — SENNA PLUS 2 TABLET(S): 8.6 TABLET ORAL at 20:49

## 2020-11-12 NOTE — PROGRESS NOTE ADULT - ASSESSMENT
Depression with need for ECT, pre-procedure evaluation:   ECT specific risk assessment: pt without history of increased ICP, aneurysm, active pulmonary disease, valvular disease, CAD, cerebral vascular disease.     Cardiovascular risk assessment:   The patient is at low risk for cardiovascular complications from the low risk procedure, ECT.     Risk for complication is low. Patient is medically optimized for ECT treatment without further intervention and can proceed with treatment.    Anesthesia specific concerns :    History of adverse reaction to anesthesia previously: none  Esophageal Assessment: normal  Known Spine issues: none  CKD: not present    Further risk reduction will involve medical management of other comorbid conditions:    Recent neck injury requiring temporary tracheostomy: Discussed with truama surgery at [facility] [phone]). Per discussion with surgeon Dr. Leonardo Bradley [phone]), there is no contraindication to the patient undergoing anesthesia at this time, including endotracheal intubation.

## 2020-11-12 NOTE — PROGRESS NOTE ADULT - SUBJECTIVE AND OBJECTIVE BOX
CC/Reason for Consult: Pre-ECT evaluation    SUBJECTIVE / OVERNIGHT EVENTS: Patient reports that he has ECT in the past and it has helped.  He has not had problems with anesthesia.  He denies MI CVA TIA SHINE SOB CAD.    MEDICATIONS  (STANDING):  BACItracin   Ointment 1 Application(s) Topical two times a day  clonazePAM  Tablet 1 milliGRAM(s) Oral two times a day  lisinopril 10 milliGRAM(s) Oral daily  melatonin. 8 milliGRAM(s) Oral at bedtime  mirtazapine 15 milliGRAM(s) Oral at bedtime  OLANZapine 15 milliGRAM(s) Oral at bedtime  PARoxetine 50 milliGRAM(s) Oral daily  senna 2 Tablet(s) Oral at bedtime  tamsulosin 0.4 milliGRAM(s) Oral at bedtime    MEDICATIONS  (PRN):  acetaminophen   Tablet .. 975 milliGRAM(s) Oral every 6 hours PRN pain  diphenhydrAMINE 50 milliGRAM(s) Oral every 6 hours PRN Agitation  diphenhydrAMINE   Injectable 50 milliGRAM(s) IntraMuscular once PRN EPS/Agitation  LORazepam     Tablet 2 milliGRAM(s) Oral every 4 hours PRN anxiety/agitation  LORazepam   Injectable 2 milliGRAM(s) IntraMuscular once PRN Severe agitation  OLANZapine 5 milliGRAM(s) Oral every 6 hours PRN agitation  oxycodone    5 mG/acetaminophen 325 mG 2 Tablet(s) Oral every 4 hours PRN Moderate Pain (4 - 6)      Vital Signs Last 24 Hrs  T(C): 36.4 (12 Nov 2020 08:27), Max: 36.6 (11 Nov 2020 19:30)  T(F): 97.5 (12 Nov 2020 08:27), Max: 97.8 (11 Nov 2020 19:30)  HR: 101  BP: 126/86  BP(mean): --  RR: 15              PHYSICAL EXAM:  GENERAL: NAD, well-developed  HEAD:  Atraumatic, Normocephalic  EYES: EOMI, conjunctiva and sclera clear  NECK: Supple, No JVD, trach stoma closing, bandage CDI, wound clean  CHEST/LUNG: Clear to auscultation bilaterally; No wheeze  HEART: Regular rate and rhythm; No murmurs, rubs, or gallops  ABDOMEN: Soft, Nontender, Nondistended; Bowel sounds present. wounds clean  EXTREMITIES:  2+ Peripheral Pulses, No clubbing, cyanosis, or edema  PSYCH: AAOx3  NEUROLOGY: non-focal      LABS:                        10.8   6.86  )-----------( 415      ( 12 Nov 2020 08:59 )             33.4     11-12    141  |  106  |  14  ----------------------------<  206<H>  3.7   |  22  |  1.00    Ca    9.7      12 Nov 2020 08:59  Mg     2.2     11-12    TPro  7.3  /  Alb  4.3  /  TBili  0.3  /  DBili  x   /  AST  38  /  ALT  67<H>  /  AlkPhos  55  11-12        EKG ST at 117, otherwise normal    Care Discussed with Consultants/Other Providers: trauma surgery [facility]

## 2020-11-13 PROCEDURE — 99232 SBSQ HOSP IP/OBS MODERATE 35: CPT

## 2020-11-13 RX ORDER — TRAZODONE HCL 50 MG
100 TABLET ORAL AT BEDTIME
Refills: 0 | Status: DISCONTINUED | OUTPATIENT
Start: 2020-11-13 | End: 2020-11-14

## 2020-11-13 RX ADMIN — OLANZAPINE 20 MILLIGRAM(S): 15 TABLET, FILM COATED ORAL at 20:29

## 2020-11-13 RX ADMIN — Medication 2 MILLIGRAM(S): at 08:46

## 2020-11-13 RX ADMIN — Medication 50 MILLIGRAM(S): at 16:08

## 2020-11-13 RX ADMIN — Medication 2 MILLIGRAM(S): at 20:29

## 2020-11-13 RX ADMIN — OLANZAPINE 5 MILLIGRAM(S): 15 TABLET, FILM COATED ORAL at 11:57

## 2020-11-13 RX ADMIN — SENNA PLUS 2 TABLET(S): 8.6 TABLET ORAL at 20:29

## 2020-11-13 RX ADMIN — Medication 30 MILLIGRAM(S): at 08:46

## 2020-11-13 RX ADMIN — Medication 100 MILLIGRAM(S): at 20:29

## 2020-11-13 RX ADMIN — Medication 1 APPLICATION(S): at 10:04

## 2020-11-13 RX ADMIN — LISINOPRIL 10 MILLIGRAM(S): 2.5 TABLET ORAL at 08:46

## 2020-11-13 RX ADMIN — TAMSULOSIN HYDROCHLORIDE 0.4 MILLIGRAM(S): 0.4 CAPSULE ORAL at 20:29

## 2020-11-14 PROCEDURE — 99232 SBSQ HOSP IP/OBS MODERATE 35: CPT

## 2020-11-14 RX ORDER — OLANZAPINE 15 MG/1
25 TABLET, FILM COATED ORAL AT BEDTIME
Refills: 0 | Status: DISCONTINUED | OUTPATIENT
Start: 2020-11-14 | End: 2020-11-18

## 2020-11-14 RX ORDER — TRAZODONE HCL 50 MG
150 TABLET ORAL AT BEDTIME
Refills: 0 | Status: DISCONTINUED | OUTPATIENT
Start: 2020-11-14 | End: 2020-11-17

## 2020-11-14 RX ADMIN — Medication 1 APPLICATION(S): at 09:03

## 2020-11-14 RX ADMIN — Medication 1 APPLICATION(S): at 20:46

## 2020-11-14 RX ADMIN — Medication 2 MILLIGRAM(S): at 20:47

## 2020-11-14 RX ADMIN — Medication 20 MILLIGRAM(S): at 09:03

## 2020-11-14 RX ADMIN — Medication 2 MILLIGRAM(S): at 17:25

## 2020-11-14 RX ADMIN — TAMSULOSIN HYDROCHLORIDE 0.4 MILLIGRAM(S): 0.4 CAPSULE ORAL at 20:47

## 2020-11-14 RX ADMIN — SENNA PLUS 2 TABLET(S): 8.6 TABLET ORAL at 20:47

## 2020-11-14 RX ADMIN — OLANZAPINE 25 MILLIGRAM(S): 15 TABLET, FILM COATED ORAL at 20:47

## 2020-11-14 RX ADMIN — Medication 2 MILLIGRAM(S): at 09:03

## 2020-11-14 RX ADMIN — LISINOPRIL 10 MILLIGRAM(S): 2.5 TABLET ORAL at 09:03

## 2020-11-14 RX ADMIN — Medication 150 MILLIGRAM(S): at 20:47

## 2020-11-15 PROCEDURE — 99232 SBSQ HOSP IP/OBS MODERATE 35: CPT

## 2020-11-15 RX ADMIN — OLANZAPINE 25 MILLIGRAM(S): 15 TABLET, FILM COATED ORAL at 20:26

## 2020-11-15 RX ADMIN — Medication 150 MILLIGRAM(S): at 20:26

## 2020-11-15 RX ADMIN — Medication 2 MILLIGRAM(S): at 11:12

## 2020-11-15 RX ADMIN — Medication 2 MILLIGRAM(S): at 20:26

## 2020-11-15 RX ADMIN — Medication 1 APPLICATION(S): at 20:26

## 2020-11-15 RX ADMIN — LISINOPRIL 10 MILLIGRAM(S): 2.5 TABLET ORAL at 08:44

## 2020-11-15 RX ADMIN — Medication 20 MILLIGRAM(S): at 08:44

## 2020-11-15 RX ADMIN — SENNA PLUS 2 TABLET(S): 8.6 TABLET ORAL at 20:26

## 2020-11-15 RX ADMIN — TAMSULOSIN HYDROCHLORIDE 0.4 MILLIGRAM(S): 0.4 CAPSULE ORAL at 20:26

## 2020-11-15 RX ADMIN — Medication 2 MILLIGRAM(S): at 08:44

## 2020-11-15 RX ADMIN — Medication 2 MILLIGRAM(S): at 16:50

## 2020-11-16 RX ADMIN — TAMSULOSIN HYDROCHLORIDE 0.4 MILLIGRAM(S): 0.4 CAPSULE ORAL at 20:40

## 2020-11-16 RX ADMIN — Medication 1 APPLICATION(S): at 09:30

## 2020-11-16 RX ADMIN — Medication 2 MILLIGRAM(S): at 20:40

## 2020-11-16 RX ADMIN — OLANZAPINE 25 MILLIGRAM(S): 15 TABLET, FILM COATED ORAL at 20:40

## 2020-11-16 RX ADMIN — LISINOPRIL 10 MILLIGRAM(S): 2.5 TABLET ORAL at 09:42

## 2020-11-16 RX ADMIN — Medication 20 MILLIGRAM(S): at 09:42

## 2020-11-16 RX ADMIN — Medication 2 MILLIGRAM(S): at 11:20

## 2020-11-16 RX ADMIN — Medication 2 MILLIGRAM(S): at 09:42

## 2020-11-16 RX ADMIN — SENNA PLUS 2 TABLET(S): 8.6 TABLET ORAL at 20:40

## 2020-11-16 RX ADMIN — Medication 150 MILLIGRAM(S): at 20:40

## 2020-11-16 RX ADMIN — OLANZAPINE 5 MILLIGRAM(S): 15 TABLET, FILM COATED ORAL at 16:54

## 2020-11-17 PROCEDURE — 99232 SBSQ HOSP IP/OBS MODERATE 35: CPT

## 2020-11-17 RX ORDER — ZOLPIDEM TARTRATE 10 MG/1
5 TABLET ORAL AT BEDTIME
Refills: 0 | Status: DISCONTINUED | OUTPATIENT
Start: 2020-11-17 | End: 2020-11-23

## 2020-11-17 RX ORDER — CLONAZEPAM 1 MG
2 TABLET ORAL
Refills: 0 | Status: DISCONTINUED | OUTPATIENT
Start: 2020-11-17 | End: 2020-11-23

## 2020-11-17 RX ADMIN — Medication 2 MILLIGRAM(S): at 11:36

## 2020-11-17 RX ADMIN — OLANZAPINE 25 MILLIGRAM(S): 15 TABLET, FILM COATED ORAL at 20:44

## 2020-11-17 RX ADMIN — Medication 50 MILLIGRAM(S): at 18:41

## 2020-11-17 RX ADMIN — SENNA PLUS 2 TABLET(S): 8.6 TABLET ORAL at 20:44

## 2020-11-17 RX ADMIN — Medication 2 MILLIGRAM(S): at 17:39

## 2020-11-17 RX ADMIN — Medication 2 MILLIGRAM(S): at 08:55

## 2020-11-17 RX ADMIN — Medication 2 MILLIGRAM(S): at 20:44

## 2020-11-17 RX ADMIN — Medication 1 APPLICATION(S): at 10:00

## 2020-11-17 RX ADMIN — OLANZAPINE 5 MILLIGRAM(S): 15 TABLET, FILM COATED ORAL at 08:56

## 2020-11-17 RX ADMIN — ZOLPIDEM TARTRATE 5 MILLIGRAM(S): 10 TABLET ORAL at 20:48

## 2020-11-17 RX ADMIN — TAMSULOSIN HYDROCHLORIDE 0.4 MILLIGRAM(S): 0.4 CAPSULE ORAL at 20:44

## 2020-11-17 RX ADMIN — LISINOPRIL 10 MILLIGRAM(S): 2.5 TABLET ORAL at 08:55

## 2020-11-17 RX ADMIN — Medication 20 MILLIGRAM(S): at 08:56

## 2020-11-18 LAB
BASOPHILS # BLD AUTO: 0.03 K/UL — SIGNIFICANT CHANGE UP (ref 0–0.2)
BASOPHILS NFR BLD AUTO: 0.5 % — SIGNIFICANT CHANGE UP (ref 0–2)
EOSINOPHIL # BLD AUTO: 0.11 K/UL — SIGNIFICANT CHANGE UP (ref 0–0.5)
EOSINOPHIL NFR BLD AUTO: 1.9 % — SIGNIFICANT CHANGE UP (ref 0–6)
HCT VFR BLD CALC: 38 % — LOW (ref 39–50)
HGB BLD-MCNC: 11.9 G/DL — LOW (ref 13–17)
IMM GRANULOCYTES NFR BLD AUTO: 0.5 % — SIGNIFICANT CHANGE UP (ref 0–1.5)
LYMPHOCYTES # BLD AUTO: 1.35 K/UL — SIGNIFICANT CHANGE UP (ref 1–3.3)
LYMPHOCYTES # BLD AUTO: 22.9 % — SIGNIFICANT CHANGE UP (ref 13–44)
MCHC RBC-ENTMCNC: 30.6 PG — SIGNIFICANT CHANGE UP (ref 27–34)
MCHC RBC-ENTMCNC: 31.3 % — LOW (ref 32–36)
MCV RBC AUTO: 97.7 FL — SIGNIFICANT CHANGE UP (ref 80–100)
MONOCYTES # BLD AUTO: 0.24 K/UL — SIGNIFICANT CHANGE UP (ref 0–0.9)
MONOCYTES NFR BLD AUTO: 4.1 % — SIGNIFICANT CHANGE UP (ref 2–14)
NEUTROPHILS # BLD AUTO: 4.14 K/UL — SIGNIFICANT CHANGE UP (ref 1.8–7.4)
NEUTROPHILS NFR BLD AUTO: 70.1 % — SIGNIFICANT CHANGE UP (ref 43–77)
NRBC # FLD: 0 K/UL — SIGNIFICANT CHANGE UP (ref 0–0)
PLATELET # BLD AUTO: 407 K/UL — HIGH (ref 150–400)
PMV BLD: 8.8 FL — SIGNIFICANT CHANGE UP (ref 7–13)
RBC # BLD: 3.89 M/UL — LOW (ref 4.2–5.8)
RBC # FLD: 13.4 % — SIGNIFICANT CHANGE UP (ref 10.3–14.5)
WBC # BLD: 5.9 K/UL — SIGNIFICANT CHANGE UP (ref 3.8–10.5)
WBC # FLD AUTO: 5.9 K/UL — SIGNIFICANT CHANGE UP (ref 3.8–10.5)

## 2020-11-18 PROCEDURE — 99233 SBSQ HOSP IP/OBS HIGH 50: CPT

## 2020-11-18 RX ORDER — CLOZAPINE 150 MG/1
25 TABLET, ORALLY DISINTEGRATING ORAL AT BEDTIME
Refills: 0 | Status: DISCONTINUED | OUTPATIENT
Start: 2020-11-18 | End: 2020-11-19

## 2020-11-18 RX ADMIN — Medication 2 MILLIGRAM(S): at 20:20

## 2020-11-18 RX ADMIN — SENNA PLUS 2 TABLET(S): 8.6 TABLET ORAL at 20:20

## 2020-11-18 RX ADMIN — Medication 2 MILLIGRAM(S): at 12:08

## 2020-11-18 RX ADMIN — TAMSULOSIN HYDROCHLORIDE 0.4 MILLIGRAM(S): 0.4 CAPSULE ORAL at 20:20

## 2020-11-18 RX ADMIN — OLANZAPINE 5 MILLIGRAM(S): 15 TABLET, FILM COATED ORAL at 08:53

## 2020-11-18 RX ADMIN — Medication 1 APPLICATION(S): at 10:00

## 2020-11-18 RX ADMIN — LISINOPRIL 10 MILLIGRAM(S): 2.5 TABLET ORAL at 08:53

## 2020-11-18 RX ADMIN — ZOLPIDEM TARTRATE 5 MILLIGRAM(S): 10 TABLET ORAL at 20:20

## 2020-11-18 RX ADMIN — Medication 2 MILLIGRAM(S): at 08:53

## 2020-11-18 RX ADMIN — CLOZAPINE 25 MILLIGRAM(S): 150 TABLET, ORALLY DISINTEGRATING ORAL at 20:20

## 2020-11-18 RX ADMIN — Medication 1 APPLICATION(S): at 20:20

## 2020-11-19 PROCEDURE — 99232 SBSQ HOSP IP/OBS MODERATE 35: CPT

## 2020-11-19 RX ORDER — CLOZAPINE 150 MG/1
50 TABLET, ORALLY DISINTEGRATING ORAL AT BEDTIME
Refills: 0 | Status: DISCONTINUED | OUTPATIENT
Start: 2020-11-19 | End: 2020-11-20

## 2020-11-19 RX ADMIN — Medication 2 MILLIGRAM(S): at 20:13

## 2020-11-19 RX ADMIN — Medication 2 MILLIGRAM(S): at 09:04

## 2020-11-19 RX ADMIN — Medication 2 MILLIGRAM(S): at 15:04

## 2020-11-19 RX ADMIN — SENNA PLUS 2 TABLET(S): 8.6 TABLET ORAL at 20:13

## 2020-11-19 RX ADMIN — CLOZAPINE 50 MILLIGRAM(S): 150 TABLET, ORALLY DISINTEGRATING ORAL at 20:13

## 2020-11-19 RX ADMIN — ZOLPIDEM TARTRATE 5 MILLIGRAM(S): 10 TABLET ORAL at 21:27

## 2020-11-19 RX ADMIN — LISINOPRIL 10 MILLIGRAM(S): 2.5 TABLET ORAL at 09:04

## 2020-11-19 RX ADMIN — ZOLPIDEM TARTRATE 5 MILLIGRAM(S): 10 TABLET ORAL at 20:13

## 2020-11-19 RX ADMIN — OLANZAPINE 5 MILLIGRAM(S): 15 TABLET, FILM COATED ORAL at 09:04

## 2020-11-19 RX ADMIN — Medication 1 APPLICATION(S): at 09:04

## 2020-11-19 RX ADMIN — Medication 2 MILLIGRAM(S): at 10:51

## 2020-11-19 RX ADMIN — Medication 1 APPLICATION(S): at 20:13

## 2020-11-19 RX ADMIN — TAMSULOSIN HYDROCHLORIDE 0.4 MILLIGRAM(S): 0.4 CAPSULE ORAL at 20:13

## 2020-11-20 PROCEDURE — 99232 SBSQ HOSP IP/OBS MODERATE 35: CPT

## 2020-11-20 RX ORDER — NALTREXONE HYDROCHLORIDE 50 MG/1
25 TABLET, FILM COATED ORAL DAILY
Refills: 0 | Status: DISCONTINUED | OUTPATIENT
Start: 2020-11-20 | End: 2020-11-23

## 2020-11-20 RX ORDER — CLOZAPINE 150 MG/1
100 TABLET, ORALLY DISINTEGRATING ORAL AT BEDTIME
Refills: 0 | Status: DISCONTINUED | OUTPATIENT
Start: 2020-11-20 | End: 2020-11-22

## 2020-11-20 RX ADMIN — OLANZAPINE 5 MILLIGRAM(S): 15 TABLET, FILM COATED ORAL at 08:37

## 2020-11-20 RX ADMIN — Medication 2 MILLIGRAM(S): at 09:55

## 2020-11-20 RX ADMIN — LISINOPRIL 10 MILLIGRAM(S): 2.5 TABLET ORAL at 08:36

## 2020-11-20 RX ADMIN — TAMSULOSIN HYDROCHLORIDE 0.4 MILLIGRAM(S): 0.4 CAPSULE ORAL at 20:42

## 2020-11-20 RX ADMIN — Medication 2 MILLIGRAM(S): at 16:12

## 2020-11-20 RX ADMIN — Medication 1 APPLICATION(S): at 20:42

## 2020-11-20 RX ADMIN — CLOZAPINE 100 MILLIGRAM(S): 150 TABLET, ORALLY DISINTEGRATING ORAL at 20:42

## 2020-11-20 RX ADMIN — Medication 1 APPLICATION(S): at 10:00

## 2020-11-20 RX ADMIN — ZOLPIDEM TARTRATE 5 MILLIGRAM(S): 10 TABLET ORAL at 20:43

## 2020-11-20 RX ADMIN — ZOLPIDEM TARTRATE 5 MILLIGRAM(S): 10 TABLET ORAL at 21:38

## 2020-11-20 RX ADMIN — Medication 2 MILLIGRAM(S): at 08:36

## 2020-11-20 RX ADMIN — SENNA PLUS 2 TABLET(S): 8.6 TABLET ORAL at 20:42

## 2020-11-20 RX ADMIN — Medication 2 MILLIGRAM(S): at 20:42

## 2020-11-20 NOTE — CHART NOTE - NSCHARTNOTEFT_GEN_A_CORE
asked by psych np to evaluate Stoma for planned ECT as was cancelled by anesthesia prior d/t concerns of Stoma  per documentation, this issue was already dw trauma sx at [facility] and was rec safe to proceed with ECT  on my exam Stoma site appears to be closing with e/o exudate, infx  defer to anesthesia on decision to proceed - pls have anesthesia dw trauma sx if any remaining concern about stoma with planned ECT asked by psych np to evaluate Stoma for planned ECT as was cancelled by anesthesia prior d/t concerns of Stoma  per documentation, this issue was already dw trauma sx at [facility] and was rec safe to proceed with ECT  on my exam Stoma site appears to be closing without e/o exudate, infx  defer to anesthesia on decision to proceed - pls have anesthesia dw trauma sx or ENT if any remaining concern about stoma with planned ECT

## 2020-11-21 RX ORDER — HYDROXYZINE HCL 10 MG
25 TABLET ORAL EVERY 6 HOURS
Refills: 0 | Status: DISCONTINUED | OUTPATIENT
Start: 2020-11-21 | End: 2020-11-27

## 2020-11-21 RX ADMIN — Medication 1 APPLICATION(S): at 11:52

## 2020-11-21 RX ADMIN — LISINOPRIL 10 MILLIGRAM(S): 2.5 TABLET ORAL at 09:48

## 2020-11-21 RX ADMIN — Medication 2 MILLIGRAM(S): at 09:48

## 2020-11-21 RX ADMIN — ZOLPIDEM TARTRATE 5 MILLIGRAM(S): 10 TABLET ORAL at 21:51

## 2020-11-21 RX ADMIN — SENNA PLUS 2 TABLET(S): 8.6 TABLET ORAL at 20:46

## 2020-11-21 RX ADMIN — Medication 2 MILLIGRAM(S): at 20:46

## 2020-11-21 RX ADMIN — CLOZAPINE 100 MILLIGRAM(S): 150 TABLET, ORALLY DISINTEGRATING ORAL at 20:46

## 2020-11-21 RX ADMIN — Medication 25 MILLIGRAM(S): at 13:05

## 2020-11-21 RX ADMIN — TAMSULOSIN HYDROCHLORIDE 0.4 MILLIGRAM(S): 0.4 CAPSULE ORAL at 20:46

## 2020-11-21 RX ADMIN — Medication 1 APPLICATION(S): at 20:46

## 2020-11-21 RX ADMIN — NALTREXONE HYDROCHLORIDE 25 MILLIGRAM(S): 50 TABLET, FILM COATED ORAL at 09:48

## 2020-11-21 RX ADMIN — ZOLPIDEM TARTRATE 5 MILLIGRAM(S): 10 TABLET ORAL at 20:49

## 2020-11-21 NOTE — CHART NOTE - NSCHARTNOTEFT_GEN_A_CORE
Pt had 3 staples ready to be removed from Left upper chest. Staples removed after 2 weeks. Wound well healing, well approximated. No signs of infection, bleeding, ertythema, or drainage. No complications.

## 2020-11-22 RX ORDER — CLOZAPINE 150 MG/1
150 TABLET, ORALLY DISINTEGRATING ORAL AT BEDTIME
Refills: 0 | Status: DISCONTINUED | OUTPATIENT
Start: 2020-11-22 | End: 2020-11-23

## 2020-11-22 RX ADMIN — TAMSULOSIN HYDROCHLORIDE 0.4 MILLIGRAM(S): 0.4 CAPSULE ORAL at 20:00

## 2020-11-22 RX ADMIN — CLOZAPINE 150 MILLIGRAM(S): 150 TABLET, ORALLY DISINTEGRATING ORAL at 20:00

## 2020-11-22 RX ADMIN — NALTREXONE HYDROCHLORIDE 25 MILLIGRAM(S): 50 TABLET, FILM COATED ORAL at 09:01

## 2020-11-22 RX ADMIN — ZOLPIDEM TARTRATE 5 MILLIGRAM(S): 10 TABLET ORAL at 20:00

## 2020-11-22 RX ADMIN — ZOLPIDEM TARTRATE 5 MILLIGRAM(S): 10 TABLET ORAL at 21:09

## 2020-11-22 RX ADMIN — SENNA PLUS 2 TABLET(S): 8.6 TABLET ORAL at 20:00

## 2020-11-22 RX ADMIN — Medication 1 APPLICATION(S): at 09:01

## 2020-11-22 RX ADMIN — Medication 25 MILLIGRAM(S): at 11:26

## 2020-11-22 RX ADMIN — Medication 1 APPLICATION(S): at 20:00

## 2020-11-22 RX ADMIN — Medication 2 MILLIGRAM(S): at 09:01

## 2020-11-22 RX ADMIN — Medication 2 MILLIGRAM(S): at 20:00

## 2020-11-22 RX ADMIN — LISINOPRIL 10 MILLIGRAM(S): 2.5 TABLET ORAL at 09:01

## 2020-11-23 PROCEDURE — 99232 SBSQ HOSP IP/OBS MODERATE 35: CPT

## 2020-11-23 RX ORDER — CLOZAPINE 150 MG/1
200 TABLET, ORALLY DISINTEGRATING ORAL AT BEDTIME
Refills: 0 | Status: DISCONTINUED | OUTPATIENT
Start: 2020-11-23 | End: 2020-11-24

## 2020-11-23 RX ORDER — CLONAZEPAM 1 MG
2 TABLET ORAL DAILY
Refills: 0 | Status: DISCONTINUED | OUTPATIENT
Start: 2020-11-23 | End: 2020-11-25

## 2020-11-23 RX ORDER — ZOLPIDEM TARTRATE 10 MG/1
5 TABLET ORAL AT BEDTIME
Refills: 0 | Status: DISCONTINUED | OUTPATIENT
Start: 2020-11-23 | End: 2020-11-25

## 2020-11-23 RX ORDER — NALTREXONE HYDROCHLORIDE 50 MG/1
50 TABLET, FILM COATED ORAL DAILY
Refills: 0 | Status: DISCONTINUED | OUTPATIENT
Start: 2020-11-23 | End: 2020-12-05

## 2020-11-23 RX ORDER — CLONAZEPAM 1 MG
2 TABLET ORAL
Refills: 0 | Status: DISCONTINUED | OUTPATIENT
Start: 2020-11-23 | End: 2020-11-23

## 2020-11-23 RX ORDER — CLONAZEPAM 1 MG
1 TABLET ORAL AT BEDTIME
Refills: 0 | Status: DISCONTINUED | OUTPATIENT
Start: 2020-11-23 | End: 2020-11-25

## 2020-11-23 RX ADMIN — ZOLPIDEM TARTRATE 5 MILLIGRAM(S): 10 TABLET ORAL at 20:50

## 2020-11-23 RX ADMIN — Medication 1 MILLIGRAM(S): at 20:49

## 2020-11-23 RX ADMIN — NALTREXONE HYDROCHLORIDE 25 MILLIGRAM(S): 50 TABLET, FILM COATED ORAL at 08:58

## 2020-11-23 RX ADMIN — Medication 1 APPLICATION(S): at 10:00

## 2020-11-23 RX ADMIN — CLOZAPINE 200 MILLIGRAM(S): 150 TABLET, ORALLY DISINTEGRATING ORAL at 20:50

## 2020-11-23 RX ADMIN — Medication 2 MILLIGRAM(S): at 08:58

## 2020-11-23 RX ADMIN — OLANZAPINE 5 MILLIGRAM(S): 15 TABLET, FILM COATED ORAL at 18:03

## 2020-11-23 RX ADMIN — SENNA PLUS 2 TABLET(S): 8.6 TABLET ORAL at 20:50

## 2020-11-23 RX ADMIN — LISINOPRIL 10 MILLIGRAM(S): 2.5 TABLET ORAL at 08:58

## 2020-11-23 RX ADMIN — OLANZAPINE 5 MILLIGRAM(S): 15 TABLET, FILM COATED ORAL at 11:55

## 2020-11-23 RX ADMIN — TAMSULOSIN HYDROCHLORIDE 0.4 MILLIGRAM(S): 0.4 CAPSULE ORAL at 20:50

## 2020-11-24 DIAGNOSIS — Z43.0 ENCOUNTER FOR ATTENTION TO TRACHEOSTOMY: ICD-10-CM

## 2020-11-24 PROCEDURE — 31575 DIAGNOSTIC LARYNGOSCOPY: CPT

## 2020-11-24 PROCEDURE — 99232 SBSQ HOSP IP/OBS MODERATE 35: CPT

## 2020-11-24 RX ORDER — CLOZAPINE 150 MG/1
250 TABLET, ORALLY DISINTEGRATING ORAL AT BEDTIME
Refills: 0 | Status: DISCONTINUED | OUTPATIENT
Start: 2020-11-24 | End: 2020-11-26

## 2020-11-24 RX ADMIN — Medication 2 MILLIGRAM(S): at 09:05

## 2020-11-24 RX ADMIN — Medication 1 APPLICATION(S): at 20:14

## 2020-11-24 RX ADMIN — ZOLPIDEM TARTRATE 5 MILLIGRAM(S): 10 TABLET ORAL at 20:14

## 2020-11-24 RX ADMIN — SENNA PLUS 2 TABLET(S): 8.6 TABLET ORAL at 20:14

## 2020-11-24 RX ADMIN — Medication 1 MILLIGRAM(S): at 20:14

## 2020-11-24 RX ADMIN — OLANZAPINE 5 MILLIGRAM(S): 15 TABLET, FILM COATED ORAL at 14:09

## 2020-11-24 RX ADMIN — Medication 1 APPLICATION(S): at 09:30

## 2020-11-24 RX ADMIN — OLANZAPINE 5 MILLIGRAM(S): 15 TABLET, FILM COATED ORAL at 23:02

## 2020-11-24 RX ADMIN — CLOZAPINE 250 MILLIGRAM(S): 150 TABLET, ORALLY DISINTEGRATING ORAL at 21:29

## 2020-11-24 RX ADMIN — NALTREXONE HYDROCHLORIDE 50 MILLIGRAM(S): 50 TABLET, FILM COATED ORAL at 09:05

## 2020-11-24 RX ADMIN — TAMSULOSIN HYDROCHLORIDE 0.4 MILLIGRAM(S): 0.4 CAPSULE ORAL at 20:14
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
## 2020-11-25 LAB
BASOPHILS # BLD AUTO: 0.02 K/UL — SIGNIFICANT CHANGE UP (ref 0–0.2)
BASOPHILS NFR BLD AUTO: 0.3 % — SIGNIFICANT CHANGE UP (ref 0–2)
EOSINOPHIL # BLD AUTO: 0.11 K/UL — SIGNIFICANT CHANGE UP (ref 0–0.5)
EOSINOPHIL NFR BLD AUTO: 1.9 % — SIGNIFICANT CHANGE UP (ref 0–6)
HCT VFR BLD CALC: 34.8 % — LOW (ref 39–50)
HGB BLD-MCNC: 11.1 G/DL — LOW (ref 13–17)
IMM GRANULOCYTES NFR BLD AUTO: 0.5 % — SIGNIFICANT CHANGE UP (ref 0–1.5)
LYMPHOCYTES # BLD AUTO: 1.18 K/UL — SIGNIFICANT CHANGE UP (ref 1–3.3)
LYMPHOCYTES # BLD AUTO: 19.9 % — SIGNIFICANT CHANGE UP (ref 13–44)
MCHC RBC-ENTMCNC: 30.2 PG — SIGNIFICANT CHANGE UP (ref 27–34)
MCHC RBC-ENTMCNC: 31.9 % — LOW (ref 32–36)
MCV RBC AUTO: 94.6 FL — SIGNIFICANT CHANGE UP (ref 80–100)
MONOCYTES # BLD AUTO: 0.48 K/UL — SIGNIFICANT CHANGE UP (ref 0–0.9)
MONOCYTES NFR BLD AUTO: 8.1 % — SIGNIFICANT CHANGE UP (ref 2–14)
NEUTROPHILS # BLD AUTO: 4.12 K/UL — SIGNIFICANT CHANGE UP (ref 1.8–7.4)
NEUTROPHILS NFR BLD AUTO: 69.3 % — SIGNIFICANT CHANGE UP (ref 43–77)
NRBC # FLD: 0 K/UL — SIGNIFICANT CHANGE UP (ref 0–0)
PLATELET # BLD AUTO: 227 K/UL — SIGNIFICANT CHANGE UP (ref 150–400)
PMV BLD: 9 FL — SIGNIFICANT CHANGE UP (ref 7–13)
RBC # BLD: 3.68 M/UL — LOW (ref 4.2–5.8)
RBC # FLD: 13 % — SIGNIFICANT CHANGE UP (ref 10.3–14.5)
WBC # BLD: 5.94 K/UL — SIGNIFICANT CHANGE UP (ref 3.8–10.5)
WBC # FLD AUTO: 5.94 K/UL — SIGNIFICANT CHANGE UP (ref 3.8–10.5)

## 2020-11-25 PROCEDURE — 99232 SBSQ HOSP IP/OBS MODERATE 35: CPT

## 2020-11-25 RX ORDER — CLONAZEPAM 1 MG
2 TABLET ORAL
Refills: 0 | Status: DISCONTINUED | OUTPATIENT
Start: 2020-11-25 | End: 2020-11-27

## 2020-11-25 RX ORDER — ZOLPIDEM TARTRATE 10 MG/1
5 TABLET ORAL AT BEDTIME
Refills: 0 | Status: DISCONTINUED | OUTPATIENT
Start: 2020-11-25 | End: 2020-11-25

## 2020-11-25 RX ADMIN — Medication 2 MILLIGRAM(S): at 10:04

## 2020-11-25 RX ADMIN — OLANZAPINE 5 MILLIGRAM(S): 15 TABLET, FILM COATED ORAL at 23:45

## 2020-11-25 RX ADMIN — OLANZAPINE 5 MILLIGRAM(S): 15 TABLET, FILM COATED ORAL at 16:45

## 2020-11-25 RX ADMIN — CLOZAPINE 250 MILLIGRAM(S): 150 TABLET, ORALLY DISINTEGRATING ORAL at 21:04

## 2020-11-25 RX ADMIN — Medication 1 APPLICATION(S): at 21:05

## 2020-11-25 RX ADMIN — Medication 1 APPLICATION(S): at 10:00

## 2020-11-25 RX ADMIN — Medication 2 MILLIGRAM(S): at 21:05

## 2020-11-25 RX ADMIN — LISINOPRIL 10 MILLIGRAM(S): 2.5 TABLET ORAL at 10:04

## 2020-11-25 RX ADMIN — ZOLPIDEM TARTRATE 5 MILLIGRAM(S): 10 TABLET ORAL at 21:03

## 2020-11-25 RX ADMIN — SENNA PLUS 2 TABLET(S): 8.6 TABLET ORAL at 21:04

## 2020-11-25 RX ADMIN — NALTREXONE HYDROCHLORIDE 50 MILLIGRAM(S): 50 TABLET, FILM COATED ORAL at 10:04

## 2020-11-25 RX ADMIN — TAMSULOSIN HYDROCHLORIDE 0.4 MILLIGRAM(S): 0.4 CAPSULE ORAL at 21:04

## 2020-11-26 RX ORDER — CLOZAPINE 150 MG/1
300 TABLET, ORALLY DISINTEGRATING ORAL AT BEDTIME
Refills: 0 | Status: DISCONTINUED | OUTPATIENT
Start: 2020-11-26 | End: 2020-11-27

## 2020-11-26 RX ORDER — TRAZODONE HCL 50 MG
50 TABLET ORAL AT BEDTIME
Refills: 0 | Status: DISCONTINUED | OUTPATIENT
Start: 2020-11-26 | End: 2020-11-27

## 2020-11-26 RX ORDER — DIPHENHYDRAMINE HCL 50 MG
50 CAPSULE ORAL EVERY 6 HOURS
Refills: 0 | Status: DISCONTINUED | OUTPATIENT
Start: 2020-11-26 | End: 2020-11-27

## 2020-11-26 RX ADMIN — OLANZAPINE 5 MILLIGRAM(S): 15 TABLET, FILM COATED ORAL at 22:40

## 2020-11-26 RX ADMIN — LISINOPRIL 10 MILLIGRAM(S): 2.5 TABLET ORAL at 09:04

## 2020-11-26 RX ADMIN — CLOZAPINE 300 MILLIGRAM(S): 150 TABLET, ORALLY DISINTEGRATING ORAL at 21:24

## 2020-11-26 RX ADMIN — Medication 50 MILLIGRAM(S): at 21:30

## 2020-11-26 RX ADMIN — Medication 2 MILLIGRAM(S): at 09:04

## 2020-11-26 RX ADMIN — Medication 1 APPLICATION(S): at 21:24

## 2020-11-26 RX ADMIN — Medication 2 MILLIGRAM(S): at 21:24

## 2020-11-26 RX ADMIN — SENNA PLUS 2 TABLET(S): 8.6 TABLET ORAL at 21:24

## 2020-11-26 RX ADMIN — TAMSULOSIN HYDROCHLORIDE 0.4 MILLIGRAM(S): 0.4 CAPSULE ORAL at 21:24

## 2020-11-26 RX ADMIN — NALTREXONE HYDROCHLORIDE 50 MILLIGRAM(S): 50 TABLET, FILM COATED ORAL at 09:04

## 2020-11-26 RX ADMIN — Medication 2 MILLIGRAM(S): at 00:50

## 2020-11-26 RX ADMIN — Medication 2 MILLIGRAM(S): at 21:30

## 2020-11-27 RX ORDER — BUPROPION HYDROCHLORIDE 150 MG/1
150 TABLET, EXTENDED RELEASE ORAL DAILY
Refills: 0 | Status: DISCONTINUED | OUTPATIENT
Start: 2020-11-28 | End: 2020-11-30

## 2020-11-27 RX ORDER — CLONAZEPAM 1 MG
1 TABLET ORAL DAILY
Refills: 0 | Status: DISCONTINUED | OUTPATIENT
Start: 2020-11-27 | End: 2020-11-27

## 2020-11-27 RX ORDER — CLONAZEPAM 1 MG
0.5 TABLET ORAL AT BEDTIME
Refills: 0 | Status: DISCONTINUED | OUTPATIENT
Start: 2020-11-27 | End: 2020-11-30

## 2020-11-27 RX ORDER — LANOLIN ALCOHOL/MO/W.PET/CERES
5 CREAM (GRAM) TOPICAL AT BEDTIME
Refills: 0 | Status: DISCONTINUED | OUTPATIENT
Start: 2020-11-27 | End: 2020-11-27

## 2020-11-27 RX ORDER — CLOZAPINE 150 MG/1
325 TABLET, ORALLY DISINTEGRATING ORAL AT BEDTIME
Refills: 0 | Status: DISCONTINUED | OUTPATIENT
Start: 2020-11-27 | End: 2020-11-30

## 2020-11-27 RX ORDER — CLONAZEPAM 1 MG
1 TABLET ORAL
Refills: 0 | Status: DISCONTINUED | OUTPATIENT
Start: 2020-11-27 | End: 2020-11-30

## 2020-11-27 RX ORDER — CLONAZEPAM 1 MG
1.5 TABLET ORAL AT BEDTIME
Refills: 0 | Status: DISCONTINUED | OUTPATIENT
Start: 2020-11-27 | End: 2020-11-27

## 2020-11-27 RX ORDER — DIPHENHYDRAMINE HCL 50 MG
50 CAPSULE ORAL EVERY 6 HOURS
Refills: 0 | Status: DISCONTINUED | OUTPATIENT
Start: 2020-11-27 | End: 2020-12-02

## 2020-11-27 RX ORDER — RAMELTEON 8 MG
8 TABLET ORAL AT BEDTIME
Refills: 0 | Status: DISCONTINUED | OUTPATIENT
Start: 2020-11-27 | End: 2020-12-01

## 2020-11-27 RX ORDER — QUETIAPINE FUMARATE 200 MG/1
50 TABLET, FILM COATED ORAL EVERY 6 HOURS
Refills: 0 | Status: DISCONTINUED | OUTPATIENT
Start: 2020-11-27 | End: 2020-12-02

## 2020-11-27 RX ORDER — CLONAZEPAM 1 MG
1 TABLET ORAL
Refills: 0 | Status: DISCONTINUED | OUTPATIENT
Start: 2020-11-27 | End: 2020-11-27

## 2020-11-27 RX ADMIN — QUETIAPINE FUMARATE 50 MILLIGRAM(S): 200 TABLET, FILM COATED ORAL at 16:48

## 2020-11-27 RX ADMIN — Medication 8 MILLIGRAM(S): at 23:03

## 2020-11-27 RX ADMIN — Medication 2 MILLIGRAM(S): at 08:37

## 2020-11-27 RX ADMIN — SENNA PLUS 2 TABLET(S): 8.6 TABLET ORAL at 22:11

## 2020-11-27 RX ADMIN — QUETIAPINE FUMARATE 50 MILLIGRAM(S): 200 TABLET, FILM COATED ORAL at 22:30

## 2020-11-27 RX ADMIN — CLOZAPINE 325 MILLIGRAM(S): 150 TABLET, ORALLY DISINTEGRATING ORAL at 22:11

## 2020-11-27 RX ADMIN — TAMSULOSIN HYDROCHLORIDE 0.4 MILLIGRAM(S): 0.4 CAPSULE ORAL at 22:11

## 2020-11-27 RX ADMIN — NALTREXONE HYDROCHLORIDE 50 MILLIGRAM(S): 50 TABLET, FILM COATED ORAL at 08:37

## 2020-11-27 RX ADMIN — LISINOPRIL 10 MILLIGRAM(S): 2.5 TABLET ORAL at 08:37

## 2020-11-28 RX ADMIN — QUETIAPINE FUMARATE 50 MILLIGRAM(S): 200 TABLET, FILM COATED ORAL at 21:30

## 2020-11-28 RX ADMIN — Medication 2 MILLIGRAM(S): at 01:24

## 2020-11-28 RX ADMIN — Medication 8 MILLIGRAM(S): at 21:30

## 2020-11-28 RX ADMIN — Medication 1 MILLIGRAM(S): at 08:23

## 2020-11-28 RX ADMIN — LISINOPRIL 10 MILLIGRAM(S): 2.5 TABLET ORAL at 08:29

## 2020-11-28 RX ADMIN — NALTREXONE HYDROCHLORIDE 50 MILLIGRAM(S): 50 TABLET, FILM COATED ORAL at 08:29

## 2020-11-28 RX ADMIN — Medication 2 MILLIGRAM(S): at 00:22

## 2020-11-28 RX ADMIN — QUETIAPINE FUMARATE 50 MILLIGRAM(S): 200 TABLET, FILM COATED ORAL at 14:06

## 2020-11-28 RX ADMIN — TAMSULOSIN HYDROCHLORIDE 0.4 MILLIGRAM(S): 0.4 CAPSULE ORAL at 21:31

## 2020-11-28 RX ADMIN — CLOZAPINE 325 MILLIGRAM(S): 150 TABLET, ORALLY DISINTEGRATING ORAL at 21:31

## 2020-11-28 RX ADMIN — Medication 1 MILLIGRAM(S): at 21:31

## 2020-11-28 RX ADMIN — BUPROPION HYDROCHLORIDE 150 MILLIGRAM(S): 150 TABLET, EXTENDED RELEASE ORAL at 08:23

## 2020-11-28 RX ADMIN — SENNA PLUS 2 TABLET(S): 8.6 TABLET ORAL at 21:31

## 2020-11-29 RX ADMIN — Medication 1 MILLIGRAM(S): at 20:15

## 2020-11-29 RX ADMIN — BUPROPION HYDROCHLORIDE 150 MILLIGRAM(S): 150 TABLET, EXTENDED RELEASE ORAL at 08:59

## 2020-11-29 RX ADMIN — SENNA PLUS 2 TABLET(S): 8.6 TABLET ORAL at 20:15

## 2020-11-29 RX ADMIN — TAMSULOSIN HYDROCHLORIDE 0.4 MILLIGRAM(S): 0.4 CAPSULE ORAL at 20:15

## 2020-11-29 RX ADMIN — NALTREXONE HYDROCHLORIDE 50 MILLIGRAM(S): 50 TABLET, FILM COATED ORAL at 08:59

## 2020-11-29 RX ADMIN — Medication 1 APPLICATION(S): at 20:15

## 2020-11-29 RX ADMIN — QUETIAPINE FUMARATE 50 MILLIGRAM(S): 200 TABLET, FILM COATED ORAL at 16:40

## 2020-11-29 RX ADMIN — LISINOPRIL 10 MILLIGRAM(S): 2.5 TABLET ORAL at 08:59

## 2020-11-29 RX ADMIN — Medication 1 MILLIGRAM(S): at 08:59

## 2020-11-29 RX ADMIN — CLOZAPINE 325 MILLIGRAM(S): 150 TABLET, ORALLY DISINTEGRATING ORAL at 20:15

## 2020-11-30 PROCEDURE — 99232 SBSQ HOSP IP/OBS MODERATE 35: CPT

## 2020-11-30 RX ORDER — BUPROPION HYDROCHLORIDE 150 MG/1
300 TABLET, EXTENDED RELEASE ORAL DAILY
Refills: 0 | Status: DISCONTINUED | OUTPATIENT
Start: 2020-11-30 | End: 2020-12-02

## 2020-11-30 RX ORDER — CLONAZEPAM 1 MG
1 TABLET ORAL
Refills: 0 | Status: DISCONTINUED | OUTPATIENT
Start: 2020-11-30 | End: 2020-12-01

## 2020-11-30 RX ORDER — CLONAZEPAM 1 MG
0.5 TABLET ORAL AT BEDTIME
Refills: 0 | Status: DISCONTINUED | OUTPATIENT
Start: 2020-11-30 | End: 2020-12-02

## 2020-11-30 RX ORDER — CLOZAPINE 150 MG/1
350 TABLET, ORALLY DISINTEGRATING ORAL AT BEDTIME
Refills: 0 | Status: DISCONTINUED | OUTPATIENT
Start: 2020-11-30 | End: 2020-12-03

## 2020-11-30 RX ADMIN — Medication 1 APPLICATION(S): at 21:34

## 2020-11-30 RX ADMIN — BUPROPION HYDROCHLORIDE 150 MILLIGRAM(S): 150 TABLET, EXTENDED RELEASE ORAL at 09:24

## 2020-11-30 RX ADMIN — TAMSULOSIN HYDROCHLORIDE 0.4 MILLIGRAM(S): 0.4 CAPSULE ORAL at 21:35

## 2020-11-30 RX ADMIN — LISINOPRIL 10 MILLIGRAM(S): 2.5 TABLET ORAL at 09:24

## 2020-11-30 RX ADMIN — Medication 1 MILLIGRAM(S): at 21:35

## 2020-11-30 RX ADMIN — Medication 1 MILLIGRAM(S): at 09:24

## 2020-11-30 RX ADMIN — QUETIAPINE FUMARATE 50 MILLIGRAM(S): 200 TABLET, FILM COATED ORAL at 23:45

## 2020-11-30 RX ADMIN — SENNA PLUS 2 TABLET(S): 8.6 TABLET ORAL at 21:35

## 2020-11-30 RX ADMIN — NALTREXONE HYDROCHLORIDE 50 MILLIGRAM(S): 50 TABLET, FILM COATED ORAL at 09:24

## 2020-11-30 RX ADMIN — CLOZAPINE 350 MILLIGRAM(S): 150 TABLET, ORALLY DISINTEGRATING ORAL at 21:35

## 2020-11-30 RX ADMIN — Medication 1 APPLICATION(S): at 09:23

## 2020-12-01 LAB
APPEARANCE UR: CLEAR — SIGNIFICANT CHANGE UP
BASOPHILS # BLD AUTO: 0.02 K/UL — SIGNIFICANT CHANGE UP (ref 0–0.2)
BASOPHILS NFR BLD AUTO: 0.3 % — SIGNIFICANT CHANGE UP (ref 0–2)
BILIRUB UR-MCNC: NEGATIVE — SIGNIFICANT CHANGE UP
BLOOD UR QL VISUAL: NEGATIVE — SIGNIFICANT CHANGE UP
COLOR SPEC: COLORLESS — SIGNIFICANT CHANGE UP
CREAT SERPL-MCNC: 1.01 MG/DL — SIGNIFICANT CHANGE UP (ref 0.5–1.3)
EOSINOPHIL # BLD AUTO: 0.13 K/UL — SIGNIFICANT CHANGE UP (ref 0–0.5)
EOSINOPHIL NFR BLD AUTO: 2.2 % — SIGNIFICANT CHANGE UP (ref 0–6)
GLUCOSE UR-MCNC: NEGATIVE — SIGNIFICANT CHANGE UP
HCT VFR BLD CALC: 36.8 % — LOW (ref 39–50)
HGB BLD-MCNC: 11.8 G/DL — LOW (ref 13–17)
IMM GRANULOCYTES NFR BLD AUTO: 0.5 % — SIGNIFICANT CHANGE UP (ref 0–1.5)
KETONES UR-MCNC: NEGATIVE — SIGNIFICANT CHANGE UP
LEUKOCYTE ESTERASE UR-ACNC: NEGATIVE — SIGNIFICANT CHANGE UP
LYMPHOCYTES # BLD AUTO: 0.9 K/UL — LOW (ref 1–3.3)
LYMPHOCYTES # BLD AUTO: 14.9 % — SIGNIFICANT CHANGE UP (ref 13–44)
MCHC RBC-ENTMCNC: 30 PG — SIGNIFICANT CHANGE UP (ref 27–34)
MCHC RBC-ENTMCNC: 32.1 % — SIGNIFICANT CHANGE UP (ref 32–36)
MCV RBC AUTO: 93.6 FL — SIGNIFICANT CHANGE UP (ref 80–100)
MONOCYTES # BLD AUTO: 0.28 K/UL — SIGNIFICANT CHANGE UP (ref 0–0.9)
MONOCYTES NFR BLD AUTO: 4.6 % — SIGNIFICANT CHANGE UP (ref 2–14)
NEUTROPHILS # BLD AUTO: 4.67 K/UL — SIGNIFICANT CHANGE UP (ref 1.8–7.4)
NEUTROPHILS NFR BLD AUTO: 77.5 % — HIGH (ref 43–77)
NITRITE UR-MCNC: NEGATIVE — SIGNIFICANT CHANGE UP
NRBC # FLD: 0 K/UL — SIGNIFICANT CHANGE UP (ref 0–0)
PH UR: 6 — SIGNIFICANT CHANGE UP (ref 5–8)
PLATELET # BLD AUTO: 214 K/UL — SIGNIFICANT CHANGE UP (ref 150–400)
PMV BLD: 9.2 FL — SIGNIFICANT CHANGE UP (ref 7–13)
PROT UR-MCNC: NEGATIVE — SIGNIFICANT CHANGE UP
RBC # BLD: 3.93 M/UL — LOW (ref 4.2–5.8)
RBC # FLD: 12.9 % — SIGNIFICANT CHANGE UP (ref 10.3–14.5)
SP GR SPEC: 1.01 — SIGNIFICANT CHANGE UP (ref 1–1.04)
UROBILINOGEN FLD QL: NORMAL — SIGNIFICANT CHANGE UP
WBC # BLD: 6.03 K/UL — SIGNIFICANT CHANGE UP (ref 3.8–10.5)
WBC # FLD AUTO: 6.03 K/UL — SIGNIFICANT CHANGE UP (ref 3.8–10.5)

## 2020-12-01 PROCEDURE — 99232 SBSQ HOSP IP/OBS MODERATE 35: CPT

## 2020-12-01 RX ORDER — CLONAZEPAM 1 MG
1 TABLET ORAL AT BEDTIME
Refills: 0 | Status: DISCONTINUED | OUTPATIENT
Start: 2020-12-01 | End: 2020-12-02

## 2020-12-01 RX ORDER — METFORMIN HYDROCHLORIDE 850 MG/1
500 TABLET ORAL DAILY
Refills: 0 | Status: DISCONTINUED | OUTPATIENT
Start: 2020-12-01 | End: 2020-12-01

## 2020-12-01 RX ORDER — RAMELTEON 8 MG
8 TABLET ORAL AT BEDTIME
Refills: 0 | Status: DISCONTINUED | OUTPATIENT
Start: 2020-12-01 | End: 2020-12-05

## 2020-12-01 RX ORDER — METFORMIN HYDROCHLORIDE 850 MG/1
250 TABLET ORAL
Refills: 0 | Status: DISCONTINUED | OUTPATIENT
Start: 2020-12-01 | End: 2020-12-04

## 2020-12-01 RX ORDER — CLONAZEPAM 1 MG
0.75 TABLET ORAL DAILY
Refills: 0 | Status: DISCONTINUED | OUTPATIENT
Start: 2020-12-02 | End: 2020-12-02

## 2020-12-01 RX ADMIN — LISINOPRIL 10 MILLIGRAM(S): 2.5 TABLET ORAL at 08:54

## 2020-12-01 RX ADMIN — Medication 1 MILLIGRAM(S): at 08:54

## 2020-12-01 RX ADMIN — QUETIAPINE FUMARATE 50 MILLIGRAM(S): 200 TABLET, FILM COATED ORAL at 22:37

## 2020-12-01 RX ADMIN — SENNA PLUS 2 TABLET(S): 8.6 TABLET ORAL at 20:16

## 2020-12-01 RX ADMIN — QUETIAPINE FUMARATE 50 MILLIGRAM(S): 200 TABLET, FILM COATED ORAL at 14:29

## 2020-12-01 RX ADMIN — METFORMIN HYDROCHLORIDE 250 MILLIGRAM(S): 850 TABLET ORAL at 20:16

## 2020-12-01 RX ADMIN — Medication 8 MILLIGRAM(S): at 22:03

## 2020-12-01 RX ADMIN — NALTREXONE HYDROCHLORIDE 50 MILLIGRAM(S): 50 TABLET, FILM COATED ORAL at 08:54

## 2020-12-01 RX ADMIN — Medication 8 MILLIGRAM(S): at 00:05

## 2020-12-01 RX ADMIN — BUPROPION HYDROCHLORIDE 300 MILLIGRAM(S): 150 TABLET, EXTENDED RELEASE ORAL at 08:54

## 2020-12-01 RX ADMIN — Medication 1 APPLICATION(S): at 08:54

## 2020-12-01 RX ADMIN — TAMSULOSIN HYDROCHLORIDE 0.4 MILLIGRAM(S): 0.4 CAPSULE ORAL at 20:16

## 2020-12-01 RX ADMIN — Medication 1 MILLIGRAM(S): at 20:16

## 2020-12-01 RX ADMIN — CLOZAPINE 350 MILLIGRAM(S): 150 TABLET, ORALLY DISINTEGRATING ORAL at 20:16

## 2020-12-02 LAB
ALBUMIN SERPL ELPH-MCNC: 4.3 G/DL — SIGNIFICANT CHANGE UP (ref 3.3–5)
ALP SERPL-CCNC: 58 U/L — SIGNIFICANT CHANGE UP (ref 40–120)
ALT FLD-CCNC: 29 U/L — SIGNIFICANT CHANGE UP (ref 4–41)
ANION GAP SERPL CALC-SCNC: 13 MMO/L — SIGNIFICANT CHANGE UP (ref 7–14)
AST SERPL-CCNC: 20 U/L — SIGNIFICANT CHANGE UP (ref 4–40)
BILIRUB SERPL-MCNC: 0.3 MG/DL — SIGNIFICANT CHANGE UP (ref 0.2–1.2)
BUN SERPL-MCNC: 15 MG/DL — SIGNIFICANT CHANGE UP (ref 7–23)
CALCIUM SERPL-MCNC: 10.1 MG/DL — SIGNIFICANT CHANGE UP (ref 8.4–10.5)
CHLORIDE SERPL-SCNC: 102 MMOL/L — SIGNIFICANT CHANGE UP (ref 98–107)
CO2 SERPL-SCNC: 22 MMOL/L — SIGNIFICANT CHANGE UP (ref 22–31)
CREAT SERPL-MCNC: 1.07 MG/DL — SIGNIFICANT CHANGE UP (ref 0.5–1.3)
GLUCOSE SERPL-MCNC: 81 MG/DL — SIGNIFICANT CHANGE UP (ref 70–99)
MAGNESIUM SERPL-MCNC: 1.9 MG/DL — SIGNIFICANT CHANGE UP (ref 1.6–2.6)
POTASSIUM SERPL-MCNC: 4.1 MMOL/L — SIGNIFICANT CHANGE UP (ref 3.5–5.3)
POTASSIUM SERPL-SCNC: 4.1 MMOL/L — SIGNIFICANT CHANGE UP (ref 3.5–5.3)
PROT SERPL-MCNC: 7.1 G/DL — SIGNIFICANT CHANGE UP (ref 6–8.3)
SODIUM SERPL-SCNC: 137 MMOL/L — SIGNIFICANT CHANGE UP (ref 135–145)
TSH SERPL-MCNC: 2.11 UIU/ML — SIGNIFICANT CHANGE UP (ref 0.27–4.2)

## 2020-12-02 PROCEDURE — 99232 SBSQ HOSP IP/OBS MODERATE 35: CPT

## 2020-12-02 RX ORDER — CLONAZEPAM 1 MG
0.5 TABLET ORAL AT BEDTIME
Refills: 0 | Status: DISCONTINUED | OUTPATIENT
Start: 2020-12-02 | End: 2020-12-03

## 2020-12-02 RX ORDER — CLONAZEPAM 1 MG
0.25 TABLET ORAL DAILY
Refills: 0 | Status: DISCONTINUED | OUTPATIENT
Start: 2020-12-03 | End: 2020-12-03

## 2020-12-02 RX ORDER — BUPROPION HYDROCHLORIDE 150 MG/1
150 TABLET, EXTENDED RELEASE ORAL DAILY
Refills: 0 | Status: DISCONTINUED | OUTPATIENT
Start: 2020-12-02 | End: 2020-12-05

## 2020-12-02 RX ORDER — QUETIAPINE FUMARATE 200 MG/1
25 TABLET, FILM COATED ORAL EVERY 6 HOURS
Refills: 0 | Status: DISCONTINUED | OUTPATIENT
Start: 2020-12-02 | End: 2020-12-05

## 2020-12-02 RX ADMIN — CLOZAPINE 350 MILLIGRAM(S): 150 TABLET, ORALLY DISINTEGRATING ORAL at 22:51

## 2020-12-02 RX ADMIN — Medication 8 MILLIGRAM(S): at 22:52

## 2020-12-02 RX ADMIN — LISINOPRIL 10 MILLIGRAM(S): 2.5 TABLET ORAL at 08:49

## 2020-12-02 RX ADMIN — TAMSULOSIN HYDROCHLORIDE 0.4 MILLIGRAM(S): 0.4 CAPSULE ORAL at 22:52

## 2020-12-02 RX ADMIN — Medication 0.5 MILLIGRAM(S): at 22:51

## 2020-12-02 RX ADMIN — NALTREXONE HYDROCHLORIDE 50 MILLIGRAM(S): 50 TABLET, FILM COATED ORAL at 08:49

## 2020-12-02 RX ADMIN — Medication 0.75 MILLIGRAM(S): at 08:49

## 2020-12-02 RX ADMIN — Medication 50 MILLIGRAM(S): at 01:23

## 2020-12-02 RX ADMIN — BUPROPION HYDROCHLORIDE 300 MILLIGRAM(S): 150 TABLET, EXTENDED RELEASE ORAL at 08:49

## 2020-12-02 RX ADMIN — SENNA PLUS 2 TABLET(S): 8.6 TABLET ORAL at 22:52

## 2020-12-02 RX ADMIN — Medication 0.5 MILLIGRAM(S): at 01:23

## 2020-12-02 RX ADMIN — METFORMIN HYDROCHLORIDE 250 MILLIGRAM(S): 850 TABLET ORAL at 08:49

## 2020-12-02 RX ADMIN — METFORMIN HYDROCHLORIDE 250 MILLIGRAM(S): 850 TABLET ORAL at 22:51

## 2020-12-03 PROCEDURE — 99233 SBSQ HOSP IP/OBS HIGH 50: CPT

## 2020-12-03 RX ORDER — CLONAZEPAM 1 MG
0.25 TABLET ORAL AT BEDTIME
Refills: 0 | Status: DISCONTINUED | OUTPATIENT
Start: 2020-12-03 | End: 2020-12-04

## 2020-12-03 RX ORDER — HALOPERIDOL DECANOATE 100 MG/ML
2.5 INJECTION INTRAMUSCULAR ONCE
Refills: 0 | Status: COMPLETED | OUTPATIENT
Start: 2020-12-03 | End: 2020-12-03

## 2020-12-03 RX ORDER — HALOPERIDOL DECANOATE 100 MG/ML
2.5 INJECTION INTRAMUSCULAR ONCE
Refills: 0 | Status: DISCONTINUED | OUTPATIENT
Start: 2020-12-03 | End: 2020-12-05

## 2020-12-03 RX ORDER — CLOZAPINE 150 MG/1
275 TABLET, ORALLY DISINTEGRATING ORAL AT BEDTIME
Refills: 0 | Status: DISCONTINUED | OUTPATIENT
Start: 2020-12-03 | End: 2020-12-04

## 2020-12-03 RX ORDER — CLOZAPINE 150 MG/1
300 TABLET, ORALLY DISINTEGRATING ORAL AT BEDTIME
Refills: 0 | Status: DISCONTINUED | OUTPATIENT
Start: 2020-12-03 | End: 2020-12-03

## 2020-12-03 RX ADMIN — CLOZAPINE 275 MILLIGRAM(S): 150 TABLET, ORALLY DISINTEGRATING ORAL at 23:30

## 2020-12-03 RX ADMIN — METFORMIN HYDROCHLORIDE 250 MILLIGRAM(S): 850 TABLET ORAL at 23:30

## 2020-12-03 RX ADMIN — TAMSULOSIN HYDROCHLORIDE 0.4 MILLIGRAM(S): 0.4 CAPSULE ORAL at 23:30

## 2020-12-03 RX ADMIN — HALOPERIDOL DECANOATE 2.5 MILLIGRAM(S): 100 INJECTION INTRAMUSCULAR at 13:04

## 2020-12-03 RX ADMIN — NALTREXONE HYDROCHLORIDE 50 MILLIGRAM(S): 50 TABLET, FILM COATED ORAL at 08:23

## 2020-12-03 RX ADMIN — Medication 1 MILLIGRAM(S): at 23:30

## 2020-12-03 RX ADMIN — Medication 8 MILLIGRAM(S): at 23:30

## 2020-12-03 RX ADMIN — SENNA PLUS 2 TABLET(S): 8.6 TABLET ORAL at 23:30

## 2020-12-03 RX ADMIN — Medication 0.25 MILLIGRAM(S): at 08:23

## 2020-12-03 RX ADMIN — LISINOPRIL 10 MILLIGRAM(S): 2.5 TABLET ORAL at 08:23

## 2020-12-03 RX ADMIN — QUETIAPINE FUMARATE 25 MILLIGRAM(S): 200 TABLET, FILM COATED ORAL at 23:30

## 2020-12-03 RX ADMIN — QUETIAPINE FUMARATE 25 MILLIGRAM(S): 200 TABLET, FILM COATED ORAL at 00:07

## 2020-12-03 RX ADMIN — HALOPERIDOL DECANOATE 2.5 MILLIGRAM(S): 100 INJECTION INTRAMUSCULAR at 13:50

## 2020-12-03 RX ADMIN — METFORMIN HYDROCHLORIDE 250 MILLIGRAM(S): 850 TABLET ORAL at 08:23

## 2020-12-03 RX ADMIN — BUPROPION HYDROCHLORIDE 150 MILLIGRAM(S): 150 TABLET, EXTENDED RELEASE ORAL at 08:23

## 2020-12-03 NOTE — CHART NOTE - NSCHARTNOTEFT_GEN_A_CORE
Referring Physician: Dr. Reyes Deng  Source of information: The patient’s chart, Dr. Reyes Deng, Lawson Pena RN, Asmita Cyr RN  Reason for Consult: Evaluation of delirium-like symptoms    History of Present Illness:   The patient is a 55 year-old right-handed man with PPH of refractory MDD presenting with bizarre behavior concerning for delirium.    Patient is a poor historian and was not able to participate in the interview.    Per Dr. Reyes Deng, patient began to have changes in his behavior starting two weeks ago on 11/19/2020 and was noted to lie in his bed and rest more in the daytime. Starting last week, he began to sleep more during the day and stay up at night. He was noted to be disorganized in a fluctuating manner. For example, patient would be logical and then become tangential and illogical in a single conversation. He became difficult to understand to both English speakers and Sammarinese interpreters. He is from [facility] but has been noted to speak well in English at baseline. Patient has required PRN medications for bizarre behavior recently as well. Quetiapine, lorazepam, and haloperidol have been used. Overall, this change in behavior has been gradual rather than abrupt. Patient was started on clozapine during his admission out of concern that his high-lethality self-injurious behavior and/or suicide attempts were concerning for psychotic symptoms in addition to refractory MDD. Of note, the patient started clozapine on 11/18/2020, one day prior to the onset of the change in his mental status.    Per Lawson Pena RN and Asmita Cyr RN, patient has appeared paranoid. He tends to pace, check under his bed and furniture frequently, and check the outside of windows. He was noted to be disoriented to place, reporting to a nurse that he is in a school. He also has been seen picking at his arm skin. This behavior tended to be more so at night but since two days ago, this behavior has been constant throughout the day and more pronounced.     Sammarinese , ID # [id number], reported that she could not understand the patient over the phone.    Allergies: NKDA    Current Medications:  -	Bacitracin ointment 1 Application Topical two times a day  -	bupropion XL 150mg PO daily  -	clonazepam  tab 0.25 PO at bedtime  o	titrated down in the past week; dosing was consistently 2mg BID during 11/13-11/22  -	clozapine 300mg PO at bedtime  o	clozapine 25mg was started on 11/18/2020 and titrated up to current dose  -	lisinopril 10mg PO daily  -	metformin 250mg PO two times a day  -	naltrexone 50mg PO daily  -	ramelteon 8mg PO at bedtime  -	senna 2 tabs PO at bedtime  -	tamsulosin 0.4mg PO at bedtime  -	acetaminophen tab 975mg PO every 6 hours PRN pain  -	haloperidol injectable 2.5mg IM once PRN Agitation  -	lorazepam tab 1mg PO every 4 hours PRN Agitation  -	lorazepam injectable 1mg IM once PRN Severe agitation  -	quetiapine 25mg PO every 6 hours PRN Anxiety    Past Medical History:   Hepatitis  L knee pain    Past Surgical History:   Repair for self-inflicted with neck wound with muscle flap and tracheostomy, which was reversed    Past Psychiatric History:   History of severe treatment-resistant MDD since 2005 with previous serious suicide attempt in November 2019 (barbiturate OD requiring ICU admission and ECMO), and suicide attempt by stabbing himself in the neck in late October 2020, prompting this admission. History of ECT treatment. Multiple prior psychiatric admissions. Has tried valerian root, Ambien, trazodone, Seroquel, Klonopin, venlafaxine. Followed by outpatient psychiatrist Dr. Alvarez.   Family History: Unable to assess due to patient’s limited communication proficiency. Attempted to contact patient’s wife Bg Barth [phone] for collateral information, but she was no available.    Social History:   Patient moved from [facility] to the  approximately 30 years ago. He worked as a  at a school prior to admission. He is  and lives with his wife and 28 year old daughter. Extensive alcohol use history. Denies history of smoking or nicotine use.    ROS: Unable to assess due to patient’s limited communication.    Physical Exam:  VS: 98.3°F, HR 72 with regular rhythm, BP 93/67, RR 17, SpO2 98% on RA  Gen: Well-developed, well-nourished man, alert, and in mild distress.  HEENT: Normocephalic, atraumatic.  Neck: Supple with full range of motion, no thyromegaly. No carotid bruits.  CV: Regular rate & rhythm. Normal S1/S2 present, no extra heart sounds, no murmurs, rubs, or gallops.  Pulmonary: Clear to auscultation bilaterally with good inspiratory effort.   Abdomen: Normoactive bowel sounds present. Soft, nontender, and nondistended.   Back: No spinal deviation noted. No spinous or costovertebral angle tenderness.  Ext: No edema of distal upper or lower extremities, no clubbing or cyanosis of digits. Radial pulses 2+ and DP pulses 2+ bilaterally.     Neurological Exam:  MSE:  Patient was alert. Patient was calm, cooperative, and maintained good eye contact. Speech was spontaneous and fluent with normal rate and volume though largely unintelligible to both English and Sammarinese speakers. Patient was noted to use intelligible Sammarinese and English words intermittently.    MMSE: 3/30. Testing was largely limited due to poor communication and attentional deficits. Patient was noted to name two objects (glasses and keys) in English. On three-stage command, he took the piece of paper in his hand.    CN II – PERRL. Peripheral fields intact bilaterally to hand motion, and blinks to threat bilaterally.  CN III, IV, VI – EOMI, without nystagmus.  CN V – V1-V3 intact to LT. Corneal blink reflexes intact to air puff bilaterally.  CN VII – No facial asymmetry; able to smile, raise eyebrows, close eyes against resistance, puff cheeks against resistance symmetrically and bilaterally.  CN VIII – Hearing intact bilaterally to finger rub.  CN IX, X – Palate elevates symmetrically bilaterally; uvula is midline.  CN XI – Shoulder shrug and head turn intact bilaterally, symmetric with good power.  CN XII – Tongue midline, no deviation.     Motor: Normal bulk and tone. No pronator drift bilaterally. 5/5 power in all muscle groups in the bilateral upper and lower extremities including distal and proximal groups. No cogwheel rigidity. No spasticity. No bradykinesia. No resting tremor present. Rapid, moderate amplitude postural and action tremor present. No asterixis present.    Sensory:  Temperature, pinprick, light touch, and vibration sense intact in upper extremities bilaterally.     Reflexes:   	B-radialis	Biceps	Triceps	Patellar	Ankle	Plantar  Right	2+	2+	2+	2+	2+	Flexor  Left	2+	2+	2+	2+	2+	Flexor    Coordination: Finger-nose-finger intact bilaterally with no dysmetria, though end-point tremor present.  Heel-to-shin intact bilaterally. Patient was unable to participate in rapid alternating movements and finger/toe tapping due to difficulty following instructions.    Gait: Unassisted regular gait with narrow base, smooth stride, and normal arm swing. He can stand on heels and toes.    Labs:   Complete Blood Count 12/1/2020  -	WBC Count: 6.03 K/uL   -	RBC Count: 3.93 M/uL   -	Hemoglobin: 11.8 g/dL   -	Hematocrit: 36.8 %   -	Mean Cell Volume: 93.6 fL   -	Mean Cell Hemoglobin: 30.0 pg   -	Mean Cell Hemoglobin Conc: 32.1 %   -	Red Cell Distrib Width: 12.9 %   -	Platelet Count - Automated: 214 K/uL     Comprehensive Metabolic w/Magnesium 12/2/2020   -	Sodium, Serum: 137 mmol/L   -	Potassium, Serum: 4.1 mmol/L   -	Chloride, Serum: 102 mmol/L   -	Carbon Dioxide, Serum: 22 mmol/L   -	Anion Gap, Serum: 13 mmo/L   -	Blood Urea Nitrogen, Serum: 15 mg/dL   -	Creatinine, Serum: 1.07 mg/dL   -	Glucose, Serum: 81 mg/dL   -	Calcium, Total Serum: 10.1 mg/dL   -	Protein Total, Serum: 7.1 g/dL   -	Albumin, Serum: 4.3 g/dL   -	Bilirubin Total, Serum: 0.3 mg/dL   -	Alkaline Phosphatase, Serum: 58 u/L   -	Aspartate Aminotransferase (AST/SGOT): 20 u/L   -	Alanine Aminotransferase (ALT/SGPT): 29 u/L   -	Magnesium, Serum: 1.9 mg/dL   Thyroid Stimulating Hormone 12/2/2020: 2.11 uIU/mL  Urinalysis (12.01.20 @ 16:00)   -	Color: COLORLESS   -	Urine Appearance: CLEAR   -	Glucose: NEGATIVE   -	Bilirubin: NEGATIVE   -	Ketone - Urine: NEGATIVE   -	Specific Gravity: 1.007   -	Blood: NEGATIVE   -	pH - Urine: 6.0   -	Protein, Urine: NEGATIVE   -	Urobilinogen: NORMAL   -	Nitrite: NEGATIVE   -	Leukocyte Esterase Concentration: NEGATIVE     Imaging:     EXAM:  CT BRAIN (Images were reviewed on [facility])  PROCEDURE DATE:  10/27/2019    INTERPRETATION:    Clinical Indication: Altered mental status  Comparison: None.  Technique: Noncontrast axial CT images of the head it were acquired.  Findings: Evaluation is limited due to severe motion artifact. No intracranial hemorrhage is seen. Gray-white differentiation is maintained. Ventricular and sulcal size are age-appropriate. No mass effect or midline shift is seen. The paranasal sinuses and visualized mastoid air cells are clear. No osseous abnormality is seen.  Impression: No gross intracranial abnormality.    Assessment:   The patient is a 55 year-old right-handed man with PPH of refractory MDD presenting with bizarre behavior concerning for delirium. On exam, there are no evident focal neurologic deficits. He does not appear to be aphasic as he has fluent speech with intermittently comprehensible words in English and Sammarinese and intact naming ability on MMSE. He appears to have attentional deficits that hinder cognitive assessment, consistent with delirium. He has a rapid postural/kinetic tremor that may be a side effect of bupropion. Although his clonazepam dose was reduced over the past week, benzodiazepine withdrawal appears less likely, since the patient is not tachycardic on exam and the dose reduction was initiated one week after the onset of behavioral changes. It is notable that the patient was started on clozapine on 11/18/2020, the day prior to reported behavioral changes. Clozapine has been associated with delirium in up to 10% of patients, and risk factors include older age. (Centorrino F, Garry MJ, John-Heriberto G, et al: Delirium during clozapine treatment: incidence and associated risk factors. Pharmacopsychiatry 2003; 36:156–160.) This patient in on multiple psychotropic agents and may be predisposed to delirium due to his history of extensive alcohol use and prior severe OD on barbiturates. Hyperammonemia should be ruled out as the patient has a history of hepatitis and alcohol abuse. Hypoglycemia should also be ruled out as there are no POC glucose measurements that have been performed while patient was actively symptomatic.    Recommendations:  1. Consider checking POC glucose levels.  2. Consider checking ammonia level.  3. Consider reducing or discontinuing clozapine if feasible.  4. Start thiamine supplementation.    Dinesh Woodson M.D.  [phone]      I performed a history and physical examination of the patient, and discussed the management with Dr. Dinesh Woodson. I reviewed her note and agree with the documented findings and plan of care. Findings and recommendations discussed with Dr. Reyes Deng.    Brayan Munoz M.D.			  [phone]  [facility] License # [id number]  NPI # [id number]

## 2020-12-04 ENCOUNTER — INPATIENT (INPATIENT)
Facility: HOSPITAL | Age: 55
LOS: 32 days | Discharge: PSYCHIATRIC OR SUBSTANCE USE FACILITY | End: 2021-01-06
Attending: HOSPITALIST | Admitting: HOSPITALIST
Payer: COMMERCIAL

## 2020-12-04 VITALS — HEART RATE: 113 BPM | SYSTOLIC BLOOD PRESSURE: 123 MMHG | DIASTOLIC BLOOD PRESSURE: 74 MMHG | OXYGEN SATURATION: 100 %

## 2020-12-04 VITALS
HEIGHT: 60.9 IN | HEART RATE: 100 BPM | RESPIRATION RATE: 18 BRPM | DIASTOLIC BLOOD PRESSURE: 65 MMHG | SYSTOLIC BLOOD PRESSURE: 101 MMHG | OXYGEN SATURATION: 98 %

## 2020-12-04 LAB
ALBUMIN SERPL ELPH-MCNC: 4.4 G/DL — SIGNIFICANT CHANGE UP (ref 3.3–5)
ALBUMIN SERPL ELPH-MCNC: 4.5 G/DL — SIGNIFICANT CHANGE UP (ref 3.3–5)
ALP SERPL-CCNC: 53 U/L — SIGNIFICANT CHANGE UP (ref 40–120)
ALP SERPL-CCNC: 55 U/L — SIGNIFICANT CHANGE UP (ref 40–120)
ALT FLD-CCNC: 37 U/L — SIGNIFICANT CHANGE UP (ref 4–41)
ALT FLD-CCNC: 39 U/L — SIGNIFICANT CHANGE UP (ref 4–41)
AMMONIA BLD-MCNC: 30 UMOL/L — SIGNIFICANT CHANGE UP (ref 11–55)
ANION GAP SERPL CALC-SCNC: 14 MMO/L — SIGNIFICANT CHANGE UP (ref 7–14)
ANION GAP SERPL CALC-SCNC: 15 MMO/L — HIGH (ref 7–14)
APAP SERPL-MCNC: < 15 UG/ML — LOW (ref 15–25)
AST SERPL-CCNC: 58 U/L — HIGH (ref 4–40)
AST SERPL-CCNC: 72 U/L — HIGH (ref 4–40)
BASE EXCESS BLDV CALC-SCNC: -3.8 MMOL/L — SIGNIFICANT CHANGE UP
BASOPHILS # BLD AUTO: 0.01 K/UL — SIGNIFICANT CHANGE UP (ref 0–0.2)
BASOPHILS # BLD AUTO: 0.02 K/UL — SIGNIFICANT CHANGE UP (ref 0–0.2)
BASOPHILS NFR BLD AUTO: 0.1 % — SIGNIFICANT CHANGE UP (ref 0–2)
BASOPHILS NFR BLD AUTO: 0.2 % — SIGNIFICANT CHANGE UP (ref 0–2)
BILIRUB SERPL-MCNC: 0.4 MG/DL — SIGNIFICANT CHANGE UP (ref 0.2–1.2)
BILIRUB SERPL-MCNC: 0.5 MG/DL — SIGNIFICANT CHANGE UP (ref 0.2–1.2)
BLOOD GAS VENOUS - CREATININE: 2.2 MG/DL — HIGH (ref 0.5–1.3)
BLOOD GAS VENOUS - FIO2: 21 — SIGNIFICANT CHANGE UP
BUN SERPL-MCNC: 35 MG/DL — HIGH (ref 7–23)
BUN SERPL-MCNC: 35 MG/DL — HIGH (ref 7–23)
CALCIUM SERPL-MCNC: 9.6 MG/DL — SIGNIFICANT CHANGE UP (ref 8.4–10.5)
CALCIUM SERPL-MCNC: 9.9 MG/DL — SIGNIFICANT CHANGE UP (ref 8.4–10.5)
CHLORIDE BLDV-SCNC: 113 MMOL/L — HIGH (ref 96–108)
CHLORIDE SERPL-SCNC: 105 MMOL/L — SIGNIFICANT CHANGE UP (ref 98–107)
CHLORIDE SERPL-SCNC: 107 MMOL/L — SIGNIFICANT CHANGE UP (ref 98–107)
CK SERPL-CCNC: 3059 U/L — HIGH (ref 30–200)
CO2 SERPL-SCNC: 19 MMOL/L — LOW (ref 22–31)
CO2 SERPL-SCNC: 20 MMOL/L — LOW (ref 22–31)
CREAT SERPL-MCNC: 2.33 MG/DL — HIGH (ref 0.5–1.3)
CREAT SERPL-MCNC: 2.58 MG/DL — HIGH (ref 0.5–1.3)
EOSINOPHIL # BLD AUTO: 0.12 K/UL — SIGNIFICANT CHANGE UP (ref 0–0.5)
EOSINOPHIL # BLD AUTO: 0.15 K/UL — SIGNIFICANT CHANGE UP (ref 0–0.5)
EOSINOPHIL NFR BLD AUTO: 1.4 % — SIGNIFICANT CHANGE UP (ref 0–6)
EOSINOPHIL NFR BLD AUTO: 1.9 % — SIGNIFICANT CHANGE UP (ref 0–6)
ETHANOL BLD-MCNC: < 10 MG/DL — SIGNIFICANT CHANGE UP
GAS PNL BLDV: 139 MMOL/L — SIGNIFICANT CHANGE UP (ref 136–146)
GLUCOSE BLDC GLUCOMTR-MCNC: 115 MG/DL — HIGH (ref 70–99)
GLUCOSE BLDV-MCNC: 101 MG/DL — HIGH (ref 70–99)
GLUCOSE SERPL-MCNC: 111 MG/DL — HIGH (ref 70–99)
GLUCOSE SERPL-MCNC: 123 MG/DL — HIGH (ref 70–99)
HCO3 BLDV-SCNC: 20 MMOL/L — SIGNIFICANT CHANGE UP (ref 20–27)
HCT VFR BLD CALC: 33.2 % — LOW (ref 39–50)
HCT VFR BLD CALC: 34.4 % — LOW (ref 39–50)
HCT VFR BLDV CALC: 32.9 % — LOW (ref 39–51)
HGB BLD-MCNC: 10.7 G/DL — LOW (ref 13–17)
HGB BLD-MCNC: 10.9 G/DL — LOW (ref 13–17)
HGB BLDV-MCNC: 10.7 G/DL — LOW (ref 13–17)
IMM GRANULOCYTES NFR BLD AUTO: 0.2 % — SIGNIFICANT CHANGE UP (ref 0–1.5)
IMM GRANULOCYTES NFR BLD AUTO: 0.6 % — SIGNIFICANT CHANGE UP (ref 0–1.5)
LACTATE BLDV-MCNC: 1.4 MMOL/L — SIGNIFICANT CHANGE UP (ref 0.5–2)
LYMPHOCYTES # BLD AUTO: 0.81 K/UL — LOW (ref 1–3.3)
LYMPHOCYTES # BLD AUTO: 1.04 K/UL — SIGNIFICANT CHANGE UP (ref 1–3.3)
LYMPHOCYTES # BLD AUTO: 13.4 % — SIGNIFICANT CHANGE UP (ref 13–44)
LYMPHOCYTES # BLD AUTO: 9.5 % — LOW (ref 13–44)
MAGNESIUM SERPL-MCNC: 1.9 MG/DL — SIGNIFICANT CHANGE UP (ref 1.6–2.6)
MCHC RBC-ENTMCNC: 29.5 PG — SIGNIFICANT CHANGE UP (ref 27–34)
MCHC RBC-ENTMCNC: 30.1 PG — SIGNIFICANT CHANGE UP (ref 27–34)
MCHC RBC-ENTMCNC: 31.7 % — LOW (ref 32–36)
MCHC RBC-ENTMCNC: 32.2 % — SIGNIFICANT CHANGE UP (ref 32–36)
MCV RBC AUTO: 93.2 FL — SIGNIFICANT CHANGE UP (ref 80–100)
MCV RBC AUTO: 93.3 FL — SIGNIFICANT CHANGE UP (ref 80–100)
MONOCYTES # BLD AUTO: 0.66 K/UL — SIGNIFICANT CHANGE UP (ref 0–0.9)
MONOCYTES # BLD AUTO: 0.71 K/UL — SIGNIFICANT CHANGE UP (ref 0–0.9)
MONOCYTES NFR BLD AUTO: 8.3 % — SIGNIFICANT CHANGE UP (ref 2–14)
MONOCYTES NFR BLD AUTO: 8.5 % — SIGNIFICANT CHANGE UP (ref 2–14)
NEUTROPHILS # BLD AUTO: 5.85 K/UL — SIGNIFICANT CHANGE UP (ref 1.8–7.4)
NEUTROPHILS # BLD AUTO: 6.87 K/UL — SIGNIFICANT CHANGE UP (ref 1.8–7.4)
NEUTROPHILS NFR BLD AUTO: 75.5 % — SIGNIFICANT CHANGE UP (ref 43–77)
NEUTROPHILS NFR BLD AUTO: 80.4 % — HIGH (ref 43–77)
NRBC # FLD: 0 K/UL — SIGNIFICANT CHANGE UP (ref 0–0)
NRBC # FLD: 0 K/UL — SIGNIFICANT CHANGE UP (ref 0–0)
PCO2 BLDV: 43 MMHG — SIGNIFICANT CHANGE UP (ref 41–51)
PH BLDV: 7.32 PH — SIGNIFICANT CHANGE UP (ref 7.32–7.43)
PHOSPHATE SERPL-MCNC: 4.1 MG/DL — SIGNIFICANT CHANGE UP (ref 2.5–4.5)
PLATELET # BLD AUTO: 221 K/UL — SIGNIFICANT CHANGE UP (ref 150–400)
PLATELET # BLD AUTO: 252 K/UL — SIGNIFICANT CHANGE UP (ref 150–400)
PMV BLD: 9.4 FL — SIGNIFICANT CHANGE UP (ref 7–13)
PMV BLD: 9.5 FL — SIGNIFICANT CHANGE UP (ref 7–13)
PO2 BLDV: 27 MMHG — LOW (ref 35–40)
POTASSIUM BLDV-SCNC: 4 MMOL/L — SIGNIFICANT CHANGE UP (ref 3.4–4.5)
POTASSIUM SERPL-MCNC: 4.4 MMOL/L — SIGNIFICANT CHANGE UP (ref 3.5–5.3)
POTASSIUM SERPL-MCNC: 4.5 MMOL/L — SIGNIFICANT CHANGE UP (ref 3.5–5.3)
POTASSIUM SERPL-SCNC: 4.4 MMOL/L — SIGNIFICANT CHANGE UP (ref 3.5–5.3)
POTASSIUM SERPL-SCNC: 4.5 MMOL/L — SIGNIFICANT CHANGE UP (ref 3.5–5.3)
PROT SERPL-MCNC: 7.2 G/DL — SIGNIFICANT CHANGE UP (ref 6–8.3)
PROT SERPL-MCNC: 7.3 G/DL — SIGNIFICANT CHANGE UP (ref 6–8.3)
RBC # BLD: 3.56 M/UL — LOW (ref 4.2–5.8)
RBC # BLD: 3.69 M/UL — LOW (ref 4.2–5.8)
RBC # FLD: 13.1 % — SIGNIFICANT CHANGE UP (ref 10.3–14.5)
RBC # FLD: 13.2 % — SIGNIFICANT CHANGE UP (ref 10.3–14.5)
SALICYLATES SERPL-MCNC: < 5 MG/DL — LOW (ref 15–30)
SAO2 % BLDV: 36.7 % — LOW (ref 60–85)
SODIUM SERPL-SCNC: 139 MMOL/L — SIGNIFICANT CHANGE UP (ref 135–145)
SODIUM SERPL-SCNC: 141 MMOL/L — SIGNIFICANT CHANGE UP (ref 135–145)
TSH SERPL-MCNC: 1.44 UIU/ML — SIGNIFICANT CHANGE UP (ref 0.27–4.2)
WBC # BLD: 7.76 K/UL — SIGNIFICANT CHANGE UP (ref 3.8–10.5)
WBC # BLD: 8.55 K/UL — SIGNIFICANT CHANGE UP (ref 3.8–10.5)
WBC # FLD AUTO: 7.76 K/UL — SIGNIFICANT CHANGE UP (ref 3.8–10.5)
WBC # FLD AUTO: 8.55 K/UL — SIGNIFICANT CHANGE UP (ref 3.8–10.5)

## 2020-12-04 PROCEDURE — 70450 CT HEAD/BRAIN W/O DYE: CPT | Mod: 26

## 2020-12-04 PROCEDURE — 99233 SBSQ HOSP IP/OBS HIGH 50: CPT

## 2020-12-04 PROCEDURE — 99291 CRITICAL CARE FIRST HOUR: CPT

## 2020-12-04 PROCEDURE — 71045 X-RAY EXAM CHEST 1 VIEW: CPT | Mod: 26

## 2020-12-04 RX ORDER — HALOPERIDOL DECANOATE 100 MG/ML
2.5 INJECTION INTRAMUSCULAR ONCE
Refills: 0 | Status: COMPLETED | OUTPATIENT
Start: 2020-12-04 | End: 2020-12-04

## 2020-12-04 RX ORDER — SODIUM CHLORIDE 9 MG/ML
1000 INJECTION INTRAMUSCULAR; INTRAVENOUS; SUBCUTANEOUS ONCE
Refills: 0 | Status: COMPLETED | OUTPATIENT
Start: 2020-12-04 | End: 2020-12-04

## 2020-12-04 RX ORDER — CLOZAPINE 150 MG/1
200 TABLET, ORALLY DISINTEGRATING ORAL AT BEDTIME
Refills: 0 | Status: DISCONTINUED | OUTPATIENT
Start: 2020-12-04 | End: 2020-12-04

## 2020-12-04 RX ORDER — THIAMINE MONONITRATE (VIT B1) 100 MG
100 TABLET ORAL DAILY
Refills: 0 | Status: DISCONTINUED | OUTPATIENT
Start: 2020-12-04 | End: 2020-12-05

## 2020-12-04 RX ORDER — CHLORPROMAZINE HCL 10 MG
50 TABLET ORAL ONCE
Refills: 0 | Status: DISCONTINUED | OUTPATIENT
Start: 2020-12-04 | End: 2020-12-04

## 2020-12-04 RX ORDER — CHLORPROMAZINE HCL 10 MG
50 TABLET ORAL ONCE
Refills: 0 | Status: COMPLETED | OUTPATIENT
Start: 2020-12-04 | End: 2020-12-04

## 2020-12-04 RX ADMIN — QUETIAPINE FUMARATE 25 MILLIGRAM(S): 200 TABLET, FILM COATED ORAL at 07:50

## 2020-12-04 RX ADMIN — NALTREXONE HYDROCHLORIDE 50 MILLIGRAM(S): 50 TABLET, FILM COATED ORAL at 07:49

## 2020-12-04 RX ADMIN — SODIUM CHLORIDE 1000 MILLILITER(S): 9 INJECTION INTRAMUSCULAR; INTRAVENOUS; SUBCUTANEOUS at 19:30

## 2020-12-04 RX ADMIN — HALOPERIDOL DECANOATE 2.5 MILLIGRAM(S): 100 INJECTION INTRAMUSCULAR at 20:10

## 2020-12-04 RX ADMIN — METFORMIN HYDROCHLORIDE 250 MILLIGRAM(S): 850 TABLET ORAL at 07:49

## 2020-12-04 RX ADMIN — SODIUM CHLORIDE 1000 MILLILITER(S): 9 INJECTION INTRAMUSCULAR; INTRAVENOUS; SUBCUTANEOUS at 23:25

## 2020-12-04 RX ADMIN — Medication 1 MILLIGRAM(S): at 22:10

## 2020-12-04 RX ADMIN — SODIUM CHLORIDE 1000 MILLILITER(S): 9 INJECTION INTRAMUSCULAR; INTRAVENOUS; SUBCUTANEOUS at 21:34

## 2020-12-04 RX ADMIN — BUPROPION HYDROCHLORIDE 150 MILLIGRAM(S): 150 TABLET, EXTENDED RELEASE ORAL at 07:49

## 2020-12-04 RX ADMIN — Medication 1 MILLIGRAM(S): at 07:49

## 2020-12-04 RX ADMIN — Medication 100 MILLIGRAM(S): at 10:49

## 2020-12-04 RX ADMIN — SODIUM CHLORIDE 1000 MILLILITER(S): 9 INJECTION INTRAMUSCULAR; INTRAVENOUS; SUBCUTANEOUS at 18:19

## 2020-12-04 RX ADMIN — HALOPERIDOL DECANOATE 2.5 MILLIGRAM(S): 100 INJECTION INTRAMUSCULAR at 21:34

## 2020-12-04 RX ADMIN — HALOPERIDOL DECANOATE 2.5 MILLIGRAM(S): 100 INJECTION INTRAMUSCULAR at 19:51

## 2020-12-04 RX ADMIN — HALOPERIDOL DECANOATE 2.5 MILLIGRAM(S): 100 INJECTION INTRAMUSCULAR at 22:10

## 2020-12-04 RX ADMIN — Medication 50 MILLIGRAM(S): at 23:05

## 2020-12-04 RX ADMIN — LISINOPRIL 10 MILLIGRAM(S): 2.5 TABLET ORAL at 07:49

## 2020-12-04 NOTE — ED ADULT NURSE NOTE - NSIMPLEMENTINTERV_GEN_ALL_ED
Implemented All Fall Risk Interventions:  [place] to call system. Call bell, personal items and telephone within reach. Instruct patient to call for assistance. Room bathroom lighting operational. Non-slip footwear when patient is off stretcher. Physically safe environment: no spills, clutter or unnecessary equipment. Stretcher in lowest position, wheels locked, appropriate side rails in place. Provide visual cue, wrist band, yellow gown, etc. Monitor gait and stability. Monitor for mental status changes and reorient to person, place, and time. Review medications for side effects contributing to fall risk. Reinforce activity limits and safety measures with patient and family.

## 2020-12-04 NOTE — ED PROVIDER NOTE - ATTENDING CONTRIBUTION TO CARE
Attending note:   After face to face evaluation of this patient, I concur with above noted hx, pe, and care plan for this patient.  Wong: 55 yom with MDD and hx of alcohol abuse sent from [facility] of waxing and waning mental status. Pt initially noted sxs at night but now becoming more consistent. Pt. unable to give further history. Pt is responsive to painful stimuli and follows some commands. PERRL, EOMI, clear lungs, RRR, abdomen soft and non tender, afebrile rectally, mildly stiff in upper ext, but not in lower extremities. skin normal. Pt has change in mental status for unknown cause, possibly meds, electrolyte disturbance, CT head for intracranial cause, fluids for JESSIE and reassess, likely admit. Unlikely NMS.

## 2020-12-04 NOTE — CHART NOTE - NSCHARTNOTEFT_GEN_A_CORE
[facility] Inpatient to ED Transfer Summary    Reason for Transfer/Medical Summary:      PAST MEDICAL & SURGICAL HISTORY:  Depression    Hepatitis  &quot;as a child&quot; unsure of type    Left knee pain    No significant past surgical history        Allergies    No Known Allergies    Intolerances        MEDICATIONS  (STANDING):  buPROPion  milliGRAM(s) Oral daily  lisinopril 10 milliGRAM(s) Oral daily  metFORMIN 250 milliGRAM(s) Oral two times a day  naltrexone 50 milliGRAM(s) Oral daily  ramelteon 8 milliGRAM(s) Oral at bedtime  senna 2 Tablet(s) Oral at bedtime  tamsulosin 0.4 milliGRAM(s) Oral at bedtime  thiamine 100 milliGRAM(s) Oral daily    MEDICATIONS  (PRN):  acetaminophen   Tablet .. 975 milliGRAM(s) Oral every 6 hours PRN pain  haloperidol    Injectable 2.5 milliGRAM(s) IntraMuscular once PRN Agitation  haloperidol    Injectable 2.5 milliGRAM(s) IntraMuscular once PRN Agitation  LORazepam     Tablet 0.5 milliGRAM(s) Oral at bedtime PRN Insomnia  LORazepam     Tablet 1 milliGRAM(s) Oral every 4 hours PRN Agitation  LORazepam   Injectable 1 milliGRAM(s) IntraMuscular once PRN Severe agitation  QUEtiapine 25 milliGRAM(s) Oral every 6 hours PRN Anxiety      Vital Signs Last 24 Hrs  T(C): 37.2 (04 Dec 2020 07:36), Max: 37.2 (04 Dec 2020 07:36)  T(F): 98.9 (04 Dec 2020 07:36), Max: 98.9 (04 Dec 2020 07:36)  HR: 113 (04 Dec 2020 14:45) (112 - 116)  BP: 123/74 (04 Dec 2020 14:45) (101/45 - 127/75)  BP(mean): --  RR: 16 (03 Dec 2020 19:14) (16 - 16)  SpO2: 100% (04 Dec 2020 14:45) (100% - 100%)  CAPILLARY BLOOD GLUCOSE            PHYSICAL EXAM:  GENERAL: NAD, well-developed  HEAD:  Atraumatic, Normocephalic  EYES: EOMI, PERRLA, conjunctiva and sclera clear  NECK: Supple, No JVD  CHEST/LUNG: Clear to auscultation bilaterally; No wheeze  HEART: Regular rate and rhythm; No murmurs, rubs, or gallops  ABDOMEN: Soft, Nontender, Nondistended; Bowel sounds present  EXTREMITIES:  2+ Peripheral Pulses, No clubbing, cyanosis, or edema  PSYCH: AAOx3  NEUROLOGY: non-focal  SKIN: No rashes or lesions    LABS:                        10.9   8.55  )-----------( 252      ( 04 Dec 2020 14:54 )             34.4     12-04    139  |  105  |  35<H>  ----------------------------<  123<H>  4.5   |  19<L>  |  2.58<H>    Ca    9.9      04 Dec 2020 14:54  Phos  4.1     12-04  Mg     1.9     12-04    TPro  7.3  /  Alb  4.5  /  TBili  0.4  /  DBili  x   /  AST  58<H>  /  ALT  37  /  AlkPhos  55  12-04              Psychiatry Section:  Psychiatric Summary/[facility] admitting diagnosis:    Psychiatric Recommendations:    Observation status (check one):   ( ) Constant Observation  ( ) Enhanced care  ( ) Routine checks    Risk Status (check all that apply if present):  ( ) at risk for suicide/self-injury  ( ) at risk for aggressive behavior  ( ) at risk for elopement  ( ) other risk: [facility] Inpatient to ED Transfer Summary    Reason for Transfer/Medical Summary:   The patient is a 56yo male, primarily Ukranian speaking, , domiciled with wife, PPHx of severe treatment-resistant MDD with previous serious suicide attempt Nov 2019 (barbiturate OD requiring ICU admission and ECMO), history of ECT treatment, multiple prior inpatient psych admissions, history of alcohol use, admitted after suicide attempt by multiple self-inflicted slash wounds to neck and abdomen and stab wound to chest requiring tracheotomy.  Pt. has been experiencing AMS for approximately 1 week. The first episode occurred at night while receiving Ambien. The patient engaged in bizarre and disorganized behavior such as crawling on the floor, searching through garbage cans and walking into other patient's rooms. Ambien was discontinued, benzodiazepines tapered, but the episodes persisted and the patient is now waxing and weaning through out the day, requiring enhanced supervision. Neuro was consulted who recommended that clozapine be discontinued and to start thiamine given his Hx of ETOH abuse. CMP and CBC w/diff on 11/2 WNL. Ammonia level, creatinine, U/A all WNL. CBC w/diff, CMP w/ Mg &phos, stool culture and ova/parasite ordered today after 1, large watery BM.  CMP revealed elevated BUN and creatinine, prompting transfer to ED. The pt. has remained afebrile, no SOB or chest pain noted. Negative for covid-19 upon admission, no recent travel.     PAST MEDICAL & SURGICAL HISTORY:  MDD  Recent tracheotomy at [facility], tracheostomy closed.  Lacerations to B/L neck requiring staples; staples removed  Lacerations to abdomen  Hepatitis; unsure of type  Left knee pain  PSHx: tracheotomy         Allergies    No Known Allergies    Intolerances        MEDICATIONS  (STANDING):  buPROPion  milliGRAM(s) Oral daily  lisinopril 10 milliGRAM(s) Oral daily  metFORMIN 250 milliGRAM(s) Oral two times a day  naltrexone 50 milliGRAM(s) Oral daily  ramelteon 8 milliGRAM(s) Oral at bedtime  senna 2 Tablet(s) Oral at bedtime  tamsulosin 0.4 milliGRAM(s) Oral at bedtime  thiamine 100 milliGRAM(s) Oral daily    MEDICATIONS  (PRN):  acetaminophen   Tablet .. 975 milliGRAM(s) Oral every 6 hours PRN pain  haloperidol    Injectable 2.5 milliGRAM(s) IntraMuscular once PRN Agitation  haloperidol    Injectable 2.5 milliGRAM(s) IntraMuscular once PRN Agitation  LORazepam     Tablet 0.5 milliGRAM(s) Oral at bedtime PRN Insomnia  LORazepam     Tablet 1 milliGRAM(s) Oral every 4 hours PRN Agitation  LORazepam   Injectable 1 milliGRAM(s) IntraMuscular once PRN Severe agitation  QUEtiapine 25 milliGRAM(s) Oral every 6 hours PRN Anxiety      Vital Signs Last 24 Hrs  T(C): 37.2 (04 Dec 2020 07:36), Max: 37.2 (04 Dec 2020 07:36)  T(F): 98.9 (04 Dec 2020 07:36), Max: 98.9 (04 Dec 2020 07:36)  HR: 113 (04 Dec 2020 14:45) (112 - 116)  BP: 123/74 (04 Dec 2020 14:45) (101/45 - 127/75)  BP(mean): --  RR: 16 (03 Dec 2020 19:14) (16 - 16)  SpO2: 100% (04 Dec 2020 14:45) (100% - 100%)  CAPILLARY BLOOD GLUCOSE            PHYSICAL EXAM:  GENERAL: NAD, well-developed  HEAD:  Atraumatic, Normocephalic,  EYES: EOMI, PERRLA, conjunctiva and sclera clear  NECK: Supple, No JVD  CHEST/LUNG: Clear to auscultation bilaterally; No wheeze  HEART: Regular rate and rhythm; No murmurs, rubs, or gallops  ABDOMEN: Soft, Nontender, obese; Bowel sounds present  EXTREMITIES:  2+ Peripheral Pulses, No clubbing, cyanosis, or edema  PSYCH: A/Ox1; disoriented to place, time and situation  NEUROLOGY: non-focal  SKIN: Healed lacerations to B/L neck and abdomen. Tracheostomy    LABS:                        10.9   8.55  )-----------( 252      ( 04 Dec 2020 14:54 )             34.4     12-04    139  |  105  |  35<H>  ----------------------------<  123<H>  4.5   |  19<L>  |  2.58<H>    Ca    9.9      04 Dec 2020 14:54  Phos  4.1     12-04  Mg     1.9     12-04    TPro  7.3  /  Alb  4.5  /  TBili  0.4  /  DBili  x   /  AST  58<H>  /  ALT  37  /  AlkPhos  55  12-04              Psychiatry Section:  Psychiatric Summary/[facility] admitting diagnosis:  The patient is delirious: disorganized, bizarre, some periods of agitation, requires frequent re-direction. He is an elevated, chronic risk of suicide due to recent and Hx of SA; he denies SI intent  or plan since being admitted. It is likely that symptoms are a result of psychotropic and sedative medications for treatment resistant MDD and severe insomnia. The patient's mental status has not improved with medication changes and given abnormal lab results, requires ED transfer for further evaluation.    Psychiatric Recommendations:  IVF  Standing and PRN medications as ordered unless deemed otherwise by medical team.   1:1 for disorganization, elopement.       Observation status (check one):   (X ) Constant Observation  ( ) Enhanced care  ( ) Routine checks    Risk Status (check all that apply if present):  ( ) at risk for suicide/self-injury  ( ) at risk for aggressive behavior  (X ) at risk for elopement  (X ) other risk: disorganization

## 2020-12-04 NOTE — ED PROVIDER NOTE - OBJECTIVE STATEMENT
55M PMH depression with previous serious suicide attempt Nov 2019 (barbiturate OD requiring ICU admission and ECMO), history of ECT treatment, multiple prior inpatient psych admissions, alcohol use, transferred from [facility] for [facility] for 1 week. As per Wilber note: the first episode occurred at night while receiving Ambien. The patient engaged in bizarre and disorganized behavior such as crawling on the floor, searching through garbage cans and walking into other patient's rooms. Ambien was discontinued, benzodiazepines tapered, but the episodes persisted and the patient is now waxing and weaning through out the day, requiring enhanced supervision. Neuro was consulted who recommended that clozapine be discontinued and to start thiamine given his Hx of ETOH abuse. CMP and CBC w/diff on 11/2 WNL. Ammonia level, creatinine, U/A all WNL. CBC w/diff, CMP w/ Mg &phos, stool culture and ova/parasite ordered today after 1, large watery BM.  CMP revealed elevated BUN and creatinine, prompting transfer to ED. The pt. has remained afebrile, no SOB or chest pain noted. Negative for covid-19 upon admission, no recent travel.   Here, pt is mostly altered, only able to answer some quests intermittently. Otherwise mumbling random sounds. Shakes his head no when asked if he felt any pain anywhere

## 2020-12-04 NOTE — ED ADULT NURSE REASSESSMENT NOTE - CONDITION
received pt in room14 with 2 pca's at bedside... pt is from [facility].  pt restless, unable to sit still.  pca's at bedside.  safety maintained at this time.  will continue to closely  monitor

## 2020-12-04 NOTE — ED PROVIDER NOTE - CARE PLAN
Principal Discharge DX:	Altered mental status   Principal Discharge DX:	Altered mental status  Secondary Diagnosis:	Agitation  Secondary Diagnosis:	Rhabdomyolysis  Secondary Diagnosis:	JESSIE (acute kidney injury)

## 2020-12-04 NOTE — CONSULT NOTE ADULT - PROBLEM SELECTOR RECOMMENDATION 9
1. Patients tracheostomy stoma is healed. Would not be possible to predict it stoma would re fistulize causing an air leak if patient underwent ECT. Also not sure what risks if any that may pose if that were to happen. would consult Anesthesia for additional input.     2. No obstruction on laryngoscopic exam Patient is able to be intubated if needed during ECT.
- only one episode so far   - would send O&P, stool culture - consider checking C diff if more than 3 watery BMs occur  - would encourage po hydration  - cbc and cmp labs

## 2020-12-04 NOTE — ED ADULT NURSE REASSESSMENT NOTE - CONDITION
pt becoming more agitated and aggressive with [facility] pca's... dr cronin aware.  given meds as ordered.

## 2020-12-04 NOTE — ED PROVIDER NOTE - PHYSICAL EXAMINATION
Physical Exam:  Gen: pt mumbling sounds when asked quests; A&Ox2 (name and place)  Head: NCAT  HEENT: EOMI, PEERL, normal conjunctiva, oral mucosa moist  Lung: CTAB, no respiratory distress, no wheezes/rhonchi/rales B/L  CV: RRR, no murmurs, rubs or gallops  Abd: multiple healing scars overlying abdomen from pt's prior suicide attempt, soft, NT, ND, no guarding, no rigidity, no rebound tenderness, no CVA tenderness   MSK: no visible deformities  Skin: Warm, well perfused, no rash, no leg swelling  ~Oswaldo Robles MD (PGY-1)

## 2020-12-04 NOTE — ED ADULT NURSE REASSESSMENT NOTE - NS ED NURSE REASSESS COMMENT FT1
Pt becoming agitated and confused. Pt undressing himself and trying to climb out of the stretcher. 2 [facility] staff members at bedside. Pt medicated as per MD order. Will continue to monitor.

## 2020-12-04 NOTE — ED ADULT NURSE NOTE - CHIEF COMPLAINT QUOTE
Pt from [facility]  inpatient for confusion , diarrhea and increased CPK,... Pt on 1 :1 for suicidal attempt 11/20

## 2020-12-04 NOTE — ED ADULT NURSE NOTE - OBJECTIVE STATEMENT
Pt brought in by EMS, received to room 14. Pt A&Ox3 with periods of intermittent confusion. Pt was brought from [facility] with two staff members at bedside. Pt admitted to [facility] for a previous suicide attempt. Pt brought it today for altered mental status and diarrhea. Respirations equal and unlabored, no acute distress noted. Pt appears to be shivering at this time, skin cool clammy. Multiple scars noted on abdomen and neck. Abdomen soft, nondistended, nontender. 18g IV in right AC from EMS, pt arrives with NS bolus running, approximately 400mL given. Vital signs as noted, comfort measures provided, call bell within reach. MDs at bedside, awaiting further orders, will continue to monitor.

## 2020-12-04 NOTE — ED PROVIDER NOTE - CLINICAL SUMMARY MEDICAL DECISION MAKING FREE TEXT BOX
55M presenting from [facility] for elevated BUN/Cr and AMS for 1 week  electrolyte imbalance vs. cva vs. trauma vs. ingestion vs. metabolic disturbance  Plan: labs, ecg, ua, ua, ucx, CT head, cxr, reassess

## 2020-12-04 NOTE — ED CLERICAL - NS ED CLERK NOTE PRE-ARRIVAL INFORMATION; ADDITIONAL PRE-ARRIVAL INFORMATION
Pt being sent in for elevated CPK, diarrhea, and disorganized.  Pt had hx of depression admitted at [facility] for suicidal attempt on 11/19/2020.

## 2020-12-04 NOTE — ED ADULT NURSE NOTE - NS ED NURSE PATIENT LEFT UNIT TIME
[facility]  [address]  [facility] [address]  Phone: [phone] November 12, 2020      Debbie Venegas,   [address] [facility] [address]    Patient: Jolly Matias   MR Number: [id number]   YOB: 1951   Date of Visit: 11/12/2020     Dear Dr. Venegas:    Thank you for referring Jolly Matias to me for evaluation. Attached you will find relevant portions of my assessment and plan of care.    Ms. Matias is doing well after surgery. She is to continue light duty restrictions until Dec 7.  She is to return to the clinic in one month.    If you have questions, please do not hesitate to call me. I look forward to following Jolly Matias along with you.    Sincerely,      Deshawn Arias M.D.  Professor, [employer]  Section Head, General Surgery  [facility]    WSR/usha        08:13
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
## 2020-12-04 NOTE — ED PROVIDER NOTE - PROGRESS NOTE DETAILS
Judith: Pt is extremely agitated, fighting with staff and attempting to hit. Will not allow necessary interventions. Pt given multiple doses of haldol in small aliquots with little change. Ativan given when still a danger to self. Called psych for recommendations. Dr. Taylor recommended [facility]. Chris HUERTA: Pt signed out to me.  Pt cont to be altered and agitated, fighting against restraints.  Additionl 4mg versed given with minimal response, IV ketamine given for agitation/anxiolysis.  Meredith placed for UOP monitoring, 2nd IV placed, and restraints removed.  WIll repeat CK and BMP to eval trend of CR and CK.  Cont IVF, added thiamine for ?etoh withdrawal/wernickes.  I spoke to [facility] charge nurse Jhonny who states that sxs have been present for the past 1 week, coinciding with starting clozapine.  Last dose of clozapine yesterday.  Pt also getting frequent PRNs (mostly haldol as has been limiting use of benzos).  Pt cont to be tachycardic, noted rigidity on exam and tremulous, AMS, hyperthermic - concern for NMS in setting of cloazpine use.  WIll cont benzos for sxs, MICU consulted. Chris HUERTA: Pt more calm, has been out of restraints >2 hours and has not required PRNs for nearly 2 hrs.  Pt has been evaluated by MICU, recommend broad spectrum abx and repeat covid/rvp.  Pt stable for floor, on 1:1.  Pt endorsed to hospitalist.

## 2020-12-04 NOTE — CONSULT NOTE ADULT - SUBJECTIVE AND OBJECTIVE BOX
HPI:   [place]  # [id number]  Pt thinks that he may have had a loose bowel movement but is unsure at this time.  Staff nearby state that he did have a watery BM earlier this afternoon.  He denies n/v or abd pain.  States he ate breakfast and lunch without issues.  Denies fevers or chills.  In the middle of the interview, the pt stops answering questions and turns his head around to the wall.      PAST MEDICAL & SURGICAL HISTORY:  Depression    Hepatitis  &quot;as a child&quot; unsure of type    Left knee pain    No significant past surgical history    Review of Systems: limited due to pt's incooperation   CONSTITUTIONAL: No fever  RESPIRATORY: No cough, wheezing, chills or hemoptysis; No shortness of breath  CARDIOVASCULAR: No chest pain, palpitations, dizziness, or leg swelling  GASTROINTESTINAL: No abdominal or epigastric pain. No nausea, vomiting, or hematemesis; No diarrhea or constipation. No melena or hematochezia.  GENITOURINARY: No dysuria, frequency, hematuria, or incontinence  NEUROLOGICAL: No headaches, memory loss, loss of strength, numbness, or tremors  SKIN: No itching, burning, rashes, or lesions   LYMPH NODES: No enlarged glands  ENDOCRINE: No heat or cold intolerance; No hair loss  MUSCULOSKELETAL: No joint pain or swelling; No muscle, back, or extremity pain  HEME/LYMPH: No easy bruising, or bleeding gums  ALLERY AND IMMUNOLOGIC: No hives or eczema    Allergies    No Known Allergies    Intolerances        Social History:     FAMILY HISTORY:      MEDICATIONS  (STANDING):  buPROPion  milliGRAM(s) Oral daily  lisinopril 10 milliGRAM(s) Oral daily  metFORMIN 250 milliGRAM(s) Oral two times a day  naltrexone 50 milliGRAM(s) Oral daily  ramelteon 8 milliGRAM(s) Oral at bedtime  senna 2 Tablet(s) Oral at bedtime  tamsulosin 0.4 milliGRAM(s) Oral at bedtime  thiamine 100 milliGRAM(s) Oral daily    MEDICATIONS  (PRN):  acetaminophen   Tablet .. 975 milliGRAM(s) Oral every 6 hours PRN pain  haloperidol    Injectable 2.5 milliGRAM(s) IntraMuscular once PRN Agitation  haloperidol    Injectable 2.5 milliGRAM(s) IntraMuscular once PRN Agitation  LORazepam     Tablet 0.5 milliGRAM(s) Oral at bedtime PRN Insomnia  LORazepam     Tablet 1 milliGRAM(s) Oral every 4 hours PRN Agitation  LORazepam   Injectable 1 milliGRAM(s) IntraMuscular once PRN Severe agitation  QUEtiapine 25 milliGRAM(s) Oral every 6 hours PRN Anxiety        CAPILLARY BLOOD GLUCOSE    Vital Signs Last 24 Hrs  T(C): 37.2 (04 Dec 2020 07:36), Max: 37.2 (04 Dec 2020 07:36)  T(F): 98.9 (04 Dec 2020 07:36), Max: 98.9 (04 Dec 2020 07:36)  HR: 112 (04 Dec 2020 13:50) (112 - 116)  BP: 101/45 (04 Dec 2020 13:50) (101/45 - 127/75)  BP(mean): --  RR: 16 (03 Dec 2020 19:14) (16 - 16)  SpO2: --    PHYSICAL EXAM:  GENERAL: NAD, well-developed  HEAD:  Atraumatic, Normocephalic  EYES: EOMI, PERRLA, conjunctiva and sclera clear  NECK: Supple, No JVD  CHEST/LUNG: Clear to auscultation bilaterally; No wheeze  HEART: Regular rate and rhythm; No murmurs, rubs, or gallops  ABDOMEN: Soft, Nontender, Nondistended; Bowel sounds present  EXTREMITIES:  2+ Peripheral Pulses, No clubbing, cyanosis, or edema  PSYCH: AAOx3  NEUROLOGY: non-focal  SKIN: No rashes or lesions    LABS:      EKG(personally reviewed):    RADIOLOGY & ADDITIONAL TESTS:    Imaging Personally Reviewed:    Consultant(s) Notes Reviewed:      Care Discussed with Consultants/Other Providers:   HPI:   [place]  # [id number]  Pt thinks that he may have had a loose bowel movement but is unsure at this time.  Staff nearby state that he did have a watery BM earlier this afternoon.  He denies n/v or abd pain.  States he ate breakfast and lunch without issues.  Denies fevers or chills.  In the middle of the interview, the pt stops answering questions and turns his head around to the wall.      PAST MEDICAL & SURGICAL HISTORY:  Depression    Hepatitis  &quot;as a child&quot; unsure of type    Left knee pain    No significant past surgical history    Review of Systems: limited due to pt's incooperation   CONSTITUTIONAL: No fever  RESPIRATORY: No cough, no shortness of breath   CARDIOVASCULAR: No chest pain, palpitations, dizziness, or leg swelling  GASTROINTESTINAL: No abdominal pain; No nausea or vomiting    GENITOURINARY: No dysuria  NEUROLOGICAL: No headaches  SKIN: No rashes  ENDOCRINE: No heat or cold intolerance; No hair loss  MUSCULOSKELETAL: No joint pain     Allergies    No Known Allergies    Intolerances    Social History:   single    FAMILY HISTORY:  noncontributory     MEDICATIONS  (STANDING):  buPROPion  milliGRAM(s) Oral daily  lisinopril 10 milliGRAM(s) Oral daily  metFORMIN 250 milliGRAM(s) Oral two times a day  naltrexone 50 milliGRAM(s) Oral daily  ramelteon 8 milliGRAM(s) Oral at bedtime  senna 2 Tablet(s) Oral at bedtime  tamsulosin 0.4 milliGRAM(s) Oral at bedtime  thiamine 100 milliGRAM(s) Oral daily    MEDICATIONS  (PRN):  acetaminophen   Tablet .. 975 milliGRAM(s) Oral every 6 hours PRN pain  haloperidol    Injectable 2.5 milliGRAM(s) IntraMuscular once PRN Agitation  haloperidol    Injectable 2.5 milliGRAM(s) IntraMuscular once PRN Agitation  LORazepam     Tablet 0.5 milliGRAM(s) Oral at bedtime PRN Insomnia  LORazepam     Tablet 1 milliGRAM(s) Oral every 4 hours PRN Agitation  LORazepam   Injectable 1 milliGRAM(s) IntraMuscular once PRN Severe agitation  QUEtiapine 25 milliGRAM(s) Oral every 6 hours PRN Anxiety    CAPILLARY BLOOD GLUCOSE    Vital Signs Last 24 Hrs  T(C): 37.2 (04 Dec 2020 07:36), Max: 37.2 (04 Dec 2020 07:36)  T(F): 98.9 (04 Dec 2020 07:36), Max: 98.9 (04 Dec 2020 07:36)  HR: 112 (04 Dec 2020 13:50) (112 - 116)  BP: 101/45 (04 Dec 2020 13:50) (101/45 - 127/75)  BP(mean): --  RR: 16 (03 Dec 2020 19:14) (16 - 16)  SpO2: --    PHYSICAL EXAM:  GENERAL: NAD, well-developed  HEAD:  Atraumatic, Normocephalic  EYES: EOMI, conjunctiva and sclera clear  NECK: Supple, No JVD  CHEST/LUNG: Clear to auscultation bilaterally; No wheeze  HEART: Regular rate and rhythm; No murmurs  ABDOMEN: Soft, Nontender, Nondistended; Bowel sounds present  EXTREMITIES:  2+ Peripheral Pulses, No edema  PSYCH: AAOx3  NEUROLOGY: non-focal  SKIN: No rashes or lesions    LABS:      EKG(personally reviewed):    RADIOLOGY & ADDITIONAL TESTS:    Imaging Personally Reviewed:    Consultant(s) Notes Reviewed:      Care Discussed with Consultants/Other Providers:

## 2020-12-04 NOTE — ED ADULT TRIAGE NOTE - CHIEF COMPLAINT QUOTE
Pt from [facility]  inpatient for confusion , diarrhea and increased CPK,... Pt on 1 :1 for suicidal attempt 11/20

## 2020-12-04 NOTE — ED ADULT NURSE REASSESSMENT NOTE - NS ED NURSE REASSESS COMMENT FT1
Pt attempting to elope, combative. Security called. MD Hendrix at bedside. Medicated per MD order. x2 [facility] staff member remains at bedside. Will continue to monitor.

## 2020-12-05 ENCOUNTER — TRANSCRIPTION ENCOUNTER (OUTPATIENT)
Age: 55
End: 2020-12-05

## 2020-12-05 DIAGNOSIS — R65.10 SYSTEMIC INFLAMMATORY RESPONSE SYNDROME (SIRS) OF NON-INFECTIOUS ORIGIN WITHOUT ACUTE ORGAN DYSFUNCTION: ICD-10-CM

## 2020-12-05 DIAGNOSIS — R74.8 ABNORMAL LEVELS OF OTHER SERUM ENZYMES: ICD-10-CM

## 2020-12-05 DIAGNOSIS — Z29.9 ENCOUNTER FOR PROPHYLACTIC MEASURES, UNSPECIFIED: ICD-10-CM

## 2020-12-05 DIAGNOSIS — G93.40 ENCEPHALOPATHY, UNSPECIFIED: ICD-10-CM

## 2020-12-05 DIAGNOSIS — F05 DELIRIUM DUE TO KNOWN PHYSIOLOGICAL CONDITION: ICD-10-CM

## 2020-12-05 DIAGNOSIS — R41.82 ALTERED MENTAL STATUS, UNSPECIFIED: ICD-10-CM

## 2020-12-05 LAB
ALBUMIN SERPL ELPH-MCNC: 3.8 G/DL — SIGNIFICANT CHANGE UP (ref 3.3–5)
ALP SERPL-CCNC: 48 U/L — SIGNIFICANT CHANGE UP (ref 40–120)
ALT FLD-CCNC: 37 U/L — SIGNIFICANT CHANGE UP (ref 4–41)
ANION GAP SERPL CALC-SCNC: 15 MMOL/L — HIGH (ref 7–14)
AST SERPL-CCNC: 60 U/L — HIGH (ref 4–40)
BILIRUB SERPL-MCNC: 0.6 MG/DL — SIGNIFICANT CHANGE UP (ref 0.2–1.2)
BLOOD GAS ARTERIAL COMPREHENSIVE RESULT: SIGNIFICANT CHANGE UP
BUN SERPL-MCNC: 12 MG/DL — SIGNIFICANT CHANGE UP (ref 7–23)
CALCIUM SERPL-MCNC: 8.7 MG/DL — SIGNIFICANT CHANGE UP (ref 8.4–10.5)
CHLORIDE SERPL-SCNC: 108 MMOL/L — HIGH (ref 98–107)
CK SERPL-CCNC: 1940 U/L — HIGH (ref 30–200)
CO2 SERPL-SCNC: 17 MMOL/L — LOW (ref 22–31)
CREAT SERPL-MCNC: 0.87 MG/DL — SIGNIFICANT CHANGE UP (ref 0.5–1.3)
GLUCOSE SERPL-MCNC: 133 MG/DL — HIGH (ref 70–99)
HCT VFR BLD CALC: 28.6 % — LOW (ref 39–50)
HGB BLD-MCNC: 9 G/DL — LOW (ref 13–17)
MAGNESIUM SERPL-MCNC: 1.6 MG/DL — SIGNIFICANT CHANGE UP (ref 1.6–2.6)
MCHC RBC-ENTMCNC: 29.6 PG — SIGNIFICANT CHANGE UP (ref 27–34)
MCHC RBC-ENTMCNC: 31.5 GM/DL — LOW (ref 32–36)
MCV RBC AUTO: 94.1 FL — SIGNIFICANT CHANGE UP (ref 80–100)
NRBC # BLD: 0 /100 WBCS — SIGNIFICANT CHANGE UP
NRBC # FLD: 0 K/UL — SIGNIFICANT CHANGE UP
PHENOBARB SERPL-MCNC: <3 UG/ML — LOW (ref 10–40)
PHOSPHATE SERPL-MCNC: 2.1 MG/DL — LOW (ref 2.5–4.5)
PLATELET # BLD AUTO: 105 K/UL — LOW (ref 150–400)
POTASSIUM SERPL-MCNC: 4.1 MMOL/L — SIGNIFICANT CHANGE UP (ref 3.5–5.3)
POTASSIUM SERPL-SCNC: 4.1 MMOL/L — SIGNIFICANT CHANGE UP (ref 3.5–5.3)
PROT SERPL-MCNC: 6.1 G/DL — SIGNIFICANT CHANGE UP (ref 6–8.3)
RBC # BLD: 3.04 M/UL — LOW (ref 4.2–5.8)
RBC # FLD: 13.2 % — SIGNIFICANT CHANGE UP (ref 10.3–14.5)
SARS-COV-2 RNA SPEC QL NAA+PROBE: SIGNIFICANT CHANGE UP
SODIUM SERPL-SCNC: 140 MMOL/L — SIGNIFICANT CHANGE UP (ref 135–145)
WBC # BLD: 5.23 K/UL — SIGNIFICANT CHANGE UP (ref 3.8–10.5)
WBC # FLD AUTO: 5.23 K/UL — SIGNIFICANT CHANGE UP (ref 3.8–10.5)

## 2020-12-05 PROCEDURE — 99223 1ST HOSP IP/OBS HIGH 75: CPT | Mod: GC

## 2020-12-05 PROCEDURE — 90792 PSYCH DIAG EVAL W/MED SRVCS: CPT

## 2020-12-05 PROCEDURE — 31500 INSERT EMERGENCY AIRWAY: CPT | Mod: GC

## 2020-12-05 PROCEDURE — 71045 X-RAY EXAM CHEST 1 VIEW: CPT | Mod: 26

## 2020-12-05 PROCEDURE — 99291 CRITICAL CARE FIRST HOUR: CPT | Mod: 25

## 2020-12-05 RX ORDER — THIAMINE MONONITRATE (VIT B1) 100 MG
500 TABLET ORAL EVERY 8 HOURS
Refills: 0 | Status: COMPLETED | OUTPATIENT
Start: 2020-12-05 | End: 2020-12-08

## 2020-12-05 RX ORDER — SODIUM CHLORIDE 9 MG/ML
1000 INJECTION INTRAMUSCULAR; INTRAVENOUS; SUBCUTANEOUS ONCE
Refills: 0 | Status: COMPLETED | OUTPATIENT
Start: 2020-12-05 | End: 2020-12-05

## 2020-12-05 RX ORDER — FENTANYL CITRATE 50 UG/ML
100 INJECTION INTRAVENOUS ONCE
Refills: 0 | Status: DISCONTINUED | OUTPATIENT
Start: 2020-12-05 | End: 2020-12-05

## 2020-12-05 RX ORDER — ACETAMINOPHEN 500 MG
1000 TABLET ORAL ONCE
Refills: 0 | Status: COMPLETED | OUTPATIENT
Start: 2020-12-05 | End: 2020-12-05

## 2020-12-05 RX ORDER — HALOPERIDOL DECANOATE 100 MG/ML
2 INJECTION INTRAMUSCULAR EVERY 6 HOURS
Refills: 0 | Status: DISCONTINUED | OUTPATIENT
Start: 2020-12-05 | End: 2020-12-05

## 2020-12-05 RX ORDER — PROPOFOL 10 MG/ML
50 INJECTION, EMULSION INTRAVENOUS
Qty: 1000 | Refills: 0 | Status: DISCONTINUED | OUTPATIENT
Start: 2020-12-05 | End: 2020-12-11

## 2020-12-05 RX ORDER — MIDAZOLAM HYDROCHLORIDE 1 MG/ML
2 INJECTION, SOLUTION INTRAMUSCULAR; INTRAVENOUS ONCE
Refills: 0 | Status: DISCONTINUED | OUTPATIENT
Start: 2020-12-05 | End: 2020-12-05

## 2020-12-05 RX ORDER — HALOPERIDOL DECANOATE 100 MG/ML
2 INJECTION INTRAMUSCULAR EVERY 4 HOURS
Refills: 0 | Status: DISCONTINUED | OUTPATIENT
Start: 2020-12-05 | End: 2020-12-05

## 2020-12-05 RX ORDER — VANCOMYCIN HCL 1 G
1000 VIAL (EA) INTRAVENOUS EVERY 12 HOURS
Refills: 0 | Status: DISCONTINUED | OUTPATIENT
Start: 2020-12-05 | End: 2020-12-07

## 2020-12-05 RX ORDER — CHLORHEXIDINE GLUCONATE 213 G/1000ML
15 SOLUTION TOPICAL EVERY 12 HOURS
Refills: 0 | Status: DISCONTINUED | OUTPATIENT
Start: 2020-12-05 | End: 2020-12-11

## 2020-12-05 RX ORDER — SODIUM CHLORIDE 9 MG/ML
1000 INJECTION, SOLUTION INTRAVENOUS
Refills: 0 | Status: DISCONTINUED | OUTPATIENT
Start: 2020-12-05 | End: 2020-12-05

## 2020-12-05 RX ORDER — PROPOFOL 10 MG/ML
50 INJECTION, EMULSION INTRAVENOUS ONCE
Refills: 0 | Status: COMPLETED | OUTPATIENT
Start: 2020-12-05 | End: 2020-12-05

## 2020-12-05 RX ORDER — NOREPINEPHRINE BITARTRATE/D5W 8 MG/250ML
0.07 PLASTIC BAG, INJECTION (ML) INTRAVENOUS
Qty: 8 | Refills: 0 | Status: DISCONTINUED | OUTPATIENT
Start: 2020-12-05 | End: 2020-12-10

## 2020-12-05 RX ORDER — KETAMINE HYDROCHLORIDE 100 MG/ML
100 INJECTION INTRAMUSCULAR; INTRAVENOUS ONCE
Refills: 0 | Status: DISCONTINUED | OUTPATIENT
Start: 2020-12-05 | End: 2020-12-05

## 2020-12-05 RX ORDER — MIDAZOLAM HYDROCHLORIDE 1 MG/ML
8 INJECTION, SOLUTION INTRAMUSCULAR; INTRAVENOUS ONCE
Refills: 0 | Status: DISCONTINUED | OUTPATIENT
Start: 2020-12-05 | End: 2020-12-05

## 2020-12-05 RX ORDER — DEXMEDETOMIDINE HYDROCHLORIDE IN 0.9% SODIUM CHLORIDE 4 UG/ML
0.2 INJECTION INTRAVENOUS
Qty: 400 | Refills: 0 | Status: DISCONTINUED | OUTPATIENT
Start: 2020-12-05 | End: 2020-12-05

## 2020-12-05 RX ORDER — CHLORHEXIDINE GLUCONATE 213 G/1000ML
1 SOLUTION TOPICAL
Refills: 0 | Status: DISCONTINUED | OUTPATIENT
Start: 2020-12-05 | End: 2020-12-12

## 2020-12-05 RX ORDER — PIPERACILLIN AND TAZOBACTAM 4; .5 G/20ML; G/20ML
3.38 INJECTION, POWDER, LYOPHILIZED, FOR SOLUTION INTRAVENOUS EVERY 8 HOURS
Refills: 0 | Status: COMPLETED | OUTPATIENT
Start: 2020-12-05 | End: 2020-12-09

## 2020-12-05 RX ORDER — SODIUM CHLORIDE 9 MG/ML
1000 INJECTION, SOLUTION INTRAVENOUS
Refills: 0 | Status: DISCONTINUED | OUTPATIENT
Start: 2020-12-05 | End: 2020-12-06

## 2020-12-05 RX ORDER — MAGNESIUM SULFATE 500 MG/ML
2 VIAL (ML) INJECTION ONCE
Refills: 0 | Status: COMPLETED | OUTPATIENT
Start: 2020-12-05 | End: 2020-12-06

## 2020-12-05 RX ORDER — ACETAMINOPHEN 500 MG
650 TABLET ORAL EVERY 6 HOURS
Refills: 0 | Status: DISCONTINUED | OUTPATIENT
Start: 2020-12-05 | End: 2020-12-13

## 2020-12-05 RX ORDER — POTASSIUM PHOSPHATE, MONOBASIC POTASSIUM PHOSPHATE, DIBASIC 236; 224 MG/ML; MG/ML
15 INJECTION, SOLUTION INTRAVENOUS ONCE
Refills: 0 | Status: COMPLETED | OUTPATIENT
Start: 2020-12-05 | End: 2020-12-06

## 2020-12-05 RX ORDER — FENTANYL CITRATE 50 UG/ML
1.5 INJECTION INTRAVENOUS
Qty: 2500 | Refills: 0 | Status: DISCONTINUED | OUTPATIENT
Start: 2020-12-05 | End: 2020-12-10

## 2020-12-05 RX ORDER — ACETAMINOPHEN 500 MG
650 TABLET ORAL EVERY 6 HOURS
Refills: 0 | Status: DISCONTINUED | OUTPATIENT
Start: 2020-12-05 | End: 2020-12-05

## 2020-12-05 RX ORDER — CLONAZEPAM 1 MG
0.5 TABLET ORAL
Refills: 0 | Status: DISCONTINUED | OUTPATIENT
Start: 2020-12-05 | End: 2020-12-07

## 2020-12-05 RX ORDER — THIAMINE MONONITRATE (VIT B1) 100 MG
100 TABLET ORAL ONCE
Refills: 0 | Status: COMPLETED | OUTPATIENT
Start: 2020-12-05 | End: 2020-12-05

## 2020-12-05 RX ORDER — DIPHENHYDRAMINE HCL 50 MG
50 CAPSULE ORAL ONCE
Refills: 0 | Status: COMPLETED | OUTPATIENT
Start: 2020-12-05 | End: 2020-12-05

## 2020-12-05 RX ORDER — THIAMINE MONONITRATE (VIT B1) 100 MG
500 TABLET ORAL ONCE
Refills: 0 | Status: DISCONTINUED | OUTPATIENT
Start: 2020-12-05 | End: 2020-12-05

## 2020-12-05 RX ORDER — PIPERACILLIN AND TAZOBACTAM 4; .5 G/20ML; G/20ML
3.38 INJECTION, POWDER, LYOPHILIZED, FOR SOLUTION INTRAVENOUS ONCE
Refills: 0 | Status: COMPLETED | OUTPATIENT
Start: 2020-12-05 | End: 2020-12-05

## 2020-12-05 RX ORDER — ACETAMINOPHEN 500 MG
1225 TABLET ORAL EVERY 4 HOURS
Refills: 0 | Status: DISCONTINUED | OUTPATIENT
Start: 2020-12-05 | End: 2020-12-05

## 2020-12-05 RX ORDER — VANCOMYCIN HCL 1 G
1000 VIAL (EA) INTRAVENOUS ONCE
Refills: 0 | Status: COMPLETED | OUTPATIENT
Start: 2020-12-05 | End: 2020-12-05

## 2020-12-05 RX ADMIN — CHLORHEXIDINE GLUCONATE 1 APPLICATION(S): 213 SOLUTION TOPICAL at 18:21

## 2020-12-05 RX ADMIN — Medication 2 MILLIGRAM(S): at 02:36

## 2020-12-05 RX ADMIN — PROPOFOL 50 MILLIGRAM(S): 10 INJECTION, EMULSION INTRAVENOUS at 18:33

## 2020-12-05 RX ADMIN — Medication 650 MILLIGRAM(S): at 19:53

## 2020-12-05 RX ADMIN — KETAMINE HYDROCHLORIDE 100 MILLIGRAM(S): 100 INJECTION INTRAMUSCULAR; INTRAVENOUS at 01:42

## 2020-12-05 RX ADMIN — Medication 250 MILLIGRAM(S): at 18:21

## 2020-12-05 RX ADMIN — Medication 250 MILLIGRAM(S): at 07:21

## 2020-12-05 RX ADMIN — Medication 105 MILLIGRAM(S): at 21:14

## 2020-12-05 RX ADMIN — Medication 3 MILLIGRAM(S): at 14:21

## 2020-12-05 RX ADMIN — SODIUM CHLORIDE 150 MILLILITER(S): 9 INJECTION, SOLUTION INTRAVENOUS at 06:29

## 2020-12-05 RX ADMIN — SODIUM CHLORIDE 1000 MILLILITER(S): 9 INJECTION INTRAMUSCULAR; INTRAVENOUS; SUBCUTANEOUS at 03:39

## 2020-12-05 RX ADMIN — Medication 4 MILLIGRAM(S): at 06:41

## 2020-12-05 RX ADMIN — PIPERACILLIN AND TAZOBACTAM 200 GRAM(S): 4; .5 INJECTION, POWDER, LYOPHILIZED, FOR SOLUTION INTRAVENOUS at 06:29

## 2020-12-05 RX ADMIN — FENTANYL CITRATE 100 MICROGRAM(S): 50 INJECTION INTRAVENOUS at 18:30

## 2020-12-05 RX ADMIN — CHLORHEXIDINE GLUCONATE 15 MILLILITER(S): 213 SOLUTION TOPICAL at 22:23

## 2020-12-05 RX ADMIN — Medication 100 MILLIGRAM(S): at 02:20

## 2020-12-05 RX ADMIN — PROPOFOL 50 MILLIGRAM(S): 10 INJECTION, EMULSION INTRAVENOUS at 18:26

## 2020-12-05 RX ADMIN — Medication 1000 MILLIGRAM(S): at 12:30

## 2020-12-05 RX ADMIN — Medication 50 MILLIGRAM(S): at 06:29

## 2020-12-05 RX ADMIN — Medication 400 MILLIGRAM(S): at 11:49

## 2020-12-05 RX ADMIN — SODIUM CHLORIDE 75 MILLILITER(S): 9 INJECTION, SOLUTION INTRAVENOUS at 19:53

## 2020-12-05 RX ADMIN — PROPOFOL 36.8 MICROGRAM(S)/KG/MIN: 10 INJECTION, EMULSION INTRAVENOUS at 19:53

## 2020-12-05 RX ADMIN — Medication 2 MILLIGRAM(S): at 10:29

## 2020-12-05 RX ADMIN — Medication 650 MILLIGRAM(S): at 22:32

## 2020-12-05 RX ADMIN — SODIUM CHLORIDE 1000 MILLILITER(S): 9 INJECTION INTRAMUSCULAR; INTRAVENOUS; SUBCUTANEOUS at 01:10

## 2020-12-05 RX ADMIN — MIDAZOLAM HYDROCHLORIDE 2 MILLIGRAM(S): 1 INJECTION, SOLUTION INTRAMUSCULAR; INTRAVENOUS at 01:14

## 2020-12-05 RX ADMIN — MIDAZOLAM HYDROCHLORIDE 2 MILLIGRAM(S): 1 INJECTION, SOLUTION INTRAMUSCULAR; INTRAVENOUS at 00:42

## 2020-12-05 RX ADMIN — FENTANYL CITRATE 6.13 MICROGRAM(S)/KG/HR: 50 INJECTION INTRAVENOUS at 22:23

## 2020-12-05 RX ADMIN — PIPERACILLIN AND TAZOBACTAM 25 GRAM(S): 4; .5 INJECTION, POWDER, LYOPHILIZED, FOR SOLUTION INTRAVENOUS at 22:23

## 2020-12-05 RX ADMIN — Medication 2 MILLIGRAM(S): at 12:46

## 2020-12-05 RX ADMIN — Medication 16.1 MICROGRAM(S)/KG/MIN: at 19:54

## 2020-12-05 RX ADMIN — MIDAZOLAM HYDROCHLORIDE 8 MILLIGRAM(S): 1 INJECTION, SOLUTION INTRAMUSCULAR; INTRAVENOUS at 18:32
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
## 2020-12-05 NOTE — H&P ADULT - PROBLEM SELECTOR PLAN 2
Patient has low grade temp of 100F rectally & tachycardic.  -CXR clear; CT chest pending  -BCx/UCx pending  -Vanc/Zosyn prophylactically  -mIVF w/ LR at 150ml/hr Patient has low grade temp of 100F rectally & tachycardic, tachypneic  -CXR clear; will obtain further imaging (ie CT chest/A/P) to eval for infectious source  -BCx/UCx pending  -Vanc/Zosyn for empiric coverage for now  -mIVF w/ LR at 150ml/hr  - per psych low suspicion for NMS given lack of muscle rigidity  - elevated CK, c/f JESSIE, f/u echo to eval for clozapine induced myocarditis as per psych

## 2020-12-05 NOTE — DISCHARGE NOTE PROVIDER - NSDCMRMEDTOKEN_GEN_ALL_CORE_FT
acetaminophen 325 mg oral tablet: 3 tab(s) orally every 6 hours, As needed, Mild Pain (1 - 3)  bacitracin 500 units/g topical ointment: 1 application topically 2 times a day  clonazePAM 1 mg oral tablet: 1 tab(s) orally 2 times a day  melatonin 1 mg oral tablet: 8 tab(s) orally once a day (at bedtime)  mirtazapine 15 mg oral tablet: 1 tab(s) orally once a day (at bedtime)  OLANZapine 15 mg oral tablet: 1 tab(s) orally once a day (at bedtime)  oxyCODONE 5 mg oral tablet: 1 tab(s) orally every 4 hours, As needed, Moderate Pain (4 - 6)  PARoxetine 40 mg oral tablet: 1 tab(s) orally once a day  polyethylene glycol 3350 oral powder for reconstitution: 17 gram(s) orally once a day  senna oral tablet: 2 tab(s) orally once a day (at bedtime)  tamsulosin 0.4 mg oral capsule: 1 cap(s) orally once a day (at bedtime)  zolpidem 10 mg oral tablet: 1 tab(s) orally once a day (at bedtime), As needed, Insomnia   acetaminophen 325 mg oral tablet: 3 tab(s) orally every 6 hours, As needed, Mild Pain (1 - 3)  bacitracin 500 units/g topical ointment: 1 application topically 2 times a day  clonazePAM 1 mg oral tablet: 1 tab(s) orally 2 times a day  melatonin 1 mg oral tablet: 8 tab(s) orally once a day (at bedtime)  OLANZapine 15 mg oral tablet: 1 tab(s) orally once a day (at bedtime)  oxyCODONE 5 mg oral tablet: 1 tab(s) orally every 4 hours, As needed, Moderate Pain (4 - 6)  polyethylene glycol 3350 oral powder for reconstitution: 17 gram(s) orally once a day  senna oral tablet: 2 tab(s) orally once a day (at bedtime)  tamsulosin 0.4 mg oral capsule: 1 cap(s) orally once a day (at bedtime)  zolpidem 10 mg oral tablet: 1 tab(s) orally once a day (at bedtime), As needed, Insomnia   acetaminophen 325 mg oral tablet: 3 tab(s) orally every 6 hours, As needed, Mild Pain (1 - 3)  bacitracin 500 units/g topical ointment: 1 application topically 2 times a day  melatonin 1 mg oral tablet: 8 tab(s) orally once a day (at bedtime)  oxyCODONE 5 mg oral tablet: 1 tab(s) orally every 4 hours, As needed, Moderate Pain (4 - 6)  polyethylene glycol 3350 oral powder for reconstitution: 17 gram(s) orally once a day  senna oral tablet: 2 tab(s) orally once a day (at bedtime)  tamsulosin 0.4 mg oral capsule: 1 cap(s) orally once a day (at bedtime)  vortioxetine 5 mg oral tablet: 3 tab(s) orally once a day  zolpidem 10 mg oral tablet: 1 tab(s) orally once a day (at bedtime), As needed, Insomnia   acetaminophen 325 mg oral tablet: 3 tab(s) orally every 6 hours, As needed, Mild Pain (1 - 3)  bacitracin 500 units/g topical ointment: 1 application topically 2 times a day  melatonin 1 mg oral tablet: 6 tab(s) orally once a day (at bedtime)  oxyCODONE 5 mg oral tablet: 1 tab(s) orally every 4 hours, As needed, Moderate Pain (4 - 6)  polyethylene glycol 3350 oral powder for reconstitution: 17 gram(s) orally once a day  senna oral tablet: 2 tab(s) orally once a day (at bedtime)  tamsulosin 0.4 mg oral capsule: 1 cap(s) orally once a day (at bedtime)  vortioxetine 5 mg oral tablet: 3 tab(s) orally once a day  zolpidem 10 mg oral tablet: 1 tab(s) orally once a day (at bedtime), As needed, Insomnia   acetaminophen 325 mg oral tablet: 3 tab(s) orally every 6 hours, As needed, Mild Pain (1 - 3)  bacitracin 500 units/g topical ointment: 1 application topically 2 times a day  folic acid 1 mg oral tablet: 1 tab(s) orally once a day  melatonin 1 mg oral tablet: 6 tab(s) orally once a day (at bedtime)  oxyCODONE 5 mg oral tablet: 1 tab(s) orally every 4 hours, As needed, Moderate Pain (4 - 6)  polyethylene glycol 3350 oral powder for reconstitution: 17 gram(s) orally once a day  senna oral tablet: 2 tab(s) orally once a day (at bedtime)  tamsulosin 0.4 mg oral capsule: 1 cap(s) orally once a day (at bedtime)  thiamine 100 mg oral tablet: 1 tab(s) orally once a day  vortioxetine 20 mg oral tablet: 1 tab(s) orally once a day  zolpidem 10 mg oral tablet: 1 tab(s) orally once a day (at bedtime), As needed, Insomnia   acetaminophen 325 mg oral tablet: 3 tab(s) orally every 6 hours, As needed, Mild Pain (1 - 3)  bacitracin 500 units/g topical ointment: 1 application topically 2 times a day  folic acid 1 mg oral tablet: 1 tab(s) orally once a day  LORazepam 0.5 mg oral tablet: 1 tab(s) orally once a day (at bedtime)  LORazepam 0.5 mg oral tablet: 1 tab(s) orally once a day  melatonin 1 mg oral tablet: 6 tab(s) orally once a day (at bedtime)  oxyCODONE 5 mg oral tablet: 1 tab(s) orally every 4 hours, As needed, Moderate Pain (4 - 6)  polyethylene glycol 3350 oral powder for reconstitution: 17 gram(s) orally once a day  senna oral tablet: 2 tab(s) orally once a day (at bedtime)  tamsulosin 0.4 mg oral capsule: 1 cap(s) orally once a day (at bedtime)  thiamine 100 mg oral tablet: 1 tab(s) orally once a day  vortioxetine 20 mg oral tablet: 1 tab(s) orally once a day  zolpidem 10 mg oral tablet: 1 tab(s) orally once a day (at bedtime), As needed, Insomnia   acetaminophen 325 mg oral tablet: 3 tab(s) orally every 6 hours, As needed, Mild Pain (1 - 3)  bacitracin 500 units/g topical ointment: 1 application topically 2 times a day  folic acid 1 mg oral tablet: 1 tab(s) orally once a day  LORazepam 0.5 mg oral tablet: 1 tab(s) orally once a day (at bedtime)  LORazepam 0.5 mg oral tablet: 1 tab(s) orally once a day  melatonin 1 mg oral tablet: 6 tab(s) orally once a day (at bedtime)  polyethylene glycol 3350 oral powder for reconstitution: 17 gram(s) orally once a day  senna oral tablet: 2 tab(s) orally once a day (at bedtime)  tamsulosin 0.4 mg oral capsule: 1 cap(s) orally once a day (at bedtime)  thiamine 100 mg oral tablet: 1 tab(s) orally once a day  vortioxetine 20 mg oral tablet: 1 tab(s) orally once a day
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
## 2020-12-05 NOTE — PROCEDURE NOTE - NSTRACHPOSTINTU_RESP_A_CORE
Breath sounds bilateral/Appropriate capnography/Chest excursion noted/Positive end tidal Co2 noted/Breath sounds equal/Chest X-Ray

## 2020-12-05 NOTE — DISCHARGE NOTE PROVIDER - NSDCACTIVITY_GEN_ALL_CORE
Bathing allowed/Do not drive or operate machinery/Do not make important decisions/No heavy lifting/straining

## 2020-12-05 NOTE — H&P ADULT - PROBLEM SELECTOR PLAN 1
Pt with hx of suicide attempts and self inflicted stab wounds now found eating on the floor and consuming waste products.  -R/O infectious/medical etiology  -Psych recs appreciated;  --1:1 observation  --off restraints  --Haldol 2mg q6 PRN for agitation Pt with hx of suicide attempts and self inflicted stab wounds now found eating on the floor and consuming waste products.  -R/O infectious/medical etiology  -Psych recs appreciated;  --1:1 observation  --off restraints  --2mg IV Ativan PRN for agitation Pt with hx of suicide attempts and self inflicted stab wounds, at [facility] for psychiatric management, found eating on the floor and consuming waste products, noted to have bizaare/disorganized behavior  - currently not able to provide history or follow commands  - unclear etiology to patient's encephalopathy though differential includes infectious, metabolic, toxic causes  - CT head negative for acute intracranial pathology  - unclear why utox positive for barbiturates if patient was at [facility]? f/u repeat  - Psych recs appreciated;  - c/w 1:1 observation  - has required multiple rounds of medications for agitation  - per psych d/c naltrexone, welbutrin, avoid antipsychotics given elevated CK, can use mittens, use ativan prn for agitation  - would benefit from further imaging, possible LP for further evaluation, but given current status, may need to defer until more stable  - reconsult MICU given increasing medication requirements/continued agitation

## 2020-12-05 NOTE — H&P ADULT - PROBLEM SELECTOR PLAN 6
Dispo:   1.  Name of PCP:   2.  PCP Contacted on Admission: [ ] Y    [ ] N    3.  PCP contacted at Discharge: [ ] Y    [ ] N    [ ] N/A  4.  Post-Discharge Appointment Date and Location:  5.  Summary of Handoff given to PCP: DVT PPX: Lovenox 40mg   Meal: Regular diet

## 2020-12-05 NOTE — DISCHARGE NOTE PROVIDER - HOSPITAL COURSE
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 Dopplers negative for LE DVT, UE dopplers w/ superficial R cephalic vein thrombosis. ID is following and rec restarting Zosyn on 12/14 that was broadened to Meropenem on 12/19. CT Neck was largely unremarkable.  Fungitell neg.  ESR, CRP, RASHEEDA unremarkable.  Meropenem/abx was discontinued per ID recs on 12/21.  Fever curve improved off of antibiotics and eventually resolved.  Encephalopathy of unknown etiology also then improved.  (ammonia neg, RPR neg, TSH wnl, B12 wnl).  Patient was continued on high dose thiamine.  Patient was started on vortioxetine for his MDD and melatonin for insomnia.  PT recommended inpatient rehab for his weakness secondary to prolonged hospital stay including ICU stay.   Weakness improved slowly.  T wave inversions with paradoxical septal motion abnormality were appreciated prior to discharge. Cardiology contacted and evaluated. no further work up at this time.     Optimized for transfer to [facility].

## 2020-12-05 NOTE — DISCHARGE NOTE PROVIDER - NSFOLLOWUPCLINICSTOKEN_GEN_ALL_ED_FT
[id number]:; [id number]:;[id number]:; no transient paralysis/no tremors/no focal seizures/no syncope/no headache/no generalized seizures
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
## 2020-12-05 NOTE — H&P ADULT - NSHPLABSRESULTS_GEN_ALL_CORE
10.7   7.76  )-----------( 221      ( 04 Dec 2020 17:55 )             33.2 10.7   7.76  )-----------( 221      ( 04 Dec 2020 17:55 )             33.2  12-05    139  |  110<H>  |  27<H>  ----------------------------<  118<H>  3.9   |  17<L>  |  1.37<H>    Ca    8.4      05 Dec 2020 01:53  Phos  4.1     12-  Mg     1.9         TPro  7.2  /  Alb  4.4  /  TBili  0.5  /  DBili  x   /  AST  72<H>  /  ALT  39  /  AlkPhos  53  12-    Urinalysis Basic - ( 05 Dec 2020 01:53 )    Color: YELLOW / Appearance: CLEAR / S.024 / pH: 5.5  Gluc: NEGATIVE / Ketone: NEGATIVE  / Bili: NEGATIVE / Urobili: NORMAL   Blood: NEGATIVE / Protein: 20 / Nitrite: NEGATIVE   Leuk Esterase: NEGATIVE / RBC: 0-2 / WBC 0-2   Sq Epi: OCC / Non Sq Epi: x / Bacteria: NEGATIVE    < from: Xray Chest 1 View AP/PA (20 @ 19:13) >    INTERPRETATION:  no emergentfinding. 10.7   7.76  )-----------( 221      ( 04 Dec 2020 17:55 )             33.2    12-    139  |  110<H>  |  27<H>  ----------------------------<  118<H>  3.9   |  17<L>  |  1.37<H>    Ca    8.4      05 Dec 2020 01:53  Phos  4.1       Mg     1.9         TPro  7.2  /  Alb  4.4  /  TBili  0.5  /  DBili  x   /  AST  72<H>  /  ALT  39  /  AlkPhos  53  12    Urinalysis Basic - ( 05 Dec 2020 01:53 )    Color: YELLOW / Appearance: CLEAR / S.024 / pH: 5.5  Gluc: NEGATIVE / Ketone: NEGATIVE  / Bili: NEGATIVE / Urobili: NORMAL   Blood: NEGATIVE / Protein: 20 / Nitrite: NEGATIVE   Leuk Esterase: NEGATIVE / RBC: 0-2 / WBC 0-2   Sq Epi: OCC / Non Sq Epi: x / Bacteria: NEGATIVE    < from: Xray Chest 1 View AP/PA (20 @ 19:13) >    INTERPRETATION:  no emergent finding.    < from: CT Head No Cont (20 @ 19:32) >    FINDINGS:    Evaluation is partially limited by motion and beam hardening artifact.    No gross acute intracranial hemorrhage, mass effect, vasogenic edema, or evidence of acute territorial infarct.    The visualized paranasal sinuses are clear. The mastoid air cells and middle ear cavities are clear.    The globes are unremarkable.    The soft tissues of the scalp are unremarkable. The calvarium is intact.    IMPRESSION:    Motion limited study without gross evidence for acute intracranial abnormality. If the patient has new and persistent symptoms, consider short interval follow-up head CT or brain MRI follow-up.    < end of copied text >

## 2020-12-05 NOTE — H&P ADULT - PROBLEM SELECTOR PLAN 5
Dispo:   1.  Name of PCP:   2.  PCP Contacted on Admission: [ ] Y    [ ] N    3.  PCP contacted at Discharge: [ ] Y    [ ] N    [ ] N/A  4.  Post-Discharge Appointment Date and Location:  5.  Summary of Handoff given to PCP: DVT PPX: Lovenox 40mg   Meal: Regular diet Pt with elevated CK to 3059, now at 3071.  -Likely 2/2 to increased shaking/rigoring but will r/o NMS  -mIVF as above DVT ppx: likely lovenox, if renal function stable, may need to hold if anticipating procedure ie LP
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
## 2020-12-05 NOTE — BEHAVIORAL HEALTH ASSESSMENT NOTE - CASE SUMMARY
Chart reviewed. Patient seen and examined with Dr. Tony. I agree with her history, MSE, A/P with below additions. We spoke to the patient using pacific interpreters Thai (ukranian dialect not available). He initially seemed to not understand, but then was answering questions appropriately (denying SI/HI, denying AH). With that said, he was quite disorganized and internally preoccupied, grabbing at the air, and talking to no-one. He did not have rigidity or myoclonus.  Differential is broad.  For now, stop scheduled antipsychotics and clozapine. Will need to rule out myocarditis and cardiomyopathy (see above). If this is ruled out- and clozapine is agreed upon by psych to be reinitiated- it will have to be from scratch. Would also hold wellbutrin for now.  Can use benzos to treat agitation. Low threshold for mittens and restraints if need be. If workup remains negative, and patient remains delirious- he may need EEG and/or LP. Psych CL to follow. Chart reviewed. Patient seen and examined with Dr. Tony. I agree with her history, MSE, A/P with below additions. We spoke to the patient using pacific interpreters Citizen of [place] (ukranian dialect not available). He initially seemed to not understand, but then was answering questions appropriately (denying SI/HI, denying AH). With that said, he was quite disorganized and internally preoccupied, grabbing at the air, and talking to no-one. He did not have rigidity or myoclonus.  Differential is broad.  For now, stop scheduled antipsychotics and clozapine. Will need to rule out myocarditis and cardiomyopathy (see above). If this is ruled out- and clozapine is agreed upon by psych to be reinitiated- it will have to be from scratch. Would also hold wellbutrin for now. Can restart low dose klonopin (0.5mg BID) and hold for sedation- to ensure not precipitating benzo withdrawal.  Can use benzos to treat agitation. Low threshold for mittens and restraints if need be. If workup remains negative, and patient remains delirious- he may need EEG and/or LP. Psych CL to follow. Chart reviewed. Patient seen and examined with Dr. Tony. I agree with her history, MSE, A/P with below additions. We spoke to the patient using pacific interpreters [place]i (ukranian dialect not available). He initially seemed to not understand, but then was answering questions appropriately (denying SI/HI, denying AH). With that said, he was quite disorganized and internally preoccupied, grabbing at the air, and talking to no-one. He did not have rigidity or myoclonus.  Differential is broad.  For now, stop scheduled antipsychotics and clozapine. Will need to rule out myocarditis and cardiomyopathy (see above). If this is ruled out- and clozapine is agreed upon by psych to be reinitiated- it will have to be from scratch. Would also hold wellbutrin for now. Can restart low dose klonopin (0.5mg BID) and hold for sedation- to ensure not precipitating benzo withdrawal.  Can use benzos to treat agitation. Would consult MICU for severe agitation assistance if unable to maintain patient on ativan 3mg q4h prn (if becomes too sedated, lower the prn dose, also closely monitor airway and respiratory depression). Low threshold for mittens and restraints if need be. If workup remains negative, and patient remains delirious- he may need EEG and/or LP. Psych CL to follow. Chart reviewed. Patient seen and examined with Dr. Tony. I agree with her history, MSE, A/P with below additions. We spoke to the patient using pacific interpreters Taiwanese (ukranian dialect not available). He initially seemed to not understand, but then was answering questions appropriately (denying SI/HI, denying AH). With that said, he was quite disorganized and internally preoccupied, grabbing at the air, and talking to no-one. He did not have rigidity or myoclonus.  Differential is broad.  For now, stop scheduled antipsychotics and clozapine. Will need to rule out myocarditis and cardiomyopathy (see above). If this is ruled out- and clozapine is agreed upon by psych to be reinitiated- it will have to be from scratch. Would also hold wellbutrin for now. Can restart low dose klonopin (0.5mg BID) and hold for sedation- to ensure not precipitating benzo withdrawal.  Can use benzos to treat agitation. Would consult MICU for severe agitation assistance if unable to maintain patient on ativan 3mg q4h prn (if becomes too sedated, lower the prn dose, also closely monitor airway and respiratory depression). Can repeat UTOX to see if kieran was a false positive- otherwise may consider phenobarb load (though do not see how patient would sneak in phenobarb into [facility]). Low threshold for mittens and restraints if need be. If workup remains negative, and patient remains delirious- he may need EEG and/or LP. Psych CL to follow.
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
## 2020-12-05 NOTE — DISCHARGE NOTE PROVIDER - NSFOLLOWUPCLINICS_GEN_ALL_ED_FT
[facility]  Psychiatry  [address] [place] [address]  Phone: [phone]  Fax:   Follow Up Time:      [facility]  Psychiatry  [address] [place] [address]  Phone: [phone]  Fax:     [facility]  Cardiology  [address] [place] [id number]  Phone: [phone]  Fax:   Follow Up Time:

## 2020-12-05 NOTE — BEHAVIORAL HEALTH ASSESSMENT NOTE - OTHER
lives in private home w/ wife but transferred from [facility], admitted since 11/10/20 full body tremors did not assess suspected 2/2 responding to internal stimuli unable to fully assess w/u ongoing

## 2020-12-05 NOTE — H&P ADULT - PROBLEM SELECTOR PLAN 3
Pt with baseline Cr of .64 now with Cr 1.37 and BUN of 27.  -Likely prerenal in nature  -mIVF as above Patient has low grade temp of 100F rectally & tachycardic.  -CXR clear; CT chest pending  -BCx/UCx pending  -Vanc/Zosyn prophylactically  -mIVF w/ LR at 150ml/hr Pt with baseline Cr of .64 now with Cr 1.37 and BUN of 27.  -Likely prerenal in nature  -mIVF as above  - trend renal function  - elevated CK, but UA negative for blood, lower suspicion for rhabdo

## 2020-12-05 NOTE — H&P ADULT - PROBLEM SELECTOR PLAN 4
DVT PPX: Lovenox 40mg   Meal: Regular diet Pt with elevated CK to 3059, now at 3071.  -Likely 2/2 to increased shaking/rigoring but will r/o NMS  -mIVF as above Pt with baseline Cr of .64 now with Cr 1.37 and BUN of 27.  -Likely prerenal in nature  -mIVF as above Pt with elevated CK to 3059, now at 3071.  -Likely 2/2 to increased shaking/rigoring, appreciate psych eval, lower supsicion for NMS given no rigidity  -mIVF as above  - avoid use of antipsychotics
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
## 2020-12-05 NOTE — BEHAVIORAL HEALTH ASSESSMENT NOTE - SUICIDE RISK FACTORS
Current mood episode/Unable to engage in safety planning/Insomnia/Alcohol/Substance abuse disorders/Impulsivity

## 2020-12-05 NOTE — ED ADULT NURSE REASSESSMENT NOTE - CONDITION
received pt in room 14... pt has 2 zhh pcaat bedside.  pt on co... er pca at bedside. co in progress.  pt calmer. no llonger requires 4 pt restraints.  safety maintained.  for icu consult.

## 2020-12-05 NOTE — BEHAVIORAL HEALTH ASSESSMENT NOTE - NSBHCHARTREVIEWLAB_PSY_A_CORE FT
Reviewed. Low hemoglobin, elevated creatinine, elevated CK (3071 today), elevated trop (18), COVID negative, RVP negative

## 2020-12-05 NOTE — BEHAVIORAL HEALTH ASSESSMENT NOTE - PAST PSYCHOTROPIC MEDICATION
Vallerian root, Ambien, trazodone, seroquel, klonopin, venlafaxine. Current meds include Zyprexa, Paxil, and Klonopin.
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
## 2020-12-05 NOTE — BEHAVIORAL HEALTH ASSESSMENT NOTE - NS ED BHA MED ROS CONSTITUTIONAL SYMPTOMS
Pneumonia 13 vaccine then wait at least 8 weeks later to get the pneumonia 23 vaccine.    Unable to assess
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
## 2020-12-05 NOTE — BEHAVIORAL HEALTH ASSESSMENT NOTE - NSBHCONSULTMEDS_PSY_A_CORE FT
C/w thiamine 100mg daily, melatonin 5mg qhs. Discontinue [facility] meds: Wellbutrin XL 150mg daily, naltrexone 50mg daily, Seroquel 25mg PRN.    Avoid antipsychotics in context of elevated CK. Avoid restraints if possible, though reasonable to use when needed. Avoid anticholinergic medications and benzodiazepines as these are deliriogenic, though recommend Ativan PRN agitation when needed. C/w thiamine 100mg daily, melatonin 5mg qhs. Discontinue [facility] meds: Wellbutrin XL 150mg daily, naltrexone 50mg daily, Seroquel 25mg PRN.    Avoid antipsychotics in context of elevated CK. Avoid restraints if possible, though reasonable to use when needed. Avoid anticholinergic medications such as anticholinergic as these are deliriogenic, though recommend Ativan PRN agitation when needed.

## 2020-12-05 NOTE — BEHAVIORAL HEALTH ASSESSMENT NOTE - NSBHSOCIALHXDETAILSFT_PSY_A_CORE
Pt moved to [place] from [place] when he was in his 20s. Has lived in [place] with his wife and now 28 year old daughter. Works doing cleaning in a school.

## 2020-12-05 NOTE — BEHAVIORAL HEALTH ASSESSMENT NOTE - NSBHCHARTREVIEWVS_PSY_A_CORE FT
Vital Signs Last 24 Hrs  T(C): 36.7 (05 Dec 2020 10:27), Max: 37.8 (04 Dec 2020 18:03)  T(F): 98.1 (05 Dec 2020 10:27), Max: 100 (04 Dec 2020 18:03)  HR: 110 (05 Dec 2020 10:27) (100 - 120)  BP: 143/80 (05 Dec 2020 10:27) (101/45 - 149/91)  BP(mean): --  RR: 22 (05 Dec 2020 10:27) (18 - 30)  SpO2: 97% (05 Dec 2020 10:27) (96% - 100%)

## 2020-12-05 NOTE — H&P ADULT - NSHPREVIEWOFSYSTEMS_GEN_ALL_CORE
Unable to obtain as patient was unable to answer questions Unable to obtain as patient was unable to answer questions.
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
## 2020-12-05 NOTE — BEHAVIORAL HEALTH ASSESSMENT NOTE - NSBHMEDSOTHERFT_PSY_A_CORE
Meds at [facility] include the following: Naltrexone 50mg daily, Ramelteon 8mg qhs, Wellbutrin XL 150mg daily, thiamine 100mg daily, Seroquel 25mg q6h prn anxiety, clozapine recently discontinued

## 2020-12-05 NOTE — BEHAVIORAL HEALTH ASSESSMENT NOTE - NSBHCONSULTMEDSEVERE_PSY_A_CORE FT
Ativan 2mg Q4H PO/IM/IV PRN agitation Ativan 3mg Q4H PO/IM/IV PRN agitation- monitor for excessive sedation. Likely should have MICU consult so made aware- may need higher level of care for AMS/Agitation

## 2020-12-05 NOTE — BEHAVIORAL HEALTH ASSESSMENT NOTE - DIFFERENTIAL
delirium 2/2 generalized medical condition, r/o clozapine-induced myocarditis, r/o serotonin syndrome

## 2020-12-05 NOTE — ED ADULT NURSE REASSESSMENT NOTE - CONDITION
pt became more retless and agitated.. attempting to climb off stretcher.  pt speaking in another language.. difficult to redired.  given meds as ordered.  co in progress. safety maintained.

## 2020-12-05 NOTE — DISCHARGE NOTE PROVIDER - NSDCCPCAREPLAN_GEN_ALL_CORE_FT
PRINCIPAL DISCHARGE DIAGNOSIS  Diagnosis: Altered mental status  Assessment and Plan of Treatment: You presented to hospital with altered mental status, a decrease in your kidney function, fever, and shakes. We worked you up extensivly to determine thr source fo your confusion. We determined that____      SECONDARY DISCHARGE DIAGNOSES  Diagnosis: JESSIE (acute kidney injury)  Assessment and Plan of Treatment:     Diagnosis: Rhabdomyolysis  Assessment and Plan of Treatment:     Diagnosis: Agitation  Assessment and Plan of Treatment:      PRINCIPAL DISCHARGE DIAGNOSIS  Diagnosis: Altered mental status  Assessment and Plan of Treatment: You presented to hospital with altered mental status, a decrease in your kidney function, fever, and shakes. We worked you up extensivly to determine thr source fo your confusion. We determined that____      SECONDARY DISCHARGE DIAGNOSES  Diagnosis: Fever of undetermined origin  Assessment and Plan of Treatment: Fever of undetermined origin     PRINCIPAL DISCHARGE DIAGNOSIS  Diagnosis: Altered mental status  Assessment and Plan of Treatment: You presented to hospital with altered mental status, a decrease in your kidney function, fever, and shakes. We worked you up extensively to determine the source of your confusion, but it was largely negative. We treated you with high dose thiamine and monitored off of any antibiotics.  You eventually had improvement of your confusion.      SECONDARY DISCHARGE DIAGNOSES  Diagnosis: Fever of undetermined origin  Assessment and Plan of Treatment: You presented to hospital with altered mental status and fever.  We worked you up extensively for an infection and inflammatory disease, including blood work and multiple imaging studies, but the work-up was unremarkable. After several courses of different antibiotics, we monitored off of any antibiotics and you eventually had resolution of your fever.    Diagnosis: MDD (major depressive disorder)  Assessment and Plan of Treatment: You were transferred from [facility] where you were treated for depression.  You had continued depression in this hospital course, and you were started on a anti-depression medication called Vortioxetine.  You will need to continue inpatient psychiatric management for your depression.
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
## 2020-12-05 NOTE — H&P ADULT - NSHPSOCIALHISTORY_GEN_ALL_CORE
Smoking -   Alcohol -   Drugs -   Occupation -   Living Disposition - Smoking - never smoker   Alcohol -   Drugs -   Occupation - unknown  Living Disposition -[facility]

## 2020-12-05 NOTE — BEHAVIORAL HEALTH ASSESSMENT NOTE - RISK ASSESSMENT
Low Acute Suicide Risk Pt at elevated acute and chronic risk for suicide 2/2 multiple risk factors, including treatment resistant depression, hx of multiple SA's, some requiring medical/ICU stay, hx of recent SA resulting in [facility] admission on 11/10/20, hx alcohol abuse, severe agitation, impulsivity, insomnia,  race, male gender, acute medical condition. Protective factors include marriage, children, social support, employment stability, residential stability, relationship stability.

## 2020-12-05 NOTE — BEHAVIORAL HEALTH ASSESSMENT NOTE - NSBHCHARTREVIEWIMAGING_PSY_A_CORE FT
ct hea< from: CT Head No Cont (12.04.20 @ 19:32) >    EXAM:  CT BRAIN        PROCEDURE DATE:  Dec  4 2020         INTERPRETATION:  CLINICAL INFORMATION: Altered mental status.    TECHNIQUE: Noncontrast axial CT images were acquired through the head. Two-dimensional sagittal and coronal reformats were generated.    COMPARISON STUDY: CT head 10/27/2019.    FINDINGS:    Evaluation is partially limited by motion and beam hardening artifact.    No gross acute intracranial hemorrhage, mass effect, vasogenic edema, or evidence of acute territorial infarct.    The visualized paranasal sinuses are clear. The mastoid air cells and middle ear cavities are clear.    The globes are unremarkable.    The soft tissues of the scalp are unremarkable. The calvarium is intact.    IMPRESSION:    Motion limited study without gross evidence for acute intracranial abnormality. If the patient has new and persistent symptoms, consider short interval follow-up head CT or brain MRI follow-up.    < end of copied text >

## 2020-12-06 LAB
ALBUMIN SERPL ELPH-MCNC: 3.3 G/DL — SIGNIFICANT CHANGE UP (ref 3.3–5)
ALP SERPL-CCNC: 46 U/L — SIGNIFICANT CHANGE UP (ref 40–120)
ALT FLD-CCNC: 37 U/L — SIGNIFICANT CHANGE UP (ref 4–41)
AMMONIA BLD-MCNC: 31 UMOL/L — SIGNIFICANT CHANGE UP (ref 11–55)
ANION GAP SERPL CALC-SCNC: 10 MMOL/L — SIGNIFICANT CHANGE UP (ref 7–14)
APPEARANCE CSF: CLEAR — SIGNIFICANT CHANGE UP
AST SERPL-CCNC: 53 U/L — HIGH (ref 4–40)
BASOPHILS # BLD AUTO: 0.01 K/UL — SIGNIFICANT CHANGE UP (ref 0–0.2)
BASOPHILS NFR BLD AUTO: 0.2 % — SIGNIFICANT CHANGE UP (ref 0–2)
BILIRUB SERPL-MCNC: 0.5 MG/DL — SIGNIFICANT CHANGE UP (ref 0.2–1.2)
BLOOD GAS ARTERIAL COMPREHENSIVE RESULT: SIGNIFICANT CHANGE UP
BUN SERPL-MCNC: 8 MG/DL — SIGNIFICANT CHANGE UP (ref 7–23)
CALCIUM SERPL-MCNC: 8.5 MG/DL — SIGNIFICANT CHANGE UP (ref 8.4–10.5)
CHLORIDE SERPL-SCNC: 107 MMOL/L — SIGNIFICANT CHANGE UP (ref 98–107)
CK MB BLD-MCNC: 0.3 % — SIGNIFICANT CHANGE UP (ref 0–2.5)
CK MB CFR SERPL CALC: 3.6 NG/ML — SIGNIFICANT CHANGE UP
CK SERPL-CCNC: 1042 U/L — HIGH (ref 30–200)
CK SERPL-CCNC: 1405 U/L — HIGH (ref 30–200)
CO2 SERPL-SCNC: 20 MMOL/L — LOW (ref 22–31)
COLOR CSF: COLORLESS — SIGNIFICANT CHANGE UP
CREAT SERPL-MCNC: 0.9 MG/DL — SIGNIFICANT CHANGE UP (ref 0.5–1.3)
CRP SERPL-MCNC: 158.2 MG/L — HIGH
CSF PCR RESULT: SIGNIFICANT CHANGE UP
D DIMER BLD IA.RAPID-MCNC: 974 NG/ML DDU — HIGH
EOSINOPHIL # BLD AUTO: 0.14 K/UL — SIGNIFICANT CHANGE UP (ref 0–0.5)
EOSINOPHIL NFR BLD AUTO: 2.6 % — SIGNIFICANT CHANGE UP (ref 0–6)
FERRITIN SERPL-MCNC: 195 NG/ML — SIGNIFICANT CHANGE UP (ref 30–400)
FOLATE SERPL-MCNC: 12.2 NG/ML — SIGNIFICANT CHANGE UP (ref 3.1–17.5)
GLUCOSE CSF-MCNC: 77 MG/DL — HIGH (ref 40–70)
GLUCOSE SERPL-MCNC: 135 MG/DL — HIGH (ref 70–99)
GRAM STN FLD: SIGNIFICANT CHANGE UP
HCT VFR BLD CALC: 30.6 % — LOW (ref 39–50)
HGB BLD-MCNC: 9.3 G/DL — LOW (ref 13–17)
IANC: 3.89 K/UL — SIGNIFICANT CHANGE UP (ref 1.5–8.5)
IMM GRANULOCYTES NFR BLD AUTO: 0.2 % — SIGNIFICANT CHANGE UP (ref 0–1.5)
LABORATORY COMMENT REPORT: SIGNIFICANT CHANGE UP
LDH CSF L TO P-CCNC: 16 U/L — SIGNIFICANT CHANGE UP
LDH FLD-CCNC: 16 U/L — SIGNIFICANT CHANGE UP
LDH SERPL L TO P-CCNC: 345 U/L — HIGH (ref 135–225)
LYMPHOCYTES # BLD AUTO: 0.97 K/UL — LOW (ref 1–3.3)
LYMPHOCYTES # BLD AUTO: 17.9 % — SIGNIFICANT CHANGE UP (ref 13–44)
MAGNESIUM SERPL-MCNC: 2.1 MG/DL — SIGNIFICANT CHANGE UP (ref 1.6–2.6)
MCHC RBC-ENTMCNC: 29.4 PG — SIGNIFICANT CHANGE UP (ref 27–34)
MCHC RBC-ENTMCNC: 30.4 GM/DL — LOW (ref 32–36)
MCV RBC AUTO: 96.8 FL — SIGNIFICANT CHANGE UP (ref 80–100)
MONOCYTES # BLD AUTO: 0.4 K/UL — SIGNIFICANT CHANGE UP (ref 0–0.9)
MONOCYTES NFR BLD AUTO: 7.4 % — SIGNIFICANT CHANGE UP (ref 2–14)
NEUTROPHILS # BLD AUTO: 3.89 K/UL — SIGNIFICANT CHANGE UP (ref 1.8–7.4)
NEUTROPHILS # CSF: SIGNIFICANT CHANGE UP %
NEUTROPHILS NFR BLD AUTO: 71.7 % — SIGNIFICANT CHANGE UP (ref 43–77)
NRBC # BLD: 0 /100 WBCS — SIGNIFICANT CHANGE UP
NRBC # FLD: 0 K/UL — SIGNIFICANT CHANGE UP
NRBC NFR CSF: 0 CELLS/UL — SIGNIFICANT CHANGE UP (ref 0–5)
PHOSPHATE SERPL-MCNC: 3.3 MG/DL — SIGNIFICANT CHANGE UP (ref 2.5–4.5)
PLATELET # BLD AUTO: 158 K/UL — SIGNIFICANT CHANGE UP (ref 150–400)
POTASSIUM SERPL-MCNC: 3.9 MMOL/L — SIGNIFICANT CHANGE UP (ref 3.5–5.3)
POTASSIUM SERPL-SCNC: 3.9 MMOL/L — SIGNIFICANT CHANGE UP (ref 3.5–5.3)
PROT CSF-MCNC: 44 MG/DL — SIGNIFICANT CHANGE UP (ref 15–45)
PROT SERPL-MCNC: 5.6 G/DL — LOW (ref 6–8.3)
RBC # BLD: 3.16 M/UL — LOW (ref 4.2–5.8)
RBC # CSF: 3 CELLS/UL — HIGH (ref 0–0)
RBC # FLD: 13.1 % — SIGNIFICANT CHANGE UP (ref 10.3–14.5)
SODIUM SERPL-SCNC: 137 MMOL/L — SIGNIFICANT CHANGE UP (ref 135–145)
SOURCE HSV 1/2: SIGNIFICANT CHANGE UP
SPECIMEN SOURCE: SIGNIFICANT CHANGE UP
TROPONIN T, HIGH SENSITIVITY RESULT: 40 NG/L — SIGNIFICANT CHANGE UP
TUBE TYPE: SIGNIFICANT CHANGE UP
VIT B12 SERPL-MCNC: 438 PG/ML — SIGNIFICANT CHANGE UP (ref 200–900)
WBC # BLD: 5.42 K/UL — SIGNIFICANT CHANGE UP (ref 3.8–10.5)
WBC # FLD AUTO: 5.42 K/UL — SIGNIFICANT CHANGE UP (ref 3.8–10.5)

## 2020-12-06 PROCEDURE — 74177 CT ABD & PELVIS W/CONTRAST: CPT | Mod: 26

## 2020-12-06 PROCEDURE — 71260 CT THORAX DX C+: CPT | Mod: 26

## 2020-12-06 PROCEDURE — 99232 SBSQ HOSP IP/OBS MODERATE 35: CPT

## 2020-12-06 PROCEDURE — 70450 CT HEAD/BRAIN W/O DYE: CPT | Mod: 26

## 2020-12-06 PROCEDURE — 62270 DX LMBR SPI PNXR: CPT

## 2020-12-06 PROCEDURE — 99291 CRITICAL CARE FIRST HOUR: CPT | Mod: 25

## 2020-12-06 RX ORDER — MIDAZOLAM HYDROCHLORIDE 1 MG/ML
8 INJECTION, SOLUTION INTRAMUSCULAR; INTRAVENOUS ONCE
Refills: 0 | Status: DISCONTINUED | OUTPATIENT
Start: 2020-12-06 | End: 2020-12-06

## 2020-12-06 RX ORDER — ACETAMINOPHEN 500 MG
1000 TABLET ORAL ONCE
Refills: 0 | Status: COMPLETED | OUTPATIENT
Start: 2020-12-06 | End: 2020-12-06

## 2020-12-06 RX ORDER — ENOXAPARIN SODIUM 100 MG/ML
40 INJECTION SUBCUTANEOUS DAILY
Refills: 0 | Status: DISCONTINUED | OUTPATIENT
Start: 2020-12-06 | End: 2020-12-06

## 2020-12-06 RX ORDER — MIDAZOLAM HYDROCHLORIDE 1 MG/ML
0.02 INJECTION, SOLUTION INTRAMUSCULAR; INTRAVENOUS
Qty: 100 | Refills: 0 | Status: DISCONTINUED | OUTPATIENT
Start: 2020-12-06 | End: 2020-12-10

## 2020-12-06 RX ORDER — FENTANYL CITRATE 50 UG/ML
100 INJECTION INTRAVENOUS ONCE
Refills: 0 | Status: DISCONTINUED | OUTPATIENT
Start: 2020-12-06 | End: 2020-12-06

## 2020-12-06 RX ORDER — ENOXAPARIN SODIUM 100 MG/ML
40 INJECTION SUBCUTANEOUS
Refills: 0 | Status: DISCONTINUED | OUTPATIENT
Start: 2020-12-07 | End: 2021-01-06

## 2020-12-06 RX ORDER — SODIUM CHLORIDE 9 MG/ML
1000 INJECTION, SOLUTION INTRAVENOUS
Refills: 0 | Status: DISCONTINUED | OUTPATIENT
Start: 2020-12-06 | End: 2020-12-07

## 2020-12-06 RX ORDER — AZITHROMYCIN 500 MG/1
500 TABLET, FILM COATED ORAL EVERY 24 HOURS
Refills: 0 | Status: DISCONTINUED | OUTPATIENT
Start: 2020-12-06 | End: 2020-12-08

## 2020-12-06 RX ADMIN — ENOXAPARIN SODIUM 40 MILLIGRAM(S): 100 INJECTION SUBCUTANEOUS at 12:30

## 2020-12-06 RX ADMIN — AZITHROMYCIN 255 MILLIGRAM(S): 500 TABLET, FILM COATED ORAL at 17:54

## 2020-12-06 RX ADMIN — Medication 16.1 MICROGRAM(S)/KG/MIN: at 08:23

## 2020-12-06 RX ADMIN — Medication 50 GRAM(S): at 00:34

## 2020-12-06 RX ADMIN — Medication 105 MILLIGRAM(S): at 13:03

## 2020-12-06 RX ADMIN — SODIUM CHLORIDE 150 MILLILITER(S): 9 INJECTION, SOLUTION INTRAVENOUS at 08:25

## 2020-12-06 RX ADMIN — CHLORHEXIDINE GLUCONATE 15 MILLILITER(S): 213 SOLUTION TOPICAL at 17:46

## 2020-12-06 RX ADMIN — PIPERACILLIN AND TAZOBACTAM 25 GRAM(S): 4; .5 INJECTION, POWDER, LYOPHILIZED, FOR SOLUTION INTRAVENOUS at 14:04

## 2020-12-06 RX ADMIN — Medication 2 MILLIGRAM(S): at 14:04

## 2020-12-06 RX ADMIN — POTASSIUM PHOSPHATE, MONOBASIC POTASSIUM PHOSPHATE, DIBASIC 62.5 MILLIMOLE(S): 236; 224 INJECTION, SOLUTION INTRAVENOUS at 00:39

## 2020-12-06 RX ADMIN — Medication 0.5 MILLIGRAM(S): at 17:45

## 2020-12-06 RX ADMIN — Medication 1000 MILLIGRAM(S): at 14:26

## 2020-12-06 RX ADMIN — Medication 0.5 MILLIGRAM(S): at 05:54

## 2020-12-06 RX ADMIN — FENTANYL CITRATE 100 MICROGRAM(S): 50 INJECTION INTRAVENOUS at 14:17

## 2020-12-06 RX ADMIN — Medication 105 MILLIGRAM(S): at 04:41

## 2020-12-06 RX ADMIN — Medication 105 MILLIGRAM(S): at 20:56

## 2020-12-06 RX ADMIN — FENTANYL CITRATE 100 MICROGRAM(S): 50 INJECTION INTRAVENOUS at 14:04

## 2020-12-06 RX ADMIN — PROPOFOL 36.8 MICROGRAM(S)/KG/MIN: 10 INJECTION, EMULSION INTRAVENOUS at 08:25

## 2020-12-06 RX ADMIN — PIPERACILLIN AND TAZOBACTAM 25 GRAM(S): 4; .5 INJECTION, POWDER, LYOPHILIZED, FOR SOLUTION INTRAVENOUS at 05:54

## 2020-12-06 RX ADMIN — MIDAZOLAM HYDROCHLORIDE 2.45 MG/KG/HR: 1 INJECTION, SOLUTION INTRAMUSCULAR; INTRAVENOUS at 14:25

## 2020-12-06 RX ADMIN — CHLORHEXIDINE GLUCONATE 15 MILLILITER(S): 213 SOLUTION TOPICAL at 05:54

## 2020-12-06 RX ADMIN — PROPOFOL 36.8 MICROGRAM(S)/KG/MIN: 10 INJECTION, EMULSION INTRAVENOUS at 22:07

## 2020-12-06 RX ADMIN — Medication 250 MILLIGRAM(S): at 06:04

## 2020-12-06 RX ADMIN — FENTANYL CITRATE 6.13 MICROGRAM(S)/KG/HR: 50 INJECTION INTRAVENOUS at 08:24

## 2020-12-06 RX ADMIN — SODIUM CHLORIDE 150 MILLILITER(S): 9 INJECTION, SOLUTION INTRAVENOUS at 05:53

## 2020-12-06 RX ADMIN — MIDAZOLAM HYDROCHLORIDE 2.45 MG/KG/HR: 1 INJECTION, SOLUTION INTRAMUSCULAR; INTRAVENOUS at 22:07

## 2020-12-06 RX ADMIN — MIDAZOLAM HYDROCHLORIDE 8 MILLIGRAM(S): 1 INJECTION, SOLUTION INTRAMUSCULAR; INTRAVENOUS at 14:25

## 2020-12-06 RX ADMIN — CHLORHEXIDINE GLUCONATE 1 APPLICATION(S): 213 SOLUTION TOPICAL at 12:29

## 2020-12-06 RX ADMIN — Medication 400 MILLIGRAM(S): at 14:17

## 2020-12-06 RX ADMIN — Medication 250 MILLIGRAM(S): at 18:51

## 2020-12-06 RX ADMIN — PIPERACILLIN AND TAZOBACTAM 25 GRAM(S): 4; .5 INJECTION, POWDER, LYOPHILIZED, FOR SOLUTION INTRAVENOUS at 22:07

## 2020-12-06 NOTE — PROGRESS NOTE ADULT - PROBLEM SELECTOR PLAN 4
Pt with elevated CK to 3059, now at 3071.  -Likely 2/2 to increased shaking/rigoring, appreciate psych eval, lower supsicion for NMS given no rigidity  -mIVF as above  - avoid use of antipsychotics
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
## 2020-12-06 NOTE — PROGRESS NOTE ADULT - PROBLEM SELECTOR PLAN 2
Patient has low grade temp of 100F rectally & tachycardic, tachypneic  -CXR clear; will obtain further imaging (ie CT chest/A/P) to eval for infectious source  -BCx/UCx pending  -Vanc/Zosyn for empiric coverage for now  -mIVF w/ LR at 150ml/hr  - per psych low suspicion for NMS given lack of muscle rigidity  - elevated CK, c/f JESSIE, f/u echo to eval for clozapine induced myocarditis as per psych
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
## 2020-12-06 NOTE — CONSULT NOTE ADULT - ASSESSMENT
·	For constant sedation, consider continuing present regimen with regular titration to achieve desired reponse.   ·	Consider r/o alternate sources of encephalopathy, would suggest an LP (CT Brain WNL).   Thank you for the consult.

## 2020-12-06 NOTE — PROGRESS NOTE BEHAVIORAL HEALTH - NSBHFUPINTERVALHXFT_PSY_A_CORE
Pt seen. Chart reviewed. Repeat utox +barbiturates. Phenobarb level <3. Standing [facility] medications: bacitracin ointment, Wellbutrin XL 150mg, Klonopin 0.25mg BID, clozaril 275mg, lisinopril 10mg, metformin 250mg BID, naltrexone 50mg, ramelteon 8mg, senna 2mg, tamsulosin 0.4mg, thiamine 100mg. PRNs: Ativan 1mg q4hrs, Seroquel 25mg q6hrs    Pt currently intubated, sedated.

## 2020-12-06 NOTE — PROGRESS NOTE ADULT - SUBJECTIVE AND OBJECTIVE BOX
INTERVAL HPI/OVERNIGHT EVENTS:    SUBJECTIVE: Patient seen and examined at bedside.       VITAL SIGNS:  ICU Vital Signs Last 24 Hrs  T(C): 37.1 (06 Dec 2020 04:00), Max: 38.4 (05 Dec 2020 18:00)  T(F): 98.7 (06 Dec 2020 04:00), Max: 101.2 (05 Dec 2020 18:00)  HR: 86 (06 Dec 2020 07:00) (86 - 135)  BP: 106/66 (06 Dec 2020 07:00) (84/72 - 143/80)  BP(mean): 77 (06 Dec 2020 07:00) (72 - 109)  ABP: --  ABP(mean): --  RR: 20 (06 Dec 2020 07:00) (18 - 44)  SpO2: 100% (06 Dec 2020 07:00) (91% - 100%)    Mode: AC/ CMV (Assist Control/ Continuous Mandatory Ventilation), RR (machine): 18, TV (machine): 450, FiO2: 30, PEEP: 5, ITime: 0.86, MAP: 9, PIP: 20  Plateau pressure:   P/F ratio:     12-05 @ 07:01  -  12-06 @ 07:00  --------------------------------------------------------  IN: 3249.1 mL / OUT: 2380 mL / NET: 869.1 mL      CAPILLARY BLOOD GLUCOSE      POCT Blood Glucose.: 115 mg/dL (04 Dec 2020 18:18)    ECG:    PHYSICAL EXAM:    General:   HEENT:   Neck:   Respiratory:   Cardiovascular:   Abdomen:   Extremities:  Neurological:    MEDICATIONS:  MEDICATIONS  (STANDING):  chlorhexidine 0.12% Liquid 15 milliLiter(s) Oral Mucosa every 12 hours  chlorhexidine 4% Liquid 1 Application(s) Topical <User Schedule>  clonazePAM  Tablet 0.5 milliGRAM(s) Oral two times a day  dextrose 5% + lactated ringers. 1000 milliLiter(s) (150 mL/Hr) IV Continuous <Continuous>  fentaNYL   Infusion. 0.5 MICROgram(s)/kG/Hr (6.13 mL/Hr) IV Continuous <Continuous>  norepinephrine Infusion 0.07 MICROgram(s)/kG/Min (16.1 mL/Hr) IV Continuous <Continuous>  piperacillin/tazobactam IVPB.. 3.375 Gram(s) IV Intermittent every 8 hours  propofol Infusion 50 MICROgram(s)/kG/Min (36.8 mL/Hr) IV Continuous <Continuous>  thiamine IVPB 500 milliGRAM(s) IV Intermittent every 8 hours  vancomycin  IVPB 1000 milliGRAM(s) IV Intermittent every 12 hours    MEDICATIONS  (PRN):  acetaminophen    Suspension .. 650 milliGRAM(s) Oral every 6 hours PRN Temp greater or equal to 38C (100.4F)      ALLERGIES:  Allergies    No Known Allergies    Intolerances        LABS:                        9.3    5.42  )-----------( 158      ( 06 Dec 2020 04:32 )             30.6     12-    137  |  107  |  8   ----------------------------<  135<H>  3.9   |  20<L>  |  0.90    Ca    8.5      06 Dec 2020 04:32  Phos  3.3     12-  Mg     2.1     12-    TPro  5.6<L>  /  Alb  3.3  /  TBili  0.5  /  DBili  x   /  AST  53<H>  /  ALT  37  /  AlkPhos  46  12-      Urinalysis Basic - ( 05 Dec 2020 01:53 )    Color: YELLOW / Appearance: CLEAR / S.024 / pH: 5.5  Gluc: NEGATIVE / Ketone: NEGATIVE  / Bili: NEGATIVE / Urobili: NORMAL   Blood: NEGATIVE / Protein: 20 / Nitrite: NEGATIVE   Leuk Esterase: NEGATIVE / RBC: 0-2 / WBC 0-2   Sq Epi: OCC / Non Sq Epi: x / Bacteria: NEGATIVE        RADIOLOGY & ADDITIONAL TESTS: Reviewed.           INTERVAL HPI/OVERNIGHT EVENTS:    SUBJECTIVE: Patient seen and examined at bedside. Intubated, sedated.       VITAL SIGNS:  ICU Vital Signs Last 24 Hrs  T(C): 37.1 (06 Dec 2020 04:00), Max: 38.4 (05 Dec 2020 18:00)  T(F): 98.7 (06 Dec 2020 04:00), Max: 101.2 (05 Dec 2020 18:00)  HR: 86 (06 Dec 2020 07:00) (86 - 135)  BP: 106/66 (06 Dec 2020 07:00) (84/72 - 143/80)  BP(mean): 77 (06 Dec 2020 07:00) (72 - 109)  ABP: --  ABP(mean): --  RR: 20 (06 Dec 2020 07:00) (18 - 44)  SpO2: 100% (06 Dec 2020 07:00) (91% - 100%)    Mode: AC/ CMV (Assist Control/ Continuous Mandatory Ventilation), RR (machine): 18, TV (machine): 450, FiO2: 30, PEEP: 5, ITime: 0.86, MAP: 9, PIP: 20  Plateau pressure:   P/F ratio:     -05 @ 07:01  -  12-06 @ 07:00  --------------------------------------------------------  IN: 3249.1 mL / OUT: 2380 mL / NET: 869.1 mL      CAPILLARY BLOOD GLUCOSE      POCT Blood Glucose.: 115 mg/dL (04 Dec 2020 18:18)    ECG:    PHYSICAL EXAM:    General: laying in bed, sedated, intubated   HEENT: atraumatic, normocephalic  Respiratory: intubated  Neurological: sedated    MEDICATIONS:  MEDICATIONS  (STANDING):  chlorhexidine 0.12% Liquid 15 milliLiter(s) Oral Mucosa every 12 hours  chlorhexidine 4% Liquid 1 Application(s) Topical <User Schedule>  clonazePAM  Tablet 0.5 milliGRAM(s) Oral two times a day  dextrose 5% + lactated ringers. 1000 milliLiter(s) (150 mL/Hr) IV Continuous <Continuous>  fentaNYL   Infusion. 0.5 MICROgram(s)/kG/Hr (6.13 mL/Hr) IV Continuous <Continuous>  norepinephrine Infusion 0.07 MICROgram(s)/kG/Min (16.1 mL/Hr) IV Continuous <Continuous>  piperacillin/tazobactam IVPB.. 3.375 Gram(s) IV Intermittent every 8 hours  propofol Infusion 50 MICROgram(s)/kG/Min (36.8 mL/Hr) IV Continuous <Continuous>  thiamine IVPB 500 milliGRAM(s) IV Intermittent every 8 hours  vancomycin  IVPB 1000 milliGRAM(s) IV Intermittent every 12 hours    MEDICATIONS  (PRN):  acetaminophen    Suspension .. 650 milliGRAM(s) Oral every 6 hours PRN Temp greater or equal to 38C (100.4F)      ALLERGIES:  Allergies    No Known Allergies    Intolerances        LABS:                        9.3    5.42  )-----------( 158      ( 06 Dec 2020 04:32 )             30.6     12-    137  |  107  |  8   ----------------------------<  135<H>  3.9   |  20<L>  |  0.90    Ca    8.5      06 Dec 2020 04:32  Phos  3.3     12-  Mg     2.1     12-    TPro  5.6<L>  /  Alb  3.3  /  TBili  0.5  /  DBili  x   /  AST  53<H>  /  ALT  37  /  AlkPhos  46  12-      Urinalysis Basic - ( 05 Dec 2020 01:53 )    Color: YELLOW / Appearance: CLEAR / S.024 / pH: 5.5  Gluc: NEGATIVE / Ketone: NEGATIVE  / Bili: NEGATIVE / Urobili: NORMAL   Blood: NEGATIVE / Protein: 20 / Nitrite: NEGATIVE   Leuk Esterase: NEGATIVE / RBC: 0-2 / WBC 0-2   Sq Epi: OCC / Non Sq Epi: x / Bacteria: NEGATIVE        RADIOLOGY & ADDITIONAL TESTS: Reviewed.

## 2020-12-06 NOTE — PROGRESS NOTE ADULT - PROBLEM SELECTOR PLAN 3
Pt with baseline Cr of .64 now with Cr 1.37 and BUN of 27.  -Likely prerenal in nature  -mIVF as above  - trend renal function  - elevated CK, but UA negative for blood, lower suspicion for rhabdo
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
## 2020-12-06 NOTE — PROGRESS NOTE BEHAVIORAL HEALTH - NSBHCHARTREVIEWLAB_PSY_A_CORE FT
.  LABS:                         9.3    5.42  )-----------( 158      ( 06 Dec 2020 04:32 )             30.6     12-    137  |  107  |  8   ----------------------------<  135<H>  3.9   |  20<L>  |  0.90    Ca    8.5      06 Dec 2020 04:32  Phos  3.3       Mg     2.1         TPro  5.6<L>  /  Alb  3.3  /  TBili  0.5  /  DBili  x   /  AST  53<H>  /  ALT  37  /  AlkPhos  46        Urinalysis Basic - ( 05 Dec 2020 01:53 )    Color: YELLOW / Appearance: CLEAR / S.024 / pH: 5.5  Gluc: NEGATIVE / Ketone: NEGATIVE  / Bili: NEGATIVE / Urobili: NORMAL   Blood: NEGATIVE / Protein: 20 / Nitrite: NEGATIVE   Leuk Esterase: NEGATIVE / RBC: 0-2 / WBC 0-2   Sq Epi: OCC / Non Sq Epi: x / Bacteria: NEGATIVE            RADIOLOGY, EKG & ADDITIONAL TESTS: Reviewed.
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
## 2020-12-07 LAB
ALBUMIN SERPL ELPH-MCNC: 3.1 G/DL — LOW (ref 3.3–5)
ALP SERPL-CCNC: 47 U/L — SIGNIFICANT CHANGE UP (ref 40–120)
ALT FLD-CCNC: 29 U/L — SIGNIFICANT CHANGE UP (ref 4–41)
ANION GAP SERPL CALC-SCNC: 8 MMOL/L — SIGNIFICANT CHANGE UP (ref 7–14)
AST SERPL-CCNC: 37 U/L — SIGNIFICANT CHANGE UP (ref 4–40)
BASOPHILS # BLD AUTO: 0.01 K/UL — SIGNIFICANT CHANGE UP (ref 0–0.2)
BASOPHILS NFR BLD AUTO: 0.2 % — SIGNIFICANT CHANGE UP (ref 0–2)
BILIRUB SERPL-MCNC: 0.3 MG/DL — SIGNIFICANT CHANGE UP (ref 0.2–1.2)
BUN SERPL-MCNC: 3 MG/DL — LOW (ref 7–23)
CALCIUM SERPL-MCNC: 8.6 MG/DL — SIGNIFICANT CHANGE UP (ref 8.4–10.5)
CHLORIDE SERPL-SCNC: 110 MMOL/L — HIGH (ref 98–107)
CO2 SERPL-SCNC: 23 MMOL/L — SIGNIFICANT CHANGE UP (ref 22–31)
CREAT SERPL-MCNC: 0.82 MG/DL — SIGNIFICANT CHANGE UP (ref 0.5–1.3)
EOSINOPHIL # BLD AUTO: 0.13 K/UL — SIGNIFICANT CHANGE UP (ref 0–0.5)
EOSINOPHIL NFR BLD AUTO: 3 % — SIGNIFICANT CHANGE UP (ref 0–6)
GLUCOSE SERPL-MCNC: 110 MG/DL — HIGH (ref 70–99)
HCT VFR BLD CALC: 31.3 % — LOW (ref 39–50)
HGB BLD-MCNC: 9.4 G/DL — LOW (ref 13–17)
IANC: 3.1 K/UL — SIGNIFICANT CHANGE UP (ref 1.5–8.5)
IMM GRANULOCYTES NFR BLD AUTO: 0.2 % — SIGNIFICANT CHANGE UP (ref 0–1.5)
LYMPHOCYTES # BLD AUTO: 0.82 K/UL — LOW (ref 1–3.3)
LYMPHOCYTES # BLD AUTO: 18.7 % — SIGNIFICANT CHANGE UP (ref 13–44)
MAGNESIUM SERPL-MCNC: 1.7 MG/DL — SIGNIFICANT CHANGE UP (ref 1.6–2.6)
MCHC RBC-ENTMCNC: 29.4 PG — SIGNIFICANT CHANGE UP (ref 27–34)
MCHC RBC-ENTMCNC: 30 GM/DL — LOW (ref 32–36)
MCV RBC AUTO: 97.8 FL — SIGNIFICANT CHANGE UP (ref 80–100)
MONOCYTES # BLD AUTO: 0.32 K/UL — SIGNIFICANT CHANGE UP (ref 0–0.9)
MONOCYTES NFR BLD AUTO: 7.3 % — SIGNIFICANT CHANGE UP (ref 2–14)
NEUTROPHILS # BLD AUTO: 3.1 K/UL — SIGNIFICANT CHANGE UP (ref 1.8–7.4)
NEUTROPHILS NFR BLD AUTO: 70.6 % — SIGNIFICANT CHANGE UP (ref 43–77)
NRBC # BLD: 0 /100 WBCS — SIGNIFICANT CHANGE UP
NRBC # FLD: 0 K/UL — SIGNIFICANT CHANGE UP
PHOSPHATE SERPL-MCNC: 2.9 MG/DL — SIGNIFICANT CHANGE UP (ref 2.5–4.5)
PLATELET # BLD AUTO: 159 K/UL — SIGNIFICANT CHANGE UP (ref 150–400)
POTASSIUM SERPL-MCNC: 3.7 MMOL/L — SIGNIFICANT CHANGE UP (ref 3.5–5.3)
POTASSIUM SERPL-SCNC: 3.7 MMOL/L — SIGNIFICANT CHANGE UP (ref 3.5–5.3)
PROT SERPL-MCNC: 5.7 G/DL — LOW (ref 6–8.3)
RBC # BLD: 3.2 M/UL — LOW (ref 4.2–5.8)
RBC # FLD: 13.1 % — SIGNIFICANT CHANGE UP (ref 10.3–14.5)
SODIUM SERPL-SCNC: 141 MMOL/L — SIGNIFICANT CHANGE UP (ref 135–145)
T PALLIDUM AB TITR SER: NEGATIVE — SIGNIFICANT CHANGE UP
TROPONIN T, HIGH SENSITIVITY RESULT: 40 NG/L — SIGNIFICANT CHANGE UP
VANCOMYCIN TROUGH SERPL-MCNC: 8.2 UG/ML — LOW (ref 10–20)
WBC # BLD: 4.39 K/UL — SIGNIFICANT CHANGE UP (ref 3.8–10.5)
WBC # FLD AUTO: 4.39 K/UL — SIGNIFICANT CHANGE UP (ref 3.8–10.5)

## 2020-12-07 PROCEDURE — 99291 CRITICAL CARE FIRST HOUR: CPT | Mod: 25

## 2020-12-07 PROCEDURE — 99233 SBSQ HOSP IP/OBS HIGH 50: CPT

## 2020-12-07 PROCEDURE — 93312 ECHO TRANSESOPHAGEAL: CPT | Mod: 26

## 2020-12-07 RX ORDER — VANCOMYCIN HCL 1 G
1250 VIAL (EA) INTRAVENOUS EVERY 8 HOURS
Refills: 0 | Status: DISCONTINUED | OUTPATIENT
Start: 2020-12-07 | End: 2020-12-07

## 2020-12-07 RX ORDER — MAGNESIUM SULFATE 500 MG/ML
2 VIAL (ML) INJECTION ONCE
Refills: 0 | Status: COMPLETED | OUTPATIENT
Start: 2020-12-07 | End: 2020-12-07

## 2020-12-07 RX ADMIN — Medication 16.1 MICROGRAM(S)/KG/MIN: at 09:04

## 2020-12-07 RX ADMIN — FENTANYL CITRATE 6.13 MICROGRAM(S)/KG/HR: 50 INJECTION INTRAVENOUS at 09:06

## 2020-12-07 RX ADMIN — Medication 105 MILLIGRAM(S): at 22:15

## 2020-12-07 RX ADMIN — Medication 105 MILLIGRAM(S): at 05:57

## 2020-12-07 RX ADMIN — CHLORHEXIDINE GLUCONATE 15 MILLILITER(S): 213 SOLUTION TOPICAL at 05:58

## 2020-12-07 RX ADMIN — AZITHROMYCIN 255 MILLIGRAM(S): 500 TABLET, FILM COATED ORAL at 17:02

## 2020-12-07 RX ADMIN — SODIUM CHLORIDE 150 MILLILITER(S): 9 INJECTION, SOLUTION INTRAVENOUS at 05:56

## 2020-12-07 RX ADMIN — CHLORHEXIDINE GLUCONATE 15 MILLILITER(S): 213 SOLUTION TOPICAL at 17:24

## 2020-12-07 RX ADMIN — Medication 105 MILLIGRAM(S): at 13:49

## 2020-12-07 RX ADMIN — SODIUM CHLORIDE 150 MILLILITER(S): 9 INJECTION, SOLUTION INTRAVENOUS at 09:05

## 2020-12-07 RX ADMIN — PIPERACILLIN AND TAZOBACTAM 25 GRAM(S): 4; .5 INJECTION, POWDER, LYOPHILIZED, FOR SOLUTION INTRAVENOUS at 14:39

## 2020-12-07 RX ADMIN — PIPERACILLIN AND TAZOBACTAM 25 GRAM(S): 4; .5 INJECTION, POWDER, LYOPHILIZED, FOR SOLUTION INTRAVENOUS at 22:15

## 2020-12-07 RX ADMIN — PROPOFOL 36.8 MICROGRAM(S)/KG/MIN: 10 INJECTION, EMULSION INTRAVENOUS at 09:04

## 2020-12-07 RX ADMIN — CHLORHEXIDINE GLUCONATE 1 APPLICATION(S): 213 SOLUTION TOPICAL at 06:30

## 2020-12-07 RX ADMIN — Medication 0.5 MILLIGRAM(S): at 05:57

## 2020-12-07 RX ADMIN — ENOXAPARIN SODIUM 40 MILLIGRAM(S): 100 INJECTION SUBCUTANEOUS at 05:57

## 2020-12-07 RX ADMIN — PIPERACILLIN AND TAZOBACTAM 25 GRAM(S): 4; .5 INJECTION, POWDER, LYOPHILIZED, FOR SOLUTION INTRAVENOUS at 05:58

## 2020-12-07 RX ADMIN — MIDAZOLAM HYDROCHLORIDE 2.45 MG/KG/HR: 1 INJECTION, SOLUTION INTRAMUSCULAR; INTRAVENOUS at 09:04

## 2020-12-07 RX ADMIN — Medication 50 GRAM(S): at 05:57

## 2020-12-07 RX ADMIN — ENOXAPARIN SODIUM 40 MILLIGRAM(S): 100 INJECTION SUBCUTANEOUS at 17:05
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
## 2020-12-07 NOTE — PROGRESS NOTE BEHAVIORAL HEALTH - NSBHCHARTREVIEWVS_PSY_A_CORE FT
Vital Signs Last 24 Hrs  T(C): 35.9 (07 Dec 2020 16:00), Max: 37.7 (06 Dec 2020 20:00)  T(F): 96.6 (07 Dec 2020 16:00), Max: 99.8 (06 Dec 2020 20:00)  HR: 60 (07 Dec 2020 17:00) (60 - 83)  BP: 103/54 (07 Dec 2020 17:00) (98/62 - 137/65)  BP(mean): 65 (07 Dec 2020 17:00) (63 - 87)  RR: 18 (07 Dec 2020 17:00) (0 - 21)  SpO2: 100% (07 Dec 2020 17:00) (94% - 100%)
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
## 2020-12-07 NOTE — PROGRESS NOTE BEHAVIORAL HEALTH - NSBHCHARTREVIEWLAB_PSY_A_CORE FT
9.4    4.39  )-----------( 159      ( 07 Dec 2020 04:45 )             31.3     12-07    141  |  110<H>  |  3<L>  ----------------------------<  110<H>  3.7   |  23  |  0.82    Ca    8.6      07 Dec 2020 04:45  Phos  2.9     12-07  Mg     1.7     12-07    TPro  5.7<L>  /  Alb  3.1<L>  /  TBili  0.3  /  DBili  x   /  AST  37  /  ALT  29  /  AlkPhos  47  12-07

## 2020-12-07 NOTE — PROGRESS NOTE BEHAVIORAL HEALTH - RISK ASSESSMENT
Pt at elevated acute and chronic risk for suicide 2/2 multiple risk factors, including treatment resistant depression, hx of multiple SA's, some requiring medical/ICU stay, hx of recent SA resulting in [facility] admission on 11/10/20, hx alcohol abuse, severe agitation, impulsivity, insomnia,  race, male gender, acute medical condition. Protective factors include marriage, children, social support, employment stability, residential stability, relationship stability.
Pt at elevated acute and chronic risk for suicide 2/2 multiple risk factors, including treatment resistant depression, hx of multiple SA's, some requiring medical/ICU stay, hx of recent SA resulting in [facility] admission on 11/10/20, hx alcohol abuse, severe agitation, impulsivity, insomnia,  race, male gender, acute medical condition. Protective factors include marriage, children, social support, employment stability, residential stability, relationship stability.

## 2020-12-07 NOTE — PROGRESS NOTE ADULT - SUBJECTIVE AND OBJECTIVE BOX
INTERVAL HPI/OVERNIGHT EVENTS:    SUBJECTIVE: Patient seen and examined at bedside.       VITAL SIGNS:  ICU Vital Signs Last 24 Hrs  T(C): 37.7 (07 Dec 2020 04:00), Max: 38.3 (06 Dec 2020 13:50)  T(F): 99.8 (07 Dec 2020 04:00), Max: 100.9 (06 Dec 2020 13:50)  HR: 74 (07 Dec 2020 07:22) (72 - 116)  BP: 116/61 (07 Dec 2020 06:00) (98/62 - 137/65)  BP(mean): 76 (07 Dec 2020 06:00) (70 - 103)  ABP: --  ABP(mean): --  RR: 18 (07 Dec 2020 06:00) (0 - 34)  SpO2: 99% (07 Dec 2020 07:22) (98% - 100%)    Mode: AC/ CMV (Assist Control/ Continuous Mandatory Ventilation), RR (machine): 18, TV (machine): 450, FiO2: 30, PEEP: 5, ITime: 0.81, MAP: 9, PIP: 18  Plateau pressure:   P/F ratio:     12-06 @ 07:01  -  12-07 @ 07:00  --------------------------------------------------------  IN: 6766.7 mL / OUT: 3010 mL / NET: 3756.7 mL      CAPILLARY BLOOD GLUCOSE        ECG:    PHYSICAL EXAM:    General:   HEENT:   Neck:   Respiratory:   Cardiovascular:   Abdomen:   Extremities:  Neurological:    MEDICATIONS:  MEDICATIONS  (STANDING):  azithromycin  IVPB 500 milliGRAM(s) IV Intermittent every 24 hours  chlorhexidine 0.12% Liquid 15 milliLiter(s) Oral Mucosa every 12 hours  chlorhexidine 4% Liquid 1 Application(s) Topical <User Schedule>  clonazePAM  Tablet 0.5 milliGRAM(s) Oral two times a day  dextrose 5% + lactated ringers. 1000 milliLiter(s) (150 mL/Hr) IV Continuous <Continuous>  enoxaparin Injectable 40 milliGRAM(s) SubCutaneous two times a day  fentaNYL   Infusion. 0.5 MICROgram(s)/kG/Hr (6.13 mL/Hr) IV Continuous <Continuous>  midazolam Infusion 0.02 mG/kG/Hr (2.45 mL/Hr) IV Continuous <Continuous>  norepinephrine Infusion 0.07 MICROgram(s)/kG/Min (16.1 mL/Hr) IV Continuous <Continuous>  piperacillin/tazobactam IVPB.. 3.375 Gram(s) IV Intermittent every 8 hours  propofol Infusion 50 MICROgram(s)/kG/Min (36.8 mL/Hr) IV Continuous <Continuous>  thiamine IVPB 500 milliGRAM(s) IV Intermittent every 8 hours  vancomycin  IVPB 1250 milliGRAM(s) IV Intermittent every 8 hours    MEDICATIONS  (PRN):  acetaminophen    Suspension .. 650 milliGRAM(s) Oral every 6 hours PRN Temp greater or equal to 38C (100.4F)      ALLERGIES:  Allergies    No Known Allergies    Intolerances        LABS:                        9.4    4.39  )-----------( 159      ( 07 Dec 2020 04:45 )             31.3     12-07    141  |  110<H>  |  3<L>  ----------------------------<  110<H>  3.7   |  23  |  0.82    Ca    8.6      07 Dec 2020 04:45  Phos  2.9     12-07  Mg     1.7     12-07    TPro  5.7<L>  /  Alb  3.1<L>  /  TBili  0.3  /  DBili  x   /  AST  37  /  ALT  29  /  AlkPhos  47  12-07          RADIOLOGY & ADDITIONAL TESTS: Reviewed.           INTERVAL HPI/OVERNIGHT EVENTS:    SUBJECTIVE: Patient seen and examined at bedside. Intubated, sedated.       VITAL SIGNS:  ICU Vital Signs Last 24 Hrs  T(C): 37.7 (07 Dec 2020 04:00), Max: 38.3 (06 Dec 2020 13:50)  T(F): 99.8 (07 Dec 2020 04:00), Max: 100.9 (06 Dec 2020 13:50)  HR: 74 (07 Dec 2020 07:22) (72 - 116)  BP: 116/61 (07 Dec 2020 06:00) (98/62 - 137/65)  BP(mean): 76 (07 Dec 2020 06:00) (70 - 103)  ABP: --  ABP(mean): --  RR: 18 (07 Dec 2020 06:00) (0 - 34)  SpO2: 99% (07 Dec 2020 07:22) (98% - 100%)    Mode: AC/ CMV (Assist Control/ Continuous Mandatory Ventilation), RR (machine): 18, TV (machine): 450, FiO2: 30, PEEP: 5, ITime: 0.81, MAP: 9, PIP: 18  Plateau pressure:   P/F ratio:     12-06 @ 07:01  -  12-07 @ 07:00  --------------------------------------------------------  IN: 6766.7 mL / OUT: 3010 mL / NET: 3756.7 mL      CAPILLARY BLOOD GLUCOSE        ECG:    PHYSICAL EXAM:    General:   HEENT:   Neck:   Respiratory:   Cardiovascular:   Abdomen:   Extremities:  Neurological:    MEDICATIONS:  MEDICATIONS  (STANDING):  azithromycin  IVPB 500 milliGRAM(s) IV Intermittent every 24 hours  chlorhexidine 0.12% Liquid 15 milliLiter(s) Oral Mucosa every 12 hours  chlorhexidine 4% Liquid 1 Application(s) Topical <User Schedule>  clonazePAM  Tablet 0.5 milliGRAM(s) Oral two times a day  dextrose 5% + lactated ringers. 1000 milliLiter(s) (150 mL/Hr) IV Continuous <Continuous>  enoxaparin Injectable 40 milliGRAM(s) SubCutaneous two times a day  fentaNYL   Infusion. 0.5 MICROgram(s)/kG/Hr (6.13 mL/Hr) IV Continuous <Continuous>  midazolam Infusion 0.02 mG/kG/Hr (2.45 mL/Hr) IV Continuous <Continuous>  norepinephrine Infusion 0.07 MICROgram(s)/kG/Min (16.1 mL/Hr) IV Continuous <Continuous>  piperacillin/tazobactam IVPB.. 3.375 Gram(s) IV Intermittent every 8 hours  propofol Infusion 50 MICROgram(s)/kG/Min (36.8 mL/Hr) IV Continuous <Continuous>  thiamine IVPB 500 milliGRAM(s) IV Intermittent every 8 hours  vancomycin  IVPB 1250 milliGRAM(s) IV Intermittent every 8 hours    MEDICATIONS  (PRN):  acetaminophen    Suspension .. 650 milliGRAM(s) Oral every 6 hours PRN Temp greater or equal to 38C (100.4F)      ALLERGIES:  Allergies    No Known Allergies    Intolerances        LABS:                        9.4    4.39  )-----------( 159      ( 07 Dec 2020 04:45 )             31.3     12-07    141  |  110<H>  |  3<L>  ----------------------------<  110<H>  3.7   |  23  |  0.82    Ca    8.6      07 Dec 2020 04:45  Phos  2.9     12-07  Mg     1.7     12-07    TPro  5.7<L>  /  Alb  3.1<L>  /  TBili  0.3  /  DBili  x   /  AST  37  /  ALT  29  /  AlkPhos  47  12-07          RADIOLOGY & ADDITIONAL TESTS: Reviewed.

## 2020-12-07 NOTE — PROGRESS NOTE ADULT - ATTENDING COMMENTS
Patient examined and care reviewed in detail on bedside rounds  Critically ill on vent with unexplained MS change   Frequent bedside visits with therapy change today.   I have personally provided 35+ minutes of critical care time concurrently with the resident/fellow; this excludes time spent on separate procedures.

## 2020-12-07 NOTE — PROGRESS NOTE BEHAVIORAL HEALTH - NSBHCONSULTMEDS_PSY_A_CORE FT
C/w thiamine 100mg daily, melatonin 5mg qhs. Discontinue [facility] meds: Wellbutrin XL 150mg daily, naltrexone 50mg daily, Seroquel 25mg PRN.    Avoid antipsychotics in context of elevated CK. Avoid restraints if possible, though reasonable to use when needed. Avoid anticholinergic medications such as anticholinergic as these are deliriogenic, though recommend Ativan PRN agitation when needed.
C/w thiamine 100mg daily, melatonin 5mg qhs. Discontinue [facility] meds: Wellbutrin XL 150mg daily, naltrexone 50mg daily, Seroquel 25mg PRN.    Avoid antipsychotics in context of elevated CK. Avoid restraints if possible, though reasonable to use when needed. Avoid anticholinergic medications such as anticholinergic as these are deliriogenic, though recommend Ativan PRN agitation when needed.
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
## 2020-12-07 NOTE — CHART NOTE - NSCHARTNOTEFT_GEN_A_CORE
Procedure: Transesophageal Echocardiogram  Indication: shock, hypoxemic respiratory failure  Consent: obtained, written  Operators: Dr. Figueroa  Anesthesia: propofol, fentanyl, midazolam, cisatracurium  Preparation:   Technique:   Findings:    AV:      MV:     PV:      TV:     LV:     RV:      LA:      RA:     SVC:      Aorta:     PA:     Doppler:  Note:       Other:     Assessment and Plan: Procedure: Transesophageal Echocardiogram  Indication: shock, hypoxemic respiratory failure  Consent: obtained, written  Operators: Dr. Figueroa  Anesthesia: propofol, fentanyl, midazolam, cisatracurium  Findings:    AV: normal    MV: mild MR    PV: grossly normal    TV: normal    LV: normal LV systolic function  VTi 17-18  Fractional area change 50%  TDI 12 at mitral annulus, unlikely to have significant diastolic dysfunction  E/A normal    RV: Moderate RV dilation with normal systolic function    LA: normal  PEGGY velocity >40, low risk for thrombus formation    RA: normal    SVC: normal size  No respiratory variation in SVC diameter    Aorta: normal  No evidence of dissection    PA: no PE visualized  , no pulmonary HTN    TELUS: translobar left sided consolidation pattern with dynamic air bronchograms    Simone Figueroa PGY-5  Pulmonary/Critical Care Fellow  Pager: [phone] ([place]) [phone] ([place])  Pulmonary Spectra #[id number] ([place]) / [phone] ([place])      Other:     Assessment and Plan:

## 2020-12-08 LAB
ALBUMIN SERPL ELPH-MCNC: 2.4 G/DL — LOW (ref 3.3–5)
ALP SERPL-CCNC: 44 U/L — SIGNIFICANT CHANGE UP (ref 40–120)
ALT FLD-CCNC: <5 U/L — LOW (ref 4–41)
ANION GAP SERPL CALC-SCNC: 9 MMOL/L — SIGNIFICANT CHANGE UP (ref 7–14)
AST SERPL-CCNC: 23 U/L — SIGNIFICANT CHANGE UP (ref 4–40)
BASOPHILS # BLD AUTO: 0.01 K/UL — SIGNIFICANT CHANGE UP (ref 0–0.2)
BASOPHILS NFR BLD AUTO: 0.3 % — SIGNIFICANT CHANGE UP (ref 0–2)
BILIRUB SERPL-MCNC: 0.2 MG/DL — SIGNIFICANT CHANGE UP (ref 0.2–1.2)
BUN SERPL-MCNC: 4 MG/DL — LOW (ref 7–23)
CALCIUM SERPL-MCNC: 7.8 MG/DL — LOW (ref 8.4–10.5)
CHLORIDE SERPL-SCNC: 104 MMOL/L — SIGNIFICANT CHANGE UP (ref 98–107)
CK SERPL-CCNC: 217 U/L — HIGH (ref 30–200)
CO2 SERPL-SCNC: 23 MMOL/L — SIGNIFICANT CHANGE UP (ref 22–31)
CREAT SERPL-MCNC: 0.7 MG/DL — SIGNIFICANT CHANGE UP (ref 0.5–1.3)
EOSINOPHIL # BLD AUTO: 0.14 K/UL — SIGNIFICANT CHANGE UP (ref 0–0.5)
EOSINOPHIL NFR BLD AUTO: 3.9 % — SIGNIFICANT CHANGE UP (ref 0–6)
GLUCOSE SERPL-MCNC: 91 MG/DL — SIGNIFICANT CHANGE UP (ref 70–99)
GRAM STN FLD: SIGNIFICANT CHANGE UP
HCT VFR BLD CALC: 27.2 % — LOW (ref 39–50)
HGB BLD-MCNC: 9 G/DL — LOW (ref 13–17)
IANC: 2.71 K/UL — SIGNIFICANT CHANGE UP (ref 1.5–8.5)
IMM GRANULOCYTES NFR BLD AUTO: 0.6 % — SIGNIFICANT CHANGE UP (ref 0–1.5)
LYMPHOCYTES # BLD AUTO: 0.42 K/UL — LOW (ref 1–3.3)
LYMPHOCYTES # BLD AUTO: 11.8 % — LOW (ref 13–44)
MAGNESIUM SERPL-MCNC: 1.6 MG/DL — SIGNIFICANT CHANGE UP (ref 1.6–2.6)
MCHC RBC-ENTMCNC: 31.7 PG — SIGNIFICANT CHANGE UP (ref 27–34)
MCHC RBC-ENTMCNC: 33.1 GM/DL — SIGNIFICANT CHANGE UP (ref 32–36)
MCV RBC AUTO: 95.8 FL — SIGNIFICANT CHANGE UP (ref 80–100)
MONOCYTES # BLD AUTO: 0.25 K/UL — SIGNIFICANT CHANGE UP (ref 0–0.9)
MONOCYTES NFR BLD AUTO: 7 % — SIGNIFICANT CHANGE UP (ref 2–14)
NEUTROPHILS # BLD AUTO: 2.71 K/UL — SIGNIFICANT CHANGE UP (ref 1.8–7.4)
NEUTROPHILS NFR BLD AUTO: 76.4 % — SIGNIFICANT CHANGE UP (ref 43–77)
NRBC # BLD: 0 /100 WBCS — SIGNIFICANT CHANGE UP
NRBC # FLD: 0 K/UL — SIGNIFICANT CHANGE UP
PHOSPHATE SERPL-MCNC: 3.2 MG/DL — SIGNIFICANT CHANGE UP (ref 2.5–4.5)
PLATELET # BLD AUTO: 158 K/UL — SIGNIFICANT CHANGE UP (ref 150–400)
POTASSIUM SERPL-MCNC: 3.5 MMOL/L — SIGNIFICANT CHANGE UP (ref 3.5–5.3)
POTASSIUM SERPL-SCNC: 3.5 MMOL/L — SIGNIFICANT CHANGE UP (ref 3.5–5.3)
PROT SERPL-MCNC: 5 G/DL — LOW (ref 6–8.3)
RBC # BLD: 2.84 M/UL — LOW (ref 4.2–5.8)
RBC # FLD: 13.2 % — SIGNIFICANT CHANGE UP (ref 10.3–14.5)
SODIUM SERPL-SCNC: 136 MMOL/L — SIGNIFICANT CHANGE UP (ref 135–145)
SPECIMEN SOURCE: SIGNIFICANT CHANGE UP
TRIGL SERPL-MCNC: 116 MG/DL — SIGNIFICANT CHANGE UP
VDRL CSF-TITR: SIGNIFICANT CHANGE UP
WBC # BLD: 3.55 K/UL — LOW (ref 3.8–10.5)
WBC # FLD AUTO: 3.55 K/UL — LOW (ref 3.8–10.5)

## 2020-12-08 PROCEDURE — 93306 TTE W/DOPPLER COMPLETE: CPT | Mod: 26

## 2020-12-08 PROCEDURE — 99291 CRITICAL CARE FIRST HOUR: CPT | Mod: 25

## 2020-12-08 PROCEDURE — 99233 SBSQ HOSP IP/OBS HIGH 50: CPT

## 2020-12-08 RX ORDER — THIAMINE MONONITRATE (VIT B1) 100 MG
100 TABLET ORAL DAILY
Refills: 0 | Status: DISCONTINUED | OUTPATIENT
Start: 2020-12-09 | End: 2020-12-14

## 2020-12-08 RX ORDER — MAGNESIUM SULFATE 500 MG/ML
2 VIAL (ML) INJECTION ONCE
Refills: 0 | Status: COMPLETED | OUTPATIENT
Start: 2020-12-08 | End: 2020-12-08

## 2020-12-08 RX ORDER — POTASSIUM CHLORIDE 20 MEQ
40 PACKET (EA) ORAL EVERY 4 HOURS
Refills: 0 | Status: COMPLETED | OUTPATIENT
Start: 2020-12-08 | End: 2020-12-08

## 2020-12-08 RX ORDER — SODIUM CHLORIDE 9 MG/ML
1000 INJECTION, SOLUTION INTRAVENOUS ONCE
Refills: 0 | Status: COMPLETED | OUTPATIENT
Start: 2020-12-08 | End: 2020-12-08

## 2020-12-08 RX ORDER — LANOLIN ALCOHOL/MO/W.PET/CERES
3 CREAM (GRAM) TOPICAL AT BEDTIME
Refills: 0 | Status: DISCONTINUED | OUTPATIENT
Start: 2020-12-08 | End: 2020-12-14

## 2020-12-08 RX ADMIN — ENOXAPARIN SODIUM 40 MILLIGRAM(S): 100 INJECTION SUBCUTANEOUS at 05:24

## 2020-12-08 RX ADMIN — PIPERACILLIN AND TAZOBACTAM 25 GRAM(S): 4; .5 INJECTION, POWDER, LYOPHILIZED, FOR SOLUTION INTRAVENOUS at 14:27

## 2020-12-08 RX ADMIN — Medication 105 MILLIGRAM(S): at 13:31

## 2020-12-08 RX ADMIN — Medication 3 MILLIGRAM(S): at 21:40

## 2020-12-08 RX ADMIN — Medication 16.1 MICROGRAM(S)/KG/MIN: at 08:20

## 2020-12-08 RX ADMIN — CHLORHEXIDINE GLUCONATE 1 APPLICATION(S): 213 SOLUTION TOPICAL at 06:12

## 2020-12-08 RX ADMIN — SODIUM CHLORIDE 1000 MILLILITER(S): 9 INJECTION, SOLUTION INTRAVENOUS at 10:30

## 2020-12-08 RX ADMIN — Medication 105 MILLIGRAM(S): at 05:24

## 2020-12-08 RX ADMIN — PROPOFOL 36.8 MICROGRAM(S)/KG/MIN: 10 INJECTION, EMULSION INTRAVENOUS at 08:19

## 2020-12-08 RX ADMIN — Medication 40 MILLIEQUIVALENT(S): at 05:23

## 2020-12-08 RX ADMIN — FENTANYL CITRATE 6.13 MICROGRAM(S)/KG/HR: 50 INJECTION INTRAVENOUS at 11:44

## 2020-12-08 RX ADMIN — MIDAZOLAM HYDROCHLORIDE 2.45 MG/KG/HR: 1 INJECTION, SOLUTION INTRAMUSCULAR; INTRAVENOUS at 08:19

## 2020-12-08 RX ADMIN — PIPERACILLIN AND TAZOBACTAM 25 GRAM(S): 4; .5 INJECTION, POWDER, LYOPHILIZED, FOR SOLUTION INTRAVENOUS at 06:12

## 2020-12-08 RX ADMIN — Medication 650 MILLIGRAM(S): at 10:11

## 2020-12-08 RX ADMIN — PIPERACILLIN AND TAZOBACTAM 25 GRAM(S): 4; .5 INJECTION, POWDER, LYOPHILIZED, FOR SOLUTION INTRAVENOUS at 21:36

## 2020-12-08 RX ADMIN — ENOXAPARIN SODIUM 40 MILLIGRAM(S): 100 INJECTION SUBCUTANEOUS at 17:12

## 2020-12-08 RX ADMIN — Medication 40 MILLIEQUIVALENT(S): at 10:10

## 2020-12-08 RX ADMIN — Medication 50 GRAM(S): at 05:24

## 2020-12-08 RX ADMIN — CHLORHEXIDINE GLUCONATE 15 MILLILITER(S): 213 SOLUTION TOPICAL at 17:09

## 2020-12-08 RX ADMIN — FENTANYL CITRATE 6.13 MICROGRAM(S)/KG/HR: 50 INJECTION INTRAVENOUS at 08:20

## 2020-12-08 RX ADMIN — CHLORHEXIDINE GLUCONATE 15 MILLILITER(S): 213 SOLUTION TOPICAL at 05:24

## 2020-12-08 NOTE — PROGRESS NOTE ADULT - SUBJECTIVE AND OBJECTIVE BOX
INTERVAL HPI/OVERNIGHT EVENTS:    SUBJECTIVE: Patient seen and examined at bedside.   Overnight events: CT chest multifocal PNA. FREDI completed yesterday, left lobe consolidation. Vanco dc'd. IVF dc'd. Patient started o Azithro. Will complete 7d Pip-Tazo. Psych recommends EEG and brain MRI.       VITAL SIGNS:  ICU Vital Signs Last 24 Hrs  T(C): 36.8 (08 Dec 2020 00:00), Max: 37.2 (07 Dec 2020 08:00)  T(F): 98.2 (08 Dec 2020 00:00), Max: 99 (07 Dec 2020 08:00)  HR: 86 (08 Dec 2020 07:29) (60 - 88)  BP: 120/55 (08 Dec 2020 07:00) (101/52 - 131/61)  BP(mean): 70 (08 Dec 2020 07:00) (62 - 83)  ABP: --  ABP(mean): --  RR: 18 (08 Dec 2020 07:00) (18 - 22)  SpO2: 97% (08 Dec 2020 07:29) (94% - 100%)    Mode: AC/ CMV (Assist Control/ Continuous Mandatory Ventilation), RR (machine): 18, TV (machine): 450, FiO2: 30, PEEP: 5, ITime: 0.8, MAP: 9, PIP: 18  Plateau pressure:   P/F ratio:     12-07 @ 07:01  -  12-08 @ 07:00  --------------------------------------------------------  IN: 3250.3 mL / OUT: 700 mL / NET: 2550.3 mL      CAPILLARY BLOOD GLUCOSE        ECG:    PHYSICAL EXAM:    General:   HEENT:   Neck:   Respiratory:   Cardiovascular:   Abdomen:   Extremities:  Neurological:    MEDICATIONS:  MEDICATIONS  (STANDING):  azithromycin  IVPB 500 milliGRAM(s) IV Intermittent every 24 hours  chlorhexidine 0.12% Liquid 15 milliLiter(s) Oral Mucosa every 12 hours  chlorhexidine 4% Liquid 1 Application(s) Topical <User Schedule>  enoxaparin Injectable 40 milliGRAM(s) SubCutaneous two times a day  fentaNYL   Infusion. 0.5 MICROgram(s)/kG/Hr (6.13 mL/Hr) IV Continuous <Continuous>  midazolam Infusion 0.02 mG/kG/Hr (2.45 mL/Hr) IV Continuous <Continuous>  norepinephrine Infusion 0.07 MICROgram(s)/kG/Min (16.1 mL/Hr) IV Continuous <Continuous>  piperacillin/tazobactam IVPB.. 3.375 Gram(s) IV Intermittent every 8 hours  potassium chloride   Powder 40 milliEquivalent(s) Oral every 4 hours  propofol Infusion 50 MICROgram(s)/kG/Min (36.8 mL/Hr) IV Continuous <Continuous>  thiamine IVPB 500 milliGRAM(s) IV Intermittent every 8 hours    MEDICATIONS  (PRN):  acetaminophen    Suspension .. 650 milliGRAM(s) Oral every 6 hours PRN Temp greater or equal to 38C (100.4F)      ALLERGIES:  Allergies    No Known Allergies    Intolerances        LABS:                        9.0    3.55  )-----------( 158      ( 08 Dec 2020 02:19 )             27.2     12-08    136  |  104  |  4<L>  ----------------------------<  91  3.5   |  23  |  0.70    Ca    7.8<L>      08 Dec 2020 02:19  Phos  3.2     12-08  Mg     1.6     12-08    TPro  5.0<L>  /  Alb  2.4<L>  /  TBili  0.2  /  DBili  x   /  AST  23  /  ALT  <5<L>  /  AlkPhos  44  12-08          RADIOLOGY & ADDITIONAL TESTS: Reviewed.           INTERVAL HPI/OVERNIGHT EVENTS:    SUBJECTIVE: Patient seen and examined at bedside.   Overnight events: CT chest multifocal PNA. FREID completed yesterday, left lobe consolidation. Vanco dc'd. IVF dc'd. Patient started o Azithro. Will complete 7d Pip-Tazo. Psych recommends EEG and brain MRI.       VITAL SIGNS:  ICU Vital Signs Last 24 Hrs  T(C): 36.8 (08 Dec 2020 00:00), Max: 37.2 (07 Dec 2020 08:00)  T(F): 98.2 (08 Dec 2020 00:00), Max: 99 (07 Dec 2020 08:00)  HR: 86 (08 Dec 2020 07:29) (60 - 88)  BP: 120/55 (08 Dec 2020 07:00) (101/52 - 131/61)  BP(mean): 70 (08 Dec 2020 07:00) (62 - 83)  ABP: --  ABP(mean): --  RR: 18 (08 Dec 2020 07:00) (18 - 22)  SpO2: 97% (08 Dec 2020 07:29) (94% - 100%)    Mode: AC/ CMV (Assist Control/ Continuous Mandatory Ventilation), RR (machine): 18, TV (machine): 450, FiO2: 30, PEEP: 5, ITime: 0.8, MAP: 9, PIP: 18  Plateau pressure:   P/F ratio:     12-07 @ 07:01  -  12-08 @ 07:00  --------------------------------------------------------  IN: 3250.3 mL / OUT: 700 mL / NET: 2550.3 mL      CAPILLARY BLOOD GLUCOSE        ECG:    PHYSICAL EXAM:    General: lethargic   Respiratory: on ventilator   Cardiovascular:   Abdomen:   Extremities:  Neurological:    MEDICATIONS:  MEDICATIONS  (STANDING):  azithromycin  IVPB 500 milliGRAM(s) IV Intermittent every 24 hours  chlorhexidine 0.12% Liquid 15 milliLiter(s) Oral Mucosa every 12 hours  chlorhexidine 4% Liquid 1 Application(s) Topical <User Schedule>  enoxaparin Injectable 40 milliGRAM(s) SubCutaneous two times a day  fentaNYL   Infusion. 0.5 MICROgram(s)/kG/Hr (6.13 mL/Hr) IV Continuous <Continuous>  midazolam Infusion 0.02 mG/kG/Hr (2.45 mL/Hr) IV Continuous <Continuous>  norepinephrine Infusion 0.07 MICROgram(s)/kG/Min (16.1 mL/Hr) IV Continuous <Continuous>  piperacillin/tazobactam IVPB.. 3.375 Gram(s) IV Intermittent every 8 hours  potassium chloride   Powder 40 milliEquivalent(s) Oral every 4 hours  propofol Infusion 50 MICROgram(s)/kG/Min (36.8 mL/Hr) IV Continuous <Continuous>  thiamine IVPB 500 milliGRAM(s) IV Intermittent every 8 hours    MEDICATIONS  (PRN):  acetaminophen    Suspension .. 650 milliGRAM(s) Oral every 6 hours PRN Temp greater or equal to 38C (100.4F)      ALLERGIES:  Allergies    No Known Allergies    Intolerances        LABS:                        9.0    3.55  )-----------( 158      ( 08 Dec 2020 02:19 )             27.2     12-08    136  |  104  |  4<L>  ----------------------------<  91  3.5   |  23  |  0.70    Ca    7.8<L>      08 Dec 2020 02:19  Phos  3.2     12-08  Mg     1.6     12-08    TPro  5.0<L>  /  Alb  2.4<L>  /  TBili  0.2  /  DBili  x   /  AST  23  /  ALT  <5<L>  /  AlkPhos  44  12-08          RADIOLOGY & ADDITIONAL TESTS: Reviewed.           INTERVAL HPI/OVERNIGHT EVENTS:    SUBJECTIVE: Patient seen and examined at bedside.   Overnight events: CT chest multifocal PNA. FREDI completed yesterday, left lobe consolidation. Vanco dc'd. IVF dc'd. Patient started o Azithro. Will complete 7d Pip-Tazo. Psych recommends EEG and brain MRI.       VITAL SIGNS:  ICU Vital Signs Last 24 Hrs  T(C): 36.8 (08 Dec 2020 00:00), Max: 37.2 (07 Dec 2020 08:00)  T(F): 98.2 (08 Dec 2020 00:00), Max: 99 (07 Dec 2020 08:00)  HR: 86 (08 Dec 2020 07:29) (60 - 88)  BP: 120/55 (08 Dec 2020 07:00) (101/52 - 131/61)  BP(mean): 70 (08 Dec 2020 07:00) (62 - 83)  ABP: --  ABP(mean): --  RR: 18 (08 Dec 2020 07:00) (18 - 22)  SpO2: 97% (08 Dec 2020 07:29) (94% - 100%)    Mode: AC/ CMV (Assist Control/ Continuous Mandatory Ventilation), RR (machine): 18, TV (machine): 450, FiO2: 30, PEEP: 5, ITime: 0.8, MAP: 9, PIP: 18  Plateau pressure:   P/F ratio:     12-07 @ 07:01  -  12-08 @ 07:00  --------------------------------------------------------  IN: 3250.3 mL / OUT: 700 mL / NET: 2550.3 mL      CAPILLARY BLOOD GLUCOSE        ECG:    PHYSICAL EXAM:    General: lethargic   Respiratory: on ventilator   Neurological: lethargic     MEDICATIONS:  MEDICATIONS  (STANDING):  azithromycin  IVPB 500 milliGRAM(s) IV Intermittent every 24 hours  chlorhexidine 0.12% Liquid 15 milliLiter(s) Oral Mucosa every 12 hours  chlorhexidine 4% Liquid 1 Application(s) Topical <User Schedule>  enoxaparin Injectable 40 milliGRAM(s) SubCutaneous two times a day  fentaNYL   Infusion. 0.5 MICROgram(s)/kG/Hr (6.13 mL/Hr) IV Continuous <Continuous>  midazolam Infusion 0.02 mG/kG/Hr (2.45 mL/Hr) IV Continuous <Continuous>  norepinephrine Infusion 0.07 MICROgram(s)/kG/Min (16.1 mL/Hr) IV Continuous <Continuous>  piperacillin/tazobactam IVPB.. 3.375 Gram(s) IV Intermittent every 8 hours  potassium chloride   Powder 40 milliEquivalent(s) Oral every 4 hours  propofol Infusion 50 MICROgram(s)/kG/Min (36.8 mL/Hr) IV Continuous <Continuous>  thiamine IVPB 500 milliGRAM(s) IV Intermittent every 8 hours    MEDICATIONS  (PRN):  acetaminophen    Suspension .. 650 milliGRAM(s) Oral every 6 hours PRN Temp greater or equal to 38C (100.4F)      ALLERGIES:  Allergies    No Known Allergies    Intolerances        LABS:                        9.0    3.55  )-----------( 158      ( 08 Dec 2020 02:19 )             27.2     12-08    136  |  104  |  4<L>  ----------------------------<  91  3.5   |  23  |  0.70    Ca    7.8<L>      08 Dec 2020 02:19  Phos  3.2     12-08  Mg     1.6     12-08    TPro  5.0<L>  /  Alb  2.4<L>  /  TBili  0.2  /  DBili  x   /  AST  23  /  ALT  <5<L>  /  AlkPhos  44  12-08          RADIOLOGY & ADDITIONAL TESTS: Reviewed.

## 2020-12-08 NOTE — PROGRESS NOTE ADULT - MINUTES
-Patient with significant history of CAD s/p recent PTCA/DEWAYNE of LM in-stent re-stenosis, presents with NSTEMI/cardiogenic shock  -Continue Levophed for pressor support  -Troponin remains  > 50  -Continue current medical management-ASA, Brilinta, heparin gtt  -Coreg held due to need for pressor support  -Statin held due to elevated liver enzymes  -2D echo showed EF of 45-50%, +WMA  -Cath images from Feb and May 2018 have been requested for Dr. Boone to review. Will make plans for cath based on Dr. Boone's review of images.   -Dr. Boone has requested arterial studies to BLE to assess for PAD if high risk intervention to LM or LAD needed   -Patient with new inferior wall MI and severe MR on echo. Will plan for left and right heart cath tomorrow with Dr. Boone.   -morning labs to reassess renal function   -Gentle IVF hydration, repeat labs at noon to evaluate renal function.  -LHC/RHC pending repeat labs    
35
35
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
## 2020-12-08 NOTE — PATIENT PROFILE ADULT - HOSPITALS/PSYCHIATRIC FACILITIES
Nakita lady to visit with Calm and in great spirits this morning Ellis Brizuela she is going home tomorrow Thankful and pleased with her care team 
Prayer for continued health and wholeness Ti Ray, staff Breana tapia [address], [address] [facility]  /   [email or web address] 
 [facility]

## 2020-12-09 LAB
ALBUMIN SERPL ELPH-MCNC: 2.7 G/DL — LOW (ref 3.3–5)
ALP SERPL-CCNC: 48 U/L — SIGNIFICANT CHANGE UP (ref 40–120)
ALT FLD-CCNC: 20 U/L — SIGNIFICANT CHANGE UP (ref 4–41)
ANION GAP SERPL CALC-SCNC: 9 MMOL/L — SIGNIFICANT CHANGE UP (ref 7–14)
APPEARANCE UR: CLEAR — SIGNIFICANT CHANGE UP
AST SERPL-CCNC: 17 U/L — SIGNIFICANT CHANGE UP (ref 4–40)
BACTERIA # UR AUTO: NEGATIVE — SIGNIFICANT CHANGE UP
BASOPHILS # BLD AUTO: 0.01 K/UL — SIGNIFICANT CHANGE UP (ref 0–0.2)
BASOPHILS NFR BLD AUTO: 0.2 % — SIGNIFICANT CHANGE UP (ref 0–2)
BILIRUB SERPL-MCNC: <0.2 MG/DL — SIGNIFICANT CHANGE UP (ref 0.2–1.2)
BILIRUB UR-MCNC: NEGATIVE — SIGNIFICANT CHANGE UP
BUN SERPL-MCNC: 5 MG/DL — LOW (ref 7–23)
CALCIUM SERPL-MCNC: 8.3 MG/DL — LOW (ref 8.4–10.5)
CHLORIDE SERPL-SCNC: 110 MMOL/L — HIGH (ref 98–107)
CO2 SERPL-SCNC: 23 MMOL/L — SIGNIFICANT CHANGE UP (ref 22–31)
COLOR SPEC: YELLOW — SIGNIFICANT CHANGE UP
CREAT SERPL-MCNC: 0.91 MG/DL — SIGNIFICANT CHANGE UP (ref 0.5–1.3)
CULTURE RESULTS: SIGNIFICANT CHANGE UP
DIFF PNL FLD: NEGATIVE — SIGNIFICANT CHANGE UP
EOSINOPHIL # BLD AUTO: 0.15 K/UL — SIGNIFICANT CHANGE UP (ref 0–0.5)
EOSINOPHIL NFR BLD AUTO: 3.7 % — SIGNIFICANT CHANGE UP (ref 0–6)
EPI CELLS # UR: 0 /HPF — SIGNIFICANT CHANGE UP (ref 0–5)
GLUCOSE SERPL-MCNC: 119 MG/DL — HIGH (ref 70–99)
GLUCOSE UR QL: NEGATIVE — SIGNIFICANT CHANGE UP
HCT VFR BLD CALC: 30.1 % — LOW (ref 39–50)
HGB BLD-MCNC: 9 G/DL — LOW (ref 13–17)
IANC: 2.74 K/UL — SIGNIFICANT CHANGE UP (ref 1.5–8.5)
IMM GRANULOCYTES NFR BLD AUTO: 0.7 % — SIGNIFICANT CHANGE UP (ref 0–1.5)
KETONES UR-MCNC: NEGATIVE — SIGNIFICANT CHANGE UP
LEUKOCYTE ESTERASE UR-ACNC: NEGATIVE — SIGNIFICANT CHANGE UP
LYMPHOCYTES # BLD AUTO: 0.8 K/UL — LOW (ref 1–3.3)
LYMPHOCYTES # BLD AUTO: 19.5 % — SIGNIFICANT CHANGE UP (ref 13–44)
MCHC RBC-ENTMCNC: 29.3 PG — SIGNIFICANT CHANGE UP (ref 27–34)
MCHC RBC-ENTMCNC: 29.9 GM/DL — LOW (ref 32–36)
MCV RBC AUTO: 98 FL — SIGNIFICANT CHANGE UP (ref 80–100)
MONOCYTES # BLD AUTO: 0.37 K/UL — SIGNIFICANT CHANGE UP (ref 0–0.9)
MONOCYTES NFR BLD AUTO: 9 % — SIGNIFICANT CHANGE UP (ref 2–14)
NEUTROPHILS # BLD AUTO: 2.74 K/UL — SIGNIFICANT CHANGE UP (ref 1.8–7.4)
NEUTROPHILS NFR BLD AUTO: 66.9 % — SIGNIFICANT CHANGE UP (ref 43–77)
NITRITE UR-MCNC: NEGATIVE — SIGNIFICANT CHANGE UP
NRBC # BLD: 0 /100 WBCS — SIGNIFICANT CHANGE UP
NRBC # FLD: 0 K/UL — SIGNIFICANT CHANGE UP
PH UR: 6 — SIGNIFICANT CHANGE UP (ref 5–8)
PLATELET # BLD AUTO: 191 K/UL — SIGNIFICANT CHANGE UP (ref 150–400)
POTASSIUM SERPL-MCNC: 4.2 MMOL/L — SIGNIFICANT CHANGE UP (ref 3.5–5.3)
POTASSIUM SERPL-SCNC: 4.2 MMOL/L — SIGNIFICANT CHANGE UP (ref 3.5–5.3)
PROT SERPL-MCNC: 5.3 G/DL — LOW (ref 6–8.3)
PROT UR-MCNC: ABNORMAL
RBC # BLD: 3.07 M/UL — LOW (ref 4.2–5.8)
RBC # FLD: 13.3 % — SIGNIFICANT CHANGE UP (ref 10.3–14.5)
RBC CASTS # UR COMP ASSIST: <5 /HPF — HIGH (ref 0–4)
SARS-COV-2 IGG SERPL IA-ACNC: 1.9 RATIO — HIGH
SARS-COV-2 IGG SERPL QL IA: POSITIVE
SARS-COV-2 IGG SERPL QL IA: POSITIVE
SARS-COV-2 IGM SERPL IA-ACNC: 4.56 RATIO — HIGH
SODIUM SERPL-SCNC: 142 MMOL/L — SIGNIFICANT CHANGE UP (ref 135–145)
SP GR SPEC: 1.03 — HIGH (ref 1.01–1.02)
SPECIMEN SOURCE: SIGNIFICANT CHANGE UP
UROBILINOGEN FLD QL: SIGNIFICANT CHANGE UP
WBC # BLD: 4.1 K/UL — SIGNIFICANT CHANGE UP (ref 3.8–10.5)
WBC # FLD AUTO: 4.1 K/UL — SIGNIFICANT CHANGE UP (ref 3.8–10.5)
WBC UR QL: 5 /HPF — SIGNIFICANT CHANGE UP (ref 0–5)

## 2020-12-09 PROCEDURE — 99233 SBSQ HOSP IP/OBS HIGH 50: CPT

## 2020-12-09 PROCEDURE — 70551 MRI BRAIN STEM W/O DYE: CPT | Mod: 26

## 2020-12-09 PROCEDURE — 99291 CRITICAL CARE FIRST HOUR: CPT

## 2020-12-09 RX ORDER — MIDAZOLAM HYDROCHLORIDE 1 MG/ML
4 INJECTION, SOLUTION INTRAMUSCULAR; INTRAVENOUS ONCE
Refills: 0 | Status: DISCONTINUED | OUTPATIENT
Start: 2020-12-09 | End: 2020-12-09

## 2020-12-09 RX ORDER — FUROSEMIDE 40 MG
20 TABLET ORAL ONCE
Refills: 0 | Status: COMPLETED | OUTPATIENT
Start: 2020-12-09 | End: 2020-12-09

## 2020-12-09 RX ORDER — POTASSIUM CHLORIDE 20 MEQ
40 PACKET (EA) ORAL ONCE
Refills: 0 | Status: COMPLETED | OUTPATIENT
Start: 2020-12-09 | End: 2020-12-09

## 2020-12-09 RX ORDER — VANCOMYCIN HCL 1 G
1500 VIAL (EA) INTRAVENOUS ONCE
Refills: 0 | Status: COMPLETED | OUTPATIENT
Start: 2020-12-09 | End: 2020-12-09

## 2020-12-09 RX ORDER — FENTANYL CITRATE 50 UG/ML
100 INJECTION INTRAVENOUS ONCE
Refills: 0 | Status: DISCONTINUED | OUTPATIENT
Start: 2020-12-09 | End: 2020-12-09

## 2020-12-09 RX ADMIN — Medication 40 MILLIEQUIVALENT(S): at 09:24

## 2020-12-09 RX ADMIN — FENTANYL CITRATE 100 MICROGRAM(S): 50 INJECTION INTRAVENOUS at 20:15

## 2020-12-09 RX ADMIN — PIPERACILLIN AND TAZOBACTAM 25 GRAM(S): 4; .5 INJECTION, POWDER, LYOPHILIZED, FOR SOLUTION INTRAVENOUS at 15:32

## 2020-12-09 RX ADMIN — Medication 650 MILLIGRAM(S): at 16:13

## 2020-12-09 RX ADMIN — Medication 650 MILLIGRAM(S): at 19:30

## 2020-12-09 RX ADMIN — PROPOFOL 36.8 MICROGRAM(S)/KG/MIN: 10 INJECTION, EMULSION INTRAVENOUS at 08:45

## 2020-12-09 RX ADMIN — Medication 20 MILLIGRAM(S): at 18:28

## 2020-12-09 RX ADMIN — Medication 20 MILLIGRAM(S): at 09:24

## 2020-12-09 RX ADMIN — Medication 3 MILLIGRAM(S): at 23:25

## 2020-12-09 RX ADMIN — PIPERACILLIN AND TAZOBACTAM 25 GRAM(S): 4; .5 INJECTION, POWDER, LYOPHILIZED, FOR SOLUTION INTRAVENOUS at 22:07

## 2020-12-09 RX ADMIN — CHLORHEXIDINE GLUCONATE 1 APPLICATION(S): 213 SOLUTION TOPICAL at 06:08

## 2020-12-09 RX ADMIN — CHLORHEXIDINE GLUCONATE 15 MILLILITER(S): 213 SOLUTION TOPICAL at 06:07

## 2020-12-09 RX ADMIN — FENTANYL CITRATE 18.4 MICROGRAM(S)/KG/HR: 50 INJECTION INTRAVENOUS at 08:46

## 2020-12-09 RX ADMIN — Medication 300 MILLIGRAM(S): at 22:07

## 2020-12-09 RX ADMIN — CHLORHEXIDINE GLUCONATE 15 MILLILITER(S): 213 SOLUTION TOPICAL at 20:26

## 2020-12-09 RX ADMIN — MIDAZOLAM HYDROCHLORIDE 2.45 MG/KG/HR: 1 INJECTION, SOLUTION INTRAMUSCULAR; INTRAVENOUS at 22:07

## 2020-12-09 RX ADMIN — ENOXAPARIN SODIUM 40 MILLIGRAM(S): 100 INJECTION SUBCUTANEOUS at 20:27

## 2020-12-09 RX ADMIN — Medication 100 MILLIGRAM(S): at 13:24

## 2020-12-09 RX ADMIN — FENTANYL CITRATE 100 MICROGRAM(S): 50 INJECTION INTRAVENOUS at 20:30

## 2020-12-09 RX ADMIN — MIDAZOLAM HYDROCHLORIDE 4 MILLIGRAM(S): 1 INJECTION, SOLUTION INTRAMUSCULAR; INTRAVENOUS at 20:15

## 2020-12-09 RX ADMIN — MIDAZOLAM HYDROCHLORIDE 2.45 MG/KG/HR: 1 INJECTION, SOLUTION INTRAMUSCULAR; INTRAVENOUS at 08:46

## 2020-12-09 RX ADMIN — ENOXAPARIN SODIUM 40 MILLIGRAM(S): 100 INJECTION SUBCUTANEOUS at 06:06

## 2020-12-09 RX ADMIN — PIPERACILLIN AND TAZOBACTAM 25 GRAM(S): 4; .5 INJECTION, POWDER, LYOPHILIZED, FOR SOLUTION INTRAVENOUS at 06:07

## 2020-12-09 RX ADMIN — PROPOFOL 36.8 MICROGRAM(S)/KG/MIN: 10 INJECTION, EMULSION INTRAVENOUS at 22:08

## 2020-12-09 NOTE — PROGRESS NOTE ADULT - SUBJECTIVE AND OBJECTIVE BOX
INTERVAL HPI/OVERNIGHT EVENTS:    SUBJECTIVE: Patient seen and examined at bedside.       VITAL SIGNS:  ICU Vital Signs Last 24 Hrs  T(C): 37.1 (09 Dec 2020 04:00), Max: 37.8 (08 Dec 2020 08:00)  T(F): 98.8 (09 Dec 2020 04:00), Max: 100 (08 Dec 2020 08:00)  HR: 70 (09 Dec 2020 06:00) (65 - 87)  BP: 112/54 (09 Dec 2020 06:00) (95/46 - 131/59)  BP(mean): 67 (09 Dec 2020 06:00) (56 - 79)  ABP: --  ABP(mean): --  RR: 18 (09 Dec 2020 06:00) (18 - 18)  SpO2: 96% (09 Dec 2020 06:00) (95% - 100%)    Mode: AC/ CMV (Assist Control/ Continuous Mandatory Ventilation), RR (machine): 18, TV (machine): 450, FiO2: 30, PEEP: 5, ITime: 0.8, MAP: 9, PIP: 17  Plateau pressure:   P/F ratio:     12-07 @ 07:01  -  12-08 @ 07:00  --------------------------------------------------------  IN: 3250.3 mL / OUT: 700 mL / NET: 2550.3 mL    12-08 @ 07:01  -  12-09 @ 06:28  --------------------------------------------------------  IN: 2678.6 mL / OUT: 2670 mL / NET: 8.6 mL      CAPILLARY BLOOD GLUCOSE        ECG:    PHYSICAL EXAM:    General:   HEENT:   Neck:   Respiratory:   Cardiovascular:   Abdomen:   Extremities:  Neurological:    MEDICATIONS:  MEDICATIONS  (STANDING):  chlorhexidine 0.12% Liquid 15 milliLiter(s) Oral Mucosa every 12 hours  chlorhexidine 4% Liquid 1 Application(s) Topical <User Schedule>  enoxaparin Injectable 40 milliGRAM(s) SubCutaneous two times a day  fentaNYL   Infusion. 1.5 MICROgram(s)/kG/Hr (18.4 mL/Hr) IV Continuous <Continuous>  melatonin 3 milliGRAM(s) Oral at bedtime  midazolam Infusion 0.02 mG/kG/Hr (2.45 mL/Hr) IV Continuous <Continuous>  norepinephrine Infusion 0.07 MICROgram(s)/kG/Min (16.1 mL/Hr) IV Continuous <Continuous>  piperacillin/tazobactam IVPB.. 3.375 Gram(s) IV Intermittent every 8 hours  propofol Infusion 50 MICROgram(s)/kG/Min (36.8 mL/Hr) IV Continuous <Continuous>  thiamine 100 milliGRAM(s) Oral daily    MEDICATIONS  (PRN):  acetaminophen    Suspension .. 650 milliGRAM(s) Oral every 6 hours PRN Temp greater or equal to 38C (100.4F)      ALLERGIES:  Allergies    No Known Allergies    Intolerances        LABS:                        9.0    4.10  )-----------( 191      ( 09 Dec 2020 03:13 )             30.1     12-09    142  |  110<H>  |  5<L>  ----------------------------<  119<H>  4.2   |  23  |  0.91    Ca    8.3<L>      09 Dec 2020 03:13  Phos  3.2     12-08  Mg     1.6     12-08    TPro  5.3<L>  /  Alb  2.7<L>  /  TBili  <0.2  /  DBili  x   /  AST  17  /  ALT  20  /  AlkPhos  48  12-09          RADIOLOGY & ADDITIONAL TESTS: Reviewed.   INTERVAL HPI/OVERNIGHT EVENTS: No acute events overnight.     SUBJECTIVE: Patient seen and examined at bedside. Intubated, sedated.       VITAL SIGNS:  ICU Vital Signs Last 24 Hrs  T(C): 37.1 (09 Dec 2020 04:00), Max: 37.8 (08 Dec 2020 08:00)  T(F): 98.8 (09 Dec 2020 04:00), Max: 100 (08 Dec 2020 08:00)  HR: 70 (09 Dec 2020 06:00) (65 - 87)  BP: 112/54 (09 Dec 2020 06:00) (95/46 - 131/59)  BP(mean): 67 (09 Dec 2020 06:00) (56 - 79)  ABP: --  ABP(mean): --  RR: 18 (09 Dec 2020 06:00) (18 - 18)  SpO2: 96% (09 Dec 2020 06:00) (95% - 100%)    Mode: AC/ CMV (Assist Control/ Continuous Mandatory Ventilation), RR (machine): 18, TV (machine): 450, FiO2: 30, PEEP: 5, ITime: 0.8, MAP: 9, PIP: 17  Plateau pressure:   P/F ratio:     12-07 @ 07:01  -  12-08 @ 07:00  --------------------------------------------------------  IN: 3250.3 mL / OUT: 700 mL / NET: 2550.3 mL    12-08 @ 07:01  -  12-09 @ 06:28  --------------------------------------------------------  IN: 2678.6 mL / OUT: 2670 mL / NET: 8.6 mL      CAPILLARY BLOOD GLUCOSE        ECG:    PHYSICAL EXAM:    General: Intubated, sedated. Obese appearing  HEENT: Pinpoint pupils  Neck: Thick neck. No JVD or palpable adenopathy  Respiratory: Lungs CTAB  Cardiovascular: RRR. Normal S1/S2. No MRG  Abdomen: Soft, nontender, nondistended  Extremities: Trace pedal edema  Neurological: Sedated.     MEDICATIONS:  MEDICATIONS  (STANDING):  chlorhexidine 0.12% Liquid 15 milliLiter(s) Oral Mucosa every 12 hours  chlorhexidine 4% Liquid 1 Application(s) Topical <User Schedule>  enoxaparin Injectable 40 milliGRAM(s) SubCutaneous two times a day  fentaNYL   Infusion. 1.5 MICROgram(s)/kG/Hr (18.4 mL/Hr) IV Continuous <Continuous>  melatonin 3 milliGRAM(s) Oral at bedtime  midazolam Infusion 0.02 mG/kG/Hr (2.45 mL/Hr) IV Continuous <Continuous>  norepinephrine Infusion 0.07 MICROgram(s)/kG/Min (16.1 mL/Hr) IV Continuous <Continuous>  piperacillin/tazobactam IVPB.. 3.375 Gram(s) IV Intermittent every 8 hours  propofol Infusion 50 MICROgram(s)/kG/Min (36.8 mL/Hr) IV Continuous <Continuous>  thiamine 100 milliGRAM(s) Oral daily    MEDICATIONS  (PRN):  acetaminophen    Suspension .. 650 milliGRAM(s) Oral every 6 hours PRN Temp greater or equal to 38C (100.4F)      ALLERGIES:  Allergies    No Known Allergies    Intolerances        LABS:                        9.0    4.10  )-----------( 191      ( 09 Dec 2020 03:13 )             30.1     12-09    142  |  110<H>  |  5<L>  ----------------------------<  119<H>  4.2   |  23  |  0.91    Ca    8.3<L>      09 Dec 2020 03:13  Phos  3.2     12-08  Mg     1.6     12-08    TPro  5.3<L>  /  Alb  2.7<L>  /  TBili  <0.2  /  DBili  x   /  AST  17  /  ALT  20  /  AlkPhos  48  12-09          RADIOLOGY & ADDITIONAL TESTS: Reviewed.

## 2020-12-09 NOTE — PROCEDURE NOTE - NSPROCDETAILS_GEN_ALL_CORE
patient pre-oxygenated, tube inserted, placement confirmed/connected to ventilator
location identified, draped/prepped, sterile technique used/blood seen on insertion/secured in place/sterile technique, catheter placed/dressing applied/flushes easily
CSF Obtained/area cleaned in sterile fashion/location identified, draped/prepped, sterile technique used, needle inserted/introduced

## 2020-12-09 NOTE — DIETITIAN INITIAL EVALUATION ADULT. - ENTERAL
Continue Jevity 1.2 @ 40 ml/hr x 24hrs daily , ADD No Carb Pro source 5 packs daily to increase protein intake and meet estimated need based on critical care guidelines for obese patients intubated .

## 2020-12-09 NOTE — PROGRESS NOTE ADULT - ATTENDING COMMENTS
Patient examined and care reviewed in detail on bedside rounds  Critically ill on vent with unexplained encephalopathy For MRI today  Frequent bedside visits with therapy change today.   I have personally provided 35+ minutes of critical care time concurrently with the resident/fellow; this excludes time spent on separate procedures

## 2020-12-09 NOTE — DIETITIAN INITIAL EVALUATION ADULT. - REASON
Unable to conduct a face to face interview or nutrition-focused physical exam at this time due to limited contact restrictions related to patient's medical condition and isolation precautions.

## 2020-12-09 NOTE — BH CONSULTATION LIAISON PROGRESS NOTE - NSBHFUPINTERVALHXFT_PSY_A_CORE
Chart reviewed. Continues on Versed drip, fentanyl, and propofol; no growth in cx, no leukocytosis, remains afebrile, with aspiration PNA on Zosyn, TTE on 12/8 +for enlarged, hypokinetic RV (EF 52%).     Pt examined at bedside, with ongoing intubation, and sedated. No rigidity on exam, no edema on LEs. However, pt with an erythematous patch on the LUE, which is warm to touch, but no fluctuance or induration appreciated. Area is around biceps region, no IV line insertion near said area. Also, noted to have skin lesions in LEs, including in the R anterior, distal tibial region. Unclear if said lesions are 2/2 to SCDs.       Standing [facility] medications: bacitracin ointment, Wellbutrin XL 150mg, Klonopin 0.25mg BID, clozaril 275mg, lisinopril 10mg, metformin 250mg BID, naltrexone 50mg, ramelteon 8mg, senna 2mg, tamsulosin 0.4mg, thiamine 100mg. PRNs: Ativan 1mg q4hrs, Seroquel 25mg q6hrs
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
## 2020-12-10 DIAGNOSIS — F32.9 MAJOR DEPRESSIVE DISORDER, SINGLE EPISODE, UNSPECIFIED: ICD-10-CM

## 2020-12-10 DIAGNOSIS — R19.7 DIARRHEA, UNSPECIFIED: ICD-10-CM

## 2020-12-10 LAB
-  AMIKACIN: SIGNIFICANT CHANGE UP
-  AMOXICILLIN/CLAVULANIC ACID: SIGNIFICANT CHANGE UP
-  AMPICILLIN/SULBACTAM: SIGNIFICANT CHANGE UP
-  AMPICILLIN: SIGNIFICANT CHANGE UP
-  AZTREONAM: SIGNIFICANT CHANGE UP
-  CEFAZOLIN: SIGNIFICANT CHANGE UP
-  CEFEPIME: SIGNIFICANT CHANGE UP
-  CEFOXITIN: SIGNIFICANT CHANGE UP
-  CEFTRIAXONE: SIGNIFICANT CHANGE UP
-  CIPROFLOXACIN: SIGNIFICANT CHANGE UP
-  ERTAPENEM: SIGNIFICANT CHANGE UP
-  GENTAMICIN: SIGNIFICANT CHANGE UP
-  LEVOFLOXACIN: SIGNIFICANT CHANGE UP
-  MEROPENEM: SIGNIFICANT CHANGE UP
-  PIPERACILLIN/TAZOBACTAM: SIGNIFICANT CHANGE UP
-  TOBRAMYCIN: SIGNIFICANT CHANGE UP
-  TRIMETHOPRIM/SULFAMETHOXAZOLE: SIGNIFICANT CHANGE UP
ALBUMIN SERPL ELPH-MCNC: 2.6 G/DL — LOW (ref 3.3–5)
ALP SERPL-CCNC: 44 U/L — SIGNIFICANT CHANGE UP (ref 40–120)
ALT FLD-CCNC: 17 U/L — SIGNIFICANT CHANGE UP (ref 4–41)
ANION GAP SERPL CALC-SCNC: 10 MMOL/L — SIGNIFICANT CHANGE UP (ref 7–14)
AST SERPL-CCNC: 16 U/L — SIGNIFICANT CHANGE UP (ref 4–40)
BASOPHILS # BLD AUTO: 0.01 K/UL — SIGNIFICANT CHANGE UP (ref 0–0.2)
BASOPHILS NFR BLD AUTO: 0.1 % — SIGNIFICANT CHANGE UP (ref 0–2)
BILIRUB SERPL-MCNC: 0.2 MG/DL — SIGNIFICANT CHANGE UP (ref 0.2–1.2)
BUN SERPL-MCNC: 7 MG/DL — SIGNIFICANT CHANGE UP (ref 7–23)
CALCIUM SERPL-MCNC: 8.3 MG/DL — LOW (ref 8.4–10.5)
CHLORIDE SERPL-SCNC: 107 MMOL/L — SIGNIFICANT CHANGE UP (ref 98–107)
CO2 SERPL-SCNC: 24 MMOL/L — SIGNIFICANT CHANGE UP (ref 22–31)
CREAT SERPL-MCNC: 0.85 MG/DL — SIGNIFICANT CHANGE UP (ref 0.5–1.3)
CULTURE RESULTS: SIGNIFICANT CHANGE UP
EOSINOPHIL # BLD AUTO: 0.12 K/UL — SIGNIFICANT CHANGE UP (ref 0–0.5)
EOSINOPHIL NFR BLD AUTO: 1.6 % — SIGNIFICANT CHANGE UP (ref 0–6)
GLUCOSE BLDC GLUCOMTR-MCNC: 161 MG/DL — HIGH (ref 70–99)
GLUCOSE SERPL-MCNC: 101 MG/DL — HIGH (ref 70–99)
HCT VFR BLD CALC: 28.5 % — LOW (ref 39–50)
HGB BLD-MCNC: 8.8 G/DL — LOW (ref 13–17)
IANC: 5.99 K/UL — SIGNIFICANT CHANGE UP (ref 1.5–8.5)
IMM GRANULOCYTES NFR BLD AUTO: 0.9 % — SIGNIFICANT CHANGE UP (ref 0–1.5)
LYMPHOCYTES # BLD AUTO: 0.67 K/UL — LOW (ref 1–3.3)
LYMPHOCYTES # BLD AUTO: 9 % — LOW (ref 13–44)
MAGNESIUM SERPL-MCNC: 1.4 MG/DL — LOW (ref 1.6–2.6)
MCHC RBC-ENTMCNC: 29.6 PG — SIGNIFICANT CHANGE UP (ref 27–34)
MCHC RBC-ENTMCNC: 30.9 GM/DL — LOW (ref 32–36)
MCV RBC AUTO: 96 FL — SIGNIFICANT CHANGE UP (ref 80–100)
METHOD TYPE: SIGNIFICANT CHANGE UP
MONOCYTES # BLD AUTO: 0.59 K/UL — SIGNIFICANT CHANGE UP (ref 0–0.9)
MONOCYTES NFR BLD AUTO: 7.9 % — SIGNIFICANT CHANGE UP (ref 2–14)
NEUTROPHILS # BLD AUTO: 5.99 K/UL — SIGNIFICANT CHANGE UP (ref 1.8–7.4)
NEUTROPHILS NFR BLD AUTO: 80.5 % — HIGH (ref 43–77)
NRBC # BLD: 0 /100 WBCS — SIGNIFICANT CHANGE UP
NRBC # FLD: 0 K/UL — SIGNIFICANT CHANGE UP
ORGANISM # SPEC MICROSCOPIC CNT: SIGNIFICANT CHANGE UP
ORGANISM # SPEC MICROSCOPIC CNT: SIGNIFICANT CHANGE UP
PHOSPHATE SERPL-MCNC: 3.9 MG/DL — SIGNIFICANT CHANGE UP (ref 2.5–4.5)
PLATELET # BLD AUTO: 223 K/UL — SIGNIFICANT CHANGE UP (ref 150–400)
POTASSIUM SERPL-MCNC: 3.6 MMOL/L — SIGNIFICANT CHANGE UP (ref 3.5–5.3)
POTASSIUM SERPL-SCNC: 3.6 MMOL/L — SIGNIFICANT CHANGE UP (ref 3.5–5.3)
PROT SERPL-MCNC: 5.4 G/DL — LOW (ref 6–8.3)
RBC # BLD: 2.97 M/UL — LOW (ref 4.2–5.8)
RBC # FLD: 13.3 % — SIGNIFICANT CHANGE UP (ref 10.3–14.5)
SODIUM SERPL-SCNC: 141 MMOL/L — SIGNIFICANT CHANGE UP (ref 135–145)
SPECIMEN SOURCE: SIGNIFICANT CHANGE UP
WBC # BLD: 7.45 K/UL — SIGNIFICANT CHANGE UP (ref 3.8–10.5)
WBC # FLD AUTO: 7.45 K/UL — SIGNIFICANT CHANGE UP (ref 3.8–10.5)

## 2020-12-10 PROCEDURE — 99233 SBSQ HOSP IP/OBS HIGH 50: CPT

## 2020-12-10 PROCEDURE — 99291 CRITICAL CARE FIRST HOUR: CPT

## 2020-12-10 RX ORDER — MAGNESIUM SULFATE 500 MG/ML
2 VIAL (ML) INJECTION
Refills: 0 | Status: COMPLETED | OUTPATIENT
Start: 2020-12-10 | End: 2020-12-10

## 2020-12-10 RX ORDER — VANCOMYCIN HCL 1 G
1500 VIAL (EA) INTRAVENOUS EVERY 12 HOURS
Refills: 0 | Status: DISCONTINUED | OUTPATIENT
Start: 2020-12-10 | End: 2020-12-15

## 2020-12-10 RX ORDER — FUROSEMIDE 40 MG
20 TABLET ORAL
Refills: 0 | Status: COMPLETED | OUTPATIENT
Start: 2020-12-10 | End: 2020-12-10

## 2020-12-10 RX ORDER — FUROSEMIDE 40 MG
20 TABLET ORAL ONCE
Refills: 0 | Status: COMPLETED | OUTPATIENT
Start: 2020-12-10 | End: 2020-12-10

## 2020-12-10 RX ORDER — MAGNESIUM SULFATE 500 MG/ML
1 VIAL (ML) INJECTION ONCE
Refills: 0 | Status: COMPLETED | OUTPATIENT
Start: 2020-12-10 | End: 2020-12-10

## 2020-12-10 RX ADMIN — Medication 50 GRAM(S): at 12:11

## 2020-12-10 RX ADMIN — Medication 20 MILLIGRAM(S): at 14:23

## 2020-12-10 RX ADMIN — Medication 3 MILLIGRAM(S): at 21:27

## 2020-12-10 RX ADMIN — Medication 300 MILLIGRAM(S): at 21:26

## 2020-12-10 RX ADMIN — CHLORHEXIDINE GLUCONATE 1 APPLICATION(S): 213 SOLUTION TOPICAL at 06:14

## 2020-12-10 RX ADMIN — PROPOFOL 36.8 MICROGRAM(S)/KG/MIN: 10 INJECTION, EMULSION INTRAVENOUS at 17:17

## 2020-12-10 RX ADMIN — Medication 100 GRAM(S): at 07:33

## 2020-12-10 RX ADMIN — Medication 100 MILLIGRAM(S): at 11:41

## 2020-12-10 RX ADMIN — Medication 20 MILLIGRAM(S): at 02:29

## 2020-12-10 RX ADMIN — Medication 50 GRAM(S): at 10:57

## 2020-12-10 RX ADMIN — ENOXAPARIN SODIUM 40 MILLIGRAM(S): 100 INJECTION SUBCUTANEOUS at 06:13

## 2020-12-10 RX ADMIN — CHLORHEXIDINE GLUCONATE 15 MILLILITER(S): 213 SOLUTION TOPICAL at 17:17

## 2020-12-10 RX ADMIN — Medication 20 MILLIGRAM(S): at 10:56

## 2020-12-10 RX ADMIN — Medication 300 MILLIGRAM(S): at 10:58

## 2020-12-10 RX ADMIN — CHLORHEXIDINE GLUCONATE 15 MILLILITER(S): 213 SOLUTION TOPICAL at 06:12

## 2020-12-10 RX ADMIN — Medication 20 MILLIGRAM(S): at 21:27

## 2020-12-10 RX ADMIN — Medication 20 MILLIGRAM(S): at 11:08

## 2020-12-10 RX ADMIN — FENTANYL CITRATE 18.4 MICROGRAM(S)/KG/HR: 50 INJECTION INTRAVENOUS at 12:12

## 2020-12-10 RX ADMIN — Medication 20 MILLIGRAM(S): at 17:18

## 2020-12-10 RX ADMIN — ENOXAPARIN SODIUM 40 MILLIGRAM(S): 100 INJECTION SUBCUTANEOUS at 17:17

## 2020-12-10 RX ADMIN — PROPOFOL 36.8 MICROGRAM(S)/KG/MIN: 10 INJECTION, EMULSION INTRAVENOUS at 12:12

## 2020-12-10 RX ADMIN — MIDAZOLAM HYDROCHLORIDE 2.45 MG/KG/HR: 1 INJECTION, SOLUTION INTRAMUSCULAR; INTRAVENOUS at 12:11

## 2020-12-10 RX ADMIN — Medication 20 MILLIGRAM(S): at 17:17

## 2020-12-10 NOTE — PHARMACOTHERAPY INTERVENTION NOTE - COMMENTS
As discussed with MICU team, patient is currently on vancomycin 1500 mg IV Q12H.     Plan:  1.  Recommend drawing vancomycin trough level 12/11/20 at 09:30AM.      Mark Paulino, PharmD, BCPS  Clinical Pharmacy Coordinator  Medical Intensive Care Unit - [facility]   Spectra [phone]

## 2020-12-10 NOTE — PHARMACOTHERAPY INTERVENTION NOTE - COMMENTS
As discussed with MICU team, patient with serum magnesium 1.4 mg/dL (12/10/20).  Patient has already received 1g magnesium sulfate IV supplementation.  Patient has urine output > 0.5ml/kg/hr and estimated CrCl > 60 ml/min.  MICU team plan to resume patient's home psychiatric medication regimen.    Plan:  1.  Recommend additional magnesium sulfate IV supplementation with 2g x2 doses.      Mark Paulino, PharmD, BCPS  Clinical Pharmacy Coordinator  Medical Intensive Care Unit - [facility]   Spectra [phone]

## 2020-12-10 NOTE — PROGRESS NOTE ADULT - ATTENDING COMMENTS
Patient examined and care reviewed in detail on bedside rounds  Critically ill on vent For reduction of sedation with preparation for a difficult combative patient  Frequent bedside visits with therapy change today.   I have personally provided 35+ minutes of critical care time concurrently with the resident/fellow; this excludes time spent on separate procedures

## 2020-12-10 NOTE — PROGRESS NOTE ADULT - SUBJECTIVE AND OBJECTIVE BOX
INTERVAL HPI/OVERNIGHT EVENTS:    SUBJECTIVE: Patient seen and examined at bedside.   Overnight events: Patient given Lasix 40 mg IV over past 24 hours for edema, urine output 1.6L. MRI brain showed Ventricles supratentorial sulci unremarkable, slightly prominent cerebellar sulci correlate with EtOH usage,  minimal microvascular disease or migraine sequela,  no restricted diffusion, hemorrhage or midline shift.  Thickened calvarial diploic space, paranasal sinus mucosal thickening with opacification right greater than left mastoid tip.  Patient given one dose of Vanco for possible UE lasix       VITAL SIGNS:  ICU Vital Signs Last 24 Hrs  T(C): 37.2 (10 Dec 2020 04:00), Max: 38.3 (09 Dec 2020 19:51)  T(F): 99 (10 Dec 2020 04:00), Max: 101 (09 Dec 2020 19:51)  HR: 58 (10 Dec 2020 06:00) (58 - 86)  BP: 101/45 (10 Dec 2020 06:00) (94/44 - 145/72)  BP(mean): 57 (10 Dec 2020 06:00) (54 - 83)  ABP: --  ABP(mean): --  RR: 18 (10 Dec 2020 02:00) (18 - 21)  SpO2: 100% (10 Dec 2020 06:00) (92% - 100%)    Mode: AC/ CMV (Assist Control/ Continuous Mandatory Ventilation), RR (machine): 18, TV (machine): 450, FiO2: 40, PEEP: 5, ITime: 0.83, MAP: 9, PIP: 19  Plateau pressure:   P/F ratio:      @ :  -   @ 07:00  --------------------------------------------------------  IN: 2678.6 mL / OUT: 2670 mL / NET: 8.6 mL     @ 07:01  -  12-10 @ 06:59  --------------------------------------------------------  IN: 2001.3 mL / OUT: 3045 mL / NET: -1043.7 mL      CAPILLARY BLOOD GLUCOSE        ECG:    PHYSICAL EXAM:    General:   HEENT:   Neck:   Respiratory:   Cardiovascular:   Abdomen:   Extremities:  Neurological:    MEDICATIONS:  MEDICATIONS  (STANDING):  chlorhexidine 0.12% Liquid 15 milliLiter(s) Oral Mucosa every 12 hours  chlorhexidine 4% Liquid 1 Application(s) Topical <User Schedule>  enoxaparin Injectable 40 milliGRAM(s) SubCutaneous two times a day  fentaNYL   Infusion. 1.5 MICROgram(s)/kG/Hr (18.4 mL/Hr) IV Continuous <Continuous>  magnesium sulfate  IVPB 1 Gram(s) IV Intermittent once  melatonin 3 milliGRAM(s) Oral at bedtime  midazolam Infusion 0.02 mG/kG/Hr (2.45 mL/Hr) IV Continuous <Continuous>  norepinephrine Infusion 0.07 MICROgram(s)/kG/Min (16.1 mL/Hr) IV Continuous <Continuous>  propofol Infusion 50 MICROgram(s)/kG/Min (36.8 mL/Hr) IV Continuous <Continuous>  thiamine 100 milliGRAM(s) Oral daily    MEDICATIONS  (PRN):  acetaminophen    Suspension .. 650 milliGRAM(s) Oral every 6 hours PRN Temp greater or equal to 38C (100.4F)      ALLERGIES:  Allergies    No Known Allergies    Intolerances        LABS:                        8.8    7.45  )-----------( 223      ( 10 Dec 2020 06:04 )             28.5     12-10    141  |  107  |  7   ----------------------------<  101<H>  3.6   |  24  |  0.85    Ca    8.3<L>      10 Dec 2020 06:04  Phos  3.9     12-10  Mg     1.4     12-10    TPro  5.4<L>  /  Alb  2.6<L>  /  TBili  0.2  /  DBili  x   /  AST  16  /  ALT  17  /  AlkPhos  44  12-10      Urinalysis Basic - ( 09 Dec 2020 22:41 )    Color: Yellow / Appearance: Clear / S.034 / pH: x  Gluc: x / Ketone: Negative  / Bili: Negative / Urobili: <2 mg/dL   Blood: x / Protein: Trace / Nitrite: Negative   Leuk Esterase: Negative / RBC: <5 /HPF / WBC 5 /HPF   Sq Epi: x / Non Sq Epi: 0 /HPF / Bacteria: Negative        RADIOLOGY & ADDITIONAL TESTS: Reviewed.           INTERVAL HPI/OVERNIGHT EVENTS:    SUBJECTIVE: Patient seen and examined at bedside.   Overnight events: Patient given Lasix 40 mg IV over past 24 hours for edema, urine output 1.6L. MRI brain showed Ventricles supratentorial sulci unremarkable, slightly prominent cerebellar sulci correlate with EtOH usage,  minimal microvascular disease or migraine sequela,  no restricted diffusion, hemorrhage or midline shift.  Thickened calvarial diploic space, paranasal sinus mucosal thickening with opacification right greater than left mastoid tip.  Patient given one dose of Vanco for possible UE cellulitis       VITAL SIGNS:  ICU Vital Signs Last 24 Hrs  T(C): 37.2 (10 Dec 2020 04:00), Max: 38.3 (09 Dec 2020 19:51)  T(F): 99 (10 Dec 2020 04:00), Max: 101 (09 Dec 2020 19:51)  HR: 58 (10 Dec 2020 06:00) (58 - 86)  BP: 101/45 (10 Dec 2020 06:00) (94/44 - 145/72)  BP(mean): 57 (10 Dec 2020 06:00) (54 - 83)  ABP: --  ABP(mean): --  RR: 18 (10 Dec 2020 02:00) (18 - 21)  SpO2: 100% (10 Dec 2020 06:00) (92% - 100%)    Mode: AC/ CMV (Assist Control/ Continuous Mandatory Ventilation), RR (machine): 18, TV (machine): 450, FiO2: 40, PEEP: 5, ITime: 0.83, MAP: 9, PIP: 19  Plateau pressure:   P/F ratio:      @ :  -   @ 07:00  --------------------------------------------------------  IN: 2678.6 mL / OUT: 2670 mL / NET: 8.6 mL     @ 07:01  -  12-10 @ 06:59  --------------------------------------------------------  IN: 2001.3 mL / OUT: 3045 mL / NET: -1043.7 mL      CAPILLARY BLOOD GLUCOSE        ECG:    PHYSICAL EXAM:    General:   HEENT:   Neck:   Respiratory:   Cardiovascular:   Abdomen:   Extremities:  Neurological:    MEDICATIONS:  MEDICATIONS  (STANDING):  chlorhexidine 0.12% Liquid 15 milliLiter(s) Oral Mucosa every 12 hours  chlorhexidine 4% Liquid 1 Application(s) Topical <User Schedule>  enoxaparin Injectable 40 milliGRAM(s) SubCutaneous two times a day  fentaNYL   Infusion. 1.5 MICROgram(s)/kG/Hr (18.4 mL/Hr) IV Continuous <Continuous>  magnesium sulfate  IVPB 1 Gram(s) IV Intermittent once  melatonin 3 milliGRAM(s) Oral at bedtime  midazolam Infusion 0.02 mG/kG/Hr (2.45 mL/Hr) IV Continuous <Continuous>  norepinephrine Infusion 0.07 MICROgram(s)/kG/Min (16.1 mL/Hr) IV Continuous <Continuous>  propofol Infusion 50 MICROgram(s)/kG/Min (36.8 mL/Hr) IV Continuous <Continuous>  thiamine 100 milliGRAM(s) Oral daily    MEDICATIONS  (PRN):  acetaminophen    Suspension .. 650 milliGRAM(s) Oral every 6 hours PRN Temp greater or equal to 38C (100.4F)      ALLERGIES:  Allergies    No Known Allergies    Intolerances        LABS:                        8.8    7.45  )-----------( 223      ( 10 Dec 2020 06:04 )             28.5     12-10    141  |  107  |  7   ----------------------------<  101<H>  3.6   |  24  |  0.85    Ca    8.3<L>      10 Dec 2020 06:04  Phos  3.9     12-10  Mg     1.4     12-10    TPro  5.4<L>  /  Alb  2.6<L>  /  TBili  0.2  /  DBili  x   /  AST  16  /  ALT  17  /  AlkPhos  44  12-10      Urinalysis Basic - ( 09 Dec 2020 22:41 )    Color: Yellow / Appearance: Clear / S.034 / pH: x  Gluc: x / Ketone: Negative  / Bili: Negative / Urobili: <2 mg/dL   Blood: x / Protein: Trace / Nitrite: Negative   Leuk Esterase: Negative / RBC: <5 /HPF / WBC 5 /HPF   Sq Epi: x / Non Sq Epi: 0 /HPF / Bacteria: Negative        RADIOLOGY & ADDITIONAL TESTS: Reviewed.
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
## 2020-12-10 NOTE — PHARMACOTHERAPY INTERVENTION NOTE - COMMENTS
As discussed with MICU team, there is concern for cellulitis.  MICU would like to continue vancomycin therapy.  MICU team requesting guidance for appropriate vancomycin dosing.  Patient with estimated CrCl > 60 mL/min and good urine output > 0.5 mL/kg/hr for previous 24 hours.    Plan:  1.  Recommend continue vancomycin 1500 mg IV Q12H.      Mark Paulino, PharmD, BCPS  Clinical Pharmacy Coordinator  Medical Intensive Care Unit - [facility]   Spectra [phone]

## 2020-12-11 LAB
ALBUMIN SERPL ELPH-MCNC: 2.9 G/DL — LOW (ref 3.3–5)
ALP SERPL-CCNC: 50 U/L — SIGNIFICANT CHANGE UP (ref 40–120)
ALT FLD-CCNC: 17 U/L — SIGNIFICANT CHANGE UP (ref 4–41)
ANION GAP SERPL CALC-SCNC: 8 MMOL/L — SIGNIFICANT CHANGE UP (ref 7–14)
AST SERPL-CCNC: 18 U/L — SIGNIFICANT CHANGE UP (ref 4–40)
BASOPHILS # BLD AUTO: 0.02 K/UL — SIGNIFICANT CHANGE UP (ref 0–0.2)
BASOPHILS NFR BLD AUTO: 0.2 % — SIGNIFICANT CHANGE UP (ref 0–2)
BILIRUB SERPL-MCNC: <0.2 MG/DL — SIGNIFICANT CHANGE UP (ref 0.2–1.2)
BUN SERPL-MCNC: 11 MG/DL — SIGNIFICANT CHANGE UP (ref 7–23)
CALCIUM SERPL-MCNC: 8.7 MG/DL — SIGNIFICANT CHANGE UP (ref 8.4–10.5)
CHLORIDE SERPL-SCNC: 104 MMOL/L — SIGNIFICANT CHANGE UP (ref 98–107)
CO2 SERPL-SCNC: 28 MMOL/L — SIGNIFICANT CHANGE UP (ref 22–31)
CREAT SERPL-MCNC: 0.87 MG/DL — SIGNIFICANT CHANGE UP (ref 0.5–1.3)
CULTURE RESULTS: SIGNIFICANT CHANGE UP
EOSINOPHIL # BLD AUTO: 0 K/UL — SIGNIFICANT CHANGE UP (ref 0–0.5)
EOSINOPHIL NFR BLD AUTO: 0 % — SIGNIFICANT CHANGE UP (ref 0–6)
GLUCOSE BLDC GLUCOMTR-MCNC: 120 MG/DL — HIGH (ref 70–99)
GLUCOSE BLDC GLUCOMTR-MCNC: 160 MG/DL — HIGH (ref 70–99)
GLUCOSE BLDC GLUCOMTR-MCNC: 183 MG/DL — HIGH (ref 70–99)
GLUCOSE BLDC GLUCOMTR-MCNC: 188 MG/DL — HIGH (ref 70–99)
GLUCOSE SERPL-MCNC: 178 MG/DL — HIGH (ref 70–99)
HCT VFR BLD CALC: 29 % — LOW (ref 39–50)
HGB BLD-MCNC: 8.9 G/DL — LOW (ref 13–17)
IANC: 9.01 K/UL — HIGH (ref 1.5–8.5)
IMM GRANULOCYTES NFR BLD AUTO: 2.7 % — HIGH (ref 0–1.5)
LYMPHOCYTES # BLD AUTO: 0.5 K/UL — LOW (ref 1–3.3)
LYMPHOCYTES # BLD AUTO: 5 % — LOW (ref 13–44)
MAGNESIUM SERPL-MCNC: 2.2 MG/DL — SIGNIFICANT CHANGE UP (ref 1.6–2.6)
MCHC RBC-ENTMCNC: 29.2 PG — SIGNIFICANT CHANGE UP (ref 27–34)
MCHC RBC-ENTMCNC: 30.7 GM/DL — LOW (ref 32–36)
MCV RBC AUTO: 95.1 FL — SIGNIFICANT CHANGE UP (ref 80–100)
MONOCYTES # BLD AUTO: 0.2 K/UL — SIGNIFICANT CHANGE UP (ref 0–0.9)
MONOCYTES NFR BLD AUTO: 2 % — SIGNIFICANT CHANGE UP (ref 2–14)
NEUTROPHILS # BLD AUTO: 9.01 K/UL — HIGH (ref 1.8–7.4)
NEUTROPHILS NFR BLD AUTO: 90.1 % — HIGH (ref 43–77)
NRBC # BLD: 0 /100 WBCS — SIGNIFICANT CHANGE UP
NRBC # FLD: 0 K/UL — SIGNIFICANT CHANGE UP
PHOSPHATE SERPL-MCNC: 3.7 MG/DL — SIGNIFICANT CHANGE UP (ref 2.5–4.5)
PLATELET # BLD AUTO: 270 K/UL — SIGNIFICANT CHANGE UP (ref 150–400)
POTASSIUM SERPL-MCNC: 3.6 MMOL/L — SIGNIFICANT CHANGE UP (ref 3.5–5.3)
POTASSIUM SERPL-SCNC: 3.6 MMOL/L — SIGNIFICANT CHANGE UP (ref 3.5–5.3)
PROT SERPL-MCNC: 6 G/DL — SIGNIFICANT CHANGE UP (ref 6–8.3)
RBC # BLD: 3.05 M/UL — LOW (ref 4.2–5.8)
RBC # FLD: 12.7 % — SIGNIFICANT CHANGE UP (ref 10.3–14.5)
SODIUM SERPL-SCNC: 140 MMOL/L — SIGNIFICANT CHANGE UP (ref 135–145)
SPECIMEN SOURCE: SIGNIFICANT CHANGE UP
VANCOMYCIN TROUGH SERPL-MCNC: 16.8 UG/ML — SIGNIFICANT CHANGE UP (ref 10–20)
VANCOMYCIN TROUGH SERPL-MCNC: 19.5 UG/ML — SIGNIFICANT CHANGE UP (ref 10–20)
WBC # BLD: 10 K/UL — SIGNIFICANT CHANGE UP (ref 3.8–10.5)
WBC # FLD AUTO: 10 K/UL — SIGNIFICANT CHANGE UP (ref 3.8–10.5)

## 2020-12-11 PROCEDURE — 99292 CRITICAL CARE ADDL 30 MIN: CPT | Mod: 25

## 2020-12-11 PROCEDURE — 99291 CRITICAL CARE FIRST HOUR: CPT

## 2020-12-11 PROCEDURE — 99233 SBSQ HOSP IP/OBS HIGH 50: CPT

## 2020-12-11 RX ORDER — ACETAMINOPHEN 500 MG
1000 TABLET ORAL ONCE
Refills: 0 | Status: COMPLETED | OUTPATIENT
Start: 2020-12-11 | End: 2020-12-11

## 2020-12-11 RX ORDER — DEXMEDETOMIDINE HYDROCHLORIDE IN 0.9% SODIUM CHLORIDE 4 UG/ML
0.1 INJECTION INTRAVENOUS
Qty: 400 | Refills: 0 | Status: DISCONTINUED | OUTPATIENT
Start: 2020-12-11 | End: 2020-12-11

## 2020-12-11 RX ORDER — HALOPERIDOL DECANOATE 100 MG/ML
5 INJECTION INTRAMUSCULAR EVERY 6 HOURS
Refills: 0 | Status: DISCONTINUED | OUTPATIENT
Start: 2020-12-11 | End: 2020-12-12

## 2020-12-11 RX ADMIN — Medication 400 MILLIGRAM(S): at 23:25

## 2020-12-11 RX ADMIN — ENOXAPARIN SODIUM 40 MILLIGRAM(S): 100 INJECTION SUBCUTANEOUS at 17:25

## 2020-12-11 RX ADMIN — CHLORHEXIDINE GLUCONATE 15 MILLILITER(S): 213 SOLUTION TOPICAL at 05:24

## 2020-12-11 RX ADMIN — CHLORHEXIDINE GLUCONATE 1 APPLICATION(S): 213 SOLUTION TOPICAL at 05:24

## 2020-12-11 RX ADMIN — DEXMEDETOMIDINE HYDROCHLORIDE IN 0.9% SODIUM CHLORIDE 3.1 MICROGRAM(S)/KG/HR: 4 INJECTION INTRAVENOUS at 09:56

## 2020-12-11 RX ADMIN — HALOPERIDOL DECANOATE 5 MILLIGRAM(S): 100 INJECTION INTRAMUSCULAR at 20:27

## 2020-12-11 RX ADMIN — ENOXAPARIN SODIUM 40 MILLIGRAM(S): 100 INJECTION SUBCUTANEOUS at 05:24

## 2020-12-11 RX ADMIN — Medication 300 MILLIGRAM(S): at 22:22

## 2020-12-11 RX ADMIN — Medication 300 MILLIGRAM(S): at 10:01

## 2020-12-11 RX ADMIN — Medication 100 MILLIGRAM(S): at 11:52

## 2020-12-11 NOTE — CHART NOTE - NSCHARTNOTEFT_GEN_A_CORE
Maggyvickie Greene PGY2  Spec: [id number]      Chief Complaint: AMS       Returned pt's wife call re: use of psych meds.        Spoke with pt's wife, MS. Coleen Sauceda (), regarding role of anti-depressants as she is concerned with pt's hx of severe depression and SAs. Extensive discussion re: pt's current state including AMS and agitation requiring sedation and currently intubated. Once pt is more medically stable, the role of psyh meds will be assessed and anti-depressants will be re-introduced in a controlled manner. Wife in agreement with plan. She will reach out to psych team if she has any further questions.
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
## 2020-12-11 NOTE — PROGRESS NOTE ADULT - SUBJECTIVE AND OBJECTIVE BOX
INTERVAL HPI/OVERNIGHT EVENTS:    SUBJECTIVE: Patient seen and examined at bedside.   Overnight: patient on light sedation for possible extubation today       VITAL SIGNS:  ICU Vital Signs Last 24 Hrs  T(C): 37.1 (11 Dec 2020 04:00), Max: 37.2 (11 Dec 2020 00:00)  T(F): 98.8 (11 Dec 2020 04:00), Max: 99 (11 Dec 2020 00:00)  HR: 105 (11 Dec 2020 07:35) (55 - 105)  BP: 152/69 (11 Dec 2020 06:00) (99/56 - 153/72)  BP(mean): 87 (11 Dec 2020 06:00) (62 - 89)  ABP: --  ABP(mean): --  RR: 18 (11 Dec 2020 06:00) (18 - 21)  SpO2: 95% (11 Dec 2020 07:35) (92% - 99%)    Mode: AC/ CMV (Assist Control/ Continuous Mandatory Ventilation), RR (machine): 18, TV (machine): 450, FiO2: 40, PEEP: 5, MAP: 7, PIP: 16  Plateau pressure:   P/F ratio:     12-10 @ 07:01  -  12-11 @ 07:00  --------------------------------------------------------  IN: 2124.7 mL / OUT: 3290 mL / NET: -1165.3 mL      CAPILLARY BLOOD GLUCOSE      POCT Blood Glucose.: 183 mg/dL (11 Dec 2020 05:04)    ECG:    PHYSICAL EXAM:    General:   HEENT:   Neck:   Respiratory:   Cardiovascular:   Abdomen:   Extremities:  Neurological:    MEDICATIONS:  MEDICATIONS  (STANDING):  chlorhexidine 0.12% Liquid 15 milliLiter(s) Oral Mucosa every 12 hours  chlorhexidine 4% Liquid 1 Application(s) Topical <User Schedule>  enoxaparin Injectable 40 milliGRAM(s) SubCutaneous two times a day  melatonin 3 milliGRAM(s) Oral at bedtime  propofol Infusion 50 MICROgram(s)/kG/Min (36.8 mL/Hr) IV Continuous <Continuous>  thiamine 100 milliGRAM(s) Oral daily  vancomycin  IVPB 1500 milliGRAM(s) IV Intermittent every 12 hours    MEDICATIONS  (PRN):  acetaminophen    Suspension .. 650 milliGRAM(s) Oral every 6 hours PRN Temp greater or equal to 38C (100.4F)  LORazepam   Injectable 2 milliGRAM(s) IV Push every 4 hours PRN Agitation      ALLERGIES:  Allergies    No Known Allergies    Intolerances        LABS:                        8.9    10.00 )-----------( 270      ( 11 Dec 2020 05:51 )             29.0     12-11    140  |  104  |  11  ----------------------------<  178<H>  3.6   |  28  |  0.87    Ca    8.7      11 Dec 2020 05:51  Phos  3.7     12-11  Mg     2.2     12-11    TPro  6.0  /  Alb  2.9<L>  /  TBili  <0.2  /  DBili  x   /  AST  18  /  ALT  17  /  AlkPhos  50  12-11      Urinalysis Basic - ( 09 Dec 2020 22:41 )    Color: Yellow / Appearance: Clear / S.034 / pH: x  Gluc: x / Ketone: Negative  / Bili: Negative / Urobili: <2 mg/dL   Blood: x / Protein: Trace / Nitrite: Negative   Leuk Esterase: Negative / RBC: <5 /HPF / WBC 5 /HPF   Sq Epi: x / Non Sq Epi: 0 /HPF / Bacteria: Negative        RADIOLOGY & ADDITIONAL TESTS: Reviewed.           INTERVAL HPI/OVERNIGHT EVENTS:    SUBJECTIVE: Patient seen and examined at bedside.   Overnight: patient on light sedation for possible extubation today       VITAL SIGNS:  ICU Vital Signs Last 24 Hrs  T(C): 37.1 (11 Dec 2020 04:00), Max: 37.2 (11 Dec 2020 00:00)  T(F): 98.8 (11 Dec 2020 04:00), Max: 99 (11 Dec 2020 00:00)  HR: 105 (11 Dec 2020 07:35) (55 - 105)  BP: 152/69 (11 Dec 2020 06:00) (99/56 - 153/72)  BP(mean): 87 (11 Dec 2020 06:00) (62 - 89)  ABP: --  ABP(mean): --  RR: 18 (11 Dec 2020 06:00) (18 - 21)  SpO2: 95% (11 Dec 2020 07:35) (92% - 99%)    Mode: AC/ CMV (Assist Control/ Continuous Mandatory Ventilation), RR (machine): 18, TV (machine): 450, FiO2: 40, PEEP: 5, MAP: 7, PIP: 16  Plateau pressure:   P/F ratio:     12-10 @ 07:01  -  12-11 @ 07:00  --------------------------------------------------------  IN: 2124.7 mL / OUT: 3290 mL / NET: -1165.3 mL      CAPILLARY BLOOD GLUCOSE      POCT Blood Glucose.: 183 mg/dL (11 Dec 2020 05:04)    ECG:    PHYSICAL EXAM:    General: large body habitus     Respiratory: extubated   Cardiovascular:   Abdomen: large, soft   Extremities: able to move all extremities   Neurological: slightly lethargic     MEDICATIONS:  MEDICATIONS  (STANDING):  chlorhexidine 0.12% Liquid 15 milliLiter(s) Oral Mucosa every 12 hours  chlorhexidine 4% Liquid 1 Application(s) Topical <User Schedule>  enoxaparin Injectable 40 milliGRAM(s) SubCutaneous two times a day  melatonin 3 milliGRAM(s) Oral at bedtime  propofol Infusion 50 MICROgram(s)/kG/Min (36.8 mL/Hr) IV Continuous <Continuous>  thiamine 100 milliGRAM(s) Oral daily  vancomycin  IVPB 1500 milliGRAM(s) IV Intermittent every 12 hours    MEDICATIONS  (PRN):  acetaminophen    Suspension .. 650 milliGRAM(s) Oral every 6 hours PRN Temp greater or equal to 38C (100.4F)  LORazepam   Injectable 2 milliGRAM(s) IV Push every 4 hours PRN Agitation      ALLERGIES:  Allergies    No Known Allergies    Intolerances        LABS:                        8.9    10.00 )-----------( 270      ( 11 Dec 2020 05:51 )             29.0     12-11    140  |  104  |  11  ----------------------------<  178<H>  3.6   |  28  |  0.87    Ca    8.7      11 Dec 2020 05:51  Phos  3.7     12-11  Mg     2.2     12-11    TPro  6.0  /  Alb  2.9<L>  /  TBili  <0.2  /  DBili  x   /  AST  18  /  ALT  17  /  AlkPhos  50  12-11      Urinalysis Basic - ( 09 Dec 2020 22:41 )    Color: Yellow / Appearance: Clear / S.034 / pH: x  Gluc: x / Ketone: Negative  / Bili: Negative / Urobili: <2 mg/dL   Blood: x / Protein: Trace / Nitrite: Negative   Leuk Esterase: Negative / RBC: <5 /HPF / WBC 5 /HPF   Sq Epi: x / Non Sq Epi: 0 /HPF / Bacteria: Negative        RADIOLOGY & ADDITIONAL TESTS: Reviewed.

## 2020-12-11 NOTE — PROGRESS NOTE ADULT - ATTENDING COMMENTS
Patient examined and care reviewed in detail on bedside rounds  Critically ill on vent SBT with adequate TV but increasing agitation with reduction of sedation For possible high risk extubation   Frequent bedside visits with therapy change today.   I have personally provided 35+ minutes of critical care time concurrently with the resident/fellow; this excludes time spent on separate procedures. Patient examined and care reviewed in detail on bedside rounds  Critically ill on vent SBT with adequate TV but increasing agitation with reduction of sedation For possible high risk extubation   Frequent bedside visits with therapy change today.   I have personally provided 80+ minutes of critical care time concurrently with the resident/fellow; this excludes time spent on separate procedures.

## 2020-12-12 DIAGNOSIS — R41.82 ALTERED MENTAL STATUS, UNSPECIFIED: ICD-10-CM

## 2020-12-12 DIAGNOSIS — J18.9 PNEUMONIA, UNSPECIFIED ORGANISM: ICD-10-CM

## 2020-12-12 DIAGNOSIS — R45.1 RESTLESSNESS AND AGITATION: ICD-10-CM

## 2020-12-12 DIAGNOSIS — N17.9 ACUTE KIDNEY FAILURE, UNSPECIFIED: ICD-10-CM

## 2020-12-12 DIAGNOSIS — L03.90 CELLULITIS, UNSPECIFIED: ICD-10-CM

## 2020-12-12 LAB
ALBUMIN SERPL ELPH-MCNC: 3.3 G/DL — SIGNIFICANT CHANGE UP (ref 3.3–5)
ALP SERPL-CCNC: 50 U/L — SIGNIFICANT CHANGE UP (ref 40–120)
ALT FLD-CCNC: 20 U/L — SIGNIFICANT CHANGE UP (ref 4–41)
ANION GAP SERPL CALC-SCNC: 11 MMOL/L — SIGNIFICANT CHANGE UP (ref 7–14)
APTT BLD: 30.5 SEC — SIGNIFICANT CHANGE UP (ref 27–36.3)
AST SERPL-CCNC: 27 U/L — SIGNIFICANT CHANGE UP (ref 4–40)
BASOPHILS # BLD AUTO: 0.04 K/UL — SIGNIFICANT CHANGE UP (ref 0–0.2)
BASOPHILS NFR BLD AUTO: 0.4 % — SIGNIFICANT CHANGE UP (ref 0–2)
BILIRUB SERPL-MCNC: 0.2 MG/DL — SIGNIFICANT CHANGE UP (ref 0.2–1.2)
BUN SERPL-MCNC: 18 MG/DL — SIGNIFICANT CHANGE UP (ref 7–23)
CALCIUM SERPL-MCNC: 9.1 MG/DL — SIGNIFICANT CHANGE UP (ref 8.4–10.5)
CHLORIDE SERPL-SCNC: 110 MMOL/L — HIGH (ref 98–107)
CO2 SERPL-SCNC: 27 MMOL/L — SIGNIFICANT CHANGE UP (ref 22–31)
CREAT SERPL-MCNC: 0.96 MG/DL — SIGNIFICANT CHANGE UP (ref 0.5–1.3)
EOSINOPHIL # BLD AUTO: 0.02 K/UL — SIGNIFICANT CHANGE UP (ref 0–0.5)
EOSINOPHIL NFR BLD AUTO: 0.2 % — SIGNIFICANT CHANGE UP (ref 0–6)
GLUCOSE SERPL-MCNC: 107 MG/DL — HIGH (ref 70–99)
HCT VFR BLD CALC: 28.2 % — LOW (ref 39–50)
HGB BLD-MCNC: 8.8 G/DL — LOW (ref 13–17)
IANC: 7.05 K/UL — SIGNIFICANT CHANGE UP (ref 1.5–8.5)
IMM GRANULOCYTES NFR BLD AUTO: 5.7 % — HIGH (ref 0–1.5)
INR BLD: 1.28 RATIO — HIGH (ref 0.88–1.17)
LYMPHOCYTES # BLD AUTO: 1.1 K/UL — SIGNIFICANT CHANGE UP (ref 1–3.3)
LYMPHOCYTES # BLD AUTO: 11.7 % — LOW (ref 13–44)
MAGNESIUM SERPL-MCNC: 2.2 MG/DL — SIGNIFICANT CHANGE UP (ref 1.6–2.6)
MCHC RBC-ENTMCNC: 29.4 PG — SIGNIFICANT CHANGE UP (ref 27–34)
MCHC RBC-ENTMCNC: 31.2 GM/DL — LOW (ref 32–36)
MCV RBC AUTO: 94.3 FL — SIGNIFICANT CHANGE UP (ref 80–100)
MONOCYTES # BLD AUTO: 0.67 K/UL — SIGNIFICANT CHANGE UP (ref 0–0.9)
MONOCYTES NFR BLD AUTO: 7.1 % — SIGNIFICANT CHANGE UP (ref 2–14)
NEUTROPHILS # BLD AUTO: 7.05 K/UL — SIGNIFICANT CHANGE UP (ref 1.8–7.4)
NEUTROPHILS NFR BLD AUTO: 74.9 % — SIGNIFICANT CHANGE UP (ref 43–77)
NRBC # BLD: 0 /100 WBCS — SIGNIFICANT CHANGE UP
NRBC # FLD: 0 K/UL — SIGNIFICANT CHANGE UP
PHOSPHATE SERPL-MCNC: 2.1 MG/DL — LOW (ref 2.5–4.5)
PLATELET # BLD AUTO: 273 K/UL — SIGNIFICANT CHANGE UP (ref 150–400)
POTASSIUM SERPL-MCNC: 3.3 MMOL/L — LOW (ref 3.5–5.3)
POTASSIUM SERPL-SCNC: 3.3 MMOL/L — LOW (ref 3.5–5.3)
PROT SERPL-MCNC: 6.2 G/DL — SIGNIFICANT CHANGE UP (ref 6–8.3)
PROTHROM AB SERPL-ACNC: 14.5 SEC — HIGH (ref 9.8–13.1)
RBC # BLD: 2.99 M/UL — LOW (ref 4.2–5.8)
RBC # FLD: 12.8 % — SIGNIFICANT CHANGE UP (ref 10.3–14.5)
SODIUM SERPL-SCNC: 148 MMOL/L — HIGH (ref 135–145)
VANCOMYCIN TROUGH SERPL-MCNC: 17.3 UG/ML — SIGNIFICANT CHANGE UP (ref 10–20)
WBC # BLD: 9.42 K/UL — SIGNIFICANT CHANGE UP (ref 3.8–10.5)
WBC # FLD AUTO: 9.42 K/UL — SIGNIFICANT CHANGE UP (ref 3.8–10.5)

## 2020-12-12 PROCEDURE — 99233 SBSQ HOSP IP/OBS HIGH 50: CPT | Mod: GC

## 2020-12-12 RX ORDER — OLANZAPINE 15 MG/1
2.5 TABLET, FILM COATED ORAL EVERY 6 HOURS
Refills: 0 | Status: DISCONTINUED | OUTPATIENT
Start: 2020-12-12 | End: 2020-12-20

## 2020-12-12 RX ORDER — POTASSIUM CHLORIDE 20 MEQ
40 PACKET (EA) ORAL ONCE
Refills: 0 | Status: DISCONTINUED | OUTPATIENT
Start: 2020-12-12 | End: 2020-12-12

## 2020-12-12 RX ORDER — POTASSIUM CHLORIDE 20 MEQ
10 PACKET (EA) ORAL
Refills: 0 | Status: COMPLETED | OUTPATIENT
Start: 2020-12-12 | End: 2020-12-12

## 2020-12-12 RX ORDER — OLANZAPINE 15 MG/1
2.5 TABLET, FILM COATED ORAL EVERY 6 HOURS
Refills: 0 | Status: DISCONTINUED | OUTPATIENT
Start: 2020-12-12 | End: 2020-12-12

## 2020-12-12 RX ORDER — SODIUM CHLORIDE 9 MG/ML
1000 INJECTION, SOLUTION INTRAVENOUS
Refills: 0 | Status: DISCONTINUED | OUTPATIENT
Start: 2020-12-12 | End: 2020-12-13

## 2020-12-12 RX ORDER — SODIUM,POTASSIUM PHOSPHATES 278-250MG
1 POWDER IN PACKET (EA) ORAL ONCE
Refills: 0 | Status: DISCONTINUED | OUTPATIENT
Start: 2020-12-12 | End: 2020-12-12

## 2020-12-12 RX ORDER — POTASSIUM CHLORIDE 20 MEQ
40 PACKET (EA) ORAL EVERY 4 HOURS
Refills: 0 | Status: DISCONTINUED | OUTPATIENT
Start: 2020-12-12 | End: 2020-12-12

## 2020-12-12 RX ORDER — POTASSIUM PHOSPHATE, MONOBASIC POTASSIUM PHOSPHATE, DIBASIC 236; 224 MG/ML; MG/ML
15 INJECTION, SOLUTION INTRAVENOUS ONCE
Refills: 0 | Status: COMPLETED | OUTPATIENT
Start: 2020-12-12 | End: 2020-12-12

## 2020-12-12 RX ADMIN — ENOXAPARIN SODIUM 40 MILLIGRAM(S): 100 INJECTION SUBCUTANEOUS at 17:21

## 2020-12-12 RX ADMIN — Medication 100 MILLIEQUIVALENT(S): at 09:51

## 2020-12-12 RX ADMIN — Medication 300 MILLIGRAM(S): at 10:24

## 2020-12-12 RX ADMIN — Medication 300 MILLIGRAM(S): at 22:47

## 2020-12-12 RX ADMIN — ENOXAPARIN SODIUM 40 MILLIGRAM(S): 100 INJECTION SUBCUTANEOUS at 05:12

## 2020-12-12 RX ADMIN — Medication 100 MILLIEQUIVALENT(S): at 10:57

## 2020-12-12 RX ADMIN — Medication 1000 MILLIGRAM(S): at 00:22

## 2020-12-12 RX ADMIN — SODIUM CHLORIDE 75 MILLILITER(S): 9 INJECTION, SOLUTION INTRAVENOUS at 10:57

## 2020-12-12 RX ADMIN — HALOPERIDOL DECANOATE 5 MILLIGRAM(S): 100 INJECTION INTRAMUSCULAR at 05:12

## 2020-12-12 RX ADMIN — POTASSIUM PHOSPHATE, MONOBASIC POTASSIUM PHOSPHATE, DIBASIC 62.5 MILLIMOLE(S): 236; 224 INJECTION, SOLUTION INTRAVENOUS at 10:57

## 2020-12-12 RX ADMIN — Medication 100 MILLIEQUIVALENT(S): at 08:50

## 2020-12-12 NOTE — PROGRESS NOTE ADULT - PROBLEM SELECTOR PLAN 3
Started on Vancomycin on 12/9/20 for UE cellulitis  - Afebrile w/ normal WBC Started on Vancomycin on 12/9/20 for UE cellulitis  - Afebrile w/ normal WBC  - c/w Vancomycin 1.5g q12  - f/u Vanc trough

## 2020-12-12 NOTE — PROGRESS NOTE ADULT - PROBLEM SELECTOR PLAN 2
Multifocal PNA on CT chest 12/7  - patient afebrile, no leukocytosis   - s/p Vanco 12/5/20-12/7/20    - Complete 5d Zosyn 12/5/20-12/9/20, Azithro 12/6/-12/8/20  - COVID PCR and Rapid RVP neg   - BCx and UCx negative thus far  - LP negaive  - covid ab positive

## 2020-12-12 NOTE — PROGRESS NOTE ADULT - PROBLEM SELECTOR PLAN 6
DVT PPX: Lovenox 40mg  PT consulted; recs pending  Dispo: transferred from [facility]

## 2020-12-12 NOTE — PROGRESS NOTE ADULT - PROBLEM SELECTOR PLAN 4
Resolved  - Elevated SCr and elevated CK on admission, now downtrending   - Meredith exchanged, resulted in improved urine output  - SCr normal, CTM

## 2020-12-12 NOTE — PROGRESS NOTE ADULT - PROBLEM SELECTOR PLAN 1
AMS, agitated fo unclear etiology   - Pan CT to rule out infectious source shows multifocal PNA; s/p Zosyn   - CXR clear lungs, no leukocytosis, COVID PCR and rapid viral panel neg   - Utox positive for barbs, no barbs given in [place], level <3  - CT head artifact due to movement; Repeat CT head in setting of sedation neg  - LP negative  - Could be multiorgan inflammatory syndrome from COVID-19; COVID ab positive   - BCx and UCx negative to date  - MRI brain:  slightly prominent cerebellar sulci correlate with EtOH usage,  minimal microvascular disease, no restricted diffusion, hemorrhage or midline shift. Thickened calvarial diploic space, paranasal sinus mucosal thickening with opacification right greater than left mastoid tip.  - melatonin and thiamine added per psych recs AMS, agitated fo unclear etiology   - Pan CT to rule out infectious source shows multifocal PNA; s/p Zosyn   - CXR clear lungs, no leukocytosis, COVID PCR and rapid viral panel neg   - Utox positive for barbs, no barbs given in [place], level <3  - CT head artifact due to movement; Repeat CT head in setting of sedation neg  - LP negative  - Could be multiorgan inflammatory syndrome from COVID-19; COVID ab positive   - BCx and UCx negative to date  - MRI brain:  slightly prominent cerebellar sulci correlate with EtOH usage,  minimal microvascular disease, no restricted diffusion, hemorrhage or midline shift. Thickened calvarial diploic space, paranasal sinus mucosal thickening with opacification right greater than left mastoid tip.  - melatonin and thiamine added per psych recs  - Haldol 5mg q6 PRN for agitation
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
## 2020-12-12 NOTE — PROGRESS NOTE ADULT - PROBLEM SELECTOR PLAN 5
- intubated, sedated for agitation 12/5/-12/11  - s/p Versed gtt, fentanyl, propofol, precedex gtt  - Per psych, would like to monitor off anti-psychotics post-extubation  - recommended zyprexa 2.5 mg for agitation or standing Depakote 250 mg if needed - intubated, sedated for agitation 12/5/-12/11  - s/p Versed gtt, fentanyl, propofol, precedex gtt  - Per psych, would like to monitor off anti-psychotics post-extubation  - recommended zyprexa 2.5 mg q6 PRN for agitation   - Can escalate to standing Depakote 250 mg if needed
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
## 2020-12-12 NOTE — PROGRESS NOTE ADULT - SUBJECTIVE AND OBJECTIVE BOX
Patient is a 55y old  Male who presents with a chief complaint of AMS (11 Dec 2020 07:48)      Samuel Teran PGY1  Anesthesiology  Pager: [facility]: [phone] NS: [phone]    SUBJECTIVE/OVERNIGHT EVENTS     MEDICATIONS  (STANDING):  enoxaparin Injectable 40 milliGRAM(s) SubCutaneous two times a day  melatonin 3 milliGRAM(s) Oral at bedtime  potassium chloride   Powder 40 milliEquivalent(s) Oral once  potassium phosphate / sodium phosphate Powder (PHOS-NaK) 1 Packet(s) Oral once  thiamine 100 milliGRAM(s) Oral daily  vancomycin  IVPB 1500 milliGRAM(s) IV Intermittent every 12 hours    MEDICATIONS  (PRN):  acetaminophen    Suspension .. 650 milliGRAM(s) Oral every 6 hours PRN Temp greater or equal to 38C (100.4F)  haloperidol    Injectable 5 milliGRAM(s) IV Push every 6 hours PRN Agitation    CAPILLARY BLOOD GLUCOSE      POCT Blood Glucose.: 160 mg/dL (11 Dec 2020 23:37)  POCT Blood Glucose.: 120 mg/dL (11 Dec 2020 16:53)  POCT Blood Glucose.: 188 mg/dL (11 Dec 2020 11:50)    I&O's Summary    11 Dec 2020 07:01  -  12 Dec 2020 07:00  --------------------------------------------------------  IN: 1125.1 mL / OUT: 2330 mL / NET: -1204.9 mL          Vital Signs Last 24 Hrs  T(C): 36.6 (12 Dec 2020 03:44), Max: 37.9 (11 Dec 2020 23:00)  T(F): 97.8 (12 Dec 2020 03:44), Max: 100.3 (11 Dec 2020 23:00)  HR: 106 (12 Dec 2020 03:44) (94 - 125)  BP: 115/88 (12 Dec 2020 03:44) (115/88 - 163/90)  BP(mean): 101 (12 Dec 2020 03:00) (91 - 109)  RR: 20 (12 Dec 2020 03:44) (17 - 34)  SpO2: 96% (12 Dec 2020 03:44) (93% - 100%)    PHYSICAL EXAM:  GENERAL: NAD, well-developed  HEAD:  Atraumatic, Normocephalic  EYES: EOMI, PERRLA, conjunctiva and sclera clear  NECK: Supple, No JVD  CHEST/LUNG: Clear to auscultation bilaterally; No wheeze  HEART: Regular rate and rhythm; No murmurs, rubs, or gallops  ABDOMEN: Soft, Nontender, Nondistended; Bowel sounds present  EXTREMITIES:  2+ Peripheral Pulses, No clubbing, cyanosis, or edema  PSYCH: AAOx3  NEUROLOGY: non-focal  SKIN: No rashes or lesions    LABS                        8.8    9.42  )-----------( 273      ( 12 Dec 2020 06:28 )             28.2                         8.9    10.00 )-----------( 270      ( 11 Dec 2020 05:51 )             29.0     12-12    148<H>  |  110<H>  |  18  ----------------------------<  107<H>  3.3<L>   |  27  |  0.96  12-11    140  |  104  |  11  ----------------------------<  178<H>  3.6   |  28  |  0.87    Ca    9.1      12 Dec 2020 06:28  Ca    8.7      11 Dec 2020 05:51  Phos  2.1     12-12  Mg     2.2     12-12    TPro  6.2  /  Alb  3.3  /  TBili  0.2  /  DBili  x   /  AST  27  /  ALT  20  /  AlkPhos  50  12-12  TPro  6.0  /  Alb  2.9<L>  /  TBili  <0.2  /  DBili  x   /  AST  18  /  ALT  17  /  AlkPhos  50  12-11    PT/INR - ( 12 Dec 2020 06:28 )   PT: 14.5 sec;   INR: 1.28 ratio         PTT - ( 12 Dec 2020 06:28 )  PTT:30.5 sec   08 Dec 2020 02:19 Troponin x     /  U/L / CKMB x     / CKMB Units x       06 Dec 2020 14:21 Troponin x     / CK 1042 U/L / CKMB x     / CKMB Units 3.6 ng/mL   06 Dec 2020 04:32 Troponin x     / CK 1405 U/L / CKMB x     / CKMB Units x              ( 06 Dec 2020 01:10 ) pH: 7.38  /  pCO2: 34    /  pO2: 150   / HCO3: 21    / Base Excess: -4.4  /  SaO2: 99.2            ( 05 Dec 2020 20:45 ) pH: 7.37  /  pCO2: 31    /  pO2: 131   / HCO3: 19    / Base Excess: -6.4  /  SaO2: 99.0                  RADIOLOGY & ADDITIONAL TESTS:         Patient is a 55y old  Male who presents with a chief complaint of AMS (11 Dec 2020 07:48)      Samuel Teran PGY1  Anesthesiology  Pager: [facility]: [phone] NS: [phone]    SUBJECTIVE/OVERNIGHT EVENTS: Pt transferred from MICU overnight. Pt seen and examined at bedside. Unable to fully understand patient and only able to answer some questions intermittently. Otherwise mumbling random sounds. Shakes his head no when asked if he felt any pain anywhere.    REVIEW OF SYSTEMS: Limited due to patient cooperation      MEDICATIONS  (STANDING):  enoxaparin Injectable 40 milliGRAM(s) SubCutaneous two times a day  melatonin 3 milliGRAM(s) Oral at bedtime  potassium chloride   Powder 40 milliEquivalent(s) Oral once  potassium phosphate / sodium phosphate Powder (PHOS-NaK) 1 Packet(s) Oral once  thiamine 100 milliGRAM(s) Oral daily  vancomycin  IVPB 1500 milliGRAM(s) IV Intermittent every 12 hours    MEDICATIONS  (PRN):  acetaminophen    Suspension .. 650 milliGRAM(s) Oral every 6 hours PRN Temp greater or equal to 38C (100.4F)  haloperidol    Injectable 5 milliGRAM(s) IV Push every 6 hours PRN Agitation    CAPILLARY BLOOD GLUCOSE      POCT Blood Glucose.: 160 mg/dL (11 Dec 2020 23:37)  POCT Blood Glucose.: 120 mg/dL (11 Dec 2020 16:53)  POCT Blood Glucose.: 188 mg/dL (11 Dec 2020 11:50)    I&O's Summary    11 Dec 2020 07:01  -  12 Dec 2020 07:00  --------------------------------------------------------  IN: 1125.1 mL / OUT: 2330 mL / NET: -1204.9 mL          Vital Signs Last 24 Hrs  T(C): 36.6 (12 Dec 2020 03:44), Max: 37.9 (11 Dec 2020 23:00)  T(F): 97.8 (12 Dec 2020 03:44), Max: 100.3 (11 Dec 2020 23:00)  HR: 106 (12 Dec 2020 03:44) (94 - 125)  BP: 115/88 (12 Dec 2020 03:44) (115/88 - 163/90)  BP(mean): 101 (12 Dec 2020 03:00) (91 - 109)  RR: 20 (12 Dec 2020 03:44) (17 - 34)  SpO2: 96% (12 Dec 2020 03:44) (93% - 100%)    PHYSICAL EXAM:  GENERAL: NAD, somewhat lethargic  EYES: EOMI, PERRLA  CHEST/LUNG: s/p extubation on 2L NC. Clear to auscultation w/ decreased breath sounds b/l  HEART: Regular rate and rhythm; No murmurs  ABDOMEN: Soft, Nontender, Nondistended  EXTREMITIES:  2+ Peripheral Pulses, No edema  PSYCH: AAOx1  NEUROLOGY: Able to move all extremities passively, unable to follow commands  SKIN: No rashes or lesions    LABS                        8.8    9.42  )-----------( 273      ( 12 Dec 2020 06:28 )             28.2                         8.9    10.00 )-----------( 270      ( 11 Dec 2020 05:51 )             29.0     12-12    148<H>  |  110<H>  |  18  ----------------------------<  107<H>  3.3<L>   |  27  |  0.96  12-11    140  |  104  |  11  ----------------------------<  178<H>  3.6   |  28  |  0.87    Ca    9.1      12 Dec 2020 06:28  Ca    8.7      11 Dec 2020 05:51  Phos  2.1     12-12  Mg     2.2     12-12    TPro  6.2  /  Alb  3.3  /  TBili  0.2  /  DBili  x   /  AST  27  /  ALT  20  /  AlkPhos  50  12-12  TPro  6.0  /  Alb  2.9<L>  /  TBili  <0.2  /  DBili  x   /  AST  18  /  ALT  17  /  AlkPhos  50  12-11    PT/INR - ( 12 Dec 2020 06:28 )   PT: 14.5 sec;   INR: 1.28 ratio         PTT - ( 12 Dec 2020 06:28 )  PTT:30.5 sec   08 Dec 2020 02:19 Troponin x     /  U/L / CKMB x     / CKMB Units x       06 Dec 2020 14:21 Troponin x     / CK 1042 U/L / CKMB x     / CKMB Units 3.6 ng/mL   06 Dec 2020 04:32 Troponin x     / CK 1405 U/L / CKMB x     / CKMB Units x              ( 06 Dec 2020 01:10 ) pH: 7.38  /  pCO2: 34    /  pO2: 150   / HCO3: 21    / Base Excess: -4.4  /  SaO2: 99.2            ( 05 Dec 2020 20:45 ) pH: 7.37  /  pCO2: 31    /  pO2: 131   / HCO3: 19    / Base Excess: -6.4  /  SaO2: 99.0                  RADIOLOGY & ADDITIONAL TESTS:         Patient is a 55y old  Male who presents with a chief complaint of AMS (11 Dec 2020 07:48)      Samuel Teran PGY1  Anesthesiology  Pager: [facility]: [phone] NS: [phone]    SUBJECTIVE/OVERNIGHT EVENTS: Pt transferred from MICU overnight. Pt seen and examined at bedside. Unable to fully understand patient and only able to answer some questions intermittently. Otherwise mumbling random sounds. Shakes his head no when asked if he felt any pain anywhere.    REVIEW OF SYSTEMS: Limited due to patient cooperation      MEDICATIONS  (STANDING):  enoxaparin Injectable 40 milliGRAM(s) SubCutaneous two times a day  melatonin 3 milliGRAM(s) Oral at bedtime  potassium chloride   Powder 40 milliEquivalent(s) Oral once  potassium phosphate / sodium phosphate Powder (PHOS-NaK) 1 Packet(s) Oral once  thiamine 100 milliGRAM(s) Oral daily  vancomycin  IVPB 1500 milliGRAM(s) IV Intermittent every 12 hours    MEDICATIONS  (PRN):  acetaminophen    Suspension .. 650 milliGRAM(s) Oral every 6 hours PRN Temp greater or equal to 38C (100.4F)  haloperidol    Injectable 5 milliGRAM(s) IV Push every 6 hours PRN Agitation    CAPILLARY BLOOD GLUCOSE      POCT Blood Glucose.: 160 mg/dL (11 Dec 2020 23:37)  POCT Blood Glucose.: 120 mg/dL (11 Dec 2020 16:53)  POCT Blood Glucose.: 188 mg/dL (11 Dec 2020 11:50)    I&O's Summary    11 Dec 2020 07:01  -  12 Dec 2020 07:00  --------------------------------------------------------  IN: 1125.1 mL / OUT: 2330 mL / NET: -1204.9 mL          Vital Signs Last 24 Hrs  T(C): 36.6 (12 Dec 2020 03:44), Max: 37.9 (11 Dec 2020 23:00)  T(F): 97.8 (12 Dec 2020 03:44), Max: 100.3 (11 Dec 2020 23:00)  HR: 106 (12 Dec 2020 03:44) (94 - 125)  BP: 115/88 (12 Dec 2020 03:44) (115/88 - 163/90)  BP(mean): 101 (12 Dec 2020 03:00) (91 - 109)  RR: 20 (12 Dec 2020 03:44) (17 - 34)  SpO2: 96% (12 Dec 2020 03:44) (93% - 100%)    PHYSICAL EXAM:  GENERAL: NAD, somewhat lethargic  EYES: EOMI, PERRLA  CHEST/LUNG: s/p extubation on 2L NC. Clear to auscultation w/ decreased breath sounds b/l  HEART: Regular rate and rhythm; No murmurs  ABDOMEN: Soft, Nontender, Nondistended  EXTREMITIES:  LUE cellulitis: erythema improving and decreasing in area.  PSYCH: AAOx1  NEUROLOGY: Able to move all extremities passively, unable to follow commands  SKIN: No rashes or lesions    LABS                        8.8    9.42  )-----------( 273      ( 12 Dec 2020 06:28 )             28.2                         8.9    10.00 )-----------( 270      ( 11 Dec 2020 05:51 )             29.0     12-12    148<H>  |  110<H>  |  18  ----------------------------<  107<H>  3.3<L>   |  27  |  0.96  12-11    140  |  104  |  11  ----------------------------<  178<H>  3.6   |  28  |  0.87    Ca    9.1      12 Dec 2020 06:28  Ca    8.7      11 Dec 2020 05:51  Phos  2.1     12-12  Mg     2.2     12-12    TPro  6.2  /  Alb  3.3  /  TBili  0.2  /  DBili  x   /  AST  27  /  ALT  20  /  AlkPhos  50  12-12  TPro  6.0  /  Alb  2.9<L>  /  TBili  <0.2  /  DBili  x   /  AST  18  /  ALT  17  /  AlkPhos  50  12-11    PT/INR - ( 12 Dec 2020 06:28 )   PT: 14.5 sec;   INR: 1.28 ratio         PTT - ( 12 Dec 2020 06:28 )  PTT:30.5 sec   08 Dec 2020 02:19 Troponin x     /  U/L / CKMB x     / CKMB Units x       06 Dec 2020 14:21 Troponin x     / CK 1042 U/L / CKMB x     / CKMB Units 3.6 ng/mL   06 Dec 2020 04:32 Troponin x     / CK 1405 U/L / CKMB x     / CKMB Units x              ( 06 Dec 2020 01:10 ) pH: 7.38  /  pCO2: 34    /  pO2: 150   / HCO3: 21    / Base Excess: -4.4  /  SaO2: 99.2            ( 05 Dec 2020 20:45 ) pH: 7.37  /  pCO2: 31    /  pO2: 131   / HCO3: 19    / Base Excess: -6.4  /  SaO2: 99.0                  RADIOLOGY & ADDITIONAL TESTS:
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
## 2020-12-13 DIAGNOSIS — Z98.890 OTHER SPECIFIED POSTPROCEDURAL STATES: Chronic | ICD-10-CM

## 2020-12-13 LAB
ALBUMIN SERPL ELPH-MCNC: 3.2 G/DL — LOW (ref 3.3–5)
ALP SERPL-CCNC: 45 U/L — SIGNIFICANT CHANGE UP (ref 40–120)
ALT FLD-CCNC: 20 U/L — SIGNIFICANT CHANGE UP (ref 4–41)
ANION GAP SERPL CALC-SCNC: 11 MMOL/L — SIGNIFICANT CHANGE UP (ref 7–14)
ANION GAP SERPL CALC-SCNC: 11 MMOL/L — SIGNIFICANT CHANGE UP (ref 7–14)
AST SERPL-CCNC: 31 U/L — SIGNIFICANT CHANGE UP (ref 4–40)
BILIRUB SERPL-MCNC: 0.3 MG/DL — SIGNIFICANT CHANGE UP (ref 0.2–1.2)
BUN SERPL-MCNC: 17 MG/DL — SIGNIFICANT CHANGE UP (ref 7–23)
BUN SERPL-MCNC: 18 MG/DL — SIGNIFICANT CHANGE UP (ref 7–23)
CALCIUM SERPL-MCNC: 8.8 MG/DL — SIGNIFICANT CHANGE UP (ref 8.4–10.5)
CALCIUM SERPL-MCNC: 8.9 MG/DL — SIGNIFICANT CHANGE UP (ref 8.4–10.5)
CHLORIDE SERPL-SCNC: 111 MMOL/L — HIGH (ref 98–107)
CHLORIDE SERPL-SCNC: 111 MMOL/L — HIGH (ref 98–107)
CO2 SERPL-SCNC: 23 MMOL/L — SIGNIFICANT CHANGE UP (ref 22–31)
CO2 SERPL-SCNC: 24 MMOL/L — SIGNIFICANT CHANGE UP (ref 22–31)
CREAT SERPL-MCNC: 0.87 MG/DL — SIGNIFICANT CHANGE UP (ref 0.5–1.3)
CREAT SERPL-MCNC: 0.89 MG/DL — SIGNIFICANT CHANGE UP (ref 0.5–1.3)
GLUCOSE SERPL-MCNC: 102 MG/DL — HIGH (ref 70–99)
GLUCOSE SERPL-MCNC: 117 MG/DL — HIGH (ref 70–99)
HCT VFR BLD CALC: 29.1 % — LOW (ref 39–50)
HGB BLD-MCNC: 9.4 G/DL — LOW (ref 13–17)
MAGNESIUM SERPL-MCNC: 2.2 MG/DL — SIGNIFICANT CHANGE UP (ref 1.6–2.6)
MAGNESIUM SERPL-MCNC: 2.2 MG/DL — SIGNIFICANT CHANGE UP (ref 1.6–2.6)
MCHC RBC-ENTMCNC: 29.2 PG — SIGNIFICANT CHANGE UP (ref 27–34)
MCHC RBC-ENTMCNC: 32.3 GM/DL — SIGNIFICANT CHANGE UP (ref 32–36)
MCV RBC AUTO: 90.4 FL — SIGNIFICANT CHANGE UP (ref 80–100)
NRBC # BLD: 0 /100 WBCS — SIGNIFICANT CHANGE UP
NRBC # FLD: 0 K/UL — SIGNIFICANT CHANGE UP
PHOSPHATE SERPL-MCNC: 2.8 MG/DL — SIGNIFICANT CHANGE UP (ref 2.5–4.5)
PHOSPHATE SERPL-MCNC: 3 MG/DL — SIGNIFICANT CHANGE UP (ref 2.5–4.5)
PLATELET # BLD AUTO: 269 K/UL — SIGNIFICANT CHANGE UP (ref 150–400)
POTASSIUM SERPL-MCNC: 3 MMOL/L — LOW (ref 3.5–5.3)
POTASSIUM SERPL-MCNC: 3.1 MMOL/L — LOW (ref 3.5–5.3)
POTASSIUM SERPL-SCNC: 3 MMOL/L — LOW (ref 3.5–5.3)
POTASSIUM SERPL-SCNC: 3.1 MMOL/L — LOW (ref 3.5–5.3)
PROT SERPL-MCNC: 5.9 G/DL — LOW (ref 6–8.3)
RBC # BLD: 3.22 M/UL — LOW (ref 4.2–5.8)
RBC # FLD: 12.3 % — SIGNIFICANT CHANGE UP (ref 10.3–14.5)
SODIUM SERPL-SCNC: 145 MMOL/L — SIGNIFICANT CHANGE UP (ref 135–145)
SODIUM SERPL-SCNC: 146 MMOL/L — HIGH (ref 135–145)
WBC # BLD: 8.29 K/UL — SIGNIFICANT CHANGE UP (ref 3.8–10.5)
WBC # FLD AUTO: 8.29 K/UL — SIGNIFICANT CHANGE UP (ref 3.8–10.5)

## 2020-12-13 PROCEDURE — 71045 X-RAY EXAM CHEST 1 VIEW: CPT | Mod: 26

## 2020-12-13 PROCEDURE — 99233 SBSQ HOSP IP/OBS HIGH 50: CPT | Mod: GC

## 2020-12-13 RX ORDER — POTASSIUM CHLORIDE 20 MEQ
40 PACKET (EA) ORAL EVERY 4 HOURS
Refills: 0 | Status: DISCONTINUED | OUTPATIENT
Start: 2020-12-13 | End: 2020-12-13

## 2020-12-13 RX ORDER — SODIUM CHLORIDE 9 MG/ML
1000 INJECTION, SOLUTION INTRAVENOUS
Refills: 0 | Status: DISCONTINUED | OUTPATIENT
Start: 2020-12-13 | End: 2020-12-14

## 2020-12-13 RX ORDER — ACETAMINOPHEN 500 MG
1000 TABLET ORAL ONCE
Refills: 0 | Status: COMPLETED | OUTPATIENT
Start: 2020-12-13 | End: 2020-12-13

## 2020-12-13 RX ORDER — POTASSIUM CHLORIDE 20 MEQ
20 PACKET (EA) ORAL
Refills: 0 | Status: DISCONTINUED | OUTPATIENT
Start: 2020-12-13 | End: 2020-12-13

## 2020-12-13 RX ORDER — POTASSIUM CHLORIDE 20 MEQ
10 PACKET (EA) ORAL
Refills: 0 | Status: COMPLETED | OUTPATIENT
Start: 2020-12-13 | End: 2020-12-13

## 2020-12-13 RX ORDER — ACETAMINOPHEN 500 MG
650 TABLET ORAL ONCE
Refills: 0 | Status: COMPLETED | OUTPATIENT
Start: 2020-12-13 | End: 2020-12-13

## 2020-12-13 RX ORDER — POTASSIUM CHLORIDE 20 MEQ
10 PACKET (EA) ORAL
Refills: 0 | Status: COMPLETED | OUTPATIENT
Start: 2020-12-13 | End: 2020-12-14

## 2020-12-13 RX ADMIN — Medication 100 MILLIEQUIVALENT(S): at 14:46

## 2020-12-13 RX ADMIN — Medication 300 MILLIGRAM(S): at 22:57

## 2020-12-13 RX ADMIN — Medication 100 MILLIEQUIVALENT(S): at 23:42

## 2020-12-13 RX ADMIN — ENOXAPARIN SODIUM 40 MILLIGRAM(S): 100 INJECTION SUBCUTANEOUS at 17:05

## 2020-12-13 RX ADMIN — Medication 650 MILLIGRAM(S): at 04:26

## 2020-12-13 RX ADMIN — Medication 100 MILLIEQUIVALENT(S): at 22:35

## 2020-12-13 RX ADMIN — Medication 400 MILLIGRAM(S): at 22:35

## 2020-12-13 RX ADMIN — Medication 100 MILLIEQUIVALENT(S): at 13:36

## 2020-12-13 RX ADMIN — Medication 100 MILLIEQUIVALENT(S): at 17:05

## 2020-12-13 RX ADMIN — Medication 300 MILLIGRAM(S): at 11:48

## 2020-12-13 RX ADMIN — Medication 650 MILLIGRAM(S): at 02:38

## 2020-12-13 RX ADMIN — ENOXAPARIN SODIUM 40 MILLIGRAM(S): 100 INJECTION SUBCUTANEOUS at 06:01

## 2020-12-13 RX ADMIN — SODIUM CHLORIDE 75 MILLILITER(S): 9 INJECTION, SOLUTION INTRAVENOUS at 13:37

## 2020-12-13 RX ADMIN — Medication 1000 MILLIGRAM(S): at 23:40

## 2020-12-13 NOTE — PROGRESS NOTE ADULT - PROBLEM SELECTOR PLAN 5
- intubated, sedated for agitation 12/5/-12/11  - s/p Versed gtt, fentanyl, propofol, precedex gtt  - Per psych, would like to monitor off anti-psychotics post-extubation  - recommended zyprexa 2.5 mg q6 PRN for agitation   - Can escalate to standing Depakote 250 mg if needed

## 2020-12-13 NOTE — PHYSICAL THERAPY INITIAL EVALUATION ADULT - DISCHARGE DISPOSITION, PT EVAL
to be determined pending further assessments. pt's cognitive status limits ability to actively participate. PT to follow up for 1-2 more sessions to determine if pt is appropriate for PT services.
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
## 2020-12-13 NOTE — PROGRESS NOTE ADULT - SUBJECTIVE AND OBJECTIVE BOX
PROGRESS NOTE:   Love Steffanie  PGY2 Internal Medicine  Pager [phone]    Patient is a 55y old  Male who presents with a chief complaint of AMS (12 Dec 2020 07:42)      SUBJECTIVE / OVERNIGHT EVENTS:    ADDITIONAL REVIEW OF SYSTEMS:    MEDICATIONS  (STANDING):  dextrose 5% + sodium chloride 0.45%. 1000 milliLiter(s) (75 mL/Hr) IV Continuous <Continuous>  enoxaparin Injectable 40 milliGRAM(s) SubCutaneous two times a day  melatonin 3 milliGRAM(s) Oral at bedtime  thiamine 100 milliGRAM(s) Oral daily  vancomycin  IVPB 1500 milliGRAM(s) IV Intermittent every 12 hours    MEDICATIONS  (PRN):  acetaminophen    Suspension .. 650 milliGRAM(s) Oral every 6 hours PRN Temp greater or equal to 38C (100.4F)  OLANZapine Injectable 2.5 milliGRAM(s) IntraMuscular every 6 hours PRN Agitation      CAPILLARY BLOOD GLUCOSE        I&O's Summary    12 Dec 2020 07:01  -  13 Dec 2020 07:00  --------------------------------------------------------  IN: 0 mL / OUT: 2100 mL / NET: -2100 mL        PHYSICAL EXAM:  Vital Signs Last 24 Hrs  T(C): 38.2 (13 Dec 2020 04:26), Max: 38.2 (13 Dec 2020 04:26)  T(F): 100.8 (13 Dec 2020 04:26), Max: 100.8 (13 Dec 2020 04:26)  HR: 95 (13 Dec 2020 04:26) (86 - 102)  BP: 133/78 (13 Dec 2020 04:26) (123/73 - 149/80)  BP(mean): --  RR: 18 (13 Dec 2020 04:26) (18 - 20)  SpO2: 95% (13 Dec 2020 04:26) (94% - 97%)    GENERAL: No acute distress, well-developed  HEAD:  Atraumatic, Normocephalic  EYES: EOMI, PERRLA, conjunctiva and sclera clear  NECK: Supple, no lymphadenopathy, no JVD  CHEST/LUNG: CTAB; No wheezes, rales, or rhonchi  HEART: Regular rate and rhythm; No murmurs, rubs, or gallops  ABDOMEN: Soft, non-tender, non-distended; normal bowel sounds, no organomegaly  EXTREMITIES:  2+ peripheral pulses b/l, No clubbing, cyanosis, or edema  NEUROLOGY: A&O x 3, no focal deficits  SKIN: No rashes or lesions    LABS:                        9.4    8.29  )-----------( 269      ( 13 Dec 2020 07:26 )             29.1     12-12    148<H>  |  110<H>  |  18  ----------------------------<  107<H>  3.3<L>   |  27  |  0.96    Ca    9.1      12 Dec 2020 06:28  Phos  2.1     12-12  Mg     2.2     12-12    TPro  6.2  /  Alb  3.3  /  TBili  0.2  /  DBili  x   /  AST  27  /  ALT  20  /  AlkPhos  50  12-12    PT/INR - ( 12 Dec 2020 06:28 )   PT: 14.5 sec;   INR: 1.28 ratio         PTT - ( 12 Dec 2020 06:28 )  PTT:30.5 sec            RADIOLOGY & ADDITIONAL TESTS:  Results Reviewed:   Imaging Personally Reviewed:  Electrocardiogram Personally Reviewed:    COORDINATION OF CARE:  Care Discussed with Consultants/Other Providers [Y/N]:  Prior or Outpatient Records Reviewed [Y/N]:   PROGRESS NOTE:   Love Steffanie  PGY2 Internal Medicine  Pager [phone]    Patient is a 55y old  Male who presents with a chief complaint of AMS (12 Dec 2020 07:42)      SUBJECTIVE / OVERNIGHT EVENTS:  Overnight pt febrile to 100.4, infectious workup sent, otherwise no complaints    ADDITIONAL REVIEW OF SYSTEMS:  limited due to pt involvement    MEDICATIONS  (STANDING):  dextrose 5% + sodium chloride 0.45%. 1000 milliLiter(s) (75 mL/Hr) IV Continuous <Continuous>  enoxaparin Injectable 40 milliGRAM(s) SubCutaneous two times a day  melatonin 3 milliGRAM(s) Oral at bedtime  thiamine 100 milliGRAM(s) Oral daily  vancomycin  IVPB 1500 milliGRAM(s) IV Intermittent every 12 hours    MEDICATIONS  (PRN):  acetaminophen    Suspension .. 650 milliGRAM(s) Oral every 6 hours PRN Temp greater or equal to 38C (100.4F)  OLANZapine Injectable 2.5 milliGRAM(s) IntraMuscular every 6 hours PRN Agitation      CAPILLARY BLOOD GLUCOSE        I&O's Summary    12 Dec 2020 07:01  -  13 Dec 2020 07:00  --------------------------------------------------------  IN: 0 mL / OUT: 2100 mL / NET: -2100 mL        PHYSICAL EXAM:  Vital Signs Last 24 Hrs  T(C): 38.2 (13 Dec 2020 04:26), Max: 38.2 (13 Dec 2020 04:26)  T(F): 100.8 (13 Dec 2020 04:26), Max: 100.8 (13 Dec 2020 04:26)  HR: 95 (13 Dec 2020 04:26) (86 - 102)  BP: 133/78 (13 Dec 2020 04:26) (123/73 - 149/80)  BP(mean): --  RR: 18 (13 Dec 2020 04:26) (18 - 20)  SpO2: 95% (13 Dec 2020 04:26) (94% - 97%)    GENERAL: NAD, somewhat lethargic  EYES: EOMI, PERRLA  CHEST/LUNG: s/p extubation on 2L NC. Clear to auscultation w/ decreased breath sounds b/l  HEART: Regular rate and rhythm; No murmurs  ABDOMEN: Soft, Nontender, Nondistended  EXTREMITIES:  LUE cellulitis: erythema improving and decreasing in area.  PSYCH: AAOx1  NEUROLOGY: Able to move all extremities passively, unable to follow commands  SKIN: No rashes or lesions    LABS:                        9.4    8.29  )-----------( 269      ( 13 Dec 2020 07:26 )             29.1     12-12    148<H>  |  110<H>  |  18  ----------------------------<  107<H>  3.3<L>   |  27  |  0.96    Ca    9.1      12 Dec 2020 06:28  Phos  2.1     12-12  Mg     2.2     12-12    TPro  6.2  /  Alb  3.3  /  TBili  0.2  /  DBili  x   /  AST  27  /  ALT  20  /  AlkPhos  50  12-12    PT/INR - ( 12 Dec 2020 06:28 )   PT: 14.5 sec;   INR: 1.28 ratio         PTT - ( 12 Dec 2020 06:28 )  PTT:30.5 sec            RADIOLOGY & ADDITIONAL TESTS:  Results Reviewed:   Imaging Personally Reviewed:  Electrocardiogram Personally Reviewed:    COORDINATION OF CARE:  Care Discussed with Consultants/Other Providers [Y/N]:  Prior or Outpatient Records Reviewed [Y/N]:

## 2020-12-13 NOTE — PHYSICAL THERAPY INITIAL EVALUATION ADULT - RANGE OF MOTION EXAMINATION, REHAB EVAL
bilateral upper extremity ROM was WFL (within functional limits)/bilateral lower extremity ROM was WFL (within functional limits)/assessed via passive/active assistive ROM

## 2020-12-13 NOTE — BH INPATIENT PSYCHIATRY DISCHARGE NOTE - NSBHDCSIGEVENTSFT_PSY_A_CORE
Pt.’s behavioral issues occurred with AMS.  He became agitated, requiring IM medications; he did not restraints, and did not self-harm on the unit. The patient’s wife was contacted for treatment planning and transfer to [facility] ED.
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
## 2020-12-13 NOTE — PHYSICAL THERAPY INITIAL EVALUATION ADULT - ADDITIONAL COMMENTS
As per EMR, pt admitted from [facility]. Prior to [facility], pt lived in house with spouse and family. Pt was independent in functional activities prior to admission without use of assistive device.    Pt left semi-juarez in bed, NAD. +call bell. PCA present. RN aware of session.

## 2020-12-13 NOTE — PROGRESS NOTE ADULT - PROBLEM SELECTOR PLAN 3
Started on Vancomycin on 12/9/20 for UE cellulitis  - Afebrile w/ normal WBC  - c/w Vancomycin 1.5g q12  - f/u Vanc trough
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
## 2020-12-13 NOTE — BH INPATIENT PSYCHIATRY DISCHARGE NOTE - NSBHSUICIDESTATUS_PSY_ALL_CORE
Suicidal and aggression risk assessments were performed on the day of discharge. The patient is at elevated chronic risk of self-harm due to an underlying mood disorder. He is not actively suicidal or manic but continues to require inpatient hospitalization for depression once medically stabilized. Protective factors for suicide include future orientation, social support, dependents, treatment engagement, med compliance, engagement in work/school. Risk factors include access, insomnia, impulsivity, anhedonia, previous SA.    Immediate risk was minimized by inpatient admission to a safe environment with appropriate supervision and limited access to lethal means. Future risk was minimized by admission to medical services for treatment and continued supervision. Patient denies all suicidal and aggressive/homicidal ideation, intent and plan, and, in view of demonstrated clinical improvement, is not judged to be an acute danger to self or others at this time.

## 2020-12-13 NOTE — PROGRESS NOTE ADULT - PROBLEM SELECTOR PLAN 6
DVT PPX: Lovenox 40mg  PT consulted; recs pending  Dispo: transferred from [facility]

## 2020-12-13 NOTE — PROGRESS NOTE ADULT - PROBLEM SELECTOR PLAN 1
AMS, agitated fo unclear etiology   - Pan CT to rule out infectious source shows multifocal PNA; s/p Zosyn   - CXR clear lungs, no leukocytosis, COVID PCR and rapid viral panel neg   - Utox positive for barbs, no barbs given in [place], level <3  - CT head artifact due to movement; Repeat CT head in setting of sedation neg  - LP negative  - Could be multiorgan inflammatory syndrome from COVID-19; COVID ab positive   - BCx and UCx negative to date  - MRI brain:  slightly prominent cerebellar sulci correlate with EtOH usage,  minimal microvascular disease, no restricted diffusion, hemorrhage or midline shift. Thickened calvarial diploic space, paranasal sinus mucosal thickening with opacification right greater than left mastoid tip.  - melatonin and thiamine added per psych recs

## 2020-12-13 NOTE — BH INPATIENT PSYCHIATRY DISCHARGE NOTE - NSICDXPASTMEDICALHX_GEN_ALL_CORE_FT
PAST MEDICAL HISTORY:  Depression     Hepatitis "as a child" unsure of type    Hypertension     Left knee pain

## 2020-12-13 NOTE — PHYSICAL THERAPY INITIAL EVALUATION ADULT - MANUAL MUSCLE TESTING RESULTS, REHAB EVAL
pt able to perform active finger flexion and toe flexion/extension; not formally assess secondary to AMS

## 2020-12-13 NOTE — BH INPATIENT PSYCHIATRY DISCHARGE NOTE - HOSPITAL COURSE
Dates of Hospitalization: 11/10/2020-12/4/2020    Upon admission, patient endorsed depression including insomnia, poor concentration, hopelessness, helplessness, anhedonia, low energy. He denied SI intent or plan upon admission. He also endorsed daily alcohol use, stating he drinks 1-2 shots of vodka every night before bed in an attempt to fall asleep. He denied AVH and delusions, although it is likely that due to the nature of his suicide attempt that there was an underlying psychotic component. The patient was thought blocked at times but generally no other overt symptoms of disorganized thought process. He made no improvement on initially ordered medications and failed multiple medications prior, therefore Clozapine was initiated. The patient began to make noted improvements in his mood, often describing his mood as “50/50”, however, his sleep remained poor. Ambien was started and the pt. reported improved sleep, but began to experience episodes of bizarre and disorganized behavior such as crawling on the floor, searching through garbage cans and walking into other patient's rooms. Ambien was discontinued, benzodiazepines tapered, but the episodes persisted, and the patient is now waxing and waning through-out the day, requiring enhanced supervision. Neuro was consulted who recommended that clozapine be discontinued and to start thiamine given his Hx of ETOH abuse. CMP and CBC w/diff on 11/2 WNL. Ammonia level, creatinine, U/A all WNL. CBC w/diff, CMP w/ Mg &phos, stool culture and ova/parasite ordered today after 1, large watery BM.  CMP revealed elevated BUN and creatinine, prompting transfer to ED. The pt. has remained afebrile, no SOB or chest pain noted. Negative for covid-19 upon admission, no recent travel.   ECT was also pursued for the patient but was denied given elevated risk factors attributed to recently closed tracheostomy and elevated BMI.  Paxil and Zyprexa were discontinued. Patient was started on Clozapine and titrated up to 350mg; Clonazepam titrated up 2mg BID; Ambien titrated up to 10mg QHS; all these medications were discontinued as they may be related to cause of AMS. Metformin started for weight control at 250mg BID; also discontinued for JESSIE.  Wellbutrin titrated to 300mg and decreased to 150mg; Rozerem 8mg started for insomnia. Seroquel 25mg added PRN or anxiety; Benadryl, Atarax avoided for possible anticholinergic SE. The patient was made aware of the risks and benefits of each medication.  Discharge diagnoses:   F32.9 Major Depressive Disorder  Discharge medications:  Wellbutrin 150mg daily    Verbal handoff was given to CL team at [facility] and transfer note completed and sent to [facility] with EMS.

## 2020-12-13 NOTE — PHYSICAL THERAPY INITIAL EVALUATION ADULT - GENERAL OBSERVATIONS, REHAB EVAL
Pt received semi-juarez in bed, NAD. Pt agreeable to PT consultation. Cleared for PT as per IGOR Ballard

## 2020-12-13 NOTE — BH INPATIENT PSYCHIATRY DISCHARGE NOTE - NSBHDCMEDICALFT_PSY_A_CORE
See hospital course of treatment for additional details.   Pt. with recently closed tracheostomy; no complications.   Treated for HTN with lisinopril 10mg.
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
## 2020-12-13 NOTE — BH INPATIENT PSYCHIATRY DISCHARGE NOTE - NSDCRECOMMENDMEDICALFT_PSY_ALL_CORE
Pt. being transferred to [facility] ED. Follow-up labs/imaging per medical team at [facility].

## 2020-12-13 NOTE — BH INPATIENT PSYCHIATRY DISCHARGE NOTE - NSDCRECOMMENDLABFT_PSY_ALL_CORE
Pt. being transferred to [facility] ED. Follow-up labs/imaging per medical team at [facility].
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
## 2020-12-13 NOTE — BH INPATIENT PSYCHIATRY DISCHARGE NOTE - NSDCCPCAREPLAN_GEN_ALL_CORE_FT
PRINCIPAL DISCHARGE DIAGNOSIS  Diagnosis: MDD (major depressive disorder)  Assessment and Plan of Treatment:

## 2020-12-13 NOTE — BH INPATIENT PSYCHIATRY DISCHARGE NOTE - NSDCRECOMMEND_PSY_ALL_CORE
Recommended medical follow-up following discharge.../Recommended laboratory tests/other investigations following discharge...
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
## 2020-12-13 NOTE — BH INPATIENT PSYCHIATRY DISCHARGE NOTE - NSBHSAALC_PSY_A_CORE FT
Pt. reports drinking 1-2 shots of vodka before bed. Pt. believes last use was several days prior to being hospitalized.

## 2020-12-13 NOTE — BH INPATIENT PSYCHIATRY DISCHARGE NOTE - NSBHMETABOLIC_PSY_ALL_CORE_FT
BMI: BMI (kg/m2): 40.4 (12-12-20 @ 03:44)  HbA1c:   Glucose: POCT Blood Glucose.: 160 mg/dL (12-11-20 @ 23:37)    BP: --  Lipid Panel: Date/Time: 12-08-20 @ 11:26  Cholesterol, Serum: --  Direct LDL: --  HDL Cholesterol, Serum: --  Total Cholesterol/HDL Ration Measurement: --  Triglycerides, Serum: 116

## 2020-12-14 DIAGNOSIS — F32.9 MAJOR DEPRESSIVE DISORDER, SINGLE EPISODE, UNSPECIFIED: ICD-10-CM

## 2020-12-14 LAB
ADD ON TEST-SPECIMEN IN LAB: SIGNIFICANT CHANGE UP
ALBUMIN SERPL ELPH-MCNC: 3.1 G/DL — LOW (ref 3.3–5)
ALP SERPL-CCNC: 47 U/L — SIGNIFICANT CHANGE UP (ref 40–120)
ALT FLD-CCNC: 25 U/L — SIGNIFICANT CHANGE UP (ref 4–41)
ANION GAP SERPL CALC-SCNC: 12 MMOL/L — SIGNIFICANT CHANGE UP (ref 7–14)
AST SERPL-CCNC: 35 U/L — SIGNIFICANT CHANGE UP (ref 4–40)
BILIRUB SERPL-MCNC: 0.3 MG/DL — SIGNIFICANT CHANGE UP (ref 0.2–1.2)
BUN SERPL-MCNC: 18 MG/DL — SIGNIFICANT CHANGE UP (ref 7–23)
CALCIUM SERPL-MCNC: 8.8 MG/DL — SIGNIFICANT CHANGE UP (ref 8.4–10.5)
CHLORIDE SERPL-SCNC: 111 MMOL/L — HIGH (ref 98–107)
CO2 SERPL-SCNC: 21 MMOL/L — LOW (ref 22–31)
CREAT SERPL-MCNC: 0.83 MG/DL — SIGNIFICANT CHANGE UP (ref 0.5–1.3)
GLUCOSE SERPL-MCNC: 117 MG/DL — HIGH (ref 70–99)
HCT VFR BLD CALC: 31.5 % — LOW (ref 39–50)
HGB BLD-MCNC: 10.1 G/DL — LOW (ref 13–17)
MAGNESIUM SERPL-MCNC: 2.2 MG/DL — SIGNIFICANT CHANGE UP (ref 1.6–2.6)
MCHC RBC-ENTMCNC: 29 PG — SIGNIFICANT CHANGE UP (ref 27–34)
MCHC RBC-ENTMCNC: 32.1 GM/DL — SIGNIFICANT CHANGE UP (ref 32–36)
MCV RBC AUTO: 90.5 FL — SIGNIFICANT CHANGE UP (ref 80–100)
NRBC # BLD: 0 /100 WBCS — SIGNIFICANT CHANGE UP
NRBC # FLD: 0 K/UL — SIGNIFICANT CHANGE UP
PHOSPHATE SERPL-MCNC: 2.7 MG/DL — SIGNIFICANT CHANGE UP (ref 2.5–4.5)
PLATELET # BLD AUTO: 308 K/UL — SIGNIFICANT CHANGE UP (ref 150–400)
POTASSIUM SERPL-MCNC: 3.2 MMOL/L — LOW (ref 3.5–5.3)
POTASSIUM SERPL-SCNC: 3.2 MMOL/L — LOW (ref 3.5–5.3)
PROCALCITONIN SERPL-MCNC: 0.08 NG/ML — SIGNIFICANT CHANGE UP (ref 0.02–0.1)
PROT SERPL-MCNC: 6.1 G/DL — SIGNIFICANT CHANGE UP (ref 6–8.3)
RBC # BLD: 3.48 M/UL — LOW (ref 4.2–5.8)
RBC # FLD: 12.6 % — SIGNIFICANT CHANGE UP (ref 10.3–14.5)
SODIUM SERPL-SCNC: 144 MMOL/L — SIGNIFICANT CHANGE UP (ref 135–145)
VANCOMYCIN TROUGH SERPL-MCNC: 12.7 UG/ML — SIGNIFICANT CHANGE UP (ref 10–20)
WBC # BLD: 9.82 K/UL — SIGNIFICANT CHANGE UP (ref 3.8–10.5)
WBC # FLD AUTO: 9.82 K/UL — SIGNIFICANT CHANGE UP (ref 3.8–10.5)

## 2020-12-14 PROCEDURE — 99254 IP/OBS CNSLTJ NEW/EST MOD 60: CPT

## 2020-12-14 PROCEDURE — 99233 SBSQ HOSP IP/OBS HIGH 50: CPT | Mod: GC

## 2020-12-14 PROCEDURE — 99233 SBSQ HOSP IP/OBS HIGH 50: CPT

## 2020-12-14 RX ORDER — PIPERACILLIN AND TAZOBACTAM 4; .5 G/20ML; G/20ML
3.38 INJECTION, POWDER, LYOPHILIZED, FOR SOLUTION INTRAVENOUS EVERY 8 HOURS
Refills: 0 | Status: DISCONTINUED | OUTPATIENT
Start: 2020-12-14 | End: 2020-12-15

## 2020-12-14 RX ORDER — PIPERACILLIN AND TAZOBACTAM 4; .5 G/20ML; G/20ML
3.38 INJECTION, POWDER, LYOPHILIZED, FOR SOLUTION INTRAVENOUS ONCE
Refills: 0 | Status: COMPLETED | OUTPATIENT
Start: 2020-12-14 | End: 2020-12-14

## 2020-12-14 RX ORDER — THIAMINE MONONITRATE (VIT B1) 100 MG
500 TABLET ORAL EVERY 8 HOURS
Refills: 0 | Status: COMPLETED | OUTPATIENT
Start: 2020-12-14 | End: 2020-12-17

## 2020-12-14 RX ORDER — POTASSIUM CHLORIDE 20 MEQ
10 PACKET (EA) ORAL
Refills: 0 | Status: COMPLETED | OUTPATIENT
Start: 2020-12-14 | End: 2020-12-14

## 2020-12-14 RX ORDER — FUROSEMIDE 40 MG
20 TABLET ORAL ONCE
Refills: 0 | Status: COMPLETED | OUTPATIENT
Start: 2020-12-14 | End: 2020-12-14

## 2020-12-14 RX ORDER — ACETAMINOPHEN 500 MG
1000 TABLET ORAL ONCE
Refills: 0 | Status: COMPLETED | OUTPATIENT
Start: 2020-12-14 | End: 2020-12-14

## 2020-12-14 RX ADMIN — Medication 100 MILLIEQUIVALENT(S): at 00:48

## 2020-12-14 RX ADMIN — Medication 1000 MILLIGRAM(S): at 11:50

## 2020-12-14 RX ADMIN — PIPERACILLIN AND TAZOBACTAM 25 GRAM(S): 4; .5 INJECTION, POWDER, LYOPHILIZED, FOR SOLUTION INTRAVENOUS at 21:16

## 2020-12-14 RX ADMIN — ENOXAPARIN SODIUM 40 MILLIGRAM(S): 100 INJECTION SUBCUTANEOUS at 17:29

## 2020-12-14 RX ADMIN — Medication 100 MILLIEQUIVALENT(S): at 15:26

## 2020-12-14 RX ADMIN — Medication 105 MILLIGRAM(S): at 12:14

## 2020-12-14 RX ADMIN — Medication 300 MILLIGRAM(S): at 21:16

## 2020-12-14 RX ADMIN — Medication 300 MILLIGRAM(S): at 10:40

## 2020-12-14 RX ADMIN — Medication 105 MILLIGRAM(S): at 21:16

## 2020-12-14 RX ADMIN — Medication 100 MILLIEQUIVALENT(S): at 14:22

## 2020-12-14 RX ADMIN — Medication 400 MILLIGRAM(S): at 11:35

## 2020-12-14 RX ADMIN — Medication 100 MILLIEQUIVALENT(S): at 13:22

## 2020-12-14 RX ADMIN — PIPERACILLIN AND TAZOBACTAM 200 GRAM(S): 4; .5 INJECTION, POWDER, LYOPHILIZED, FOR SOLUTION INTRAVENOUS at 12:14

## 2020-12-14 RX ADMIN — Medication 20 MILLIGRAM(S): at 13:22

## 2020-12-14 RX ADMIN — ENOXAPARIN SODIUM 40 MILLIGRAM(S): 100 INJECTION SUBCUTANEOUS at 06:30
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
## 2020-12-14 NOTE — PROGRESS NOTE ADULT - PROBLEM SELECTOR PLAN 2
Multifocal PNA on CT chest 12/7  - patient afebrile, no leukocytosis   - s/p Vanco 12/5/20-12/7/20    - Complete 5d Zosyn 12/5/20-12/9/20, Azithro 12/6/-12/8/20  - COVID PCR and Rapid RVP neg   - BCx and UCx negative thus far  - LP negaive  - covid ab positive Multifocal PNA on CT chest 12/7  - patient afebrile, no leukocytosis   - s/p Vanco 12/5/20-12/7/20    - Complete 5d Zosyn 12/5/20-12/9/20, Azithro 12/6/-12/8/20  - COVID PCR and Rapid RVP neg   - BCx and UCx negative thus far  - Concern for continuing aspiration, will restart zosyn  - ID consult pending
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
## 2020-12-14 NOTE — PROGRESS NOTE ADULT - PROBLEM SELECTOR PLAN 1
AMS, agitated fo unclear etiology   - Pan CT to rule out infectious source shows multifocal PNA; s/p Zosyn   - CXR clear lungs, no leukocytosis, COVID PCR and rapid viral panel neg   - Utox positive for barbs, no barbs given in [place], level <3  - CT head artifact due to movement; Repeat CT head in setting of sedation neg  - LP negative  - Could be multiorgan inflammatory syndrome from COVID-19; COVID ab positive   - BCx and UCx negative to date  - MRI brain:  slightly prominent cerebellar sulci correlate with EtOH usage,  minimal microvascular disease, no restricted diffusion, hemorrhage or midline shift. Thickened calvarial diploic space, paranasal sinus mucosal thickening with opacification right greater than left mastoid tip.  - melatonin and thiamine added per psych recs AMS, agitated fo unclear etiology   - Pan CT to rule out infectious source shows multifocal PNA; s/p Zosyn   - CXR clear lungs, no leukocytosis, COVID PCR and rapid viral panel neg   - Utox positive for barbs, no barbs given in [place], level <3  - CT head artifact due to movement; Repeat CT head in setting of sedation neg  - LP negative  - Could be multiorgan inflammatory syndrome from COVID-19; COVID ab positive   - BCx and UCx negative to date  - MRI brain:  slightly prominent cerebellar sulci correlate with EtOH usage,  minimal microvascular disease, no restricted diffusion, hemorrhage or midline shift. Thickened calvarial diploic space, paranasal sinus mucosal thickening with opacification right greater than left mastoid tip.  - c/w IV thiamine

## 2020-12-14 NOTE — BH CONSULTATION LIAISON PROGRESS NOTE - NSBHFUPINTERVALHXFT_PSY_A_CORE
Chart reviewed. Continues on Versed drip, fentanyl, and propofol; no growth in cx, no leukocytosis, remains afebrile, with aspiration PNA on Zosyn, TTE on 12/8 +for enlarged, hypokinetic RV (EF 52%).     Pt examined at bedside, with ongoing intubation, and sedated. No rigidity on exam, no edema on LEs. However, pt with an erythematous patch on the LUE, which is warm to touch, but no fluctuance or induration appreciated. Area is around biceps region, no IV line insertion near said area. Also, noted to have skin lesions in LEs, including in the R anterior, distal tibial region. Unclear if said lesions are 2/2 to SCDs.       Standing [facility] medications: bacitracin ointment, Wellbutrin XL 150mg, Klonopin 0.25mg BID, clozaril 275mg, lisinopril 10mg, metformin 250mg BID, naltrexone 50mg, ramelteon 8mg, senna 2mg, tamsulosin 0.4mg, thiamine 100mg. PRNs: Ativan 1mg q4hrs, Seroquel 25mg q6hrs

## 2020-12-14 NOTE — PROGRESS NOTE ADULT - SUBJECTIVE AND OBJECTIVE BOX
Patient is a 55y old  Male who presents with a chief complaint of AMS (13 Dec 2020 07:55)      INTERVAL HPI/OVERNIGHT EVENTS: Febrile to 100.6 overnight. Per RN, has been coughing more recently.       REVIEW OF SYSTEMS: unable to attain    FAMILY HISTORY:    T(C): 37.8 (12-14-20 @ 05:20), Max: 38.1 (12-13-20 @ 21:42)  HR: 89 (12-14-20 @ 05:20) (89 - 98)  BP: 130/88 (12-14-20 @ 05:20) (130/88 - 141/81)  RR: 20 (12-14-20 @ 05:20) (18 - 20)  SpO2: 97% (12-14-20 @ 05:20) (94% - 98%)  Wt(kg): --Vital Signs Last 24 Hrs  T(C): 37.8 (14 Dec 2020 05:20), Max: 38.1 (13 Dec 2020 21:42)  T(F): 100 (14 Dec 2020 05:20), Max: 100.6 (13 Dec 2020 21:42)  HR: 89 (14 Dec 2020 05:20) (89 - 98)  BP: 130/88 (14 Dec 2020 05:20) (130/88 - 141/81)  BP(mean): --  RR: 20 (14 Dec 2020 05:20) (18 - 20)  SpO2: 97% (14 Dec 2020 05:20) (94% - 98%)    PHYSICAL EXAM:  GENERAL: NAD, somewhat lethargic  EYES: EOMI, PERRLA  CHEST/LUNG: s/p extubation on 2L NC. Clear to auscultation w/ decreased breath sounds b/l  HEART: Regular rate and rhythm; No murmurs  ABDOMEN: Soft, Nontender, Nondistended  EXTREMITIES:  LUE cellulitis: erythema improving and decreasing in area.  PSYCH: Not responsive to questions/commands  NEUROLOGY: Able to move all extremities passively, unable to follow commands  SKIN: No rashes or lesions    Consultant(s) Notes Reviewed:  [x ] YES  [ ] NO  Care Discussed with Consultants/Other Providers [ x] YES  [ ] NO    LABS:    14 Dec 2020 07:42    144    |  111    |  18     ----------------------------<  117    3.2     |  21     |  0.83     Ca    8.8        14 Dec 2020 07:42  Phos  2.7       14 Dec 2020 07:42  Mg     2.2       14 Dec 2020 07:42    TPro  6.1    /  Alb  3.1    /  TBili  0.3    /  DBili  x      /  AST  35     /  ALT  25     /  AlkPhos  47     14 Dec 2020 07:42      CAPILLARY BLOOD GLUCOSE        BLOOD CULTURE      RADIOLOGY & ADDITIONAL TESTS:    Imaging Personally Reviewed:  [ ] YES  [ ] NO  dextrose 5% + sodium chloride 0.45%. 1000 milliLiter(s) IV Continuous <Continuous>  enoxaparin Injectable 40 milliGRAM(s) SubCutaneous two times a day  melatonin 3 milliGRAM(s) Oral at bedtime  OLANZapine Injectable 2.5 milliGRAM(s) IntraMuscular every 6 hours PRN  thiamine 100 milliGRAM(s) Oral daily  vancomycin  IVPB 1500 milliGRAM(s) IV Intermittent every 12 hours      HEALTH ISSUES - PROBLEM Dx:  Agitation  Agitation    JESSIE (acute kidney injury)  JESSIE (acute kidney injury)    Cellulitis  Cellulitis    PNA (pneumonia)  PNA (pneumonia)    Altered mental status  Altered mental status    Prophylactic measure  Prophylactic measure    Encephalopathy  Encephalopathy    Delirium due to another medical condition    Elevated CK  Elevated CK    SIRS (systemic inflammatory response syndrome)  SIRS (systemic inflammatory response syndrome)           Patient is a 55y old  Male who presents with a chief complaint of AMS (13 Dec 2020 07:55)      INTERVAL HPI/OVERNIGHT EVENTS: Febrile to 100.6 overnight. Per RN, has been coughing more recently. Patient unable to provide HPI given mental status.       REVIEW OF SYSTEMS: unable to attain    FAMILY HISTORY:    T(C): 37.8 (12-14-20 @ 05:20), Max: 38.1 (12-13-20 @ 21:42)  HR: 89 (12-14-20 @ 05:20) (89 - 98)  BP: 130/88 (12-14-20 @ 05:20) (130/88 - 141/81)  RR: 20 (12-14-20 @ 05:20) (18 - 20)  SpO2: 97% (12-14-20 @ 05:20) (94% - 98%)  Wt(kg): --Vital Signs Last 24 Hrs  T(C): 37.8 (14 Dec 2020 05:20), Max: 38.1 (13 Dec 2020 21:42)  T(F): 100 (14 Dec 2020 05:20), Max: 100.6 (13 Dec 2020 21:42)  HR: 89 (14 Dec 2020 05:20) (89 - 98)  BP: 130/88 (14 Dec 2020 05:20) (130/88 - 141/81)  BP(mean): --  RR: 20 (14 Dec 2020 05:20) (18 - 20)  SpO2: 97% (14 Dec 2020 05:20) (94% - 98%)    PHYSICAL EXAM:  GENERAL: NAD, somewhat lethargic  EYES: EOMI, PERRLA  CHEST/LUNG: s/p extubation on 2L NC. Diffusely scattered rhonchi and course breath sounds. Bilateral pedal and hand edema.   HEART: Regular rate and rhythm; No murmurs  ABDOMEN: Soft, Nontender, Nondistended  EXTREMITIES:  LUE cellulitis: erythema improving and decreasing in area.  PSYCH: Not responsive to questions/commands  NEUROLOGY: Able to move all extremities passively, unable to follow commands  SKIN: No rashes or lesions    Consultant(s) Notes Reviewed:  [x ] YES  [ ] NO  Care Discussed with Consultants/Other Providers [ x] YES  [ ] NO    LABS:    14 Dec 2020 07:42    144    |  111    |  18     ----------------------------<  117    3.2     |  21     |  0.83     Ca    8.8        14 Dec 2020 07:42  Phos  2.7       14 Dec 2020 07:42  Mg     2.2       14 Dec 2020 07:42    TPro  6.1    /  Alb  3.1    /  TBili  0.3    /  DBili  x      /  AST  35     /  ALT  25     /  AlkPhos  47     14 Dec 2020 07:42      CAPILLARY BLOOD GLUCOSE        BLOOD CULTURE      RADIOLOGY & ADDITIONAL TESTS:    Imaging Personally Reviewed:  [ ] YES  [ ] NO  dextrose 5% + sodium chloride 0.45%. 1000 milliLiter(s) IV Continuous <Continuous>  enoxaparin Injectable 40 milliGRAM(s) SubCutaneous two times a day  melatonin 3 milliGRAM(s) Oral at bedtime  OLANZapine Injectable 2.5 milliGRAM(s) IntraMuscular every 6 hours PRN  thiamine 100 milliGRAM(s) Oral daily  vancomycin  IVPB 1500 milliGRAM(s) IV Intermittent every 12 hours      HEALTH ISSUES - PROBLEM Dx:  Agitation  Agitation    JESSIE (acute kidney injury)  JESSIE (acute kidney injury)    Cellulitis  Cellulitis    PNA (pneumonia)  PNA (pneumonia)    Altered mental status  Altered mental status    Prophylactic measure  Prophylactic measure    Encephalopathy  Encephalopathy    Delirium due to another medical condition    Elevated CK  Elevated CK    SIRS (systemic inflammatory response syndrome)  SIRS (systemic inflammatory response syndrome)

## 2020-12-15 DIAGNOSIS — R50.9 FEVER, UNSPECIFIED: ICD-10-CM

## 2020-12-15 LAB
ADD ON TEST-SPECIMEN IN LAB: SIGNIFICANT CHANGE UP
ALBUMIN SERPL ELPH-MCNC: 3.4 G/DL — SIGNIFICANT CHANGE UP (ref 3.3–5)
ALP SERPL-CCNC: 52 U/L — SIGNIFICANT CHANGE UP (ref 40–120)
ALT FLD-CCNC: 32 U/L — SIGNIFICANT CHANGE UP (ref 4–41)
ANION GAP SERPL CALC-SCNC: 16 MMOL/L — HIGH (ref 7–14)
AST SERPL-CCNC: 29 U/L — SIGNIFICANT CHANGE UP (ref 4–40)
BASOPHILS # BLD AUTO: 0.04 K/UL — SIGNIFICANT CHANGE UP (ref 0–0.2)
BASOPHILS NFR BLD AUTO: 0.3 % — SIGNIFICANT CHANGE UP (ref 0–2)
BILIRUB SERPL-MCNC: 0.5 MG/DL — SIGNIFICANT CHANGE UP (ref 0.2–1.2)
BUN SERPL-MCNC: 23 MG/DL — SIGNIFICANT CHANGE UP (ref 7–23)
CALCIUM SERPL-MCNC: 9.3 MG/DL — SIGNIFICANT CHANGE UP (ref 8.4–10.5)
CHLORIDE SERPL-SCNC: 109 MMOL/L — HIGH (ref 98–107)
CO2 SERPL-SCNC: 21 MMOL/L — LOW (ref 22–31)
CREAT SERPL-MCNC: 0.97 MG/DL — SIGNIFICANT CHANGE UP (ref 0.5–1.3)
CULTURE RESULTS: SIGNIFICANT CHANGE UP
CULTURE RESULTS: SIGNIFICANT CHANGE UP
EOSINOPHIL # BLD AUTO: 0.09 K/UL — SIGNIFICANT CHANGE UP (ref 0–0.5)
EOSINOPHIL NFR BLD AUTO: 0.6 % — SIGNIFICANT CHANGE UP (ref 0–6)
GLUCOSE SERPL-MCNC: 116 MG/DL — HIGH (ref 70–99)
HCT VFR BLD CALC: 36.4 % — LOW (ref 39–50)
HGB BLD-MCNC: 11.6 G/DL — LOW (ref 13–17)
HIV 1+2 AB+HIV1 P24 AG SERPL QL IA: SIGNIFICANT CHANGE UP
IANC: 12.82 K/UL — HIGH (ref 1.5–8.5)
IMM GRANULOCYTES NFR BLD AUTO: 1.2 % — SIGNIFICANT CHANGE UP (ref 0–1.5)
LYMPHOCYTES # BLD AUTO: 1.21 K/UL — SIGNIFICANT CHANGE UP (ref 1–3.3)
LYMPHOCYTES # BLD AUTO: 8.1 % — LOW (ref 13–44)
MAGNESIUM SERPL-MCNC: 2.2 MG/DL — SIGNIFICANT CHANGE UP (ref 1.6–2.6)
MCHC RBC-ENTMCNC: 28.8 PG — SIGNIFICANT CHANGE UP (ref 27–34)
MCHC RBC-ENTMCNC: 31.9 GM/DL — LOW (ref 32–36)
MCV RBC AUTO: 90.3 FL — SIGNIFICANT CHANGE UP (ref 80–100)
MONOCYTES # BLD AUTO: 0.66 K/UL — SIGNIFICANT CHANGE UP (ref 0–0.9)
MONOCYTES NFR BLD AUTO: 4.4 % — SIGNIFICANT CHANGE UP (ref 2–14)
NEUTROPHILS # BLD AUTO: 12.82 K/UL — HIGH (ref 1.8–7.4)
NEUTROPHILS NFR BLD AUTO: 85.4 % — HIGH (ref 43–77)
NRBC # BLD: 0 /100 WBCS — SIGNIFICANT CHANGE UP
NRBC # FLD: 0 K/UL — SIGNIFICANT CHANGE UP
PHOSPHATE SERPL-MCNC: 3.5 MG/DL — SIGNIFICANT CHANGE UP (ref 2.5–4.5)
PLATELET # BLD AUTO: 404 K/UL — HIGH (ref 150–400)
POTASSIUM SERPL-MCNC: 3.1 MMOL/L — LOW (ref 3.5–5.3)
POTASSIUM SERPL-SCNC: 3.1 MMOL/L — LOW (ref 3.5–5.3)
PROT SERPL-MCNC: 6.6 G/DL — SIGNIFICANT CHANGE UP (ref 6–8.3)
RBC # BLD: 4.03 M/UL — LOW (ref 4.2–5.8)
RBC # FLD: 12.8 % — SIGNIFICANT CHANGE UP (ref 10.3–14.5)
SODIUM SERPL-SCNC: 146 MMOL/L — HIGH (ref 135–145)
SPECIMEN SOURCE: SIGNIFICANT CHANGE UP
SPECIMEN SOURCE: SIGNIFICANT CHANGE UP
VANCOMYCIN TROUGH SERPL-MCNC: 14.1 UG/ML — SIGNIFICANT CHANGE UP (ref 10–20)
WBC # BLD: 15.09 K/UL — HIGH (ref 3.8–10.5)
WBC # FLD AUTO: 15.09 K/UL — HIGH (ref 3.8–10.5)

## 2020-12-15 PROCEDURE — 93970 EXTREMITY STUDY: CPT | Mod: 26

## 2020-12-15 PROCEDURE — 99232 SBSQ HOSP IP/OBS MODERATE 35: CPT

## 2020-12-15 PROCEDURE — 71275 CT ANGIOGRAPHY CHEST: CPT | Mod: 26

## 2020-12-15 PROCEDURE — 99233 SBSQ HOSP IP/OBS HIGH 50: CPT | Mod: GC

## 2020-12-15 PROCEDURE — 99233 SBSQ HOSP IP/OBS HIGH 50: CPT

## 2020-12-15 RX ORDER — ACETAMINOPHEN 500 MG
1000 TABLET ORAL ONCE
Refills: 0 | Status: COMPLETED | OUTPATIENT
Start: 2020-12-15 | End: 2020-12-15

## 2020-12-15 RX ORDER — FUROSEMIDE 40 MG
20 TABLET ORAL ONCE
Refills: 0 | Status: COMPLETED | OUTPATIENT
Start: 2020-12-15 | End: 2020-12-15

## 2020-12-15 RX ORDER — KETOROLAC TROMETHAMINE 30 MG/ML
30 SYRINGE (ML) INJECTION ONCE
Refills: 0 | Status: DISCONTINUED | OUTPATIENT
Start: 2020-12-15 | End: 2020-12-15

## 2020-12-15 RX ORDER — POTASSIUM CHLORIDE 20 MEQ
10 PACKET (EA) ORAL
Refills: 0 | Status: COMPLETED | OUTPATIENT
Start: 2020-12-15 | End: 2020-12-15

## 2020-12-15 RX ORDER — VANCOMYCIN HCL 1 G
1000 VIAL (EA) INTRAVENOUS EVERY 8 HOURS
Refills: 0 | Status: DISCONTINUED | OUTPATIENT
Start: 2020-12-15 | End: 2020-12-16

## 2020-12-15 RX ORDER — PIPERACILLIN AND TAZOBACTAM 4; .5 G/20ML; G/20ML
3.38 INJECTION, POWDER, LYOPHILIZED, FOR SOLUTION INTRAVENOUS EVERY 8 HOURS
Refills: 0 | Status: DISCONTINUED | OUTPATIENT
Start: 2020-12-15 | End: 2020-12-19

## 2020-12-15 RX ORDER — ACETAMINOPHEN 500 MG
650 TABLET ORAL ONCE
Refills: 0 | Status: COMPLETED | OUTPATIENT
Start: 2020-12-15 | End: 2020-12-15

## 2020-12-15 RX ADMIN — Medication 105 MILLIGRAM(S): at 06:32

## 2020-12-15 RX ADMIN — Medication 250 MILLIGRAM(S): at 23:10

## 2020-12-15 RX ADMIN — Medication 400 MILLIGRAM(S): at 10:26

## 2020-12-15 RX ADMIN — Medication 1000 MILLIGRAM(S): at 18:53

## 2020-12-15 RX ADMIN — Medication 100 MILLIEQUIVALENT(S): at 18:07

## 2020-12-15 RX ADMIN — Medication 400 MILLIGRAM(S): at 18:21

## 2020-12-15 RX ADMIN — Medication 20 MILLIGRAM(S): at 17:13

## 2020-12-15 RX ADMIN — Medication 30 MILLIGRAM(S): at 22:52

## 2020-12-15 RX ADMIN — ENOXAPARIN SODIUM 40 MILLIGRAM(S): 100 INJECTION SUBCUTANEOUS at 17:13

## 2020-12-15 RX ADMIN — PIPERACILLIN AND TAZOBACTAM 25 GRAM(S): 4; .5 INJECTION, POWDER, LYOPHILIZED, FOR SOLUTION INTRAVENOUS at 15:35

## 2020-12-15 RX ADMIN — Medication 1000 MILLIGRAM(S): at 11:00

## 2020-12-15 RX ADMIN — ENOXAPARIN SODIUM 40 MILLIGRAM(S): 100 INJECTION SUBCUTANEOUS at 06:32

## 2020-12-15 RX ADMIN — Medication 100 MILLIEQUIVALENT(S): at 15:35

## 2020-12-15 RX ADMIN — Medication 105 MILLIGRAM(S): at 15:35

## 2020-12-15 RX ADMIN — PIPERACILLIN AND TAZOBACTAM 25 GRAM(S): 4; .5 INJECTION, POWDER, LYOPHILIZED, FOR SOLUTION INTRAVENOUS at 04:06

## 2020-12-15 RX ADMIN — Medication 105 MILLIGRAM(S): at 21:18

## 2020-12-15 RX ADMIN — Medication 650 MILLIGRAM(S): at 01:42

## 2020-12-15 RX ADMIN — PIPERACILLIN AND TAZOBACTAM 25 GRAM(S): 4; .5 INJECTION, POWDER, LYOPHILIZED, FOR SOLUTION INTRAVENOUS at 23:10

## 2020-12-15 RX ADMIN — Medication 300 MILLIGRAM(S): at 10:26

## 2020-12-15 RX ADMIN — Medication 100 MILLIEQUIVALENT(S): at 16:43

## 2020-12-15 RX ADMIN — Medication 650 MILLIGRAM(S): at 03:00

## 2020-12-15 NOTE — PROVIDER CONTACT NOTE (OTHER) - ACTION/TREATMENT ORDERED:
MD made aware. MD to order Toradol IVP. Reassess temp an hour after administration. Will continue to monitor.

## 2020-12-15 NOTE — PROGRESS NOTE ADULT - PROBLEM SELECTOR PLAN 6
DVT PPX: Lovenox 40mg  PT consulted; recs pending  Dispo: transferred from [facility] - intubated, sedated for agitation 12/5/-12/11  - s/p Versed gtt, fentanyl, propofol, precedex gtt  - Per psych, would like to monitor off anti-psychotics post-extubation  - recommended zyprexa 2.5 mg q6 PRN for agitation   - Can escalate to standing Depakote 250 mg if needed
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
## 2020-12-15 NOTE — PROGRESS NOTE ADULT - SUBJECTIVE AND OBJECTIVE BOX
Patient is a 55y old  Male who presents with a chief complaint of AMS (14 Dec 2020 17:29)      Samuel Teran PGY1  Anesthesiology  Pager: LIALLIE: [phone] NS: [phone]    SUBJECTIVE/OVERNIGHT EVENTS: Tmax of 100.2 overnight w/ tachycardia to 114, given Tylenol 650 suppository. Pt seen and examined at bedside. Pt unable/unwilling to answer questions but does not and shake his head when asked yes or no questions. Otherwise mumbling random sounds. Pt shakes his head no when asked if he is feeling well and then when asked to clarify, shakes his head yes when asked is he has RLE pain. Otherwise unable to vocalize his name, date or location. Minimally verbal    REVIEW OF SYSTEMS: Limited due to patient cooperation    MEDICATIONS  (STANDING):  enoxaparin Injectable 40 milliGRAM(s) SubCutaneous two times a day  piperacillin/tazobactam IVPB.. 3.375 Gram(s) IV Intermittent every 8 hours  potassium chloride  10 mEq/100 mL IVPB 10 milliEquivalent(s) IV Intermittent every 1 hour  thiamine IVPB 500 milliGRAM(s) IV Intermittent every 8 hours  vancomycin  IVPB 1500 milliGRAM(s) IV Intermittent every 12 hours    MEDICATIONS  (PRN):  OLANZapine Injectable 2.5 milliGRAM(s) IntraMuscular every 6 hours PRN Agitation    CAPILLARY BLOOD GLUCOSE        I&O's Summary    14 Dec 2020 07:01  -  15 Dec 2020 07:00  --------------------------------------------------------  IN: 0 mL / OUT: 1640 mL / NET: -1640 mL          Vital Signs Last 24 Hrs  T(C): 37.7 (15 Dec 2020 05:50), Max: 38.3 (14 Dec 2020 11:00)  T(F): 99.9 (15 Dec 2020 05:50), Max: 100.9 (14 Dec 2020 11:00)  HR: 112 (15 Dec 2020 05:50) (102 - 115)  BP: 147/99 (15 Dec 2020 05:50) (136/93 - 147/99)  BP(mean): --  RR: 20 (15 Dec 2020 05:50) (19 - 22)  SpO2: 97% (15 Dec 2020 05:50) (95% - 98%)    PHYSICAL EXAM:  GENERAL: NAD, somewhat lethargic  EYES: EOMI, PERRLA  CHEST/LUNG: s/p extubation on 5L NC. Diffusely scattered rhonchi and course breath sounds. Bilateral pedal and hand edema.   HEART: Regular rate and rhythm; No murmurs  ABDOMEN: Soft, Nontender, Nondistended  EXTREMITIES:  LUE cellulitis: erythema improving and decreasing in area.  PSYCH: Not responsive to questions/commands  NEUROLOGY: Able to move all extremities passively, unable to follow commands  SKIN: No rashes or lesions    LABS                        11.6   x     )-----------( 404      ( 15 Dec 2020 07:57 )             36.4                         10.1   9.82  )-----------( 308      ( 14 Dec 2020 07:42 )             31.5     12-15    146<H>  |  109<H>  |  x   ----------------------------<  x   3.1<L>   |  x   |  x   12-14    144  |  111<H>  |  18  ----------------------------<  117<H>  3.2<L>   |  21<L>  |  0.83    Ca    8.8      14 Dec 2020 07:42  Ca    8.8      13 Dec 2020 20:57  Phos  2.7     12-14  Mg     2.2     12-15    TPro  6.1  /  Alb  3.1<L>  /  TBili  0.3  /  DBili  x   /  AST  35  /  ALT  25  /  AlkPhos  47  12-14       08 Dec 2020 02:19 Troponin x     /  U/L / CKMB x     / CKMB Units x       06 Dec 2020 14:21 Troponin x     / CK 1042 U/L / CKMB x     / CKMB Units 3.6 ng/mL   06 Dec 2020 04:32 Troponin x     / CK 1405 U/L / CKMB x     / CKMB Units x                    RADIOLOGY & ADDITIONAL TESTS:         Patient is a 55y old  Male who presents with a chief complaint of AMS (14 Dec 2020 17:29)      Samuel Teran PGY1  Anesthesiology  Pager: LIALLIE: [phone] NS: [phone]    SUBJECTIVE/OVERNIGHT EVENTS: Tmax of 100.2 overnight w/ tachycardia to 114, given Tylenol 650 suppository. Pt seen and examined at bedside. Pt unable/unwilling to answer questions but does not and shake his head when asked yes or no questions. Otherwise mumbling random sounds. Pt shakes his head no when asked if he is feeling well and then when asked to clarify, shakes his head yes when asked is he has RLE pain. Otherwise unable to vocalize his name, date or location. Minimally verbal    REVIEW OF SYSTEMS: Limited due to patient cooperation    MEDICATIONS  (STANDING):  enoxaparin Injectable 40 milliGRAM(s) SubCutaneous two times a day  piperacillin/tazobactam IVPB.. 3.375 Gram(s) IV Intermittent every 8 hours  potassium chloride  10 mEq/100 mL IVPB 10 milliEquivalent(s) IV Intermittent every 1 hour  thiamine IVPB 500 milliGRAM(s) IV Intermittent every 8 hours  vancomycin  IVPB 1500 milliGRAM(s) IV Intermittent every 12 hours    MEDICATIONS  (PRN):  OLANZapine Injectable 2.5 milliGRAM(s) IntraMuscular every 6 hours PRN Agitation    CAPILLARY BLOOD GLUCOSE        I&O's Summary    14 Dec 2020 07:01  -  15 Dec 2020 07:00  --------------------------------------------------------  IN: 0 mL / OUT: 1640 mL / NET: -1640 mL          Vital Signs Last 24 Hrs  T(C): 37.7 (15 Dec 2020 05:50), Max: 38.3 (14 Dec 2020 11:00)  T(F): 99.9 (15 Dec 2020 05:50), Max: 100.9 (14 Dec 2020 11:00)  HR: 112 (15 Dec 2020 05:50) (102 - 115)  BP: 147/99 (15 Dec 2020 05:50) (136/93 - 147/99)  BP(mean): --  RR: 20 (15 Dec 2020 05:50) (19 - 22)  SpO2: 97% (15 Dec 2020 05:50) (95% - 98%)    PHYSICAL EXAM:  GENERAL: NAD, somewhat lethargic  EYES: EOMI, PERRLA  CHEST/LUNG: on 5L NC. Diffusely scattered rhonchi and course breath sounds, +tachypnea. Bilateral pedal and hand edema.   HEART: Regular rate and rhythm; No murmurs  ABDOMEN: Soft, Nontender, Nondistended  EXTREMITIES:  LUE cellulitis: erythema improving and decreasing in area.  PSYCH: Not responsive to questions/commands  NEUROLOGY: Able to move all extremities passively, unable to follow commands  SKIN: No rashes or lesions    LABS                        11.6   x     )-----------( 404      ( 15 Dec 2020 07:57 )             36.4                         10.1   9.82  )-----------( 308      ( 14 Dec 2020 07:42 )             31.5     12-15    146<H>  |  109<H>  |  x   ----------------------------<  x   3.1<L>   |  x   |  x   12-14    144  |  111<H>  |  18  ----------------------------<  117<H>  3.2<L>   |  21<L>  |  0.83    Ca    8.8      14 Dec 2020 07:42  Ca    8.8      13 Dec 2020 20:57  Phos  2.7     12-14  Mg     2.2     12-15    TPro  6.1  /  Alb  3.1<L>  /  TBili  0.3  /  DBili  x   /  AST  35  /  ALT  25  /  AlkPhos  47  12-14       08 Dec 2020 02:19 Troponin x     /  U/L / CKMB x     / CKMB Units x       06 Dec 2020 14:21 Troponin x     / CK 1042 U/L / CKMB x     / CKMB Units 3.6 ng/mL   06 Dec 2020 04:32 Troponin x     / CK 1405 U/L / CKMB x     / CKMB Units x                    RADIOLOGY & ADDITIONAL TESTS:

## 2020-12-15 NOTE — PROGRESS NOTE ADULT - PROBLEM SELECTOR PLAN 1
AMS, agitated fo unclear etiology   - Pan CT to rule out infectious source shows multifocal PNA; s/p Zosyn   - CXR clear lungs, no leukocytosis, COVID PCR and rapid viral panel neg   - Utox positive for barbs, no barbs given in [place], level <3  - CT head artifact due to movement; Repeat CT head in setting of sedation neg  - LP negative  - Could be multiorgan inflammatory syndrome from COVID-19; COVID ab positive   - BCx and UCx negative to date  - MRI brain:  slightly prominent cerebellar sulci correlate with EtOH usage,  minimal microvascular disease, no restricted diffusion, hemorrhage or midline shift. Thickened calvarial diploic space, paranasal sinus mucosal thickening with opacification right greater than left mastoid tip.  - c/w IV thiamine  - Psychiatry following; unlikely this is related to psychiatric medication side effects

## 2020-12-15 NOTE — BH CONSULTATION LIAISON PROGRESS NOTE - CASE SUMMARY
patient drooling, tachypneic still, mumbled speech, intermittently able to follow simple commands, waxing/waning, no PRNs overnight, would keep on 1:1 as patient very suicidal when awake, would c/w medical AMS workup.
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
## 2020-12-15 NOTE — PROGRESS NOTE ADULT - PROBLEM SELECTOR PLAN 2
Multifocal PNA on CT chest 12/7  - patient afebrile, no leukocytosis   - s/p Vanco 12/5/20-12/7/20    - Complete 5d Zosyn 12/5/20-12/9/20, Azithro 12/6/-12/8/20  - COVID PCR and Rapid RVP neg   - BCx and UCx negative thus far  - Concern for continuing aspiration, will restart zosyn  - ID consult; started Zosyn, follow up repeat BCx Pt continues to spike fever in spite of extensive negative workup  - ID consulted; restarted Zosyn given concern for aspiration  - Will send repeat BCx  - b/l LE Duplex negative for DVT  - Will get CTA chest to r/o PE given tachycardia, hypoxia and recurrent fever

## 2020-12-15 NOTE — PROGRESS NOTE ADULT - PROBLEM SELECTOR PLAN 7
DVT PPX: Lovenox 40mg  PT consulted; recs pending  Dispo: transferred from [facility]
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
## 2020-12-15 NOTE — PROGRESS NOTE ADULT - PROBLEM SELECTOR PLAN 5
- intubated, sedated for agitation 12/5/-12/11  - s/p Versed gtt, fentanyl, propofol, precedex gtt  - Per psych, would like to monitor off anti-psychotics post-extubation  - recommended zyprexa 2.5 mg q6 PRN for agitation   - Can escalate to standing Depakote 250 mg if needed Resolved  - Elevated SCr and elevated CK on admission, now downtrending   - Meredith exchanged, resulted in improved urine output  - SCr normal, CTM
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
## 2020-12-15 NOTE — PROVIDER CONTACT NOTE (OTHER) - ACTION/TREATMENT ORDERED:
MD made aware. Apply cold packs/hypothermia blanket. Redraw blood cultures. Sent patient to CT and Ultrasound. Continue to monitor.

## 2020-12-15 NOTE — PROGRESS NOTE ADULT - PROBLEM SELECTOR PLAN 3
Started on Vancomycin on 12/9/20 for UE cellulitis  - Afebrile w/ normal WBC  - c/w Vancomycin 1.5g q12  - f/u Vanc trough Multifocal PNA on CT chest 12/7  - patient afebrile, no leukocytosis   - s/p Vanco 12/5/20-12/7/20    - Complete 5d Zosyn 12/5/20-12/9/20, Azithro 12/6/-12/8/20  - COVID PCR and Rapid RVP neg   - BCx and UCx negative thus far  - Concern for continuing aspiration, will restart zosyn  - ID consult; started Zosyn, follow up repeat BCx

## 2020-12-15 NOTE — PROGRESS NOTE ADULT - PROBLEM SELECTOR PLAN 4
Resolved  - Elevated SCr and elevated CK on admission, now downtrending   - Meredith exchanged, resulted in improved urine output  - SCr normal, CTM Started on Vancomycin on 12/9/20 for UE cellulitis  - Afebrile w/ normal WBC  - c/w Vancomycin 1.5g q12  - f/u Vanc trough

## 2020-12-16 LAB
ALBUMIN SERPL ELPH-MCNC: 3.5 G/DL — SIGNIFICANT CHANGE UP (ref 3.3–5)
ALP SERPL-CCNC: 50 U/L — SIGNIFICANT CHANGE UP (ref 40–120)
ALT FLD-CCNC: 30 U/L — SIGNIFICANT CHANGE UP (ref 4–41)
ANION GAP SERPL CALC-SCNC: 14 MMOL/L — SIGNIFICANT CHANGE UP (ref 7–14)
AST SERPL-CCNC: 23 U/L — SIGNIFICANT CHANGE UP (ref 4–40)
BILIRUB SERPL-MCNC: 0.5 MG/DL — SIGNIFICANT CHANGE UP (ref 0.2–1.2)
BUN SERPL-MCNC: 28 MG/DL — HIGH (ref 7–23)
CALCIUM SERPL-MCNC: 9.4 MG/DL — SIGNIFICANT CHANGE UP (ref 8.4–10.5)
CHLORIDE SERPL-SCNC: 111 MMOL/L — HIGH (ref 98–107)
CO2 SERPL-SCNC: 22 MMOL/L — SIGNIFICANT CHANGE UP (ref 22–31)
CREAT SERPL-MCNC: 1.11 MG/DL — SIGNIFICANT CHANGE UP (ref 0.5–1.3)
GLUCOSE SERPL-MCNC: 139 MG/DL — HIGH (ref 70–99)
HCT VFR BLD CALC: 35.2 % — LOW (ref 39–50)
HGB BLD-MCNC: 11 G/DL — LOW (ref 13–17)
MAGNESIUM SERPL-MCNC: 2.3 MG/DL — SIGNIFICANT CHANGE UP (ref 1.6–2.6)
MCHC RBC-ENTMCNC: 29.3 PG — SIGNIFICANT CHANGE UP (ref 27–34)
MCHC RBC-ENTMCNC: 31.3 GM/DL — LOW (ref 32–36)
MCV RBC AUTO: 93.6 FL — SIGNIFICANT CHANGE UP (ref 80–100)
NRBC # BLD: 0 /100 WBCS — SIGNIFICANT CHANGE UP
NRBC # FLD: 0 K/UL — SIGNIFICANT CHANGE UP
PHOSPHATE SERPL-MCNC: 3.8 MG/DL — SIGNIFICANT CHANGE UP (ref 2.5–4.5)
PLATELET # BLD AUTO: 404 K/UL — HIGH (ref 150–400)
POTASSIUM SERPL-MCNC: 3 MMOL/L — LOW (ref 3.5–5.3)
POTASSIUM SERPL-SCNC: 3 MMOL/L — LOW (ref 3.5–5.3)
PROT SERPL-MCNC: 6.7 G/DL — SIGNIFICANT CHANGE UP (ref 6–8.3)
RBC # BLD: 3.76 M/UL — LOW (ref 4.2–5.8)
RBC # FLD: 13.2 % — SIGNIFICANT CHANGE UP (ref 10.3–14.5)
SODIUM SERPL-SCNC: 147 MMOL/L — HIGH (ref 135–145)
WBC # BLD: 13.7 K/UL — HIGH (ref 3.8–10.5)
WBC # FLD AUTO: 13.7 K/UL — HIGH (ref 3.8–10.5)

## 2020-12-16 PROCEDURE — 99232 SBSQ HOSP IP/OBS MODERATE 35: CPT

## 2020-12-16 PROCEDURE — 99233 SBSQ HOSP IP/OBS HIGH 50: CPT | Mod: GC

## 2020-12-16 PROCEDURE — 93970 EXTREMITY STUDY: CPT | Mod: 26

## 2020-12-16 PROCEDURE — 71045 X-RAY EXAM CHEST 1 VIEW: CPT | Mod: 26

## 2020-12-16 PROCEDURE — 99233 SBSQ HOSP IP/OBS HIGH 50: CPT

## 2020-12-16 RX ORDER — KETOROLAC TROMETHAMINE 30 MG/ML
30 SYRINGE (ML) INJECTION ONCE
Refills: 0 | Status: DISCONTINUED | OUTPATIENT
Start: 2020-12-16 | End: 2020-12-16

## 2020-12-16 RX ORDER — ACETAMINOPHEN 500 MG
1000 TABLET ORAL ONCE
Refills: 0 | Status: DISCONTINUED | OUTPATIENT
Start: 2020-12-16 | End: 2020-12-16

## 2020-12-16 RX ORDER — ACETAMINOPHEN 500 MG
1000 TABLET ORAL ONCE
Refills: 0 | Status: COMPLETED | OUTPATIENT
Start: 2020-12-16 | End: 2020-12-16

## 2020-12-16 RX ORDER — POTASSIUM CHLORIDE 20 MEQ
10 PACKET (EA) ORAL
Refills: 0 | Status: COMPLETED | OUTPATIENT
Start: 2020-12-16 | End: 2020-12-16

## 2020-12-16 RX ORDER — FUROSEMIDE 40 MG
20 TABLET ORAL ONCE
Refills: 0 | Status: COMPLETED | OUTPATIENT
Start: 2020-12-16 | End: 2020-12-16

## 2020-12-16 RX ORDER — FOLIC ACID 0.8 MG
1 TABLET ORAL
Qty: 0 | Refills: 0 | DISCHARGE

## 2020-12-16 RX ADMIN — Medication 30 MILLIGRAM(S): at 00:00

## 2020-12-16 RX ADMIN — Medication 105 MILLIGRAM(S): at 14:42

## 2020-12-16 RX ADMIN — Medication 1000 MILLIGRAM(S): at 02:49

## 2020-12-16 RX ADMIN — Medication 105 MILLIGRAM(S): at 05:32

## 2020-12-16 RX ADMIN — Medication 100 MILLIEQUIVALENT(S): at 11:01

## 2020-12-16 RX ADMIN — Medication 250 MILLIGRAM(S): at 14:53

## 2020-12-16 RX ADMIN — PIPERACILLIN AND TAZOBACTAM 25 GRAM(S): 4; .5 INJECTION, POWDER, LYOPHILIZED, FOR SOLUTION INTRAVENOUS at 15:52

## 2020-12-16 RX ADMIN — Medication 400 MILLIGRAM(S): at 01:49

## 2020-12-16 RX ADMIN — PIPERACILLIN AND TAZOBACTAM 25 GRAM(S): 4; .5 INJECTION, POWDER, LYOPHILIZED, FOR SOLUTION INTRAVENOUS at 22:03

## 2020-12-16 RX ADMIN — Medication 30 MILLIGRAM(S): at 06:16

## 2020-12-16 RX ADMIN — ENOXAPARIN SODIUM 40 MILLIGRAM(S): 100 INJECTION SUBCUTANEOUS at 05:31

## 2020-12-16 RX ADMIN — Medication 100 MILLIEQUIVALENT(S): at 10:02

## 2020-12-16 RX ADMIN — PIPERACILLIN AND TAZOBACTAM 25 GRAM(S): 4; .5 INJECTION, POWDER, LYOPHILIZED, FOR SOLUTION INTRAVENOUS at 06:17

## 2020-12-16 RX ADMIN — Medication 105 MILLIGRAM(S): at 22:03

## 2020-12-16 RX ADMIN — Medication 30 MILLIGRAM(S): at 07:06

## 2020-12-16 RX ADMIN — Medication 250 MILLIGRAM(S): at 05:31

## 2020-12-16 RX ADMIN — Medication 20 MILLIGRAM(S): at 12:39

## 2020-12-16 RX ADMIN — ENOXAPARIN SODIUM 40 MILLIGRAM(S): 100 INJECTION SUBCUTANEOUS at 18:00

## 2020-12-16 RX ADMIN — Medication 100 MILLIEQUIVALENT(S): at 12:39

## 2020-12-16 NOTE — PROGRESS NOTE ADULT - SUBJECTIVE AND OBJECTIVE BOX
Follow Up:  depression, fever, pulmonary infiltrates    Inverval History/ROS: patient more verbal today.  knows his name and that he is in a facility.   afebrile.  Allergies  No Known Allergies        ANTIMICROBIALS:    piperacillin/tazobactam IVPB.. 3.375 every 8 hours        OTHER MEDS:  enoxaparin Injectable 40 milliGRAM(s) SubCutaneous two times a day  OLANZapine Injectable 2.5 milliGRAM(s) IntraMuscular every 6 hours PRN  thiamine IVPB 500 milliGRAM(s) IV Intermittent every 8 hours    Vital Signs Last 24 Hrs  T(F): 97.9 (12-16-20 @ 15:00), Max: 102 (12-15-20 @ 18:13)  HR: 109 (12-16-20 @ 15:00)  BP: 134/91 (12-16-20 @ 15:00)  RR: 18 (12-16-20 @ 15:00)  SpO2: 97% (12-16-20 @ 15:00) (96% - 97%)    PHYSICAL EXAM:  General: awake alert answering few questions  HEAD/EYES: [ ] PERRL [x ] white sclera [ ] icterus  ENT:  scar left neck  Cardiovascular:   [ ] murmur [ ] normal [ ] PPM/AICD  Respiratory:  [ ] clear to ausculation bilaterally  GI:  [ ] soft, non-tender, normal bowel sounds  :  guajardo  +  Musculoskeletal: left arm > right  Neurologic:  [ ] non-focal exam   Skin:  left arm erythema faint  Lymph: [ ] no lymphadenopathy  Psychiatric: flat affect, alert  Lines:  [x ] no phlebitis [ ] central line                          11.0   13.70 )-----------( 404      ( 16 Dec 2020 07:53 )             35.2 12-16    147  |  111  |  28  ----------------------------<  139  3.0   |  22  |  1.11  Ca    9.4      16 Dec 2020 07:53Phos  3.8     12-16Mg     2.3     12-16  TPro  6.7  /  Alb  3.5  /  TBili  0.5  /  DBili  x   /  AST  23  /  ALT  30  /  AlkPhos  50  12-16          MICROBIOLOGY:  v  .Blood Blood-Peripheral  12-13-20   No growth to date.  --  --      .Blood Blood-Peripheral  12-09-20   No Growth Final  --  --      .Sputum Sputum  12-08-20   Numerous Serratia marcescens  Normal Respiratory Leticia absent  --  Serratia marcescens      .CSF CSF  12-06-20   No growth at 3 days.  --    No polymorphonuclear leukocytes seen  No organisms seen  by cytocentrifuge      .Blood Blood-Arterial  12-05-20   No Growth Final  --  --      .Blood  12-05-20   No Growth Final  --  --      .Blood  12-05-20   No Growth Final  --  --      .Urine  12-05-20   No growth  --  --      RADIOLOGY:    rad< from: CT Angio Chest w/ IV Cont (12.15.20 @ 14:27) >    EXAM:  CT ANGIO CHEST (W)AW IC        PROCEDURE DATE:  Dec 15 2020         INTERPRETATION:  CLINICAL INFORMATION: Tachycardic and hypoxic. Evaluate for pulmonary embolism.    COMPARISON: CT chest abdomen pelvis 12/6/2020.    PROCEDURE:  CT Angiography of the Chest.  90 ml of Omnipaque 350 was injected intravenously. 10 ml were discarded.  Sagittal and coronal reformats were performed as well as 3D (MIP) reconstructions.    FINDINGS:    LUNGS AND PLEURA: Since 12/6/2020, the right and left pleural effusion are new. The bilateral lower lobe consolidation has decreased, however, there is likely septal thickening and groundglass opacities within the bilateral upper lobes. The opacities in the bilateral lobes have slightly increased in the apices since 12/6/2020 allowing for respiratory motion while they have slightly decreased in the remainder of the upper lobes.    MEDIASTINUM AND KATHY: The chest lymph nodes involving the upper paratracheal, lower paratracheal, prevascular, AP window, and subcarinal stations are unchanged. The visualized thyroid gland and esophagus are unremarkable    VESSELS: The aortic root measures 4.3 cm at the sinuses of Valsalva. The ascending aorta measures 4.1 cm at the main pulmonary artery. The remainder of the aorta is unremarkable.    There is no main, central, lobar, or proximal segmental pulmonary embolus. The mid to distal segmental and subsegmental vessels are not well evaluated secondary to motion.    HEART: Heart size is normal. No pericardial effusion.    CHEST WALL AND LOWER NECK: Within normal limits.    VISUALIZED UPPER ABDOMEN: Solitary right hepatic lobe calcification.    BONES: Degenerative changes.    IMPRESSION:    1.  No pulmonary embolism, however, the mid to distal segmental and subsegmental vessels are not well evaluated secondary to motion.  2.  Since 12/6/2020, the bilateral pleural effusions have increased, however, the bilateral lower lobe opacities have decreased.  3.  In addition, the opacities in the bilateral lung apices and likely septal thickening and increased while the additional upper lobe opacities have decreased. These findings are of uncertain etiology.            ANNAMARIE MCKINNEY MD; Resident Radiology  This document has been electronically signed.  STEPHANIE FELICIANO MD; Attending Radiologist  This document has been electronically signed. Dec 15 2020  4:14PM    < end of copied text >

## 2020-12-16 NOTE — PROGRESS NOTE ADULT - PROBLEM SELECTOR PLAN 2
Pt continues to spike fever in spite of extensive negative workup  - ID consulted; restarted Zosyn given concern for aspiration  - b/l LE Duplex negative for DVT  - CTA chest w/ no pulmonary embolism. Opacities in the bilateral lung apices and bilateral pleural effusions have increased  - f/u repeat BCx Pt continues to spike fever in spite of extensive negative workup  - ID consulted; restarted Zosyn given concern for aspiration  - CTA chest w/ no pulmonary embolism. Opacities in the bilateral lung apices and bilateral pleural effusions have increased  - f/u repeat BCx  - b/l LE Duplex negative for DVT  - will get b/l UE duplex
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
## 2020-12-16 NOTE — PROGRESS NOTE ADULT - PROBLEM SELECTOR PLAN 7
DVT PPX: Lovenox 40mg  PT consulted; recs pending  Dispo: transferred from [facility] DVT PPX: Lovenox 40mg  Diet: S/S reevaluation  PT consulted; recs pending  Dispo: transferred from [facility]

## 2020-12-16 NOTE — PROVIDER CONTACT NOTE (OTHER) - ACTION/TREATMENT ORDERED:
MD made aware. MD to order IVP Toradol. Reassess temp hour after IV Tylenol.  Will continue to monitor.

## 2020-12-16 NOTE — PROGRESS NOTE ADULT - PROBLEM SELECTOR PLAN 4
Started on Vancomycin on 12/9/20 for UE cellulitis  - Afebrile w/ normal WBC  - c/w Vancomycin 1.5g q12
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
## 2020-12-16 NOTE — PROVIDER CONTACT NOTE (OTHER) - ACTION/TREATMENT ORDERED:
MD made aware. MD to order IV Tylenol. Reassess temp hour after IV Tylenol.  Will continue to monitor.
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
## 2020-12-16 NOTE — PROGRESS NOTE ADULT - PROBLEM SELECTOR PLAN 3
Multifocal PNA on CT chest 12/7  - s/p Vanco 12/5/20-12/7/20   - Sputum cx w/ Serratia, received 5 day course Zosyn in MICU (12/5/20-12/9/20) and Azithro 12/6/-12/8/20  - COVID PCR and Rapid RVP neg   - BCx and UCx negative thus far  - lungs with bilateral rhonchi, concern for possible aspiration  - ID consult; started Zosyn, follow up repeat BCx  - Suction prn, duonebs q6h ATC, chest PT Multifocal PNA on CT chest 12/7  - s/p Vanco 12/5/20-12/7/20   - Sputum cx w/ Serratia, received 5 day course Zosyn in MICU (12/5/20-12/9/20) and Azithro 12/6/-12/8/20  - COVID PCR and Rapid RVP neg   - BCx and UCx negative thus far  - lungs with bilateral rhonchi, concern for possible aspiration  - ID consult; started Zosyn, follow up repeat BCx  - Suction prn, duonebs q6h ATC, chest PT  - will give Lasix 20mg IV

## 2020-12-16 NOTE — BH CONSULTATION LIAISON PROGRESS NOTE - NSBHFUPINTERVALHXFT_PSY_A_CORE
Spoke to patient via ukranian , patient more awake and alert than before but still confused Aox1, [facility] in [facility]", unable ot answer questions about mood, per staff made suicidal statement before, able to follow simple commands though arm raise limited by tremulousness and weaknessness, speech still garbled.

## 2020-12-16 NOTE — SWALLOW BEDSIDE ASSESSMENT ADULT - PHARYNGEAL PHASE
wet breath sounds/upper airway congestion wet breath sounds/upper airway congestion/Delayed pharyngeal swallow/Wet vocal quality post oral intake wet breath sounds/upper airway congestion/Delayed pharyngeal swallow/Cough post oral intake

## 2020-12-16 NOTE — PROGRESS NOTE ADULT - SUBJECTIVE AND OBJECTIVE BOX
Patient is a 55y old  Male who presents with a chief complaint of AMS (15 Dec 2020 20:10)      Samuel Teran PGY1  Anesthesiology  Pager: [place]CHELL: [phone] NS: [phone]    SUBJECTIVE/OVERNIGHT EVENTS: Patient continued to spike fevers throughout the night requiring Tylenol ATC and Ketoralac 30mg IV push and was tachycardia to 117. Pt unable/unwilling to answer questions but does not and shake his head when asked yes or no questions. Otherwise mumbling random sounds. Pt shakes his head no when asked if he is feeling well and then when asked to clarify, shakes his head yes when asked is he has RLE pain. Otherwise unable to vocalize his name, date or location. Minimally verbal    REVIEW OF SYSTEMS: Limited due to patient cooperation      MEDICATIONS  (STANDING):  enoxaparin Injectable 40 milliGRAM(s) SubCutaneous two times a day  piperacillin/tazobactam IVPB.. 3.375 Gram(s) IV Intermittent every 8 hours  thiamine IVPB 500 milliGRAM(s) IV Intermittent every 8 hours  vancomycin  IVPB 1000 milliGRAM(s) IV Intermittent every 8 hours    MEDICATIONS  (PRN):  OLANZapine Injectable 2.5 milliGRAM(s) IntraMuscular every 6 hours PRN Agitation    CAPILLARY BLOOD GLUCOSE        I&O's Summary        Vital Signs Last 24 Hrs  T(C): 38.2 (16 Dec 2020 07:06), Max: 38.9 (15 Dec 2020 18:13)  T(F): 100.7 (16 Dec 2020 07:06), Max: 102 (15 Dec 2020 18:13)  HR: 117 (16 Dec 2020 05:00) (112 - 129)  BP: 132/92 (16 Dec 2020 05:00) (131/90 - 147/99)  BP(mean): --  RR: 18 (16 Dec 2020 05:00) (18 - 20)  SpO2: 97% (16 Dec 2020 05:00) (96% - 98%)    PHYSICAL EXAM:  GENERAL: NAD, somewhat lethargic  EYES: EOMI, PERRLA  CHEST/LUNG: on 4L NC. Diffusely scattered rhonchi and course breath sounds, +tachypnea. Bilateral pedal and hand edema.   HEART: Regular rate and rhythm; No murmurs  ABDOMEN: Soft, Nontender, Nondistended  EXTREMITIES:  LUE cellulitis: erythema improving and decreasing in area.  PSYCH: Not responsive to questions/commands  NEUROLOGY: Able to move all extremities passively, unable to follow commands  SKIN: No rashes or lesions    LABS                        11.6   15.09 )-----------( 404      ( 15 Dec 2020 07:57 )             36.4     12-15    146<H>  |  109<H>  |  23  ----------------------------<  116<H>  3.1<L>   |  21<L>  |  0.97    Ca    9.3      15 Dec 2020 07:57  Phos  3.5     12-15  Mg     2.2     12-15    TPro  6.6  /  Alb  3.4  /  TBili  0.5  /  DBili  x   /  AST  29  /  ALT  32  /  AlkPhos  52  12-15       08 Dec 2020 02:19 Troponin x     /  U/L / CKMB x     / CKMB Units x       06 Dec 2020 14:21 Troponin x     / CK 1042 U/L / CKMB x     / CKMB Units 3.6 ng/mL   06 Dec 2020 04:32 Troponin x     / CK 1405 U/L / CKMB x     / CKMB Units x                    RADIOLOGY & ADDITIONAL TESTS:         Patient is a 55y old  Male who presents with a chief complaint of AMS (15 Dec 2020 20:10)      Samuel Teran PGY1  Anesthesiology  Pager: [place]: [phone] NS: [phone]    SUBJECTIVE/OVERNIGHT EVENTS: Patient continued to spike fevers throughout the night requiring Tylenol ATC and Ketoralac 30mg IV push and was tachycardia to 117. Pt able to answer questions this morning. States he is feeling "not too bad" but does note that he has pain "everywhere". Otherwise is minimally verbal but does shake his head when asked if he has any chest pain, N/V/D.     REVIEW OF SYSTEMS: Limited due to patient cooperation      MEDICATIONS  (STANDING):  enoxaparin Injectable 40 milliGRAM(s) SubCutaneous two times a day  piperacillin/tazobactam IVPB.. 3.375 Gram(s) IV Intermittent every 8 hours  thiamine IVPB 500 milliGRAM(s) IV Intermittent every 8 hours  vancomycin  IVPB 1000 milliGRAM(s) IV Intermittent every 8 hours    MEDICATIONS  (PRN):  OLANZapine Injectable 2.5 milliGRAM(s) IntraMuscular every 6 hours PRN Agitation    CAPILLARY BLOOD GLUCOSE        I&O's Summary        Vital Signs Last 24 Hrs  T(C): 38.2 (16 Dec 2020 07:06), Max: 38.9 (15 Dec 2020 18:13)  T(F): 100.7 (16 Dec 2020 07:06), Max: 102 (15 Dec 2020 18:13)  HR: 117 (16 Dec 2020 05:00) (112 - 129)  BP: 132/92 (16 Dec 2020 05:00) (131/90 - 147/99)  BP(mean): --  RR: 18 (16 Dec 2020 05:00) (18 - 20)  SpO2: 97% (16 Dec 2020 05:00) (96% - 98%)    PHYSICAL EXAM:  GENERAL: NAD, somewhat lethargic  EYES: EOMI, PERRLA  CHEST/LUNG: on 4L NC. Mild rhonchi diffusely, much improved  HEART: Regular rate and rhythm; No murmurs  ABDOMEN: Soft, Nontender, Nondistended  EXTREMITIES:  B/l 2+ pedal (improved) and b/l hand edema (L>R)  PSYCH: A&O x1-2, able to tell me his name and birthday but does no know here he is  NEUROLOGY: Able to LUE, b/l LE passively, able to follow some commands. Unable to move RUE   SKIN: LUE cellulitis: erythema improving and decreasing in area.    LABS                        11.6   15.09 )-----------( 404      ( 15 Dec 2020 07:57 )             36.4     12-15    146<H>  |  109<H>  |  23  ----------------------------<  116<H>  3.1<L>   |  21<L>  |  0.97    Ca    9.3      15 Dec 2020 07:57  Phos  3.5     12-15  Mg     2.2     12-15    TPro  6.6  /  Alb  3.4  /  TBili  0.5  /  DBili  x   /  AST  29  /  ALT  32  /  AlkPhos  52  12-15       08 Dec 2020 02:19 Troponin x     /  U/L / CKMB x     / CKMB Units x       06 Dec 2020 14:21 Troponin x     / CK 1042 U/L / CKMB x     / CKMB Units 3.6 ng/mL   06 Dec 2020 04:32 Troponin x     / CK 1405 U/L / CKMB x     / CKMB Units x                    RADIOLOGY & ADDITIONAL TESTS:
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
## 2020-12-17 LAB
ALBUMIN SERPL ELPH-MCNC: 3.6 G/DL — SIGNIFICANT CHANGE UP (ref 3.3–5)
ALP SERPL-CCNC: 48 U/L — SIGNIFICANT CHANGE UP (ref 40–120)
ALT FLD-CCNC: 27 U/L — SIGNIFICANT CHANGE UP (ref 4–41)
ANION GAP SERPL CALC-SCNC: 15 MMOL/L — HIGH (ref 7–14)
AST SERPL-CCNC: 19 U/L — SIGNIFICANT CHANGE UP (ref 4–40)
BILIRUB SERPL-MCNC: 0.5 MG/DL — SIGNIFICANT CHANGE UP (ref 0.2–1.2)
BUN SERPL-MCNC: 26 MG/DL — HIGH (ref 7–23)
CALCIUM SERPL-MCNC: 9.6 MG/DL — SIGNIFICANT CHANGE UP (ref 8.4–10.5)
CHLORIDE SERPL-SCNC: 110 MMOL/L — HIGH (ref 98–107)
CO2 SERPL-SCNC: 22 MMOL/L — SIGNIFICANT CHANGE UP (ref 22–31)
CREAT SERPL-MCNC: 0.96 MG/DL — SIGNIFICANT CHANGE UP (ref 0.5–1.3)
GLUCOSE SERPL-MCNC: 105 MG/DL — HIGH (ref 70–99)
HCT VFR BLD CALC: 34.2 % — LOW (ref 39–50)
HGB BLD-MCNC: 10.7 G/DL — LOW (ref 13–17)
MAGNESIUM SERPL-MCNC: 2.3 MG/DL — SIGNIFICANT CHANGE UP (ref 1.6–2.6)
MCHC RBC-ENTMCNC: 29.6 PG — SIGNIFICANT CHANGE UP (ref 27–34)
MCHC RBC-ENTMCNC: 31.3 GM/DL — LOW (ref 32–36)
MCV RBC AUTO: 94.7 FL — SIGNIFICANT CHANGE UP (ref 80–100)
NRBC # BLD: 0 /100 WBCS — SIGNIFICANT CHANGE UP
NRBC # FLD: 0 K/UL — SIGNIFICANT CHANGE UP
PHOSPHATE SERPL-MCNC: 3.4 MG/DL — SIGNIFICANT CHANGE UP (ref 2.5–4.5)
PLATELET # BLD AUTO: 338 K/UL — SIGNIFICANT CHANGE UP (ref 150–400)
POTASSIUM SERPL-MCNC: 3 MMOL/L — LOW (ref 3.5–5.3)
POTASSIUM SERPL-SCNC: 3 MMOL/L — LOW (ref 3.5–5.3)
PROT SERPL-MCNC: 6.9 G/DL — SIGNIFICANT CHANGE UP (ref 6–8.3)
RBC # BLD: 3.61 M/UL — LOW (ref 4.2–5.8)
RBC # FLD: 13.6 % — SIGNIFICANT CHANGE UP (ref 10.3–14.5)
SARS-COV-2 RNA SPEC QL NAA+PROBE: SIGNIFICANT CHANGE UP
SODIUM SERPL-SCNC: 147 MMOL/L — HIGH (ref 135–145)
WBC # BLD: 11.3 K/UL — HIGH (ref 3.8–10.5)
WBC # FLD AUTO: 11.3 K/UL — HIGH (ref 3.8–10.5)

## 2020-12-17 PROCEDURE — 99233 SBSQ HOSP IP/OBS HIGH 50: CPT

## 2020-12-17 PROCEDURE — 99232 SBSQ HOSP IP/OBS MODERATE 35: CPT

## 2020-12-17 PROCEDURE — 99233 SBSQ HOSP IP/OBS HIGH 50: CPT | Mod: GC

## 2020-12-17 RX ORDER — FUROSEMIDE 40 MG
40 TABLET ORAL DAILY
Refills: 0 | Status: DISCONTINUED | OUTPATIENT
Start: 2020-12-17 | End: 2020-12-17

## 2020-12-17 RX ORDER — POTASSIUM CHLORIDE 20 MEQ
10 PACKET (EA) ORAL
Refills: 0 | Status: COMPLETED | OUTPATIENT
Start: 2020-12-17 | End: 2020-12-17

## 2020-12-17 RX ORDER — FUROSEMIDE 40 MG
20 TABLET ORAL DAILY
Refills: 0 | Status: DISCONTINUED | OUTPATIENT
Start: 2020-12-17 | End: 2020-12-19

## 2020-12-17 RX ORDER — ACETAMINOPHEN 500 MG
1000 TABLET ORAL ONCE
Refills: 0 | Status: COMPLETED | OUTPATIENT
Start: 2020-12-17 | End: 2020-12-17

## 2020-12-17 RX ADMIN — Medication 40 MILLIGRAM(S): at 10:15

## 2020-12-17 RX ADMIN — ENOXAPARIN SODIUM 40 MILLIGRAM(S): 100 INJECTION SUBCUTANEOUS at 17:41

## 2020-12-17 RX ADMIN — Medication 105 MILLIGRAM(S): at 05:48

## 2020-12-17 RX ADMIN — Medication 100 MILLIEQUIVALENT(S): at 09:03

## 2020-12-17 RX ADMIN — PIPERACILLIN AND TAZOBACTAM 25 GRAM(S): 4; .5 INJECTION, POWDER, LYOPHILIZED, FOR SOLUTION INTRAVENOUS at 14:42

## 2020-12-17 RX ADMIN — PIPERACILLIN AND TAZOBACTAM 25 GRAM(S): 4; .5 INJECTION, POWDER, LYOPHILIZED, FOR SOLUTION INTRAVENOUS at 21:56

## 2020-12-17 RX ADMIN — ENOXAPARIN SODIUM 40 MILLIGRAM(S): 100 INJECTION SUBCUTANEOUS at 05:48

## 2020-12-17 RX ADMIN — Medication 100 MILLIEQUIVALENT(S): at 10:08

## 2020-12-17 RX ADMIN — PIPERACILLIN AND TAZOBACTAM 25 GRAM(S): 4; .5 INJECTION, POWDER, LYOPHILIZED, FOR SOLUTION INTRAVENOUS at 05:48

## 2020-12-17 RX ADMIN — Medication 1000 MILLIGRAM(S): at 03:21

## 2020-12-17 RX ADMIN — Medication 100 MILLIEQUIVALENT(S): at 11:31

## 2020-12-17 RX ADMIN — Medication 400 MILLIGRAM(S): at 02:21

## 2020-12-17 NOTE — PROGRESS NOTE ADULT - SUBJECTIVE AND OBJECTIVE BOX
Follow Up:  depression, fever, pulmonary infiltrates    Inverval History/ROS: patient awake and nods head to questions.  Guajardo removed and TOV in progress.  NGT in place.  Temp 101.2 last night.  Allergies  No Known Allergies        ANTIMICROBIALS:    piperacillin/tazobactam IVPB.. 3.375 every 8 hours        OTHER MEDS:  enoxaparin Injectable 40 milliGRAM(s) SubCutaneous two times a day  OLANZapine Injectable 2.5 milliGRAM(s) IntraMuscular every 6 hours PRN  thiamine IVPB 500 milliGRAM(s) IV Intermittent every 8 hours    Vital Signs Last 24 Hrs  T(F): 100 (12-17-20 @ 12:55), Max: 101.2 (12-16-20 @ 22:00)  HR: 98 (12-17-20 @ 12:55)  BP: 141/93 (12-17-20 @ 12:55)  RR: 18 (12-17-20 @ 12:55)  SpO2: 96% (12-17-20 @ 12:55) (96% - 99%)    PHYSICAL EXAM:  General: awake alert no distress on 1:1  HEAD/EYES: NGT  ENT:  scar left neck  Cardiovascular: s1S2  Respiratory: clear anteriorly  GI:   soft, non-tender, normal bowel sounds  : no  guajardo   Musculoskeletal: no synovitis  Neurologic:  non-focal exam   Skin:  no rash, erythema  Psychiatric: flat affect, alert  Lines:  [x ] no phlebitis [ ] central line                                     10.7   11.30 )-----------( 338      ( 17 Dec 2020 07:54 )             34.2 12-17    147  |  110  |  26  ----------------------------<  105  3.0   |  22  |  0.96  Ca    9.6      17 Dec 2020 07:54Phos  3.4     12-17Mg     2.3     12-17  TPro  6.9  /  Alb  3.6  /  TBili  0.5  /  DBili  x   /  AST  19  /  ALT  27  /  AlkPhos  48  12-17          MICROBIOLOGY:    .Blood Blood-Peripheral  12-13-20   No growth to date.  --  --      .Blood Blood-Peripheral  12-09-20   No Growth Final  --  --      .Sputum Sputum  12-08-20   Numerous Serratia marcescens  Normal Respiratory Leticia absent  --  Serratia marcescens      .CSF CSF  12-06-20   No growth at 3 days.  --    No polymorphonuclear leukocytes seen  No organisms seen  by cytocentrifuge      .Blood Blood-Arterial  12-05-20   No Growth Final  --  --      .Blood  12-05-20   No Growth Final  --  --      .Blood  12-05-20   No Growth Final  --  --      .Urine  12-05-20   No growth  --  --      RADIOLOGY:    rad< from: CT Angio Chest w/ IV Cont (12.15.20 @ 14:27) >    EXAM:  CT ANGIO CHEST (W)AW IC        PROCEDURE DATE:  Dec 15 2020         INTERPRETATION:  CLINICAL INFORMATION: Tachycardic and hypoxic. Evaluate for pulmonary embolism.    COMPARISON: CT chest abdomen pelvis 12/6/2020.    PROCEDURE:  CT Angiography of the Chest.  90 ml of Omnipaque 350 was injected intravenously. 10 ml were discarded.  Sagittal and coronal reformats were performed as well as 3D (MIP) reconstructions.    FINDINGS:    LUNGS AND PLEURA: Since 12/6/2020, the right and left pleural effusion are new. The bilateral lower lobe consolidation has decreased, however, there is likely septal thickening and groundglass opacities within the bilateral upper lobes. The opacities in the bilateral lobes have slightly increased in the apices since 12/6/2020 allowing for respiratory motion while they have slightly decreased in the remainder of the upper lobes.    MEDIASTINUM AND KATHY: The chest lymph nodes involving the upper paratracheal, lower paratracheal, prevascular, AP window, and subcarinal stations are unchanged. The visualized thyroid gland and esophagus are unremarkable    VESSELS: The aortic root measures 4.3 cm at the sinuses of Valsalva. The ascending aorta measures 4.1 cm at the main pulmonary artery. The remainder of the aorta is unremarkable.    There is no main, central, lobar, or proximal segmental pulmonary embolus. The mid to distal segmental and subsegmental vessels are not well evaluated secondary to motion.    HEART: Heart size is normal. No pericardial effusion.    CHEST WALL AND LOWER NECK: Within normal limits.    VISUALIZED UPPER ABDOMEN: Solitary right hepatic lobe calcification.    BONES: Degenerative changes.    IMPRESSION:    1.  No pulmonary embolism, however, the mid to distal segmental and subsegmental vessels are not well evaluated secondary to motion.  2.  Since 12/6/2020, the bilateral pleural effusions have increased, however, the bilateral lower lobe opacities have decreased.  3.  In addition, the opacities in the bilateral lung apices and likely septal thickening and increased while the additional upper lobe opacities have decreased. These findings are of uncertain etiology.            ANNAMARIE MCKINNEY MD; Resident Radiology  This document has been electronically signed.  STEPHANIE FELICIANO MD; Attending Radiologist  This document has been electronically signed. Dec 15 2020  4:14PM    < end of copied text >

## 2020-12-17 NOTE — BH CONSULTATION LIAISON PROGRESS NOTE - CASE SUMMARY
patient drooling, tachypneic still, mumbled speech, intermittently able to follow simple commands, waxing/waning, no PRNs overnight, would keep on 1:1 as patient very suicidal when awake, would c/w medical AMS workup.  patient improving daily, more awake /alert than before though still aox1, intermittently low mood, guarded when asked about suicidality. Must stay on 1:1 for suicidality, likely throughout hospitalization, patient admitted to [facility] prior to this for high lethality suicide attempt.
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
## 2020-12-17 NOTE — PROGRESS NOTE ADULT - PROBLEM SELECTOR PLAN 3
Multifocal PNA on CT chest 12/7  - s/p Vanco 12/5/20-12/7/20   - Sputum cx w/ Serratia, received 5 day course Zosyn in MICU (12/5/20-12/9/20) and Azithro 12/6/-12/8/20  - COVID PCR and Rapid RVP neg   - BCx and UCx negative thus far  - lungs with bilateral rhonchi, concern for possible aspiration  - ID consult; started Zosyn, follow up repeat BCx  - Suction prn, duonebs q6h ATC, chest PT  - will give Lasix 20mg IV Multifocal PNA on CT chest 12/7  - s/p Vanco 12/5/20-12/7/20   - Sputum cx w/ Serratia, received 5 day course Zosyn in MICU (12/5/20-12/9/20) and Azithro 12/6/-12/8/20  - COVID PCR and Rapid RVP neg   - BCx and UCx negative thus far  - lungs with bilateral rhonchi, concern for possible aspiration  - ID consult; started Zosyn, follow up repeat BCx  - Suction prn, duonebs q6h ATC, chest PT  - will give Lasix 20mg IV PRN Multifocal PNA on CT chest 12/7  - s/p Vanco 12/5/20-12/7/20   - Sputum cx w/ Serratia, received 5 day course Zosyn in MICU (12/5/20-12/9/20) and Azithro 12/6/-12/8/20  - COVID PCR and Rapid RVP neg   - BCx and UCx negative thus far  - lungs with bilateral rhonchi, concern for possible aspiration  - ID consult; c/w Zosyn, repeat BCx NGTD  - Suction prn, duonebs q6h ATC, chest PT  - c/w lasix 20mg IVP daily and continue to reassess volume status

## 2020-12-17 NOTE — PROGRESS NOTE ADULT - PROBLEM SELECTOR PLAN 2
Pt continues to spike fever in spite of extensive negative workup  - ID consulted; restarted Zosyn given concern for aspiration  - CTA chest w/ no pulmonary embolism. Opacities in the bilateral lung apices and bilateral pleural effusions have increased  - Repeat BCx w/ NGTD  - b/l LE Duplex negative for DVT  - b/l UE duplex notable for superficial vein thrombosis in R cephalic vein. No DVT Pt continues to spike fever in spite of extensive negative workup  - ID consulted; restarted Zosyn given concern for aspiration  - CTA chest w/ no pulmonary embolism. Opacities in the bilateral lung apices and bilateral pleural effusions have increased  - Repeat BCx w/ NGTD  - b/l LE Duplex negative for DVT  - b/l UE duplex notable for superficial vein thrombosis in R cephalic vein. No DVT  - will start FUO work-up: ESR, CRP, LSH, CPK, etc
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
## 2020-12-17 NOTE — BH CONSULTATION LIAISON PROGRESS NOTE - OTHER
partially cooperative  increased strength in upper and lower extremities  low intensity, high frequency mild tremors when raising UEs in front of him  "ok"

## 2020-12-17 NOTE — BH CONSULTATION LIAISON PROGRESS NOTE - NSBHFUPINTERVALHXFT_PSY_A_CORE
Chart review. WBC elevated to 11.3, Na 147, K 3.0; Scr and LFTs wnl. Last temp spike to 101.2 at 22:00 on 12/16, pt afebrile o/n, with improved tachy, now on 4L NC. No DVT on UEs.     Spoke to patient via Arabic  ([id number]).     Pt states he is in "[place]" and identifies the country as [place]." Denies any pain but notes he feels confused. Knows he is in a hospital but is unable/unwilling to explain why he is here. With ongoing low volume in speech, at times making it hard to  to hear. Despite answering other questions, when asked if he wanted to die/kill himself, pt remained silent.  Ask how he would hurt himself, he replied "I don't know." Of note, pt with improved strength when liz hand muscles and raising LEs against pressure. Asked if he wanted to walk, pt proceeded to attempt to get up.     Per RN at bedside, pt has not been agitated/aggressive.
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
## 2020-12-17 NOTE — PROGRESS NOTE ADULT - PROBLEM SELECTOR PLAN 7
DVT PPX: Lovenox 40mg  Diet: S/S reevaluation  PT consulted; recs pending  Dispo: transferred from [facility]

## 2020-12-17 NOTE — PROVIDER CONTACT NOTE (OTHER) - ACTION/TREATMENT ORDERED:
MD aware and notified. No tylenol at this time.  Cold pack given to patient. Will continue to monitor patient. MD aware and notified. Will give Tylenol as per MD orders. Cold pack given to patient. Will continue to monitor patient.
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
## 2020-12-17 NOTE — PROGRESS NOTE ADULT - SUBJECTIVE AND OBJECTIVE BOX
Patient is a 55y old  Male who presents with a chief complaint of AMS (16 Dec 2020 16:58)      Samuel Teran PGY1  Anesthesiology  Pager: LIALLIE: [phone] NS: [phone]    SUBJECTIVE/OVERNIGHT EVENTS: Pt noted to be febrile to 101.2 overnight, given Tylenol 1g IV. Has been afebrile since then. Pt able to answer questions this morning. States he is feeling "not too bad" but does note that he has pain "everywhere". Otherwise is minimally verbal but does shake his head when asked if he has any chest pain, N/V/D.     REVIEW OF SYSTEMS: Limited due to patient cooperation      MEDICATIONS  (STANDING):  enoxaparin Injectable 40 milliGRAM(s) SubCutaneous two times a day  piperacillin/tazobactam IVPB.. 3.375 Gram(s) IV Intermittent every 8 hours    MEDICATIONS  (PRN):  OLANZapine Injectable 2.5 milliGRAM(s) IntraMuscular every 6 hours PRN Agitation    CAPILLARY BLOOD GLUCOSE        I&O's Summary    16 Dec 2020 07:01  -  17 Dec 2020 07:00  --------------------------------------------------------  IN: 0 mL / OUT: 1425 mL / NET: -1425 mL          Vital Signs Last 24 Hrs  T(C): 37.6 (17 Dec 2020 06:00), Max: 38.4 (16 Dec 2020 22:00)  T(F): 99.7 (17 Dec 2020 06:00), Max: 101.2 (16 Dec 2020 22:00)  HR: 98 (17 Dec 2020 06:00) (92 - 109)  BP: 139/75 (17 Dec 2020 06:00) (109/77 - 149/92)  BP(mean): --  RR: 18 (17 Dec 2020 06:00) (18 - 18)  SpO2: 98% (17 Dec 2020 06:00) (97% - 99%)    PHYSICAL EXAM:  GENERAL: NAD, somewhat lethargic  EYES: EOMI, PERRLA  CHEST/LUNG: on 4L NC. Mild rhonchi diffusely, much improved  HEART: Regular rate and rhythm; No murmurs  ABDOMEN: Soft, Nontender, Nondistended  EXTREMITIES:  B/l 2+ pedal (improved) and b/l hand edema (L>R)  PSYCH: A&O x1-2, able to tell me his name and birthday but does no know here he is  NEUROLOGY: Able to LUE, b/l LE passively, able to follow some commands. Unable to move RUE   SKIN: LUE cellulitis: erythema improving and decreasing in area.    LABS                        10.7   11.30 )-----------( 338      ( 17 Dec 2020 07:54 )             34.2                         11.0   13.70 )-----------( 404      ( 16 Dec 2020 07:53 )             35.2     12-16    147<H>  |  111<H>  |  28<H>  ----------------------------<  139<H>  3.0<L>   |  22  |  1.11    Ca    9.4      16 Dec 2020 07:53  Phos  3.8     12-16  Mg     2.3     12-16    TPro  6.7  /  Alb  3.5  /  TBili  0.5  /  DBili  x   /  AST  23  /  ALT  30  /  AlkPhos  50  12-16       08 Dec 2020 02:19 Troponin x     /  U/L / CKMB x     / CKMB Units x       06 Dec 2020 14:21 Troponin x     / CK 1042 U/L / CKMB x     / CKMB Units 3.6 ng/mL   06 Dec 2020 04:32 Troponin x     / CK 1405 U/L / CKMB x     / CKMB Units x                    RADIOLOGY & ADDITIONAL TESTS:         Patient is a 55y old  Male who presents with a chief complaint of AMS (16 Dec 2020 16:58)      Samuel Teran PGY1  Anesthesiology  Pager: [place]LL: [phone] NS: [phone]    SUBJECTIVE/OVERNIGHT EVENTS: Pt noted to be febrile to 101.2 overnight, given Tylenol 1g IV. Has been afebrile since then. Pt responsive to some questions this morning. When asked how he is doing, he states "not too well" and when further prompted nods his head yes when asked if he has abdominal pain.  Otherwise is minimally verbal but does shake his head when asked if he has any chest pain, N/V/D.     REVIEW OF SYSTEMS: Limited due to patient cooperation      MEDICATIONS  (STANDING):  enoxaparin Injectable 40 milliGRAM(s) SubCutaneous two times a day  piperacillin/tazobactam IVPB.. 3.375 Gram(s) IV Intermittent every 8 hours    MEDICATIONS  (PRN):  OLANZapine Injectable 2.5 milliGRAM(s) IntraMuscular every 6 hours PRN Agitation    CAPILLARY BLOOD GLUCOSE        I&O's Summary    16 Dec 2020 07:01  -  17 Dec 2020 07:00  --------------------------------------------------------  IN: 0 mL / OUT: 1425 mL / NET: -1425 mL          Vital Signs Last 24 Hrs  T(C): 37.6 (17 Dec 2020 06:00), Max: 38.4 (16 Dec 2020 22:00)  T(F): 99.7 (17 Dec 2020 06:00), Max: 101.2 (16 Dec 2020 22:00)  HR: 98 (17 Dec 2020 06:00) (92 - 109)  BP: 139/75 (17 Dec 2020 06:00) (109/77 - 149/92)  BP(mean): --  RR: 18 (17 Dec 2020 06:00) (18 - 18)  SpO2: 98% (17 Dec 2020 06:00) (97% - 99%)    PHYSICAL EXAM:  GENERAL: NAD, lethargic  EYES: EOMI, PERRLA  CHEST/LUNG: on 4L NC. Diffusely scattered rhonchi and course breath sounds  HEART: Regular rate and rhythm; No murmurs  ABDOMEN: Soft, Nontender, Nondistended  EXTREMITIES:  B/l 2+ pedal (improved) and b/l hand edema (L>R)  PSYCH: A&O x2, able to tell me his name, birthday, and location but does not know year  NEUROLOGY: Unwilling to range b/l UE and LE passively, does not follow commands  SKIN: LUE cellulitis: improved, no erythema noted    LABS                        10.7   11.30 )-----------( 338      ( 17 Dec 2020 07:54 )             34.2                         11.0   13.70 )-----------( 404      ( 16 Dec 2020 07:53 )             35.2     12-16    147<H>  |  111<H>  |  28<H>  ----------------------------<  139<H>  3.0<L>   |  22  |  1.11    Ca    9.4      16 Dec 2020 07:53  Phos  3.8     12-16  Mg     2.3     12-16    TPro  6.7  /  Alb  3.5  /  TBili  0.5  /  DBili  x   /  AST  23  /  ALT  30  /  AlkPhos  50  12-16       08 Dec 2020 02:19 Troponin x     /  U/L / CKMB x     / CKMB Units x       06 Dec 2020 14:21 Troponin x     / CK 1042 U/L / CKMB x     / CKMB Units 3.6 ng/mL   06 Dec 2020 04:32 Troponin x     / CK 1405 U/L / CKMB x     / CKMB Units x                    RADIOLOGY & ADDITIONAL TESTS:         Patient is a 55y old  Male who presents with a chief complaint of AMS (16 Dec 2020 16:58)      Samuel Teran PGY1  Anesthesiology  Pager: LIALLIE: [phone] NS: [phone]    SUBJECTIVE/OVERNIGHT EVENTS: Pt noted to be febrile to 101.2 overnight, given Tylenol 1g IV. Has been afebrile since then. Pt responsive to some questions this morning. When asked how he is doing, he states "not too well" and when further prompted nods his head yes when asked if he has abdominal pain.  Otherwise is minimally verbal but does shake his head when asked if he has any chest pain, N/V/D.     REVIEW OF SYSTEMS: Limited due to patient cooperation      MEDICATIONS  (STANDING):  enoxaparin Injectable 40 milliGRAM(s) SubCutaneous two times a day  piperacillin/tazobactam IVPB.. 3.375 Gram(s) IV Intermittent every 8 hours    MEDICATIONS  (PRN):  OLANZapine Injectable 2.5 milliGRAM(s) IntraMuscular every 6 hours PRN Agitation    CAPILLARY BLOOD GLUCOSE        I&O's Summary    16 Dec 2020 07:01  -  17 Dec 2020 07:00  --------------------------------------------------------  IN: 0 mL / OUT: 1425 mL / NET: -1425 mL          Vital Signs Last 24 Hrs  T(C): 37.6 (17 Dec 2020 06:00), Max: 38.4 (16 Dec 2020 22:00)  T(F): 99.7 (17 Dec 2020 06:00), Max: 101.2 (16 Dec 2020 22:00)  HR: 98 (17 Dec 2020 06:00) (92 - 109)  BP: 139/75 (17 Dec 2020 06:00) (109/77 - 149/92)  BP(mean): --  RR: 18 (17 Dec 2020 06:00) (18 - 18)  SpO2: 98% (17 Dec 2020 06:00) (97% - 99%)    PHYSICAL EXAM:  GENERAL: NAD,  lethargic  EYES: EOMI, PERRLA  CHEST/LUNG: on 4L NC. Mild rhonchi diffusely, much improved  HEART: Regular rate and rhythm; No murmurs  ABDOMEN: Soft, Nontender, Nondistended  EXTREMITIES:  B/l 2+ pedal (improved) and b/l hand edema (L>R)  PSYCH: A&O x1-2, able to tell me his name, birthday but does know year/location  NEUROLOGY: Able to b/l UE and LE passively, able to follow some commands  SKIN: LUE cellulitis: improved, no erythema noted      LABS                        10.7   11.30 )-----------( 338      ( 17 Dec 2020 07:54 )             34.2                         11.0   13.70 )-----------( 404      ( 16 Dec 2020 07:53 )             35.2     12-16    147<H>  |  111<H>  |  28<H>  ----------------------------<  139<H>  3.0<L>   |  22  |  1.11    Ca    9.4      16 Dec 2020 07:53  Phos  3.8     12-16  Mg     2.3     12-16    TPro  6.7  /  Alb  3.5  /  TBili  0.5  /  DBili  x   /  AST  23  /  ALT  30  /  AlkPhos  50  12-16       08 Dec 2020 02:19 Troponin x     /  U/L / CKMB x     / CKMB Units x       06 Dec 2020 14:21 Troponin x     / CK 1042 U/L / CKMB x     / CKMB Units 3.6 ng/mL   06 Dec 2020 04:32 Troponin x     / CK 1405 U/L / CKMB x     / CKMB Units x                    RADIOLOGY & ADDITIONAL TESTS:

## 2020-12-18 LAB
ALBUMIN SERPL ELPH-MCNC: 3.6 G/DL — SIGNIFICANT CHANGE UP (ref 3.3–5)
ALP SERPL-CCNC: 47 U/L — SIGNIFICANT CHANGE UP (ref 40–120)
ALT FLD-CCNC: 26 U/L — SIGNIFICANT CHANGE UP (ref 4–41)
ANION GAP SERPL CALC-SCNC: 13 MMOL/L — SIGNIFICANT CHANGE UP (ref 7–14)
APPEARANCE UR: CLEAR — SIGNIFICANT CHANGE UP
AST SERPL-CCNC: 22 U/L — SIGNIFICANT CHANGE UP (ref 4–40)
BASOPHILS # BLD AUTO: 0.03 K/UL — SIGNIFICANT CHANGE UP (ref 0–0.2)
BASOPHILS NFR BLD AUTO: 0.3 % — SIGNIFICANT CHANGE UP (ref 0–2)
BILIRUB SERPL-MCNC: 0.4 MG/DL — SIGNIFICANT CHANGE UP (ref 0.2–1.2)
BILIRUB UR-MCNC: NEGATIVE — SIGNIFICANT CHANGE UP
BUN SERPL-MCNC: 25 MG/DL — HIGH (ref 7–23)
CALCIUM SERPL-MCNC: 9.7 MG/DL — SIGNIFICANT CHANGE UP (ref 8.4–10.5)
CARBOHYDRATE DEFICIENT TRANSFERRIN, CHILD, RESULT: ABNORMAL
CHLORIDE SERPL-SCNC: 112 MMOL/L — HIGH (ref 98–107)
CK SERPL-CCNC: 55 U/L — SIGNIFICANT CHANGE UP (ref 30–200)
CO2 SERPL-SCNC: 22 MMOL/L — SIGNIFICANT CHANGE UP (ref 22–31)
COLOR SPEC: SIGNIFICANT CHANGE UP
CREAT SERPL-MCNC: 0.88 MG/DL — SIGNIFICANT CHANGE UP (ref 0.5–1.3)
CRP SERPL-MCNC: 4.6 MG/L — SIGNIFICANT CHANGE UP
CULTURE RESULTS: SIGNIFICANT CHANGE UP
CULTURE RESULTS: SIGNIFICANT CHANGE UP
DIFF PNL FLD: ABNORMAL
EOSINOPHIL # BLD AUTO: 0.3 K/UL — SIGNIFICANT CHANGE UP (ref 0–0.5)
EOSINOPHIL NFR BLD AUTO: 3.1 % — SIGNIFICANT CHANGE UP (ref 0–6)
GLUCOSE SERPL-MCNC: 125 MG/DL — HIGH (ref 70–99)
GLUCOSE UR QL: NEGATIVE — SIGNIFICANT CHANGE UP
HCT VFR BLD CALC: 37.2 % — LOW (ref 39–50)
HGB BLD-MCNC: 11.2 G/DL — LOW (ref 13–17)
IANC: 7.92 K/UL — SIGNIFICANT CHANGE UP (ref 1.5–8.5)
IMM GRANULOCYTES NFR BLD AUTO: 0.7 % — SIGNIFICANT CHANGE UP (ref 0–1.5)
KETONES UR-MCNC: NEGATIVE — SIGNIFICANT CHANGE UP
LDH SERPL L TO P-CCNC: 299 U/L — HIGH (ref 135–225)
LEUKOCYTE ESTERASE UR-ACNC: NEGATIVE — SIGNIFICANT CHANGE UP
LYMPHOCYTES # BLD AUTO: 0.96 K/UL — LOW (ref 1–3.3)
LYMPHOCYTES # BLD AUTO: 9.9 % — LOW (ref 13–44)
MAGNESIUM SERPL-MCNC: 2.2 MG/DL — SIGNIFICANT CHANGE UP (ref 1.6–2.6)
MCHC RBC-ENTMCNC: 29.1 PG — SIGNIFICANT CHANGE UP (ref 27–34)
MCHC RBC-ENTMCNC: 30.1 GM/DL — LOW (ref 32–36)
MCV RBC AUTO: 96.6 FL — SIGNIFICANT CHANGE UP (ref 80–100)
MONOCYTES # BLD AUTO: 0.45 K/UL — SIGNIFICANT CHANGE UP (ref 0–0.9)
MONOCYTES NFR BLD AUTO: 4.6 % — SIGNIFICANT CHANGE UP (ref 2–14)
NEUTROPHILS # BLD AUTO: 7.92 K/UL — HIGH (ref 1.8–7.4)
NEUTROPHILS NFR BLD AUTO: 81.4 % — HIGH (ref 43–77)
NITRITE UR-MCNC: NEGATIVE — SIGNIFICANT CHANGE UP
NRBC # BLD: 0 /100 WBCS — SIGNIFICANT CHANGE UP
NRBC # FLD: 0 K/UL — SIGNIFICANT CHANGE UP
PH UR: 6 — SIGNIFICANT CHANGE UP (ref 5–8)
PHOSPHATE SERPL-MCNC: 2.8 MG/DL — SIGNIFICANT CHANGE UP (ref 2.5–4.5)
PLATELET # BLD AUTO: 353 K/UL — SIGNIFICANT CHANGE UP (ref 150–400)
POTASSIUM SERPL-MCNC: 3 MMOL/L — LOW (ref 3.5–5.3)
POTASSIUM SERPL-SCNC: 3 MMOL/L — LOW (ref 3.5–5.3)
PROT SERPL-MCNC: 6.7 G/DL — SIGNIFICANT CHANGE UP (ref 6–8.3)
PROT SERPL-MCNC: 6.7 G/DL — SIGNIFICANT CHANGE UP (ref 6–8.3)
PROT UR-MCNC: NEGATIVE — SIGNIFICANT CHANGE UP
RBC # BLD: 3.85 M/UL — LOW (ref 4.2–5.8)
RBC # FLD: 13.4 % — SIGNIFICANT CHANGE UP (ref 10.3–14.5)
RHEUMATOID FACT SERPL-ACNC: <10 IU/ML — SIGNIFICANT CHANGE UP (ref 0–13)
SODIUM SERPL-SCNC: 147 MMOL/L — HIGH (ref 135–145)
SP GR SPEC: 1.01 — SIGNIFICANT CHANGE UP (ref 1.01–1.02)
SPECIMEN SOURCE: SIGNIFICANT CHANGE UP
SPECIMEN SOURCE: SIGNIFICANT CHANGE UP
UROBILINOGEN FLD QL: SIGNIFICANT CHANGE UP
WBC # BLD: 9.73 K/UL — SIGNIFICANT CHANGE UP (ref 3.8–10.5)
WBC # FLD AUTO: 9.73 K/UL — SIGNIFICANT CHANGE UP (ref 3.8–10.5)

## 2020-12-18 PROCEDURE — 99232 SBSQ HOSP IP/OBS MODERATE 35: CPT

## 2020-12-18 PROCEDURE — 99233 SBSQ HOSP IP/OBS HIGH 50: CPT | Mod: GC

## 2020-12-18 PROCEDURE — 99233 SBSQ HOSP IP/OBS HIGH 50: CPT

## 2020-12-18 RX ORDER — ACETAMINOPHEN 500 MG
1000 TABLET ORAL ONCE
Refills: 0 | Status: COMPLETED | OUTPATIENT
Start: 2020-12-18 | End: 2020-12-18

## 2020-12-18 RX ORDER — FUROSEMIDE 40 MG
20 TABLET ORAL ONCE
Refills: 0 | Status: COMPLETED | OUTPATIENT
Start: 2020-12-18 | End: 2020-12-18

## 2020-12-18 RX ORDER — KETOROLAC TROMETHAMINE 30 MG/ML
30 SYRINGE (ML) INJECTION ONCE
Refills: 0 | Status: DISCONTINUED | OUTPATIENT
Start: 2020-12-18 | End: 2020-12-18

## 2020-12-18 RX ORDER — POTASSIUM CHLORIDE 20 MEQ
10 PACKET (EA) ORAL
Refills: 0 | Status: COMPLETED | OUTPATIENT
Start: 2020-12-18 | End: 2020-12-18

## 2020-12-18 RX ORDER — POTASSIUM CHLORIDE 20 MEQ
40 PACKET (EA) ORAL ONCE
Refills: 0 | Status: COMPLETED | OUTPATIENT
Start: 2020-12-18 | End: 2020-12-18

## 2020-12-18 RX ADMIN — PIPERACILLIN AND TAZOBACTAM 25 GRAM(S): 4; .5 INJECTION, POWDER, LYOPHILIZED, FOR SOLUTION INTRAVENOUS at 05:45

## 2020-12-18 RX ADMIN — PIPERACILLIN AND TAZOBACTAM 25 GRAM(S): 4; .5 INJECTION, POWDER, LYOPHILIZED, FOR SOLUTION INTRAVENOUS at 15:46

## 2020-12-18 RX ADMIN — PIPERACILLIN AND TAZOBACTAM 25 GRAM(S): 4; .5 INJECTION, POWDER, LYOPHILIZED, FOR SOLUTION INTRAVENOUS at 23:06

## 2020-12-18 RX ADMIN — ENOXAPARIN SODIUM 40 MILLIGRAM(S): 100 INJECTION SUBCUTANEOUS at 05:45

## 2020-12-18 RX ADMIN — Medication 400 MILLIGRAM(S): at 15:46

## 2020-12-18 RX ADMIN — Medication 30 MILLIGRAM(S): at 18:46

## 2020-12-18 RX ADMIN — Medication 100 MILLIEQUIVALENT(S): at 10:27

## 2020-12-18 RX ADMIN — ENOXAPARIN SODIUM 40 MILLIGRAM(S): 100 INJECTION SUBCUTANEOUS at 17:44

## 2020-12-18 RX ADMIN — Medication 100 MILLIEQUIVALENT(S): at 11:44

## 2020-12-18 RX ADMIN — Medication 40 MILLIEQUIVALENT(S): at 17:44

## 2020-12-18 RX ADMIN — Medication 20 MILLIGRAM(S): at 05:45

## 2020-12-18 RX ADMIN — Medication 100 MILLIEQUIVALENT(S): at 12:56

## 2020-12-18 RX ADMIN — Medication 20 MILLIGRAM(S): at 22:42

## 2020-12-18 NOTE — OCCUPATIONAL THERAPY INITIAL EVALUATION ADULT - PLANNED THERAPY INTERVENTIONS, OT EVAL
ADL retraining/balance training/bed mobility training/cognitive, visual perceptual/ROM/strengthening/transfer training

## 2020-12-18 NOTE — PROGRESS NOTE ADULT - SUBJECTIVE AND OBJECTIVE BOX
Follow Up:  depression, fever, pulmonary infiltrates    Inverval History/ROS: patient awake and nods to questions.  NGT.  condom catheter.  fever 101.  Allergies  No Known Allergies        ANTIMICROBIALS:    piperacillin/tazobactam IVPB.. 3.375 every 8 hours          OTHER MEDS:  enoxaparin Injectable 40 milliGRAM(s) SubCutaneous two times a day  OLANZapine Injectable 2.5 milliGRAM(s) IntraMuscular every 6 hours PRN  thiamine IVPB 500 milliGRAM(s) IV Intermittent every 8 hours    Vital Signs Last 24 Hrs  T(F): 101 (12-18-20 @ 14:25), Max: 101 (12-18-20 @ 14:25)  HR: 108 (12-18-20 @ 14:25)  BP: 158/108 (12-18-20 @ 14:25)  RR: 18 (12-18-20 @ 14:25)  SpO2: 96% (12-18-20 @ 14:25) (96% - 98%)    PHYSICAL EXAM:  General: awake alert no distress on 1:1  HEAD/EYES: NGT  ENT:  scar left neck  Cardiovascular: s1S2  Respiratory: clear anteriorly  GI:   soft, non-tender, normal bowel sounds  : condom cath  Musculoskeletal: no synovitis  Skin:  no rash, or erythema  Psychiatric: flat affect, alert  Lines:  [x ] no phlebitis [ ] central line                                                11.2   9.73  )-----------( 353      ( 18 Dec 2020 08:18 )             37.2 12-18    147  |  112  |  25  ----------------------------<  125  3.0   |  22  |  0.88  Ca    9.7      18 Dec 2020 08:18Phos  2.8     12-18Mg     2.2     12-18  TPro  6.7  /  Alb  3.6  /  TBili  0.4  /  DBili  x   /  AST  22  /  ALT  26  /  AlkPhos  47  12-18          MICROBIOLOGY:    .Blood Blood-Peripheral  12-13-20   No growth to date.  --  --      .Blood Blood-Peripheral  12-09-20   No Growth Final  --  --      .Sputum Sputum  12-08-20   Numerous Serratia marcescens  Normal Respiratory Leticia absent  --  Serratia marcescens      .CSF CSF  12-06-20   No growth at 3 days.  --    No polymorphonuclear leukocytes seen  No organisms seen  by cytocentrifuge      .Blood Blood-Arterial  12-05-20   No Growth Final  --  --      .Blood  12-05-20   No Growth Final  --  --      .Blood  12-05-20   No Growth Final  --  --      .Urine  12-05-20   No growth  --  --      RADIOLOGY:    rad< from: CT Angio Chest w/ IV Cont (12.15.20 @ 14:27) >    EXAM:  CT ANGIO CHEST (W)AW IC        PROCEDURE DATE:  Dec 15 2020         INTERPRETATION:  CLINICAL INFORMATION: Tachycardic and hypoxic. Evaluate for pulmonary embolism.    COMPARISON: CT chest abdomen pelvis 12/6/2020.    PROCEDURE:  CT Angiography of the Chest.  90 ml of Omnipaque 350 was injected intravenously. 10 ml were discarded.  Sagittal and coronal reformats were performed as well as 3D (MIP) reconstructions.    FINDINGS:    LUNGS AND PLEURA: Since 12/6/2020, the right and left pleural effusion are new. The bilateral lower lobe consolidation has decreased, however, there is likely septal thickening and groundglass opacities within the bilateral upper lobes. The opacities in the bilateral lobes have slightly increased in the apices since 12/6/2020 allowing for respiratory motion while they have slightly decreased in the remainder of the upper lobes.    MEDIASTINUM AND KATHY: The chest lymph nodes involving the upper paratracheal, lower paratracheal, prevascular, AP window, and subcarinal stations are unchanged. The visualized thyroid gland and esophagus are unremarkable    VESSELS: The aortic root measures 4.3 cm at the sinuses of Valsalva. The ascending aorta measures 4.1 cm at the main pulmonary artery. The remainder of the aorta is unremarkable.    There is no main, central, lobar, or proximal segmental pulmonary embolus. The mid to distal segmental and subsegmental vessels are not well evaluated secondary to motion.    HEART: Heart size is normal. No pericardial effusion.    CHEST WALL AND LOWER NECK: Within normal limits.    VISUALIZED UPPER ABDOMEN: Solitary right hepatic lobe calcification.    BONES: Degenerative changes.    IMPRESSION:    1.  No pulmonary embolism, however, the mid to distal segmental and subsegmental vessels are not well evaluated secondary to motion.  2.  Since 12/6/2020, the bilateral pleural effusions have increased, however, the bilateral lower lobe opacities have decreased.  3.  In addition, the opacities in the bilateral lung apices and likely septal thickening and increased while the additional upper lobe opacities have decreased. These findings are of uncertain etiology.            ANNAMARIE MCKINNEY MD; Resident Radiology  This document has been electronically signed.  STEPHANIE FELICIANO MD; Attending Radiologist  This document has been electronically signed. Dec 15 2020  4:14PM    < end of copied text >

## 2020-12-18 NOTE — OCCUPATIONAL THERAPY INITIAL EVALUATION ADULT - GENERAL OBSERVATIONS, REHAB EVAL
no Patient received semisupine in bed in NAD. +NG tube. +oxygen via nasal cannula. +IV. PCA at bedside. Per RN Olivia, patient okay to participate in OT evaluation.

## 2020-12-18 NOTE — PROGRESS NOTE ADULT - PROBLEM SELECTOR PLAN 2
Pt continues to spike fever in spite of extensive negative workup  - ID consulted; restarted Zosyn given concern for aspiration  - CTA chest w/ no pulmonary embolism. Opacities in the bilateral lung apices and bilateral pleural effusions have increased  - Repeat BCx w/ NGTD  - b/l LE Duplex negative for DVT  - b/l UE duplex notable for superficial vein thrombosis in R cephalic vein. No DVT  - will start FUO work-up: ESR, CRP, LSH, CPK, etc Pt continues to spike fever in spite of extensive negative workup  - ID consulted; restarted Zosyn given concern for aspiration  - CTA chest w/ no pulmonary embolism. Opacities in the bilateral lung apices and bilateral pleural effusions have increased  - Repeat BCx w/ NGTD  - b/l LE and UE Duplex negative for DVT  - b/l UE duplex notable for superficial vein thrombosis in R cephalic vein  - f/u FUO work-up: ESR, CRP, LSH, CPK, etc

## 2020-12-18 NOTE — BH CONSULTATION LIAISON PROGRESS NOTE - NSBHFUPINTERVALHXFT_PSY_A_CORE
Chart review. VSS. pt afebrile o/n. CRP wnl, LD at 299, repeat COVID19 PCR negative     Spoke to patient via [place]  ([id number]).     Pt examined at bedside. More verbal today, able to answer some questions in English. Pt states he is at "home" and identifies country as "[place]." Currently denies SI/HI, but insists he wants to leave as he needs to meet someone. Later on, insisting he needs to go to a "hospital." Today orientated to self and year only. Strength in upper and lower extremities remains stable.      Per RN at bedside, pt has not been agitated/aggressive.

## 2020-12-18 NOTE — PROGRESS NOTE ADULT - PROBLEM SELECTOR PLAN 1
AMS, agitated fo unclear etiology   - Pan CT to rule out infectious source shows multifocal PNA; s/p Zosyn   - CXR clear lungs, no leukocytosis, COVID PCR and rapid viral panel neg   - Utox positive for barbs, no barbs given in [place], level <3  - CT head artifact due to movement; Repeat CT head in setting of sedation neg  - Could be multiorgan inflammatory syndrome from COVID-19; COVID ab positive   - LP negative, BCx/UCx negative to date  - MRI brain:  slightly prominent cerebellar sulci correlate with EtOH usage,  minimal microvascular disease, no restricted diffusion, hemorrhage or midline shift. Thickened calvarial diploic space, paranasal sinus mucosal thickening with opacification right greater than left mastoid tip.  - c/w IV thiamine  - Psychiatry following; unlikely this is related to psychiatric medication side effects

## 2020-12-18 NOTE — PROGRESS NOTE ADULT - PROBLEM SELECTOR PLAN 3
Multifocal PNA on CT chest 12/7  - s/p Vanco 12/5/20-12/7/20   - Sputum cx w/ Serratia, received 5 day course Zosyn in MICU (12/5/20-12/9/20) and Azithro 12/6/-12/8/20  - COVID PCR and Rapid RVP neg   - BCx and UCx negative thus far  - lungs with bilateral rhonchi, concern for possible aspiration  - ID consult; c/w Zosyn, repeat BCx NGTD  - Suction prn, duonebs q6h ATC, chest PT  - c/w lasix 20mg IVP daily and continue to reassess volume status
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
## 2020-12-18 NOTE — OCCUPATIONAL THERAPY INITIAL EVALUATION ADULT - ADDITIONAL COMMENTS
Per chart review, patient admitted from [facility] for inpatient psychiatric stay. Prior to [facility], patient lived with his wife and daughter in a co-op with steps to manage. Patient was independent with ADLs and functional mobility.
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
## 2020-12-18 NOTE — BH CONSULTATION LIAISON PROGRESS NOTE - OTHER
low intensity, high frequency mild tremors when raising UEs in front of him  "ok"  partially cooperative  stable strength in upper and lower extremities

## 2020-12-18 NOTE — OCCUPATIONAL THERAPY INITIAL EVALUATION ADULT - NS ASR OT EQUIP NEEDS DISCH
Patient Education     Constipation (Adult)  Constipation means that you have bowel movements that are less frequent than usual. Stools often become very hard and difficult to pass.  Constipation is very common. At some point in life it affects almost everyone. Since everyone's bowel habits are different, what is constipation to one person may not be to another. Your healthcare provider may do tests to diagnose constipation. It depends on what he or she finds when evaluating you.    Symptoms of constipation include:  · Abdominal pain  · Bloating  · Vomiting  · Painful bowel movements  · Itching, swelling, bleeding, or pain around the anus  Causes  Constipation can have many causes. These include:  · Diet low in fiber  · Too much dairy  · Not drinking enough liquids  · Lack of exercise or physical activity. This is especially true for older adults.  · Changes in lifestyle or daily routine, including pregnancy, aging, work, and travel  · Frequent use or misuse of laxatives  · Ignoring the urge to have a bowel movement or delaying it until later  · Medicines, such as certain prescription pain medicines, iron supplements, antacids, certain antidepressants, and calcium supplements  · Diseases like irritable bowel syndrome, bowel obstructions, stroke, diabetes, thyroid disease, Parkinson disease, hemorrhoids, and colon cancer  Complications  Potential complications of constipation can include:  · Hemorrhoids  · Rectal bleeding from hemorrhoids or anal fissures (skin tears)  · Hernias  · Dependency on laxatives  · Chronic constipation  · Fecal impaction  · Bowel obstruction or perforation  Home care  All treatment should be done after talking with your healthcare provider. This is especially true if you have another medical problems, are taking prescription medicines, or are an older adult. Treatment most often involves lifestyle changes. You may also need medicines. Your healthcare provider will tell you which will work  best for you. Follow the advice below to help avoid this problem in the future.  Lifestyle changes  These lifestyle changes can help prevent constipation:  · Diet. Eat a high-fiber diet, with fresh fruit and vegetables, and reduce dairy intake, meats, and processed foods  · Fluids. It's important to get enough fluids each day. Drink plenty of water when you eat more fiber. If you are on diet that limits the amount of fluid you can have, talk about this with your healthcare provider.  · Regular exercise. Check with your healthcare provider first.  Medications  Take any medicines as directed. Some laxatives are safe to use only every now and then. Others can be taken on a regular basis. Talk with your doctor or pharmacist if you have questions.  Prescription pain medicines can cause constipation. If you are taking this kind of medicine, ask your healthcare provider if you should also take a stool softener.  Medicines you may take to treat constipation include:  · Fiber supplements  · Stool softeners  · Laxatives  · Enemas  · Rectal suppositories  Follow-up care  Follow up with your healthcare provider if symptoms don't get better in the next few days. You may need to have more tests or see a specialist.  Call 911  Call 911 if any of these occur:  · Trouble breathing  · Stiff, rigid abdomen that is severely painful to touch  · Confusion  · Fainting or loss of consciousness  · Rapid heart rate  · Chest pain  When to seek medical advice  Call your healthcare provider right away if any of these occur:  · Fever over 100.4°F (38°C)  · Failure to resume normal bowel movements  · Pain in your abdomen or back gets worse  · Nausea or vomiting  · Swelling in your abdomen  · Blood in the stool  · Black, tarry stool  · Involuntary weight loss  · Weakness  © 7961-9118 [employer]. [address] [place] [address], [place] [address]. All rights reserved. This information is not intended as a substitute for professional medical  care. Always follow your healthcare professional's instructions.            TBD at rehab

## 2020-12-18 NOTE — PROGRESS NOTE ADULT - PROBLEM SELECTOR PLAN 7
DVT PPX: Lovenox 40mg  Diet: S/S reevaluation  PT consulted; recs pending  Dispo: transferred from [facility]
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
## 2020-12-18 NOTE — OCCUPATIONAL THERAPY INITIAL EVALUATION ADULT - PERTINENT HX OF CURRENT PROBLEM, REHAB EVAL
55 year old male with history of depression, suicide attempts with most recent attempt at self stabbing, h/o ECT, ETOH abuse, transferred to [facility] from [facility] on 12/5 for AMS. Admitted for encephalopathy with tachycardia and tachypnea, requiring multiple doses of medications for agitation, intubated/sedated 12/5-12/11 now extubated.

## 2020-12-18 NOTE — OCCUPATIONAL THERAPY INITIAL EVALUATION ADULT - DIAGNOSIS, OT EVAL
s/p suicide attempts, s/p encephalopathy, s/p agitation; decreased functional mobility, decreased ADL performance, decreased overall strength

## 2020-12-18 NOTE — BH CONSULTATION LIAISON PROGRESS NOTE - CASE SUMMARY
patient improving daily, more awake /alert than before though still aox1, intermittently low mood, guarded when asked about suicidality. Must stay on 1:1 for suicidality, likely throughout hospitalization, patient admitted to [facility] prior to this for high lethality suicide attempt. patient improving, still AOx1, wishes to leave, more awake /alert than before, when more awake/alert, less disoriented, and able to ambulate must return to [facility] for severe suicidality, currently fall, elopement risk, and suicide risk would continue 1:1.
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
## 2020-12-18 NOTE — PROVIDER CONTACT NOTE (OTHER) - ACTION/TREATMENT ORDERED:
MD notified and states will discuss plan with team, no further interventions at this time. Will continue to monitor

## 2020-12-18 NOTE — PROVIDER CONTACT NOTE (OTHER) - ACTION/TREATMENT ORDERED:
MD notified and states will order IV Tylenol, no further interventions at this time. Will continue to monitor

## 2020-12-18 NOTE — PROGRESS NOTE ADULT - SUBJECTIVE AND OBJECTIVE BOX
Patient is a 55y old  Male who presents with a chief complaint of AMS (17 Dec 2020 16:01)      Samuel Teran PGY1  Anesthesiology  Pager: LIALLIE: [phone] NS: [phone]    SUBJECTIVE/OVERNIGHT EVENTS: Bladder scan w/ 670cc overnight, straight cath'd w/ 600cc UOP.  Pt responsive to some questions this morning. When asked how he is doing, he states "not too well" and when further prompted nods his head yes when asked if he has abdominal pain.  Otherwise is minimally verbal but does shake his head when asked if he has any chest pain, N/V/D.     REVIEW OF SYSTEMS: Limited due to patient cooperation    MEDICATIONS  (STANDING):  enoxaparin Injectable 40 milliGRAM(s) SubCutaneous two times a day  furosemide   Injectable 20 milliGRAM(s) IV Push daily  piperacillin/tazobactam IVPB.. 3.375 Gram(s) IV Intermittent every 8 hours    MEDICATIONS  (PRN):  OLANZapine Injectable 2.5 milliGRAM(s) IntraMuscular every 6 hours PRN Agitation    CAPILLARY BLOOD GLUCOSE        I&O's Summary    17 Dec 2020 07:01  -  18 Dec 2020 07:00  --------------------------------------------------------  IN: 390 mL / OUT: 2375 mL / NET: -1985 mL          Vital Signs Last 24 Hrs  T(C): 37.7 (18 Dec 2020 05:17), Max: 37.8 (17 Dec 2020 12:55)  T(F): 99.8 (18 Dec 2020 05:17), Max: 100 (17 Dec 2020 12:55)  HR: 100 (18 Dec 2020 05:17) (85 - 100)  BP: 148/88 (18 Dec 2020 05:17) (141/93 - 150/83)  BP(mean): --  RR: 20 (18 Dec 2020 05:17) (18 - 20)  SpO2: 97% (18 Dec 2020 05:17) (96% - 98%)    PHYSICAL EXAM:  GENERAL: NAD,  lethargic  EYES: EOMI, PERRLA  CHEST/LUNG: on 4L NC. Mild rhonchi diffusely, much improved  HEART: Regular rate and rhythm; No murmurs  ABDOMEN: Soft, Nontender, Nondistended  EXTREMITIES:  B/l 2+ pedal (improved) and b/l hand edema (L>R)  PSYCH: A&O x1-2, able to tell me his name, birthday but does know year/location  NEUROLOGY: Able to b/l UE and LE passively, able to follow some commands  SKIN: LUE cellulitis: improved, no erythema noted    LABS                        10.7   11.30 )-----------( 338      ( 17 Dec 2020 07:54 )             34.2                         11.0   13.70 )-----------( 404      ( 16 Dec 2020 07:53 )             35.2     12-17    147<H>  |  110<H>  |  26<H>  ----------------------------<  105<H>  3.0<L>   |  22  |  0.96  12-16    147<H>  |  111<H>  |  28<H>  ----------------------------<  139<H>  3.0<L>   |  22  |  1.11    Ca    9.6      17 Dec 2020 07:54  Ca    9.4      16 Dec 2020 07:53  Phos  3.4     12-17  Mg     2.3     12-17    TPro  6.9  /  Alb  3.6  /  TBili  0.5  /  DBili  x   /  AST  19  /  ALT  27  /  AlkPhos  48  12-17  TPro  6.7  /  Alb  3.5  /  TBili  0.5  /  DBili  x   /  AST  23  /  ALT  30  /  AlkPhos  50  12-16       08 Dec 2020 02:19 Troponin x     /  U/L / CKMB x     / CKMB Units x       06 Dec 2020 14:21 Troponin x     / CK 1042 U/L / CKMB x     / CKMB Units 3.6 ng/mL   06 Dec 2020 04:32 Troponin x     / CK 1405 U/L / CKMB x     / CKMB Units x                    RADIOLOGY & ADDITIONAL TESTS:         Patient is a 55y old  Male who presents with a chief complaint of AMS (17 Dec 2020 16:01)      Samuel Teran PGY1  Anesthesiology  Pager: LIALLIE: [phone] NS: [phone]    SUBJECTIVE/OVERNIGHT EVENTS: Bladder scan w/ 670cc overnight, straight cath'd w/ 600cc UOP.  Pt responsive to some questions this morning. When asked how he is doing, he states "I'm okay" but does endorse "lung pain" when further prompted.  Otherwise is minimally verbal but does deny any chest pain, abd pain, N/V/D, numbness.     REVIEW OF SYSTEMS: Limited due to patient cooperation    MEDICATIONS  (STANDING):  enoxaparin Injectable 40 milliGRAM(s) SubCutaneous two times a day  furosemide   Injectable 20 milliGRAM(s) IV Push daily  piperacillin/tazobactam IVPB.. 3.375 Gram(s) IV Intermittent every 8 hours    MEDICATIONS  (PRN):  OLANZapine Injectable 2.5 milliGRAM(s) IntraMuscular every 6 hours PRN Agitation    CAPILLARY BLOOD GLUCOSE        I&O's Summary    17 Dec 2020 07:01  -  18 Dec 2020 07:00  --------------------------------------------------------  IN: 390 mL / OUT: 2375 mL / NET: -1985 mL          Vital Signs Last 24 Hrs  T(C): 37.7 (18 Dec 2020 05:17), Max: 37.8 (17 Dec 2020 12:55)  T(F): 99.8 (18 Dec 2020 05:17), Max: 100 (17 Dec 2020 12:55)  HR: 100 (18 Dec 2020 05:17) (85 - 100)  BP: 148/88 (18 Dec 2020 05:17) (141/93 - 150/83)  BP(mean): --  RR: 20 (18 Dec 2020 05:17) (18 - 20)  SpO2: 97% (18 Dec 2020 05:17) (96% - 98%)    PHYSICAL EXAM:  GENERAL: NAD,  lethargic  EYES: EOMI, PERRLA  CHEST/LUNG: on 4L NC. Mild rhonchi diffusely  HEART: Regular rate and rhythm; No murmurs  ABDOMEN: Soft, Nontender, Nondistended  EXTREMITIES:  B/l 1+ pedal (improved) and b/l hand edema (L>R) improved  PSYCH: A&O x2, able to tell me his name, birthday, and year but states he is at [place]  NEUROLOGY: Able to b/l UE and LE passively, able to follow squeeze fingers on command, strength improved   SKIN: LUE cellulitis: improved, no erythema noted    LABS                        10.7   11.30 )-----------( 338      ( 17 Dec 2020 07:54 )             34.2                         11.0   13.70 )-----------( 404      ( 16 Dec 2020 07:53 )             35.2     12-17    147<H>  |  110<H>  |  26<H>  ----------------------------<  105<H>  3.0<L>   |  22  |  0.96  12-16    147<H>  |  111<H>  |  28<H>  ----------------------------<  139<H>  3.0<L>   |  22  |  1.11    Ca    9.6      17 Dec 2020 07:54  Ca    9.4      16 Dec 2020 07:53  Phos  3.4     12-17  Mg     2.3     12-17    TPro  6.9  /  Alb  3.6  /  TBili  0.5  /  DBili  x   /  AST  19  /  ALT  27  /  AlkPhos  48  12-17  TPro  6.7  /  Alb  3.5  /  TBili  0.5  /  DBili  x   /  AST  23  /  ALT  30  /  AlkPhos  50  12-16       08 Dec 2020 02:19 Troponin x     /  U/L / CKMB x     / CKMB Units x       06 Dec 2020 14:21 Troponin x     / CK 1042 U/L / CKMB x     / CKMB Units 3.6 ng/mL   06 Dec 2020 04:32 Troponin x     / CK 1405 U/L / CKMB x     / CKMB Units x                    RADIOLOGY & ADDITIONAL TESTS:
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
## 2020-12-18 NOTE — PROVIDER CONTACT NOTE (OTHER) - ACTION/TREATMENT ORDERED:
Administer Lasix one time order for patient. MD aware and notified.  Will continue to monitor patient.

## 2020-12-19 LAB
ALBUMIN SERPL ELPH-MCNC: 3.9 G/DL — SIGNIFICANT CHANGE UP (ref 3.3–5)
ALP SERPL-CCNC: 48 U/L — SIGNIFICANT CHANGE UP (ref 40–120)
ALT FLD-CCNC: 28 U/L — SIGNIFICANT CHANGE UP (ref 4–41)
ANA TITR SER: NEGATIVE — SIGNIFICANT CHANGE UP
ANION GAP SERPL CALC-SCNC: 14 MMOL/L — SIGNIFICANT CHANGE UP (ref 7–14)
AST SERPL-CCNC: 22 U/L — SIGNIFICANT CHANGE UP (ref 4–40)
BILIRUB SERPL-MCNC: 0.5 MG/DL — SIGNIFICANT CHANGE UP (ref 0.2–1.2)
BUN SERPL-MCNC: 27 MG/DL — HIGH (ref 7–23)
CALCIUM SERPL-MCNC: 10 MG/DL — SIGNIFICANT CHANGE UP (ref 8.4–10.5)
CHLORIDE SERPL-SCNC: 114 MMOL/L — HIGH (ref 98–107)
CO2 SERPL-SCNC: 23 MMOL/L — SIGNIFICANT CHANGE UP (ref 22–31)
CREAT SERPL-MCNC: 1.03 MG/DL — SIGNIFICANT CHANGE UP (ref 0.5–1.3)
GLUCOSE SERPL-MCNC: 113 MG/DL — HIGH (ref 70–99)
HCT VFR BLD CALC: 38.6 % — LOW (ref 39–50)
HGB BLD-MCNC: 11.7 G/DL — LOW (ref 13–17)
MAGNESIUM SERPL-MCNC: 2.3 MG/DL — SIGNIFICANT CHANGE UP (ref 1.6–2.6)
MCHC RBC-ENTMCNC: 28.5 PG — SIGNIFICANT CHANGE UP (ref 27–34)
MCHC RBC-ENTMCNC: 30.3 GM/DL — LOW (ref 32–36)
MCV RBC AUTO: 93.9 FL — SIGNIFICANT CHANGE UP (ref 80–100)
NRBC # BLD: 0 /100 WBCS — SIGNIFICANT CHANGE UP
NRBC # FLD: 0 K/UL — SIGNIFICANT CHANGE UP
PHOSPHATE SERPL-MCNC: 3.4 MG/DL — SIGNIFICANT CHANGE UP (ref 2.5–4.5)
PLATELET # BLD AUTO: 414 K/UL — HIGH (ref 150–400)
POTASSIUM SERPL-MCNC: 3.1 MMOL/L — LOW (ref 3.5–5.3)
POTASSIUM SERPL-SCNC: 3.1 MMOL/L — LOW (ref 3.5–5.3)
PROT SERPL-MCNC: 7.1 G/DL — SIGNIFICANT CHANGE UP (ref 6–8.3)
RBC # BLD: 4.11 M/UL — LOW (ref 4.2–5.8)
RBC # FLD: 13.5 % — SIGNIFICANT CHANGE UP (ref 10.3–14.5)
SODIUM SERPL-SCNC: 151 MMOL/L — HIGH (ref 135–145)
WBC # BLD: 10.78 K/UL — HIGH (ref 3.8–10.5)
WBC # FLD AUTO: 10.78 K/UL — HIGH (ref 3.8–10.5)

## 2020-12-19 PROCEDURE — 99233 SBSQ HOSP IP/OBS HIGH 50: CPT

## 2020-12-19 PROCEDURE — 99233 SBSQ HOSP IP/OBS HIGH 50: CPT | Mod: GC

## 2020-12-19 PROCEDURE — 99232 SBSQ HOSP IP/OBS MODERATE 35: CPT

## 2020-12-19 PROCEDURE — 95816 EEG AWAKE AND DROWSY: CPT | Mod: 26

## 2020-12-19 RX ORDER — MEROPENEM 1 G/30ML
1000 INJECTION INTRAVENOUS EVERY 8 HOURS
Refills: 0 | Status: DISCONTINUED | OUTPATIENT
Start: 2020-12-19 | End: 2020-12-21

## 2020-12-19 RX ORDER — THIAMINE MONONITRATE (VIT B1) 100 MG
500 TABLET ORAL EVERY 8 HOURS
Refills: 0 | Status: COMPLETED | OUTPATIENT
Start: 2020-12-19 | End: 2020-12-22

## 2020-12-19 RX ORDER — POTASSIUM CHLORIDE 20 MEQ
10 PACKET (EA) ORAL
Refills: 0 | Status: COMPLETED | OUTPATIENT
Start: 2020-12-19 | End: 2020-12-19

## 2020-12-19 RX ORDER — POTASSIUM CHLORIDE 20 MEQ
40 PACKET (EA) ORAL ONCE
Refills: 0 | Status: COMPLETED | OUTPATIENT
Start: 2020-12-19 | End: 2020-12-19

## 2020-12-19 RX ORDER — SODIUM CHLORIDE 9 MG/ML
1000 INJECTION, SOLUTION INTRAVENOUS
Refills: 0 | Status: DISCONTINUED | OUTPATIENT
Start: 2020-12-19 | End: 2020-12-20

## 2020-12-19 RX ORDER — ACETAMINOPHEN 500 MG
1000 TABLET ORAL ONCE
Refills: 0 | Status: COMPLETED | OUTPATIENT
Start: 2020-12-19 | End: 2020-12-19

## 2020-12-19 RX ADMIN — MEROPENEM 100 MILLIGRAM(S): 1 INJECTION INTRAVENOUS at 13:40

## 2020-12-19 RX ADMIN — MEROPENEM 100 MILLIGRAM(S): 1 INJECTION INTRAVENOUS at 21:58

## 2020-12-19 RX ADMIN — Medication 100 MILLIEQUIVALENT(S): at 12:44

## 2020-12-19 RX ADMIN — ENOXAPARIN SODIUM 40 MILLIGRAM(S): 100 INJECTION SUBCUTANEOUS at 06:24

## 2020-12-19 RX ADMIN — Medication 400 MILLIGRAM(S): at 03:02

## 2020-12-19 RX ADMIN — Medication 20 MILLIGRAM(S): at 06:24

## 2020-12-19 RX ADMIN — PIPERACILLIN AND TAZOBACTAM 25 GRAM(S): 4; .5 INJECTION, POWDER, LYOPHILIZED, FOR SOLUTION INTRAVENOUS at 06:24

## 2020-12-19 RX ADMIN — Medication 100 MILLIEQUIVALENT(S): at 11:00

## 2020-12-19 RX ADMIN — Medication 40 MILLIEQUIVALENT(S): at 13:41

## 2020-12-19 RX ADMIN — SODIUM CHLORIDE 75 MILLILITER(S): 9 INJECTION, SOLUTION INTRAVENOUS at 17:05

## 2020-12-19 RX ADMIN — Medication 105 MILLIGRAM(S): at 21:58

## 2020-12-19 RX ADMIN — Medication 100 MILLIEQUIVALENT(S): at 09:32

## 2020-12-19 RX ADMIN — ENOXAPARIN SODIUM 40 MILLIGRAM(S): 100 INJECTION SUBCUTANEOUS at 17:05
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
## 2020-12-19 NOTE — PROGRESS NOTE ADULT - SUBJECTIVE AND OBJECTIVE BOX
Follow Up:  depression, fever, pulmonary infiltrates    Inverval History/ROS:   "I'm good"   fever 102.  Allergies  No Known Allergies        ANTIMICROBIALS:    piperacillin/tazobactam IVPB.. 3.375 every 8 hours          OTHER MEDS:  enoxaparin Injectable 40 milliGRAM(s) SubCutaneous two times a day  OLANZapine Injectable 2.5 milliGRAM(s) IntraMuscular every 6 hours PRN  thiamine IVPB 500 milliGRAM(s) IV Intermittent every 8 hours    Vital Signs Last 24 Hrs  T(F): 99.8 (12-19-20 @ 17:00), Max: 102.8 (12-19-20 @ 02:03)  HR: 115 (12-19-20 @ 17:00)  BP: 150/93 (12-19-20 @ 17:00)  RR: 20 (12-19-20 @ 17:00)  SpO2: 95% (12-19-20 @ 17:00) (95% - 98%)    PHYSICAL EXAM:  General: awake alert no distress on 1:1  HEAD/EYES: NGT  ENT:  scar left neck  Cardiovascular: s1S2  Respiratory: clear anteriorly  GI:   soft, non-tender, normal bowel sounds, superficial wounds lower abd.  : condom cath  Musculoskeletal: no synovitis  Skin:  no rash, or erythema  Psychiatric: flat affect, alert  Lines:  [x ] no phlebitis [ ] central line                                                11.7   10.78 )-----------( 414      ( 19 Dec 2020 06:17 )             38.6 12-19    151  |  114  |  27  ----------------------------<  113  3.1   |  23  |  1.03  Ca    10.0      19 Dec 2020 06:17Phos  3.4     12-19Mg     2.3     12-19  TPro  7.1  /  Alb  3.9  /  TBili  0.5  /  DBili  x   /  AST  22  /  ALT  28  /  AlkPhos  48  12-19          MICROBIOLOGY:    .Blood Blood-Peripheral  12-13-20   No growth to date.  --  --      .Blood Blood-Peripheral  12-09-20   No Growth Final  --  --      .Sputum Sputum  12-08-20   Numerous Serratia marcescens  Normal Respiratory Leticia absent  --  Serratia marcescens      .CSF CSF  12-06-20   No growth at 3 days.  --    No polymorphonuclear leukocytes seen  No organisms seen  by cytocentrifuge      .Blood Blood-Arterial  12-05-20   No Growth Final  --  --      .Blood  12-05-20   No Growth Final  --  --      .Blood  12-05-20   No Growth Final  --  --      .Urine  12-05-20   No growth  --  --      RADIOLOGY:    rad< from: CT Angio Chest w/ IV Cont (12.15.20 @ 14:27) >    EXAM:  CT ANGIO CHEST (W)AW IC        PROCEDURE DATE:  Dec 15 2020         INTERPRETATION:  CLINICAL INFORMATION: Tachycardic and hypoxic. Evaluate for pulmonary embolism.    COMPARISON: CT chest abdomen pelvis 12/6/2020.    PROCEDURE:  CT Angiography of the Chest.  90 ml of Omnipaque 350 was injected intravenously. 10 ml were discarded.  Sagittal and coronal reformats were performed as well as 3D (MIP) reconstructions.    FINDINGS:    LUNGS AND PLEURA: Since 12/6/2020, the right and left pleural effusion are new. The bilateral lower lobe consolidation has decreased, however, there is likely septal thickening and groundglass opacities within the bilateral upper lobes. The opacities in the bilateral lobes have slightly increased in the apices since 12/6/2020 allowing for respiratory motion while they have slightly decreased in the remainder of the upper lobes.    MEDIASTINUM AND KATHY: The chest lymph nodes involving the upper paratracheal, lower paratracheal, prevascular, AP window, and subcarinal stations are unchanged. The visualized thyroid gland and esophagus are unremarkable    VESSELS: The aortic root measures 4.3 cm at the sinuses of Valsalva. The ascending aorta measures 4.1 cm at the main pulmonary artery. The remainder of the aorta is unremarkable.    There is no main, central, lobar, or proximal segmental pulmonary embolus. The mid to distal segmental and subsegmental vessels are not well evaluated secondary to motion.    HEART: Heart size is normal. No pericardial effusion.    CHEST WALL AND LOWER NECK: Within normal limits.    VISUALIZED UPPER ABDOMEN: Solitary right hepatic lobe calcification.    BONES: Degenerative changes.    IMPRESSION:    1.  No pulmonary embolism, however, the mid to distal segmental and subsegmental vessels are not well evaluated secondary to motion.  2.  Since 12/6/2020, the bilateral pleural effusions have increased, however, the bilateral lower lobe opacities have decreased.  3.  In addition, the opacities in the bilateral lung apices and likely septal thickening and increased while the additional upper lobe opacities have decreased. These findings are of uncertain etiology.            ANNAMARIE MCKINNEY MD; Resident Radiology  This document has been electronically signed.  STEPHANIE FELICIANO MD; Attending Radiologist  This document has been electronically signed. Dec 15 2020  4:14PM    < end of copied text >
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
## 2020-12-19 NOTE — PROVIDER CONTACT NOTE (OTHER) - ACTION/TREATMENT ORDERED:
Tylenol given for temperature. No cultures needed at this time. MD notified and aware. Will continue to monitor patient.

## 2020-12-19 NOTE — EEG REPORT - NS EEG TEXT BOX
EBONI CARRASCO MRN-[id number]     Study Date: 		12-19-20    ROUTINE EEG    Technical Information:			  		  Placement and Labeling of Electrodes:  The EEG was performed utilizing 20 channels referential EEG connections (coronal over temporal over parasagittal montage) using all standard 10-20 electrode placements with EKG.  Recording was at a sampling rate of 256 samples per second per channel.  Time synchronized digital video recording was done simultaneously with EEG recording.  A low light infrared camera was used for low light recording.  Dontrell and seizure detection algorithms were utilized.    CSA Technical Component:  Quantitative EEG analysis using a separate Compressed Spectral Array (CSA) software package was conducted in real-time and run at bedside after set up by the technician, digitally displaying the power of electrographic frequencies included in the 1-30Hz band using a graded color map.  This data was reviewed and interpreted independently, and is reported in a separate section below.    --------------------------------------------------------------------------------------------------  History:  CC/ HPI Patient is a 55y old  Male who presents with a chief complaint of AMS (19 Dec 2020 07:34)    MEDICATIONS  (STANDING):  enoxaparin Injectable 40 milliGRAM(s) SubCutaneous two times a day  furosemide   Injectable 20 milliGRAM(s) IV Push daily  piperacillin/tazobactam IVPB.. 3.375 Gram(s) IV Intermittent every 8 hours  potassium chloride   Powder 40 milliEquivalent(s) Oral once  potassium chloride  10 mEq/100 mL IVPB 10 milliEquivalent(s) IV Intermittent every 1 hour    --------------------------------------------------------------------------------------------------  Study Interpretation:    [[[Abbreviation Key:  PDR=alpha rhythm/posterior dominant rhythm. A-P=anterior posterior gradient.  Amplitude: ‘very low’:<20; ‘low’:20-50; ‘medium’:; ‘high’:>200uV.  Persistence for periodic/rhythmic patterns (% of epoch) ‘rare’:<1%; ‘occasional’:1-10%; ‘frequent’:10-50%; ‘abundant’:50-90%; ‘continuous’:>90%.  Persistence for sporadic discharges: ‘rare’:<1/hr; ‘occasional’:1/min-1/hr; ‘frequent’:>1/min; ‘abundant’:>1/10 sec.  GRDA=generalized rhythmic delta activity, LRDA=lateralized rhythmic delta activity, TIRDA=temporal intermittent rhythmic delta activity, FIRDA=frontal intermittent rhythmic activity. LPD=PLED=lateralized periodic discharges, GPD=generalized periodic discharges, BiPDs=BiPLEDs=bilateral independent periodic epileptiform discharges, SIRPID=stimulus induced rhythmic, periodic, or ictal appearing discharges.  Modifiers: +F=with fast component, +S=with spike component, +R=with rhythmic component.  S-B=burst suppression pattern.  Max=maximal. N1-drowsy, N2-stage II sleep, N3-slow wave sleep.  HV=hyperventilation, PS=photic stimulation]]]    FINDINGS:  The background was continuous, spontaneously variable and reactive.  During wakefulness, the posteriorly dominant rhythm was poorly modulated near 7hz 40uV.    There was diffuse irregular theta and delta activity present.    Sleep Background:  Drowsiness was characterized by fragmentation, attenuation, and slowing of the background activity.    Stage II sleep transients were not recorded.    Epileptiform Activity:   No epileptiform discharges were present.    Events:  Fast tremors of hands noted.  No seizures were recorded.    Activation Procedures:   -Hyperventilation was not performed.    -Photic stimulation was performed and did not elicit any abnormalities.      Artifacts:  Intermittent myogenic and movement artifacts were noted.    ECG:  The heart rate on single channel ECG at baseline was predominantly near BPM = 70-80  -----------------------------------------------------------------------------------------------------    EEG Classification / Summary:  Abnormal EEG study  Moderate background slowing  Tremor noted.    -----------------------------------------------------------------------------------------------------    Clinical Impression:  Moderate diffuse or multifocal cerebral dysfunction, not specific as to etiology.  There were no epileptiform abnormalities recorded.      -------------------------------------------------------------------------------------------------------  [facility] EEG Reading Room Ph#: [phone]  Epilepsy Answering Service after 5PM and before 8:30AM: Ph#: [phone]    Yann Orantes M.D.   of Neurology, [facility] Epilepsy [place]

## 2020-12-19 NOTE — BH CONSULTATION LIAISON PROGRESS NOTE - CASE SUMMARY
patient improving, still AOx1, wishes to leave, more awake /alert than before, when more awake/alert, less disoriented, and able to ambulate must return to [facility] for severe suicidality, currently fall, elopement risk, and suicide risk would continue 1:1.  Pt. seen and evaluated with Dr. Sood, I agree with above assessment and plan, , pt. alert, minimally engaged. 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 seems confused with impaired attention, plan is to transfer patient to [facility] once he is medically optimized, pt. needs further treatment and stabilization, Continue COS 1;1 for safety, recommend meds. as written above, psych CL will continue to follow.
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
## 2020-12-19 NOTE — BH CONSULTATION LIAISON PROGRESS NOTE - DETAILS
Patient endorsing suicidal ideation but does not elaborate - is confused and minimally cooperative with answering questions

## 2020-12-19 NOTE — PROGRESS NOTE ADULT - PROBLEM SELECTOR PLAN 7
DVT PPX: Lovenox 40mg  Diet: S/S reevaluation  PT consulted; recs pending  Dispo: transferred from [facility]

## 2020-12-19 NOTE — PROGRESS NOTE ADULT - PROBLEM SELECTOR PLAN 1
AMS, agitated fo unclear etiology   - Pan CT to rule out infectious source shows multifocal PNA; s/p Zosyn   - CXR clear lungs, no leukocytosis, COVID PCR and rapid viral panel neg   - Utox positive for barbs, no barbs given in [place], level <3  - CT head artifact due to movement; Repeat CT head in setting of sedation neg  - Could be multiorgan inflammatory syndrome from COVID-19; COVID ab positive   - LP negative, BCx/UCx negative to date  - MRI brain:  slightly prominent cerebellar sulci correlate with EtOH usage,  minimal microvascular disease, no restricted diffusion, hemorrhage or midline shift. Thickened calvarial diploic space, paranasal sinus mucosal thickening with opacification right greater than left mastoid tip.  - c/w IV thiamine  - Psychiatry following; unlikely this is related to psychiatric medication side effects  - Failed s/s eval x2 --> s/p NGT and started on tube feeds. Will re-eval if mental status continues to improve   - Check spot EEG

## 2020-12-19 NOTE — PROGRESS NOTE ADULT - SUBJECTIVE AND OBJECTIVE BOX
Patient is a 55y old  Male who presents with a chief complaint of AMS (18 Dec 2020 15:19)      Samuel Teran PGY1  Anesthesiology  Pager: [facility]: [phone] NS: [phone]    SUBJECTIVE/OVERNIGHT EVENTS: Pt noted be febrile throughout the night w/ [id number], given Tylenol and Toradol, BCx and UA sent. Pt responsive to some questions this morning. When asked how he is doing, he states "I'm okay" but does endorse "lung pain" when further prompted.  Otherwise is minimally verbal but does deny any chest pain, abd pain, N/V/D, numbness.     REVIEW OF SYSTEMS: Limited due to patient cooperation    MEDICATIONS  (STANDING):  enoxaparin Injectable 40 milliGRAM(s) SubCutaneous two times a day  furosemide   Injectable 20 milliGRAM(s) IV Push daily  piperacillin/tazobactam IVPB.. 3.375 Gram(s) IV Intermittent every 8 hours  potassium chloride   Powder 40 milliEquivalent(s) Oral once  potassium chloride  10 mEq/100 mL IVPB 10 milliEquivalent(s) IV Intermittent every 1 hour    MEDICATIONS  (PRN):  OLANZapine Injectable 2.5 milliGRAM(s) IntraMuscular every 6 hours PRN Agitation    CAPILLARY BLOOD GLUCOSE        I&O's Summary    18 Dec 2020 07:01  -  19 Dec 2020 07:00  --------------------------------------------------------  IN: 680 mL / OUT: 1550 mL / NET: -870 mL          Vital Signs Last 24 Hrs  T(C): 39.2 (19 Dec 2020 05:44), Max: 39.3 (19 Dec 2020 02:03)  T(F): 102.6 (19 Dec 2020 05:44), Max: 102.8 (19 Dec 2020 02:03)  HR: 119 (19 Dec 2020 05:44) (98 - 119)  BP: 150/103 (19 Dec 2020 05:44) (149/103 - 161/115)  BP(mean): --  RR: 20 (19 Dec 2020 05:44) (18 - 20)  SpO2: 97% (19 Dec 2020 05:44) (96% - 97%)    PHYSICAL EXAM:  GENERAL: NAD,  lethargic  EYES: EOMI, PERRLA  CHEST/LUNG: on 4L NC. Mild rhonchi diffusely  HEART: Regular rate and rhythm; No murmurs  ABDOMEN: Soft, Nontender, Nondistended  EXTREMITIES:  B/l 1+ pedal (improved) and b/l hand edema (L>R) improved  PSYCH: A&O x2, able to tell me his name, birthday, and year but states he is at [place]  NEUROLOGY: Able to b/l UE and LE passively, able to follow squeeze fingers on command, strength improved   SKIN: LUE cellulitis: improved, no erythema noted    LABS                        11.7   10.78 )-----------( 414      ( 19 Dec 2020 06:17 )             38.6                         11.2   9.73  )-----------( 353      ( 18 Dec 2020 08:18 )             37.2     12-    151<H>  |  114<H>  |  27<H>  ----------------------------<  113<H>  3.1<L>   |  23  |  1.03  12-18    147<H>  |  112<H>  |  25<H>  ----------------------------<  125<H>  3.0<L>   |  22  |  0.88    Ca    10.0      19 Dec 2020 06:17  Ca    9.7      18 Dec 2020 08:18  Phos  3.4     12-19  Mg     2.3     -    TPro  7.1  /  Alb  3.9  /  TBili  0.5  /  DBili  x   /  AST  22  /  ALT  28  /  AlkPhos  48  12  TPro  6.7  /  Alb  3.6  /  TBili  0.4  /  DBili  x   /  AST  22  /  ALT  26  /  AlkPhos  47  12-18       18 Dec 2020 08:18 Troponin x     / CK 55 U/L / CKMB x     / CKMB Units x       08 Dec 2020 02:19 Troponin x     /  U/L / CKMB x     / CKMB Units x       06 Dec 2020 14:21 Troponin x     / CK 1042 U/L / CKMB x     / CKMB Units 3.6 ng/mL      Urinalysis Basic - ( 18 Dec 2020 19:05 )    Color: Light Yellow / Appearance: Clear / S.013 / pH: x  Gluc: x / Ketone: Negative  / Bili: Negative / Urobili: <2 mg/dL   Blood: x / Protein: Negative / Nitrite: Negative   Leuk Esterase: Negative / RBC: 4 /HPF / WBC 0 /HPF   Sq Epi: x / Non Sq Epi: 0 /HPF / Bacteria: Negative              RADIOLOGY & ADDITIONAL TESTS:         Patient is a 55y old  Male who presents with a chief complaint of AMS (18 Dec 2020 15:19)      Samuel Teran PGY1  Anesthesiology  Pager: [facility]: [phone] NS: [phone]    SUBJECTIVE/OVERNIGHT EVENTS: Pt noted be febrile throughout the night w/ [id number], given Tylenol and Toradol, BCx and UA sent. Pt minimally responsive to questioning this AM. Shakes his head no when asked if he is doing okay but does no elaborate further. Does nod his head when asked if he has neck pain.  Otherwise is minimally verbal     REVIEW OF SYSTEMS: (+) neck pain, limited due to patient cooperation    MEDICATIONS  (STANDING):  enoxaparin Injectable 40 milliGRAM(s) SubCutaneous two times a day  furosemide   Injectable 20 milliGRAM(s) IV Push daily  piperacillin/tazobactam IVPB.. 3.375 Gram(s) IV Intermittent every 8 hours  potassium chloride   Powder 40 milliEquivalent(s) Oral once  potassium chloride  10 mEq/100 mL IVPB 10 milliEquivalent(s) IV Intermittent every 1 hour    MEDICATIONS  (PRN):  OLANZapine Injectable 2.5 milliGRAM(s) IntraMuscular every 6 hours PRN Agitation    CAPILLARY BLOOD GLUCOSE        I&O's Summary    18 Dec 2020 07:01  -  19 Dec 2020 07:00  --------------------------------------------------------  IN: 680 mL / OUT: 1550 mL / NET: -870 mL          Vital Signs Last 24 Hrs  T(C): 39.2 (19 Dec 2020 05:44), Max: 39.3 (19 Dec 2020 02:03)  T(F): 102.6 (19 Dec 2020 05:44), Max: 102.8 (19 Dec 2020 02:03)  HR: 119 (19 Dec 2020 05:44) (98 - 119)  BP: 150/103 (19 Dec 2020 05:44) (149/103 - 161/115)  BP(mean): --  RR: 20 (19 Dec 2020 05:44) (18 - 20)  SpO2: 97% (19 Dec 2020 05:44) (96% - 97%)    PHYSICAL EXAM:  GENERAL: Lethargic, diaphoretic and tremulous  EYES: EOMI, PERRLA  CHEST/LUNG: on 2L NC. Mild rhonchi diffusely  HEART: Regular rate and rhythm; No murmurs  ABDOMEN: Soft, Nontender, Nondistended  EXTREMITIES:  B/l 1+ pedal (improved) and b/l hand edema (L>R) improved  PSYCH: A&O x2, able to tell me his name, birthday, and year  NEUROLOGY: Able to b/l UE and LE passively, does not follow commands  SKIN: LUE cellulitis: improved, no erythema noted    LABS                        11.7   10.78 )-----------( 414      ( 19 Dec 2020 06:17 )             38.6                         11.2   9.73  )-----------( 353      ( 18 Dec 2020 08:18 )             37.2     12-19    151<H>  |  114<H>  |  27<H>  ----------------------------<  113<H>  3.1<L>   |  23  |  1.03  12-18    147<H>  |  112<H>  |  25<H>  ----------------------------<  125<H>  3.0<L>   |  22  |  0.88    Ca    10.0      19 Dec 2020 06:17  Ca    9.7      18 Dec 2020 08:18  Phos  3.4     12-19  Mg     2.3     -    TPro  7.1  /  Alb  3.9  /  TBili  0.5  /  DBili  x   /  AST  22  /  ALT  28  /  AlkPhos  48  12-19  TPro  6.7  /  Alb  3.6  /  TBili  0.4  /  DBili  x   /  AST  22  /  ALT  26  /  AlkPhos  47  12-18       18 Dec 2020 08:18 Troponin x     / CK 55 U/L / CKMB x     / CKMB Units x       08 Dec 2020 02:19 Troponin x     /  U/L / CKMB x     / CKMB Units x       06 Dec 2020 14:21 Troponin x     / CK 1042 U/L / CKMB x     / CKMB Units 3.6 ng/mL      Urinalysis Basic - ( 18 Dec 2020 19:05 )    Color: Light Yellow / Appearance: Clear / S.013 / pH: x  Gluc: x / Ketone: Negative  / Bili: Negative / Urobili: <2 mg/dL   Blood: x / Protein: Negative / Nitrite: Negative   Leuk Esterase: Negative / RBC: 4 /HPF / WBC 0 /HPF   Sq Epi: x / Non Sq Epi: 0 /HPF / Bacteria: Negative              RADIOLOGY & ADDITIONAL TESTS:

## 2020-12-19 NOTE — BH CONSULTATION LIAISON PROGRESS NOTE - RISK ASSESSMENT
Patient is at elevated risk, given recent suicide attempt, current suicidal thoughts, hx multiple inpatient hospitalizations, prior suicide attempts, current delirious state. Protective factors include patient on 1:1 in hospital, receiving medications

## 2020-12-20 LAB
ALBUMIN SERPL ELPH-MCNC: 4 G/DL — SIGNIFICANT CHANGE UP (ref 3.3–5)
ALP SERPL-CCNC: 48 U/L — SIGNIFICANT CHANGE UP (ref 40–120)
ALT FLD-CCNC: 29 U/L — SIGNIFICANT CHANGE UP (ref 4–41)
ANION GAP SERPL CALC-SCNC: 16 MMOL/L — HIGH (ref 7–14)
AST SERPL-CCNC: 20 U/L — SIGNIFICANT CHANGE UP (ref 4–40)
BASOPHILS # BLD AUTO: 0.04 K/UL — SIGNIFICANT CHANGE UP (ref 0–0.2)
BASOPHILS NFR BLD AUTO: 0.3 % — SIGNIFICANT CHANGE UP (ref 0–2)
BILIRUB SERPL-MCNC: 0.5 MG/DL — SIGNIFICANT CHANGE UP (ref 0.2–1.2)
BUN SERPL-MCNC: 20 MG/DL — SIGNIFICANT CHANGE UP (ref 7–23)
CALCIUM SERPL-MCNC: 9.7 MG/DL — SIGNIFICANT CHANGE UP (ref 8.4–10.5)
CHLORIDE SERPL-SCNC: 119 MMOL/L — HIGH (ref 98–107)
CO2 SERPL-SCNC: 18 MMOL/L — LOW (ref 22–31)
CREAT SERPL-MCNC: 0.96 MG/DL — SIGNIFICANT CHANGE UP (ref 0.5–1.3)
CULTURE RESULTS: SIGNIFICANT CHANGE UP
CULTURE RESULTS: SIGNIFICANT CHANGE UP
EOSINOPHIL # BLD AUTO: 0.15 K/UL — SIGNIFICANT CHANGE UP (ref 0–0.5)
EOSINOPHIL NFR BLD AUTO: 1.2 % — SIGNIFICANT CHANGE UP (ref 0–6)
GLUCOSE SERPL-MCNC: 151 MG/DL — HIGH (ref 70–99)
HCT VFR BLD CALC: 40.4 % — SIGNIFICANT CHANGE UP (ref 39–50)
HGB BLD-MCNC: 12 G/DL — LOW (ref 13–17)
IANC: 10.73 K/UL — HIGH (ref 1.5–8.5)
IMM GRANULOCYTES NFR BLD AUTO: 0.5 % — SIGNIFICANT CHANGE UP (ref 0–1.5)
LYMPHOCYTES # BLD AUTO: 1.38 K/UL — SIGNIFICANT CHANGE UP (ref 1–3.3)
LYMPHOCYTES # BLD AUTO: 10.7 % — LOW (ref 13–44)
MAGNESIUM SERPL-MCNC: 2.5 MG/DL — SIGNIFICANT CHANGE UP (ref 1.6–2.6)
MCHC RBC-ENTMCNC: 28.6 PG — SIGNIFICANT CHANGE UP (ref 27–34)
MCHC RBC-ENTMCNC: 29.7 GM/DL — LOW (ref 32–36)
MCV RBC AUTO: 96.2 FL — SIGNIFICANT CHANGE UP (ref 80–100)
MONOCYTES # BLD AUTO: 0.54 K/UL — SIGNIFICANT CHANGE UP (ref 0–0.9)
MONOCYTES NFR BLD AUTO: 4.2 % — SIGNIFICANT CHANGE UP (ref 2–14)
NEUTROPHILS # BLD AUTO: 10.73 K/UL — HIGH (ref 1.8–7.4)
NEUTROPHILS NFR BLD AUTO: 83.1 % — HIGH (ref 43–77)
NRBC # BLD: 0 /100 WBCS — SIGNIFICANT CHANGE UP
NRBC # FLD: 0 K/UL — SIGNIFICANT CHANGE UP
PHOSPHATE SERPL-MCNC: 2.9 MG/DL — SIGNIFICANT CHANGE UP (ref 2.5–4.5)
PLATELET # BLD AUTO: 462 K/UL — HIGH (ref 150–400)
POTASSIUM SERPL-MCNC: 3.2 MMOL/L — LOW (ref 3.5–5.3)
POTASSIUM SERPL-SCNC: 3.2 MMOL/L — LOW (ref 3.5–5.3)
PROT SERPL-MCNC: 7 G/DL — SIGNIFICANT CHANGE UP (ref 6–8.3)
RBC # BLD: 4.2 M/UL — SIGNIFICANT CHANGE UP (ref 4.2–5.8)
RBC # FLD: 14 % — SIGNIFICANT CHANGE UP (ref 10.3–14.5)
SODIUM SERPL-SCNC: 153 MMOL/L — HIGH (ref 135–145)
SPECIMEN SOURCE: SIGNIFICANT CHANGE UP
SPECIMEN SOURCE: SIGNIFICANT CHANGE UP
WBC # BLD: 12.91 K/UL — HIGH (ref 3.8–10.5)
WBC # FLD AUTO: 12.91 K/UL — HIGH (ref 3.8–10.5)

## 2020-12-20 PROCEDURE — 99233 SBSQ HOSP IP/OBS HIGH 50: CPT | Mod: GC

## 2020-12-20 PROCEDURE — 99233 SBSQ HOSP IP/OBS HIGH 50: CPT

## 2020-12-20 PROCEDURE — 99232 SBSQ HOSP IP/OBS MODERATE 35: CPT

## 2020-12-20 RX ORDER — POTASSIUM PHOSPHATE, MONOBASIC POTASSIUM PHOSPHATE, DIBASIC 236; 224 MG/ML; MG/ML
15 INJECTION, SOLUTION INTRAVENOUS ONCE
Refills: 0 | Status: COMPLETED | OUTPATIENT
Start: 2020-12-20 | End: 2020-12-20

## 2020-12-20 RX ORDER — ACETAMINOPHEN 500 MG
1000 TABLET ORAL ONCE
Refills: 0 | Status: COMPLETED | OUTPATIENT
Start: 2020-12-20 | End: 2020-12-20

## 2020-12-20 RX ORDER — SODIUM,POTASSIUM PHOSPHATES 278-250MG
1 POWDER IN PACKET (EA) ORAL ONCE
Refills: 0 | Status: COMPLETED | OUTPATIENT
Start: 2020-12-20 | End: 2020-12-20

## 2020-12-20 RX ORDER — POTASSIUM CHLORIDE 20 MEQ
10 PACKET (EA) ORAL
Refills: 0 | Status: DISCONTINUED | OUTPATIENT
Start: 2020-12-20 | End: 2020-12-20

## 2020-12-20 RX ORDER — POTASSIUM CHLORIDE 20 MEQ
40 PACKET (EA) ORAL ONCE
Refills: 0 | Status: DISCONTINUED | OUTPATIENT
Start: 2020-12-20 | End: 2020-12-20

## 2020-12-20 RX ORDER — SODIUM CHLORIDE 9 MG/ML
1000 INJECTION, SOLUTION INTRAVENOUS
Refills: 0 | Status: DISCONTINUED | OUTPATIENT
Start: 2020-12-20 | End: 2020-12-21

## 2020-12-20 RX ADMIN — Medication 400 MILLIGRAM(S): at 23:15

## 2020-12-20 RX ADMIN — Medication 1000 MILLIGRAM(S): at 11:33

## 2020-12-20 RX ADMIN — Medication 105 MILLIGRAM(S): at 05:37

## 2020-12-20 RX ADMIN — Medication 105 MILLIGRAM(S): at 13:50

## 2020-12-20 RX ADMIN — POTASSIUM PHOSPHATE, MONOBASIC POTASSIUM PHOSPHATE, DIBASIC 62.5 MILLIMOLE(S): 236; 224 INJECTION, SOLUTION INTRAVENOUS at 12:04

## 2020-12-20 RX ADMIN — MEROPENEM 100 MILLIGRAM(S): 1 INJECTION INTRAVENOUS at 13:50

## 2020-12-20 RX ADMIN — SODIUM CHLORIDE 100 MILLILITER(S): 9 INJECTION, SOLUTION INTRAVENOUS at 11:04

## 2020-12-20 RX ADMIN — Medication 400 MILLIGRAM(S): at 11:03

## 2020-12-20 RX ADMIN — Medication 105 MILLIGRAM(S): at 21:59

## 2020-12-20 RX ADMIN — MEROPENEM 100 MILLIGRAM(S): 1 INJECTION INTRAVENOUS at 21:59

## 2020-12-20 RX ADMIN — ENOXAPARIN SODIUM 40 MILLIGRAM(S): 100 INJECTION SUBCUTANEOUS at 05:37

## 2020-12-20 RX ADMIN — MEROPENEM 100 MILLIGRAM(S): 1 INJECTION INTRAVENOUS at 05:37

## 2020-12-20 RX ADMIN — Medication 1 PACKET(S): at 12:04

## 2020-12-20 RX ADMIN — ENOXAPARIN SODIUM 40 MILLIGRAM(S): 100 INJECTION SUBCUTANEOUS at 17:35
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
## 2020-12-20 NOTE — PROGRESS NOTE ADULT - PROBLEM SELECTOR PLAN 1
AMS, agitated fo unclear etiology   - Pan CT to rule out infectious source shows multifocal PNA; s/p Zosyn   - CXR clear lungs, no leukocytosis, COVID PCR and rapid viral panel neg   - Utox positive for barbs, no barbs given in [place], level <3  - CT head artifact due to movement; Repeat CT head in setting of sedation neg  - Could be multiorgan inflammatory syndrome from COVID-19; COVID ab positive   - LP negative, BCx/UCx negative to date  - MRI brain:  slightly prominent cerebellar sulci correlate with EtOH usage,  minimal microvascular disease, no restricted diffusion, hemorrhage or midline shift. Thickened calvarial diploic space, paranasal sinus mucosal thickening with opacification right greater than left mastoid tip.  - c/w IV thiamine  - Psychiatry following; unlikely this is related to psychiatric medication side effects  - Failed s/s eval x2 --> s/p NGT and started on tube feeds. Will re-eval if mental status continues to improve   - Check spot EEG 12/19 w/ no epileptiform abmlties

## 2020-12-20 NOTE — PROGRESS NOTE ADULT - PROBLEM SELECTOR PLAN 3
Multifocal PNA on CT chest 12/7  - s/p Vanco 12/5/20-12/7/20   - Sputum cx w/ Serratia, received 5 day course Zosyn in MICU (12/5/20-12/9/20) and Azithro 12/6/-12/8/20  - COVID PCR and Rapid RVP neg   - BCx and UCx negative thus far  - lungs with bilateral rhonchi, concern for possible aspiration  - ID consult; c/w Meropenum  - Suction prn, duonebs q6h ATC, chest PT
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
## 2020-12-20 NOTE — PROGRESS NOTE ADULT - PROBLEM SELECTOR PLAN 7
DVT PPX: Lovenox 40mg  Diet: S/S reevaluation  PT consulted; recs pending  Dispo: transferred from [facility]
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
## 2020-12-20 NOTE — PROGRESS NOTE ADULT - SUBJECTIVE AND OBJECTIVE BOX
Follow Up:  depression, fever, pulmonary infiltrates    Inverval History/ROS:  antibiotics broadened to meropenem yesterday.   spiked fever 103 today.    Allergies  No Known Allergies        ANTIMICROBIALS:    meropenem  IVPB 1000 every 8 hours            OTHER MEDS:  enoxaparin Injectable 40 milliGRAM(s) SubCutaneous two times a day  OLANZapine Injectable 2.5 milliGRAM(s) IntraMuscular every 6 hours PRN  thiamine IVPB 500 milliGRAM(s) IV Intermittent every 8 hours    Vital Signs Last 24 Hrs  T(F): 103.3 (12-20-20 @ 11:33), Max: 103.3 (12-20-20 @ 11:33)  HR: 125 (12-20-20 @ 10:00)  BP: 131/95 (12-20-20 @ 10:00)  RR: 19 (12-20-20 @ 10:00)  SpO2: 95% (12-20-20 @ 10:00) (94% - 98%)    PHYSICAL EXAM:  General: lethargic no distress on 1:1  HEAD/EYES: NGT  ENT:  scar left neck  Cardiovascular: s1S2  Respiratory: clear anteriorly  GI:   soft, non-tender, normal bowel sounds, superficial wounds lower abd.  : condom cath  Musculoskeletal: no synovitis  Skin:  no rash, or erythema  Psychiatric: lethargic  Lines:  [x ] no phlebitis [ ] central line                                     12.0   12.91 )-----------( 462      ( 20 Dec 2020 07:57 )             40.4 12-20    153  |  119  |  20  ----------------------------<  151  3.2   |  18  |  0.96  Ca    9.7      20 Dec 2020 07:57Phos  2.9     12-20Mg     2.5     12-20  TPro  7.0  /  Alb  4.0  /  TBili  0.5  /  DBili  x   /  AST  20  /  ALT  29  /  AlkPhos  48  12-20          MICROBIOLOGY:  culre - Blood (12.19.20 @ 02:21)   Specimen Source: .Blood Blood-Peripheral   Culture Results:   No growth to date. Culture - Blood (12.19.20 @ 02:21)   Specimen Source: .Blood Blood-Venous   Culture Results:   No growth to date. Urine Microscopic-Add On (NC) (12.18.20 @ 19:05)   Epithelial Cells: 0 /HPF   Red Blood Cell - Urine: 4 /HPF   White Blood Cell - Urine: 0 /HPF   Bacteria: Negative Culture - Blood (12.15.20 @ 16:34)   Specimen Source: .Blood Blood-Peripheral   Culture Results:   No growth to date. Culture - Blood (12.15.20 @ 16:32)   Specimen Source: .Blood Blood-Venous   Culture Results:   No growth to date.     .Blood Blood-Peripheral  12-13-20   No growth to date.  --  --      .Blood Blood-Peripheral  12-09-20   No Growth Final  --  --      .Sputum Sputum  12-08-20   Numerous Serratia marcescens  Normal Respiratory Leticia absent  --  Serratia marcescens      .CSF CSF  12-06-20   No growth at 3 days.  --    No polymorphonuclear leukocytes seen  No organisms seen  by cytocentrifuge      .Blood Blood-Arterial  12-05-20   No Growth Final  --  --      .Blood  12-05-20   No Growth Final  --  --      .Blood  12-05-20   No Growth Final  --  --      .Urine  12-05-20   No growth  --  --      RADIOLOGY:    rad< from: CT Angio Chest w/ IV Cont (12.15.20 @ 14:27) >    EXAM:  CT ANGIO CHEST (W)AW IC        PROCEDURE DATE:  Dec 15 2020         INTERPRETATION:  CLINICAL INFORMATION: Tachycardic and hypoxic. Evaluate for pulmonary embolism.    COMPARISON: CT chest abdomen pelvis 12/6/2020.    PROCEDURE:  CT Angiography of the Chest.  90 ml of Omnipaque 350 was injected intravenously. 10 ml were discarded.  Sagittal and coronal reformats were performed as well as 3D (MIP) reconstructions.    FINDINGS:    LUNGS AND PLEURA: Since 12/6/2020, the right and left pleural effusion are new. The bilateral lower lobe consolidation has decreased, however, there is likely septal thickening and groundglass opacities within the bilateral upper lobes. The opacities in the bilateral lobes have slightly increased in the apices since 12/6/2020 allowing for respiratory motion while they have slightly decreased in the remainder of the upper lobes.    MEDIASTINUM AND KATHY: The chest lymph nodes involving the upper paratracheal, lower paratracheal, prevascular, AP window, and subcarinal stations are unchanged. The visualized thyroid gland and esophagus are unremarkable    VESSELS: The aortic root measures 4.3 cm at the sinuses of Valsalva. The ascending aorta measures 4.1 cm at the main pulmonary artery. The remainder of the aorta is unremarkable.    There is no main, central, lobar, or proximal segmental pulmonary embolus. The mid to distal segmental and subsegmental vessels are not well evaluated secondary to motion.    HEART: Heart size is normal. No pericardial effusion.    CHEST WALL AND LOWER NECK: Within normal limits.    VISUALIZED UPPER ABDOMEN: Solitary right hepatic lobe calcification.    BONES: Degenerative changes.    IMPRESSION:    1.  No pulmonary embolism, however, the mid to distal segmental and subsegmental vessels are not well evaluated secondary to motion.  2.  Since 12/6/2020, the bilateral pleural effusions have increased, however, the bilateral lower lobe opacities have decreased.  3.  In addition, the opacities in the bilateral lung apices and likely septal thickening and increased while the additional upper lobe opacities have decreased. These findings are of uncertain etiology.            ANNAMARIE MCKINNEY MD; Resident Radiology  This document has been electronically signed.  STEPHANIE FELICIANO MD; Attending Radiologist  This document has been electronically signed. Dec 15 2020  4:14PM    < end of copied text >

## 2020-12-20 NOTE — PROGRESS NOTE ADULT - SUBJECTIVE AND OBJECTIVE BOX
Patient is a 55y old  Male who presents with a chief complaint of AMS (19 Dec 2020 18:56)      Samuel Teran PGY1  Anesthesiology  Pager: [facility]: [phone] NS: [phone]    SUBJECTIVE/OVERNIGHT EVENTS: Pt afebrile overnight but noted to be tachycardic Pt y responsive to questioning this AM. Shakes his head no when asked if he is doing okay but does no elaborate further. Does nod his head when asked if he has neck pain.  Otherwise is minimally verbal     REVIEW OF SYSTEMS: (+) neck pain, limited due to patient cooperation    MEDICATIONS  (STANDING):  enoxaparin Injectable 40 milliGRAM(s) SubCutaneous two times a day  meropenem  IVPB 1000 milliGRAM(s) IV Intermittent every 8 hours  potassium chloride   Powder 40 milliEquivalent(s) Oral once  potassium chloride  10 mEq/100 mL IVPB 10 milliEquivalent(s) IV Intermittent every 1 hour  thiamine IVPB 500 milliGRAM(s) IV Intermittent every 8 hours    MEDICATIONS  (PRN):  OLANZapine Injectable 2.5 milliGRAM(s) IntraMuscular every 6 hours PRN Agitation    CAPILLARY BLOOD GLUCOSE        I&O's Summary    19 Dec 2020 07:01  -  20 Dec 2020 07:00  --------------------------------------------------------  IN: 500 mL / OUT: 750 mL / NET: -250 mL          Vital Signs Last 24 Hrs  T(C): 37.3 (20 Dec 2020 05:00), Max: 37.9 (19 Dec 2020 10:00)  T(F): 99.2 (20 Dec 2020 05:00), Max: 100.2 (19 Dec 2020 10:00)  HR: 125 (20 Dec 2020 05:00) (107 - 125)  BP: 136/85 (20 Dec 2020 05:00) (132/84 - 158/110)  BP(mean): --  RR: 18 (20 Dec 2020 05:00) (18 - 20)  SpO2: 95% (20 Dec 2020 05:00) (94% - 98%)    PHYSICAL EXAM:  GENERAL: Lethargic, NAD  EYES: EOMI, PERRLA  CHEST/LUNG: on RA, Mild rhonchi diffusely  HEART: Regular rate and rhythm; No murmurs  ABDOMEN: Soft, Nontender, Nondistended  EXTREMITIES:  B/l 1+ pedal (improved) and b/l hand edema (L>R) improved  PSYCH: A&O x2, able to tell me his name, birthday, and year  NEUROLOGY: Able to b/l UE and LE passively, does not follow commands  SKIN: LUE cellulitis: improved, no erythema noted    LABS                        12.0   12.91 )-----------( 462      ( 20 Dec 2020 07:57 )             40.4                         11.7   10.78 )-----------( 414      ( 19 Dec 2020 06:17 )             38.6     12-20    153<H>  |  119<H>  |  20  ----------------------------<  151<H>  3.2<L>   |  18<L>  |  0.96      151<H>  |  114<H>  |  27<H>  ----------------------------<  113<H>  3.1<L>   |  23  |  1.03    Ca    9.7      20 Dec 2020 07:57  Ca    10.0      19 Dec 2020 06:17  Phos  2.9     12-20  Mg     2.5     12-20    TPro  7.0  /  Alb  4.0  /  TBili  0.5  /  DBili  x   /  AST  20  /  ALT  29  /  AlkPhos  48  12-20  TPro  7.1  /  Alb  3.9  /  TBili  0.5  /  DBili  x   /  AST  22  /  ALT  28  /  AlkPhos  48  12-19       18 Dec 2020 08:18 Troponin x     / CK 55 U/L / CKMB x     / CKMB Units x       08 Dec 2020 02:19 Troponin x     /  U/L / CKMB x     / CKMB Units x       06 Dec 2020 14:21 Troponin x     / CK 1042 U/L / CKMB x     / CKMB Units 3.6 ng/mL      Urinalysis Basic - ( 18 Dec 2020 19:05 )    Color: Light Yellow / Appearance: Clear / S.013 / pH: x  Gluc: x / Ketone: Negative  / Bili: Negative / Urobili: <2 mg/dL   Blood: x / Protein: Negative / Nitrite: Negative   Leuk Esterase: Negative / RBC: 4 /HPF / WBC 0 /HPF   Sq Epi: x / Non Sq Epi: 0 /HPF / Bacteria: Negative              RADIOLOGY & ADDITIONAL TESTS:         Patient is a 55y old  Male who presents with a chief complaint of AMS (19 Dec 2020 18:56)      Samuel Teran PGY1  Anesthesiology  Pager: [facility]: [phone] NS: [phone]    SUBJECTIVE/OVERNIGHT EVENTS: Pt afebrile overnight but noted to be tachycardic Pt unresponsive to questioning this AM. Makes eyes contact when you say his name but otherwise unresponsive    REVIEW OF SYSTEMS: limited due to patient cooperation    MEDICATIONS  (STANDING):  enoxaparin Injectable 40 milliGRAM(s) SubCutaneous two times a day  meropenem  IVPB 1000 milliGRAM(s) IV Intermittent every 8 hours  potassium chloride   Powder 40 milliEquivalent(s) Oral once  potassium chloride  10 mEq/100 mL IVPB 10 milliEquivalent(s) IV Intermittent every 1 hour  thiamine IVPB 500 milliGRAM(s) IV Intermittent every 8 hours    MEDICATIONS  (PRN):  OLANZapine Injectable 2.5 milliGRAM(s) IntraMuscular every 6 hours PRN Agitation    CAPILLARY BLOOD GLUCOSE        I&O's Summary    19 Dec 2020 07:01  -  20 Dec 2020 07:00  --------------------------------------------------------  IN: 500 mL / OUT: 750 mL / NET: -250 mL          Vital Signs Last 24 Hrs  T(C): 37.3 (20 Dec 2020 05:00), Max: 37.9 (19 Dec 2020 10:00)  T(F): 99.2 (20 Dec 2020 05:00), Max: 100.2 (19 Dec 2020 10:00)  HR: 125 (20 Dec 2020 05:00) (107 - 125)  BP: 136/85 (20 Dec 2020 05:00) (132/84 - 158/110)  BP(mean): --  RR: 18 (20 Dec 2020 05:00) (18 - 20)  SpO2: 95% (20 Dec 2020 05:00) (94% - 98%)    PHYSICAL EXAM:  GENERAL: Lethargic, NAD  EYES: EOMI, PERRLA  CHEST/LUNG: on RA, Mild rhonchi diffusely  HEART: Regular rate and rhythm; No murmurs  ABDOMEN: Soft, Nontender, Nondistended  EXTREMITIES:  B/l 1+ pedal (improved) and b/l hand edema (L>R) improved  PSYCH: Unable to assess orientation as patient not cooperating w/ exam  NEUROLOGY: Able to b/l UE and LE passively, does not follow commands  SKIN: LUE cellulitis: improved, no erythema noted    LABS                        12.0   12.91 )-----------( 462      ( 20 Dec 2020 07:57 )             40.4                         11.7   10.78 )-----------( 414      ( 19 Dec 2020 06:17 )             38.6     12-20    153<H>  |  119<H>  |  20  ----------------------------<  151<H>  3.2<L>   |  18<L>  |  0.96      151<H>  |  114<H>  |  27<H>  ----------------------------<  113<H>  3.1<L>   |  23  |  1.03    Ca    9.7      20 Dec 2020 07:57  Ca    10.0      19 Dec 2020 06:17  Phos  2.9     12-20  Mg     2.5     12-20    TPro  7.0  /  Alb  4.0  /  TBili  0.5  /  DBili  x   /  AST  20  /  ALT  29  /  AlkPhos  48  12-20  TPro  7.1  /  Alb  3.9  /  TBili  0.5  /  DBili  x   /  AST  22  /  ALT  28  /  AlkPhos  48  12-19       18 Dec 2020 08:18 Troponin x     / CK 55 U/L / CKMB x     / CKMB Units x       08 Dec 2020 02:19 Troponin x     /  U/L / CKMB x     / CKMB Units x       06 Dec 2020 14:21 Troponin x     / CK 1042 U/L / CKMB x     / CKMB Units 3.6 ng/mL      Urinalysis Basic - ( 18 Dec 2020 19:05 )    Color: Light Yellow / Appearance: Clear / S.013 / pH: x  Gluc: x / Ketone: Negative  / Bili: Negative / Urobili: <2 mg/dL   Blood: x / Protein: Negative / Nitrite: Negative   Leuk Esterase: Negative / RBC: 4 /HPF / WBC 0 /HPF   Sq Epi: x / Non Sq Epi: 0 /HPF / Bacteria: Negative              RADIOLOGY & ADDITIONAL TESTS:
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
## 2020-12-20 NOTE — BH CONSULTATION LIAISON PROGRESS NOTE - NSBHFUPINTERVALHXFT_PSY_A_CORE
Patient seen and evaluated, chart reviewed. On exam patient appears tremulous and confused. Minimal attention to interview, requires multiple redirection of both tactile and verbal stimulation (in both English and American) but does not sustain attention for more than a few seconds. When asked to state his name he stares ahead blankly and does not answer. When called by his name he does not turn to writer. When asked to state his location he states "[place]" when asked to state the date he does not answer.    Per RN at bedside, pt has not been agitated/aggressive.

## 2020-12-20 NOTE — BH CONSULTATION LIAISON PROGRESS NOTE - CASE SUMMARY
Pt. seen and evaluated with Dr. Sood, I agree with above assessment and plan, , pt. alert, minimally engaged. 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 seems confused with impaired attention, plan is to transfer patient to [facility] once he is medically optimized, pt. needs further treatment and stabilization, Continue COS 1;1 for safety, recommend meds. as written above, psych CL will continue to follow.  I saw the patient with Dr Chavez. I agree with her history, MSE, A/P. Patient appears delirious. Has some increased tone and cogwheeling in UE. Would redo infectious workup per medical team. Consider also looking for other causes. Switch PRN per above for now- carefully and closely monitor airway. Call with questions.

## 2020-12-21 DIAGNOSIS — E87.0 HYPEROSMOLALITY AND HYPERNATREMIA: ICD-10-CM

## 2020-12-21 LAB
ALBUMIN SERPL ELPH-MCNC: 3.8 G/DL — SIGNIFICANT CHANGE UP (ref 3.3–5)
ALP SERPL-CCNC: 44 U/L — SIGNIFICANT CHANGE UP (ref 40–120)
ALT FLD-CCNC: 35 U/L — SIGNIFICANT CHANGE UP (ref 4–41)
ANION GAP SERPL CALC-SCNC: 12 MMOL/L — SIGNIFICANT CHANGE UP (ref 7–14)
ANION GAP SERPL CALC-SCNC: 14 MMOL/L — SIGNIFICANT CHANGE UP (ref 7–14)
AST SERPL-CCNC: 27 U/L — SIGNIFICANT CHANGE UP (ref 4–40)
BILIRUB SERPL-MCNC: 0.4 MG/DL — SIGNIFICANT CHANGE UP (ref 0.2–1.2)
BUN SERPL-MCNC: 24 MG/DL — HIGH (ref 7–23)
BUN SERPL-MCNC: 25 MG/DL — HIGH (ref 7–23)
CALCIUM SERPL-MCNC: 9.7 MG/DL — SIGNIFICANT CHANGE UP (ref 8.4–10.5)
CALCIUM SERPL-MCNC: 9.9 MG/DL — SIGNIFICANT CHANGE UP (ref 8.4–10.5)
CHLORIDE SERPL-SCNC: 117 MMOL/L — HIGH (ref 98–107)
CHLORIDE SERPL-SCNC: 120 MMOL/L — HIGH (ref 98–107)
CO2 SERPL-SCNC: 21 MMOL/L — LOW (ref 22–31)
CO2 SERPL-SCNC: 21 MMOL/L — LOW (ref 22–31)
CORTIS AM PEAK SERPL-MCNC: 22.9 UG/DL — HIGH (ref 6–18.4)
CREAT SERPL-MCNC: 0.91 MG/DL — SIGNIFICANT CHANGE UP (ref 0.5–1.3)
CREAT SERPL-MCNC: 0.97 MG/DL — SIGNIFICANT CHANGE UP (ref 0.5–1.3)
GLUCOSE SERPL-MCNC: 118 MG/DL — HIGH (ref 70–99)
GLUCOSE SERPL-MCNC: 133 MG/DL — HIGH (ref 70–99)
HCT VFR BLD CALC: 39.7 % — SIGNIFICANT CHANGE UP (ref 39–50)
HGB BLD-MCNC: 11.6 G/DL — LOW (ref 13–17)
MAGNESIUM SERPL-MCNC: 2.4 MG/DL — SIGNIFICANT CHANGE UP (ref 1.6–2.6)
MAGNESIUM SERPL-MCNC: 2.7 MG/DL — HIGH (ref 1.6–2.6)
MCHC RBC-ENTMCNC: 28.6 PG — SIGNIFICANT CHANGE UP (ref 27–34)
MCHC RBC-ENTMCNC: 29.2 GM/DL — LOW (ref 32–36)
MCV RBC AUTO: 98 FL — SIGNIFICANT CHANGE UP (ref 80–100)
NRBC # BLD: 0 /100 WBCS — SIGNIFICANT CHANGE UP
NRBC # FLD: 0 K/UL — SIGNIFICANT CHANGE UP
PHOSPHATE SERPL-MCNC: 3.2 MG/DL — SIGNIFICANT CHANGE UP (ref 2.5–4.5)
PHOSPHATE SERPL-MCNC: 3.8 MG/DL — SIGNIFICANT CHANGE UP (ref 2.5–4.5)
PLATELET # BLD AUTO: 437 K/UL — HIGH (ref 150–400)
POTASSIUM SERPL-MCNC: 3.5 MMOL/L — SIGNIFICANT CHANGE UP (ref 3.5–5.3)
POTASSIUM SERPL-MCNC: 3.8 MMOL/L — SIGNIFICANT CHANGE UP (ref 3.5–5.3)
POTASSIUM SERPL-SCNC: 3.5 MMOL/L — SIGNIFICANT CHANGE UP (ref 3.5–5.3)
POTASSIUM SERPL-SCNC: 3.8 MMOL/L — SIGNIFICANT CHANGE UP (ref 3.5–5.3)
PROT SERPL-MCNC: 7.3 G/DL — SIGNIFICANT CHANGE UP (ref 6–8.3)
RBC # BLD: 4.05 M/UL — LOW (ref 4.2–5.8)
RBC # FLD: 14.4 % — SIGNIFICANT CHANGE UP (ref 10.3–14.5)
SODIUM SERPL-SCNC: 150 MMOL/L — HIGH (ref 135–145)
SODIUM SERPL-SCNC: 155 MMOL/L — HIGH (ref 135–145)
WBC # BLD: 10.42 K/UL — SIGNIFICANT CHANGE UP (ref 3.8–10.5)
WBC # FLD AUTO: 10.42 K/UL — SIGNIFICANT CHANGE UP (ref 3.8–10.5)

## 2020-12-21 PROCEDURE — 99233 SBSQ HOSP IP/OBS HIGH 50: CPT | Mod: GC

## 2020-12-21 PROCEDURE — 99233 SBSQ HOSP IP/OBS HIGH 50: CPT

## 2020-12-21 PROCEDURE — 99232 SBSQ HOSP IP/OBS MODERATE 35: CPT

## 2020-12-21 PROCEDURE — 70491 CT SOFT TISSUE NECK W/DYE: CPT | Mod: 26

## 2020-12-21 RX ORDER — ACETAMINOPHEN 500 MG
1000 TABLET ORAL ONCE
Refills: 0 | Status: COMPLETED | OUTPATIENT
Start: 2020-12-21 | End: 2020-12-21

## 2020-12-21 RX ORDER — SODIUM CHLORIDE 9 MG/ML
1000 INJECTION, SOLUTION INTRAVENOUS
Refills: 0 | Status: DISCONTINUED | OUTPATIENT
Start: 2020-12-21 | End: 2020-12-22

## 2020-12-21 RX ADMIN — SODIUM CHLORIDE 125 MILLILITER(S): 9 INJECTION, SOLUTION INTRAVENOUS at 13:30

## 2020-12-21 RX ADMIN — ENOXAPARIN SODIUM 40 MILLIGRAM(S): 100 INJECTION SUBCUTANEOUS at 06:19

## 2020-12-21 RX ADMIN — Medication 105 MILLIGRAM(S): at 21:39

## 2020-12-21 RX ADMIN — Medication 1000 MILLIGRAM(S): at 00:15

## 2020-12-21 RX ADMIN — Medication 105 MILLIGRAM(S): at 06:19

## 2020-12-21 RX ADMIN — Medication 1000 MILLIGRAM(S): at 07:15

## 2020-12-21 RX ADMIN — MEROPENEM 100 MILLIGRAM(S): 1 INJECTION INTRAVENOUS at 13:30

## 2020-12-21 RX ADMIN — ENOXAPARIN SODIUM 40 MILLIGRAM(S): 100 INJECTION SUBCUTANEOUS at 17:20

## 2020-12-21 RX ADMIN — MEROPENEM 100 MILLIGRAM(S): 1 INJECTION INTRAVENOUS at 06:19

## 2020-12-21 RX ADMIN — Medication 400 MILLIGRAM(S): at 06:54

## 2020-12-21 RX ADMIN — Medication 105 MILLIGRAM(S): at 13:30
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
## 2020-12-21 NOTE — BH CONSULTATION LIAISON PROGRESS NOTE - NSBHFUPINTERVALHXFT_PSY_A_CORE
Patient more confused, stuporous than before, waxing/waning, AOx0 awake but does not follow commands, no PRNS overnight.     Mild increased tone in left arm, no catalepsy
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
## 2020-12-21 NOTE — PROGRESS NOTE ADULT - SUBJECTIVE AND OBJECTIVE BOX
Evert Mathewon, PGY1  Pager [phone]    INCOMPLETE NOTE - IN PROGRESS    Patient is a 55y old  Male who presents with a chief complaint of AMS (20 Dec 2020 16:43)      SUBJECTIVE/INTERVAL EVENTS: Patient seen and examined at bedside.    MEDICATIONS  (STANDING):  dextrose 5%. 1000 milliLiter(s) (100 mL/Hr) IV Continuous <Continuous>  enoxaparin Injectable 40 milliGRAM(s) SubCutaneous two times a day  meropenem  IVPB 1000 milliGRAM(s) IV Intermittent every 8 hours  thiamine IVPB 500 milliGRAM(s) IV Intermittent every 8 hours    MEDICATIONS  (PRN):  LORazepam   Injectable 2 milliGRAM(s) IV Push every 6 hours PRN Agitation      VITAL SIGNS:  T(F): 102.5 (12-21-20 @ 06:05), Max: 103.3 (12-20-20 @ 11:33)  HR: 109 (12-21-20 @ 06:05) (101 - 125)  BP: 134/94 (12-21-20 @ 06:05) (131/95 - 159/95)  RR: 18 (12-21-20 @ 06:05) (18 - 19)  SpO2: 92% (12-21-20 @ 06:05) (92% - 98%)    I&O's Summary    20 Dec 2020 07:01  -  21 Dec 2020 07:00  --------------------------------------------------------  IN: 250 mL / OUT: 1300 mL / NET: -1050 mL      Daily     Daily     PHYSICAL EXAM:  Gen: Alert, NAD  HEENT: NCAT, conjunctiva clear, sclera anicteric, no erythema or exudates in the oropharynx, mmm  Neck: Supple, no JVD  CV: RRR, S1S2, no m/r/g  Resp: CTAB, normal respiratory effort  Abd: Soft, nontender, nondistended, normal bowel sounds  Ext: no edema, no clubbing or cyanosis  Neuro: AOx3, CN2-12 grossly intact, TROY  SKIN: warm, perfused    LABS:                        12.0   12.91 )-----------( 462      ( 20 Dec 2020 07:57 )             40.4     Hgb Trend: 12.0<--, 11.7<--, 11.2<--, 10.7<--, 11.0<--  12-20    153<H>  |  119<H>  |  20  ----------------------------<  151<H>  3.2<L>   |  18<L>  |  0.96    Ca    9.7      20 Dec 2020 07:57  Phos  2.9     12-20  Mg     2.5     12-20    TPro  7.0  /  Alb  4.0  /  TBili  0.5  /  DBili  x   /  AST  20  /  ALT  29  /  AlkPhos  48  12-20    Creatinine Trend: 0.96<--, 1.03<--, 0.88<--, 0.96<--, 1.11<--, 0.97<--  LIVER FUNCTIONS - ( 20 Dec 2020 07:57 )  Alb: 4.0 g/dL / Pro: 7.0 g/dL / ALK PHOS: 48 U/L / ALT: 29 U/L / AST: 20 U/L / GGT: x                     CAPILLARY BLOOD GLUCOSE          RADIOLOGY & ADDITIONAL TESTS: Reviewed    Imaging Personally Reviewed:    Consultant(s) Notes Reviewed:      Care Discussed with Consultants/Other Providers:   Evert Holloway Sridevi, PGY1  Pager [phone]      Patient is a 55y old  Male who presents with a chief complaint of AMS (20 Dec 2020 16:43)      SUBJECTIVE/INTERVAL EVENTS:  Continued to be febrile overnight T 102, received 1g tylenol IV. Febrile again to 102.5 this AM, received 1g tylenol IV.  Patient denies pain anywhere.  Admits to cough.  Patient seen and examined at bedside.      MEDICATIONS  (STANDING):  dextrose 5%. 1000 milliLiter(s) (100 mL/Hr) IV Continuous <Continuous>  enoxaparin Injectable 40 milliGRAM(s) SubCutaneous two times a day  meropenem  IVPB 1000 milliGRAM(s) IV Intermittent every 8 hours  thiamine IVPB 500 milliGRAM(s) IV Intermittent every 8 hours    MEDICATIONS  (PRN):  LORazepam   Injectable 2 milliGRAM(s) IV Push every 6 hours PRN Agitation      VITAL SIGNS:  T(F): 102.5 (12-21-20 @ 06:05), Max: 103.3 (12-20-20 @ 11:33)  HR: 109 (12-21-20 @ 06:05) (101 - 125)  BP: 134/94 (12-21-20 @ 06:05) (131/95 - 159/95)  RR: 18 (12-21-20 @ 06:05) (18 - 19)  SpO2: 92% (12-21-20 @ 06:05) (92% - 98%)    I&O's Summary    20 Dec 2020 07:01  -  21 Dec 2020 07:00  --------------------------------------------------------  IN: 250 mL / OUT: 1300 mL / NET: -1050 mL      Daily     Daily     PHYSICAL EXAM:  GENERAL: Lethargic, appearing toxic, NAD  EYES: EOMI, PERRLA  CHEST/LUNG: on RA, rhonchi diffusely  HEART: Regular rate and rhythm; No murmurs  ABDOMEN: Soft, Nontender, Nondistended  EXTREMITIES:  Trace b/l pedal and b/l hand edema (L>R) improved  PSYCH: AOx1 to self. Shakes head no and yes to questions, but poor concentration and lethargy.  NEUROLOGY: Rigidity in BUE to passive movement.  SKIN: LUE cellulitis: improved, no erythema noted    LABS:                        12.0   12.91 )-----------( 462      ( 20 Dec 2020 07:57 )             40.4     Hgb Trend: 12.0<--, 11.7<--, 11.2<--, 10.7<--, 11.0<--  12-20    153<H>  |  119<H>  |  20  ----------------------------<  151<H>  3.2<L>   |  18<L>  |  0.96    Ca    9.7      20 Dec 2020 07:57  Phos  2.9     12-20  Mg     2.5     12-20    TPro  7.0  /  Alb  4.0  /  TBili  0.5  /  DBili  x   /  AST  20  /  ALT  29  /  AlkPhos  48  12-20    Creatinine Trend: 0.96<--, 1.03<--, 0.88<--, 0.96<--, 1.11<--, 0.97<--  LIVER FUNCTIONS - ( 20 Dec 2020 07:57 )  Alb: 4.0 g/dL / Pro: 7.0 g/dL / ALK PHOS: 48 U/L / ALT: 29 U/L / AST: 20 U/L / GGT: x                     CAPILLARY BLOOD GLUCOSE          RADIOLOGY & ADDITIONAL TESTS: Reviewed    Imaging Personally Reviewed:    Consultant(s) Notes Reviewed:      Care Discussed with Consultants/Other Providers:

## 2020-12-21 NOTE — PROGRESS NOTE ADULT - PROBLEM SELECTOR PLAN 1
AMS, agitated fo unclear etiology   - Pan CT to rule out infectious source shows multifocal PNA; s/p Zosyn   - CXR clear lungs, no leukocytosis, COVID PCR and rapid viral panel neg   - Utox positive for barbs, no barbs given in [place], level <3  - CT head artifact due to movement; Repeat CT head in setting of sedation neg  - Could be multiorgan inflammatory syndrome from COVID-19; COVID ab positive   - LP negative, BCx/UCx negative to date  - MRI brain:  slightly prominent cerebellar sulci correlate with EtOH usage,  minimal microvascular disease, no restricted diffusion, hemorrhage or midline shift. Thickened calvarial diploic space, paranasal sinus mucosal thickening with opacification right greater than left mastoid tip.  - c/w IV thiamine  - Psychiatry following; unlikely this is related to psychiatric medication side effects  - Failed s/s eval x2 --> s/p NGT and started on tube feeds. Will re-eval if mental status continues to improve   - Check spot EEG 12/19 w/ no epileptiform abmlties Encephalopathy of unknown origin. Initially, possibly septic enceph given initially with multifocal pneumonia, sputum cx with Serratia, s/p course of Zosyn, now on Meropenem.  Vs. Multiorgan inflammatory syndrome d/t COVID given + Ab.  Vs. Hypernatremia.  AOx1-2, waxes and wanes.  - MR brain 12/9 with slightly prominent cerebellar sulci, thickened calvarial diploic space, paranasal sinus mucosal thickening with opacities in mastoid.   - Spot EEG 12/19 with no epileptiform abnormalities.   - LP negative, BCx/UCx negative to date  - Failed s/s eval x2 --> s/p NGT, on tube feeds  - c/w IV thiamine  - 1:1 CO and PRN ativan 2mg q6h for agitation
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
## 2020-12-21 NOTE — PROGRESS NOTE ADULT - PROBLEM SELECTOR PLAN 4
Started on Vancomycin on 12/9/20 for UE cellulitis, Resolved  - s/p Vancomycin 1.5g q12 Resolved  - Elevated SCr and elevated CK on admission, now downtrending   - Meredith exchanged, resulted in improved urine output  - SCr normal, CTM DVT PPX: Lovenox 40mg  Diet: S/S reevaluation  PT consulted; recs pending  Dispo: transferred from [facility]
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
## 2020-12-21 NOTE — PROGRESS NOTE ADULT - PROBLEM SELECTOR PLAN 5
Resolved  - Elevated SCr and elevated CK on admission, now downtrending   - Meredith exchanged, resulted in improved urine output  - SCr normal, CTM - intubated, sedated for agitation 12/5/-12/11  - s/p Versed gtt, fentanyl, propofol, precedex gtt  - Per psych, would like to monitor off anti-psychotics post-extubation  - recommended zyprexa 2.5 mg q6 PRN for agitation   - Can escalate to standing Depakote 250 mg if needed

## 2020-12-21 NOTE — PROGRESS NOTE ADULT - PROBLEM SELECTOR PLAN 6
- intubated, sedated for agitation 12/5/-12/11  - s/p Versed gtt, fentanyl, propofol, precedex gtt  - Per psych, would like to monitor off anti-psychotics post-extubation  - recommended zyprexa 2.5 mg q6 PRN for agitation   - Can escalate to standing Depakote 250 mg if needed DVT PPX: Lovenox 40mg  Diet: S/S reevaluation  PT consulted; recs pending  Dispo: transferred from [facility]

## 2020-12-21 NOTE — PROGRESS NOTE ADULT - PROBLEM SELECTOR PLAN 7
DVT PPX: Lovenox 40mg  Diet: S/S reevaluation  PT consulted; recs pending  Dispo: transferred from [facility]

## 2020-12-21 NOTE — PROGRESS NOTE ADULT - PROBLEM SELECTOR PLAN 3
Multifocal PNA on CT chest 12/7  - s/p Vanco 12/5/20-12/7/20   - Sputum cx w/ Serratia, received 5 day course Zosyn in MICU (12/5/20-12/9/20) and Azithro 12/6/-12/8/20  - COVID PCR and Rapid RVP neg   - BCx and UCx negative thus far  - lungs with bilateral rhonchi, concern for possible aspiration  - ID consult; c/w Meropenum  - Suction prn, duonebs q6h ATC, chest PT D/t Hypovolemia secondary to insensible loss from persistent fever, poor PO intake  - Free water 350 q6h  - D5 125 cc/hr  - Check BMP at 6PM
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
## 2020-12-21 NOTE — PROGRESS NOTE ADULT - SUBJECTIVE AND OBJECTIVE BOX
Follow Up:  depression, fever, pulmonary infiltrates    Inverval History/ROS:  persistently febrile despite meropenem x 48 h.  blood cultures negative x 11  "I'm good"    Allergies  No Known Allergies        ANTIMICROBIALS:    meropenem  IVPB 1000 every 8 hours      OTHER MEDS:  enoxaparin Injectable 40 milliGRAM(s) SubCutaneous two times a day  OLANZapine Injectable 2.5 milliGRAM(s) IntraMuscular every 6 hours PRN  thiamine IVPB 500 milliGRAM(s) IV Intermittent every 8 hours    Vital Signs Last 24 Hrs  T(F): 100.5 (12-21-20 @ 10:00), Max: 102.5 (12-20-20 @ 21:41)  HR: 109 (12-21-20 @ 10:00)  BP: 131/81 (12-21-20 @ 10:00)  RR: 20 (12-21-20 @ 10:00)  SpO2: 91% (12-21-20 @ 10:00) (91% - 98%)    PHYSICAL EXAM:  General: lethargic no distress on 1:1  HEAD/EYES: NGT, no erythema face  ENT:  scar left neck  Cardiovascular: s1S2  Respiratory: clear anteriorly  GI:   soft, non-tender, normal bowel sounds, superficial wounds lower abd.  : condom cath  Musculoskeletal: no synovitis  Skin:  no rash, or erythema  Psychiatric: lethargic  Lines:  [x ] no phlebitis [ ] central line                                     11.6   10.42 )-----------( 437      ( 21 Dec 2020 07:46 )             39.7 12-21    155  |  120  |  25  ----------------------------<  118  3.8   |  21  |  0.97  Ca    9.9      21 Dec 2020 07:46Phos  3.8     12-21Mg     2.7     12-21  TPro  7.3  /  Alb  3.8  /  TBili  0.4  /  DBili  x   /  AST  27  /  ALT  35  /  AlkPhos  44  12-21          MICROBIOLOGY:  culre - Blood (12.19.20 @ 02:21)   Specimen Source: .Blood Blood-Peripheral   Culture Results:   No growth to date. Culture - Blood (12.19.20 @ 02:21)   Specimen Source: .Blood Blood-Venous   Culture Results:   No growth to date. Urine Microscopic-Add On (NC) (12.18.20 @ 19:05)   Epithelial Cells: 0 /HPF   Red Blood Cell - Urine: 4 /HPF   White Blood Cell - Urine: 0 /HPF   Bacteria: Negative Culture - Blood (12.15.20 @ 16:34)   Specimen Source: .Blood Blood-Peripheral   Culture Results:   No growth to date. Culture - Blood (12.15.20 @ 16:32)   Specimen Source: .Blood Blood-Venous   Culture Results:   No growth to date.     .Blood Blood-Peripheral  12-13-20   No growth to date.  --  --      .Blood Blood-Peripheral  12-09-20   No Growth Final  --  --      .Sputum Sputum  12-08-20   Numerous Serratia marcescens  Normal Respiratory Leticia absent  --  Serratia marcescens      .CSF CSF  12-06-20   No growth at 3 days.  --    No polymorphonuclear leukocytes seen  No organisms seen  by cytocentrifuge      .Blood Blood-Arterial  12-05-20   No Growth Final  --  --      .Blood  12-05-20   No Growth Final  --  --      .Blood  12-05-20   No Growth Final  --  --      .Urine  12-05-20   No growth  --  --      RADIOLOGY:    rad< from: CT Angio Chest w/ IV Cont (12.15.20 @ 14:27) >    EXAM:  CT ANGIO CHEST (W)AW IC        PROCEDURE DATE:  Dec 15 2020         INTERPRETATION:  CLINICAL INFORMATION: Tachycardic and hypoxic. Evaluate for pulmonary embolism.    COMPARISON: CT chest abdomen pelvis 12/6/2020.    PROCEDURE:  CT Angiography of the Chest.  90 ml of Omnipaque 350 was injected intravenously. 10 ml were discarded.  Sagittal and coronal reformats were performed as well as 3D (MIP) reconstructions.    FINDINGS:    LUNGS AND PLEURA: Since 12/6/2020, the right and left pleural effusion are new. The bilateral lower lobe consolidation has decreased, however, there is likely septal thickening and groundglass opacities within the bilateral upper lobes. The opacities in the bilateral lobes have slightly increased in the apices since 12/6/2020 allowing for respiratory motion while they have slightly decreased in the remainder of the upper lobes.    MEDIASTINUM AND KATHY: The chest lymph nodes involving the upper paratracheal, lower paratracheal, prevascular, AP window, and subcarinal stations are unchanged. The visualized thyroid gland and esophagus are unremarkable    VESSELS: The aortic root measures 4.3 cm at the sinuses of Valsalva. The ascending aorta measures 4.1 cm at the main pulmonary artery. The remainder of the aorta is unremarkable.    There is no main, central, lobar, or proximal segmental pulmonary embolus. The mid to distal segmental and subsegmental vessels are not well evaluated secondary to motion.    HEART: Heart size is normal. No pericardial effusion.    CHEST WALL AND LOWER NECK: Within normal limits.    VISUALIZED UPPER ABDOMEN: Solitary right hepatic lobe calcification.    BONES: Degenerative changes.    IMPRESSION:    1.  No pulmonary embolism, however, the mid to distal segmental and subsegmental vessels are not well evaluated secondary to motion.  2.  Since 12/6/2020, the bilateral pleural effusions have increased, however, the bilateral lower lobe opacities have decreased.  3.  In addition, the opacities in the bilateral lung apices and likely septal thickening and increased while the additional upper lobe opacities have decreased. These findings are of uncertain etiology.            ANNAMARIE MCKINNEY MD; Resident Radiology  This document has been electronically signed.  STEPHANIE FELICIANO MD; Attending Radiologist  This document has been electronically signed. Dec 15 2020  4:14PM    < end of copied text >  rd< from: CT Neck Soft Tissue w/ IV Cont (12.21.20 @ 11:13) >    IMPRESSION:  -Nonspecific trace subcutaneous fat stranding involving the bilateral lower face. Correlate for cellulitis.    -No evidence for acute penetrating soft tissue injury.    < end of copied text >

## 2020-12-22 LAB
% ALBUMIN: 43.4 % — SIGNIFICANT CHANGE UP
% ALPHA 1: 5.9 % — SIGNIFICANT CHANGE UP
% ALPHA 2: 18.8 % — SIGNIFICANT CHANGE UP
% BETA: 16.6 % — SIGNIFICANT CHANGE UP
% GAMMA: 15.2 % — SIGNIFICANT CHANGE UP
ADD ON TEST-SPECIMEN IN LAB: SIGNIFICANT CHANGE UP
ALBUMIN SERPL ELPH-MCNC: 2.91 G/DL — LOW (ref 3.3–4.4)
ALBUMIN SERPL ELPH-MCNC: 3.7 G/DL — SIGNIFICANT CHANGE UP (ref 3.3–5)
ALBUMIN/GLOB SERPL ELPH: 0.8 RATIO — SIGNIFICANT CHANGE UP
ALP SERPL-CCNC: 43 U/L — SIGNIFICANT CHANGE UP (ref 40–120)
ALPHA1 GLOB SERPL ELPH-MCNC: 0.4 G/DL — HIGH (ref 0.1–0.3)
ALPHA2 GLOB SERPL ELPH-MCNC: 1.3 G/DL — HIGH (ref 0.6–1)
ALT FLD-CCNC: 38 U/L — SIGNIFICANT CHANGE UP (ref 4–41)
ANION GAP SERPL CALC-SCNC: 13 MMOL/L — SIGNIFICANT CHANGE UP (ref 7–14)
ANION GAP SERPL CALC-SCNC: 14 MMOL/L — SIGNIFICANT CHANGE UP (ref 7–14)
AST SERPL-CCNC: 28 U/L — SIGNIFICANT CHANGE UP (ref 4–40)
B-GLOBULIN SERPL ELPH-MCNC: 1.11 G/DL — SIGNIFICANT CHANGE UP (ref 0.7–1.7)
BILIRUB SERPL-MCNC: 0.4 MG/DL — SIGNIFICANT CHANGE UP (ref 0.2–1.2)
BUN SERPL-MCNC: 21 MG/DL — SIGNIFICANT CHANGE UP (ref 7–23)
BUN SERPL-MCNC: 24 MG/DL — HIGH (ref 7–23)
CALCIUM SERPL-MCNC: 9.4 MG/DL — SIGNIFICANT CHANGE UP (ref 8.4–10.5)
CALCIUM SERPL-MCNC: 9.7 MG/DL — SIGNIFICANT CHANGE UP (ref 8.4–10.5)
CHLORIDE SERPL-SCNC: 116 MMOL/L — HIGH (ref 98–107)
CHLORIDE SERPL-SCNC: 117 MMOL/L — HIGH (ref 98–107)
CO2 SERPL-SCNC: 20 MMOL/L — LOW (ref 22–31)
CO2 SERPL-SCNC: 20 MMOL/L — LOW (ref 22–31)
CREAT SERPL-MCNC: 0.79 MG/DL — SIGNIFICANT CHANGE UP (ref 0.5–1.3)
CREAT SERPL-MCNC: 0.9 MG/DL — SIGNIFICANT CHANGE UP (ref 0.5–1.3)
CRP SERPL-MCNC: <4 MG/L — SIGNIFICANT CHANGE UP
ERYTHROCYTE [SEDIMENTATION RATE] IN BLOOD: 46 MM/HR — HIGH (ref 1–15)
GAMMA GLOBULIN: 1.02 G/DL — LOW (ref 6–7.3)
GLUCOSE SERPL-MCNC: 119 MG/DL — HIGH (ref 70–99)
GLUCOSE SERPL-MCNC: 146 MG/DL — HIGH (ref 70–99)
HCT VFR BLD CALC: 38.3 % — LOW (ref 39–50)
HGB BLD-MCNC: 11.3 G/DL — LOW (ref 13–17)
MAGNESIUM SERPL-MCNC: 2.5 MG/DL — SIGNIFICANT CHANGE UP (ref 1.6–2.6)
MCHC RBC-ENTMCNC: 29 PG — SIGNIFICANT CHANGE UP (ref 27–34)
MCHC RBC-ENTMCNC: 29.5 GM/DL — LOW (ref 32–36)
MCV RBC AUTO: 98.2 FL — SIGNIFICANT CHANGE UP (ref 80–100)
NRBC # BLD: 0 /100 WBCS — SIGNIFICANT CHANGE UP
NRBC # FLD: 0 K/UL — SIGNIFICANT CHANGE UP
PHOSPHATE SERPL-MCNC: 3.5 MG/DL — SIGNIFICANT CHANGE UP (ref 2.5–4.5)
PLATELET # BLD AUTO: 251 K/UL — SIGNIFICANT CHANGE UP (ref 150–400)
POTASSIUM SERPL-MCNC: 3.4 MMOL/L — LOW (ref 3.5–5.3)
POTASSIUM SERPL-MCNC: 3.6 MMOL/L — SIGNIFICANT CHANGE UP (ref 3.5–5.3)
POTASSIUM SERPL-SCNC: 3.4 MMOL/L — LOW (ref 3.5–5.3)
POTASSIUM SERPL-SCNC: 3.6 MMOL/L — SIGNIFICANT CHANGE UP (ref 3.5–5.3)
PROCALCITONIN SERPL-MCNC: 0.12 NG/ML — HIGH (ref 0.02–0.1)
PROT PATTERN SERPL ELPH-IMP: SIGNIFICANT CHANGE UP
PROT SERPL-MCNC: 6.6 G/DL — SIGNIFICANT CHANGE UP (ref 6–8.3)
PROT SERPL-MCNC: 6.7 G/DL — SIGNIFICANT CHANGE UP
RBC # BLD: 3.9 M/UL — LOW (ref 4.2–5.8)
RBC # FLD: 14.4 % — SIGNIFICANT CHANGE UP (ref 10.3–14.5)
SODIUM SERPL-SCNC: 150 MMOL/L — HIGH (ref 135–145)
SODIUM SERPL-SCNC: 150 MMOL/L — HIGH (ref 135–145)
TSH SERPL-MCNC: 1.48 UIU/ML — SIGNIFICANT CHANGE UP (ref 0.27–4.2)
WBC # BLD: 9.53 K/UL — SIGNIFICANT CHANGE UP (ref 3.8–10.5)
WBC # FLD AUTO: 9.53 K/UL — SIGNIFICANT CHANGE UP (ref 3.8–10.5)

## 2020-12-22 PROCEDURE — 99233 SBSQ HOSP IP/OBS HIGH 50: CPT | Mod: GC

## 2020-12-22 PROCEDURE — 99232 SBSQ HOSP IP/OBS MODERATE 35: CPT

## 2020-12-22 PROCEDURE — 99233 SBSQ HOSP IP/OBS HIGH 50: CPT

## 2020-12-22 RX ORDER — POTASSIUM CHLORIDE 20 MEQ
40 PACKET (EA) ORAL ONCE
Refills: 0 | Status: COMPLETED | OUTPATIENT
Start: 2020-12-22 | End: 2020-12-22

## 2020-12-22 RX ORDER — SODIUM CHLORIDE 9 MG/ML
1000 INJECTION, SOLUTION INTRAVENOUS
Refills: 0 | Status: DISCONTINUED | OUTPATIENT
Start: 2020-12-22 | End: 2020-12-23

## 2020-12-22 RX ADMIN — Medication 105 MILLIGRAM(S): at 13:44

## 2020-12-22 RX ADMIN — Medication 40 MILLIEQUIVALENT(S): at 17:01

## 2020-12-22 RX ADMIN — ENOXAPARIN SODIUM 40 MILLIGRAM(S): 100 INJECTION SUBCUTANEOUS at 05:21

## 2020-12-22 RX ADMIN — Medication 105 MILLIGRAM(S): at 05:22

## 2020-12-22 RX ADMIN — ENOXAPARIN SODIUM 40 MILLIGRAM(S): 100 INJECTION SUBCUTANEOUS at 17:01

## 2020-12-22 RX ADMIN — SODIUM CHLORIDE 125 MILLILITER(S): 9 INJECTION, SOLUTION INTRAVENOUS at 11:28
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
## 2020-12-22 NOTE — PROGRESS NOTE ADULT - PROBLEM SELECTOR PLAN 1
Encephalopathy of unknown origin. Initially, possibly septic enceph given initially with multifocal pneumonia, sputum cx with Serratia, s/p course of Zosyn, now on Meropenem.  Vs. Multiorgan inflammatory syndrome d/t COVID given + Ab.  Vs. Hypernatremia.  AOx1-2, waxes and wanes.  - MR brain 12/9 with slightly prominent cerebellar sulci, thickened calvarial diploic space, paranasal sinus mucosal thickening with opacities in mastoid.   - Spot EEG 12/19 with no epileptiform abnormalities.   - LP negative, BCx/UCx negative to date  - Failed s/s eval x2 --> s/p NGT, on tube feeds  - c/w IV thiamine  - 1:1 CO and PRN ativan 2mg q6h for agitation Encephalopathy of unknown origin. Initially, possibly septic enceph given initially with multifocal pneumonia, sputum cx with Serratia, s/p course of Zosyn.  Vs. Multiorgan inflammatory syndrome d/t COVID given + Ab.  Vs. Hypernatremia.  AOx2, waxes and wanes.  Slight improvement with downtrending serum Na.  - MR brain 12/9 with slightly prominent cerebellar sulci, thickened calvarial diploic space, paranasal sinus mucosal thickening with opacities in mastoid.   - Spot EEG 12/19 with no epileptiform abnormalities.   - LP negative, BCx/UCx negative to date  - c/w high dose IV thiamine  - 1:1 CO and PRN ativan 2mg q6h for agitation  - Failed s/s eval x2 --> s/p NGT, on tube feeds Fever curve (100-101.3) improved s/p discontinuing meropenem on 12/21. Possibly central fever. Pan CT revealed multifocal pneumonia, s/p Zosyn course in MICU. LUE cellulitis, s/p Vanc course, resolved. No PE, no DVT's. TTE 12/8 cannot r/o endocarditis. LP negative. RASHEEDA negative. CRP wnl. Repeat BCxs w/ NGTD.  - Monitoring off of abx  - F/u ANCA, Fungitell, Fungal blood cx

## 2020-12-22 NOTE — PROVIDER CONTACT NOTE (OTHER) - ACTION/TREATMENT ORDERED:
MD made aware. No intervention given at this time. MD wants to hold off on giving tylenol at this time.
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
## 2020-12-22 NOTE — BH CONSULTATION LIAISON PROGRESS NOTE - NSBHFUPINTERVALHXFT_PSY_A_CORE
Attempted to use ukranian , unable to understand patient as speech garbled and patient coughing, exam limited, per staff patient thinks he is in a high school, AOx1 follows simple commands

## 2020-12-22 NOTE — PROGRESS NOTE ADULT - SUBJECTIVE AND OBJECTIVE BOX
Evert Holloway Sridevi, PGY1  Pager [phone]    INCOMPLETE NOTE - IN PROGRESS    Patient is a 55y old  Male who presents with a chief complaint of AMS (21 Dec 2020 13:53)      SUBJECTIVE/INTERVAL EVENTS: Patient seen and examined at bedside.    MEDICATIONS  (STANDING):  dextrose 5%. 1000 milliLiter(s) (125 mL/Hr) IV Continuous <Continuous>  enoxaparin Injectable 40 milliGRAM(s) SubCutaneous two times a day  thiamine IVPB 500 milliGRAM(s) IV Intermittent every 8 hours    MEDICATIONS  (PRN):  LORazepam   Injectable 2 milliGRAM(s) IV Push every 6 hours PRN Agitation      VITAL SIGNS:  T(F): 101.1 (12-22-20 @ 04:00), Max: 101.3 (12-21-20 @ 07:16)  HR: 109 (12-22-20 @ 04:00) (108 - 110)  BP: 148/98 (12-22-20 @ 04:00) (131/81 - 148/98)  RR: 20 (12-22-20 @ 04:00) (20 - 20)  SpO2: 95% (12-22-20 @ 04:00) (91% - 95%)    I&O's Summary    20 Dec 2020 07:01  -  21 Dec 2020 07:00  --------------------------------------------------------  IN: 500 mL / OUT: 1300 mL / NET: -800 mL    21 Dec 2020 07:01  -  22 Dec 2020 06:11  --------------------------------------------------------  IN: 1400 mL / OUT: 875 mL / NET: 525 mL      Daily     Daily     PHYSICAL EXAM:  Gen: Alert, NAD  HEENT: NCAT, conjunctiva clear, sclera anicteric, no erythema or exudates in the oropharynx, mmm  Neck: Supple, no JVD  CV: RRR, S1S2, no m/r/g  Resp: CTAB, normal respiratory effort  Abd: Soft, nontender, nondistended, normal bowel sounds  Ext: no edema, no clubbing or cyanosis  Neuro: AOx3, CN2-12 grossly intact, TROY  SKIN: warm, perfused    LABS:                        11.6   10.42 )-----------( 437      ( 21 Dec 2020 07:46 )             39.7     Hgb Trend: 11.6<--, 12.0<--, 11.7<--, 11.2<--, 10.7<--  12-21    150<H>  |  117<H>  |  24<H>  ----------------------------<  133<H>  3.5   |  21<L>  |  0.91    Ca    9.7      21 Dec 2020 19:29  Phos  3.2     12-21  Mg     2.4     12-21    TPro  7.3  /  Alb  3.8  /  TBili  0.4  /  DBili  x   /  AST  27  /  ALT  35  /  AlkPhos  44  12-21    Creatinine Trend: 0.91<--, 0.97<--, 0.96<--, 1.03<--, 0.88<--, 0.96<--  LIVER FUNCTIONS - ( 21 Dec 2020 07:46 )  Alb: 3.8 g/dL / Pro: 7.3 g/dL / ALK PHOS: 44 U/L / ALT: 35 U/L / AST: 27 U/L / GGT: x                     CAPILLARY BLOOD GLUCOSE          RADIOLOGY & ADDITIONAL TESTS: Reviewed    Imaging Personally Reviewed:    Consultant(s) Notes Reviewed:      Care Discussed with Consultants/Other Providers:   Evert Mathewon, PGY1  Pager [phone]      Patient is a 55y old  Male who presents with a chief complaint of AMS (21 Dec 2020 13:53)      SUBJECTIVE/INTERVAL EVENTS:  No acute complaints, appears less lethargic. Denies pain anywhere.  Patient seen and examined at bedside.    MEDICATIONS  (STANDING):  dextrose 5%. 1000 milliLiter(s) (125 mL/Hr) IV Continuous <Continuous>  enoxaparin Injectable 40 milliGRAM(s) SubCutaneous two times a day  thiamine IVPB 500 milliGRAM(s) IV Intermittent every 8 hours    MEDICATIONS  (PRN):  LORazepam   Injectable 2 milliGRAM(s) IV Push every 6 hours PRN Agitation      VITAL SIGNS:  T(F): 101.1 (12-22-20 @ 04:00), Max: 101.3 (12-21-20 @ 07:16)  HR: 109 (12-22-20 @ 04:00) (108 - 110)  BP: 148/98 (12-22-20 @ 04:00) (131/81 - 148/98)  RR: 20 (12-22-20 @ 04:00) (20 - 20)  SpO2: 95% (12-22-20 @ 04:00) (91% - 95%)    I&O's Summary    20 Dec 2020 07:01  -  21 Dec 2020 07:00  --------------------------------------------------------  IN: 500 mL / OUT: 1300 mL / NET: -800 mL    21 Dec 2020 07:01  -  22 Dec 2020 06:11  --------------------------------------------------------  IN: 1400 mL / OUT: 875 mL / NET: 525 mL      Daily     Daily     PHYSICAL EXAM:  GENERAL: Lethargy appears improved, NAD  EYES: EOMI, PERRLA  CHEST/LUNG: on RA, rhonchi diffusely  HEART: Regular rate and rhythm; No murmurs  ABDOMEN: Soft, Nontender, Nondistended  EXTREMITIES:  Trace b/l hand edema improved  PSYCH: AOx2 to self and place.  NEUROLOGY: Rigidity in BUE to passive movement.  SKIN: LUE cellulitis: improved, no erythema noted      LABS:                        11.6   10.42 )-----------( 437      ( 21 Dec 2020 07:46 )             39.7     Hgb Trend: 11.6<--, 12.0<--, 11.7<--, 11.2<--, 10.7<--  12-21    150<H>  |  117<H>  |  24<H>  ----------------------------<  133<H>  3.5   |  21<L>  |  0.91    Ca    9.7      21 Dec 2020 19:29  Phos  3.2     12-21  Mg     2.4     12-21    TPro  7.3  /  Alb  3.8  /  TBili  0.4  /  DBili  x   /  AST  27  /  ALT  35  /  AlkPhos  44  12-21    Creatinine Trend: 0.91<--, 0.97<--, 0.96<--, 1.03<--, 0.88<--, 0.96<--  LIVER FUNCTIONS - ( 21 Dec 2020 07:46 )  Alb: 3.8 g/dL / Pro: 7.3 g/dL / ALK PHOS: 44 U/L / ALT: 35 U/L / AST: 27 U/L / GGT: x                     CAPILLARY BLOOD GLUCOSE          RADIOLOGY & ADDITIONAL TESTS: Reviewed    Imaging Personally Reviewed:    Consultant(s) Notes Reviewed:      Care Discussed with Consultants/Other Providers:

## 2020-12-22 NOTE — PROGRESS NOTE ADULT - SUBJECTIVE AND OBJECTIVE BOX
Follow Up:  fever    Interval History/ROS: sleeping    Allergies  No Known Allergies    ANTIMICROBIALS:      OTHER MEDS:  MEDICATIONS  (STANDING):  enoxaparin Injectable 40 two times a day  LORazepam   Injectable 2 every 6 hours PRN      Vital Signs Last 24 Hrs  T(C): 38.4 (22 Dec 2020 15:40), Max: 38.6 (22 Dec 2020 09:47)  T(F): 101.2 (22 Dec 2020 15:40), Max: 101.5 (22 Dec 2020 09:47)  HR: 113 (22 Dec 2020 15:40) (103 - 113)  BP: 144/92 (22 Dec 2020 15:40) (134/92 - 148/98)  BP(mean): --  RR: 19 (22 Dec 2020 15:40) (19 - 20)  SpO2: 92% (22 Dec 2020 15:40) (91% - 95%)    PHYSICAL EXAM:  General: WN/WD NAD, Non-toxic  Neurology: A&Ox3, nonfocal  Respiratory: Clear to auscultation bilaterally  CV: RRR, S1S2, no murmurs, rubs or gallops  Abdominal: Soft, Non-tender, non-distended, normal bowel sounds  Extremities: No edema  Line Sites: Clear  Skin: No rash                        11.3   9.53  )-----------( 251      ( 22 Dec 2020 07:42 )             38.3   WBC Count: 9.53 (12-22 @ 07:42)  WBC Count: 10.42 (12-21 @ 07:46)  WBC Count: 12.91 (12-20 @ 07:57)  WBC Count: 10.78 (12-19 @ 06:17)  WBC Count: 9.73 (12-18 @ 08:18)      12-22    150<H>  |  116<H>  |  24<H>  ----------------------------<  119<H>  3.4<L>   |  20<L>  |  0.90    Ca    9.7      22 Dec 2020 07:42  Phos  3.5     12-22  Mg     2.5     12-22    TPro  6.6  /  Alb  3.7  /  TBili  0.4  /  DBili  x   /  AST  28  /  ALT  38  /  AlkPhos  43  12-22    12.22.20 @ 07:42)  Procalcitonin, Serum: 0.12:     MICROBIOLOGY:  .Blood Blood-Venous  12-22-20   Testing in progress  --  --      .Blood Blood-Venous  12-19-20   No growth to date.  --  --      .Blood Blood-Venous  12-15-20   No Growth Final  --  --      .Blood Blood-Peripheral  12-15-20   No Growth Final  --  --      .Blood Blood-Peripheral  12-13-20   No Growth Final  --  --      .Blood Blood-Venous  12-13-20   No Growth Final  --  --      .Blood Blood-Peripheral  12-09-20   No Growth Final  --  --      .Sputum Sputum  12-08-20   Numerous Serratia marcescens  Normal Respiratory Leticia absent  --  Serratia marcescens      .CSF CSF  12-06-20   No growth at 3 days.  --    No polymorphonuclear leukocytes seen  No organisms seen  by cytocentrifuge      .Blood Blood-Arterial  12-05-20   No Growth Final  --  --      .Blood  12-05-20   No Growth Final  --  --      .Blood  12-05-20   No Growth Final  --  --      .Urine  12-05-20   No growth  --  --      RADIOLOGY:  rd< from: CT Neck Soft Tissue w/ IV Cont (12.21.20 @ 11:13) >  IMPRESSION:  -Nonspecific trace subcutaneous fat stranding involving the bilateral lower face. Correlate for cellulitis.    -No evidence for acute penetrating soft tissue injury.    < end of copied text >  < from: Xray Chest 1 View- PORTABLE-Routine (Xray Chest 1 View- PORTABLE-Routine .) (12.16.20 @ 15:54) >  FINDINGS:    Tip of the enteric tube is in the stomach.  Bibasilar linear atelectasis. Small bilateral pleural effusions. No pneumothorax.  Cardiac size cannot be accurately assessed in this projection. No acute osseous abnormality.    IMPRESSION:  Tip of the enteric tube is in the stomach.    < end of copied text >      Shan Preciado MD; Division of Infectious Disease; Pager: [phone]; nights and weekends: [phone]
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
## 2020-12-22 NOTE — PROGRESS NOTE ADULT - PROBLEM SELECTOR PLAN 4
DVT PPX: Lovenox 40mg  Diet: S/S reevaluation  PT consulted; recs pending  Dispo: transferred from [facility] D/t Hypovolemia secondary to insensible loss from persistent fever, poor PO intake  - Free water 350 q6h  - D5 125 cc/hr  - Check BMP at 6PM DVT PPX: Lovenox 40mg  Diet: TF for now, S/S reevaluation once mental status improving  PT consulted, recs pending  Dispo: transferred from [facility]

## 2020-12-23 LAB
ANION GAP SERPL CALC-SCNC: 12 MMOL/L — SIGNIFICANT CHANGE UP (ref 7–14)
BUN SERPL-MCNC: 22 MG/DL — SIGNIFICANT CHANGE UP (ref 7–23)
CALCIUM SERPL-MCNC: 9.9 MG/DL — SIGNIFICANT CHANGE UP (ref 8.4–10.5)
CHLORIDE SERPL-SCNC: 116 MMOL/L — HIGH (ref 98–107)
CO2 SERPL-SCNC: 21 MMOL/L — LOW (ref 22–31)
CREAT SERPL-MCNC: 0.73 MG/DL — SIGNIFICANT CHANGE UP (ref 0.5–1.3)
GLUCOSE SERPL-MCNC: 111 MG/DL — HIGH (ref 70–99)
HCT VFR BLD CALC: 36.6 % — LOW (ref 39–50)
HGB BLD-MCNC: 11.1 G/DL — LOW (ref 13–17)
MAGNESIUM SERPL-MCNC: 2.6 MG/DL — SIGNIFICANT CHANGE UP (ref 1.6–2.6)
MCHC RBC-ENTMCNC: 28.6 PG — SIGNIFICANT CHANGE UP (ref 27–34)
MCHC RBC-ENTMCNC: 30.3 GM/DL — LOW (ref 32–36)
MCV RBC AUTO: 94.3 FL — SIGNIFICANT CHANGE UP (ref 80–100)
NRBC # BLD: 0 /100 WBCS — SIGNIFICANT CHANGE UP
NRBC # FLD: 0 K/UL — SIGNIFICANT CHANGE UP
PHOSPHATE SERPL-MCNC: 3.2 MG/DL — SIGNIFICANT CHANGE UP (ref 2.5–4.5)
PLATELET # BLD AUTO: 374 K/UL — SIGNIFICANT CHANGE UP (ref 150–400)
POTASSIUM SERPL-MCNC: 3.5 MMOL/L — SIGNIFICANT CHANGE UP (ref 3.5–5.3)
POTASSIUM SERPL-SCNC: 3.5 MMOL/L — SIGNIFICANT CHANGE UP (ref 3.5–5.3)
RBC # BLD: 3.88 M/UL — LOW (ref 4.2–5.8)
RBC # FLD: 14.1 % — SIGNIFICANT CHANGE UP (ref 10.3–14.5)
SODIUM SERPL-SCNC: 149 MMOL/L — HIGH (ref 135–145)
WBC # BLD: 9.65 K/UL — SIGNIFICANT CHANGE UP (ref 3.8–10.5)
WBC # FLD AUTO: 9.65 K/UL — SIGNIFICANT CHANGE UP (ref 3.8–10.5)

## 2020-12-23 PROCEDURE — 99232 SBSQ HOSP IP/OBS MODERATE 35: CPT

## 2020-12-23 PROCEDURE — 99233 SBSQ HOSP IP/OBS HIGH 50: CPT

## 2020-12-23 PROCEDURE — 99233 SBSQ HOSP IP/OBS HIGH 50: CPT | Mod: GC

## 2020-12-23 PROCEDURE — 93010 ELECTROCARDIOGRAM REPORT: CPT

## 2020-12-23 RX ORDER — SODIUM CHLORIDE 9 MG/ML
1000 INJECTION, SOLUTION INTRAVENOUS
Refills: 0 | Status: DISCONTINUED | OUTPATIENT
Start: 2020-12-23 | End: 2020-12-25

## 2020-12-23 RX ORDER — POTASSIUM CHLORIDE 20 MEQ
40 PACKET (EA) ORAL ONCE
Refills: 0 | Status: COMPLETED | OUTPATIENT
Start: 2020-12-23 | End: 2020-12-23

## 2020-12-23 RX ADMIN — Medication 40 MILLIEQUIVALENT(S): at 16:46

## 2020-12-23 RX ADMIN — ENOXAPARIN SODIUM 40 MILLIGRAM(S): 100 INJECTION SUBCUTANEOUS at 16:54

## 2020-12-23 RX ADMIN — ENOXAPARIN SODIUM 40 MILLIGRAM(S): 100 INJECTION SUBCUTANEOUS at 05:44

## 2020-12-23 RX ADMIN — SODIUM CHLORIDE 125 MILLILITER(S): 9 INJECTION, SOLUTION INTRAVENOUS at 12:45

## 2020-12-23 NOTE — PROGRESS NOTE ADULT - PROBLEM SELECTOR PLAN 3
Likely d/t insensible fluid loss from persistent fever, poor PO intake.  Downtrending from 155 to 150 since yesterday.  - Free water 350 q6h  - Another trial of D5 125 cc/hr x 6hr  - Recheck BMP at 6PM Likely d/t insensible fluid loss from persistent fever, poor PO intake.  Na still at 150 since yesterday.  - Free water 350 q6h  - Another trial of D5 125 cc/hr x 6hr

## 2020-12-23 NOTE — PROGRESS NOTE ADULT - SUBJECTIVE AND OBJECTIVE BOX
Evert Mathewon, PGY1  Pager [phone]    INCOMPLETE NOTE - IN PROGRESS    Patient is a 55y old  Male who presents with a chief complaint of AMS (22 Dec 2020 16:34)      SUBJECTIVE/INTERVAL EVENTS: Patient seen and examined at bedside.    MEDICATIONS  (STANDING):  dextrose 5%. 1000 milliLiter(s) (125 mL/Hr) IV Continuous <Continuous>  enoxaparin Injectable 40 milliGRAM(s) SubCutaneous two times a day    MEDICATIONS  (PRN):  LORazepam   Injectable 2 milliGRAM(s) IV Push every 6 hours PRN Agitation      VITAL SIGNS:  T(F): 100 (12-23-20 @ 05:46), Max: 101.5 (12-22-20 @ 09:47)  HR: 103 (12-23-20 @ 05:46) (102 - 113)  BP: 149/96 (12-23-20 @ 05:46) (134/92 - 149/98)  RR: 17 (12-23-20 @ 05:46) (17 - 20)  SpO2: 95% (12-23-20 @ 05:46) (91% - 95%)    I&O's Summary    21 Dec 2020 07:01  -  22 Dec 2020 07:00  --------------------------------------------------------  IN: 1400 mL / OUT: 875 mL / NET: 525 mL    22 Dec 2020 07:01  -  23 Dec 2020 06:25  --------------------------------------------------------  IN: 0 mL / OUT: 700 mL / NET: -700 mL      Daily     Daily     PHYSICAL EXAM:  Gen: Alert, NAD  HEENT: NCAT, conjunctiva clear, sclera anicteric, no erythema or exudates in the oropharynx, mmm  Neck: Supple, no JVD  CV: RRR, S1S2, no m/r/g  Resp: CTAB, normal respiratory effort  Abd: Soft, nontender, nondistended, normal bowel sounds  Ext: no edema, no clubbing or cyanosis  Neuro: AOx3, CN2-12 grossly intact, TROY  SKIN: warm, perfused    LABS:                        11.3   9.53  )-----------( 251      ( 22 Dec 2020 07:42 )             38.3     Hgb Trend: 11.3<--, 11.6<--, 12.0<--, 11.7<--, 11.2<--  12-22    150<H>  |  117<H>  |  21  ----------------------------<  146<H>  3.6   |  20<L>  |  0.79    Ca    9.4      22 Dec 2020 22:10  Phos  3.5     12-22  Mg     2.5     12-22    TPro  6.6  /  Alb  3.7  /  TBili  0.4  /  DBili  x   /  AST  28  /  ALT  38  /  AlkPhos  43  12-22    Creatinine Trend: 0.79<--, 0.90<--, 0.91<--, 0.97<--, 0.96<--, 1.03<--  LIVER FUNCTIONS - ( 22 Dec 2020 07:42 )  Alb: 3.7 g/dL / Pro: 6.6 g/dL / ALK PHOS: 43 U/L / ALT: 38 U/L / AST: 28 U/L / GGT: x                     CAPILLARY BLOOD GLUCOSE          RADIOLOGY & ADDITIONAL TESTS: Reviewed    Imaging Personally Reviewed:    Consultant(s) Notes Reviewed:      Care Discussed with Consultants/Other Providers:   Evert Jewel Laureano, PGY1  Pager [phone]      Patient is a 55y old  Male who presents with a chief complaint of AMS (22 Dec 2020 16:34)      SUBJECTIVE/INTERVAL EVENTS:  NAEON  Patient has no acute complaints this AM.  Denies CP, SOB. Admits to cough.  Patient seen and examined at bedside.      MEDICATIONS  (STANDING):  dextrose 5%. 1000 milliLiter(s) (125 mL/Hr) IV Continuous <Continuous>  enoxaparin Injectable 40 milliGRAM(s) SubCutaneous two times a day    MEDICATIONS  (PRN):  LORazepam   Injectable 2 milliGRAM(s) IV Push every 6 hours PRN Agitation      VITAL SIGNS:  T(F): 100 (12-23-20 @ 05:46), Max: 101.5 (12-22-20 @ 09:47)  HR: 103 (12-23-20 @ 05:46) (102 - 113)  BP: 149/96 (12-23-20 @ 05:46) (134/92 - 149/98)  RR: 17 (12-23-20 @ 05:46) (17 - 20)  SpO2: 95% (12-23-20 @ 05:46) (91% - 95%)    I&O's Summary    21 Dec 2020 07:01  -  22 Dec 2020 07:00  --------------------------------------------------------  IN: 1400 mL / OUT: 875 mL / NET: 525 mL    22 Dec 2020 07:01  -  23 Dec 2020 06:25  --------------------------------------------------------  IN: 0 mL / OUT: 700 mL / NET: -700 mL      Daily     Daily     PHYSICAL EXAM:  GENERAL: Lethargy appears improved, shaking/tremulous, NAD  EYES: EOMI, PERRLA  CHEST/LUNG: on RA, rhonchi diffusely  HEART: Regular rate and rhythm; No murmurs  ABDOMEN: NG tube in place.  Soft, Nontender, Nondistended  EXTREMITIES:  Trace b/l hand edema improved  NEURO: AOx2 to self and place. Rigidity in BUE to passive movement.  SKIN: LUE cellulitis: improved, no erythema noted    LABS:                        11.3   9.53  )-----------( 251      ( 22 Dec 2020 07:42 )             38.3     Hgb Trend: 11.3<--, 11.6<--, 12.0<--, 11.7<--, 11.2<--  12-22    150<H>  |  117<H>  |  21  ----------------------------<  146<H>  3.6   |  20<L>  |  0.79    Ca    9.4      22 Dec 2020 22:10  Phos  3.5     12-22  Mg     2.5     12-22    TPro  6.6  /  Alb  3.7  /  TBili  0.4  /  DBili  x   /  AST  28  /  ALT  38  /  AlkPhos  43  12-22    Creatinine Trend: 0.79<--, 0.90<--, 0.91<--, 0.97<--, 0.96<--, 1.03<--  LIVER FUNCTIONS - ( 22 Dec 2020 07:42 )  Alb: 3.7 g/dL / Pro: 6.6 g/dL / ALK PHOS: 43 U/L / ALT: 38 U/L / AST: 28 U/L / GGT: x                     CAPILLARY BLOOD GLUCOSE          RADIOLOGY & ADDITIONAL TESTS: Reviewed    Imaging Personally Reviewed:    Consultant(s) Notes Reviewed:      Care Discussed with Consultants/Other Providers:

## 2020-12-23 NOTE — PROGRESS NOTE ADULT - PROBLEM SELECTOR PLAN 1
Fever curve (100-101.3) improved s/p discontinuing meropenem on 12/21. Possibly central fever. Pan CT revealed multifocal pneumonia, s/p Zosyn course in MICU. LUE cellulitis, s/p Vanc course, resolved. No PE, no DVT's. TTE 12/8 cannot r/o endocarditis. LP negative. RASHEEDA negative. CRP wnl. Repeat BCxs w/ NGTD.  - Monitoring off of abx  - F/u ANCA, Fungitell, Fungal blood cx Fever curve .5 s/p discontinuing meropenem on 12/21. Possibly chronic aspiration vs. drug-fever vs. central fever. Pan CT revealed multifocal pneumonia, s/p Zosyn course in MICU. LUE cellulitis, s/p Vanc course, resolved. No PE, no DVT's. TTE 12/8 cannot r/o endocarditis. LP negative. RASHEEDA negative. CRP wnl. Repeat BCxs w/ NGTD. RASHEEDA neg. Procal low, unlikely to be bacterial infx.  - Monitoring off of abx  - F/u ANCA, Fungitell, Fungal blood cx, Bartonella, Brucella, Chlamydia, Legionella  - Consider WBC scan vs. FREDI to rule out IE

## 2020-12-23 NOTE — PROGRESS NOTE ADULT - PROBLEM SELECTOR PLAN 2
Encephalopathy of unknown origin. Initially, possibly septic enceph given initially with multifocal pneumonia, sputum cx with Serratia, s/p course of Zosyn.  Vs. Multiorgan inflammatory syndrome d/t COVID given + Ab.  Vs. Hypernatremia.  AOx2, waxes and wanes.  Slight improvement with downtrending serum Na.  - MR brain 12/9 with slightly prominent cerebellar sulci, thickened calvarial diploic space, paranasal sinus mucosal thickening with opacities in mastoid.   - Spot EEG 12/19 with no epileptiform abnormalities.   - LP negative, BCx/UCx negative to date  - c/w high dose IV thiamine  - 1:1 CO and PRN ativan 2mg q6h for agitation  - Failed s/s eval x2 --> s/p NGT, on tube feeds
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
## 2020-12-23 NOTE — PROGRESS NOTE ADULT - SUBJECTIVE AND OBJECTIVE BOX
Follow Up:  fever    Interval History/ROS:  nods "OK"   smiled.      Allergies  No Known Allergies    ANTIMICROBIALS:      OTHER MEDS:  MEDICATIONS  (STANDING):  enoxaparin Injectable 40 two times a day  LORazepam   Injectable 2 every 6 hours PRN      Vital Signs Last 24 Hrs  T(F): 98.9 (12-23-20 @ 13:30), Max: 101.2 (12-22-20 @ 15:40)  HR: 124 (12-23-20 @ 13:30)  BP: 155/101 (12-23-20 @ 13:30)  RR: 22 (12-23-20 @ 13:30)  SpO2: 94% (12-23-20 @ 13:30) (92% - 95%)    PHYSICAL EXAM:  alert  no distress  NGT  lungs clear ant  cor: s1s2 no murmur  abd soft  ext peripheral iv left arm no erythema                          11.1   9.65  )-----------( 374      ( 23 Dec 2020 08:37 )             36.6 12-23    149  |  116  |  22  ----------------------------<  111  3.5   |  21  |  0.73  Ca    9.9      23 Dec 2020 08:37Phos  3.2     12-23Mg     2.6     12-23  TPro  6.6  /  Alb  3.7  /  TBili  0.4  /  DBili  x   /  AST  28  /  ALT  38  /  AlkPhos  43  12-22    12.22.20 @ 07:42)  Procalcitonin, Serum: 0.12:     MICROBIOLOGY:  .Blood Blood-Venous  12-22-20   Testing in progress  --  --      .Blood Blood-Venous  12-19-20   No growth to date.  --  --      .Blood Blood-Venous  12-15-20   No Growth Final  --  --      .Blood Blood-Peripheral  12-15-20   No Growth Final  --  --      .Blood Blood-Peripheral  12-13-20   No Growth Final  --  --      .Blood Blood-Venous  12-13-20   No Growth Final  --  --      .Blood Blood-Peripheral  12-09-20   No Growth Final  --  --      .Sputum Sputum  12-08-20   Numerous Serratia marcescens  Normal Respiratory Leticia absent  --  Serratia marcescens      .CSF CSF  12-06-20   No growth at 3 days.  --    No polymorphonuclear leukocytes seen  No organisms seen  by cytocentrifuge      .Blood Blood-Arterial  12-05-20   No Growth Final  --  --      .Blood  12-05-20   No Growth Final  --  --      .Blood  12-05-20   No Growth Final  --  --      .Urine  12-05-20   No growth  --  --      RADIOLOGY:  rd< from: CT Neck Soft Tissue w/ IV Cont (12.21.20 @ 11:13) >  IMPRESSION:  -Nonspecific trace subcutaneous fat stranding involving the bilateral lower face. Correlate for cellulitis.    -No evidence for acute penetrating soft tissue injury.    < end of copied text >  < from: Xray Chest 1 View- PORTABLE-Routine (Xray Chest 1 View- PORTABLE-Routine .) (12.16.20 @ 15:54) >  FINDINGS:    Tip of the enteric tube is in the stomach.  Bibasilar linear atelectasis. Small bilateral pleural effusions. No pneumothorax.  Cardiac size cannot be accurately assessed in this projection. No acute osseous abnormality.    IMPRESSION:  Tip of the enteric tube is in the stomach.    < end of copied text >
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
## 2020-12-23 NOTE — PROGRESS NOTE ADULT - PROBLEM SELECTOR PLAN 4
DVT PPX: Lovenox 40mg  Diet: TF for now, S/S reevaluation once mental status improving  PT consulted, recs pending  Dispo: transferred from [facility]
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
## 2020-12-24 LAB
ANION GAP SERPL CALC-SCNC: 13 MMOL/L — SIGNIFICANT CHANGE UP (ref 7–14)
BUN SERPL-MCNC: 22 MG/DL — SIGNIFICANT CHANGE UP (ref 7–23)
CALCIUM SERPL-MCNC: 9.7 MG/DL — SIGNIFICANT CHANGE UP (ref 8.4–10.5)
CHLORIDE SERPL-SCNC: 115 MMOL/L — HIGH (ref 98–107)
CO2 SERPL-SCNC: 19 MMOL/L — LOW (ref 22–31)
CREAT SERPL-MCNC: 0.65 MG/DL — SIGNIFICANT CHANGE UP (ref 0.5–1.3)
CULTURE RESULTS: SIGNIFICANT CHANGE UP
CULTURE RESULTS: SIGNIFICANT CHANGE UP
FUNGITELL: <31 PG/ML — SIGNIFICANT CHANGE UP
GLUCOSE SERPL-MCNC: 124 MG/DL — HIGH (ref 70–99)
HCT VFR BLD CALC: 37.1 % — LOW (ref 39–50)
HGB BLD-MCNC: 11 G/DL — LOW (ref 13–17)
MAGNESIUM SERPL-MCNC: 2.4 MG/DL — SIGNIFICANT CHANGE UP (ref 1.6–2.6)
MCHC RBC-ENTMCNC: 28.9 PG — SIGNIFICANT CHANGE UP (ref 27–34)
MCHC RBC-ENTMCNC: 29.6 GM/DL — LOW (ref 32–36)
MCV RBC AUTO: 97.6 FL — SIGNIFICANT CHANGE UP (ref 80–100)
NRBC # BLD: 0 /100 WBCS — SIGNIFICANT CHANGE UP
NRBC # FLD: 0 K/UL — SIGNIFICANT CHANGE UP
PHOSPHATE SERPL-MCNC: 3.5 MG/DL — SIGNIFICANT CHANGE UP (ref 2.5–4.5)
PLATELET # BLD AUTO: 355 K/UL — SIGNIFICANT CHANGE UP (ref 150–400)
POTASSIUM SERPL-MCNC: 3.5 MMOL/L — SIGNIFICANT CHANGE UP (ref 3.5–5.3)
POTASSIUM SERPL-SCNC: 3.5 MMOL/L — SIGNIFICANT CHANGE UP (ref 3.5–5.3)
RBC # BLD: 3.8 M/UL — LOW (ref 4.2–5.8)
RBC # FLD: 14.2 % — SIGNIFICANT CHANGE UP (ref 10.3–14.5)
SODIUM SERPL-SCNC: 147 MMOL/L — HIGH (ref 135–145)
SPECIMEN SOURCE: SIGNIFICANT CHANGE UP
SPECIMEN SOURCE: SIGNIFICANT CHANGE UP
WBC # BLD: 8.62 K/UL — SIGNIFICANT CHANGE UP (ref 3.8–10.5)
WBC # FLD AUTO: 8.62 K/UL — SIGNIFICANT CHANGE UP (ref 3.8–10.5)

## 2020-12-24 PROCEDURE — 99232 SBSQ HOSP IP/OBS MODERATE 35: CPT

## 2020-12-24 PROCEDURE — 99233 SBSQ HOSP IP/OBS HIGH 50: CPT | Mod: GC

## 2020-12-24 PROCEDURE — 99233 SBSQ HOSP IP/OBS HIGH 50: CPT

## 2020-12-24 RX ORDER — POTASSIUM CHLORIDE 20 MEQ
40 PACKET (EA) ORAL ONCE
Refills: 0 | Status: COMPLETED | OUTPATIENT
Start: 2020-12-24 | End: 2020-12-24

## 2020-12-24 RX ADMIN — Medication 0.5 MILLIGRAM(S): at 11:46

## 2020-12-24 RX ADMIN — Medication 40 MILLIEQUIVALENT(S): at 11:01

## 2020-12-24 RX ADMIN — ENOXAPARIN SODIUM 40 MILLIGRAM(S): 100 INJECTION SUBCUTANEOUS at 05:02

## 2020-12-24 RX ADMIN — ENOXAPARIN SODIUM 40 MILLIGRAM(S): 100 INJECTION SUBCUTANEOUS at 16:18
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
## 2020-12-24 NOTE — PROGRESS NOTE ADULT - SUBJECTIVE AND OBJECTIVE BOX
Evert Mathewon, PGY1  Pager [phone]    INCOMPLETE NOTE - IN PROGRESS    Patient is a 55y old  Male who presents with a chief complaint of AMS (23 Dec 2020 13:52)      SUBJECTIVE/INTERVAL EVENTS: Patient seen and examined at bedside.    MEDICATIONS  (STANDING):  dextrose 5%. 1000 milliLiter(s) (125 mL/Hr) IV Continuous <Continuous>  enoxaparin Injectable 40 milliGRAM(s) SubCutaneous two times a day    MEDICATIONS  (PRN):  LORazepam   Injectable 2 milliGRAM(s) IV Push every 6 hours PRN Agitation      VITAL SIGNS:  T(F): 100.1 (12-24-20 @ 06:22), Max: 101 (12-24-20 @ 03:09)  HR: 101 (12-24-20 @ 06:22) (90 - 124)  BP: 146/91 (12-24-20 @ 06:22) (133/80 - 155/101)  RR: 19 (12-24-20 @ 06:22) (16 - 22)  SpO2: 95% (12-24-20 @ 06:22) (92% - 97%)    I&O's Summary    23 Dec 2020 07:01  -  24 Dec 2020 07:00  --------------------------------------------------------  IN: 1376 mL / OUT: 703 mL / NET: 673 mL      Daily     Daily     PHYSICAL EXAM:  Gen: Alert, NAD  HEENT: NCAT, conjunctiva clear, sclera anicteric, no erythema or exudates in the oropharynx, mmm  Neck: Supple, no JVD  CV: RRR, S1S2, no m/r/g  Resp: CTAB, normal respiratory effort  Abd: Soft, nontender, nondistended, normal bowel sounds  Ext: no edema, no clubbing or cyanosis  Neuro: AOx3, CN2-12 grossly intact, TROY  SKIN: warm, perfused    LABS:                        11.1   9.65  )-----------( 374      ( 23 Dec 2020 08:37 )             36.6     Hgb Trend: 11.1<--, 11.3<--, 11.6<--, 12.0<--, 11.7<--  12-23    149<H>  |  116<H>  |  22  ----------------------------<  111<H>  3.5   |  21<L>  |  0.73    Ca    9.9      23 Dec 2020 08:37  Phos  3.2     12-23  Mg     2.6     12-23    TPro  6.6  /  Alb  3.7  /  TBili  0.4  /  DBili  x   /  AST  28  /  ALT  38  /  AlkPhos  43  12-22    Creatinine Trend: 0.73<--, 0.79<--, 0.90<--, 0.91<--, 0.97<--, 0.96<--  LIVER FUNCTIONS - ( 22 Dec 2020 07:42 )  Alb: 3.7 g/dL / Pro: 6.6 g/dL / ALK PHOS: 43 U/L / ALT: 38 U/L / AST: 28 U/L / GGT: x                     CAPILLARY BLOOD GLUCOSE          RADIOLOGY & ADDITIONAL TESTS: Reviewed    Imaging Personally Reviewed:    Consultant(s) Notes Reviewed:      Care Discussed with Consultants/Other Providers:   Evert Holloway Sridevi, PGY1  Pager [phone]      Patient is a 55y old  Male who presents with a chief complaint of AMS (23 Dec 2020 13:52)      SUBJECTIVE/INTERVAL EVENTS:  Tachycardic to 120s yesterday afternoon ~5pm, afebrile, exam was unchanged.  Febrile to 101 at 3AM overnight.  This AM, patient has no acute complaints. Responds "no" to ROS, mumbling at times.  Patient seen and examined at bedside.      MEDICATIONS  (STANDING):  dextrose 5%. 1000 milliLiter(s) (125 mL/Hr) IV Continuous <Continuous>  enoxaparin Injectable 40 milliGRAM(s) SubCutaneous two times a day    MEDICATIONS  (PRN):  LORazepam   Injectable 2 milliGRAM(s) IV Push every 6 hours PRN Agitation      VITAL SIGNS:  T(F): 100.1 (12-24-20 @ 06:22), Max: 101 (12-24-20 @ 03:09)  HR: 101 (12-24-20 @ 06:22) (90 - 124)  BP: 146/91 (12-24-20 @ 06:22) (133/80 - 155/101)  RR: 19 (12-24-20 @ 06:22) (16 - 22)  SpO2: 95% (12-24-20 @ 06:22) (92% - 97%)    I&O's Summary    23 Dec 2020 07:01  -  24 Dec 2020 07:00  --------------------------------------------------------  IN: 1376 mL / OUT: 703 mL / NET: 673 mL      Daily     Daily     PHYSICAL EXAM:  GENERAL:  male, shaking/tremulous, NAD  EYES: EOMI, PERRLA  CHEST/LUNG: on RA, rhonchi diffusely  HEART: Regular rate and rhythm; No murmurs  ABDOMEN: NG tube in place.  Soft, Nontender, Nondistended  EXTREMITIES:  Trace BLE and BUE edema.  NEURO: AOx2 to self and place. Rigidity in BUE to passive movement. Tremulous with movement.   SKIN: LUE cellulitis: improved, no erythema noted    LABS:                        11.1   9.65  )-----------( 374      ( 23 Dec 2020 08:37 )             36.6     Hgb Trend: 11.1<--, 11.3<--, 11.6<--, 12.0<--, 11.7<--  12-23    149<H>  |  116<H>  |  22  ----------------------------<  111<H>  3.5   |  21<L>  |  0.73    Ca    9.9      23 Dec 2020 08:37  Phos  3.2     12-23  Mg     2.6     12-23    TPro  6.6  /  Alb  3.7  /  TBili  0.4  /  DBili  x   /  AST  28  /  ALT  38  /  AlkPhos  43  12-22    Creatinine Trend: 0.73<--, 0.79<--, 0.90<--, 0.91<--, 0.97<--, 0.96<--  LIVER FUNCTIONS - ( 22 Dec 2020 07:42 )  Alb: 3.7 g/dL / Pro: 6.6 g/dL / ALK PHOS: 43 U/L / ALT: 38 U/L / AST: 28 U/L / GGT: x                     CAPILLARY BLOOD GLUCOSE          RADIOLOGY & ADDITIONAL TESTS: Reviewed    Imaging Personally Reviewed:    Consultant(s) Notes Reviewed:      Care Discussed with Consultants/Other Providers:

## 2020-12-24 NOTE — BH CONSULTATION LIAISON PROGRESS NOTE - [id number]_PSY_A_CORE
Prior note filed but deleted due to digital error. Patient more awake, alert, Aox1, thinks he is in [place], nods "yes" when asked if mood is depressed and again" yes" when asked if he wants to die.
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
## 2020-12-24 NOTE — PROGRESS NOTE ADULT - PROBLEM SELECTOR PLAN 4
DVT PPX: Lovenox 40mg  Diet: TF for now, S/S reevaluation once mental status improving  PT consulted, recs pending  Dispo: transferred from [facility]

## 2020-12-24 NOTE — PROGRESS NOTE ADULT - PROBLEM SELECTOR PLAN 3
Likely d/t insensible fluid loss from persistent fever, poor PO intake.  Na still at 150 since yesterday.  - Free water 350 q6h  - Another trial of D5 125 cc/hr x 6hr

## 2020-12-24 NOTE — PROGRESS NOTE ADULT - PROBLEM SELECTOR PLAN 1
Fever curve .5 s/p discontinuing meropenem on 12/21. Possibly chronic aspiration vs. drug-fever vs. central fever. Pan CT revealed multifocal pneumonia, s/p Zosyn course in MICU. LUE cellulitis, s/p Vanc course, resolved. No PE, no DVT's. TTE 12/8 cannot r/o endocarditis. LP negative. RASHEEDA negative. CRP wnl. Repeat BCxs w/ NGTD. RASHEEDA neg. Procal low, unlikely to be bacterial infx.  - Monitoring off of abx  - F/u ANCA, Fungitell, Fungal blood cx, Bartonella, Brucella, Chlamydia, Legionella  - Consider WBC scan vs. FREDI to rule out IE Fever curve improved, but still spiking to 101 this AM. Possibly chronic aspiration vs. drug-fever vs. central fever. Pan CT revealed multifocal pneumonia, s/p Zosyn course in MICU. LUE cellulitis, s/p Vanc course, resolved. No PE, no DVT's. TTE 12/8 cannot r/o endocarditis. LP negative. RASHEEDA negative. CRP wnl. Repeat BCxs w/ NGTD. RASHEEDA neg. Procal low, unlikely to be bacterial infx. Neg ANCA.   - Monitoring off of abx. Meropenem d/c'd on 12/21.   - F/u Fungitell, Fungal blood cx, Bartonella, Brucella, Chlamydia, Legionella  - Per ID, low concern for bacterial infection, atypical pneumonia  - If fever curve worsens/not improving, consider WBC scan

## 2020-12-24 NOTE — PROGRESS NOTE ADULT - SUBJECTIVE AND OBJECTIVE BOX
Follow Up:  fever    Interval History/ROS:  appears comfortable.  needs suctioning to clear secretions.  NGT.  on 1:1    Allergies  No Known Allergies    ANTIMICROBIALS:  none    OTHER MEDS:  MEDICATIONS  (STANDING):  enoxaparin Injectable 40 two times a day  LORazepam   Injectable 2 every 6 hours PRN      Vital Signs Last 24 Hrs  T(F): 98.5 (12-24-20 @ 13:58), Max: 101 (12-24-20 @ 03:09)  HR: 100 (12-24-20 @ 13:58)  BP: 148/100 (12-24-20 @ 13:58)  RR: 16 (12-24-20 @ 13:58)  SpO2: 97% (12-24-20 @ 13:58) (95% - 97%)    PHYSICAL EXAM:  alert  no distress  NGT  lungs coarse rhonchi  cor: s1s2 no murmur  abd soft  ext no edema or erythema                                     11.0   8.62  )-----------( 355      ( 24 Dec 2020 07:23 )             37.1 12-24    147  |  115  |  22  ----------------------------<  124  3.5   |  19  |  0.65  Ca    9.7      24 Dec 2020 07:23Phos  3.5     12-24Mg     2.4     12-24    12.22.20 @ 07:42)  Procalcitonin, Serum: 0.12:     MICROBIOLOGY:  .Blood Blood-Venous  12-22-20   Testing in progress  --  --      .Blood Blood-Venous  12-19-20   No growth  --  --      .Blood Blood-Venous  12-15-20   No Growth Final  --  --      .Blood Blood-Peripheral  12-15-20   No Growth Final  --  --      .Blood Blood-Peripheral  12-13-20   No Growth Final  --  --      .Blood Blood-Venous  12-13-20   No Growth Final  --  --      .Blood Blood-Peripheral  12-09-20   No Growth Final  --  --      .Sputum Sputum  12-08-20   Numerous Serratia marcescens  Normal Respiratory Leticia absent  --  Serratia marcescens      .CSF CSF  12-06-20   No growth at 3 days.  --    No polymorphonuclear leukocytes seen  No organisms seen  by cytocentrifuge      .Blood Blood-Arterial  12-05-20   No Growth Final  --  --      .Blood  12-05-20   No Growth Final  --  --      .Blood  12-05-20   No Growth Final  --  --      .Urine  12-05-20   No growth  --  --      RADIOLOGY:  rd< from: CT Neck Soft Tissue w/ IV Cont (12.21.20 @ 11:13) >  IMPRESSION:  -Nonspecific trace subcutaneous fat stranding involving the bilateral lower face. Correlate for cellulitis.    -No evidence for acute penetrating soft tissue injury.    < end of copied text >  < from: Xray Chest 1 View- PORTABLE-Routine (Xray Chest 1 View- PORTABLE-Routine .) (12.16.20 @ 15:54) >  FINDINGS:    Tip of the enteric tube is in the stomach.  Bibasilar linear atelectasis. Small bilateral pleural effusions. No pneumothorax.  Cardiac size cannot be accurately assessed in this projection. No acute osseous abnormality.    IMPRESSION:  Tip of the enteric tube is in the stomach.    < end of copied text >

## 2020-12-24 NOTE — PROGRESS NOTE ADULT - PROBLEM SELECTOR PLAN 2
Encephalopathy of unknown origin. Initially, possibly septic enceph given initially with multifocal pneumonia, sputum cx with Serratia, s/p course of Zosyn.  Vs. Multiorgan inflammatory syndrome d/t COVID given + Ab.  Vs. Hypernatremia.  AOx2, waxes and wanes.  Slight improvement with downtrending serum Na.  - MR brain 12/9 with slightly prominent cerebellar sulci, thickened calvarial diploic space, paranasal sinus mucosal thickening with opacities in mastoid.   - Spot EEG 12/19 with no epileptiform abnormalities.   - LP negative, BCx/UCx negative to date  - c/w high dose IV thiamine  - 1:1 CO and PRN ativan 2mg q6h for agitation  - Failed s/s eval x2 --> s/p NGT, on tube feeds Encephalopathy of unknown origin. Initially, possibly septic enceph given initially with multifocal pneumonia, sputum cx with Serratia, s/p course of Zosyn.  VS. Hypernatremia.  AOx2, waxes and wanes.  Slight improvement with downtrending serum Na.  - MR brain 12/9 unremarkable. Spot EEG 12/19 with no epileptiform abnormalities. LP negative, BCx/UCx negative to date  - c/w high dose IV thiamine  - per psych, some concern for catatonia given rigidity and tremulousness.  Will administer ativan 0.5mg IV and monitor response in PM.  - 1:1 CO and PRN ativan 2mg q6h for agitation  - Failed s/s eval x2 --> s/p NGT, on tube feeds

## 2020-12-24 NOTE — BH CONSULTATION LIAISON PROGRESS NOTE - NSBHFUPINTERVALHXFT_PSY_A_CORE
Chart reviewed. Patient spiked fever overnight.  This AM he is found in his bed. He is able to understand my questions in English- he answers his name. He believes he is in a school. Believes that the month is November. Denies SI/HI. No AH/VH elicited. Interview is somewhat limited as his attention remains poor.  He has some increased rigidity in UE.

## 2020-12-24 NOTE — PROGRESS NOTE ADULT - ASSESSMENT
54 yo man with depression, h/o suicide attempts most recent self stabbing, h/o ECT, ETOH abuse, transferred to [facility] from [facility] on 12/5 for AMS.  He was intubated and sedated for agitation 12/5- 12/11.  He underwent LP 12/6 - WBC 0, CSF PCR neg.   Blood cultures no growth.  Treated for multifocal PNA with Zosyn.  Sputum culture grew Serratia which was susceptible to Zosyn.  Developed cellulitis left upper arm (?IV related) and treated with Vanco with improvement.  CTA 12/15 no PE, bilateral effusions, increased upper lobe opacities.  CT abd 12/6 no focus of infection in abd.  Blood cultures negative x 11  US upper extremities superficial thrombosis right upper.  CT neck, sinuses 12/21 no obvious focus of infection    fever curve trending down off antibiotics  non toxic, normal wbc, negative cultures, low Procalcitonin makes bacterial infection unlkely    Fever unclear source ? aspiration without infection  ?drug fever  monitor off antibiotics  follow final 12/22 blood culture result    ID service available over holiday weekend.    Hilda Leon MD  Pager: [phone]  After 5 PM or weekends please call fellow on call or office [phone]

## 2020-12-25 LAB
ALBUMIN SERPL ELPH-MCNC: 3.8 G/DL — SIGNIFICANT CHANGE UP (ref 3.3–5)
ALP SERPL-CCNC: 46 U/L — SIGNIFICANT CHANGE UP (ref 40–120)
ALT FLD-CCNC: 35 U/L — SIGNIFICANT CHANGE UP (ref 4–41)
ANION GAP SERPL CALC-SCNC: 16 MMOL/L — HIGH (ref 7–14)
AST SERPL-CCNC: 23 U/L — SIGNIFICANT CHANGE UP (ref 4–40)
BILIRUB SERPL-MCNC: 0.4 MG/DL — SIGNIFICANT CHANGE UP (ref 0.2–1.2)
BUN SERPL-MCNC: 22 MG/DL — SIGNIFICANT CHANGE UP (ref 7–23)
CALCIUM SERPL-MCNC: 9.8 MG/DL — SIGNIFICANT CHANGE UP (ref 8.4–10.5)
CHLORIDE SERPL-SCNC: 114 MMOL/L — HIGH (ref 98–107)
CK MB BLD-MCNC: 3.7 % — HIGH (ref 0–2.5)
CK MB CFR SERPL CALC: 1.4 NG/ML — SIGNIFICANT CHANGE UP
CK SERPL-CCNC: 38 U/L — SIGNIFICANT CHANGE UP (ref 30–200)
CO2 SERPL-SCNC: 19 MMOL/L — LOW (ref 22–31)
CREAT SERPL-MCNC: 0.63 MG/DL — SIGNIFICANT CHANGE UP (ref 0.5–1.3)
GLUCOSE SERPL-MCNC: 121 MG/DL — HIGH (ref 70–99)
HCT VFR BLD CALC: 36.3 % — LOW (ref 39–50)
HGB BLD-MCNC: 11 G/DL — LOW (ref 13–17)
MAGNESIUM SERPL-MCNC: 2.5 MG/DL — SIGNIFICANT CHANGE UP (ref 1.6–2.6)
MCHC RBC-ENTMCNC: 28.5 PG — SIGNIFICANT CHANGE UP (ref 27–34)
MCHC RBC-ENTMCNC: 30.3 GM/DL — LOW (ref 32–36)
MCV RBC AUTO: 94 FL — SIGNIFICANT CHANGE UP (ref 80–100)
NRBC # BLD: 0 /100 WBCS — SIGNIFICANT CHANGE UP
NRBC # FLD: 0 K/UL — SIGNIFICANT CHANGE UP
PHOSPHATE SERPL-MCNC: 3.7 MG/DL — SIGNIFICANT CHANGE UP (ref 2.5–4.5)
PLATELET # BLD AUTO: 336 K/UL — SIGNIFICANT CHANGE UP (ref 150–400)
POTASSIUM SERPL-MCNC: 3.9 MMOL/L — SIGNIFICANT CHANGE UP (ref 3.5–5.3)
POTASSIUM SERPL-SCNC: 3.9 MMOL/L — SIGNIFICANT CHANGE UP (ref 3.5–5.3)
PROT SERPL-MCNC: 7 G/DL — SIGNIFICANT CHANGE UP (ref 6–8.3)
RBC # BLD: 3.86 M/UL — LOW (ref 4.2–5.8)
RBC # FLD: 14.2 % — SIGNIFICANT CHANGE UP (ref 10.3–14.5)
SODIUM SERPL-SCNC: 149 MMOL/L — HIGH (ref 135–145)
WBC # BLD: 8.39 K/UL — SIGNIFICANT CHANGE UP (ref 3.8–10.5)
WBC # FLD AUTO: 8.39 K/UL — SIGNIFICANT CHANGE UP (ref 3.8–10.5)

## 2020-12-25 PROCEDURE — 99233 SBSQ HOSP IP/OBS HIGH 50: CPT | Mod: GC

## 2020-12-25 PROCEDURE — 99231 SBSQ HOSP IP/OBS SF/LOW 25: CPT

## 2020-12-25 RX ORDER — SODIUM CHLORIDE 9 MG/ML
1000 INJECTION, SOLUTION INTRAVENOUS
Refills: 0 | Status: DISCONTINUED | OUTPATIENT
Start: 2020-12-25 | End: 2020-12-26

## 2020-12-25 RX ADMIN — ENOXAPARIN SODIUM 40 MILLIGRAM(S): 100 INJECTION SUBCUTANEOUS at 17:15

## 2020-12-25 RX ADMIN — ENOXAPARIN SODIUM 40 MILLIGRAM(S): 100 INJECTION SUBCUTANEOUS at 05:02

## 2020-12-25 RX ADMIN — SODIUM CHLORIDE 125 MILLILITER(S): 9 INJECTION, SOLUTION INTRAVENOUS at 12:01
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
## 2020-12-25 NOTE — PROGRESS NOTE ADULT - PROBLEM SELECTOR PLAN 1
Fever curve improved, but still spiking to 101 this AM. Possibly chronic aspiration vs. drug-fever vs. central fever. Pan CT revealed multifocal pneumonia, s/p Zosyn course in MICU. LUE cellulitis, s/p Vanc course, resolved. No PE, no DVT's. TTE 12/8 cannot r/o endocarditis. LP negative. RASHEEDA negative. CRP wnl. Repeat BCxs w/ NGTD. RASHEEDA neg. Procal low, unlikely to be bacterial infx. Neg ANCA.   - Monitoring off of abx. Meropenem d/c'd on 12/21.   - F/u Fungitell, Fungal blood cx, Bartonella, Brucella, Chlamydia, Legionella  - Per ID, low concern for bacterial infection, atypical pneumonia  - If fever curve worsens/not improving, consider WBC scan No recorded fevers over past 24 hours.  Possibly secondary to chronic aspiration vs. drug-fever vs. central fever. Pan CT revealed multifocal pneumonia, s/p Zosyn course in MICU. LUE cellulitis, s/p Vanc course, resolved. No PE, no DVT's. TTE 12/8 cannot r/o endocarditis. LP negative. RASHEEDA negative. CRP wnl. Repeat BCxs w/ NGTD. RASHEEDA neg. Procal low, unlikely to be bacterial infx. Neg ANCA. Neg fungitell.  - Monitoring off of abx. Meropenem d/c'd on 12/21.   - F/u Fungal blood cx, Bartonella, Brucella, Chlamydia, Legionella  - Per ID, low concern for bacterial infection, atypical pneumonia  - If fever curve worsens/not improving, consider WBC scan No recorded fevers over past 24 hours.  Possibly secondary to chronic aspiration vs. drug-fever vs. central fever. Pan CT revealed multifocal pneumonia, s/p Zosyn course in MICU. LUE cellulitis, s/p Vanc course, resolved. No PE, no DVT's. TTE 12/8 cannot r/o endocarditis. LP negative. RASHEEDA negative. CRP wnl. Repeat BCxs w/ NGTD. RASHEEDA neg. Procal low, unlikely to be bacterial infx. Neg ANCA. Neg fungitell.  - Monitoring off of abx. Meropenem d/c'd on 12/21.   - F/u Fungal blood cx, Bartonella, Brucella, Chlamydia  - Collect Urine Legionella  - Per ID, low concern for bacterial infection, atypical pneumonia  - If fever curve worsens/not improving, consider WBC scan
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
## 2020-12-25 NOTE — CONSULT NOTE ADULT - ASSESSMENT
54 yo male with PMH past depression with suicide attempts and multiple inpatient psychiatry admissions admitted from [facility] in setting of JESSIE and altered mental status on 12/5/2020, with continued waxing and waning confusion and responsiveness since, but recently improving per notes and subjectively. Patient remains with some inattention/confusion, and found to have some mild rigidity on left > right and possible babinski on left, but no other focal deficits event on currently limited neurological examination. Had a prior CT head with no acute findings and MRI non-contrast consistent with prior EtOH use and mild microvascular disease, but no acute findings that would indicate a cause for his continued AMS. Likely remains delirium given waxing waning state with gradual recent improvement on recent changes in psychiatry medications. Given new spasticity on left side, can also consider parkinsonian like syndrome contributing to confusion, but spasticity could be related to recent changes in psychiatric meds.    Recommendations:  [] agree with full delirium workup; f/u CMP, B12, B1, B2, thiamine, ammonia, TSH at minimum  [] frequent reorientation and redirection as needed for agitation/confusion  [] psychiatric medication adjustments per psychiatry as they are following  [] recommend MRI w/ contrast for further evaluation of structural causes of AMS, help r/o encephalitis  [] if nothing seen on MRI, can consider EEG and/or possible repeat LP, but would hold off for now  [] if no improvement on left sided rigidity and tremors with continued treatment and adjustment of psychiatric medications, can consider outpatient evaluation for movement disorders that could be contributing to his current status.    Case to be discussed with neurology attending Dr. Mcgraw. 56 yo male with PMH past depression with suicide attempts and multiple inpatient psychiatry admissions admitted from [facility] in setting of JESSIE and altered mental status on 12/5/2020, with continued waxing and waning confusion and responsiveness since, but recently improving per notes and subjectively. Patient remains with some inattention/confusion, and found to have some mild rigidity on left > right and possible babinski on left, but no other focal deficits event on currently limited neurological examination. Had a prior CT head with no acute findings and MRI non-contrast consistent with prior EtOH use and mild microvascular disease, but no acute findings that would indicate a cause for his continued AMS. Likely remains delirium given waxing waning state with gradual recent improvement on recent changes in psychiatry medications. Given new spasticity on left side, can also consider parkinsonian like syndrome contributing to confusion, but spasticity could be related to recent changes in psychiatric meds.    Recommendations:  [] agree with full delirium workup; f/u CMP, B12, B1, B2, thiamine, ammonia, TSH at minimum  [] frequent reorientation and redirection as needed for agitation/confusion  [] psychiatric medication adjustments per psychiatry as they are following  [] recommend MRI w/ and w/o contrast for further evaluation of structural causes of AMS, help r/o encephalitis  [] if nothing seen on MRI, can consider EEG and/or possible repeat LP, but would hold off for now  [] if no improvement on left sided rigidity and tremors with continued treatment and adjustment of psychiatric medications, can consider outpatient evaluation for movement disorders that could be contributing to his current status.    Case to be discussed with neurology attending Dr. Mcgraw.
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
## 2020-12-25 NOTE — BH CONSULTATION LIAISON PROGRESS NOTE - NSBHFUPINTERVALHXFT_PSY_A_CORE
Chart reviewed. Patient afebrile x24h. Of note, yesterday pt received Ativan challenge of 0.5mg for possible catatonia w/o significant response.    This AM pt is found in bed, appears lethargic, though answers questions in English. Pt is Ox3, knows his name, says he is in a hospital, and knows it is [place]. Denies SI/HI. No AH/VH elicited. Interview is somewhat limited as his attention remains poor.

## 2020-12-25 NOTE — PROGRESS NOTE ADULT - PROBLEM SELECTOR PLAN 3
Likely d/t insensible fluid loss from persistent fever, poor PO intake.  Na still at 150 since yesterday.  - Free water 350 q6h  - Another trial of D5 125 cc/hr x 6hr Likely d/t insensible fluid loss from persistent fever, poor PO intake.  Na still at 150 since yesterday.  - Free water 350 q6h  - ? D5W 125cc/hr x 12 hours?  - Another trial of D5 125 cc/hr x 6hr Likely d/t insensible fluid loss from persistent fever, poor PO intake.  Na still at 150 since yesterday.  - Free water 350 q6h  - Another trial of D5W 125cc/hr x 12 hours

## 2020-12-25 NOTE — PROGRESS NOTE ADULT - SUBJECTIVE AND OBJECTIVE BOX
Evert Mathewon, PGY1  Pager [phone]    INCOMPLETE NOTE - IN PROGRESS    Patient is a 55y old  Male who presents with a chief complaint of AMS (24 Dec 2020 14:39)      SUBJECTIVE/INTERVAL EVENTS: Patient seen and examined at bedside.    MEDICATIONS  (STANDING):  dextrose 5%. 1000 milliLiter(s) (125 mL/Hr) IV Continuous <Continuous>  enoxaparin Injectable 40 milliGRAM(s) SubCutaneous two times a day    MEDICATIONS  (PRN):  LORazepam   Injectable 1 milliGRAM(s) IV Push every 6 hours PRN Agitation      VITAL SIGNS:  T(F): 98.2 (12-25-20 @ 05:25), Max: 99.9 (12-24-20 @ 18:13)  HR: 97 (12-25-20 @ 05:25) (97 - 104)  BP: 144/88 (12-25-20 @ 05:25) (144/88 - 154/98)  RR: 18 (12-25-20 @ 05:25) (16 - 20)  SpO2: 93% (12-25-20 @ 05:25) (93% - 97%)    I&O's Summary    24 Dec 2020 07:01  -  25 Dec 2020 07:00  --------------------------------------------------------  IN: 2076 mL / OUT: 1475 mL / NET: 601 mL      Daily     Daily     PHYSICAL EXAM:  Gen: Alert, NAD  HEENT: NCAT, conjunctiva clear, sclera anicteric, no erythema or exudates in the oropharynx, mmm  Neck: Supple, no JVD  CV: RRR, S1S2, no m/r/g  Resp: CTAB, normal respiratory effort  Abd: Soft, nontender, nondistended, normal bowel sounds  Ext: no edema, no clubbing or cyanosis  Neuro: AOx3, CN2-12 grossly intact, TROY  SKIN: warm, perfused    LABS:                        11.0   8.62  )-----------( 355      ( 24 Dec 2020 07:23 )             37.1     Hgb Trend: 11.0<--, 11.1<--, 11.3<--, 11.6<--, 12.0<--  12-24    147<H>  |  115<H>  |  22  ----------------------------<  124<H>  3.5   |  19<L>  |  0.65    Ca    9.7      24 Dec 2020 07:23  Phos  3.5     12-24  Mg     2.4     12-24      Creatinine Trend: 0.65<--, 0.73<--, 0.79<--, 0.90<--, 0.91<--, 0.97<--              CAPILLARY BLOOD GLUCOSE          RADIOLOGY & ADDITIONAL TESTS: Reviewed    Imaging Personally Reviewed:    Consultant(s) Notes Reviewed:      Care Discussed with Consultants/Other Providers:   Evert Jewel Laureano, PGY1  Pager [phone]    Patient is a 55y old  Male who presents with a chief complaint of AMS (24 Dec 2020 14:39)      SUBJECTIVE/INTERVAL EVENTS:  SONIA  Received 0.5mg of ativan yesterday morning, no significant response per Psych.  Patient reports no acute complaints this morning. Shakes head no to pain or other ROS.  Patient seen and examined at bedside.    MEDICATIONS  (STANDING):  dextrose 5%. 1000 milliLiter(s) (125 mL/Hr) IV Continuous <Continuous>  enoxaparin Injectable 40 milliGRAM(s) SubCutaneous two times a day    MEDICATIONS  (PRN):  LORazepam   Injectable 1 milliGRAM(s) IV Push every 6 hours PRN Agitation      VITAL SIGNS:  T(F): 98.2 (12-25-20 @ 05:25), Max: 99.9 (12-24-20 @ 18:13)  HR: 97 (12-25-20 @ 05:25) (97 - 104)  BP: 144/88 (12-25-20 @ 05:25) (144/88 - 154/98)  RR: 18 (12-25-20 @ 05:25) (16 - 20)  SpO2: 93% (12-25-20 @ 05:25) (93% - 97%)    I&O's Summary    24 Dec 2020 07:01  -  25 Dec 2020 07:00  --------------------------------------------------------  IN: 2076 mL / OUT: 1475 mL / NET: 601 mL      Daily     Daily     PHYSICAL EXAM:  GENERAL:  male, NAD  EYES: EOMI, PERRLA  CHEST/LUNG: on RA, rhonchi diffusely  HEART: Regular rate and rhythm; No murmurs  ABDOMEN: NG tube in place.  Soft, Nontender, Nondistended  EXTREMITIES:  Trace BLE and BUE edema.  NEURO: AOx2 ?3 (self, place, for time states "November"). Rigidity in BUE to passive movement. Tremulous in face, extremities with movement.  SKIN: LUE cellulitis: improved, no erythema noted    LABS:                        11.0   8.62  )-----------( 355      ( 24 Dec 2020 07:23 )             37.1     Hgb Trend: 11.0<--, 11.1<--, 11.3<--, 11.6<--, 12.0<--  12-24    147<H>  |  115<H>  |  22  ----------------------------<  124<H>  3.5   |  19<L>  |  0.65    Ca    9.7      24 Dec 2020 07:23  Phos  3.5     12-24  Mg     2.4     12-24      Creatinine Trend: 0.65<--, 0.73<--, 0.79<--, 0.90<--, 0.91<--, 0.97<--              CAPILLARY BLOOD GLUCOSE          RADIOLOGY & ADDITIONAL TESTS: Reviewed    Imaging Personally Reviewed:    Consultant(s) Notes Reviewed:      Care Discussed with Consultants/Other Providers:
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
## 2020-12-25 NOTE — PROGRESS NOTE ADULT - PROBLEM SELECTOR PLAN 4
DVT PPX: Lovenox 40mg  Diet: TF for now, S/S reevaluation once mental status improving  PT consulted, recs pending  Dispo: transferred from [facility] DVT PPX: Lovenox 40mg  Diet: TF for now, S/S reevaluation once mental status improving  PT consulted, recs pending  Dispo: transferred from [facility], pending resolution of fever, encephalopathy

## 2020-12-25 NOTE — PROGRESS NOTE ADULT - PROBLEM SELECTOR PLAN 2
Encephalopathy of unknown origin. Initially, possibly septic enceph given initially with multifocal pneumonia, sputum cx with Serratia, s/p course of Zosyn.  VS. Hypernatremia.  AOx2, waxes and wanes.  Slight improvement with downtrending serum Na.  - MR brain 12/9 unremarkable. Spot EEG 12/19 with no epileptiform abnormalities. LP negative, BCx/UCx negative to date  - c/w high dose IV thiamine  - per psych, some concern for catatonia given rigidity and tremulousness.  Will administer ativan 0.5mg IV and monitor response in PM.  - 1:1 CO and PRN ativan 2mg q6h for agitation  - Failed s/s eval x2 --> s/p NGT, on tube feeds Encephalopathy of unknown origin. Initially, possibly septic enceph given initially with multifocal pneumonia, sputum cx with Serratia, s/p course of Zosyn.  VS. Hypernatremia.  AOx2-3, waxes and wanes.  Slight improvement with downtrending serum Na.  - MR brain 12/9 unremarkable. Spot EEG 12/19 with no epileptiform abnormalities. LP negative, BCx/UCx negative to date  - c/w high dose IV thiamine  - per psych, some concern for catatonia given rigidity and tremulousness.  Will administer ativan 0.5mg IV and monitor response in PM.  - 1:1 CO and PRN ativan 2mg q6h for agitation  - Failed s/s eval x2 --> s/p NGT, on tube feeds Encephalopathy of unknown origin. Initially, possibly septic enceph given initially with multifocal pneumonia, sputum cx with Serratia, s/p course of Zosyn.  VS. Hypernatremia.  AOx2-3, waxes and wanes.  Slight improvement with downtrending serum Na.  - MR brain 12/9 unremarkable. Spot EEG 12/19 with no epileptiform abnormalities. LP negative, BCx/UCx negative to date  - c/w high dose IV thiamine  - per psych, does not think catatonia is involved given no significant response to ativan trial  - 1:1 CO and PRN ativan 2mg q6h for agitation  - Failed s/s eval x2 --> s/p NGT, on tube feeds

## 2020-12-25 NOTE — CONSULT NOTE ADULT - SUBJECTIVE AND OBJECTIVE BOX
*************************************  NEUROLOGY CONSULT  SERVICE  **************************************    EBONI CARRASCO  Male  MRN-[id number]    HPI:  56 yo male with PMH past depression with suicide attempts and multiple inpatient psychiatry admissions with admission from [facility] in setting of altered mental status and found to have JESSIE. History primarily obtained from chart review as patient inconsistent with responses to questions. Patient has remained with waxing and waning mental status since admission on 12/5/2020 with careful adjustments of his psychiatric meds as he is being followed by psychiatry. Noted prior evaluations to be weak with gradual improvement over the course of the hospitalization and noted on recent days' notes to have improvement in his delirium, but still with fluctuating course. Patient noted on prior notes to have "abnormal movements" and questionable rigidity. Was given an ativan trial yesterday for concern for possible catatonia, with noted lack of response. On my interview, patient able to answer some simple questions but does not maintain attention. ROS thus limited by patient cooperation. Patient notes that he feels generally weak, but better than when he came to the hospital. He denies headache or pain, but does not directly answer other ROS questions. Per 1:1 observer, patient has been improved in mental status lately, asking appropriate questions like to speak to his wife on the phone and not being as agitated as previously.        ROS: Limited due to patient's mental status/cooperation.    PAST MEDICAL & SURGICAL HISTORY:  Hypertension    Depression    Hepatitis  &quot;as a child&quot; unsure of type    Left knee pain    H/O tracheostomy      MEDICATIONS  (STANDING):  dextrose 5%. 1000 milliLiter(s) (125 mL/Hr) IV Continuous <Continuous>  enoxaparin Injectable 40 milliGRAM(s) SubCutaneous two times a day    MEDICATIONS  (PRN):  LORazepam   Injectable 1 milliGRAM(s) IV Push every 6 hours PRN Agitation    Allergies    No Known Allergies    Intolerances        VITAL SIGNS:  Vital Signs Last 24 Hrs  T(C): 36.8 (25 Dec 2020 09:00), Max: 37.7 (24 Dec 2020 18:13)  T(F): 98.3 (25 Dec 2020 09:00), Max: 99.9 (24 Dec 2020 18:13)  HR: 114 (25 Dec 2020 09:00) (97 - 114)  BP: 143/87 (25 Dec 2020 09:00) (143/87 - 149/87)  BP(mean): --  RR: 18 (25 Dec 2020 09:00) (16 - 20)  SpO2: 96% (25 Dec 2020 09:00) (93% - 97%)    PHYSICAL EXAMINATION:  General: Well-developed, well nourished, in no acute distress.  Eyes: Conjunctiva and sclera clear.  Neck: Supple, nontender  Lung: no respiratory distress noted    Neurologic:  - Mental Status:  Alert, awake, oriented to person, place, and time; Speech is soft with few words but fluent with intact naming, repetition, and comprehension; follows most simple commands, no extinction or neglect noted;   - Cranial Nerves II-XII:  VFF, No nystagmus noted, EOMI, PERRLA, V1-V3 intact, no facial asymmetry. Mild quiver of lips at rest. t/p midline, SCM/trap intact.  - Motor: Normal muscle bulk throughout Increased spasticity on left arm compared to right, which is normal tone. Mild tremor of arms bilaterally when outstretched. Noncooperative with formal strength testing, but moves all extremities spontaneously anti-gravity.  - Reflexes: brisk 2+ biceps, brachioradialis patellar, ankle reflexes bilaterally. Plantar responses flexor on right, extensor on left.  - Sensory:  Intact to light touch, pinprick throughout.  - Coordination:  Unable to assess as patient not cooperative with testing.  - Gait:  deferred    LABS:                          11.0   8.39  )-----------( 336      ( 25 Dec 2020 07:48 )             36.3     12-25    149<H>  |  114<H>  |  22  ----------------------------<  121<H>  3.9   |  19<L>  |  0.63    Ca    9.8      25 Dec 2020 07:48  Phos  3.7     12-25  Mg     2.5     12-25    TPro  7.0  /  Alb  3.8  /  TBili  0.4  /  DBili  x   /  AST  23  /  ALT  35  /  AlkPhos  46  12-25      RADIOLOGY & ADDITIONAL STUDIES:      CT Head No Cont (12.06.20 @ 17:07)  IMPRESSION:  No acute intracranial hemorrhage, mass effect, or midline shift.      MR Head No Cont (12.09.20 @ 19:08)  IMPRESSION:    Ventricles supratentorial sulci unremarkable, slightly prominent cerebellar sulci correlate with EtOH usage,  minimal microvascular disease or migraine sequela,  no restricted diffusion, hemorrhage or midline shift.    Thickened calvarial diploic space, paranasal sinus mucosal thickening with opacification right greater than left mastoid tip.

## 2020-12-26 LAB
ALBUMIN SERPL ELPH-MCNC: 4 G/DL — SIGNIFICANT CHANGE UP (ref 3.3–5)
ALP SERPL-CCNC: 51 U/L — SIGNIFICANT CHANGE UP (ref 40–120)
ALT FLD-CCNC: 36 U/L — SIGNIFICANT CHANGE UP (ref 4–41)
AMMONIA BLD-MCNC: 32 UMOL/L — SIGNIFICANT CHANGE UP (ref 11–55)
ANION GAP SERPL CALC-SCNC: 13 MMOL/L — SIGNIFICANT CHANGE UP (ref 7–14)
AST SERPL-CCNC: 21 U/L — SIGNIFICANT CHANGE UP (ref 4–40)
BILIRUB SERPL-MCNC: 0.4 MG/DL — SIGNIFICANT CHANGE UP (ref 0.2–1.2)
BUN SERPL-MCNC: 22 MG/DL — SIGNIFICANT CHANGE UP (ref 7–23)
CALCIUM SERPL-MCNC: 10 MG/DL — SIGNIFICANT CHANGE UP (ref 8.4–10.5)
CHLORIDE SERPL-SCNC: 111 MMOL/L — HIGH (ref 98–107)
CO2 SERPL-SCNC: 21 MMOL/L — LOW (ref 22–31)
CREAT SERPL-MCNC: 0.58 MG/DL — SIGNIFICANT CHANGE UP (ref 0.5–1.3)
GLUCOSE SERPL-MCNC: 125 MG/DL — HIGH (ref 70–99)
HCT VFR BLD CALC: 38 % — LOW (ref 39–50)
HGB BLD-MCNC: 11.5 G/DL — LOW (ref 13–17)
LEGIONELLA AG UR QL: NEGATIVE — SIGNIFICANT CHANGE UP
MAGNESIUM SERPL-MCNC: 2.4 MG/DL — SIGNIFICANT CHANGE UP (ref 1.6–2.6)
MCHC RBC-ENTMCNC: 28.7 PG — SIGNIFICANT CHANGE UP (ref 27–34)
MCHC RBC-ENTMCNC: 30.3 GM/DL — LOW (ref 32–36)
MCV RBC AUTO: 94.8 FL — SIGNIFICANT CHANGE UP (ref 80–100)
NRBC # BLD: 0 /100 WBCS — SIGNIFICANT CHANGE UP
NRBC # FLD: 0 K/UL — SIGNIFICANT CHANGE UP
PHOSPHATE SERPL-MCNC: 3.6 MG/DL — SIGNIFICANT CHANGE UP (ref 2.5–4.5)
PLATELET # BLD AUTO: 344 K/UL — SIGNIFICANT CHANGE UP (ref 150–400)
POTASSIUM SERPL-MCNC: 3.9 MMOL/L — SIGNIFICANT CHANGE UP (ref 3.5–5.3)
POTASSIUM SERPL-SCNC: 3.9 MMOL/L — SIGNIFICANT CHANGE UP (ref 3.5–5.3)
PROT SERPL-MCNC: 7.2 G/DL — SIGNIFICANT CHANGE UP (ref 6–8.3)
RBC # BLD: 4.01 M/UL — LOW (ref 4.2–5.8)
RBC # FLD: 13.8 % — SIGNIFICANT CHANGE UP (ref 10.3–14.5)
SODIUM SERPL-SCNC: 145 MMOL/L — SIGNIFICANT CHANGE UP (ref 135–145)
T4 FREE SERPL-MCNC: 1.3 NG/DL — SIGNIFICANT CHANGE UP (ref 0.9–1.8)
TSH SERPL-MCNC: 1.88 UIU/ML — SIGNIFICANT CHANGE UP (ref 0.27–4.2)
VIT B12 SERPL-MCNC: 772 PG/ML — SIGNIFICANT CHANGE UP (ref 200–900)
WBC # BLD: 8.56 K/UL — SIGNIFICANT CHANGE UP (ref 3.8–10.5)
WBC # FLD AUTO: 8.56 K/UL — SIGNIFICANT CHANGE UP (ref 3.8–10.5)

## 2020-12-26 PROCEDURE — 99233 SBSQ HOSP IP/OBS HIGH 50: CPT | Mod: GC

## 2020-12-26 PROCEDURE — 99233 SBSQ HOSP IP/OBS HIGH 50: CPT

## 2020-12-26 PROCEDURE — 99231 SBSQ HOSP IP/OBS SF/LOW 25: CPT

## 2020-12-26 RX ADMIN — ENOXAPARIN SODIUM 40 MILLIGRAM(S): 100 INJECTION SUBCUTANEOUS at 05:51

## 2020-12-26 RX ADMIN — ENOXAPARIN SODIUM 40 MILLIGRAM(S): 100 INJECTION SUBCUTANEOUS at 18:21
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
## 2020-12-26 NOTE — PROGRESS NOTE ADULT - PROBLEM SELECTOR PLAN 1
No recorded fevers over past 24 hours.  Possibly secondary to chronic aspiration vs. drug-fever vs. central fever. Pan CT revealed multifocal pneumonia, s/p Zosyn course in MICU. LUE cellulitis, s/p Vanc course, resolved. No PE, no DVT's. TTE 12/8 cannot r/o endocarditis. LP negative. RASHEEDA negative. CRP wnl. Repeat BCxs w/ NGTD. RASHEEDA neg. Procal low, unlikely to be bacterial infx. Neg ANCA. Neg fungitell.  - Monitoring off of abx. Meropenem d/c'd on 12/21.   - F/u Fungal blood cx, Bartonella, Brucella, Chlamydia  - Collect Urine Legionella  - Per ID, low concern for bacterial infection, atypical pneumonia  - If fever curve worsens/not improving, consider WBC scan, last febrile on 12/24.
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
## 2020-12-26 NOTE — BH CONSULTATION LIAISON PROGRESS NOTE - NSBHFUPINTERVALHXFT_PSY_A_CORE
Pt was able to express that he was in the hospital. He knew the date, the day of the week, and his own name. He was unaware of why he was in the hospital and didn't know who the president was. He was unable to participate in more formal evaluations of attention but was not grossly inattentive. Pt denied having any psychiatric history, reported that he has never been suicidal and wasn't suicidal on interview. Pt was rigid in bilateral arms, wrists, knees, and ankles but per staff, he is less rigid than before.
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
## 2020-12-26 NOTE — PROGRESS NOTE ADULT - PROBLEM SELECTOR PLAN 3
Likely d/t insensible fluid loss from persistent fever, poor PO intake.  Na still at 150 since yesterday.  - Free water 350 q6h  - Another trial of D5W 125cc/hr x 12 hours: Na 145 this am so will dc D5W

## 2020-12-26 NOTE — PROGRESS NOTE ADULT - SUBJECTIVE AND OBJECTIVE BOX
**************************************************************  Arturo Mitchell, PGY2  Internal Medicine   pager: NS: [phone] [facility]: [phone]  ***************************************************************    PROGRESS NOTE:     Patient is a 55y old  Male who presents with a chief complaint of AMS (25 Dec 2020 13:44)  SUBJECTIVE / OVERNIGHT EVENTS:  Pt seen and examined at bedside. Pt A&Ox2 but minimally talks to me; however, denies abdominal pain or chest pain.    ADDITIONAL REVIEW OF SYSTEMS: 10 point ROS negative except per HPI    MEDICATIONS  (STANDING):  dextrose 5%. 1000 milliLiter(s) (125 mL/Hr) IV Continuous <Continuous>  enoxaparin Injectable 40 milliGRAM(s) SubCutaneous two times a day    MEDICATIONS  (PRN):  LORazepam   Injectable 1 milliGRAM(s) IV Push every 6 hours PRN Agitation      CAPILLARY BLOOD GLUCOSE        I&O's Summary    25 Dec 2020 07:01  -  26 Dec 2020 07:00  --------------------------------------------------------  IN: 2597 mL / OUT: 1225 mL / NET: 1372 mL        PHYSICAL EXAM:  Vital Signs Last 24 Hrs  T(C): 36.8 (26 Dec 2020 09:00), Max: 37.2 (26 Dec 2020 01:00)  T(F): 98.3 (26 Dec 2020 09:00), Max: 99 (26 Dec 2020 01:00)  HR: 94 (26 Dec 2020 09:00) (92 - 105)  BP: 144/90 (26 Dec 2020 09:00) (131/94 - 157/96)  BP(mean): --  RR: 18 (26 Dec 2020 09:00) (17 - 18)  SpO2: 94% (26 Dec 2020 09:00) (94% - 97%)    PHYSICAL EXAM:  GENERAL:  male, NAD  EYES: EOMI, PERRLA  CHEST/LUNG: on RA, rhonchi diffusely  HEART: Regular rate and rhythm; No murmurs  ABDOMEN: NG tube in place.  Soft, Nontender, Nondistended  EXTREMITIES:  Trace BLE and BUE edema.  NEURO: AOx2 (self, place"). Rigidity in BUE to passive movement. Tremulous in face, extremities with movement.  SKIN: LUE cellulitis: improved, no erythema noted    LABS:                        11.5   8.56  )-----------( 344      ( 26 Dec 2020 09:34 )             38.0     12-26    145  |  111<H>  |  22  ----------------------------<  125<H>  3.9   |  21<L>  |  0.58    Ca    10.0      26 Dec 2020 09:34  Phos  3.6     12-26  Mg     2.4     12-26    TPro  7.2  /  Alb  4.0  /  TBili  0.4  /  DBili  x   /  AST  21  /  ALT  36  /  AlkPhos  51  12-26      CARDIAC MARKERS ( 25 Dec 2020 07:48 )  x     / x     / 38 U/L / x     / 1.4 ng/mL            RADIOLOGY & ADDITIONAL TESTS:  Results Reviewed:   Imaging Personally Reviewed:  Electrocardiogram Personally Reviewed:    COORDINATION OF CARE:  Care Discussed with Consultants/Other Providers [Y/N]:  Prior or Outpatient Records Reviewed [Y/N]:

## 2020-12-26 NOTE — PROGRESS NOTE ADULT - PROBLEM SELECTOR PLAN 4
DVT PPX: Lovenox 40mg  Diet: TF for now, S/S reevaluation once mental status improving  PT consulted, recs pending  Dispo: transferred from [facility], pending resolution of fever, encephalopathy

## 2020-12-26 NOTE — PROGRESS NOTE ADULT - PROBLEM SELECTOR PLAN 2
Encephalopathy of unknown origin. Initially, possibly septic enceph given initially with multifocal pneumonia, sputum cx with Serratia, s/p course of Zosyn.  VS. Hypernatremia.  AOx2-3, waxes and wanes.  Slight improvement with downtrending serum Na.  - MR brain 12/9 unremarkable. Spot EEG 12/19 with no epileptiform abnormalities. LP negative, BCx/UCx negative to date  - c/w high dose IV thiamine  - per psych, does not think catatonia is involved given no significant response to ativan trial  - 1:1 CO and PRN ativan 2mg q6h for agitation  - Failed s/s eval x2 --> s/p NGT, on tube feeds  - pt more alert on 12/26 -> will get repeat s/s  - neuro recs: pending MR w/w/o IV contrast

## 2020-12-27 LAB
ALBUMIN SERPL ELPH-MCNC: 3.8 G/DL — SIGNIFICANT CHANGE UP (ref 3.3–5)
ALP SERPL-CCNC: 53 U/L — SIGNIFICANT CHANGE UP (ref 40–120)
ALT FLD-CCNC: 33 U/L — SIGNIFICANT CHANGE UP (ref 4–41)
ANION GAP SERPL CALC-SCNC: 14 MMOL/L — SIGNIFICANT CHANGE UP (ref 7–14)
AST SERPL-CCNC: 18 U/L — SIGNIFICANT CHANGE UP (ref 4–40)
BILIRUB SERPL-MCNC: 0.4 MG/DL — SIGNIFICANT CHANGE UP (ref 0.2–1.2)
BUN SERPL-MCNC: 21 MG/DL — SIGNIFICANT CHANGE UP (ref 7–23)
CALCIUM SERPL-MCNC: 10.2 MG/DL — SIGNIFICANT CHANGE UP (ref 8.4–10.5)
CHLORIDE SERPL-SCNC: 109 MMOL/L — HIGH (ref 98–107)
CO2 SERPL-SCNC: 21 MMOL/L — LOW (ref 22–31)
CREAT SERPL-MCNC: 0.59 MG/DL — SIGNIFICANT CHANGE UP (ref 0.5–1.3)
GLUCOSE SERPL-MCNC: 110 MG/DL — HIGH (ref 70–99)
HCT VFR BLD CALC: 37.5 % — LOW (ref 39–50)
HGB BLD-MCNC: 11.6 G/DL — LOW (ref 13–17)
MAGNESIUM SERPL-MCNC: 2.2 MG/DL — SIGNIFICANT CHANGE UP (ref 1.6–2.6)
MCHC RBC-ENTMCNC: 28.7 PG — SIGNIFICANT CHANGE UP (ref 27–34)
MCHC RBC-ENTMCNC: 30.9 GM/DL — LOW (ref 32–36)
MCV RBC AUTO: 92.8 FL — SIGNIFICANT CHANGE UP (ref 80–100)
NRBC # BLD: 0 /100 WBCS — SIGNIFICANT CHANGE UP
NRBC # FLD: 0 K/UL — SIGNIFICANT CHANGE UP
PHOSPHATE SERPL-MCNC: 3.2 MG/DL — SIGNIFICANT CHANGE UP (ref 2.5–4.5)
PLATELET # BLD AUTO: 352 K/UL — SIGNIFICANT CHANGE UP (ref 150–400)
POTASSIUM SERPL-MCNC: 3.8 MMOL/L — SIGNIFICANT CHANGE UP (ref 3.5–5.3)
POTASSIUM SERPL-SCNC: 3.8 MMOL/L — SIGNIFICANT CHANGE UP (ref 3.5–5.3)
PROT SERPL-MCNC: 6.9 G/DL — SIGNIFICANT CHANGE UP (ref 6–8.3)
RBC # BLD: 4.04 M/UL — LOW (ref 4.2–5.8)
RBC # FLD: 13.9 % — SIGNIFICANT CHANGE UP (ref 10.3–14.5)
SODIUM SERPL-SCNC: 144 MMOL/L — SIGNIFICANT CHANGE UP (ref 135–145)
WBC # BLD: 6.68 K/UL — SIGNIFICANT CHANGE UP (ref 3.8–10.5)
WBC # FLD AUTO: 6.68 K/UL — SIGNIFICANT CHANGE UP (ref 3.8–10.5)

## 2020-12-27 PROCEDURE — 99233 SBSQ HOSP IP/OBS HIGH 50: CPT

## 2020-12-27 PROCEDURE — 99233 SBSQ HOSP IP/OBS HIGH 50: CPT | Mod: GC

## 2020-12-27 RX ADMIN — ENOXAPARIN SODIUM 40 MILLIGRAM(S): 100 INJECTION SUBCUTANEOUS at 05:56

## 2020-12-27 RX ADMIN — ENOXAPARIN SODIUM 40 MILLIGRAM(S): 100 INJECTION SUBCUTANEOUS at 17:05

## 2020-12-27 NOTE — PROGRESS NOTE ADULT - SUBJECTIVE AND OBJECTIVE BOX
Evert Mathewon, PGY1  Pager [phone]    INCOMPLETE NOTE - IN PROGRESS    Patient is a 55y old  Male who presents with a chief complaint of AMS (26 Dec 2020 11:00)      SUBJECTIVE/INTERVAL EVENTS: Patient seen and examined at bedside.    MEDICATIONS  (STANDING):  enoxaparin Injectable 40 milliGRAM(s) SubCutaneous two times a day    MEDICATIONS  (PRN):  LORazepam   Injectable 1 milliGRAM(s) IV Push every 6 hours PRN Agitation      VITAL SIGNS:  T(F): 98.6 (12-27-20 @ 05:00), Max: 99.1 (12-27-20 @ 01:00)  HR: 98 (12-27-20 @ 05:00) (94 - 103)  BP: 144/89 (12-27-20 @ 05:00) (139/95 - 147/97)  RR: 17 (12-27-20 @ 05:00) (17 - 18)  SpO2: 97% (12-27-20 @ 05:00) (94% - 99%)    I&O's Summary    25 Dec 2020 07:01  -  26 Dec 2020 07:00  --------------------------------------------------------  IN: 2597 mL / OUT: 1225 mL / NET: 1372 mL    26 Dec 2020 07:01  -  27 Dec 2020 06:25  --------------------------------------------------------  IN: 1258 mL / OUT: 1175 mL / NET: 83 mL      Daily     Daily     PHYSICAL EXAM:  Gen: Alert, NAD  HEENT: NCAT, conjunctiva clear, sclera anicteric, no erythema or exudates in the oropharynx, mmm  Neck: Supple, no JVD  CV: RRR, S1S2, no m/r/g  Resp: CTAB, normal respiratory effort  Abd: Soft, nontender, nondistended, normal bowel sounds  Ext: no edema, no clubbing or cyanosis  Neuro: AOx3, CN2-12 grossly intact, TROY  SKIN: warm, perfused    LABS:                        11.5   8.56  )-----------( 344      ( 26 Dec 2020 09:34 )             38.0     Hgb Trend: 11.5<--, 11.0<--, 11.0<--, 11.1<--, 11.3<--  12-26    145  |  111<H>  |  22  ----------------------------<  125<H>  3.9   |  21<L>  |  0.58    Ca    10.0      26 Dec 2020 09:34  Phos  3.6     12-26  Mg     2.4     12-26    TPro  7.2  /  Alb  4.0  /  TBili  0.4  /  DBili  x   /  AST  21  /  ALT  36  /  AlkPhos  51  12-26    Creatinine Trend: 0.58<--, 0.63<--, 0.65<--, 0.73<--, 0.79<--, 0.90<--  LIVER FUNCTIONS - ( 26 Dec 2020 09:34 )  Alb: 4.0 g/dL / Pro: 7.2 g/dL / ALK PHOS: 51 U/L / ALT: 36 U/L / AST: 21 U/L / GGT: x             CARDIAC MARKERS ( 25 Dec 2020 07:48 )  x     / x     / 38 U/L / x     / 1.4 ng/mL          CAPILLARY BLOOD GLUCOSE          RADIOLOGY & ADDITIONAL TESTS: Reviewed    Imaging Personally Reviewed:    Consultant(s) Notes Reviewed:      Care Discussed with Consultants/Other Providers:   Evert Holloway Sridevi, PGY1  Pager [phone]      Patient is a 55y old  Male who presents with a chief complaint of AMS (26 Dec 2020 11:00)      SUBJECTIVE/INTERVAL EVENTS:  ANDREWEON.  This AM, reports he is thirsty.  Admits to cough but states cough "is not too strong."  Denies pain anywhere else.  Patient seen and examined at bedside.      MEDICATIONS  (STANDING):  enoxaparin Injectable 40 milliGRAM(s) SubCutaneous two times a day    MEDICATIONS  (PRN):  LORazepam   Injectable 1 milliGRAM(s) IV Push every 6 hours PRN Agitation      VITAL SIGNS:  T(F): 98.6 (12-27-20 @ 05:00), Max: 99.1 (12-27-20 @ 01:00)  HR: 98 (12-27-20 @ 05:00) (94 - 103)  BP: 144/89 (12-27-20 @ 05:00) (139/95 - 147/97)  RR: 17 (12-27-20 @ 05:00) (17 - 18)  SpO2: 97% (12-27-20 @ 05:00) (94% - 99%)    I&O's Summary    25 Dec 2020 07:01  -  26 Dec 2020 07:00  --------------------------------------------------------  IN: 2597 mL / OUT: 1225 mL / NET: 1372 mL    26 Dec 2020 07:01  -  27 Dec 2020 06:25  --------------------------------------------------------  IN: 1258 mL / OUT: 1175 mL / NET: 83 mL      Daily     Daily     PHYSICAL EXAM:  GENERAL:  male, NAD, alert, improved attention  EYES: EOMI, PERRLA  CHEST/LUNG: on RA, trace rhonchi.  HEART: Regular rate and rhythm; No murmurs  ABDOMEN: NG tube in place.  Soft, Nontender, Nondistended  EXTREMITIES:  No BLE and BUE edema.  NEURO: AOx3 (self, place, and time). Mild rigidity in BUE to passive movement. No tremulousness noted.  SKIN: warm, perfused    LABS:                        11.5   8.56  )-----------( 344      ( 26 Dec 2020 09:34 )             38.0     Hgb Trend: 11.5<--, 11.0<--, 11.0<--, 11.1<--, 11.3<--  12-26    145  |  111<H>  |  22  ----------------------------<  125<H>  3.9   |  21<L>  |  0.58    Ca    10.0      26 Dec 2020 09:34  Phos  3.6     12-26  Mg     2.4     12-26    TPro  7.2  /  Alb  4.0  /  TBili  0.4  /  DBili  x   /  AST  21  /  ALT  36  /  AlkPhos  51  12-26    Creatinine Trend: 0.58<--, 0.63<--, 0.65<--, 0.73<--, 0.79<--, 0.90<--  LIVER FUNCTIONS - ( 26 Dec 2020 09:34 )  Alb: 4.0 g/dL / Pro: 7.2 g/dL / ALK PHOS: 51 U/L / ALT: 36 U/L / AST: 21 U/L / GGT: x             CARDIAC MARKERS ( 25 Dec 2020 07:48 )  x     / x     / 38 U/L / x     / 1.4 ng/mL          CAPILLARY BLOOD GLUCOSE          RADIOLOGY & ADDITIONAL TESTS: Reviewed    Imaging Personally Reviewed:    Consultant(s) Notes Reviewed:      Care Discussed with Consultants/Other Providers:

## 2020-12-27 NOTE — PROGRESS NOTE ADULT - PROBLEM SELECTOR PLAN 3
Likely d/t insensible fluid loss from persistent fever, poor PO intake.  Na still at 150 since yesterday.  - Free water 350 q6h  - Another trial of D5W 125cc/hr x 12 hours: Na 145 this am so will dc D5W Resolved, Na at 144 this AM.  Likely d/t insensible fluid loss from persistent fever, poor PO intake.

## 2020-12-27 NOTE — PROGRESS NOTE ADULT - PROBLEM SELECTOR PLAN 2
Encephalopathy of unknown origin. Initially, possibly septic enceph given initially with multifocal pneumonia, sputum cx with Serratia, s/p course of Zosyn.  VS. Hypernatremia.  AOx2-3, waxes and wanes.  Slight improvement with downtrending serum Na.  - MR brain 12/9 unremarkable. Spot EEG 12/19 with no epileptiform abnormalities. LP negative, BCx/UCx negative to date  - c/w high dose IV thiamine  - per psych, does not think catatonia is involved given no significant response to ativan trial  - 1:1 CO and PRN ativan 2mg q6h for agitation  - Failed s/s eval x2 --> s/p NGT, on tube feeds  - pt more alert on 12/26 -> will get repeat s/s  - neuro recs: pending MR w/w/o IV contrast Encephalopathy of unknown origin, possibly delirium.  Now AOx3 with improving attention.  Initially, possibly septic enceph given initially with multifocal pneumonia, sputum cx with Serratia, s/p course of Zosyn. Vs. Hypernatremia, which has now resolved.  - MR brain 12/9 unremarkable. Spot EEG 12/19 with no epileptiform abnormalities. LP negative, BCx/UCx negative to date  - c/w high dose IV thiamine  - Passed s/s. NGT d/c'd. Mech soft nectar consistency diet.  - Psych recs:  possible catatonia. Wants to try atival 1mg challenge.   - C/w 1:1 CO and PRN ativan 1mg q6h for agitation  - Neuro recs: does not think MR or LP is needed at this time. vEEG ordered.

## 2020-12-27 NOTE — BH CONSULTATION LIAISON PROGRESS NOTE - MODIFICATIONS
agree with above
No modifications
agree with above
agree with above
As below
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
## 2020-12-27 NOTE — BH CONSULTATION LIAISON PROGRESS NOTE - NSBHMSESPABN_PSY_A_CORE
Soft volume/Decreased productivity/Increased latency Soft volume/Slowed rate/Decreased productivity/Increased latency

## 2020-12-27 NOTE — BH CONSULTATION LIAISON PROGRESS NOTE - NSBHFUPINTERVALHXFT_PSY_A_CORE
No PRNs overnight. Patient is oriented to self, "hospital in [place]", and month. He says the year is "25". He says he is in the hospital because he was feeling sick. Today, he says he is "feeling better, not 100% but 60-70%". He says he slept well the previous night. He says he feels safe in the hospital but would like to go home. He denies passive or active SI/HI. Pt initially with rigidity in UEs, but appears volitional. No cogwheeling. Per RN, patient has been calm and cooperative. He was pending swallow eval at time of interview.
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
## 2020-12-27 NOTE — PROGRESS NOTE ADULT - PROBLEM SELECTOR PLAN 4
DVT PPX: Lovenox 40mg  Diet: TF for now, S/S reevaluation once mental status improving  PT consulted, recs pending  Dispo: transferred from [facility], pending resolution of fever, encephalopathy DVT PPX: Lovenox 40mg  Diet: Mech soft with nectar consistency  PT re-consulted  Dispo: transferred from [facility], pending resolution of fever, encephalopathy

## 2020-12-27 NOTE — PROGRESS NOTE ADULT - PROBLEM SELECTOR PLAN 1
No recorded fevers over past 24 hours.  Possibly secondary to chronic aspiration vs. drug-fever vs. central fever. Pan CT revealed multifocal pneumonia, s/p Zosyn course in MICU. LUE cellulitis, s/p Vanc course, resolved. No PE, no DVT's. TTE 12/8 cannot r/o endocarditis. LP negative. RASHEEDA negative. CRP wnl. Repeat BCxs w/ NGTD. RASHEEDA neg. Procal low, unlikely to be bacterial infx. Neg ANCA. Neg fungitell.  - Monitoring off of abx. Meropenem d/c'd on 12/21.   - F/u Fungal blood cx, Bartonella, Brucella, Chlamydia  - Collect Urine Legionella  - Per ID, low concern for bacterial infection, atypical pneumonia  - If fever curve worsens/not improving, consider WBC scan, last febrile on 12/24. Afebrile over past 72 hours.  Possibly d/t chronic aspiration vs. drug-fever. Pan CT revealed multifocal pneumonia, s/p Zosyn course in MICU. LUE cellulitis, s/p Vanc course, resolved. No PE, no DVT's. TTE 12/8 cannot r/o endocarditis. LP negative. RASHEEDA negative. CRP wnl. Repeat BCxs w/ NGTD. RASHEEDA neg. Procal low, unlikely to be bacterial infx. Neg ANCA. Neg fungitell.  - Improving off of abx. Meropenem d/c'd on 12/21.   - F/u Fungal blood cx, Bartonella, Brucella, Chlamydia, Legionella  - Per ID, low concern for bacterial infection, atypical pneumonia  - If fever curve worsens/not improving, consider WBC scan, last febrile on 12/24.

## 2020-12-27 NOTE — SWALLOW BEDSIDE ASSESSMENT ADULT - ADDITIONAL RECOMMENDATIONS
Oral care to prevent the colonization of oral cavity bacteria  This service will follow up as schedule permits
1. Monitor for PO tolerance/intake  2. Patient will benefit from swallow therapy pending discharge plan (rehabilitation center vs home care vs outpatient vs [facility] [phone])

## 2020-12-28 DIAGNOSIS — Z02.9 ENCOUNTER FOR ADMINISTRATIVE EXAMINATIONS, UNSPECIFIED: ICD-10-CM

## 2020-12-28 LAB
ANION GAP SERPL CALC-SCNC: 13 MMOL/L — SIGNIFICANT CHANGE UP (ref 7–14)
BRUCELLA IGG SER QL IA: NEGATIVE — SIGNIFICANT CHANGE UP
BRUCELLA IGG+IGM SER-IMP: SIGNIFICANT CHANGE UP
BRUCELLA IGM SER QL IA: NEGATIVE — SIGNIFICANT CHANGE UP
BUN SERPL-MCNC: 21 MG/DL — SIGNIFICANT CHANGE UP (ref 7–23)
C PNEUM IGM TITR SER: SIGNIFICANT CHANGE UP
CALCIUM SERPL-MCNC: 9.9 MG/DL — SIGNIFICANT CHANGE UP (ref 8.4–10.5)
CHLAMYDIA AB SER-ACNC: SIGNIFICANT CHANGE UP
CHLAMYDIA IGG SER-ACNC: HIGH
CHLAMYDIA PNEUMONIAE IGA: HIGH
CHLORIDE SERPL-SCNC: 104 MMOL/L — SIGNIFICANT CHANGE UP (ref 98–107)
CO2 SERPL-SCNC: 21 MMOL/L — LOW (ref 22–31)
CREAT SERPL-MCNC: 0.57 MG/DL — SIGNIFICANT CHANGE UP (ref 0.5–1.3)
GLUCOSE SERPL-MCNC: 105 MG/DL — HIGH (ref 70–99)
HCT VFR BLD CALC: 36.2 % — LOW (ref 39–50)
HGB BLD-MCNC: 11.4 G/DL — LOW (ref 13–17)
MAGNESIUM SERPL-MCNC: 2.2 MG/DL — SIGNIFICANT CHANGE UP (ref 1.6–2.6)
MCHC RBC-ENTMCNC: 28.6 PG — SIGNIFICANT CHANGE UP (ref 27–34)
MCHC RBC-ENTMCNC: 31.5 GM/DL — LOW (ref 32–36)
MCV RBC AUTO: 91 FL — SIGNIFICANT CHANGE UP (ref 80–100)
NRBC # BLD: 0 /100 WBCS — SIGNIFICANT CHANGE UP
NRBC # FLD: 0 K/UL — SIGNIFICANT CHANGE UP
PHOSPHATE SERPL-MCNC: 3.1 MG/DL — SIGNIFICANT CHANGE UP (ref 2.5–4.5)
PLATELET # BLD AUTO: 322 K/UL — SIGNIFICANT CHANGE UP (ref 150–400)
POTASSIUM SERPL-MCNC: 3.8 MMOL/L — SIGNIFICANT CHANGE UP (ref 3.5–5.3)
POTASSIUM SERPL-SCNC: 3.8 MMOL/L — SIGNIFICANT CHANGE UP (ref 3.5–5.3)
RBC # BLD: 3.98 M/UL — LOW (ref 4.2–5.8)
RBC # FLD: 13.9 % — SIGNIFICANT CHANGE UP (ref 10.3–14.5)
SODIUM SERPL-SCNC: 138 MMOL/L — SIGNIFICANT CHANGE UP (ref 135–145)
WBC # BLD: 7.46 K/UL — SIGNIFICANT CHANGE UP (ref 3.8–10.5)
WBC # FLD AUTO: 7.46 K/UL — SIGNIFICANT CHANGE UP (ref 3.8–10.5)

## 2020-12-28 PROCEDURE — 99233 SBSQ HOSP IP/OBS HIGH 50: CPT | Mod: GC

## 2020-12-28 PROCEDURE — 99232 SBSQ HOSP IP/OBS MODERATE 35: CPT

## 2020-12-28 PROCEDURE — 99233 SBSQ HOSP IP/OBS HIGH 50: CPT

## 2020-12-28 RX ADMIN — ENOXAPARIN SODIUM 40 MILLIGRAM(S): 100 INJECTION SUBCUTANEOUS at 17:20

## 2020-12-28 RX ADMIN — ENOXAPARIN SODIUM 40 MILLIGRAM(S): 100 INJECTION SUBCUTANEOUS at 06:08

## 2020-12-28 NOTE — CHART NOTE - NSCHARTNOTEFT_GEN_A_CORE
EEG preliminary read (not final) on the initial recording hour(s) = x 3     No seizures or spikes recorded.    Final report to follow tomorrow morning after completion of study.    [facility] EEG Reading Room Ph#: [phone]  Epilepsy Answering Service after 5PM and before 8:30AM: Ph#: [phone]

## 2020-12-28 NOTE — PROGRESS NOTE ADULT - PROBLEM SELECTOR PLAN 4
DVT PPX: Lovenox 40mg  Diet: Mech soft with nectar consistency  PT re-consulted  Dispo: transferred from [facility], pending resolution of fever, encephalopathy PT re-consulted, f/u recs.  Dispo: transferred from [facility].
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
## 2020-12-28 NOTE — PROGRESS NOTE ADULT - PROBLEM SELECTOR PLAN 1
Afebrile over past 72 hours.  Possibly d/t chronic aspiration vs. drug-fever. Pan CT revealed multifocal pneumonia, s/p Zosyn course in MICU. LUE cellulitis, s/p Vanc course, resolved. No PE, no DVT's. TTE 12/8 cannot r/o endocarditis. LP negative. RASHEEDA negative. CRP wnl. Repeat BCxs w/ NGTD. RASHEEDA neg. Procal low, unlikely to be bacterial infx. Neg ANCA. Neg fungitell.  - Improving off of abx. Meropenem d/c'd on 12/21.   - F/u Fungal blood cx, Bartonella, Brucella, Chlamydia, Legionella  - Per ID, low concern for bacterial infection, atypical pneumonia  - If fever curve worsens/not improving, consider WBC scan, last febrile on 12/24. Resolved. Afebrile over past 96 hours.  Possibly d/t chronic aspiration vs. drug-fever.   - Neg fungitell. Prior chlamydia infection. Legionella neg.  - Improving off of abx. Meropenem d/c'd on 12/21.   - F/u Fungal blood cx, Bartonella, Brucella

## 2020-12-28 NOTE — BH CONSULTATION LIAISON PROGRESS NOTE - NSBHFUPINTERVALHXFT_PSY_A_CORE
Patient states he feels "better", awake and alert, AOx2 on exam knows it is december, initially says year is 21 but when told it is year 2020 expresses acknowledgement. Denies depression or SI, feels "something has changed in my body", denies AH/VH.
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
## 2020-12-28 NOTE — PROGRESS NOTE ADULT - PROBLEM SELECTOR PLAN 2
Encephalopathy of unknown origin, possibly delirium.  Now AOx3 with improving attention.  Initially, possibly septic enceph given initially with multifocal pneumonia, sputum cx with Serratia, s/p course of Zosyn. Vs. Hypernatremia, which has now resolved.  - MR brain 12/9 unremarkable. Spot EEG 12/19 with no epileptiform abnormalities. LP negative, BCx/UCx negative to date  - c/w high dose IV thiamine  - Passed s/s. NGT d/c'd. Mech soft nectar consistency diet.  - Psych recs:  possible catatonia. Wants to try atival 1mg challenge.   - C/w 1:1 CO and PRN ativan 1mg q6h for agitation  - Neuro recs: does not think MR or LP is needed at this time. vEEG ordered. Resolved, AOx3 with good attention for several days. Encephalopathy of unknown origin, possibly delirium. Initially, possibly septic enceph given initially with multifocal pneumonia, sputum cx with Serratia, s/p course of Zosyn. Vs. Hypernatremia, which has now resolved.  - c/w high dose IV thiamine  - Passed s/s. Mech soft nectar consistency diet.  - Psych and Neuro following; will f/u recs   - C/w 1:1 CO and PRN ativan 1mg q6h for agitation  - vEEG ordered.
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
## 2020-12-28 NOTE — PROGRESS NOTE ADULT - SUBJECTIVE AND OBJECTIVE BOX
Follow Up:  fever    Interval History/ROS:  appears comfortable.  undergoing EEG.  NGT.  on 1:1    Allergies  No Known Allergies    ANTIMICROBIALS:  none    OTHER MEDS:  MEDICATIONS  (STANDING):  enoxaparin Injectable 40 two times a day  LORazepam   Injectable 2 every 6 hours PRN    Vital Signs Last 24 Hrs  T(F): 98.8 (12-28-20 @ 13:00), Max: 98.8 (12-28-20 @ 13:00)  HR: 105 (12-28-20 @ 13:00)  BP: 150/80 (12-28-20 @ 13:00)  RR: 18 (12-28-20 @ 13:00)  SpO2: 100% (12-28-20 @ 13:00) (97% - 100%)    PHYSICAL EXAM:  non toxic  no distress  NGT  lungs coarse rhonchi  cor: s1s2 no murmur  abd soft  ext no edema or erythema                          11.4   7.46  )-----------( 322      ( 28 Dec 2020 05:36 )             36.2 12-28    138  |  104  |  21  ----------------------------<  105  3.8   |  21  |  0.57  Ca    9.9      28 Dec 2020 05:36Phos  3.1     12-28Mg     2.2     12-28  TPro  6.9  /  Alb  3.8  /  TBili  0.4  /  DBili  x   /  AST  18  /  ALT  33  /  AlkPhos  53  12-27    12.22.20 @ 07:42)  Procalcitonin, Serum: 0.12:     MICROBIOLOGY:  .Blood Blood-Venous  12-22-20   Testing in progress  --  --      .Blood Blood-Venous  12-19-20   No growth  --  --      .Blood Blood-Venous  12-15-20   No Growth Final  --  --      .Blood Blood-Peripheral  12-15-20   No Growth Final  --  --      .Blood Blood-Peripheral  12-13-20   No Growth Final  --  --      .Blood Blood-Venous  12-13-20   No Growth Final  --  --      .Blood Blood-Peripheral  12-09-20   No Growth Final  --  --      .Sputum Sputum  12-08-20   Numerous Serratia marcescens  Normal Respiratory Leticia absent  --  Serratia marcescens      .CSF CSF  12-06-20   No growth at 3 days.  --    No polymorphonuclear leukocytes seen  No organisms seen  by cytocentrifuge      .Blood Blood-Arterial  12-05-20   No Growth Final  --  --      .Blood  12-05-20   No Growth Final  --  --      .Blood  12-05-20   No Growth Final  --  --      .Urine  12-05-20   No growth  --  --      RADIOLOGY:  rd< from: CT Neck Soft Tissue w/ IV Cont (12.21.20 @ 11:13) >  IMPRESSION:  -Nonspecific trace subcutaneous fat stranding involving the bilateral lower face. Correlate for cellulitis.    -No evidence for acute penetrating soft tissue injury.    < end of copied text >  < from: Xray Chest 1 View- PORTABLE-Routine (Xray Chest 1 View- PORTABLE-Routine .) (12.16.20 @ 15:54) >  FINDINGS:    Tip of the enteric tube is in the stomach.  Bibasilar linear atelectasis. Small bilateral pleural effusions. No pneumothorax.  Cardiac size cannot be accurately assessed in this projection. No acute osseous abnormality.    IMPRESSION:  Tip of the enteric tube is in the stomach.    < end of copied text >

## 2020-12-28 NOTE — PROGRESS NOTE ADULT - SUBJECTIVE AND OBJECTIVE BOX
Evert Mathewon, PGY1  Pager [phone]    INCOMPLETE NOTE - IN PROGRESS    Patient is a 55y old  Male who presents with a chief complaint of AMS (27 Dec 2020 06:24)      SUBJECTIVE/INTERVAL EVENTS: Patient seen and examined at bedside.    MEDICATIONS  (STANDING):  enoxaparin Injectable 40 milliGRAM(s) SubCutaneous two times a day    MEDICATIONS  (PRN):  LORazepam   Injectable 1 milliGRAM(s) IV Push every 6 hours PRN Agitation      VITAL SIGNS:  T(F): 97.3 (12-28-20 @ 05:00), Max: 98.2 (12-27-20 @ 09:00)  HR: 111 (12-28-20 @ 05:00) (96 - 111)  BP: 134/93 (12-28-20 @ 05:00) (131/95 - 156/94)  RR: 118 (12-28-20 @ 05:00) (18 - 118)  SpO2: 99% (12-28-20 @ 05:00) (97% - 100%)    I&O's Summary    26 Dec 2020 07:01  -  27 Dec 2020 07:00  --------------------------------------------------------  IN: 1920 mL / OUT: 1575 mL / NET: 345 mL    27 Dec 2020 07:01  -  28 Dec 2020 06:32  --------------------------------------------------------  IN: 0 mL / OUT: 600 mL / NET: -600 mL      Daily     Daily     PHYSICAL EXAM:  Gen: Alert, NAD  HEENT: NCAT, conjunctiva clear, sclera anicteric, no erythema or exudates in the oropharynx, mmm  Neck: Supple, no JVD  CV: RRR, S1S2, no m/r/g  Resp: CTAB, normal respiratory effort  Abd: Soft, nontender, nondistended, normal bowel sounds  Ext: no edema, no clubbing or cyanosis  Neuro: AOx3, CN2-12 grossly intact, TROY  SKIN: warm, perfused    LABS:                        11.6   6.68  )-----------( 352      ( 27 Dec 2020 08:27 )             37.5     Hgb Trend: 11.6<--, 11.5<--, 11.0<--, 11.0<--, 11.1<--  12-28    138  |  104  |  21  ----------------------------<  105<H>  3.8   |  21<L>  |  0.57    Ca    9.9      28 Dec 2020 05:36  Phos  3.1     12-28  Mg     2.2     12-28    TPro  6.9  /  Alb  3.8  /  TBili  0.4  /  DBili  x   /  AST  18  /  ALT  33  /  AlkPhos  53  12-27    Creatinine Trend: 0.57<--, 0.59<--, 0.58<--, 0.63<--, 0.65<--, 0.73<--  LIVER FUNCTIONS - ( 27 Dec 2020 08:27 )  Alb: 3.8 g/dL / Pro: 6.9 g/dL / ALK PHOS: 53 U/L / ALT: 33 U/L / AST: 18 U/L / GGT: x                     CAPILLARY BLOOD GLUCOSE          RADIOLOGY & ADDITIONAL TESTS: Reviewed    Imaging Personally Reviewed:    Consultant(s) Notes Reviewed:      Care Discussed with Consultants/Other Providers:   Evert Jewel Laureano, PGY1  Pager [phone]      Patient is a 55y old  Male who presents with a chief complaint of AMS (27 Dec 2020 06:24)      SUBJECTIVE/INTERVAL EVENTS:  NAEON. Patient reports that he "feels well" this AM and that he "slept ok."  More attentive than previous day.  Denies pain, admits to mild cough.  Patient seen and examined at bedside.      MEDICATIONS  (STANDING):  enoxaparin Injectable 40 milliGRAM(s) SubCutaneous two times a day    MEDICATIONS  (PRN):  LORazepam   Injectable 1 milliGRAM(s) IV Push every 6 hours PRN Agitation      VITAL SIGNS:  T(F): 97.3 (12-28-20 @ 05:00), Max: 98.2 (12-27-20 @ 09:00)  HR: 111 (12-28-20 @ 05:00) (96 - 111)  BP: 134/93 (12-28-20 @ 05:00) (131/95 - 156/94)  RR: 118 (12-28-20 @ 05:00) (18 - 118)  SpO2: 99% (12-28-20 @ 05:00) (97% - 100%)    I&O's Summary    26 Dec 2020 07:01  -  27 Dec 2020 07:00  --------------------------------------------------------  IN: 1920 mL / OUT: 1575 mL / NET: 345 mL    27 Dec 2020 07:01  -  28 Dec 2020 06:32  --------------------------------------------------------  IN: 0 mL / OUT: 600 mL / NET: -600 mL      Daily     Daily     PHYSICAL EXAM:  GENERAL:  male, NAD, alert, improved attention, more conversive  EYES: EOMI, PERRLA  CHEST/LUNG: on RA, no rhonchi.   HEART: Regular rate and rhythm; No murmurs  ABDOMEN: NG tube in place.  Soft, Nontender, Nondistended  : Meredith in place  EXTREMITIES:  No BLE and BUE edema.  NEURO: AOx3 (self, place, time). No significant ridigity in BUE to passive movement. No tremulousness noted.  SKIN: warm, perfused    LABS:                        11.6   6.68  )-----------( 352      ( 27 Dec 2020 08:27 )             37.5     Hgb Trend: 11.6<--, 11.5<--, 11.0<--, 11.0<--, 11.1<--  12-28    138  |  104  |  21  ----------------------------<  105<H>  3.8   |  21<L>  |  0.57    Ca    9.9      28 Dec 2020 05:36  Phos  3.1     12-28  Mg     2.2     12-28    TPro  6.9  /  Alb  3.8  /  TBili  0.4  /  DBili  x   /  AST  18  /  ALT  33  /  AlkPhos  53  12-27    Creatinine Trend: 0.57<--, 0.59<--, 0.58<--, 0.63<--, 0.65<--, 0.73<--  LIVER FUNCTIONS - ( 27 Dec 2020 08:27 )  Alb: 3.8 g/dL / Pro: 6.9 g/dL / ALK PHOS: 53 U/L / ALT: 33 U/L / AST: 18 U/L / GGT: x                     CAPILLARY BLOOD GLUCOSE          RADIOLOGY & ADDITIONAL TESTS: Reviewed    Imaging Personally Reviewed:    Consultant(s) Notes Reviewed:      Care Discussed with Consultants/Other Providers:

## 2020-12-29 LAB
ANION GAP SERPL CALC-SCNC: 11 MMOL/L — SIGNIFICANT CHANGE UP (ref 7–14)
BUN SERPL-MCNC: 21 MG/DL — SIGNIFICANT CHANGE UP (ref 7–23)
CALCIUM SERPL-MCNC: 9.7 MG/DL — SIGNIFICANT CHANGE UP (ref 8.4–10.5)
CHLORIDE SERPL-SCNC: 105 MMOL/L — SIGNIFICANT CHANGE UP (ref 98–107)
CO2 SERPL-SCNC: 23 MMOL/L — SIGNIFICANT CHANGE UP (ref 22–31)
CREAT SERPL-MCNC: 0.55 MG/DL — SIGNIFICANT CHANGE UP (ref 0.5–1.3)
GLUCOSE SERPL-MCNC: 95 MG/DL — SIGNIFICANT CHANGE UP (ref 70–99)
HCT VFR BLD CALC: 35.6 % — LOW (ref 39–50)
HGB BLD-MCNC: 11.1 G/DL — LOW (ref 13–17)
MAGNESIUM SERPL-MCNC: 2.1 MG/DL — SIGNIFICANT CHANGE UP (ref 1.6–2.6)
MCHC RBC-ENTMCNC: 28.5 PG — SIGNIFICANT CHANGE UP (ref 27–34)
MCHC RBC-ENTMCNC: 31.2 GM/DL — LOW (ref 32–36)
MCV RBC AUTO: 91.5 FL — SIGNIFICANT CHANGE UP (ref 80–100)
NRBC # BLD: 0 /100 WBCS — SIGNIFICANT CHANGE UP
NRBC # FLD: 0 K/UL — SIGNIFICANT CHANGE UP
PHOSPHATE SERPL-MCNC: 3.3 MG/DL — SIGNIFICANT CHANGE UP (ref 2.5–4.5)
PLATELET # BLD AUTO: 299 K/UL — SIGNIFICANT CHANGE UP (ref 150–400)
POTASSIUM SERPL-MCNC: 3.4 MMOL/L — LOW (ref 3.5–5.3)
POTASSIUM SERPL-SCNC: 3.4 MMOL/L — LOW (ref 3.5–5.3)
RBC # BLD: 3.89 M/UL — LOW (ref 4.2–5.8)
RBC # FLD: 13.9 % — SIGNIFICANT CHANGE UP (ref 10.3–14.5)
SODIUM SERPL-SCNC: 139 MMOL/L — SIGNIFICANT CHANGE UP (ref 135–145)
WBC # BLD: 6.72 K/UL — SIGNIFICANT CHANGE UP (ref 3.8–10.5)
WBC # FLD AUTO: 6.72 K/UL — SIGNIFICANT CHANGE UP (ref 3.8–10.5)

## 2020-12-29 PROCEDURE — 99233 SBSQ HOSP IP/OBS HIGH 50: CPT

## 2020-12-29 PROCEDURE — 95720 EEG PHY/QHP EA INCR W/VEEG: CPT

## 2020-12-29 PROCEDURE — 99233 SBSQ HOSP IP/OBS HIGH 50: CPT | Mod: GC

## 2020-12-29 RX ORDER — LANOLIN ALCOHOL/MO/W.PET/CERES
3 CREAM (GRAM) TOPICAL AT BEDTIME
Refills: 0 | Status: DISCONTINUED | OUTPATIENT
Start: 2020-12-29 | End: 2020-12-31

## 2020-12-29 RX ORDER — VORTIOXETINE 5 MG/1
5 TABLET, FILM COATED ORAL DAILY
Refills: 0 | Status: DISCONTINUED | OUTPATIENT
Start: 2020-12-29 | End: 2020-12-31

## 2020-12-29 RX ORDER — POTASSIUM CHLORIDE 20 MEQ
40 PACKET (EA) ORAL EVERY 4 HOURS
Refills: 0 | Status: COMPLETED | OUTPATIENT
Start: 2020-12-29 | End: 2020-12-29

## 2020-12-29 RX ADMIN — VORTIOXETINE 5 MILLIGRAM(S): 5 TABLET, FILM COATED ORAL at 17:54

## 2020-12-29 RX ADMIN — ENOXAPARIN SODIUM 40 MILLIGRAM(S): 100 INJECTION SUBCUTANEOUS at 05:50

## 2020-12-29 RX ADMIN — Medication 40 MILLIEQUIVALENT(S): at 10:08

## 2020-12-29 RX ADMIN — ENOXAPARIN SODIUM 40 MILLIGRAM(S): 100 INJECTION SUBCUTANEOUS at 17:54

## 2020-12-29 RX ADMIN — Medication 3 MILLIGRAM(S): at 23:02

## 2020-12-29 RX ADMIN — Medication 40 MILLIEQUIVALENT(S): at 14:04

## 2020-12-29 NOTE — CHART NOTE - NSCHARTNOTEFT_GEN_A_CORE
EEG noted:    eEEG Classification / Summary:  Abnormal EEG study, awake / drowsy / asleep    -slight background slowing  -----------------------------------------------------------------------------------------------------    Clinical Impression:  Mild diffuse cerebral dysfunction, nonspecific.  There were no epileptiform abnormalities recorded.      with guidance from Epilepsy, okay to discontinue the EEG at this time    pending MRI brain w/w/o contrast EEG noted:    eEEG Classification / Summary:  Abnormal EEG study, awake / drowsy / asleep    -slight background slowing  -----------------------------------------------------------------------------------------------------    Clinical Impression:  Mild diffuse cerebral dysfunction, nonspecific.  There were no epileptiform abnormalities recorded.      with guidance from Epilepsy, okay to discontinue the EEG at this time    repeat MRI brain not required at this time    d/w primary team

## 2020-12-29 NOTE — PROGRESS NOTE ADULT - PROBLEM SELECTOR PLAN 1
Resolved. Afebrile over past 96 hours.  Possibly d/t chronic aspiration vs. drug-fever.   - Neg fungitell. Prior chlamydia infection. Legionella neg.  - Improving off of abx. Meropenem d/c'd on 12/21.   - F/u Fungal blood cx, Bartonella, Brucella Resolved, AOx3 with good attention for several days.  Possibly delirium/septic enceph given initially with multifocal pneumonia, sputum cx with Serratia, s/p course of Zosyn. Vs. Hypernatremia, which has now resolved.  - c/w high dose IV thiamine  - Psych and Neuro following; will f/u recs   - C/w 1:1 CO and PRN ativan 1mg q6h for agitation  - vEEG shows mild nonspecific diffuse cerebral dysfunction, no seizure activity. Will now d/c.

## 2020-12-29 NOTE — BH CONSULTATION LIAISON PROGRESS NOTE - NSBHFUPINTERVALHXFT_PSY_A_CORE
Patient Aox3, feels "better" denies depression/SI, minimizes his prior suicide attempt. Spoke to wife, says that patient is much more awake/alert and improved upon prior status. Discussed restarting some psychiatric medications, wife amenable to this and agrees that patient still high risk and must return to [facility].

## 2020-12-29 NOTE — PROGRESS NOTE ADULT - SUBJECTIVE AND OBJECTIVE BOX
Evert Holloway Sridevi, PGY1  Pager [phone]    INCOMPLETE NOTE - IN PROGRESS    Patient is a 55y old  Male who presents with a chief complaint of AMS (28 Dec 2020 14:51)      SUBJECTIVE/INTERVAL EVENTS: Patient seen and examined at bedside.    MEDICATIONS  (STANDING):  enoxaparin Injectable 40 milliGRAM(s) SubCutaneous two times a day    MEDICATIONS  (PRN):  LORazepam   Injectable 1 milliGRAM(s) IV Push every 6 hours PRN Agitation      VITAL SIGNS:  T(F): 97.5 (12-29-20 @ 05:00), Max: 98.8 (12-28-20 @ 13:00)  HR: 92 (12-29-20 @ 05:00) (92 - 109)  BP: 135/93 (12-29-20 @ 05:00) (135/93 - 150/80)  RR: 17 (12-29-20 @ 05:00) (17 - 18)  SpO2: 99% (12-29-20 @ 05:00) (99% - 100%)    I&O's Summary    27 Dec 2020 07:01  -  28 Dec 2020 07:00  --------------------------------------------------------  IN: 0 mL / OUT: 600 mL / NET: -600 mL    28 Dec 2020 07:01  -  29 Dec 2020 06:44  --------------------------------------------------------  IN: 240 mL / OUT: 0 mL / NET: 240 mL      Daily     Daily     PHYSICAL EXAM:  Gen: Alert, NAD  HEENT: NCAT, conjunctiva clear, sclera anicteric, no erythema or exudates in the oropharynx, mmm  Neck: Supple, no JVD  CV: RRR, S1S2, no m/r/g  Resp: CTAB, normal respiratory effort  Abd: Soft, nontender, nondistended, normal bowel sounds  Ext: no edema, no clubbing or cyanosis  Neuro: AOx3, CN2-12 grossly intact, TROY  SKIN: warm, perfused    LABS:                        11.1   6.72  )-----------( 299      ( 29 Dec 2020 06:24 )             35.6     Hgb Trend: 11.1<--, 11.4<--, 11.6<--, 11.5<--, 11.0<--  12-28    138  |  104  |  21  ----------------------------<  105<H>  3.8   |  21<L>  |  0.57    Ca    9.9      28 Dec 2020 05:36  Phos  3.1     12-28  Mg     2.2     12-28    TPro  6.9  /  Alb  3.8  /  TBili  0.4  /  DBili  x   /  AST  18  /  ALT  33  /  AlkPhos  53  12-27    Creatinine Trend: 0.57<--, 0.59<--, 0.58<--, 0.63<--, 0.65<--, 0.73<--  LIVER FUNCTIONS - ( 27 Dec 2020 08:27 )  Alb: 3.8 g/dL / Pro: 6.9 g/dL / ALK PHOS: 53 U/L / ALT: 33 U/L / AST: 18 U/L / GGT: x                     CAPILLARY BLOOD GLUCOSE          RADIOLOGY & ADDITIONAL TESTS: Reviewed    Imaging Personally Reviewed:    Consultant(s) Notes Reviewed:      Care Discussed with Consultants/Other Providers:   Evert Jewel Laureano, PGY1  Pager [phone]      Patient is a 55y old  Male who presents with a chief complaint of AMS (28 Dec 2020 14:51)      SUBJECTIVE/INTERVAL EVENTS:  On vEEG.  NAEON.  Reports had dry coughing overnight, but subsided in morning, otherwise no other complaints. Denies pain.      MEDICATIONS  (STANDING):  enoxaparin Injectable 40 milliGRAM(s) SubCutaneous two times a day    MEDICATIONS  (PRN):  LORazepam   Injectable 1 milliGRAM(s) IV Push every 6 hours PRN Agitation      VITAL SIGNS:  T(F): 97.5 (12-29-20 @ 05:00), Max: 98.8 (12-28-20 @ 13:00)  HR: 92 (12-29-20 @ 05:00) (92 - 109)  BP: 135/93 (12-29-20 @ 05:00) (135/93 - 150/80)  RR: 17 (12-29-20 @ 05:00) (17 - 18)  SpO2: 99% (12-29-20 @ 05:00) (99% - 100%)    I&O's Summary    27 Dec 2020 07:01  -  28 Dec 2020 07:00  --------------------------------------------------------  IN: 0 mL / OUT: 600 mL / NET: -600 mL    28 Dec 2020 07:01  -  29 Dec 2020 06:44  --------------------------------------------------------  IN: 240 mL / OUT: 0 mL / NET: 240 mL      Daily     Daily     PHYSICAL EXAM:  GENERAL:  male, NAD, alert, good attention, conversive  EYES: EOMI, PERRLA  CHEST/LUNG: CTAB. No rhonchi.  HEART: Regular rate and rhythm; No murmurs  ABDOMEN: Soft, Nontender, Nondistended  EXTREMITIES:  No BLE and BUE edema.  NEURO: AOx3 (self, place, time). No rigidity, tremulousness noted.  SKIN: warm, perfused    LABS:                        11.1   6.72  )-----------( 299      ( 29 Dec 2020 06:24 )             35.6     Hgb Trend: 11.1<--, 11.4<--, 11.6<--, 11.5<--, 11.0<--  12-28    138  |  104  |  21  ----------------------------<  105<H>  3.8   |  21<L>  |  0.57    Ca    9.9      28 Dec 2020 05:36  Phos  3.1     12-28  Mg     2.2     12-28    TPro  6.9  /  Alb  3.8  /  TBili  0.4  /  DBili  x   /  AST  18  /  ALT  33  /  AlkPhos  53  12-27    Creatinine Trend: 0.57<--, 0.59<--, 0.58<--, 0.63<--, 0.65<--, 0.73<--  LIVER FUNCTIONS - ( 27 Dec 2020 08:27 )  Alb: 3.8 g/dL / Pro: 6.9 g/dL / ALK PHOS: 53 U/L / ALT: 33 U/L / AST: 18 U/L / GGT: x                     CAPILLARY BLOOD GLUCOSE          RADIOLOGY & ADDITIONAL TESTS: Reviewed    Imaging Personally Reviewed:    Consultant(s) Notes Reviewed:      Care Discussed with Consultants/Other Providers:
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
## 2020-12-29 NOTE — PROGRESS NOTE ADULT - PROBLEM SELECTOR PLAN 2
Resolved, AOx3 with good attention for several days. Encephalopathy of unknown origin, possibly delirium. Initially, possibly septic enceph given initially with multifocal pneumonia, sputum cx with Serratia, s/p course of Zosyn. Vs. Hypernatremia, which has now resolved.  - c/w high dose IV thiamine  - Passed s/s. Mech soft nectar consistency diet.  - Psych and Neuro following; will f/u recs   - C/w 1:1 CO and PRN ativan 1mg q6h for agitation  - vEEG ordered. Resolved.  Possibly d/t chronic aspiration vs. drug-fever. Meropenem d/c'd on 12/21.  - CTM

## 2020-12-29 NOTE — EEG REPORT - NS EEG TEXT BOX
EBONI CARRASCO [id number] 55y (1965)M  Admitting MD: Dr. Wilber Fair    Study Date: 12-29 08:00-11:54 12-29-20    Duration - [id number]   		  --------------------------------------------------------------------------------------------------  History:  CC/ HPI Patient is a 55y old  Male who presents with a chief complaint of confusion.     MEDICATIONS:   none   --------------------------------------------------------------------------------------------------  Study Interpretation:    [[[Abbreviation Key:  PDR=alpha rhythm/posterior dominant rhythm. A-P=anterior posterior gradient.  Amplitude: ‘very low’:<20; ‘low’:20-50; ‘medium’:; ‘high’:>200uV.  Persistence for periodic/rhythmic patterns (% of epoch) ‘rare’:<1%; ‘occasional’:1-10%; ‘frequent’:10-50%; ‘abundant’:50-90%; ‘continuous’:>90%.  Persistence for sporadic discharges: ‘rare’:<1/hr; ‘occasional’:1/min-1/hr; ‘frequent’:>1/min; ‘abundant’:>1/10 sec.  GRDA=generalized rhythmic delta activity, LRDA=lateralized rhythmic delta activity, TIRDA=temporal intermittent rhythmic delta activity, FIRDA=frontal intermittent rhythmic activity. LPD=PLED=lateralized periodic discharges, GPD=generalized periodic discharges, BiPDs=BiPLEDs=bilateral independent periodic epileptiform discharges, SIRPID=stimulus induced rhythmic, periodic, or ictal appearing discharges.  Modifiers: +F=with fast component, +S=with spike component, +R=with rhythmic component.  S-B=burst suppression pattern.  Max=maximal. N1-drowsy, N2-stage II sleep, N3-slow wave sleep.  HV=hyperventilation, PS=photic stimulation]]]    FINDINGS:  The background was continuous, spontaneously variable and reactive.  During wakefulness, the posteriorly dominant rhythm consisted of symmetric, well modulated 8-8.5Hz activity, with an amplitude to 40 uV, that attenuated to eye opening.  Low amplitude central beta was noted in wakefulness.    Background Slowing:  Generalized slowing: diffuse polymorphic theta>delta  was present.  Focal slowing: none was present.    Sleep Background:  Drowsiness was characterized by fragmentation, attenuation, and slowing of the background activity.    Sleep was characterized by the presence of vertex waves, symmetric spindles, and K-complexes.    Epileptiform Activity:   No epileptiform discharges were present.    Events:  No clinical events were recorded.  No seizures were recorded.    Activation Procedures:   -Hyperventilation was not performed.    -Photic stimulation was not performed.    Artifacts:  Intermittent myogenic and movement artifacts were noted.    ECG:  The heart rate on single channel ECG at baseline was predominantly near BPM = 70-80  -----------------------------------------------------------------------------------------------------    EEG Classification / Summary:  Abnormal EEG study, awake / drowsy / asleep    -slight background slowing  -----------------------------------------------------------------------------------------------------    Clinical Impression:  Mild diffuse cerebral dysfunction, nonspecific.  There were no epileptiform abnormalities recorded.      -------------------------------------------------------------------------------------------------------  [facility] EEG Reading Room Ph#: [phone]  Epilepsy Answering Service after 5PM and before 8:30AM: Ph#: [phone]    Note: This is a preliminary report pending attending review.    Osmin Montano MD  Epilepsy Fellow   EBONI CARRASCO [id number] 55y (1965)M  Admitting MD: Dr. Wliber Fair    Study Date: 12-29 08:00-11:54 12-29-20    Duration - [id number]   		  --------------------------------------------------------------------------------------------------  History:  CC/ HPI Patient is a 55y old  Male who presents with a chief complaint of confusion.     MEDICATIONS:   none   --------------------------------------------------------------------------------------------------  Study Interpretation:    [[[Abbreviation Key:  PDR=alpha rhythm/posterior dominant rhythm. A-P=anterior posterior gradient.  Amplitude: ‘very low’:<20; ‘low’:20-50; ‘medium’:; ‘high’:>200uV.  Persistence for periodic/rhythmic patterns (% of epoch) ‘rare’:<1%; ‘occasional’:1-10%; ‘frequent’:10-50%; ‘abundant’:50-90%; ‘continuous’:>90%.  Persistence for sporadic discharges: ‘rare’:<1/hr; ‘occasional’:1/min-1/hr; ‘frequent’:>1/min; ‘abundant’:>1/10 sec.  GRDA=generalized rhythmic delta activity, LRDA=lateralized rhythmic delta activity, TIRDA=temporal intermittent rhythmic delta activity, FIRDA=frontal intermittent rhythmic activity. LPD=PLED=lateralized periodic discharges, GPD=generalized periodic discharges, BiPDs=BiPLEDs=bilateral independent periodic epileptiform discharges, SIRPID=stimulus induced rhythmic, periodic, or ictal appearing discharges.  Modifiers: +F=with fast component, +S=with spike component, +R=with rhythmic component.  S-B=burst suppression pattern.  Max=maximal. N1-drowsy, N2-stage II sleep, N3-slow wave sleep.  HV=hyperventilation, PS=photic stimulation]]]    FINDINGS:  The background was continuous, spontaneously variable and reactive.  During wakefulness, the posteriorly dominant rhythm consisted of symmetric, well modulated 8-8.5Hz activity, with an amplitude to 40 uV, that attenuated to eye opening.  Low amplitude central beta was noted in wakefulness.    Background Slowing:  Generalized slowing: diffuse polymorphic theta>delta  was present.  Focal slowing: none was present.    Sleep Background:  Drowsiness was characterized by fragmentation, attenuation, and slowing of the background activity.    Sleep was characterized by the presence of vertex waves, symmetric spindles, and K-complexes.    Epileptiform Activity:   No epileptiform discharges were present.    Events:  No clinical events were recorded.  No seizures were recorded.    Activation Procedures:   -Hyperventilation was not performed.    -Photic stimulation was not performed.    Artifacts:  Intermittent myogenic and movement artifacts were noted.    ECG:  The heart rate on single channel ECG at baseline was predominantly near BPM = 70-80  -----------------------------------------------------------------------------------------------------    EEG Classification / Summary:  Abnormal EEG study, awake / drowsy / asleep    -slight background slowing  -----------------------------------------------------------------------------------------------------    Clinical Impression:  Mild diffuse cerebral dysfunction, nonspecific.  There were no epileptiform abnormalities recorded.      -------------------------------------------------------------------------------------------------------  [facility] EEG Reading Room Ph#: [phone]  Epilepsy Answering Service after 5PM and before 8:30AM: Ph#: [phone]    Osmin Montano MD  Epilepsy Fellow

## 2020-12-29 NOTE — EEG REPORT - NS EEG TEXT BOX
EBONI CARRASCO [id number] 55y (1965)M  Admitting MD: Dr. Wilber Fair    Study Date: 12-28 12:01-08:00 12-29-20  		  --------------------------------------------------------------------------------------------------  History:  CC/ HPI Patient is a 55y old  Male who presents with a chief complaint of AMS (29 Dec 2020 06:44)    enoxaparin Injectable 40 milliGRAM(s) SubCutaneous two times a day  potassium chloride    Tablet ER 40 milliEquivalent(s) Oral every 4 hours    --------------------------------------------------------------------------------------------------  Study Interpretation:    [[[Abbreviation Key:  PDR=alpha rhythm/posterior dominant rhythm. A-P=anterior posterior gradient.  Amplitude: ‘very low’:<20; ‘low’:20-50; ‘medium’:; ‘high’:>200uV.  Persistence for periodic/rhythmic patterns (% of epoch) ‘rare’:<1%; ‘occasional’:1-10%; ‘frequent’:10-50%; ‘abundant’:50-90%; ‘continuous’:>90%.  Persistence for sporadic discharges: ‘rare’:<1/hr; ‘occasional’:1/min-1/hr; ‘frequent’:>1/min; ‘abundant’:>1/10 sec.  GRDA=generalized rhythmic delta activity, LRDA=lateralized rhythmic delta activity, TIRDA=temporal intermittent rhythmic delta activity, FIRDA=frontal intermittent rhythmic activity. LPD=PLED=lateralized periodic discharges, GPD=generalized periodic discharges, BiPDs=BiPLEDs=bilateral independent periodic epileptiform discharges, SIRPID=stimulus induced rhythmic, periodic, or ictal appearing discharges.  Modifiers: +F=with fast component, +S=with spike component, +R=with rhythmic component.  S-B=burst suppression pattern.  Max=maximal. N1-drowsy, N2-stage II sleep, N3-slow wave sleep.  HV=hyperventilation, PS=photic stimulation]]]    FINDINGS:  The background was continuous, spontaneously variable and reactive.  During wakefulness, the posteriorly dominant rhythm consisted of symmetric, well modulated 8-8.5Hz activity, with an amplitude to 40 uV, that attenuated to eye opening.  Low amplitude central beta was noted in wakefulness.    Background Slowing:  Generalized slowing: diffuse polymorphic theta>delta  was present.  Focal slowing: none was present.    Sleep Background:  Drowsiness was characterized by fragmentation, attenuation, and slowing of the background activity.    Sleep was characterized by the presence of vertex waves, symmetric spindles, and K-complexes.    Epileptiform Activity:   No epileptiform discharges were present.    Events:  No clinical events were recorded.  No seizures were recorded.    Activation Procedures:   -Hyperventilation was not performed.    -Photic stimulation was not performed.    Artifacts:  Intermittent myogenic and movement artifacts were noted.    ECG:  The heart rate on single channel ECG at baseline was predominantly near BPM = 70-80  -----------------------------------------------------------------------------------------------------    EEG Classification / Summary:  Abnormal EEG study, awake / drowsy / asleep    -slight background slowing  -----------------------------------------------------------------------------------------------------    Clinical Impression:  Mild diffuse cerebral dysfunction, nonspecific.  There were no epileptiform abnormalities recorded.      -------------------------------------------------------------------------------------------------------  [facility] EEG Reading Room Ph#: [phone]  Epilepsy Answering Service after 5PM and before 8:30AM: Ph#: [phone]    Yann Orantes M.D.   of Neurology, [facility]	   EBONI CARRASCO [id number] 55y (1965)M  Admitting MD: Dr. Wilber Fair    Study Date: 12-28 12:01-08:00 12-29-20  x20hrs  		  --------------------------------------------------------------------------------------------------  History:  CC/ HPI Patient is a 55y old  Male who presents with a chief complaint of AMS (29 Dec 2020 06:44)    enoxaparin Injectable 40 milliGRAM(s) SubCutaneous two times a day  potassium chloride    Tablet ER 40 milliEquivalent(s) Oral every 4 hours    --------------------------------------------------------------------------------------------------  Study Interpretation:    [[[Abbreviation Key:  PDR=alpha rhythm/posterior dominant rhythm. A-P=anterior posterior gradient.  Amplitude: ‘very low’:<20; ‘low’:20-50; ‘medium’:; ‘high’:>200uV.  Persistence for periodic/rhythmic patterns (% of epoch) ‘rare’:<1%; ‘occasional’:1-10%; ‘frequent’:10-50%; ‘abundant’:50-90%; ‘continuous’:>90%.  Persistence for sporadic discharges: ‘rare’:<1/hr; ‘occasional’:1/min-1/hr; ‘frequent’:>1/min; ‘abundant’:>1/10 sec.  GRDA=generalized rhythmic delta activity, LRDA=lateralized rhythmic delta activity, TIRDA=temporal intermittent rhythmic delta activity, FIRDA=frontal intermittent rhythmic activity. LPD=PLED=lateralized periodic discharges, GPD=generalized periodic discharges, BiPDs=BiPLEDs=bilateral independent periodic epileptiform discharges, SIRPID=stimulus induced rhythmic, periodic, or ictal appearing discharges.  Modifiers: +F=with fast component, +S=with spike component, +R=with rhythmic component.  S-B=burst suppression pattern.  Max=maximal. N1-drowsy, N2-stage II sleep, N3-slow wave sleep.  HV=hyperventilation, PS=photic stimulation]]]    FINDINGS:  The background was continuous, spontaneously variable and reactive.  During wakefulness, the posteriorly dominant rhythm consisted of symmetric, well modulated 8-8.5Hz activity, with an amplitude to 40 uV, that attenuated to eye opening.  Low amplitude central beta was noted in wakefulness.    Background Slowing:  Generalized slowing: diffuse polymorphic theta>delta  was present.  Focal slowing: none was present.    Sleep Background:  Drowsiness was characterized by fragmentation, attenuation, and slowing of the background activity.    Sleep was characterized by the presence of vertex waves, symmetric spindles, and K-complexes.    Epileptiform Activity:   No epileptiform discharges were present.    Events:  No clinical events were recorded.  No seizures were recorded.    Activation Procedures:   -Hyperventilation was not performed.    -Photic stimulation was not performed.    Artifacts:  Intermittent myogenic and movement artifacts were noted.    ECG:  The heart rate on single channel ECG at baseline was predominantly near BPM = 70-80  -----------------------------------------------------------------------------------------------------    EEG Classification / Summary:  Abnormal EEG study, awake / drowsy / asleep    -slight background slowing  -----------------------------------------------------------------------------------------------------    Clinical Impression:  Mild diffuse cerebral dysfunction, nonspecific.  There were no epileptiform abnormalities recorded.      -------------------------------------------------------------------------------------------------------  [facility] EEG Reading Room Ph#: [phone]  Epilepsy Answering Service after 5PM and before 8:30AM: Ph#: [phone]    Yann rOantes M.D.   of Neurology, [facility] [place]

## 2020-12-29 NOTE — PROGRESS NOTE ADULT - PROBLEM SELECTOR PLAN 4
PT re-consulted, f/u recs.  Dispo: transferred from [facility]. PT will re-evaluate patient today. F/u recs  Dispo: transferred from [facility].

## 2020-12-30 LAB
ANION GAP SERPL CALC-SCNC: 12 MMOL/L — SIGNIFICANT CHANGE UP (ref 7–14)
BUN SERPL-MCNC: 16 MG/DL — SIGNIFICANT CHANGE UP (ref 7–23)
CALCIUM SERPL-MCNC: 10.1 MG/DL — SIGNIFICANT CHANGE UP (ref 8.4–10.5)
CHLORIDE SERPL-SCNC: 104 MMOL/L — SIGNIFICANT CHANGE UP (ref 98–107)
CO2 SERPL-SCNC: 23 MMOL/L — SIGNIFICANT CHANGE UP (ref 22–31)
CREAT SERPL-MCNC: 0.6 MG/DL — SIGNIFICANT CHANGE UP (ref 0.5–1.3)
GLUCOSE SERPL-MCNC: 91 MG/DL — SIGNIFICANT CHANGE UP (ref 70–99)
HCT VFR BLD CALC: 37.1 % — LOW (ref 39–50)
HGB BLD-MCNC: 11.7 G/DL — LOW (ref 13–17)
MAGNESIUM SERPL-MCNC: 2.2 MG/DL — SIGNIFICANT CHANGE UP (ref 1.6–2.6)
MCHC RBC-ENTMCNC: 28.5 PG — SIGNIFICANT CHANGE UP (ref 27–34)
MCHC RBC-ENTMCNC: 31.5 GM/DL — LOW (ref 32–36)
MCV RBC AUTO: 90.3 FL — SIGNIFICANT CHANGE UP (ref 80–100)
NRBC # BLD: 0 /100 WBCS — SIGNIFICANT CHANGE UP
NRBC # FLD: 0 K/UL — SIGNIFICANT CHANGE UP
PHOSPHATE SERPL-MCNC: 3.4 MG/DL — SIGNIFICANT CHANGE UP (ref 2.5–4.5)
PLATELET # BLD AUTO: 302 K/UL — SIGNIFICANT CHANGE UP (ref 150–400)
POTASSIUM SERPL-MCNC: 3.8 MMOL/L — SIGNIFICANT CHANGE UP (ref 3.5–5.3)
POTASSIUM SERPL-SCNC: 3.8 MMOL/L — SIGNIFICANT CHANGE UP (ref 3.5–5.3)
RBC # BLD: 4.11 M/UL — LOW (ref 4.2–5.8)
RBC # FLD: 13.8 % — SIGNIFICANT CHANGE UP (ref 10.3–14.5)
SODIUM SERPL-SCNC: 139 MMOL/L — SIGNIFICANT CHANGE UP (ref 135–145)
WBC # BLD: 5.94 K/UL — SIGNIFICANT CHANGE UP (ref 3.8–10.5)
WBC # FLD AUTO: 5.94 K/UL — SIGNIFICANT CHANGE UP (ref 3.8–10.5)

## 2020-12-30 PROCEDURE — 99233 SBSQ HOSP IP/OBS HIGH 50: CPT

## 2020-12-30 PROCEDURE — 99233 SBSQ HOSP IP/OBS HIGH 50: CPT | Mod: GC

## 2020-12-30 RX ADMIN — VORTIOXETINE 5 MILLIGRAM(S): 5 TABLET, FILM COATED ORAL at 12:26

## 2020-12-30 RX ADMIN — ENOXAPARIN SODIUM 40 MILLIGRAM(S): 100 INJECTION SUBCUTANEOUS at 17:10

## 2020-12-30 RX ADMIN — ENOXAPARIN SODIUM 40 MILLIGRAM(S): 100 INJECTION SUBCUTANEOUS at 06:04
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
## 2020-12-30 NOTE — PROGRESS NOTE ADULT - PROBLEM SELECTOR PLAN 4
PT recs inpatient rehab, pt was unable to get up out of bed yesterday on PT eval. PT will attempt to come work with patient again today.    Dispo: transfer back to [facility]
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
## 2020-12-30 NOTE — BH CONSULTATION LIAISON PROGRESS NOTE - NSBHFUPINTERVALHXFT_PSY_A_CORE
Patient Aox3, feels "okay" denies SI/HI, states he was suicidal before because he was not sleeping well, is sleeping well now, no n/v diarrhea on exam after taking vortioxetine

## 2020-12-30 NOTE — PROGRESS NOTE ADULT - SUBJECTIVE AND OBJECTIVE BOX
Evert Holloway Sridevi, PGY1  Pager [phone]    INCOMPLETE NOTE - IN PROGRESS    Patient is a 55y old  Male who presents with a chief complaint of AMS (29 Dec 2020 06:44)      SUBJECTIVE/INTERVAL EVENTS: Patient seen and examined at bedside.    MEDICATIONS  (STANDING):  enoxaparin Injectable 40 milliGRAM(s) SubCutaneous two times a day  melatonin 3 milliGRAM(s) Oral at bedtime  vortioxetine 5 milliGRAM(s) Oral daily    MEDICATIONS  (PRN):  benzonatate 100 milliGRAM(s) Oral every 8 hours PRN Cough  LORazepam   Injectable 1 milliGRAM(s) IV Push every 6 hours PRN Agitation      VITAL SIGNS:  T(F): 97.9 (12-30-20 @ 06:26), Max: 98.7 (12-29-20 @ 14:00)  HR: 96 (12-29-20 @ 22:42) (96 - 108)  BP: 124/96 (12-30-20 @ 06:26) (123/91 - 129/83)  RR: 17 (12-30-20 @ 06:26) (17 - 96)  SpO2: 99% (12-30-20 @ 06:26) (99% - 100%)    I&O's Summary    28 Dec 2020 07:01  -  29 Dec 2020 07:00  --------------------------------------------------------  IN: 240 mL / OUT: 0 mL / NET: 240 mL    29 Dec 2020 07:01  -  30 Dec 2020 06:53  --------------------------------------------------------  IN: 0 mL / OUT: 1000 mL / NET: -1000 mL      Daily     Daily     PHYSICAL EXAM:  Gen: Alert, NAD  HEENT: NCAT, conjunctiva clear, sclera anicteric, no erythema or exudates in the oropharynx, mmm  Neck: Supple, no JVD  CV: RRR, S1S2, no m/r/g  Resp: CTAB, normal respiratory effort  Abd: Soft, nontender, nondistended, normal bowel sounds  Ext: no edema, no clubbing or cyanosis  Neuro: AOx3, CN2-12 grossly intact, TROY  SKIN: warm, perfused    LABS:                        11.1   6.72  )-----------( 299      ( 29 Dec 2020 06:24 )             35.6     Hgb Trend: 11.1<--, 11.4<--, 11.6<--, 11.5<--, 11.0<--  12-29    139  |  105  |  21  ----------------------------<  95  3.4<L>   |  23  |  0.55    Ca    9.7      29 Dec 2020 06:24  Phos  3.3     12-29  Mg     2.1     12-29      Creatinine Trend: 0.55<--, 0.57<--, 0.59<--, 0.58<--, 0.63<--, 0.65<--              CAPILLARY BLOOD GLUCOSE          RADIOLOGY & ADDITIONAL TESTS: Reviewed    Imaging Personally Reviewed:    Consultant(s) Notes Reviewed:      Care Discussed with Consultants/Other Providers:   Evert Holloway Sridevi, PGY1  Pager [phone]      Patient is a 55y old  Male who presents with a chief complaint of AMS (29 Dec 2020 06:44)      SUBJECTIVE/INTERVAL EVENTS:  NAEON  This AM, patient is confused and reports that he went home last night and woke up Patient seen and examined at bedside.    MEDICATIONS  (STANDING):  enoxaparin Injectable 40 milliGRAM(s) SubCutaneous two times a day  melatonin 3 milliGRAM(s) Oral at bedtime  vortioxetine 5 milliGRAM(s) Oral daily    MEDICATIONS  (PRN):  benzonatate 100 milliGRAM(s) Oral every 8 hours PRN Cough  LORazepam   Injectable 1 milliGRAM(s) IV Push every 6 hours PRN Agitation      VITAL SIGNS:  T(F): 97.9 (12-30-20 @ 06:26), Max: 98.7 (12-29-20 @ 14:00)  HR: 96 (12-29-20 @ 22:42) (96 - 108)  BP: 124/96 (12-30-20 @ 06:26) (123/91 - 129/83)  RR: 17 (12-30-20 @ 06:26) (17 - 96)  SpO2: 99% (12-30-20 @ 06:26) (99% - 100%)    I&O's Summary    28 Dec 2020 07:01  -  29 Dec 2020 07:00  --------------------------------------------------------  IN: 240 mL / OUT: 0 mL / NET: 240 mL    29 Dec 2020 07:01  -  30 Dec 2020 06:53  --------------------------------------------------------  IN: 0 mL / OUT: 1000 mL / NET: -1000 mL      Daily     Daily     PHYSICAL EXAM:  Gen: Alert, NAD  HEENT: NCAT, conjunctiva clear, sclera anicteric, no erythema or exudates in the oropharynx, mmm  Neck: Supple, no JVD  CV: RRR, S1S2, no m/r/g  Resp: CTAB, normal respiratory effort  Abd: Soft, nontender, nondistended, normal bowel sounds  Ext: no edema, no clubbing or cyanosis  Neuro: AOx3, CN2-12 grossly intact, TROY  SKIN: warm, perfused    LABS:                        11.1   6.72  )-----------( 299      ( 29 Dec 2020 06:24 )             35.6     Hgb Trend: 11.1<--, 11.4<--, 11.6<--, 11.5<--, 11.0<--  12-29    139  |  105  |  21  ----------------------------<  95  3.4<L>   |  23  |  0.55    Ca    9.7      29 Dec 2020 06:24  Phos  3.3     12-29  Mg     2.1     12-29      Creatinine Trend: 0.55<--, 0.57<--, 0.59<--, 0.58<--, 0.63<--, 0.65<--              CAPILLARY BLOOD GLUCOSE          RADIOLOGY & ADDITIONAL TESTS: Reviewed    Imaging Personally Reviewed:    Consultant(s) Notes Reviewed:      Care Discussed with Consultants/Other Providers:   Evert Mathewon, PGY1  Pager [phone]      Patient is a 55y old  Male who presents with a chief complaint of AMS (29 Dec 2020 06:44)      SUBJECTIVE/INTERVAL EVENTS:  NAEON  This AM, patient is confused and reports that he went home last night and woke up in the hospital again.  Patient expresses wishes to go home.  Informed patient that he will need to go back to [facility] prior to going back home.  Patient seen and examined at bedside.    MEDICATIONS  (STANDING):  enoxaparin Injectable 40 milliGRAM(s) SubCutaneous two times a day  melatonin 3 milliGRAM(s) Oral at bedtime  vortioxetine 5 milliGRAM(s) Oral daily    MEDICATIONS  (PRN):  benzonatate 100 milliGRAM(s) Oral every 8 hours PRN Cough  LORazepam   Injectable 1 milliGRAM(s) IV Push every 6 hours PRN Agitation      VITAL SIGNS:  T(F): 97.9 (12-30-20 @ 06:26), Max: 98.7 (12-29-20 @ 14:00)  HR: 96 (12-29-20 @ 22:42) (96 - 108)  BP: 124/96 (12-30-20 @ 06:26) (123/91 - 129/83)  RR: 17 (12-30-20 @ 06:26) (17 - 96)  SpO2: 99% (12-30-20 @ 06:26) (99% - 100%)    I&O's Summary    28 Dec 2020 07:01  -  29 Dec 2020 07:00  --------------------------------------------------------  IN: 240 mL / OUT: 0 mL / NET: 240 mL    29 Dec 2020 07:01  -  30 Dec 2020 06:53  --------------------------------------------------------  IN: 0 mL / OUT: 1000 mL / NET: -1000 mL      Daily     Daily     PHYSICAL EXAM:  GENERAL:  male, NAD, alert, good attention, conversive  EYES: EOMI, PERRLA  CHEST/LUNG: CTAB. No rhonchi.  HEART: Regular rate and rhythm; No murmurs  ABDOMEN: Soft, Nontender, Nondistended  EXTREMITIES:  No BLE and BUE edema.  NEURO: AOx3 but confused. No rigidity, tremulousness noted.  SKIN: warm, perfused    LABS:                        11.1   6.72  )-----------( 299      ( 29 Dec 2020 06:24 )             35.6     Hgb Trend: 11.1<--, 11.4<--, 11.6<--, 11.5<--, 11.0<--  12-29    139  |  105  |  21  ----------------------------<  95  3.4<L>   |  23  |  0.55    Ca    9.7      29 Dec 2020 06:24  Phos  3.3     12-29  Mg     2.1     12-29      Creatinine Trend: 0.55<--, 0.57<--, 0.59<--, 0.58<--, 0.63<--, 0.65<--              CAPILLARY BLOOD GLUCOSE          RADIOLOGY & ADDITIONAL TESTS: Reviewed    Imaging Personally Reviewed:    Consultant(s) Notes Reviewed:      Care Discussed with Consultants/Other Providers:

## 2020-12-30 NOTE — PROGRESS NOTE ADULT - PROBLEM SELECTOR PLAN 1
Resolved, AOx3 with good attention for several days.  Possibly delirium/septic enceph given initially with multifocal pneumonia, sputum cx with Serratia, s/p course of Zosyn. Vs. Hypernatremia, which has now resolved. No seizure activity on EEG.  - c/w high dose IV thiamine  - Psych and Neuro following; will f/u recs   - C/w 1:1 CO and PRN ativan 1mg q6h for agitation
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
## 2020-12-31 DIAGNOSIS — R53.1 WEAKNESS: ICD-10-CM

## 2020-12-31 PROCEDURE — 99233 SBSQ HOSP IP/OBS HIGH 50: CPT

## 2020-12-31 PROCEDURE — 99232 SBSQ HOSP IP/OBS MODERATE 35: CPT | Mod: GC

## 2020-12-31 RX ORDER — VORTIOXETINE 5 MG/1
10 TABLET, FILM COATED ORAL DAILY
Refills: 0 | Status: DISCONTINUED | OUTPATIENT
Start: 2021-01-01 | End: 2021-01-03

## 2020-12-31 RX ORDER — VORTIOXETINE 5 MG/1
5 TABLET, FILM COATED ORAL ONCE
Refills: 0 | Status: COMPLETED | OUTPATIENT
Start: 2020-12-31 | End: 2020-12-31

## 2020-12-31 RX ORDER — LANOLIN ALCOHOL/MO/W.PET/CERES
6 CREAM (GRAM) TOPICAL AT BEDTIME
Refills: 0 | Status: DISCONTINUED | OUTPATIENT
Start: 2020-12-31 | End: 2021-01-06

## 2020-12-31 RX ADMIN — Medication 6 MILLIGRAM(S): at 21:49

## 2020-12-31 RX ADMIN — ENOXAPARIN SODIUM 40 MILLIGRAM(S): 100 INJECTION SUBCUTANEOUS at 06:22

## 2020-12-31 RX ADMIN — VORTIOXETINE 5 MILLIGRAM(S): 5 TABLET, FILM COATED ORAL at 17:30

## 2020-12-31 RX ADMIN — VORTIOXETINE 5 MILLIGRAM(S): 5 TABLET, FILM COATED ORAL at 11:39

## 2020-12-31 RX ADMIN — ENOXAPARIN SODIUM 40 MILLIGRAM(S): 100 INJECTION SUBCUTANEOUS at 17:31
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
## 2020-12-31 NOTE — BH CONSULTATION LIAISON PROGRESS NOTE - NSBHFUPINTERVALHXFT_PSY_A_CORE
Patient Aox2-3, feels "depressed" denies SI/HI, understands that he may go to [facility] hospital though is ambivalent about this

## 2020-12-31 NOTE — PROGRESS NOTE ADULT - PROBLEM SELECTOR PLAN 4
PT recs inpatient rehab, pt was unable to get up out of bed yesterday on PT eval. PT will attempt to come work with patient again today.    Dispo: transfer back to [facility] D/c planning tentatively to [place] psych inpatient for long-term stay and PT needs    Dispo: transfer back to [facility]

## 2020-12-31 NOTE — PROGRESS NOTE ADULT - PROBLEM SELECTOR PLAN 2
Resolved.  Possibly d/t chronic aspiration vs. drug-fever. Meropenem d/c'd on 12/21.  - CTM Weakness secondary to ICU, prolonged hospital course.  - attempt for PT everyday  - D/c planning tentatively to [place] psych inpatient for long-term stay and PT needs

## 2020-12-31 NOTE — PROGRESS NOTE ADULT - SUBJECTIVE AND OBJECTIVE BOX
Evert Mathewon, PGY1  Pager [phone]    INCOMPLETE NOTE - IN PROGRESS    Patient is a 55y old  Male who presents with a chief complaint of AMS (30 Dec 2020 06:52)      SUBJECTIVE/INTERVAL EVENTS: Patient seen and examined at bedside.    MEDICATIONS  (STANDING):  enoxaparin Injectable 40 milliGRAM(s) SubCutaneous two times a day  melatonin 3 milliGRAM(s) Oral at bedtime  vortioxetine 5 milliGRAM(s) Oral daily    MEDICATIONS  (PRN):  benzonatate 100 milliGRAM(s) Oral every 8 hours PRN Cough  LORazepam   Injectable 1 milliGRAM(s) IV Push every 6 hours PRN Agitation      VITAL SIGNS:  T(F): 97.4 (20 @ 06:24), Max: 98.4 (20 @ 21:00)  HR: 100 (20 @ 06:24) (100 - 108)  BP: 139/91 (20 @ 06:24) (126/90 - 145/95)  RR: 18 (20 @ 06:24) (18 - 18)  SpO2: 100% (20 @ 06:24) (97% - 100%)    I&O's Summary    Daily     Daily Weight in k.6 (30 Dec 2020 08:00)    PHYSICAL EXAM:  Gen: Alert, NAD  HEENT: NCAT, conjunctiva clear, sclera anicteric, no erythema or exudates in the oropharynx, mmm  Neck: Supple, no JVD  CV: RRR, S1S2, no m/r/g  Resp: CTAB, normal respiratory effort  Abd: Soft, nontender, nondistended, normal bowel sounds  Ext: no edema, no clubbing or cyanosis  Neuro: AOx3, CN2-12 grossly intact, TROY  SKIN: warm, perfused    LABS:                        11.7   5.94  )-----------( 302      ( 30 Dec 2020 07:50 )             37.1     Hgb Trend: 11.7<--, 11.1<--, 11.4<--, 11.6<--, 11.5<--  12-30    139  |  104  |  16  ----------------------------<  91  3.8   |  23  |  0.60    Ca    10.1      30 Dec 2020 07:50  Phos  3.4     12-30  Mg     2.2     12-30      Creatinine Trend: 0.60<--, 0.55<--, 0.57<--, 0.59<--, 0.58<--, 0.63<--              CAPILLARY BLOOD GLUCOSE          RADIOLOGY & ADDITIONAL TESTS: Reviewed    Imaging Personally Reviewed:    Consultant(s) Notes Reviewed:      Care Discussed with Consultants/Other Providers:   Evert Holloway Sridevi, PGY1  Pager [phone]      Patient is a 55y old  Male who presents with a chief complaint of AMS (30 Dec 2020 06:52)      SUBJECTIVE/INTERVAL EVENTS:   NAEON.  This AM, patient is confused and reports no one has been giving him food.  He reports didn't sleep all night, but sitter reports he has been sleeping well throughout the night.  Otherwise, AOx3 and no acute complaints.  Patient seen and examined at bedside.    MEDICATIONS  (STANDING):  enoxaparin Injectable 40 milliGRAM(s) SubCutaneous two times a day  melatonin 3 milliGRAM(s) Oral at bedtime  vortioxetine 5 milliGRAM(s) Oral daily    MEDICATIONS  (PRN):  benzonatate 100 milliGRAM(s) Oral every 8 hours PRN Cough  LORazepam   Injectable 1 milliGRAM(s) IV Push every 6 hours PRN Agitation      VITAL SIGNS:  T(F): 97.4 (20 @ 06:24), Max: 98.4 (20 @ 21:00)  HR: 100 (20 @ 06:24) (100 - 108)  BP: 139/91 (20 @ 06:24) (126/90 - 145/95)  RR: 18 (20 @ 06:24) (18 - 18)  SpO2: 100% (20 @ 06:24) (97% - 100%)    I&O's Summary    Daily     Daily Weight in k.6 (30 Dec 2020 08:00)    PHYSICAL EXAM:  GENERAL:  male, NAD, alert, good attention, conversive  EYES: EOMI, PERRLA  CHEST/LUNG: CTAB. No rhonchi.  HEART: Regular rate and rhythm; No murmurs  ABDOMEN: Soft, Nontender, Nondistended  EXTREMITIES:  No BLE and BUE edema.  NEURO: AOx3 but confused. No rigidity, tremulousness noted.  SKIN: warm, perfused    LABS:                        11.7   5.94  )-----------( 302      ( 30 Dec 2020 07:50 )             37.1     Hgb Trend: 11.7<--, 11.1<--, 11.4<--, 11.6<--, 11.5<--  1230    139  |  104  |  16  ----------------------------<  91  3.8   |  23  |  0.60    Ca    10.1      30 Dec 2020 07:50  Phos  3.4     12-30  Mg     2.2     12-30      Creatinine Trend: 0.60<--, 0.55<--, 0.57<--, 0.59<--, 0.58<--, 0.63<--              CAPILLARY BLOOD GLUCOSE          RADIOLOGY & ADDITIONAL TESTS: Reviewed    Imaging Personally Reviewed:    Consultant(s) Notes Reviewed:      Care Discussed with Consultants/Other Providers:
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
## 2021-01-01 LAB
ANION GAP SERPL CALC-SCNC: 16 MMOL/L — HIGH (ref 7–14)
BUN SERPL-MCNC: 12 MG/DL — SIGNIFICANT CHANGE UP (ref 7–23)
CALCIUM SERPL-MCNC: 9.8 MG/DL — SIGNIFICANT CHANGE UP (ref 8.4–10.5)
CHLORIDE SERPL-SCNC: 105 MMOL/L — SIGNIFICANT CHANGE UP (ref 98–107)
CO2 SERPL-SCNC: 19 MMOL/L — LOW (ref 22–31)
CREAT SERPL-MCNC: 0.55 MG/DL — SIGNIFICANT CHANGE UP (ref 0.5–1.3)
GLUCOSE SERPL-MCNC: 93 MG/DL — SIGNIFICANT CHANGE UP (ref 70–99)
HCT VFR BLD CALC: 35.6 % — LOW (ref 39–50)
HGB BLD-MCNC: 11.5 G/DL — LOW (ref 13–17)
MAGNESIUM SERPL-MCNC: 1.9 MG/DL — SIGNIFICANT CHANGE UP (ref 1.6–2.6)
MCHC RBC-ENTMCNC: 28.5 PG — SIGNIFICANT CHANGE UP (ref 27–34)
MCHC RBC-ENTMCNC: 32.3 GM/DL — SIGNIFICANT CHANGE UP (ref 32–36)
MCV RBC AUTO: 88.1 FL — SIGNIFICANT CHANGE UP (ref 80–100)
NRBC # BLD: 0 /100 WBCS — SIGNIFICANT CHANGE UP
NRBC # FLD: 0 K/UL — SIGNIFICANT CHANGE UP
PHOSPHATE SERPL-MCNC: 3.8 MG/DL — SIGNIFICANT CHANGE UP (ref 2.5–4.5)
PLATELET # BLD AUTO: 278 K/UL — SIGNIFICANT CHANGE UP (ref 150–400)
POTASSIUM SERPL-MCNC: 3.6 MMOL/L — SIGNIFICANT CHANGE UP (ref 3.5–5.3)
POTASSIUM SERPL-SCNC: 3.6 MMOL/L — SIGNIFICANT CHANGE UP (ref 3.5–5.3)
RBC # BLD: 4.04 M/UL — LOW (ref 4.2–5.8)
RBC # FLD: 13.8 % — SIGNIFICANT CHANGE UP (ref 10.3–14.5)
SODIUM SERPL-SCNC: 140 MMOL/L — SIGNIFICANT CHANGE UP (ref 135–145)
WBC # BLD: 5.09 K/UL — SIGNIFICANT CHANGE UP (ref 3.8–10.5)
WBC # FLD AUTO: 5.09 K/UL — SIGNIFICANT CHANGE UP (ref 3.8–10.5)

## 2021-01-01 PROCEDURE — 99232 SBSQ HOSP IP/OBS MODERATE 35: CPT

## 2021-01-01 RX ADMIN — VORTIOXETINE 10 MILLIGRAM(S): 5 TABLET, FILM COATED ORAL at 12:07

## 2021-01-01 RX ADMIN — Medication 6 MILLIGRAM(S): at 21:57

## 2021-01-01 RX ADMIN — ENOXAPARIN SODIUM 40 MILLIGRAM(S): 100 INJECTION SUBCUTANEOUS at 05:21

## 2021-01-01 RX ADMIN — ENOXAPARIN SODIUM 40 MILLIGRAM(S): 100 INJECTION SUBCUTANEOUS at 17:33

## 2021-01-01 NOTE — BH CONSULTATION LIAISON PROGRESS NOTE - NSBHMSEINTELL_PSY_A_CORE
Average
Unable to assess
Average
Unable to assess
Average
Other
Average
Average

## 2021-01-01 NOTE — BH CONSULTATION LIAISON PROGRESS NOTE - NSBHMSELANG_PSY_A_CORE
No abnormalities noted
Unable to assess
Unable to assess
No abnormalities noted
Unable to assess
No abnormalities noted
No abnormalities noted
Other
No abnormalities noted
Unable to assess
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
## 2021-01-01 NOTE — BH CONSULTATION LIAISON PROGRESS NOTE - NSBHMSEREMMEM_PSY_A_CORE
Unable to assess
Other
Unable to assess

## 2021-01-01 NOTE — BH CONSULTATION LIAISON PROGRESS NOTE - NSBHMSEAFFRANGE_PSY_A_CORE
Blunted/Constricted
Constricted
Blunted/Constricted
Other
Blunted
Constricted
Blunted/Constricted
Constricted
Constricted
Blunted/Constricted

## 2021-01-01 NOTE — BH CONSULTATION LIAISON PROGRESS NOTE - NSBHMSEMOVE_PSY_A_CORE
Tremors
Tremors
Other
Tremors
No abnormal movements
No abnormal movements
Tremors/Rigidity
No abnormal movements
Tremors
No abnormal movements
Other

## 2021-01-01 NOTE — BH CONSULTATION LIAISON PROGRESS NOTE - LEVEL OF CONSCIOUSNESS
Alert
Alert
Other
Alert
Alert
Lethargic, arousable to verbal stimulus
Alert
Alert
Lethargic, arousable to verbal stimulus
Alert
Alert
Lethargic, arousable to verbal stimulus
Alert

## 2021-01-01 NOTE — BH CONSULTATION LIAISON PROGRESS NOTE - NSBHMSEAFFQUAL_PSY_A_CORE
Anxious
Anxious
Unable to assess
Unable to assess
Anxious
Anxious
Depressed
Other
Anxious
Depressed
Anxious
Unable to assess
Depressed
Anxious
Unable to assess

## 2021-01-01 NOTE — PROGRESS NOTE ADULT - PROBLEM SELECTOR PLAN 1
Resolved, AOx3 with good attention for several days.  Possibly delirium/septic enceph given initially with multifocal pneumonia, sputum cx with Serratia, s/p course of Zosyn. Vs. Hypernatremia, which has now resolved. No seizure activity on EEG.  - c/w high dose IV thiamine  - Psych and Neuro following; will f/u recs   - C/w 1:1 CO and PRN ativan 1mg q6h for agitation Resolved, AOx3 with good attention for several days.  Possibly delirium/septic enceph vs. Hypernatremia, which has now resolved. No seizure activity on EEG.  - c/w high dose IV thiamine  - Psych and Neuro following; will f/u recs   - C/w 1:1 CO and PRN ativan 1mg q6h for agitation

## 2021-01-01 NOTE — BH CONSULTATION LIAISON PROGRESS NOTE - NSBHMSETHTASSOC_PSY_A_CORE
Unable to assess
Normal
Unable to assess
Other
Unable to assess
Loose
Unable to assess
Unable to assess

## 2021-01-01 NOTE — BH CONSULTATION LIAISON PROGRESS NOTE - NSBHMSEAFFCONG_PSY_A_CORE
Non-congruent
Other
Non-congruent

## 2021-01-01 NOTE — PROGRESS NOTE ADULT - SUBJECTIVE AND OBJECTIVE BOX
Evert Mathewon, PGY1  Pager [phone]    INCOMPLETE NOTE - IN PROGRESS    Patient is a 55y old  Male who presents with a chief complaint of AMS (31 Dec 2020 07:34)      SUBJECTIVE/INTERVAL EVENTS: Patient seen and examined at bedside.    MEDICATIONS  (STANDING):  enoxaparin Injectable 40 milliGRAM(s) SubCutaneous two times a day  melatonin 6 milliGRAM(s) Oral at bedtime  vortioxetine 10 milliGRAM(s) Oral daily    MEDICATIONS  (PRN):  benzonatate 100 milliGRAM(s) Oral every 8 hours PRN Cough      VITAL SIGNS:  T(F): 97.4 (01-01-21 @ 05:20), Max: 97.6 (12-31-20 @ 21:47)  HR: 96 (01-01-21 @ 05:20) (96 - 101)  BP: 137/98 (01-01-21 @ 05:20) (130/92 - 146/99)  RR: 16 (01-01-21 @ 05:20) (16 - 18)  SpO2: 99% (01-01-21 @ 05:20) (99% - 100%)    I&O's Summary    Daily     Daily     PHYSICAL EXAM:  Gen: Alert, NAD  HEENT: NCAT, conjunctiva clear, sclera anicteric, no erythema or exudates in the oropharynx, mmm  Neck: Supple, no JVD  CV: RRR, S1S2, no m/r/g  Resp: CTAB, normal respiratory effort  Abd: Soft, nontender, nondistended, normal bowel sounds  Ext: no edema, no clubbing or cyanosis  Neuro: AOx3, CN2-12 grossly intact, TROY  SKIN: warm, perfused    LABS:                        11.5   5.09  )-----------( 278      ( 01 Jan 2021 07:55 )             35.6     Hgb Trend: 11.5<--, 11.7<--, 11.1<--, 11.4<--, 11.6<--  01-01    140  |  105  |  12  ----------------------------<  93  3.6   |  19<L>  |  0.55    Ca    9.8      01 Jan 2021 07:55  Phos  3.8     01-01  Mg     1.9     01-01      Creatinine Trend: 0.55<--, 0.60<--, 0.55<--, 0.57<--, 0.59<--, 0.58<--              CAPILLARY BLOOD GLUCOSE          RADIOLOGY & ADDITIONAL TESTS: Reviewed    Imaging Personally Reviewed:    Consultant(s) Notes Reviewed:      Care Discussed with Consultants/Other Providers:   Evert Holloway Sridevi, PGY1  Pager [phone]      Patient is a 55y old  Male who presents with a chief complaint of AMS (31 Dec 2020 07:34)      SUBJECTIVE/INTERVAL EVENTS:  NAEON.  Pt reports poor sleep overnight despite melatonin 6mg.  When asked about coughing, he reports it is "not bad"  Patient seen and examined at bedside.    MEDICATIONS  (STANDING):  enoxaparin Injectable 40 milliGRAM(s) SubCutaneous two times a day  melatonin 6 milliGRAM(s) Oral at bedtime  vortioxetine 10 milliGRAM(s) Oral daily    MEDICATIONS  (PRN):  benzonatate 100 milliGRAM(s) Oral every 8 hours PRN Cough      VITAL SIGNS:  T(F): 97.4 (01-01-21 @ 05:20), Max: 97.6 (12-31-20 @ 21:47)  HR: 96 (01-01-21 @ 05:20) (96 - 101)  BP: 137/98 (01-01-21 @ 05:20) (130/92 - 146/99)  RR: 16 (01-01-21 @ 05:20) (16 - 18)  SpO2: 99% (01-01-21 @ 05:20) (99% - 100%)    I&O's Summary    Daily     Daily     PHYSICAL EXAM:  GENERAL:  male, NAD, alert, good attention, conversive  EYES: EOMI, PERRLA  CHEST/LUNG: CTAB. No rhonchi.  HEART: Regular rate and rhythm; No murmurs  ABDOMEN: Soft, Nontender, Nondistended  EXTREMITIES:  No BLE and BUE edema.  NEURO: AOx3. No rigidity, tremulousness noted.  SKIN: warm, perfused    LABS:                        11.5   5.09  )-----------( 278      ( 01 Jan 2021 07:55 )             35.6     Hgb Trend: 11.5<--, 11.7<--, 11.1<--, 11.4<--, 11.6<--  01-01    140  |  105  |  12  ----------------------------<  93  3.6   |  19<L>  |  0.55    Ca    9.8      01 Jan 2021 07:55  Phos  3.8     01-01  Mg     1.9     01-01      Creatinine Trend: 0.55<--, 0.60<--, 0.55<--, 0.57<--, 0.59<--, 0.58<--              CAPILLARY BLOOD GLUCOSE          RADIOLOGY & ADDITIONAL TESTS: Reviewed    Imaging Personally Reviewed:    Consultant(s) Notes Reviewed:      Care Discussed with Consultants/Other Providers:

## 2021-01-01 NOTE — BH CONSULTATION LIAISON PROGRESS NOTE - NSBHFUPINTERVALHXFT_PSY_A_CORE
Patient Ao to self, year, month, date, hospital name, feels "bad" due to poor sleep but denies SI/HI. He attributes his poor sleep to "not getting melatonin" but chart review reveals that melatonin 6mg was given last night. Patient denies AH/VH, no delusions elicited, denies confusion. Says family visited him when they were able.

## 2021-01-01 NOTE — BH CONSULTATION LIAISON PROGRESS NOTE - NSBHMSERECMEM_PSY_A_CORE
Unable to assess
Other
Unable to assess

## 2021-01-01 NOTE — PROGRESS NOTE ADULT - PROBLEM SELECTOR PLAN 4
D/c planning tentatively to [place] psych inpatient for long-term stay and PT needs    Dispo: transfer back to [facility]

## 2021-01-01 NOTE — BH CONSULTATION LIAISON PROGRESS NOTE - NSBHMSEMUSCLE_PSY_A_CORE
Abnormal muscle tone/strength
Abnormal muscle tone/strength
Normal muscle tone/strength
Abnormal muscle tone/strength
Normal muscle tone/strength
Normal muscle tone/strength
Abnormal muscle tone/strength
Normal muscle tone/strength
Other
Normal muscle tone/strength
Abnormal muscle tone/strength
Other
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
## 2021-01-01 NOTE — BH CONSULTATION LIAISON PROGRESS NOTE - NSBHPSYCHOLCOGABN_PSY_A_CORE
disoriented to time/disoriented to place/disoriented to situation
disoriented to time/disoriented to place/disoriented to person/disoriented to situation
disoriented to time/disoriented to place/disoriented to situation
disoriented to time/disoriented to place/disoriented to person/disoriented to situation
disoriented to time/disoriented to place/disoriented to situation
disoriented to time/disoriented to place/disoriented to person/disoriented to situation
disoriented to time/disoriented to place/disoriented to situation
disoriented to time/disoriented to place/disoriented to situation
disoriented to situation
disoriented to time/disoriented to place/disoriented to person/disoriented to situation

## 2021-01-01 NOTE — BH CONSULTATION LIAISON PROGRESS NOTE - NSBHMSESPEECH_PSY_A_CORE
Abnormal as indicated, otherwise normal...
Non-verbal: unable to assess speech further
Abnormal as indicated, otherwise normal...
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
## 2021-01-01 NOTE — PROGRESS NOTE ADULT - PROBLEM SELECTOR PLAN 2
Weakness secondary to ICU, prolonged hospital course.  - attempt for PT everyday  - D/c planning tentatively to [place] psych inpatient for long-term stay and PT needs

## 2021-01-01 NOTE — BH CONSULTATION LIAISON PROGRESS NOTE - NSBHMSEMOOD_PSY_A_CORE
Normal
Normal
Other
Unable to assess
Unable to assess
Normal
Normal
Other
Other
Normal
Unable to assess
Normal
Other
Normal
Unable to assess

## 2021-01-01 NOTE — BH CONSULTATION LIAISON PROGRESS NOTE - NSBHPSYCHOLCOGORIENT_PSY_A_CORE
Not fully oriented...
Unable to assess
Not fully oriented...
Not fully oriented...

## 2021-01-02 DIAGNOSIS — G47.00 INSOMNIA, UNSPECIFIED: ICD-10-CM

## 2021-01-02 LAB
ANION GAP SERPL CALC-SCNC: 15 MMOL/L — HIGH (ref 7–14)
BUN SERPL-MCNC: 11 MG/DL — SIGNIFICANT CHANGE UP (ref 7–23)
CALCIUM SERPL-MCNC: 10.2 MG/DL — SIGNIFICANT CHANGE UP (ref 8.4–10.5)
CHLORIDE SERPL-SCNC: 103 MMOL/L — SIGNIFICANT CHANGE UP (ref 98–107)
CO2 SERPL-SCNC: 23 MMOL/L — SIGNIFICANT CHANGE UP (ref 22–31)
CREAT SERPL-MCNC: 0.54 MG/DL — SIGNIFICANT CHANGE UP (ref 0.5–1.3)
GLUCOSE SERPL-MCNC: 102 MG/DL — HIGH (ref 70–99)
HCT VFR BLD CALC: 37.5 % — LOW (ref 39–50)
HGB BLD-MCNC: 12.1 G/DL — LOW (ref 13–17)
MAGNESIUM SERPL-MCNC: 2.1 MG/DL — SIGNIFICANT CHANGE UP (ref 1.6–2.6)
MCHC RBC-ENTMCNC: 28.3 PG — SIGNIFICANT CHANGE UP (ref 27–34)
MCHC RBC-ENTMCNC: 32.3 GM/DL — SIGNIFICANT CHANGE UP (ref 32–36)
MCV RBC AUTO: 87.8 FL — SIGNIFICANT CHANGE UP (ref 80–100)
NRBC # BLD: 0 /100 WBCS — SIGNIFICANT CHANGE UP
NRBC # FLD: 0 K/UL — SIGNIFICANT CHANGE UP
PHOSPHATE SERPL-MCNC: 3.6 MG/DL — SIGNIFICANT CHANGE UP (ref 2.5–4.5)
PLATELET # BLD AUTO: 282 K/UL — SIGNIFICANT CHANGE UP (ref 150–400)
POTASSIUM SERPL-MCNC: 3.5 MMOL/L — SIGNIFICANT CHANGE UP (ref 3.5–5.3)
POTASSIUM SERPL-SCNC: 3.5 MMOL/L — SIGNIFICANT CHANGE UP (ref 3.5–5.3)
RBC # BLD: 4.27 M/UL — SIGNIFICANT CHANGE UP (ref 4.2–5.8)
RBC # FLD: 13.9 % — SIGNIFICANT CHANGE UP (ref 10.3–14.5)
SODIUM SERPL-SCNC: 141 MMOL/L — SIGNIFICANT CHANGE UP (ref 135–145)
WBC # BLD: 5.06 K/UL — SIGNIFICANT CHANGE UP (ref 3.8–10.5)
WBC # FLD AUTO: 5.06 K/UL — SIGNIFICANT CHANGE UP (ref 3.8–10.5)

## 2021-01-02 PROCEDURE — 99233 SBSQ HOSP IP/OBS HIGH 50: CPT

## 2021-01-02 PROCEDURE — 99232 SBSQ HOSP IP/OBS MODERATE 35: CPT

## 2021-01-02 RX ADMIN — ENOXAPARIN SODIUM 40 MILLIGRAM(S): 100 INJECTION SUBCUTANEOUS at 06:08

## 2021-01-02 RX ADMIN — ENOXAPARIN SODIUM 40 MILLIGRAM(S): 100 INJECTION SUBCUTANEOUS at 17:01

## 2021-01-02 RX ADMIN — VORTIOXETINE 10 MILLIGRAM(S): 5 TABLET, FILM COATED ORAL at 12:34

## 2021-01-02 RX ADMIN — Medication 6 MILLIGRAM(S): at 21:19

## 2021-01-02 NOTE — PROGRESS NOTE ADULT - SUBJECTIVE AND OBJECTIVE BOX
Evert Mathewon, PGY1  Pager [phone]    INCOMPLETE NOTE - IN PROGRESS    Patient is a 55y old  Male who presents with a chief complaint of AMS (01 Jan 2021 09:41)      SUBJECTIVE/INTERVAL EVENTS: Patient seen and examined at bedside.    MEDICATIONS  (STANDING):  enoxaparin Injectable 40 milliGRAM(s) SubCutaneous two times a day  melatonin 6 milliGRAM(s) Oral at bedtime  vortioxetine 10 milliGRAM(s) Oral daily    MEDICATIONS  (PRN):  benzonatate 100 milliGRAM(s) Oral every 8 hours PRN Cough      VITAL SIGNS:  T(F): 97.6 (01-02-21 @ 06:00), Max: 98.4 (01-01-21 @ 21:55)  HR: 88 (01-02-21 @ 06:00) (88 - 97)  BP: 144/92 (01-02-21 @ 06:00) (138/90 - 144/92)  RR: 17 (01-02-21 @ 06:00) (17 - 18)  SpO2: 98% (01-02-21 @ 06:00) (98% - 99%)    I&O's Summary    01 Jan 2021 07:01  -  02 Jan 2021 07:00  --------------------------------------------------------  IN: 0 mL / OUT: 300 mL / NET: -300 mL      Daily     Daily     PHYSICAL EXAM:  Gen: Alert, NAD  HEENT: NCAT, conjunctiva clear, sclera anicteric, no erythema or exudates in the oropharynx, mmm  Neck: Supple, no JVD  CV: RRR, S1S2, no m/r/g  Resp: CTAB, normal respiratory effort  Abd: Soft, nontender, nondistended, normal bowel sounds  Ext: no edema, no clubbing or cyanosis  Neuro: AOx3, CN2-12 grossly intact, TROY  SKIN: warm, perfused    LABS:                        11.5   5.09  )-----------( 278      ( 01 Jan 2021 07:55 )             35.6     Hgb Trend: 11.5<--, 11.7<--, 11.1<--, 11.4<--, 11.6<--  01-01    140  |  105  |  12  ----------------------------<  93  3.6   |  19<L>  |  0.55    Ca    9.8      01 Jan 2021 07:55  Phos  3.8     01-01  Mg     1.9     01-01      Creatinine Trend: 0.55<--, 0.60<--, 0.55<--, 0.57<--, 0.59<--, 0.58<--              CAPILLARY BLOOD GLUCOSE          RADIOLOGY & ADDITIONAL TESTS: Reviewed    Imaging Personally Reviewed:    Consultant(s) Notes Reviewed:      Care Discussed with Consultants/Other Providers:   Evert Holloway Sridevi, PGY1  Pager [phone]      Patient is a 55y old  Male who presents with a chief complaint of AMS (01 Jan 2021 09:41)      SUBJECTIVE/INTERVAL EVENTS:  NAEON  This AM, patient reports poor sleep - unable to tell me if difficulty with falling asleep or staying asleep.  Neg ROS.  Patient seen and examined at bedside.    MEDICATIONS  (STANDING):  enoxaparin Injectable 40 milliGRAM(s) SubCutaneous two times a day  melatonin 6 milliGRAM(s) Oral at bedtime  vortioxetine 10 milliGRAM(s) Oral daily    MEDICATIONS  (PRN):  benzonatate 100 milliGRAM(s) Oral every 8 hours PRN Cough      VITAL SIGNS:  T(F): 97.6 (01-02-21 @ 06:00), Max: 98.4 (01-01-21 @ 21:55)  HR: 88 (01-02-21 @ 06:00) (88 - 97)  BP: 144/92 (01-02-21 @ 06:00) (138/90 - 144/92)  RR: 17 (01-02-21 @ 06:00) (17 - 18)  SpO2: 98% (01-02-21 @ 06:00) (98% - 99%)    I&O's Summary    01 Jan 2021 07:01  -  02 Jan 2021 07:00  --------------------------------------------------------  IN: 0 mL / OUT: 300 mL / NET: -300 mL      Daily     Daily     PHYSICAL EXAM:  GENERAL:  male, NAD, alert, good attention, conversive  EYES: EOMI, PERRLA  CHEST/LUNG: CTAB. No rhonchi.  HEART: Regular rate and rhythm; No murmurs  ABDOMEN: Soft, Nontender, Nondistended  EXTREMITIES:  No BLE and BUE edema.  NEURO: AOx3. No rigidity, tremulousness noted.  SKIN: warm, perfused      LABS:                        11.5   5.09  )-----------( 278      ( 01 Jan 2021 07:55 )             35.6     Hgb Trend: 11.5<--, 11.7<--, 11.1<--, 11.4<--, 11.6<--  01-01    140  |  105  |  12  ----------------------------<  93  3.6   |  19<L>  |  0.55    Ca    9.8      01 Jan 2021 07:55  Phos  3.8     01-01  Mg     1.9     01-01      Creatinine Trend: 0.55<--, 0.60<--, 0.55<--, 0.57<--, 0.59<--, 0.58<--              CAPILLARY BLOOD GLUCOSE          RADIOLOGY & ADDITIONAL TESTS: Reviewed    Imaging Personally Reviewed:    Consultant(s) Notes Reviewed:      Care Discussed with Consultants/Other Providers:

## 2021-01-02 NOTE — PROGRESS NOTE ADULT - PROBLEM SELECTOR PLAN 3
DVT PPX: Lovenox 40mg  Diet: Mech soft with nectar consistency Resolved, AOx3 with good attention for several days.  Possibly delirium/septic enceph vs. Hypernatremia, which has now resolved. No seizure activity on EEG.  - c/w high dose IV thiamine  - Psych and Neuro following; will f/u recs   - C/w 1:1 CO and PRN ativan 1mg q6h for agitation

## 2021-01-02 NOTE — PROGRESS NOTE ADULT - PROBLEM SELECTOR PLAN 2
Weakness secondary to ICU, prolonged hospital course.  - attempt for PT everyday  - D/c planning tentatively to [place] psych inpatient for long-term stay and PT needs - c/w melatonin 6mg QHS per Psych recs
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
## 2021-01-02 NOTE — PROGRESS NOTE ADULT - PROBLEM SELECTOR PLAN 4
D/c planning tentatively to [place] psych inpatient for long-term stay and PT needs    Dispo: transfer back to [facility] Weakness secondary to ICU, prolonged hospital course.  - attempt for PT everyday  - D/c planning tentatively to [place] psych inpatient for long-term stay and PT needs

## 2021-01-02 NOTE — PROGRESS NOTE ADULT - PROBLEM SELECTOR PLAN 1
Resolved, AOx3 with good attention for several days.  Possibly delirium/septic enceph vs. Hypernatremia, which has now resolved. No seizure activity on EEG.  - c/w high dose IV thiamine  - Psych and Neuro following; will f/u recs   - C/w 1:1 CO and PRN ativan 1mg q6h for agitation Refractory to most antidepressants. Hx of multiple SI, hx of ECT.  - Psych following; appreciate recs  - C/w vortioxetine 10mg daily

## 2021-01-02 NOTE — PROGRESS NOTE ADULT - PROBLEM SELECTOR PLAN 6
D/c planning tentatively to [place] psych inpatient for long-term stay and PT needs    Dispo: transfer back to [facility]
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
## 2021-01-02 NOTE — BH CONSULTATION LIAISON PROGRESS NOTE - NSBHFUPINTERVALHXFT_PSY_A_CORE
Chart reviewed. Case seen. Pt is O x 3, sitting with CO, calm though dysphoric/constricted in affect. No psychomotor agitation or focal neuro deficits noted. Says he is depressed, with low mood/energy/motivation, as well as poor sleep. Denies SI or hopelessness. Says he needs ongoing psych care. Denies safety concerns, paranoia, AVH, or panic.

## 2021-01-03 DIAGNOSIS — F32.9 MAJOR DEPRESSIVE DISORDER, SINGLE EPISODE, UNSPECIFIED: ICD-10-CM

## 2021-01-03 LAB
ANION GAP SERPL CALC-SCNC: 14 MMOL/L — SIGNIFICANT CHANGE UP (ref 7–14)
ANION GAP SERPL CALC-SCNC: 14 MMOL/L — SIGNIFICANT CHANGE UP (ref 7–14)
BUN SERPL-MCNC: 11 MG/DL — SIGNIFICANT CHANGE UP (ref 7–23)
BUN SERPL-MCNC: 13 MG/DL — SIGNIFICANT CHANGE UP (ref 7–23)
CALCIUM SERPL-MCNC: 10.2 MG/DL — SIGNIFICANT CHANGE UP (ref 8.4–10.5)
CALCIUM SERPL-MCNC: 9.9 MG/DL — SIGNIFICANT CHANGE UP (ref 8.4–10.5)
CHLORIDE SERPL-SCNC: 104 MMOL/L — SIGNIFICANT CHANGE UP (ref 98–107)
CHLORIDE SERPL-SCNC: 104 MMOL/L — SIGNIFICANT CHANGE UP (ref 98–107)
CO2 SERPL-SCNC: 20 MMOL/L — LOW (ref 22–31)
CO2 SERPL-SCNC: 22 MMOL/L — SIGNIFICANT CHANGE UP (ref 22–31)
CREAT SERPL-MCNC: 0.51 MG/DL — SIGNIFICANT CHANGE UP (ref 0.5–1.3)
CREAT SERPL-MCNC: 0.53 MG/DL — SIGNIFICANT CHANGE UP (ref 0.5–1.3)
GLUCOSE SERPL-MCNC: 117 MG/DL — HIGH (ref 70–99)
GLUCOSE SERPL-MCNC: 79 MG/DL — SIGNIFICANT CHANGE UP (ref 70–99)
HCT VFR BLD CALC: 38.7 % — LOW (ref 39–50)
HGB BLD-MCNC: 12 G/DL — LOW (ref 13–17)
MAGNESIUM SERPL-MCNC: 2 MG/DL — SIGNIFICANT CHANGE UP (ref 1.6–2.6)
MCHC RBC-ENTMCNC: 28.2 PG — SIGNIFICANT CHANGE UP (ref 27–34)
MCHC RBC-ENTMCNC: 31 GM/DL — LOW (ref 32–36)
MCV RBC AUTO: 91.1 FL — SIGNIFICANT CHANGE UP (ref 80–100)
NRBC # BLD: 0 /100 WBCS — SIGNIFICANT CHANGE UP
NRBC # FLD: 0 K/UL — SIGNIFICANT CHANGE UP
PHOSPHATE SERPL-MCNC: 3.3 MG/DL — SIGNIFICANT CHANGE UP (ref 2.5–4.5)
PLATELET # BLD AUTO: 258 K/UL — SIGNIFICANT CHANGE UP (ref 150–400)
POTASSIUM SERPL-MCNC: 2.9 MMOL/L — CRITICAL LOW (ref 3.5–5.3)
POTASSIUM SERPL-MCNC: 4.7 MMOL/L — SIGNIFICANT CHANGE UP (ref 3.5–5.3)
POTASSIUM SERPL-SCNC: 2.9 MMOL/L — CRITICAL LOW (ref 3.5–5.3)
POTASSIUM SERPL-SCNC: 4.7 MMOL/L — SIGNIFICANT CHANGE UP (ref 3.5–5.3)
RBC # BLD: 4.25 M/UL — SIGNIFICANT CHANGE UP (ref 4.2–5.8)
RBC # FLD: 14 % — SIGNIFICANT CHANGE UP (ref 10.3–14.5)
SODIUM SERPL-SCNC: 138 MMOL/L — SIGNIFICANT CHANGE UP (ref 135–145)
SODIUM SERPL-SCNC: 140 MMOL/L — SIGNIFICANT CHANGE UP (ref 135–145)
WBC # BLD: 4.96 K/UL — SIGNIFICANT CHANGE UP (ref 3.8–10.5)
WBC # FLD AUTO: 4.96 K/UL — SIGNIFICANT CHANGE UP (ref 3.8–10.5)

## 2021-01-03 PROCEDURE — 99233 SBSQ HOSP IP/OBS HIGH 50: CPT | Mod: GC

## 2021-01-03 RX ORDER — POTASSIUM CHLORIDE 20 MEQ
40 PACKET (EA) ORAL EVERY 4 HOURS
Refills: 0 | Status: COMPLETED | OUTPATIENT
Start: 2021-01-03 | End: 2021-01-03

## 2021-01-03 RX ORDER — VORTIOXETINE 5 MG/1
15 TABLET, FILM COATED ORAL DAILY
Refills: 0 | Status: DISCONTINUED | OUTPATIENT
Start: 2021-01-04 | End: 2021-01-06

## 2021-01-03 RX ORDER — VORTIOXETINE 5 MG/1
5 TABLET, FILM COATED ORAL ONCE
Refills: 0 | Status: COMPLETED | OUTPATIENT
Start: 2021-01-03 | End: 2021-01-03

## 2021-01-03 RX ADMIN — Medication 6 MILLIGRAM(S): at 21:52

## 2021-01-03 RX ADMIN — VORTIOXETINE 10 MILLIGRAM(S): 5 TABLET, FILM COATED ORAL at 14:11

## 2021-01-03 RX ADMIN — ENOXAPARIN SODIUM 40 MILLIGRAM(S): 100 INJECTION SUBCUTANEOUS at 17:26

## 2021-01-03 RX ADMIN — Medication 40 MILLIEQUIVALENT(S): at 14:11

## 2021-01-03 RX ADMIN — Medication 40 MILLIEQUIVALENT(S): at 10:15

## 2021-01-03 RX ADMIN — ENOXAPARIN SODIUM 40 MILLIGRAM(S): 100 INJECTION SUBCUTANEOUS at 06:17

## 2021-01-03 RX ADMIN — VORTIOXETINE 5 MILLIGRAM(S): 5 TABLET, FILM COATED ORAL at 17:26

## 2021-01-03 NOTE — PROGRESS NOTE ADULT - SUBJECTIVE AND OBJECTIVE BOX
Ari Franklin MD    Internal Medicine Resident (PGY-2)  Pager: [phone] (NS) / [phone] ([place])  Please see "resident assignment" column on [facility] for coverage info  ___________________________________________________________________________________________________      LUNA EBONI 55y Male    Overnight events/subjective: No acute overnight events. Patient seen and examined at bedside. No complaints. Denies fever, chills, chest pain, shortness of breath, abdominal pain, nausea, vomiting, changes in bowel habits, or urinary symptoms.    Vital Signs Last 24 Hrs  T(C): 36.6 (03 Jan 2021 12:49), Max: 36.7 (03 Jan 2021 06:00)  T(F): 97.9 (03 Jan 2021 12:49), Max: 98.1 (03 Jan 2021 06:00)  HR: 102 (03 Jan 2021 12:49) (102 - 105)  BP: 141/93 (03 Jan 2021 12:49) (133/91 - 148/95)  BP(mean): --  RR: 18 (03 Jan 2021 12:49) (18 - 18)  SpO2: 98% (03 Jan 2021 12:49) (98% - 100%)    PHYSICAL EXAM:  PHYSICAL EXAM:  GENERAL:  male, NAD, alert, good attention, conversive  EYES: EOMI, PERRLA  CHEST/LUNG: CTAB. No rhonchi.  HEART: Regular rate and rhythm; No murmurs  ABDOMEN: Soft, Nontender, Nondistended  EXTREMITIES:  No BLE and BUE edema.  NEURO: AOx3. No rigidity, tremulousness noted.  SKIN: warm, perfused    HOSPITAL MEDICATIONS:  MEDICATIONS  (STANDING):  enoxaparin Injectable 40 milliGRAM(s) SubCutaneous two times a day  melatonin 6 milliGRAM(s) Oral at bedtime  vortioxetine 10 milliGRAM(s) Oral daily    MEDICATIONS  (PRN):  benzonatate 100 milliGRAM(s) Oral every 8 hours PRN Cough      LABS:                        12.0   4.96  )-----------( 258      ( 03 Jan 2021 08:08 )             38.7     01-03    140  |  104  |  11  ----------------------------<  79  2.9<LL>   |  22  |  0.51    Ca    9.9      03 Jan 2021 08:08  Phos  3.3     01-03  Mg     2.0     01-03             Ari Franklin MD    Internal Medicine Resident (PGY-2)  Pager: [phone] (NS) / [phone] ([place])  Please see "resident assignment" column on [facility] for coverage info  ___________________________________________________________________________________________________      LUNA, EBONI 55y Male    Overnight events/subjective: No acute overnight events, minor nosebleed which has resolved. Patient seen and examined at bedside. No complaints. Denies fever, chills, chest pain, shortness of breath, abdominal pain, nausea, vomiting, changes in bowel habits, or urinary symptoms.    Vital Signs Last 24 Hrs  T(C): 36.6 (03 Jan 2021 12:49), Max: 36.7 (03 Jan 2021 06:00)  T(F): 97.9 (03 Jan 2021 12:49), Max: 98.1 (03 Jan 2021 06:00)  HR: 102 (03 Jan 2021 12:49) (102 - 105)  BP: 141/93 (03 Jan 2021 12:49) (133/91 - 148/95)  BP(mean): --  RR: 18 (03 Jan 2021 12:49) (18 - 18)  SpO2: 98% (03 Jan 2021 12:49) (98% - 100%)    PHYSICAL EXAM:  PHYSICAL EXAM:  GENERAL:  male, NAD, alert, good attention, conversive  EYES: EOMI, PERRLA  CHEST/LUNG: CTAB. No rhonchi.  HEART: Regular rate and rhythm; No murmurs  ABDOMEN: Soft, Nontender, Nondistended  EXTREMITIES:  No BLE and BUE edema.  NEURO: AOx3. No rigidity, tremulousness noted.  SKIN: warm, perfused    HOSPITAL MEDICATIONS:  MEDICATIONS  (STANDING):  enoxaparin Injectable 40 milliGRAM(s) SubCutaneous two times a day  melatonin 6 milliGRAM(s) Oral at bedtime  vortioxetine 10 milliGRAM(s) Oral daily    MEDICATIONS  (PRN):  benzonatate 100 milliGRAM(s) Oral every 8 hours PRN Cough      LABS:                        12.0   4.96  )-----------( 258      ( 03 Jan 2021 08:08 )             38.7     01-03    140  |  104  |  11  ----------------------------<  79  2.9<LL>   |  22  |  0.51    Ca    9.9      03 Jan 2021 08:08  Phos  3.3     01-03  Mg     2.0     01-03

## 2021-01-03 NOTE — PROGRESS NOTE ADULT - PROBLEM SELECTOR PLAN 4
Weakness secondary to ICU, prolonged hospital course.  - attempt for PT everyday  - D/c planning tentatively to [place] psych inpatient for long-term stay and PT needs
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
## 2021-01-03 NOTE — BH CONSULTATION LIAISON PROGRESS NOTE - NSBHFUPINTERVALHXFT_PSY_A_CORE
Patient AOx3, says he is "very depressed", appears dysphoric, not sleeping well, denies SI/HI, minimizes prior suicide attempt

## 2021-01-03 NOTE — PROGRESS NOTE ADULT - PROBLEM SELECTOR PLAN 3
Resolved, AOx3 with good attention for several days.  Possibly delirium/septic enceph vs. Hypernatremia, which has now resolved. No seizure activity on EEG.  - c/w high dose IV thiamine  - Psych and Neuro following; will f/u recs   - C/w 1:1 CO and PRN ativan 1mg q6h for agitation

## 2021-01-03 NOTE — PROGRESS NOTE ADULT - PROBLEM SELECTOR PLAN 6
D/c planning tentatively to [place] psych inpatient for long-term stay and PT needs    Dispo: transfer back to [facility]
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
## 2021-01-03 NOTE — PROGRESS NOTE ADULT - PROBLEM SELECTOR PLAN 1
Refractory to most antidepressants. Hx of multiple SI, hx of ECT.  - Psych following; appreciate recs  - C/w vortioxetine 10mg daily

## 2021-01-04 LAB
ANION GAP SERPL CALC-SCNC: 13 MMOL/L — SIGNIFICANT CHANGE UP (ref 7–14)
BUN SERPL-MCNC: 11 MG/DL — SIGNIFICANT CHANGE UP (ref 7–23)
CALCIUM SERPL-MCNC: 10.1 MG/DL — SIGNIFICANT CHANGE UP (ref 8.4–10.5)
CHLORIDE SERPL-SCNC: 100 MMOL/L — SIGNIFICANT CHANGE UP (ref 98–107)
CO2 SERPL-SCNC: 25 MMOL/L — SIGNIFICANT CHANGE UP (ref 22–31)
CREAT SERPL-MCNC: 0.53 MG/DL — SIGNIFICANT CHANGE UP (ref 0.5–1.3)
GLUCOSE SERPL-MCNC: 143 MG/DL — HIGH (ref 70–99)
HCT VFR BLD CALC: 38.7 % — LOW (ref 39–50)
HGB BLD-MCNC: 12.4 G/DL — LOW (ref 13–17)
MAGNESIUM SERPL-MCNC: 1.8 MG/DL — SIGNIFICANT CHANGE UP (ref 1.6–2.6)
MCHC RBC-ENTMCNC: 28.2 PG — SIGNIFICANT CHANGE UP (ref 27–34)
MCHC RBC-ENTMCNC: 32 GM/DL — SIGNIFICANT CHANGE UP (ref 32–36)
MCV RBC AUTO: 88.2 FL — SIGNIFICANT CHANGE UP (ref 80–100)
NRBC # BLD: 0 /100 WBCS — SIGNIFICANT CHANGE UP
NRBC # FLD: 0 K/UL — SIGNIFICANT CHANGE UP
PHOSPHATE SERPL-MCNC: 3.4 MG/DL — SIGNIFICANT CHANGE UP (ref 2.5–4.5)
PLATELET # BLD AUTO: 252 K/UL — SIGNIFICANT CHANGE UP (ref 150–400)
POTASSIUM SERPL-MCNC: 3.8 MMOL/L — SIGNIFICANT CHANGE UP (ref 3.5–5.3)
POTASSIUM SERPL-SCNC: 3.8 MMOL/L — SIGNIFICANT CHANGE UP (ref 3.5–5.3)
RBC # BLD: 4.39 M/UL — SIGNIFICANT CHANGE UP (ref 4.2–5.8)
RBC # FLD: 13.6 % — SIGNIFICANT CHANGE UP (ref 10.3–14.5)
SARS-COV-2 RNA SPEC QL NAA+PROBE: SIGNIFICANT CHANGE UP
SODIUM SERPL-SCNC: 138 MMOL/L — SIGNIFICANT CHANGE UP (ref 135–145)
WBC # BLD: 5.58 K/UL — SIGNIFICANT CHANGE UP (ref 3.8–10.5)
WBC # FLD AUTO: 5.58 K/UL — SIGNIFICANT CHANGE UP (ref 3.8–10.5)

## 2021-01-04 PROCEDURE — 99232 SBSQ HOSP IP/OBS MODERATE 35: CPT | Mod: GC

## 2021-01-04 RX ORDER — VORTIOXETINE 5 MG/1
3 TABLET, FILM COATED ORAL
Qty: 0 | Refills: 0 | DISCHARGE
Start: 2021-01-04

## 2021-01-04 RX ORDER — OLANZAPINE 15 MG/1
1 TABLET, FILM COATED ORAL
Qty: 0 | Refills: 0 | DISCHARGE

## 2021-01-04 RX ADMIN — Medication 0.5 MILLIGRAM(S): at 22:49

## 2021-01-04 RX ADMIN — ENOXAPARIN SODIUM 40 MILLIGRAM(S): 100 INJECTION SUBCUTANEOUS at 06:17

## 2021-01-04 RX ADMIN — VORTIOXETINE 15 MILLIGRAM(S): 5 TABLET, FILM COATED ORAL at 12:30

## 2021-01-04 RX ADMIN — Medication 0.5 MILLIGRAM(S): at 14:47

## 2021-01-04 RX ADMIN — Medication 6 MILLIGRAM(S): at 22:49

## 2021-01-04 RX ADMIN — ENOXAPARIN SODIUM 40 MILLIGRAM(S): 100 INJECTION SUBCUTANEOUS at 18:49

## 2021-01-04 NOTE — BH CONSULTATION LIAISON PROGRESS NOTE - NSBHFUPINTERVALHXFT_PSY_A_CORE
Patient feels "depressed", denies SI, has mild tremors right hand unable to extinguish on distraction    No asterixis. denies N/V, diarhea. No catalepsy or waxiness, mild negativism persists

## 2021-01-04 NOTE — PROGRESS NOTE ADULT - PROBLEM SELECTOR PLAN 4
Weakness secondary to ICU, prolonged hospital course.  - attempt for PT everyday  - D/c planning tentatively to [place] psych inpatient for long-term stay and PT needs

## 2021-01-04 NOTE — PROGRESS NOTE ADULT - SUBJECTIVE AND OBJECTIVE BOX
Bruce Morrison MD  Internal Medicine  Pager #[phone]    Patient is a 55y old  Male who presents with a chief complaint of AMS (03 Jan 2021 07:48)      SUBJECTIVE / OVERNIGHT EVENTS:    ON: improving mental status per PCA who has been following    No acute concerns.     ADDITIONAL REVIEW OF SYSTEMS:    CONSTITUTIONAL: No weakness, fevers or chills  EYES/ENT: No visual changes;  No vertigo or throat pain.  NECK: No pain or stiffness.  RESPIRATORY: No cough, wheezing, hemoptysis; No shortness of breath.  CARDIOVASCULAR: No chest pain or palpitations  GASTROINTESTINAL: No abdominal pain. No nausea, vomiting, diarrhea, constipation, or melena.  GENITOURINARY: No dysuria, frequency or hematuria  NEUROLOGICAL: No numbness or weakness  SKIN: No itching, burning, rashes, or lesions   All other review of systems is negative unless indicated above.    MEDICATIONS  (STANDING):  enoxaparin Injectable 40 milliGRAM(s) SubCutaneous two times a day  melatonin 6 milliGRAM(s) Oral at bedtime  vortioxetine 15 milliGRAM(s) Oral daily    MEDICATIONS  (PRN):  benzonatate 100 milliGRAM(s) Oral every 8 hours PRN Cough      CAPILLARY BLOOD GLUCOSE        I&O's Summary      PHYSICAL EXAM:  Vital Signs Last 24 Hrs  T(C): 37 (04 Jan 2021 05:00), Max: 37 (04 Jan 2021 05:00)  T(F): 98.6 (04 Jan 2021 05:00), Max: 98.6 (04 Jan 2021 05:00)  HR: 100 (04 Jan 2021 05:00) (96 - 102)  BP: 122/83 (04 Jan 2021 05:00) (122/83 - 144/91)  BP(mean): --  RR: 18 (04 Jan 2021 05:00) (18 - 18)  SpO2: 100% (04 Jan 2021 05:00) (98% - 100%)    GENERAL: NAD  EYES: EOMI, PERRLA  CHEST/LUNG: CTAB.   HEART: Regular rate and rhythm; No murmurs  ABDOMEN: Soft, Nontender, Nondistended  EXTREMITIES:  No BLE and BUE edema.  NEURO: AOx3. No rigidity. Delirium assessment negative  PSYCH: appropriate affect  SKIN: warm, perfused    LABS:             Labs Pending 1/4.                 RADIOLOGY & ADDITIONAL TESTS:  Results Reviewed:   Imaging Personally Reviewed:  Electrocardiogram Personally Reviewed:    COORDINATION OF CARE:  Care Discussed with Consultants/Other Providers [Y/N]:   Prior or Outpatient Records Reviewed [Y/N]:      Bruce Morrison MD  Internal Medicine  Pager #[phone]    Patient is a 55y old  Male who presents with a chief complaint of AMS (03 Jan 2021 07:48)    SUBJECTIVE / OVERNIGHT EVENTS:    ON: improving mental status per PCA who has been following  No acute concerns.     ADDITIONAL REVIEW OF SYSTEMS:    CONSTITUTIONAL: No weakness, fevers or chills  EYES/ENT: No visual changes;  No vertigo or throat pain.  NECK: No pain or stiffness.  RESPIRATORY: No cough, wheezing, hemoptysis; No shortness of breath.  CARDIOVASCULAR: No chest pain or palpitations  GASTROINTESTINAL: No abdominal pain. No nausea, vomiting, diarrhea, constipation, or melena.  GENITOURINARY: No dysuria, frequency or hematuria  NEUROLOGICAL: No numbness or weakness  SKIN: No itching, burning, rashes, or lesions   All other review of systems is negative unless indicated above.    MEDICATIONS  (STANDING):  enoxaparin Injectable 40 milliGRAM(s) SubCutaneous two times a day  melatonin 6 milliGRAM(s) Oral at bedtime  vortioxetine 15 milliGRAM(s) Oral daily    MEDICATIONS  (PRN):  benzonatate 100 milliGRAM(s) Oral every 8 hours PRN Cough      PHYSICAL EXAM:  Vital Signs Last 24 Hrs  T(C): 37 (04 Jan 2021 05:00), Max: 37 (04 Jan 2021 05:00)  T(F): 98.6 (04 Jan 2021 05:00), Max: 98.6 (04 Jan 2021 05:00)  HR: 100 (04 Jan 2021 05:00) (96 - 102)  BP: 122/83 (04 Jan 2021 05:00) (122/83 - 144/91)  RR: 18 (04 Jan 2021 05:00) (18 - 18)  SpO2: 100% (04 Jan 2021 05:00) (98% - 100%)    GENERAL: NAD  EYES: EOMI, PERRLA  CHEST/LUNG: CTAB.   HEART: Regular rate and rhythm; No murmurs  ABDOMEN: Soft, Nontender, Nondistended  EXTREMITIES:  No BLE and BUE edema.  NEURO: AOx3. No rigidity. Delirium assessment negative  PSYCH: appropriate affect  SKIN: warm, perfused    LABS:                        12.4   5.58  )-----------( 252      ( 04 Jan 2021 09:53 )             38.7     01-04    138  |  100  |  11  ----------------------------<  143<H>  3.8   |  25  |  0.53    Ca    10.1      04 Jan 2021 09:53  Phos  3.4     01-04  Mg     1.8     01-04    RADIOLOGY & ADDITIONAL TESTS:  Results Reviewed:   Imaging Personally Reviewed:  Electrocardiogram Personally Reviewed:    COORDINATION OF CARE:  Care Discussed with Consultants/Other Providers [Y/N]:   Prior or Outpatient Records Reviewed [Y/N]:

## 2021-01-04 NOTE — PROGRESS NOTE ADULT - PROBLEM SELECTOR PLAN 3
Resolved, AOx3 with good attention for several days.  Possibly delirium/septic enceph vs. Hypernatremia, which has now resolved. No seizure activity on EEG.  - high dose IV thiamine given  - Psych and Neuro following; will f/u recs   - C/w 1:1 CO
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
## 2021-01-04 NOTE — PROGRESS NOTE ADULT - PROBLEM SELECTOR PLAN 1
Refractory to most antidepressants. Hx of multiple SI, hx of ECT.  - Psych following; appreciate recs  - C/w vortioxetine

## 2021-01-04 NOTE — BH CONSULTATION LIAISON PROGRESS NOTE - NSBHROSSYSTEMS_PSY_ALL_CORE
Psychiatric
Ears/Nose/Throat/Mouth.../Psychiatric
Psychiatric
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
## 2021-01-04 NOTE — PROGRESS NOTE ADULT - PROBLEM SELECTOR PLAN 6
D/c planning tentatively to [facility] inpatient for long-term stay and PT needs    Dispo: transfer back to [facility]

## 2021-01-05 PROCEDURE — 93306 TTE W/DOPPLER COMPLETE: CPT | Mod: 26

## 2021-01-05 PROCEDURE — 99232 SBSQ HOSP IP/OBS MODERATE 35: CPT | Mod: GC

## 2021-01-05 PROCEDURE — 93010 ELECTROCARDIOGRAM REPORT: CPT

## 2021-01-05 RX ADMIN — ENOXAPARIN SODIUM 40 MILLIGRAM(S): 100 INJECTION SUBCUTANEOUS at 18:14

## 2021-01-05 RX ADMIN — Medication 0.5 MILLIGRAM(S): at 21:50

## 2021-01-05 RX ADMIN — Medication 6 MILLIGRAM(S): at 21:50

## 2021-01-05 RX ADMIN — ENOXAPARIN SODIUM 40 MILLIGRAM(S): 100 INJECTION SUBCUTANEOUS at 05:27

## 2021-01-05 RX ADMIN — VORTIOXETINE 15 MILLIGRAM(S): 5 TABLET, FILM COATED ORAL at 11:33

## 2021-01-05 RX ADMIN — Medication 0.5 MILLIGRAM(S): at 11:33
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
## 2021-01-05 NOTE — SWALLOW BEDSIDE ASSESSMENT ADULT - SWALLOW EVAL: DIAGNOSIS
1. The patient demonstrated a mild oral dysphagia for puree and honey thick liquid textures marked by reduced utensil stripping, delayed bolus collection, transfer and posterior transport with suspected posterior loss of bolus. 2. The patient demonstrated a moderate-severe pharyngeal dysphagia marked by hyolaryngeal elevation and suspected delayed initiation of swallow trigger upon digital palpation with coughing and increased upper respiratory sounds suggestive of airway penetration.
Patient presents with Mild to Moderate Oropharyngeal Dysphagia. The Oral Phase was marked by adequate stripping of bolus from utensil, adequate oral containment, mildly extended mastication for regular solids with slow bolus manipulation and suspected posterior loss for thin liquids. The Pharyngeal Phase was marked by laryngeal elevation upon digital palpation with suspected delay in initiation of pharyngeal swallow for thin liquids. There was evidence of cough response subsequent deglutition of thin liquids indicative of laryngeal penetration/aspiration. There were no overt s/s of laryngeal penetration/ aspiration for puree consistency, mechanical soft solids, regular solids, honey thick liquids and nectar thick liquids.
SLP provided patient with trials of puree, honey thick liquids, nectar thick liquids and thin liquids presenting with moderate-severe oropharyngeal dysphagia. Oral phase characterized by adequate acceptance, adequate anterior bolus manipulation, slow bolus manipulation, suspect premature spillage with thin and nectar thick liquids and adequate oral cavity clearance. Pharyngeal phase characterized by suspect delayed initiation of the pharyngeal swallow and hyolaryngeal elevation and excursion noted upon palpation.  Patient with increased upper airway congestion sounds across all trials, wet vocal quality follow trials of nectar thick liquids and delayed cough following trials of thin liquids indicative of laryngeal penetration/aspiration.  Patient required cues to cough and clear upper airway congestion.
1. Patient demonstrated functional oral management with nectar-thick liquids, thin liquids and regular solids characterized by adequate bolus mastication, manipulation, propulsion and oral clearance. 2. Patient demonstrated functional pharyngeal phase of swallow with nectar-thick liquids, thin liquids and regular solids with use of safe feeding strategies of slow rate and small sips characterized by adequate pharyngeal swallow trigger and present hyolaryngeal elevation upon digital palpation. No overt signs or symptoms of aspiration noted with adequate use of safe feeding strategies. 3. Patient demonstrated moderate pharyngeal dysphagia with thin liquids characterized by suspected delay in swallow trigger and present hyolaryngeal elevation upon digital palpation. Throat clearing noted post intake, suggestive of airway penetration/aspiration.

## 2021-01-05 NOTE — SWALLOW BEDSIDE ASSESSMENT ADULT - SWALLOW EVAL: RECOMMENDED FEEDING/EATING TECHNIQUES
alternate food with liquid/maintain upright posture during/after eating for 30 mins/oral hygiene/position upright (90 degrees)/small sips/bites
position upright (90 degrees)/small sips/bites

## 2021-01-05 NOTE — PROGRESS NOTE ADULT - PROBLEM SELECTOR PLAN 6
D/c planning tentatively to [facility] psych inpatient for long-term stay and PT needs    Dispo: transfer back to [facility]

## 2021-01-05 NOTE — PROVIDER CONTACT NOTE (OTHER) - SITUATION
Patient heart rate: 118
Pt temp is 101. Pt received IV tylenol
Pt temp is 102.5
Rectal temp: 103.1
Rectal temp: 103.3 after IV Tylenol administered
Temp 101.5 and 
temp 100.6
temp 101
NGT placed today - pending verification via xray. Yellow contents coming out of the NGT.
Pt temp is 102
Temp: 102.6 HR: 110 BP: 151/106
 ; /101
100.7 axillary
Patient heart rate: 120
Temp 100.2 axillary
Temp 100.2 axillary 
Temp 100.3 axillary, 
Temp 100.6
Temp 100.7 rectally, , /99
Temp 100.8 axillary 
Temp 100.8 axillary 
Temp 100.9 rectally, , RR 22
Temp 101.7 rectally, , /99; MEWS score triggered
Temp 101.9 rectally s/p IV tylenol, MEWS score of 7 triggered
Temp 102 rectally, , /105; MEWS score triggered
Temperature: 101.2 F
temp 100.2, /110, 
temp 100.4
temp 100.8 axillary
temp 101.7, /103, ; MEWS score of 7 triggered
Patient heart rate: 113; temperature: 102.8 F
Temp 101
Temp 101.1
Pt noted with left nostril bleeding
Patient heart rate: 111; BP: 161/115
Temp 101.2 and 
Temp 101.4 and 
Temp: 101 HR: 108 BP: 158/108
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
## 2021-01-05 NOTE — SWALLOW BEDSIDE ASSESSMENT ADULT - SWALLOW EVAL: RECOMMENDED DIET
1. PO appears contraindicated, consideration for short term alternate means of nutrition/hydration/medication 2.  Reconsult this service as patient is medically optimized
1. Oral means of nutrition/hydration are contraindicated at this time. 2. Consider short-term nonoral means of nutrition/hydration/medication.
Mechanical Soft Solids and Nectar Thick Liquids
Regular solids with thin liquids with use of safe feeding strategies (e.g. small sips/bites, slow rate).

## 2021-01-05 NOTE — BH CONSULTATION LIAISON PROGRESS NOTE - NSBHFUPINTERVALHXFT_PSY_A_CORE
Chart reviewed. no PRNs. Continues on CO. Continues to endorse depression, anergia, insomnia. No hopelessness/SI at this time. Looking forward to transfer to [facility] when medically cleared.

## 2021-01-05 NOTE — PROVIDER CONTACT NOTE (OTHER) - DATE AND TIME:
18-Dec-2020 14:27
18-Dec-2020 22:35
19-Dec-2020 02:35
19-Dec-2020 10:13
19-Dec-2020 13:16
20-Dec-2020 11:01
21-Dec-2020 16:25
03-Jan-2021 11:10
16-Dec-2020 18:00
22-Dec-2020 16:00
13-Dec-2020 02:30
13-Dec-2020 04:33
13-Dec-2020 22:00
14-Dec-2020 11:01
15-Dec-2020 01:30
15-Dec-2020 06:30
15-Dec-2020 09:52
15-Dec-2020 11:02
15-Dec-2020 15:02
15-Dec-2020 18:15
15-Dec-2020 18:15
15-Dec-2020 22:00
16-Dec-2020 00:15
16-Dec-2020 01:15
16-Dec-2020 05:15
16-Dec-2020 07:27
20-Dec-2020 23:45
22-Dec-2020 13:50
21-Dec-2020 06:07
19-Dec-2020 22:45
22-Dec-2020 09:55
17-Dec-2020 23:12
18-Dec-2020 17:58
19-Dec-2020 17:24
21-Dec-2020 14:17
24-Dec-2020 03:37
05-Jan-2021 21:30
20-Dec-2020 11:34
20-Dec-2020 21:30
23-Dec-2020 13:40
25-Dec-2020 09:05
22-Dec-2020 04:15
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
## 2021-01-05 NOTE — PROVIDER CONTACT NOTE (OTHER) - REASON
Patient heart rate: 120
Temp 100.2 axillary 
Temp 100.6
Temp 100.7 axillary
Temp 100.9 rectally, , RR 22
Temp 101.7 rectally, , /99; MEWS score triggered
Temp 102 rectally, , /105; MEWS score triggered
temp 100.2 axillary
temp 100.3 axillary, 
temp 100.4
temp 100.7 rectally, , /99
temp 100.8 axillary
temp 101.7, /103, ; MEWS score triggered
temp 101.9 rectally s/p IV tylenol; MEWS score triggered
 ; /101
Patient heart rate: 111; BP: 161/115
Patient heart rate: 118
Pt temp is 102.5
Pt with nose bleed
Temp 101.1
Patient heart rate: 113; temperature: 102.8 F
Temp 101.4 and 
pt febrile
Pt temp is 101
Rectal temp: 103.3 after IV Tylenol administered
Temp 101.2 and 
Temp: 101 HR: 108 BP: 158/108
Yellow contents coming from T
temp 100.2, /110, 
Rectal temp: 103.1
Temp 101.5 and 
Temperature: 101.2 F
Pt temp is 102
Temp 101
Temp: 102.6 HR: 110 BP: 151/106

## 2021-01-05 NOTE — SWALLOW BEDSIDE ASSESSMENT ADULT - CONSISTENCIES ADMINISTERED
thin liquid/nectar thick/solid 8 oz of nectar-thick apple juice; 8 oz of water/thin liquid/nectar thick/solid

## 2021-01-05 NOTE — SWALLOW BEDSIDE ASSESSMENT ADULT - SWALLOW EVAL: ORAL MUSCULATURE
generally intact
unable to assess due to poor participation/comprehension
generally intact
unable to assess due to poor participation/comprehension

## 2021-01-05 NOTE — SWALLOW BEDSIDE ASSESSMENT ADULT - SWALLOW EVAL: CRITERIA FOR SKILLED INTERVENTION MET
demonstrates skilled criteria for swallowing intervention
no problems identified which require skilled intervention
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
## 2021-01-05 NOTE — SWALLOW BEDSIDE ASSESSMENT ADULT - SPECIFY REASON(S)
To assess swallow mechanism
clinical swallow evaluation
to reassess swallow function
To re-assess swallow mechanism

## 2021-01-05 NOTE — SWALLOW BEDSIDE ASSESSMENT ADULT - ASR SWALLOW ASPIRATION MONITOR
cough/gurgly voice/pneumonia/throat clearing
change of breathing pattern/oral hygiene/position upright (90Y)/cough/gurgly voice/fever/pneumonia/throat clearing/upper respiratory infection
oral hygiene
change of breathing pattern/position upright (90Y)/cough/gurgly voice/fever/pneumonia/throat clearing/upper respiratory infection/oral hygiene

## 2021-01-05 NOTE — PROGRESS NOTE ADULT - PROBLEM SELECTOR PLAN 4
Weakness secondary to ICU, prolonged hospital course.  - attempt for PT everyday  - D/c planning tentatively to [place] psych inpatient for long-term stay and PT needs

## 2021-01-05 NOTE — PROVIDER CONTACT NOTE (OTHER) - BACKGROUND
Pt admitted with altered mental status
pt admitted with AMS. PMH of HTN, depression, hepatitis.
Patient admitted for altered mental status.
pt admitted with AMS. PMH of HTN, depression, hepatitis.
Dx: AMS  PMH: htn, depression, hepatitis, L knee pain
Patient admitted for altered mental status.
Dx: AMS  PMH: htn, depression, hepatitis, L knee pain
Patient admitted for AMS. Patient has cellulitis & PNA and currently on vanco and zosyn.
Patient admitted for AMS. Patient has cellulitis & PNA and currently on vanco.
Patient admitted for AMS. Patient has cellulitis & PNA and currently on vanco. [facility]
Patient admitted for AMS. Patient has cellulitis & PNA and currently on vanco. [facility]
Patient admitted for AMS. Patient has cellulitis & PNA and currently on vanco. [facility]
Patient admitted for altered mental status
Patient admitted for altered mental status
pt admitted for ams 2/2 SI
pt admitted with AMS. PMH of HTN, Depression, JESSIE
pt admitted with AMS. PMH of HTN, depression, hepatitis.
pt admitted with AMS. PMH of HTN, hepatitis, fevers, depression
Patient admitted for AMS. Patient has cellulitis & PNA and currently on vanco and zosyn.
Patient admitted for altered mental status
Pt came for altered mental status
pt admitted with AMS. PMH of HTN, Depression, JESSIE
fever
pt admitted for ams 2/2 SI
pt admitted with AMS. PMH of HTN, Depression, JESSIE

## 2021-01-05 NOTE — BH CONSULTATION LIAISON PROGRESS NOTE - NSBHATTENDATTEST_PSY_ALL_CORE
I have personally seen, examined and participated in the care of this patient. I have reviewed all pertinent clinical information, including history, physical exam, plan and the Medical/PA/NP Student’s note and agree except as noted.

## 2021-01-05 NOTE — PROGRESS NOTE ADULT - SUBJECTIVE AND OBJECTIVE BOX
Bruce Morrison MD  Internal Medicine  Pager #[phone]    Patient is a 55y old  Male who presents with a chief complaint of AMS (04 Jan 2021 08:20)      SUBJECTIVE / OVERNIGHT EVENTS:    pending transfer to [facility]. 2PC may be necessary. Otherwise medically clear.     ADDITIONAL REVIEW OF SYSTEMS:    CONSTITUTIONAL: No weakness, fevers or chills  EYES/ENT: No visual changes;  No vertigo or throat pain.  NECK: No pain or stiffness.  RESPIRATORY: No cough, wheezing, hemoptysis; No shortness of breath.  CARDIOVASCULAR: No chest pain or palpitations  GASTROINTESTINAL: No abdominal pain. No nausea, vomiting, diarrhea, constipation, or melena.  GENITOURINARY: No dysuria, frequency or hematuria  NEUROLOGICAL: No numbness or weakness  SKIN: No itching, burning, rashes, or lesions   All other review of systems is negative unless indicated above.    MEDICATIONS  (STANDING):  enoxaparin Injectable 40 milliGRAM(s) SubCutaneous two times a day  LORazepam     Tablet 0.5 milliGRAM(s) Oral daily  LORazepam     Tablet 0.5 milliGRAM(s) Oral at bedtime  melatonin 6 milliGRAM(s) Oral at bedtime  vortioxetine 15 milliGRAM(s) Oral daily    MEDICATIONS  (PRN):  benzonatate 100 milliGRAM(s) Oral every 8 hours PRN Cough  LORazepam   Injectable 1 milliGRAM(s) IV Push every 6 hours PRN Agitation      CAPILLARY BLOOD GLUCOSE        I&O's Summary      PHYSICAL EXAM:  Vital Signs Last 24 Hrs  T(C): 36.6 (05 Jan 2021 05:23), Max: 36.8 (04 Jan 2021 22:06)  T(F): 97.8 (05 Jan 2021 05:23), Max: 98.2 (04 Jan 2021 22:06)  HR: 104 (05 Jan 2021 05:23) (100 - 104)  BP: 134/86 (05 Jan 2021 05:23) (134/86 - 141/92)  BP(mean): --  RR: 18 (05 Jan 2021 05:23) (18 - 18)  SpO2: 100% (05 Jan 2021 05:23) (100% - 100%)    CONSTITUTIONAL: NAD, well-developed  RESPIRATORY: Normal respiratory effort; lungs are clear to auscultation bilaterally  CARDIOVASCULAR: Regular rate, normal S1 and S2, no murmur/rub/gallop; No lower extremity edema; Peripheral pulses are 2+ bilaterally  ABDOMEN: Nontender to palpation, normoactive bowel sounds, no rebound/guarding; No hepatosplenomegaly  MUSCLOSKELETAL: no cyanosis of digits; no joint swelling or tenderness to palpation, full strength all extremities.  NEURO: No focal deficits, CN II-XII grossly intact b/l; sensation to light touch intact in all extremities, gait intact.  PSYCH: A+O to person, place, and time; affect appropriate.    LABS:                        12.4   5.58  )-----------( 252      ( 04 Jan 2021 09:53 )             38.7     01-04    138  |  100  |  11  ----------------------------<  143<H>  3.8   |  25  |  0.53    Ca    10.1      04 Jan 2021 09:53  Phos  3.4     01-04  Mg     1.8     01-04                  RADIOLOGY & ADDITIONAL TESTS:  Results Reviewed:   Imaging Personally Reviewed:  Electrocardiogram Personally Reviewed:    COORDINATION OF CARE:  Care Discussed with Consultants/Other Providers [Y/N]:   Prior or Outpatient Records Reviewed [Y/N]:      Bruce Morrison MD  Internal Medicine  Pager #[phone]    Patient is a 55y old  Male who presents with a chief complaint of AMS (04 Jan 2021 08:20)    SUBJECTIVE / OVERNIGHT EVENTS:    pending transfer to [facility]. 2PC may be necessary. Otherwise medically clear.     ADDITIONAL REVIEW OF SYSTEMS:  CONSTITUTIONAL: No weakness, fevers or chills  EYES/ENT: No visual changes;  No vertigo or throat pain.  NECK: No pain or stiffness.  RESPIRATORY: No cough, wheezing, hemoptysis; No shortness of breath.  CARDIOVASCULAR: No chest pain or palpitations  GASTROINTESTINAL: No abdominal pain. No nausea, vomiting, diarrhea, constipation, or melena.  GENITOURINARY: No dysuria, frequency or hematuria  NEUROLOGICAL: No numbness or weakness  SKIN: No itching, burning, rashes, or lesions   All other review of systems is negative unless indicated above.    MEDICATIONS  (STANDING):  enoxaparin Injectable 40 milliGRAM(s) SubCutaneous two times a day  LORazepam     Tablet 0.5 milliGRAM(s) Oral daily  LORazepam     Tablet 0.5 milliGRAM(s) Oral at bedtime  melatonin 6 milliGRAM(s) Oral at bedtime  vortioxetine 15 milliGRAM(s) Oral daily    MEDICATIONS  (PRN):  benzonatate 100 milliGRAM(s) Oral every 8 hours PRN Cough  LORazepam   Injectable 1 milliGRAM(s) IV Push every 6 hours PRN Agitation      PHYSICAL EXAM:  Vital Signs Last 24 Hrs  T(C): 36.6 (05 Jan 2021 05:23), Max: 36.8 (04 Jan 2021 22:06)  T(F): 97.8 (05 Jan 2021 05:23), Max: 98.2 (04 Jan 2021 22:06)  HR: 104 (05 Jan 2021 05:23) (100 - 104)  BP: 134/86 (05 Jan 2021 05:23) (134/86 - 141/92)  RR: 18 (05 Jan 2021 05:23) (18 - 18)  SpO2: 100% (05 Jan 2021 05:23) (100% - 100%)    CONSTITUTIONAL: NAD, well-developed  RESPIRATORY: Normal respiratory effort; lungs are clear to auscultation bilaterally  CARDIOVASCULAR: Regular rate, normal S1 and S2, no murmur/rub/gallop; No lower extremity edema; Peripheral pulses are 2+ bilaterally  ABDOMEN: Nontender to palpation, normoactive bowel sounds, no rebound/guarding; No hepatosplenomegaly  MUSCLOSKELETAL: no cyanosis of digits; no joint swelling or tenderness to palpation, full strength all extremities.  NEURO: No focal deficits, CN II-XII grossly intact b/l; sensation to light touch intact in all extremities, gait intact.  PSYCH: A+O to person, place, and time; affect appropriate.    LABS:                        12.4   5.58  )-----------( 252      ( 04 Jan 2021 09:53 )             38.7     01-04    138  |  100  |  11  ----------------------------<  143<H>  3.8   |  25  |  0.53    Ca    10.1      04 Jan 2021 09:53  Phos  3.4     01-04  Mg     1.8     01-04      RADIOLOGY & ADDITIONAL TESTS:  Results Reviewed:   Imaging Personally Reviewed:  Electrocardiogram Personally Reviewed:    COORDINATION OF CARE:  Care Discussed with Consultants/Other Providers [Y/N]:   Prior or Outpatient Records Reviewed [Y/N]:

## 2021-01-06 ENCOUNTER — INPATIENT (INPATIENT)
Facility: HOSPITAL | Age: 56
LOS: 26 days | Discharge: HOME | End: 2021-02-02
Attending: PSYCHIATRY & NEUROLOGY | Admitting: PSYCHIATRY & NEUROLOGY
Payer: COMMERCIAL

## 2021-01-06 ENCOUNTER — TRANSCRIPTION ENCOUNTER (OUTPATIENT)
Age: 56
End: 2021-01-06

## 2021-01-06 VITALS — TEMPERATURE: 99 F | WEIGHT: 223.99 LBS | HEIGHT: 71 IN

## 2021-01-06 VITALS
DIASTOLIC BLOOD PRESSURE: 92 MMHG | HEART RATE: 102 BPM | OXYGEN SATURATION: 99 % | RESPIRATION RATE: 18 BRPM | SYSTOLIC BLOOD PRESSURE: 141 MMHG | TEMPERATURE: 97 F

## 2021-01-06 DIAGNOSIS — F33.9 MAJOR DEPRESSIVE DISORDER, RECURRENT, UNSPECIFIED: ICD-10-CM

## 2021-01-06 DIAGNOSIS — Z98.890 OTHER SPECIFIED POSTPROCEDURAL STATES: Chronic | ICD-10-CM

## 2021-01-06 DIAGNOSIS — R94.31 ABNORMAL ELECTROCARDIOGRAM [ECG] [EKG]: ICD-10-CM

## 2021-01-06 PROBLEM — I10 ESSENTIAL (PRIMARY) HYPERTENSION: Chronic | Status: ACTIVE | Noted: 2020-12-13

## 2021-01-06 LAB
CHOLEST SERPL-MCNC: 156 MG/DL — SIGNIFICANT CHANGE UP
HDLC SERPL-MCNC: 42 MG/DL — SIGNIFICANT CHANGE UP
LIPID PNL WITH DIRECT LDL SERPL: 92 MG/DL — SIGNIFICANT CHANGE UP
NON HDL CHOLESTEROL: 114 MG/DL — SIGNIFICANT CHANGE UP
TRIGL SERPL-MCNC: 110 MG/DL — SIGNIFICANT CHANGE UP

## 2021-01-06 PROCEDURE — 99239 HOSP IP/OBS DSCHRG MGMT >30: CPT | Mod: GC

## 2021-01-06 PROCEDURE — 99223 1ST HOSP IP/OBS HIGH 75: CPT

## 2021-01-06 RX ORDER — VORTIOXETINE 5 MG/1
20 TABLET, FILM COATED ORAL DAILY
Refills: 0 | Status: DISCONTINUED | OUTPATIENT
Start: 2021-01-07 | End: 2021-01-06

## 2021-01-06 RX ORDER — THIAMINE MONONITRATE (VIT B1) 100 MG
100 TABLET ORAL DAILY
Refills: 0 | Status: DISCONTINUED | OUTPATIENT
Start: 2021-01-06 | End: 2021-01-06

## 2021-01-06 RX ORDER — FOLIC ACID 0.8 MG
1 TABLET ORAL DAILY
Refills: 0 | Status: DISCONTINUED | OUTPATIENT
Start: 2021-01-06 | End: 2021-01-06

## 2021-01-06 RX ORDER — ACETAMINOPHEN 500 MG
975 TABLET ORAL EVERY 6 HOURS
Refills: 0 | Status: DISCONTINUED | OUTPATIENT
Start: 2021-01-06 | End: 2021-02-02

## 2021-01-06 RX ORDER — LANOLIN ALCOHOL/MO/W.PET/CERES
6 CREAM (GRAM) TOPICAL AT BEDTIME
Refills: 0 | Status: DISCONTINUED | OUTPATIENT
Start: 2021-01-06 | End: 2021-02-02

## 2021-01-06 RX ORDER — THIAMINE MONONITRATE (VIT B1) 100 MG
1 TABLET ORAL
Qty: 0 | Refills: 0 | DISCHARGE
Start: 2021-01-06

## 2021-01-06 RX ORDER — VORTIOXETINE 5 MG/1
20 TABLET, FILM COATED ORAL DAILY
Refills: 0 | Status: DISCONTINUED | OUTPATIENT
Start: 2021-01-06 | End: 2021-01-07

## 2021-01-06 RX ORDER — FOLIC ACID 0.8 MG
1 TABLET ORAL
Qty: 0 | Refills: 0 | DISCHARGE
Start: 2021-01-06

## 2021-01-06 RX ORDER — VORTIOXETINE 5 MG/1
1 TABLET, FILM COATED ORAL
Qty: 0 | Refills: 0 | DISCHARGE
Start: 2021-01-06

## 2021-01-06 RX ADMIN — ENOXAPARIN SODIUM 40 MILLIGRAM(S): 100 INJECTION SUBCUTANEOUS at 05:38

## 2021-01-06 RX ADMIN — Medication 0.5 MILLIGRAM(S): at 20:32

## 2021-01-06 RX ADMIN — Medication 0.5 MILLIGRAM(S): at 11:04

## 2021-01-06 RX ADMIN — Medication 6 MILLIGRAM(S): at 20:32

## 2021-01-06 RX ADMIN — VORTIOXETINE 15 MILLIGRAM(S): 5 TABLET, FILM COATED ORAL at 11:04

## 2021-01-06 NOTE — BH CONSULTATION LIAISON PROGRESS NOTE - MSE OPTIONS
Unstructured MSE
Structured MSE
Structured MSE
Unstructured MSE
Structured MSE
Unstructured MSE
Structured MSE
Structured MSE
Unstructured MSE
Structured MSE

## 2021-01-06 NOTE — PROGRESS NOTE ADULT - REASON FOR ADMISSION
AMS
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
## 2021-01-06 NOTE — BH INPATIENT PSYCHIATRY ASSESSMENT NOTE - NSBHMETABOLIC_PSY_ALL_CORE_FT
BMI: BMI (kg/m2): 31.3 (01-06-21 @ 16:50)  HbA1c: A1C with Estimated Average Glucose: 5.0 % (11-12-20 @ 08:59)    Glucose: POCT Blood Glucose.: 160 mg/dL (12-11-20 @ 23:37)    BP: 138/82 (01-06-21 @ 18:15) (138/82 - 138/82)  Lipid Panel: Date/Time: 01-04-21 @ 09:56  Cholesterol, Serum: 156  Direct LDL: --  HDL Cholesterol, Serum: 42  Total Cholesterol/HDL Ration Measurement: --  Triglycerides, Serum: 110

## 2021-01-06 NOTE — BH CONSULTATION LIAISON PROGRESS NOTE - NSBHCONSULTFOLLOW_PSY_ALL_CORE
Yes...
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
## 2021-01-06 NOTE — PROGRESS NOTE ADULT - PROBLEM SELECTOR PLAN 6
D/c planning tentatively to [place]/[facility] inpatient for long-term stay DVT PPX: Lovenox  Diet: Regular, thin liquids S&S assessment 1/5/2021

## 2021-01-06 NOTE — CONSULT NOTE ADULT - ASSESSMENT
55 M w/ depression, HTN, and alcohol admitted with work-up of encephalopathy and FUO all self-resolved pending transfer to [facility] for management of underlying depression. Pt noted to have EKG changes while monitoring QTc for which cardiology is consulted. Cardiology also consulted for pre-op risk stratification for ECT.    #EKG Abnormality  -DDx for TWI is broad and non-specific. However given pt lack of anginal symptoms and normal biventricular function, doubt active ischemia and would not pursue any inpatient work-up at this time.    #Pre-Op Risk Stratification  -Pt well compensated with no evidence of active ischemia or decompensated heart failure. RCRI score of 0. No contraindication from cardiac standpoint to proceeding with ECT. No further inpatient work-up indicated.    Mushtaq Young PGY5  [phone]  All Cardiology service information can be found 24/7 on [email or web address], password: [email or web address]

## 2021-01-06 NOTE — CONSULT NOTE ADULT - SUBJECTIVE AND OBJECTIVE BOX
All Cardiology service information can be found 24/7 on [email or web address], password: [email or web address]    Chief Complaint: depression    HPI:  55 M w/ depression, HTN, and alcohol admitted with work-up of encephalopathy and FUO all self-resolved pending transfer to [facility] for management of underlying depression. Pt noted to have EKG changes while monitoring QTc for which cardiology is consulted. Pt denies any prior cardiac hx and denies any chest pain, sob, or palpitations. Pt reports prior presenting to hospital he was able to tolerate multiple blocks and flights of stair without any chest pain, sob, or palpitations.     PMHx:   Hypertension    Depression    Hepatitis    Left knee pain        PSHx:   H/O tracheostomy    No significant past surgical history        Allergies:  No Known Allergies      Home Meds: Reviewed    Current Medications:   enoxaparin Injectable 40 milliGRAM(s) SubCutaneous two times a day  folic acid 1 milliGRAM(s) Oral daily  LORazepam     Tablet 0.5 milliGRAM(s) Oral daily  LORazepam     Tablet 0.5 milliGRAM(s) Oral at bedtime  LORazepam   Injectable 1 milliGRAM(s) IV Push every 6 hours PRN  melatonin 6 milliGRAM(s) Oral at bedtime  thiamine 100 milliGRAM(s) Oral daily      FAMILY HISTORY: Non-pertinent       Social History:  Denies smoking, alcohol, or IVDU     REVIEW OF SYSTEMS:  CONSTITUTIONAL: +fever  EYES/ENT: No visual changes;  No dysphagia  NECK: No pain or stiffness  RESPIRATORY: No cough, wheezing, hemoptysis; No shortness of breath  CARDIOVASCULAR: No chest pain or palpitations; No lower extremity edema  GASTROINTESTINAL: No abdominal or epigastric pain. No nausea, vomiting, or hematemesis; No diarrhea or constipation. No melena or hematochezia.  BACK: No back pain  GENITOURINARY: No dysuria, frequency or hematuria  NEUROLOGICAL: No numbness or weakness  SKIN: No itching, burning, rashes, or lesions   All other review of systems is negative unless indicated above.    Physical Exam:  T(F): 97.3 (01-06), Max: 98.5 (01-05)  HR: 102 (01-06) (102 - 118)  BP: 141/92 (01-06) (124/92 - 150/90)  RR: 18 (01-06)  SpO2: 99% (01-06)  GENERAL: No acute distress  HEAD:  Atraumatic, Normocephalic  ENT: EOMI, PERRLA, conjunctiva and sclera clear, Neck supple, No JVD, moist mucosa  CHEST/LUNG: Clear to auscultation bilaterally; No wheeze, equal breath sounds bilaterally   BACK: No spinal tenderness  HEART: Regular rate and rhythm; No murmurs, rubs, or gallops  ABDOMEN: Soft, Nontender, Nondistended; Bowel sounds present  EXTREMITIES:  No clubbing, cyanosis, or edema  PSYCH: flat affect  NEUROLOGY: non-focal, cranial nerves intact  SKIN: Normal color, No rashes or lesions  LINES:    Cardiovascular Diagnostic Testing:    ECG: Personally reviewed: SR, TWI in anteroseptal leads    Echo: Personally reviewed:    < from: TTE with Doppler (w/Cont) (01.05.21 @ 17:16) >  CONCLUSIONS:  Technically difficult study.  1. Normal mitral valve. Minimal mitral regurgitation.  2. Endocardium not well visualized; grossly normal left  ventricular systolic function.  Paradoxical septal wall  motion.  Endocardial visualization enhanced with  intravenous injection of echo contrast (Definity).  3. The right ventricle is not well visualized; grossly  normal right ventricular systolic function.    < end of copied text >    Labs: Personally reviewed

## 2021-01-06 NOTE — BH INPATIENT PSYCHIATRY ASSESSMENT NOTE - NSCOMMENTSUICPROTRISKFACT_PSY_ALL_CORE
Patient's risk factors are currently mitigated by the fact he is currently in a safe/controlled environment and placed on 1:1 observation. In addition patient denies suicidal ideation, intent or plan at this time.

## 2021-01-06 NOTE — BH CONSULTATION LIAISON PROGRESS NOTE - NSICDXBHPRIMARYDX_PSY_ALL_CORE
Delirium due to another medical condition   F05  

## 2021-01-06 NOTE — BH CONSULTATION LIAISON PROGRESS NOTE - MSE UNSTRUCTURED FT
Appearance: Appears uncomfortable  Behavior: No gross psychomotor agitation or retardation  Speech: Soft, increased latency, decreased spontaneity, mumbled at times  Mood: ok  Affect: anxious, blunted  Thought process: Impoverished  Thought content: Denies SI/HI  Perception: does not appear internally preoccupied  Cognition: lethargic, though Ox3  Attention: impaired  Insight/Judgement: limited    Catatonia assessment:   *Note assessment was limited 2/2 pt w/ limited ability to follow directions at this time.   Pt w/ immobility, possible mutism, possible echolalia, rigidity vs waxy flexibility, automatic obedience, autonomic abnormalities. 
Mental Status Exam:  Brenda: well groomed, fair hygiene     Behavior: calm, cooperative, no psychomotor retardation/agitation  Motor: no tremors, EPS, or rigidity  Gait: did not assess, pt in bed  Speech: normal rate, rhythm, prosedy and volume   Mood: "depressed"  Affect: depressed, congruent  Thought process: clear, goal directed   Thought Content: denies paranoia, delusions   Perception: denies AH/VH  SI: denies  HI: denies  Insight: fair  Judgment: fair    Cognitive Exam:  Orientation: AOx3
Mental Status Exam:  Brenda: poor hygiene     Behavior: psychomotor retardation, stuporous   Motor: EPS, or rigidity, no catalepsy, some tremors in arms able to raise hands weakly  Gait: did not assess, pt in bed  Speech: poverty of speech, mumbling/garbled speech   Mood: unintelligible   Affect: unable to assess   Thought process: impoverished   Thought Content: unable ot assess   Perception: unable to assess   SI: +  HI: denies  Insight: poor  Judgment: poor    Cognitive Exam:  Orientation: AOx0-1
Mental Status Exam:  Brenda: poor hygiene     Behavior: psychomotor retardation, stuporous   Motor: no tremors, EPS, or rigidity, no catalepsy   Gait: did not assess, pt in bed  Speech: poverty of speech, mumbling/garbled speech   Mood: unintelligible   Affect: unable to assess   Thought process: impoverished   Thought Content: unable ot assess   Perception: unable to assess   SI: +  HI: denies  Insight: poor  Judgment: poor    Cognitive Exam:  Orientation: AOx0-1
Mental Status Exam:  Brenda: poor hygiene     Behavior: psychomotor retardation, stuporous   Motor: no tremors, EPS, or rigidity, no catalepsy   Gait: did not assess, pt in bed  Speech: poverty of speech, mumbling/garbled speech   Mood: unintelligible   Affect: unable to assess   Thought process: impoverished   Thought Content: unable ot assess   Perception: unable to assess   SI: +  HI: denies  Insight: poor  Judgment: poor    Cognitive Exam:  Orientation: AOx0-1
Chart reviewed. Case seen. Pt is O x 3, sitting with CO, calm though dysphoric/constricted in affect. No psychomotor agitation or focal neuro deficits noted.
Mental Status Exam:  Brenda: well groomed, fair hygiene     Behavior: calm, cooperative, no psychomotor retardation/agitation  Motor: no tremors, EPS, or rigidity  Gait: did not assess, pt in bed  Speech: normal rate, rhythm, prosedy and volume   Mood: "depressed"  Affect: depressed, congruent  Thought process: clear, goal directed   Thought Content: denies paranoia, delusions   Perception: denies AH/VH  SI: denies  HI: denies  Insight: fair  Judgment: fair    Cognitive Exam:  Orientation: AOx3
AA O x 3, calm, though dysphoric/depressed. No SI/safety concerns at this time but severe depression. 
Appearance: Appears uncomfortable  Behavior: No gross psychomotor agitation or retardation  Speech: Soft, increased latency, decreased spontaneity, mumbled at times  Mood: ok  Affect: anxious, blunted  Thought process: Impoverished  Thought content: believes he is in a school, denies SI/HI  Perception: does not appear internally preoccupied  Cognition: impaired- does not know where he is, nor the date AAOx1  Attention: impaired  Insight/Judgement: limited    Has UE rigidity- has some mild rigidity
Mental Status Exam:  Brenda: well groomed, fair hygiene     Behavior: calm, cooperative, no psychomotor retardation/agitation  Motor: no tremors, EPS, or rigidity  Gait: did not assess, pt in bed  Speech: normal rate, rhythm, prosedy and volume   Mood: "depressed"  Affect: depressed, congruent  Thought process: clear, goal directed   Thought Content: denies paranoia, delusions   Perception: denies AH/VH  SI: denies  HI: denies  Insight: fair  Judgment: fair    Cognitive Exam:  Orientation: AOx3

## 2021-01-06 NOTE — BH CONSULTATION LIAISON PROGRESS NOTE - NSBHDSMAXES_PSY_ALL_CORE
See above

## 2021-01-06 NOTE — BH CONSULTATION LIAISON PROGRESS NOTE - CURRENT MEDICATION
MEDICATIONS  (STANDING):  dextrose 5%. 1000 milliLiter(s) (125 mL/Hr) IV Continuous <Continuous>  enoxaparin Injectable 40 milliGRAM(s) SubCutaneous two times a day  potassium chloride   Powder 40 milliEquivalent(s) Oral once    MEDICATIONS  (PRN):  LORazepam   Injectable 2 milliGRAM(s) IV Push every 6 hours PRN Agitation  
MEDICATIONS  (STANDING):  enoxaparin Injectable 40 milliGRAM(s) SubCutaneous two times a day  melatonin 3 milliGRAM(s) Oral at bedtime  vortioxetine 5 milliGRAM(s) Oral daily    MEDICATIONS  (PRN):  benzonatate 100 milliGRAM(s) Oral every 8 hours PRN Cough  LORazepam   Injectable 1 milliGRAM(s) IV Push every 6 hours PRN Agitation  
MEDICATIONS  (STANDING):  enoxaparin Injectable 40 milliGRAM(s) SubCutaneous two times a day  LORazepam     Tablet 0.5 milliGRAM(s) Oral daily  LORazepam     Tablet 0.5 milliGRAM(s) Oral at bedtime  melatonin 6 milliGRAM(s) Oral at bedtime  vortioxetine 15 milliGRAM(s) Oral daily    MEDICATIONS  (PRN):  LORazepam   Injectable 1 milliGRAM(s) IV Push every 6 hours PRN Agitation  
MEDICATIONS  (STANDING):  dextrose 5%. 1000 milliLiter(s) (100 mL/Hr) IV Continuous <Continuous>  enoxaparin Injectable 40 milliGRAM(s) SubCutaneous two times a day  meropenem  IVPB 1000 milliGRAM(s) IV Intermittent every 8 hours  thiamine IVPB 500 milliGRAM(s) IV Intermittent every 8 hours    MEDICATIONS  (PRN):  LORazepam   Injectable 2 milliGRAM(s) IV Push every 6 hours PRN Agitation  
MEDICATIONS  (STANDING):  dextrose 5%. 1000 milliLiter(s) (125 mL/Hr) IV Continuous <Continuous>  enoxaparin Injectable 40 milliGRAM(s) SubCutaneous two times a day  potassium chloride   Powder 40 milliEquivalent(s) Oral once    MEDICATIONS  (PRN):  LORazepam   Injectable 2 milliGRAM(s) IV Push every 6 hours PRN Agitation  
MEDICATIONS  (STANDING):  enoxaparin Injectable 40 milliGRAM(s) SubCutaneous two times a day    MEDICATIONS  (PRN):  LORazepam   Injectable 1 milliGRAM(s) IV Push every 6 hours PRN Agitation  
MEDICATIONS  (STANDING):  enoxaparin Injectable 40 milliGRAM(s) SubCutaneous two times a day  LORazepam     Tablet 0.5 milliGRAM(s) Oral at bedtime  LORazepam     Tablet 0.5 milliGRAM(s) Oral daily  melatonin 6 milliGRAM(s) Oral at bedtime  vortioxetine 15 milliGRAM(s) Oral daily    MEDICATIONS  (PRN):  benzonatate 100 milliGRAM(s) Oral every 8 hours PRN Cough  LORazepam   Injectable 1 milliGRAM(s) IV Push every 6 hours PRN Agitation  
MEDICATIONS  (STANDING):  enoxaparin Injectable 40 milliGRAM(s) SubCutaneous two times a day  melatonin 6 milliGRAM(s) Oral at bedtime  potassium chloride   Powder 40 milliEquivalent(s) Oral every 4 hours  vortioxetine 10 milliGRAM(s) Oral daily    MEDICATIONS  (PRN):  benzonatate 100 milliGRAM(s) Oral every 8 hours PRN Cough  
MEDICATIONS  (STANDING):  enoxaparin Injectable 40 milliGRAM(s) SubCutaneous two times a day  melatonin 6 milliGRAM(s) Oral at bedtime  vortioxetine 10 milliGRAM(s) Oral daily    MEDICATIONS  (PRN):  benzonatate 100 milliGRAM(s) Oral every 8 hours PRN Cough  
MEDICATIONS  (STANDING):  chlorhexidine 0.12% Liquid 15 milliLiter(s) Oral Mucosa every 12 hours  chlorhexidine 4% Liquid 1 Application(s) Topical <User Schedule>  enoxaparin Injectable 40 milliGRAM(s) SubCutaneous two times a day  fentaNYL   Infusion. 1.5 MICROgram(s)/kG/Hr (18.4 mL/Hr) IV Continuous <Continuous>  melatonin 3 milliGRAM(s) Oral at bedtime  midazolam Infusion 0.02 mG/kG/Hr (2.45 mL/Hr) IV Continuous <Continuous>  norepinephrine Infusion 0.07 MICROgram(s)/kG/Min (16.1 mL/Hr) IV Continuous <Continuous>  piperacillin/tazobactam IVPB.. 3.375 Gram(s) IV Intermittent every 8 hours  propofol Infusion 50 MICROgram(s)/kG/Min (36.8 mL/Hr) IV Continuous <Continuous>  thiamine 100 milliGRAM(s) Oral daily    MEDICATIONS  (PRN):  acetaminophen    Suspension .. 650 milliGRAM(s) Oral every 6 hours PRN Temp greater or equal to 38C (100.4F)  
MEDICATIONS  (STANDING):  enoxaparin Injectable 40 milliGRAM(s) SubCutaneous two times a day  piperacillin/tazobactam IVPB.. 3.375 Gram(s) IV Intermittent every 8 hours  potassium chloride  10 mEq/100 mL IVPB 10 milliEquivalent(s) IV Intermittent every 1 hour  thiamine IVPB 500 milliGRAM(s) IV Intermittent every 8 hours  vancomycin  IVPB 1500 milliGRAM(s) IV Intermittent every 12 hours    MEDICATIONS  (PRN):  OLANZapine Injectable 2.5 milliGRAM(s) IntraMuscular every 6 hours PRN Agitation  
MEDICATIONS  (STANDING):  enoxaparin Injectable 40 milliGRAM(s) SubCutaneous two times a day  piperacillin/tazobactam IVPB.. 3.375 Gram(s) IV Intermittent every 8 hours  potassium chloride  10 mEq/100 mL IVPB 10 milliEquivalent(s) IV Intermittent every 1 hour  thiamine IVPB 500 milliGRAM(s) IV Intermittent every 8 hours  vancomycin  IVPB 1500 milliGRAM(s) IV Intermittent every 12 hours    MEDICATIONS  (PRN):  OLANZapine Injectable 2.5 milliGRAM(s) IntraMuscular every 6 hours PRN Agitation  
MEDICATIONS  (STANDING):  enoxaparin Injectable 40 milliGRAM(s) SubCutaneous two times a day  piperacillin/tazobactam IVPB.. 3.375 Gram(s) IV Intermittent every 8 hours  thiamine IVPB 500 milliGRAM(s) IV Intermittent every 8 hours    MEDICATIONS  (PRN):  OLANZapine Injectable 2.5 milliGRAM(s) IntraMuscular every 6 hours PRN Agitation  
MEDICATIONS  (STANDING):  dextrose 5%. 1000 milliLiter(s) (125 mL/Hr) IV Continuous <Continuous>  enoxaparin Injectable 40 milliGRAM(s) SubCutaneous two times a day    MEDICATIONS  (PRN):  LORazepam   Injectable 1 milliGRAM(s) IV Push every 6 hours PRN Agitation  
MEDICATIONS  (STANDING):  enoxaparin Injectable 40 milliGRAM(s) SubCutaneous two times a day  furosemide   Injectable 40 milliGRAM(s) IV Push daily  piperacillin/tazobactam IVPB.. 3.375 Gram(s) IV Intermittent every 8 hours  potassium chloride  10 mEq/100 mL IVPB 10 milliEquivalent(s) IV Intermittent every 1 hour    MEDICATIONS  (PRN):  OLANZapine Injectable 2.5 milliGRAM(s) IntraMuscular every 6 hours PRN Agitation  
MEDICATIONS  (STANDING):  enoxaparin Injectable 40 milliGRAM(s) SubCutaneous two times a day  furosemide   Injectable 20 milliGRAM(s) IV Push daily  piperacillin/tazobactam IVPB.. 3.375 Gram(s) IV Intermittent every 8 hours  potassium chloride   Powder 40 milliEquivalent(s) Oral once  potassium chloride  10 mEq/100 mL IVPB 10 milliEquivalent(s) IV Intermittent every 1 hour    MEDICATIONS  (PRN):  OLANZapine Injectable 2.5 milliGRAM(s) IntraMuscular every 6 hours PRN Agitation  
MEDICATIONS  (STANDING):  dextrose 5%. 1000 milliLiter(s) (125 mL/Hr) IV Continuous <Continuous>  dextrose 5%. 1000 milliLiter(s) (125 mL/Hr) IV Continuous <Continuous>  enoxaparin Injectable 40 milliGRAM(s) SubCutaneous two times a day  potassium chloride   Powder 40 milliEquivalent(s) Oral once  thiamine IVPB 500 milliGRAM(s) IV Intermittent every 8 hours    MEDICATIONS  (PRN):  LORazepam   Injectable 2 milliGRAM(s) IV Push every 6 hours PRN Agitation  
MEDICATIONS  (STANDING):  enoxaparin Injectable 40 milliGRAM(s) SubCutaneous two times a day  melatonin 6 milliGRAM(s) Oral at bedtime  vortioxetine 15 milliGRAM(s) Oral daily    MEDICATIONS  (PRN):  benzonatate 100 milliGRAM(s) Oral every 8 hours PRN Cough  
MEDICATIONS  (STANDING):  enoxaparin Injectable 40 milliGRAM(s) SubCutaneous two times a day    MEDICATIONS  (PRN):  LORazepam   Injectable 1 milliGRAM(s) IV Push every 6 hours PRN Agitation  
MEDICATIONS  (STANDING):  enoxaparin Injectable 40 milliGRAM(s) SubCutaneous two times a day  melatonin 6 milliGRAM(s) Oral at bedtime  vortioxetine 10 milliGRAM(s) Oral daily    MEDICATIONS  (PRN):  benzonatate 100 milliGRAM(s) Oral every 8 hours PRN Cough  
MEDICATIONS  (STANDING):  dextrose 5%. 1000 milliLiter(s) (100 mL/Hr) IV Continuous <Continuous>  enoxaparin Injectable 40 milliGRAM(s) SubCutaneous two times a day  meropenem  IVPB 1000 milliGRAM(s) IV Intermittent every 8 hours  thiamine IVPB 500 milliGRAM(s) IV Intermittent every 8 hours    MEDICATIONS  (PRN):  LORazepam   Injectable 2 milliGRAM(s) IV Push every 6 hours PRN Agitation  
MEDICATIONS  (STANDING):  enoxaparin Injectable 40 milliGRAM(s) SubCutaneous two times a day    MEDICATIONS  (PRN):  LORazepam   Injectable 1 milliGRAM(s) IV Push every 6 hours PRN Agitation  
MEDICATIONS  (STANDING):  enoxaparin Injectable 40 milliGRAM(s) SubCutaneous two times a day    MEDICATIONS  (PRN):  benzonatate 100 milliGRAM(s) Oral every 8 hours PRN Cough  LORazepam   Injectable 1 milliGRAM(s) IV Push every 6 hours PRN Agitation  
MEDICATIONS  (STANDING):  enoxaparin Injectable 40 milliGRAM(s) SubCutaneous two times a day  melatonin 3 milliGRAM(s) Oral at bedtime  vortioxetine 5 milliGRAM(s) Oral daily    MEDICATIONS  (PRN):  benzonatate 100 milliGRAM(s) Oral every 8 hours PRN Cough  LORazepam   Injectable 1 milliGRAM(s) IV Push every 6 hours PRN Agitation  
MEDICATIONS  (STANDING):  enoxaparin Injectable 40 milliGRAM(s) SubCutaneous two times a day  furosemide   Injectable 20 milliGRAM(s) IV Push daily  piperacillin/tazobactam IVPB.. 3.375 Gram(s) IV Intermittent every 8 hours  potassium chloride   Powder 40 milliEquivalent(s) Oral once  potassium chloride  10 mEq/100 mL IVPB 10 milliEquivalent(s) IV Intermittent every 1 hour    MEDICATIONS  (PRN):  OLANZapine Injectable 2.5 milliGRAM(s) IntraMuscular every 6 hours PRN Agitation

## 2021-01-06 NOTE — BH INPATIENT PSYCHIATRY ASSESSMENT NOTE - NSCOMMENTSUICRISKFACT_PSY_ALL_CORE
Patient has multiple chronic , unmodifiable risk factors for suicide such as gender, race, age, history of severe depression requiring ECT in the past, history of 2 serious suicide attempts. In addition patient has recent hx of treatment non-adherence which perpetuates his depressive symptoms.

## 2021-01-06 NOTE — BH CONSULTATION LIAISON PROGRESS NOTE - NSBHCHARTREVIEWINVESTIGATE_PSY_A_CORE FT
EKG 12/23    QTc 482  Sinus tachycardia 
PROCEDURE: Transthoracic echocardiogram with 2-D, M-Mode  and complete spectral and color flow Doppler.  INDICATION: Acute and subacute infective endocarditis  (I33.0)  ------------------------------------------------------------------------  DIMENSIONS:  Dimensions:     Normal Values:  LA:     3.0 cm    2.0 - 4.0 cm  Ao:     3.9 cm    2.0 - 3.8 cm  SEPTUM: 0.9 cm    0.6 - 1.2 cm  PWT:    0.9 cm    0.6 - 1.1 cm  LVIDd:  5.6 cm    3.0 - 5.6 cm  LVIDs:  4.1 cm    1.8 - 4.0 cm  Derived Variables:  LVMI: 90 g/m2  RWT: 0.32  Fractional short: 27 %  Ejection Fraction (Teicholtz): 52 %  ------------------------------------------------------------------------  OBSERVATIONS:  Mitral Valve: Normal mitral valve. No mitral valve  regurgitation seen.  Aortic Root: Normal aortic root.  Aortic Valve: Normal trileaflet aortic valve. No aortic  valve regurgitation seen.  Left Atrium: Normal left atrium.  LA volume index = 21  cc/m2.  Left Ventricle: Endocardium not well visualized; grossly  normal left ventricular systolic function. Normal left  ventricular internal dimensions and wall thicknesses.  Right Heart: Normal right atrium. The right ventricle is  not well visualized; grossly, the right ventricle appears  enlarged and hypokinetic. Normal tricuspid valve. No  tricuspid regurgitation. Normal pulmonic valve.  Pericardium/PleuraNormal pericardium with no pericardial  effusion.  ------------------------------------------------------------------------  CONCLUSIONS:  1. Endocardium not well visualized; grossly normal left  ventricular systolic function.  2. The right ventricle is not well visualized; grossly, the  right ventricle appears enlarged and hypokinetic.  3. Cannot exclude endocarditis on this study. Recommend FREDI  if clinically appropriate.  *** Compared with echocardiogram of 11/8/2019 ,the right  ventricle is hypokinetic.
EKG 12/23    QTc 482  Sinus tachycardia 
PROCEDURE: Transthoracic echocardiogram with 2-D, M-Mode  and complete spectral and color flow Doppler.  INDICATION: Acute and subacute infective endocarditis  (I33.0)  ------------------------------------------------------------------------  DIMENSIONS:  Dimensions:     Normal Values:  LA:     3.0 cm    2.0 - 4.0 cm  Ao:     3.9 cm    2.0 - 3.8 cm  SEPTUM: 0.9 cm    0.6 - 1.2 cm  PWT:    0.9 cm    0.6 - 1.1 cm  LVIDd:  5.6 cm    3.0 - 5.6 cm  LVIDs:  4.1 cm    1.8 - 4.0 cm  Derived Variables:  LVMI: 90 g/m2  RWT: 0.32  Fractional short: 27 %  Ejection Fraction (Teicholtz): 52 %  ------------------------------------------------------------------------  OBSERVATIONS:  Mitral Valve: Normal mitral valve. No mitral valve  regurgitation seen.  Aortic Root: Normal aortic root.  Aortic Valve: Normal trileaflet aortic valve. No aortic  valve regurgitation seen.  Left Atrium: Normal left atrium.  LA volume index = 21  cc/m2.  Left Ventricle: Endocardium not well visualized; grossly  normal left ventricular systolic function. Normal left  ventricular internal dimensions and wall thicknesses.  Right Heart: Normal right atrium. The right ventricle is  not well visualized; grossly, the right ventricle appears  enlarged and hypokinetic. Normal tricuspid valve. No  tricuspid regurgitation. Normal pulmonic valve.  Pericardium/PleuraNormal pericardium with no pericardial  effusion.  ------------------------------------------------------------------------  CONCLUSIONS:  1. Endocardium not well visualized; grossly normal left  ventricular systolic function.  2. The right ventricle is not well visualized; grossly, the  right ventricle appears enlarged and hypokinetic.  3. Cannot exclude endocarditis on this study. Recommend FREDI  if clinically appropriate.  *** Compared with echocardiogram of 11/8/2019 ,the right  ventricle is hypokinetic.
EKG 12/23    QTc 482  Sinus tachycardia 
PROCEDURE: Transthoracic echocardiogram with 2-D, M-Mode  and complete spectral and color flow Doppler.  INDICATION: Acute and subacute infective endocarditis  (I33.0)  ------------------------------------------------------------------------  DIMENSIONS:  Dimensions:     Normal Values:  LA:     3.0 cm    2.0 - 4.0 cm  Ao:     3.9 cm    2.0 - 3.8 cm  SEPTUM: 0.9 cm    0.6 - 1.2 cm  PWT:    0.9 cm    0.6 - 1.1 cm  LVIDd:  5.6 cm    3.0 - 5.6 cm  LVIDs:  4.1 cm    1.8 - 4.0 cm  Derived Variables:  LVMI: 90 g/m2  RWT: 0.32  Fractional short: 27 %  Ejection Fraction (Teicholtz): 52 %  ------------------------------------------------------------------------  OBSERVATIONS:  Mitral Valve: Normal mitral valve. No mitral valve  regurgitation seen.  Aortic Root: Normal aortic root.  Aortic Valve: Normal trileaflet aortic valve. No aortic  valve regurgitation seen.  Left Atrium: Normal left atrium.  LA volume index = 21  cc/m2.  Left Ventricle: Endocardium not well visualized; grossly  normal left ventricular systolic function. Normal left  ventricular internal dimensions and wall thicknesses.  Right Heart: Normal right atrium. The right ventricle is  not well visualized; grossly, the right ventricle appears  enlarged and hypokinetic. Normal tricuspid valve. No  tricuspid regurgitation. Normal pulmonic valve.  Pericardium/PleuraNormal pericardium with no pericardial  effusion.  ------------------------------------------------------------------------  CONCLUSIONS:  1. Endocardium not well visualized; grossly normal left  ventricular systolic function.  2. The right ventricle is not well visualized; grossly, the  right ventricle appears enlarged and hypokinetic.  3. Cannot exclude endocarditis on this study. Recommend FREDI  if clinically appropriate.  *** Compared with echocardiogram of 11/8/2019 ,the right  ventricle is hypokinetic.
EKG 12/23    QTc 482  Sinus tachycardia 
PROCEDURE: Transthoracic echocardiogram with 2-D, M-Mode  and complete spectral and color flow Doppler.  INDICATION: Acute and subacute infective endocarditis  (I33.0)  ------------------------------------------------------------------------  DIMENSIONS:  Dimensions:     Normal Values:  LA:     3.0 cm    2.0 - 4.0 cm  Ao:     3.9 cm    2.0 - 3.8 cm  SEPTUM: 0.9 cm    0.6 - 1.2 cm  PWT:    0.9 cm    0.6 - 1.1 cm  LVIDd:  5.6 cm    3.0 - 5.6 cm  LVIDs:  4.1 cm    1.8 - 4.0 cm  Derived Variables:  LVMI: 90 g/m2  RWT: 0.32  Fractional short: 27 %  Ejection Fraction (Teicholtz): 52 %  ------------------------------------------------------------------------  OBSERVATIONS:  Mitral Valve: Normal mitral valve. No mitral valve  regurgitation seen.  Aortic Root: Normal aortic root.  Aortic Valve: Normal trileaflet aortic valve. No aortic  valve regurgitation seen.  Left Atrium: Normal left atrium.  LA volume index = 21  cc/m2.  Left Ventricle: Endocardium not well visualized; grossly  normal left ventricular systolic function. Normal left  ventricular internal dimensions and wall thicknesses.  Right Heart: Normal right atrium. The right ventricle is  not well visualized; grossly, the right ventricle appears  enlarged and hypokinetic. Normal tricuspid valve. No  tricuspid regurgitation. Normal pulmonic valve.  Pericardium/PleuraNormal pericardium with no pericardial  effusion.  ------------------------------------------------------------------------  CONCLUSIONS:  1. Endocardium not well visualized; grossly normal left  ventricular systolic function.  2. The right ventricle is not well visualized; grossly, the  right ventricle appears enlarged and hypokinetic.  3. Cannot exclude endocarditis on this study. Recommend FREDI  if clinically appropriate.  *** Compared with echocardiogram of 11/8/2019 ,the right  ventricle is hypokinetic.
EKG 12/23    QTc 482  Sinus tachycardia
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
## 2021-01-06 NOTE — BH CONSULTATION LIAISON PROGRESS NOTE - NSBHCHARTREVIEWVS_PSY_A_CORE FT
Vital Signs Last 24 Hrs  T(C): 36.8 (25 Dec 2020 09:00), Max: 37.7 (24 Dec 2020 18:13)  T(F): 98.3 (25 Dec 2020 09:00), Max: 99.9 (24 Dec 2020 18:13)  HR: 114 (25 Dec 2020 09:00) (97 - 114)  BP: 143/87 (25 Dec 2020 09:00) (143/87 - 149/87)  BP(mean): --  RR: 18 (25 Dec 2020 09:00) (16 - 20)  SpO2: 96% (25 Dec 2020 09:00) (93% - 97%)
Vital Signs Last 24 Hrs  T(C): 36.3 (29 Dec 2020 10:40), Max: 36.4 (29 Dec 2020 05:00)  T(F): 97.4 (29 Dec 2020 10:40), Max: 97.5 (29 Dec 2020 05:00)  HR: 104 (29 Dec 2020 10:40) (92 - 104)  BP: 129/83 (29 Dec 2020 10:40) (129/83 - 135/93)  BP(mean): --  RR: 18 (29 Dec 2020 10:40) (17 - 18)  SpO2: 99% (29 Dec 2020 10:40) (99% - 99%)
Vital Signs Last 24 Hrs  T(C): 36.4 (28 Dec 2020 09:00), Max: 36.7 (27 Dec 2020 13:00)  T(F): 97.5 (28 Dec 2020 09:00), Max: 98.1 (27 Dec 2020 21:00)  HR: 109 (28 Dec 2020 09:00) (96 - 111)  BP: 143/99 (28 Dec 2020 09:00) (131/95 - 156/94)  BP(mean): --  RR: 17 (28 Dec 2020 09:00) (17 - 118)  SpO2: 99% (28 Dec 2020 09:00) (97% - 100%)
Vital Signs Last 24 Hrs  T(C): 36.6 (05 Jan 2021 05:23), Max: 36.8 (04 Jan 2021 22:06)  T(F): 97.8 (05 Jan 2021 05:23), Max: 98.2 (04 Jan 2021 22:06)  HR: 104 (05 Jan 2021 05:23) (100 - 104)  BP: 134/86 (05 Jan 2021 05:23) (134/86 - 141/92)  BP(mean): --  RR: 18 (05 Jan 2021 05:23) (18 - 18)  SpO2: 100% (05 Jan 2021 05:23) (100% - 100%)
Vital Signs Last 24 Hrs  T(C): 37 (04 Jan 2021 05:00), Max: 37 (04 Jan 2021 05:00)  T(F): 98.6 (04 Jan 2021 05:00), Max: 98.6 (04 Jan 2021 05:00)  HR: 100 (04 Jan 2021 05:00) (96 - 102)  BP: 122/83 (04 Jan 2021 05:00) (122/83 - 144/91)  BP(mean): --  RR: 18 (04 Jan 2021 05:00) (18 - 18)  SpO2: 100% (04 Jan 2021 05:00) (98% - 100%)
Vital Signs Last 24 Hrs  T(C): 37.8 (24 Dec 2020 06:22), Max: 38.3 (24 Dec 2020 03:09)  T(F): 100.1 (24 Dec 2020 06:22), Max: 101 (24 Dec 2020 03:09)  HR: 101 (24 Dec 2020 06:22) (90 - 124)  BP: 146/91 (24 Dec 2020 06:22) (133/80 - 155/101)  BP(mean): --  RR: 19 (24 Dec 2020 06:22) (16 - 22)  SpO2: 95% (24 Dec 2020 06:22) (94% - 97%)
Vital Signs Last 24 Hrs  T(C): 38.1 (21 Dec 2020 10:00), Max: 39.6 (20 Dec 2020 11:33)  T(F): 100.5 (21 Dec 2020 10:00), Max: 103.3 (20 Dec 2020 11:33)  HR: 109 (21 Dec 2020 10:00) (101 - 111)  BP: 131/81 (21 Dec 2020 10:00) (131/81 - 159/95)  BP(mean): --  RR: 20 (21 Dec 2020 10:00) (18 - 20)  SpO2: 91% (21 Dec 2020 10:00) (91% - 98%)
Vital Signs Last 24 Hrs  T(C): 36.6 (16 Dec 2020 15:00), Max: 38.9 (15 Dec 2020 18:13)  T(F): 97.9 (16 Dec 2020 15:00), Max: 102 (15 Dec 2020 18:13)  HR: 109 (16 Dec 2020 15:00) (105 - 129)  BP: 134/91 (16 Dec 2020 15:00) (130/89 - 145/105)  BP(mean): --  RR: 18 (16 Dec 2020 15:00) (18 - 20)  SpO2: 97% (16 Dec 2020 15:00) (96% - 97%)
Vital Signs Last 24 Hrs  T(C): 37.1 (09 Dec 2020 08:00), Max: 37.7 (08 Dec 2020 20:00)  T(F): 98.8 (09 Dec 2020 08:00), Max: 99.8 (08 Dec 2020 20:00)  HR: 69 (09 Dec 2020 10:08) (65 - 83)  BP: 114/54 (09 Dec 2020 10:00) (95/46 - 126/66)  BP(mean): 68 (09 Dec 2020 10:00) (56 - 72)  RR: 18 (09 Dec 2020 10:00) (18 - 18)  SpO2: 97% (09 Dec 2020 10:08) (95% - 100%)
Vital Signs Last 24 Hrs  T(C): 37.2 (14 Dec 2020 11:50), Max: 38.3 (14 Dec 2020 11:00)  T(F): 99 (14 Dec 2020 11:50), Max: 100.9 (14 Dec 2020 11:00)  HR: 102 (14 Dec 2020 11:50) (89 - 115)  BP: 136/93 (14 Dec 2020 11:00) (130/88 - 141/81)  BP(mean): --  RR: 19 (14 Dec 2020 11:50) (18 - 22)  SpO2: 97% (14 Dec 2020 11:50) (94% - 98%)
Vital Signs Last 24 Hrs  T(C): 38.7 (15 Dec 2020 09:50), Max: 38.7 (15 Dec 2020 09:50)  T(F): 101.7 (15 Dec 2020 09:50), Max: 101.7 (15 Dec 2020 09:50)  HR: 112 (15 Dec 2020 09:50) (102 - 115)  BP: 145/103 (15 Dec 2020 09:50) (136/93 - 147/99)  BP(mean): --  RR: 20 (15 Dec 2020 09:50) (19 - 22)  SpO2: 97% (15 Dec 2020 09:50) (95% - 98%)
Vital Signs Last 24 Hrs  T(C): 36.3 (06 Jan 2021 06:04), Max: 36.9 (05 Jan 2021 13:04)  T(F): 97.4 (06 Jan 2021 06:04), Max: 98.5 (05 Jan 2021 13:04)  HR: 105 (06 Jan 2021 06:04) (104 - 118)  BP: 150/90 (06 Jan 2021 06:04) (124/92 - 150/90)  BP(mean): --  RR: 17 (06 Jan 2021 06:04) (17 - 19)  SpO2: 100% (06 Jan 2021 06:04) (97% - 100%)
Vital Signs Last 24 Hrs  T(C): 36.3 (01 Jan 2021 05:20), Max: 36.4 (31 Dec 2020 21:47)  T(F): 97.4 (01 Jan 2021 05:20), Max: 97.6 (31 Dec 2020 21:47)  HR: 96 (01 Jan 2021 05:20) (96 - 98)  BP: 137/98 (01 Jan 2021 05:20) (137/98 - 146/99)  BP(mean): --  RR: 16 (01 Jan 2021 05:20) (16 - 17)  SpO2: 99% (01 Jan 2021 05:20) (99% - 99%)
Vital Signs Last 24 Hrs  T(C): 37.9 (19 Dec 2020 10:00), Max: 39.3 (19 Dec 2020 02:03)  T(F): 100.2 (19 Dec 2020 10:00), Max: 102.8 (19 Dec 2020 02:03)  HR: 110 (19 Dec 2020 10:00) (108 - 119)  BP: 158/110 (19 Dec 2020 10:00) (149/103 - 161/115)  BP(mean): --  RR: 20 (19 Dec 2020 10:00) (18 - 20)  SpO2: 98% (19 Dec 2020 10:00) (96% - 98%)
Vital Signs Last 24 Hrs  T(C): 36.8 (27 Dec 2020 09:00), Max: 37.3 (27 Dec 2020 01:00)  T(F): 98.2 (27 Dec 2020 09:00), Max: 99.1 (27 Dec 2020 01:00)  HR: 99 (27 Dec 2020 09:00) (94 - 103)  BP: 131/96 (27 Dec 2020 09:00) (131/96 - 147/97)  BP(mean): --  RR: 18 (27 Dec 2020 09:00) (17 - 18)  SpO2: 98% (27 Dec 2020 09:00) (95% - 99%)
Vital Signs Last 24 Hrs  T(C): 36.4 (02 Jan 2021 06:00), Max: 36.9 (01 Jan 2021 21:55)  T(F): 97.6 (02 Jan 2021 06:00), Max: 98.4 (01 Jan 2021 21:55)  HR: 88 (02 Jan 2021 06:00) (88 - 97)  BP: 144/92 (02 Jan 2021 06:00) (138/90 - 144/92)  BP(mean): --  RR: 17 (02 Jan 2021 06:00) (17 - 18)  SpO2: 98% (02 Jan 2021 06:00) (98% - 99%)
Vital Signs Last 24 Hrs  T(C): 37.1 (24 Dec 2020 10:19), Max: 38.3 (24 Dec 2020 03:09)  T(F): 98.8 (24 Dec 2020 10:19), Max: 101 (24 Dec 2020 03:09)  HR: 100 (24 Dec 2020 10:19) (90 - 124)  BP: 154/98 (24 Dec 2020 10:19) (133/80 - 155/101)  BP(mean): --  RR: 20 (24 Dec 2020 10:19) (16 - 22)  SpO2: 95% (24 Dec 2020 10:19) (94% - 97%)
Vital Signs Last 24 Hrs  T(C): 36.7 (03 Jan 2021 06:00), Max: 36.7 (03 Jan 2021 06:00)  T(F): 98.1 (03 Jan 2021 06:00), Max: 98.1 (03 Jan 2021 06:00)  HR: 105 (03 Jan 2021 06:00) (90 - 105)  BP: 133/91 (03 Jan 2021 06:00) (133/91 - 155/92)  BP(mean): --  RR: 18 (03 Jan 2021 06:00) (18 - 18)  SpO2: 100% (03 Jan 2021 06:00) (99% - 100%)
ICU Vital Signs Last 24 Hrs  T(C): 39.5 (20 Dec 2020 11:01), Max: 39.5 (20 Dec 2020 11:01)  T(F): 103.1 (20 Dec 2020 11:01), Max: 103.1 (20 Dec 2020 11:01)  HR: 125 (20 Dec 2020 10:00) (107 - 125)  BP: 131/95 (20 Dec 2020 10:00) (131/95 - 150/93)  BP(mean): --  ABP: --  ABP(mean): --  RR: 19 (20 Dec 2020 10:00) (18 - 20)  SpO2: 95% (20 Dec 2020 10:00) (94% - 98%)  
Vital Signs Last 24 Hrs  T(C): 36.8 (26 Dec 2020 09:00), Max: 37.2 (26 Dec 2020 01:00)  T(F): 98.3 (26 Dec 2020 09:00), Max: 99 (26 Dec 2020 01:00)  HR: 94 (26 Dec 2020 09:00) (92 - 105)  BP: 144/90 (26 Dec 2020 09:00) (131/94 - 157/96)  BP(mean): --  RR: 18 (26 Dec 2020 09:00) (17 - 18)  SpO2: 94% (26 Dec 2020 09:00) (94% - 97%)
Vital Signs Last 24 Hrs  T(C): 36.3 (31 Dec 2020 12:00), Max: 36.9 (30 Dec 2020 21:00)  T(F): 97.3 (31 Dec 2020 12:00), Max: 98.4 (30 Dec 2020 21:00)  HR: 101 (31 Dec 2020 12:00) (100 - 102)  BP: 130/92 (31 Dec 2020 12:00) (130/92 - 145/95)  BP(mean): --  RR: 18 (31 Dec 2020 12:00) (18 - 18)  SpO2: 100% (31 Dec 2020 12:00) (99% - 100%)
Vital Signs Last 24 Hrs  T(C): 36.5 (30 Dec 2020 12:56), Max: 36.8 (29 Dec 2020 22:42)  T(F): 97.7 (30 Dec 2020 12:56), Max: 98.2 (29 Dec 2020 22:42)  HR: 108 (30 Dec 2020 12:56) (96 - 108)  BP: 126/90 (30 Dec 2020 12:56) (123/91 - 126/90)  BP(mean): --  RR: 18 (30 Dec 2020 12:56) (17 - 96)  SpO2: 97% (30 Dec 2020 12:56) (97% - 99%)
Vital Signs Last 24 Hrs  T(C): 38.6 (22 Dec 2020 09:47), Max: 38.6 (22 Dec 2020 09:47)  T(F): 101.5 (22 Dec 2020 09:47), Max: 101.5 (22 Dec 2020 09:47)  HR: 112 (22 Dec 2020 09:47) (108 - 112)  BP: 134/92 (22 Dec 2020 09:47) (131/88 - 148/98)  BP(mean): --  RR: 20 (22 Dec 2020 09:47) (20 - 20)  SpO2: 91% (22 Dec 2020 09:47) (91% - 95%)
Vital Signs Last 24 Hrs  T(C): 37.7 (18 Dec 2020 05:17), Max: 37.8 (17 Dec 2020 12:55)  T(F): 99.8 (18 Dec 2020 05:17), Max: 100 (17 Dec 2020 12:55)  HR: 100 (18 Dec 2020 05:17) (85 - 100)  BP: 148/88 (18 Dec 2020 05:17) (141/93 - 150/83)  BP(mean): --  RR: 20 (18 Dec 2020 05:17) (18 - 20)  SpO2: 97% (18 Dec 2020 05:17) (96% - 98%)
Vital Signs Last 24 Hrs  T(C): 37.6 (17 Dec 2020 06:00), Max: 38.4 (16 Dec 2020 22:00)  T(F): 99.7 (17 Dec 2020 06:00), Max: 101.2 (16 Dec 2020 22:00)  HR: 98 (17 Dec 2020 06:00) (92 - 109)  BP: 139/75 (17 Dec 2020 06:00) (109/77 - 149/92)  BP(mean): --  RR: 18 (17 Dec 2020 06:00) (18 - 18)  SpO2: 98% (17 Dec 2020 06:00) (97% - 99%)

## 2021-01-06 NOTE — BH CONSULTATION LIAISON PROGRESS NOTE - NSBHFUPINTERVALCCFT_PSY_A_CORE
"Ok."
I feel okay"
Patient sleepy, awakens only briefly, able to follow simple commands (squeezing hands etc), otherwise speech garbled, appears tachypneic, AOx0
confusion 
pt sedated, intubated
"I'm okay"
States his name
I feel depressed"  
"feeling better"
"Im depressed"
Pt states his name
depression/suicidality 
I feel depressed"
delirium
"I'm depressed"
"I'm depressed"
depression/suicidality 
I feel better"	
"I'm ok"
delirium
"I want to leave. I have someone to see." 
depression/suicidality 
delirium
"[place]." 
delirium

## 2021-01-06 NOTE — CONSULT NOTE ADULT - CONSULT REASON
AMS, aggression
Patient with h/o abnormal and disorganized behavior with difficulty to restrain and chemically sedate.   Urine tox screen positive for Barbiturates.
agitation
fever
EKG changes
altered mental status, delerium

## 2021-01-06 NOTE — PROGRESS NOTE ADULT - PROVIDER SPECIALTY LIST ADULT
Infectious Disease
Internal Medicine
MICU
Infectious Disease
Infectious Disease
Internal Medicine
Internal Medicine
Infectious Disease
Internal Medicine
Infectious Disease
Internal Medicine

## 2021-01-06 NOTE — BH CONSULTATION LIAISON PROGRESS NOTE - NSBHFUPREASONCONS_PSY_A_CORE
delerium/agitation/med management
delerium/agitation/med management
med management/agitation/delerium
med management/delerium/agitation
ritchie
ritchie
suicidality/delerium
depression
delerium/agitation/med management
suicidality/delerium
suicidality/delerium
ritchie
depression
depression
suicidality/delerium
suicidality/delerium/depression
depression
ritchie
suicidality/delerium
delerium/agitation/med management
suicidality/delerium
delerium/agitation/med management
suicidality/delerium
suicidality/delerium/med management
suicidality/delerium/med management

## 2021-01-06 NOTE — BH INPATIENT PSYCHIATRY ASSESSMENT NOTE - NSBHCHARTREVIEWVS_PSY_A_CORE FT
Vital Signs Last 24 Hrs  T(C): 37 (06 Jan 2021 16:50), Max: 37 (06 Jan 2021 16:50)  T(F): 98.6 (06 Jan 2021 16:50), Max: 98.6 (06 Jan 2021 16:50)  HR: 78 (06 Jan 2021 18:15) (78 - 118)  BP: 138/82 (06 Jan 2021 18:15) (124/92 - 150/90)  BP(mean): --  RR: 18 (06 Jan 2021 12:25) (17 - 19)  SpO2: 99% (06 Jan 2021 12:25) (99% - 100%)

## 2021-01-06 NOTE — PROGRESS NOTE ADULT - PROBLEM SELECTOR PLAN 5
DVT PPX: Lovenox  Diet: Mech soft with nectar consistency Weakness secondary to ICU, prolonged hospital course.  - attempt for PT everyday  - D/c planning  to [facility] for long-term stay

## 2021-01-06 NOTE — DISCHARGE NOTE NURSING/CASE MANAGEMENT/SOCIAL WORK - NSDCVIVACCINE_GEN_ALL_CORE_FT
Influenza , 2019/11/18 15:46 , Yocasta Aldana (IGOR)  Tdap , 2020/11/1 11:05 , Bethany Kyle (IGOR)

## 2021-01-06 NOTE — DISCHARGE NOTE NURSING/CASE MANAGEMENT/SOCIAL WORK - PATIENT PORTAL LINK FT
You can access the FollowMyHealth Patient Portal offered by [facility] by registering at the following website: [email or web address]. By joining [facility]’s FollowMyHealth portal, you will also be able to view your health information using other applications (apps) compatible with our system.
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
## 2021-01-06 NOTE — BH CONSULTATION LIAISON PROGRESS NOTE - NSBHFUPINTERVALHXFT_PSY_A_CORE
Patient feels depressed still, denies SI, understands that he will need psych hospitalization, denies negative sfx from vortioxetine, amenable to increase as below.
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
## 2021-01-06 NOTE — PROGRESS NOTE ADULT - PROBLEM SELECTOR PLAN 3
Resolved, AOx3 with good attention for several days.  Possibly delirium/septic enceph vs. Hypernatremia, which has now resolved. No seizure activity on EEG.  - high dose IV thiamine given  - Psych and Neuro following; will f/u recs   - C/w 1:1 CO - c/w melatonin 6mg QHS per Psych recs

## 2021-01-06 NOTE — CONSULT NOTE ADULT - CONSULT REQUESTED DATE/TIME
25-Dec-2020 12:45
05-Dec-2020 03:13
05-Dec-2020 13:30
05-Dec-2020 14:02
14-Dec-2020 17:29
06-Jan-2021 13:01

## 2021-01-06 NOTE — PROGRESS NOTE ADULT - PROBLEM SELECTOR PROBLEM 7
Prophylactic measure
Discharge planning issues

## 2021-01-06 NOTE — BH CONSULTATION LIAISON PROGRESS NOTE - NSBHCONSFOLLOWNEEDS_PSY_ALL_CORE
Needs further psych safety assessment prior to discharge
No psychiatric contraindications to discharge
No psychiatric contraindications to discharge
Needs further psych safety assessment prior to discharge
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
## 2021-01-06 NOTE — PROGRESS NOTE ADULT - PROBLEM SELECTOR PLAN 1
Refractory to most antidepressants. Hx of multiple SI, hx of ECT.  - Psych following; appreciate recs  - C/w vortioxetine Pt without CP or other cardiac complaints, remains baseline tachy.  EKG from admission early Dec to last 2 in Jan with new deep twi.   TTE orginally with possible RV dysfxn repeat on 1/5 with paradoxical septal wall motion abnormality.  - f/u cards recs re: need for further w/u (ie NST)

## 2021-01-06 NOTE — PROGRESS NOTE ADULT - PROBLEM SELECTOR PLAN 2
- c/w melatonin 6mg QHS per Psych recs Refractory to most antidepressants. Hx of multiple SI, hx of ECT.  - Psych following; appreciate recs  - C/w vortioxetine

## 2021-01-06 NOTE — BH CONSULTATION LIAISON PROGRESS NOTE - NSICDXBHSECONDARYDX_PSY_ALL_CORE
Major depressive disorder   F32.9  
Delirium due to another medical condition   F05  
Major depressive disorder   F32.9
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
## 2021-01-06 NOTE — BH CONSULTATION LIAISON PROGRESS NOTE - NSBHCONSULTPRIMARYDISCUSSYES_PSY_A_CORE FT
spoke to primary team Dr. Laureano
spoke to primary team Dr. Laureano
Needs [facility] admission 
Needs H vs  admission 
[facility] admission 
Needs H vs  admission 
Paged Dr. Laureano
d/w Dr Laureano
Bro[facility], resident
Brody, resident 
Needs H vs  admission 
Needs H vs  admission 
resident Brody-- pls keep pt on 1:1 CO 
spoke to primary team Dr. Laureano
paged Dr. Mitchell

## 2021-01-06 NOTE — BH CONSULTATION LIAISON PROGRESS NOTE - NSBHCHARTREVIEWLAB_PSY_A_CORE FT
11.6   6.68  )-----------( 352      ( 27 Dec 2020 08:27 )             37.5     12-27    144  |  109<H>  |  21  ----------------------------<  110<H>  3.8   |  21<L>  |  0.59    Ca    10.2      27 Dec 2020 08:27  Phos  3.2     12-27  Mg     2.2     12-27    TPro  6.9  /  Alb  3.8  /  TBili  0.4  /  DBili  x   /  AST  18  /  ALT  33  /  AlkPhos  53  12-27  
                      11.0   8.62  )-----------( 355      ( 24 Dec 2020 07:23 )             37.1   12-24    147<H>  |  115<H>  |  22  ----------------------------<  124<H>  3.5   |  19<L>  |  0.65    Ca    9.7      24 Dec 2020 07:23  Phos  3.5     12-24  Mg     2.4     12-24    
                      11.6   10.42 )-----------( 437      ( 21 Dec 2020 07:46 )             39.7     12-21    155<H>  |  120<H>  |  25<H>  ----------------------------<  118<H>  3.8   |  21<L>  |  0.97    Ca    9.9      21 Dec 2020 07:46  Phos  3.8     12-21  Mg     2.7     12-21    TPro  7.3  /  Alb  3.8  /  TBili  0.4  /  DBili  x   /  AST  27  /  ALT  35  /  AlkPhos  44  12-21      LIVER FUNCTIONS - ( 21 Dec 2020 07:46 )  Alb: 3.8 g/dL / Pro: 7.3 g/dL / ALK PHOS: 44 U/L / ALT: 35 U/L / AST: 27 U/L / GGT: x           
                      11.7   5.94  )-----------( 302      ( 30 Dec 2020 07:50 )             37.1     12-30    139  |  104  |  16  ----------------------------<  91  3.8   |  23  |  0.60    Ca    10.1      30 Dec 2020 07:50  Phos  3.4     12-30  Mg     2.2     12-30          
                      11.7   5.94  )-----------( 302      ( 30 Dec 2020 07:50 )             37.1     12-30    139  |  104  |  16  ----------------------------<  91  3.8   |  23  |  0.60    Ca    10.1      30 Dec 2020 07:50  Phos  3.4     12-30  Mg     2.2     12-30          
                      11.6   15.09 )-----------( 404      ( 15 Dec 2020 07:57 )             36.4     12-15    146<H>  |  109<H>  |  23  ----------------------------<  116<H>  3.1<L>   |  21<L>  |  0.97    Ca    9.3      15 Dec 2020 07:57  Phos  3.5     12-15  Mg     2.2     12-15    TPro  6.6  /  Alb  3.4  /  TBili  0.5  /  DBili  x   /  AST  29  /  ALT  32  /  AlkPhos  52  12-15    
             10.1   9.82  )-----------( 308      ( 14 Dec 2020 07:42 )             31.5     12-14    144  |  111<H>  |  18  ----------------------------<  117<H>  3.2<L>   |  21<L>  |  0.83    Ca    8.8      14 Dec 2020 07:42  Phos  2.7     12-14  Mg     2.2     12-14    TPro  6.1  /  Alb  3.1<L>  /  TBili  0.3  /  DBili  x   /  AST  35  /  ALT  25  /  AlkPhos  47  12-14      LIVER FUNCTIONS - ( 14 Dec 2020 07:42 )  Alb: 3.1 g/dL / Pro: 6.1 g/dL / ALK PHOS: 47 U/L / ALT: 25 U/L / AST: 35 U/L / GGT: x           
             11.6   15.09 )-----------( 404      ( 15 Dec 2020 07:57 )             36.4     12-15    146<H>  |  109<H>  |  23  ----------------------------<  116<H>  3.1<L>   |  21<L>  |  0.97    Ca    9.3      15 Dec 2020 07:57  Phos  3.5     12-15  Mg     2.2     12-15    TPro  6.6  /  Alb  3.4  /  TBili  0.5  /  DBili  x   /  AST  29  /  ALT  32  /  AlkPhos  52  12-15    
             9.0    4.10  )-----------( 191      ( 09 Dec 2020 03:13 )             30.1     12-09    142  |  110<H>  |  5<L>  ----------------------------<  119<H>  4.2   |  23  |  0.91    Ca    8.3<L>      09 Dec 2020 03:13  Phos  3.2     12-08  Mg     1.6     12-08    TPro  5.3<L>  /  Alb  2.7<L>  /  TBili  <0.2  /  DBili  x   /  AST  17  /  ALT  20  /  AlkPhos  48  12-09    
                      12.0   12.91 )-----------( 462      ( 20 Dec 2020 07:57 )             40.4   12-20    153<H>  |  119<H>  |  20  ----------------------------<  151<H>  3.2<L>   |  18<L>  |  0.96    Ca    9.7      20 Dec 2020 07:57  Phos  2.9     12-20  Mg     2.5     12-20    TPro  7.0  /  Alb  4.0  /  TBili  0.5  /  DBili  x   /  AST  20  /  ALT  29  /  AlkPhos  48  12-20  
Comprehensive Metabolic Panel (12.25.20 @ 07:48)   Sodium, Serum: 149 mmol/L   Potassium, Serum: 3.9: SPECIMEN MILDLY HEMOLYZED mmol/L   Chloride, Serum: 114 mmol/L   Carbon Dioxide, Serum: 19 mmol/L   Anion Gap, Serum: 16 mmol/L   Blood Urea Nitrogen, Serum: 22 mg/dL   Creatinine, Serum: 0.63 mg/dL   Glucose, Serum: 121 mg/dL   Calcium, Total Serum: 9.8 mg/dL   Protein Total, Serum: 7.0: SPECIMEN MILDLY HEMOLYZED g/dL   Albumin, Serum: 3.8 g/dL   Bilirubin Total, Serum: 0.4 mg/dL   Alkaline Phosphatase, Serum: 46 U/L   Aspartate Aminotransferase (AST/SGOT): 23: SPECIMEN MILDLY HEMOLYZED U/L   Alanine Aminotransferase (ALT/SGPT): 35: SPECIMEN MILDLY HEMOLYZED U/L 
                      11.5   8.56  )-----------( 344      ( 26 Dec 2020 09:34 )             38.0     12-26    145  |  111<H>  |  22  ----------------------------<  125<H>  3.9   |  21<L>  |  0.58    Ca    10.0      26 Dec 2020 09:34  Phos  3.6     12-26  Mg     2.4     12-26    TPro  7.2  /  Alb  4.0  /  TBili  0.4  /  DBili  x   /  AST  21  /  ALT  36  /  AlkPhos  51  12-26  
                      11.6   10.42 )-----------( 437      ( 21 Dec 2020 07:46 )             39.7     12-21    155<H>  |  120<H>  |  25<H>  ----------------------------<  118<H>  3.8   |  21<L>  |  0.97    Ca    9.9      21 Dec 2020 07:46  Phos  3.8     12-21  Mg     2.7     12-21    TPro  7.3  /  Alb  3.8  /  TBili  0.4  /  DBili  x   /  AST  27  /  ALT  35  /  AlkPhos  44  12-21      LIVER FUNCTIONS - ( 21 Dec 2020 07:46 )  Alb: 3.8 g/dL / Pro: 7.3 g/dL / ALK PHOS: 44 U/L / ALT: 35 U/L / AST: 27 U/L / GGT: x           
                      11.1   6.72  )-----------( 299      ( 29 Dec 2020 06:24 )             35.6     12-29    139  |  105  |  21  ----------------------------<  95  3.4<L>   |  23  |  0.55    Ca    9.7      29 Dec 2020 06:24  Phos  3.3     12-29  Mg     2.1     12-29  Carbohydrate Deficient Transferrin, Child (12.15.20 @ 13:28)    Carbohydrate Deficient Transferrin, Child, Result: See Note: Test                                  Result      Flag  Unit  RefValue  ----------------------------------------------------------------------  CDG, S    Reason for Referral                 See Note  RESULT: R41.82 ALTERED MENTAL STATUS    Mono-oligo/Di-oligo Ratio           0.09         H          <=0.06    A-oligo/Di-oligo Ratio              0.008                   <=0.011    Tri-sialo/Di-oligo Ratio            0.05                    <=0.05    Apo CIII-1/Apo CIII-2 Ratio         1.46         <=2.91    Apo CIII-0/Apo CIII-2 Ratio         0.19                    <=0.48    Interpretation                      See Note  Essentially normal result. This finding is not consistent  with a congenital disorder of glycosylation (CDG).  
                      11.7   5.94  )-----------( 302      ( 30 Dec 2020 07:50 )             37.1     12-30    139  |  104  |  16  ----------------------------<  91  3.8   |  23  |  0.60    Ca    10.1      30 Dec 2020 07:50  Phos  3.4     12-30  Mg     2.2     12-30          
                      11.4   7.46  )-----------( 322      ( 28 Dec 2020 05:36 )             36.2     12-28    138  |  104  |  21  ----------------------------<  105<H>  3.8   |  21<L>  |  0.57    Ca    9.9      28 Dec 2020 05:36  Phos  3.1     12-28  Mg     2.2     12-28    TPro  6.9  /  Alb  3.8  /  TBili  0.4  /  DBili  x   /  AST  18  /  ALT  33  /  AlkPhos  53  12-27      LIVER FUNCTIONS - ( 27 Dec 2020 08:27 )  Alb: 3.8 g/dL / Pro: 6.9 g/dL / ALK PHOS: 53 U/L / ALT: 33 U/L / AST: 18 U/L / GGT: x           
                      10.7   11.30 )-----------( 338      ( 17 Dec 2020 07:54 )             34.2     12-17    147<H>  |  110<H>  |  26<H>  ----------------------------<  105<H>  3.0<L>   |  22  |  0.96    Ca    9.6      17 Dec 2020 07:54  Phos  3.4     12-17  Mg     2.3     12-17    TPro  6.9  /  Alb  3.6  /  TBili  0.5  /  DBili  x   /  AST  19  /  ALT  27  /  AlkPhos  48  12-17    
                      11.0   8.62  )-----------( 355      ( 24 Dec 2020 07:23 )             37.1     12-24    147<H>  |  115<H>  |  22  ----------------------------<  124<H>  3.5   |  19<L>  |  0.65    Ca    9.7      24 Dec 2020 07:23  Phos  3.5     12-24  Mg     2.4     12-24          
                      11.7   5.94  )-----------( 302      ( 30 Dec 2020 07:50 )             37.1     12-30    139  |  104  |  16  ----------------------------<  91  3.8   |  23  |  0.60    Ca    10.1      30 Dec 2020 07:50  Phos  3.4     12-30  Mg     2.2     12-30          
                      11.1   6.72  )-----------( 299      ( 29 Dec 2020 06:24 )             35.6     12-29    139  |  105  |  21  ----------------------------<  95  3.4<L>   |  23  |  0.55    Ca    9.7      29 Dec 2020 06:24  Phos  3.3     12-29  Mg     2.1     12-29  Carbohydrate Deficient Transferrin, Child (12.15.20 @ 13:28)    Carbohydrate Deficient Transferrin, Child, Result: See Note: Test                                  Result      Flag  Unit  RefValue  ----------------------------------------------------------------------  CDG, S    Reason for Referral                 See Note  RESULT: R41.82 ALTERED MENTAL STATUS    Mono-oligo/Di-oligo Ratio           0.09         H          <=0.06    A-oligo/Di-oligo Ratio              0.008                   <=0.011    Tri-sialo/Di-oligo Ratio            0.05                    <=0.05    Apo CIII-1/Apo CIII-2 Ratio         1.46         <=2.91    Apo CIII-0/Apo CIII-2 Ratio         0.19                    <=0.48    Interpretation                      See Note  Essentially normal result. This finding is not consistent  with a congenital disorder of glycosylation (CDG).  
                      11.7   5.94  )-----------( 302      ( 30 Dec 2020 07:50 )             37.1     12-30    139  |  104  |  16  ----------------------------<  91  3.8   |  23  |  0.60    Ca    10.1      30 Dec 2020 07:50  Phos  3.4     12-30  Mg     2.2     12-30          
                      11.7   10.78 )-----------( 414      ( 19 Dec 2020 06:17 )             38.6     12-    151<H>  |  114<H>  |  27<H>  ----------------------------<  113<H>  3.1<L>   |  23  |  1.03    Ca    10.0      19 Dec 2020 06:17  Phos  3.4       Mg     2.3         TPro  7.1  /  Alb  3.9  /  TBili  0.5  /  DBili  x   /  AST  22  /  ALT  28  /  AlkPhos  48      Urinalysis Basic - ( 18 Dec 2020 19:05 )    Color: Light Yellow / Appearance: Clear / S.013 / pH: x  Gluc: x / Ketone: Negative  / Bili: Negative / Urobili: <2 mg/dL   Blood: x / Protein: Negative / Nitrite: Negative   Leuk Esterase: Negative / RBC: 4 /HPF / WBC 0 /HPF   Sq Epi: x / Non Sq Epi: 0 /HPF / Bacteria: Negative      LIVER FUNCTIONS - ( 19 Dec 2020 06:17 )  Alb: 3.9 g/dL / Pro: 7.1 g/dL / ALK PHOS: 48 U/L / ALT: 28 U/L / AST: 22 U/L / GGT: x             
                      11.2   9.73  )-----------( 353      ( 18 Dec 2020 08:18 )             37.2     12-18    147<H>  |  112<H>  |  25<H>  ----------------------------<  125<H>  3.0<L>   |  22  |  0.88    Ca    9.7      18 Dec 2020 08:18  Phos  2.8     12-18  Mg     2.2     12-18    TPro  6.7  /  Alb  3.6  /  TBili  0.4  /  DBili  x   /  AST  22  /  ALT  26  /  AlkPhos  47  12-18

## 2021-01-06 NOTE — PROGRESS NOTE ADULT - SUBJECTIVE AND OBJECTIVE BOX
Bruce Morrison MD  Internal Medicine  Pager #[phone]    Patient is a 55y old  Male who presents with a chief complaint of AMS (05 Jan 2021 09:21)      SUBJECTIVE / OVERNIGHT EVENTS:    ON: patient unable to sleep, NF called for tachycardia to 118.     Patient states he was unable to sleep because he is anxious about everything. Denies diaphoresis, chest pain, jaw pain, pain radiating down arm, or SOB. He states he is not sleeping during the day.     ADDITIONAL REVIEW OF SYSTEMS:    CONSTITUTIONAL: No weakness, fevers or chills  EYES/ENT: No visual changes;  No vertigo or throat pain.  NECK: No pain or stiffness.  RESPIRATORY: No cough, wheezing, hemoptysis; No shortness of breath.  CARDIOVASCULAR: No chest pain or palpitations  GASTROINTESTINAL: No abdominal pain. No nausea, vomiting, diarrhea, constipation, or melena.  GENITOURINARY: No dysuria, frequency or hematuria  NEUROLOGICAL: No numbness or weakness  SKIN: No itching, burning, rashes, or lesions   All other review of systems is negative unless indicated above.    MEDICATIONS  (STANDING):  enoxaparin Injectable 40 milliGRAM(s) SubCutaneous two times a day  LORazepam     Tablet 0.5 milliGRAM(s) Oral daily  LORazepam     Tablet 0.5 milliGRAM(s) Oral at bedtime  melatonin 6 milliGRAM(s) Oral at bedtime  vortioxetine 15 milliGRAM(s) Oral daily    MEDICATIONS  (PRN):  LORazepam   Injectable 1 milliGRAM(s) IV Push every 6 hours PRN Agitation      CAPILLARY BLOOD GLUCOSE        I&O's Summary      PHYSICAL EXAM:  Vital Signs Last 24 Hrs  T(C): 36.3 (06 Jan 2021 06:04), Max: 36.9 (05 Jan 2021 13:04)  T(F): 97.4 (06 Jan 2021 06:04), Max: 98.5 (05 Jan 2021 13:04)  HR: 105 (06 Jan 2021 06:04) (104 - 118)  BP: 150/90 (06 Jan 2021 06:04) (124/92 - 150/90)  BP(mean): --  RR: 17 (06 Jan 2021 06:04) (17 - 19)  SpO2: 100% (06 Jan 2021 06:04) (97% - 100%)    CONSTITUTIONAL: NAD, well-developed  RESPIRATORY: Normal respiratory effort; lungs are clear to auscultation bilaterally  CARDIOVASCULAR: Regular rate, normal S1 and S2, no murmur/rub/gallop; No lower extremity edema; Peripheral pulses are 2+ bilaterally  ABDOMEN: Nontender to palpation, normoactive bowel sounds, no rebound/guarding; No hepatosplenomegaly  MUSCLOSKELETAL: no cyanosis of digits; no joint swelling or tenderness to palpation, full strength all extremities.  NEURO: No focal deficits, CN II-XII grossly intact b/l; sensation to light touch intact in all extremities, gait intact.  PSYCH: A+O to person, place, and time; affect appropriate.    LABS:             No AM Labs                RADIOLOGY & ADDITIONAL TESTS:  Results Reviewed:   Imaging Personally Reviewed:  Electrocardiogram Personally Reviewed:    COORDINATION OF CARE:  Care Discussed with Consultants/Other Providers [Y/N]:   Prior or Outpatient Records Reviewed [Y/N]:      Bruce Morrison MD  Internal Medicine  Pager #[phone]    Patient is a 55y old  Male who presents with a chief complaint of AMS (05 Jan 2021 09:21)  SUBJECTIVE / OVERNIGHT EVENTS:    ON: patient unable to sleep, NF called for tachycardia to 118.     Patient states he was unable to sleep because he is anxious about everything. Denies diaphoresis, chest pain, jaw pain, pain radiating down arm, or SOB. He states he is not sleeping during the day.     ADDITIONAL REVIEW OF SYSTEMS:    CONSTITUTIONAL: No weakness, fevers or chills  EYES/ENT: No visual changes;  No vertigo or throat pain.  NECK: No pain or stiffness.  RESPIRATORY: No cough, wheezing, hemoptysis; No shortness of breath.  CARDIOVASCULAR: No chest pain or palpitations  GASTROINTESTINAL: No abdominal pain. No nausea, vomiting, diarrhea, constipation, or melena.  GENITOURINARY: No dysuria, frequency or hematuria  NEUROLOGICAL: No numbness or weakness  SKIN: No itching, burning, rashes, or lesions   All other review of systems is negative unless indicated above.    MEDICATIONS  (STANDING):  enoxaparin Injectable 40 milliGRAM(s) SubCutaneous two times a day  LORazepam     Tablet 0.5 milliGRAM(s) Oral daily  LORazepam     Tablet 0.5 milliGRAM(s) Oral at bedtime  melatonin 6 milliGRAM(s) Oral at bedtime  vortioxetine 15 milliGRAM(s) Oral daily    MEDICATIONS  (PRN):  LORazepam   Injectable 1 milliGRAM(s) IV Push every 6 hours PRN Agitation    PHYSICAL EXAM:  Vital Signs Last 24 Hrs  T(C): 36.3 (06 Jan 2021 06:04), Max: 36.9 (05 Jan 2021 13:04)  T(F): 97.4 (06 Jan 2021 06:04), Max: 98.5 (05 Jan 2021 13:04)  HR: 105 (06 Jan 2021 06:04) (104 - 118)  BP: 150/90 (06 Jan 2021 06:04) (124/92 - 150/90)  BP(mean): --  RR: 17 (06 Jan 2021 06:04) (17 - 19)  SpO2: 100% (06 Jan 2021 06:04) (97% - 100%)    CONSTITUTIONAL: NAD, well-developed  RESPIRATORY: Normal respiratory effort; lungs are clear to auscultation bilaterally  CARDIOVASCULAR: Regular rate, normal S1 and S2, no murmur/rub/gallop; No lower extremity edema; Peripheral pulses are 2+ bilaterally  ABDOMEN: Nontender to palpation, normoactive bowel sounds, no rebound/guarding; No hepatosplenomegaly  MUSCLOSKELETAL: no cyanosis of digits; no joint swelling or tenderness to palpation, full strength all extremities.  NEURO: No focal deficits, CN II-XII grossly intact b/l; sensation to light touch intact in all extremities, gait intact.  PSYCH: A+O to person, place, and time; affect appropriate.    LABS:             No AM Labs    Electrocardiogram Personally Reviewed: yes    TTE  CONCLUSIONS:  Technically difficult study.  1. Normal mitral valve. Minimal mitral regurgitation.  2. Endocardium not well visualized; grossly normal left  ventricular systolic function.  Paradoxical septal wall  motion.  Endocardial visualization enhanced with  intravenous injection of echo contrast (Definity).  3. The right ventricle is not well visualized; grossly  normal right ventricular systolic function.    COORDINATION OF CARE:  Care Discussed with Consultants/Other Providers [Y/N]:   Prior or Outpatient Records Reviewed [Y/N]:

## 2021-01-06 NOTE — PROGRESS NOTE ADULT - PROBLEM SELECTOR PLAN 4
Weakness secondary to ICU, prolonged hospital course.  - attempt for PT everyday  - D/c planning  to [facility] for long-term stay Resolved, AOx3 with good attention for several days.  Possibly delirium/septic enceph vs. Hypernatremia, which has now resolved. No seizure activity on EEG.  - high dose IV thiamine given  - Psych and Neuro following; will f/u recs   - C/w 1:1 CO

## 2021-01-07 LAB
A1C WITH ESTIMATED AVERAGE GLUCOSE RESULT: 5.3 % — SIGNIFICANT CHANGE UP (ref 4–5.6)
ALBUMIN SERPL ELPH-MCNC: 3.9 G/DL — SIGNIFICANT CHANGE UP (ref 3.3–5)
ALP SERPL-CCNC: 49 U/L — SIGNIFICANT CHANGE UP (ref 40–120)
ALT FLD-CCNC: 56 U/L — HIGH (ref 4–41)
ANION GAP SERPL CALC-SCNC: 15 MMOL/L — HIGH (ref 7–14)
AST SERPL-CCNC: 23 U/L — SIGNIFICANT CHANGE UP (ref 4–40)
BASOPHILS # BLD AUTO: 0.01 K/UL — SIGNIFICANT CHANGE UP (ref 0–0.2)
BASOPHILS NFR BLD AUTO: 0.2 % — SIGNIFICANT CHANGE UP (ref 0–2)
BILIRUB SERPL-MCNC: 0.4 MG/DL — SIGNIFICANT CHANGE UP (ref 0.2–1.2)
BUN SERPL-MCNC: 7 MG/DL — SIGNIFICANT CHANGE UP (ref 7–23)
CALCIUM SERPL-MCNC: 9.8 MG/DL — SIGNIFICANT CHANGE UP (ref 8.4–10.5)
CHLORIDE SERPL-SCNC: 103 MMOL/L — SIGNIFICANT CHANGE UP (ref 98–107)
CHOLEST SERPL-MCNC: 150 MG/DL — SIGNIFICANT CHANGE UP
CO2 SERPL-SCNC: 21 MMOL/L — LOW (ref 22–31)
CREAT SERPL-MCNC: 0.58 MG/DL — SIGNIFICANT CHANGE UP (ref 0.5–1.3)
EOSINOPHIL # BLD AUTO: 0.05 K/UL — SIGNIFICANT CHANGE UP (ref 0–0.5)
EOSINOPHIL NFR BLD AUTO: 1.1 % — SIGNIFICANT CHANGE UP (ref 0–6)
ESTIMATED AVERAGE GLUCOSE: 105 MG/DL — SIGNIFICANT CHANGE UP (ref 68–114)
GLUCOSE SERPL-MCNC: 143 MG/DL — HIGH (ref 70–99)
HCT VFR BLD CALC: 37.6 % — LOW (ref 39–50)
HDLC SERPL-MCNC: 43 MG/DL — SIGNIFICANT CHANGE UP
HGB BLD-MCNC: 12 G/DL — LOW (ref 13–17)
IANC: 3.23 K/UL — SIGNIFICANT CHANGE UP (ref 1.5–8.5)
IMM GRANULOCYTES NFR BLD AUTO: 0.2 % — SIGNIFICANT CHANGE UP (ref 0–1.5)
LIPID PNL WITH DIRECT LDL SERPL: 88 MG/DL — SIGNIFICANT CHANGE UP
LYMPHOCYTES # BLD AUTO: 0.81 K/UL — LOW (ref 1–3.3)
LYMPHOCYTES # BLD AUTO: 18.4 % — SIGNIFICANT CHANGE UP (ref 13–44)
MAGNESIUM SERPL-MCNC: 1.8 MG/DL — SIGNIFICANT CHANGE UP (ref 1.6–2.6)
MCHC RBC-ENTMCNC: 28.6 PG — SIGNIFICANT CHANGE UP (ref 27–34)
MCHC RBC-ENTMCNC: 31.9 GM/DL — LOW (ref 32–36)
MCV RBC AUTO: 89.5 FL — SIGNIFICANT CHANGE UP (ref 80–100)
MONOCYTES # BLD AUTO: 0.29 K/UL — SIGNIFICANT CHANGE UP (ref 0–0.9)
MONOCYTES NFR BLD AUTO: 6.6 % — SIGNIFICANT CHANGE UP (ref 2–14)
NEUTROPHILS # BLD AUTO: 3.23 K/UL — SIGNIFICANT CHANGE UP (ref 1.8–7.4)
NEUTROPHILS NFR BLD AUTO: 73.5 % — SIGNIFICANT CHANGE UP (ref 43–77)
NON HDL CHOLESTEROL: 107 MG/DL — SIGNIFICANT CHANGE UP
NRBC # BLD: 0 /100 WBCS — SIGNIFICANT CHANGE UP
NRBC # FLD: 0 K/UL — SIGNIFICANT CHANGE UP
PLATELET # BLD AUTO: 232 K/UL — SIGNIFICANT CHANGE UP (ref 150–400)
POTASSIUM SERPL-MCNC: 2.9 MMOL/L — CRITICAL LOW (ref 3.5–5.3)
POTASSIUM SERPL-SCNC: 2.9 MMOL/L — CRITICAL LOW (ref 3.5–5.3)
PROT SERPL-MCNC: 6.5 G/DL — SIGNIFICANT CHANGE UP (ref 6–8.3)
RBC # BLD: 4.2 M/UL — SIGNIFICANT CHANGE UP (ref 4.2–5.8)
RBC # FLD: 13.9 % — SIGNIFICANT CHANGE UP (ref 10.3–14.5)
SODIUM SERPL-SCNC: 139 MMOL/L — SIGNIFICANT CHANGE UP (ref 135–145)
TRIGL SERPL-MCNC: 95 MG/DL — SIGNIFICANT CHANGE UP
WBC # BLD: 4.4 K/UL — SIGNIFICANT CHANGE UP (ref 3.8–10.5)
WBC # FLD AUTO: 4.4 K/UL — SIGNIFICANT CHANGE UP (ref 3.8–10.5)

## 2021-01-07 PROCEDURE — 99232 SBSQ HOSP IP/OBS MODERATE 35: CPT

## 2021-01-07 PROCEDURE — 99223 1ST HOSP IP/OBS HIGH 75: CPT

## 2021-01-07 PROCEDURE — 93010 ELECTROCARDIOGRAM REPORT: CPT

## 2021-01-07 RX ORDER — POTASSIUM CHLORIDE 20 MEQ
40 PACKET (EA) ORAL ONCE
Refills: 0 | Status: DISCONTINUED | OUTPATIENT
Start: 2021-01-07 | End: 2021-01-07

## 2021-01-07 RX ORDER — VENLAFAXINE HCL 75 MG
37.5 CAPSULE, EXT RELEASE 24 HR ORAL DAILY
Refills: 0 | Status: DISCONTINUED | OUTPATIENT
Start: 2021-01-08 | End: 2021-01-09

## 2021-01-07 RX ORDER — POTASSIUM CHLORIDE 20 MEQ
40 PACKET (EA) ORAL ONCE
Refills: 0 | Status: COMPLETED | OUTPATIENT
Start: 2021-01-07 | End: 2021-01-07

## 2021-01-07 RX ORDER — QUETIAPINE FUMARATE 200 MG/1
50 TABLET, FILM COATED ORAL AT BEDTIME
Refills: 0 | Status: DISCONTINUED | OUTPATIENT
Start: 2021-01-07 | End: 2021-01-09

## 2021-01-07 RX ORDER — MIRTAZAPINE 45 MG/1
7.5 TABLET, ORALLY DISINTEGRATING ORAL AT BEDTIME
Refills: 0 | Status: DISCONTINUED | OUTPATIENT
Start: 2021-01-07 | End: 2021-01-07

## 2021-01-07 RX ADMIN — Medication 0.5 MILLIGRAM(S): at 09:08

## 2021-01-07 RX ADMIN — VORTIOXETINE 20 MILLIGRAM(S): 5 TABLET, FILM COATED ORAL at 09:45

## 2021-01-07 RX ADMIN — QUETIAPINE FUMARATE 50 MILLIGRAM(S): 200 TABLET, FILM COATED ORAL at 20:16

## 2021-01-07 RX ADMIN — Medication 40 MILLIEQUIVALENT(S): at 13:56

## 2021-01-07 RX ADMIN — Medication 6 MILLIGRAM(S): at 20:16

## 2021-01-07 RX ADMIN — Medication 1 MILLIGRAM(S): at 20:26
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
## 2021-01-07 NOTE — BH INPATIENT PSYCHIATRY PROGRESS NOTE - NSBHCHARTREVIEWLAB_PSY_A_CORE FT
11.7   5.94  )-----------( 302      ( 30 Dec 2020 07:50 )             37.1     12-30    139  |  104  |  16  ----------------------------<  91  3.8   |  23  |  0.60    Ca    10.1      30 Dec 2020 07:50  Phos  3.4     12-30  Mg     2.2     12-30

## 2021-01-07 NOTE — PSYCHIATRIC REHAB INITIAL EVALUATION - NSBHEMPLOYED_PSY_ALL_CORE
Other (specify) 93 Patient had been employed as a  up until 6 months ago, when he stopped worked due to depressive symptoms./Other (specify)
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
## 2021-01-07 NOTE — BH INPATIENT PSYCHIATRY PROGRESS NOTE - NSBHCHARTREVIEWVS_PSY_A_CORE FT
Vital Signs Last 24 Hrs  T(C): 36.3 (07 Jan 2021 06:35), Max: 37 (06 Jan 2021 16:50)  T(F): 97.3 (07 Jan 2021 06:35), Max: 98.6 (06 Jan 2021 16:50)  HR: 78 (06 Jan 2021 18:15) (78 - 102)  BP: 138/82 (06 Jan 2021 18:15) (138/82 - 141/92)  BP(mean): --  RR: 18 (06 Jan 2021 12:25) (18 - 18)  SpO2: 99% (06 Jan 2021 12:25) (99% - 99%)
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
## 2021-01-07 NOTE — PSYCHIATRIC REHAB INITIAL EVALUATION - NSBHPRRECOMMEND_PSY_ALL_CORE
Writer met with patient in order to orient him to the unit, introduce him to department staff and department functions, and provide him with a copy of the unit schedule. Patient was cooperative and verbalized need for treatment for his depression, and verbalized that he has been experiencing symptoms over the past 6 months.  Writer collaborated with patient to select an appropriate psychiatric rehabilitation goal. Psychiatric rehabilitation staff will continue to engage patient daily in order to develop therapeutic rapport. In response to COVID19, unit programming will be re-evaluated on a consistent basis in effort to maintain safety guidelines.

## 2021-01-07 NOTE — BH INPATIENT PSYCHIATRY PROGRESS NOTE - MSE UNSTRUCTURED FT
On initial MSE today the patient is casually dressed and makes fair eye contact. Speech is with a heavy Icelandic accent but is clear and of normal rate. Patient’s thought process with some latency of response but no evidence of a disorder of thought process. Patient’s thought content with no evidence of delusions or obsessions. Patient denies hallucinations. Mood "depressed" affect constricted in the depressed range.  Patient admits to passive thoughts of death and hx of cutting himself before admission and with hopelessness but denies active suicidal ideation, intent and plan. Patient denies aggressive/homicidal ideation, intent or plan. Patient is AAO X3. Patient is cognitively grossly intact. Patient’s insight and judgment are limited. Patient’s impulse control is intact at this time.

## 2021-01-08 LAB
ALBUMIN SERPL ELPH-MCNC: 3.9 G/DL — SIGNIFICANT CHANGE UP (ref 3.3–5)
ALP SERPL-CCNC: 45 U/L — SIGNIFICANT CHANGE UP (ref 40–120)
ALT FLD-CCNC: 53 U/L — HIGH (ref 4–41)
ANION GAP SERPL CALC-SCNC: 14 MMOL/L — SIGNIFICANT CHANGE UP (ref 7–14)
AST SERPL-CCNC: 22 U/L — SIGNIFICANT CHANGE UP (ref 4–40)
BILIRUB SERPL-MCNC: 0.3 MG/DL — SIGNIFICANT CHANGE UP (ref 0.2–1.2)
BUN SERPL-MCNC: 8 MG/DL — SIGNIFICANT CHANGE UP (ref 7–23)
CALCIUM SERPL-MCNC: 9.4 MG/DL — SIGNIFICANT CHANGE UP (ref 8.4–10.5)
CHLORIDE SERPL-SCNC: 105 MMOL/L — SIGNIFICANT CHANGE UP (ref 98–107)
CHOLEST SERPL-MCNC: 140 MG/DL — SIGNIFICANT CHANGE UP
CO2 SERPL-SCNC: 22 MMOL/L — SIGNIFICANT CHANGE UP (ref 22–31)
CREAT SERPL-MCNC: 0.57 MG/DL — SIGNIFICANT CHANGE UP (ref 0.5–1.3)
GLUCOSE SERPL-MCNC: 162 MG/DL — HIGH (ref 70–99)
HDLC SERPL-MCNC: 43 MG/DL — SIGNIFICANT CHANGE UP
LIPID PNL WITH DIRECT LDL SERPL: 77 MG/DL — SIGNIFICANT CHANGE UP
NON HDL CHOLESTEROL: 97 MG/DL — SIGNIFICANT CHANGE UP
POTASSIUM SERPL-MCNC: 3.4 MMOL/L — LOW (ref 3.5–5.3)
POTASSIUM SERPL-SCNC: 3.4 MMOL/L — LOW (ref 3.5–5.3)
PROT SERPL-MCNC: 6.4 G/DL — SIGNIFICANT CHANGE UP (ref 6–8.3)
SODIUM SERPL-SCNC: 141 MMOL/L — SIGNIFICANT CHANGE UP (ref 135–145)
TRIGL SERPL-MCNC: 102 MG/DL — SIGNIFICANT CHANGE UP

## 2021-01-08 PROCEDURE — 99232 SBSQ HOSP IP/OBS MODERATE 35: CPT

## 2021-01-08 RX ADMIN — Medication 1 MILLIGRAM(S): at 09:26

## 2021-01-08 RX ADMIN — Medication 1 MILLIGRAM(S): at 21:30

## 2021-01-08 RX ADMIN — Medication 37.5 MILLIGRAM(S): at 09:25

## 2021-01-08 RX ADMIN — Medication 6 MILLIGRAM(S): at 21:30

## 2021-01-08 RX ADMIN — QUETIAPINE FUMARATE 50 MILLIGRAM(S): 200 TABLET, FILM COATED ORAL at 21:30

## 2021-01-08 NOTE — BH INPATIENT PSYCHIATRY PROGRESS NOTE - MSE UNSTRUCTURED FT
The patient appears stated age, is casually dressed, with fair hygiene. He is cooperative during interview and makes fair eye contact. Speech is with a heavy Croatian accent but is clear and of normal rate. Patient’s thought process with some latency of response but no evidence of a disorder of thought process. Patient’s thought content with no evidence of delusions or obsessions. Patient denies hallucinations. Mood "depressed," affect constricted in the depressed range.  Patient admits to passive thoughts of death and hx of cutting himself before admission and with hopelessness but denies active suicidal ideation, intent and plan. Patient denies aggressive/homicidal ideation, intent or plan. Patient is AAO X3. Patient is cognitively grossly intact. Patient’s insight and judgment are limited. Patient’s impulse control is intact at this time.
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
## 2021-01-08 NOTE — ECT CONSULT NOTE - NSECTMENTALSTATUSEXAM_PSY_ALL_CORE
Conscious, cooperative, alert.   No psychomotor agitation/retardation.   Good eye contact.   Speech: regular rate and rhythm,   Mood: "Depressed" Affect: appropriate, full range.   Thought Process: linear, goal directed   Thought Content: No Death wish, No Suicidal ideation/intent/plan, No homicidal ideation/intent/plan. No delusions   Perception: No hallucinations   Insight and Judgement: fair  Impulse Control: fair at this time.     Conscious, cooperative, alert.   No psychomotor agitation/retardation.   Good eye contact.   Speech: regular rate and rhythm,   Mood: "Depressed" Affect: flat.   Thought Process: linear, goal directed   Thought Content: No Death wish, No Suicidal ideation/intent/plan, No homicidal ideation/intent/plan. No delusions   Perception: No hallucinations   Insight and Judgement: fair  Impulse Control: fair at this time.

## 2021-01-08 NOTE — ECT CONSULT NOTE - CURRENT MEDICATION
MEDICATIONS  (STANDING):  LORazepam     Tablet 1 milliGRAM(s) Oral two times a day  melatonin. 6 milliGRAM(s) Oral at bedtime  QUEtiapine 50 milliGRAM(s) Oral at bedtime  venlafaxine XR 37.5 milliGRAM(s) Oral daily    MEDICATIONS  (PRN):  acetaminophen   Tablet .. 975 milliGRAM(s) Oral every 6 hours PRN Mild Pain (1 - 3), Moderate Pain (4 - 6), Severe Pain (7 - 10)  LORazepam     Tablet 1 milliGRAM(s) Oral every 4 hours PRN anxiety  LORazepam   Injectable 2 milliGRAM(s) IntraMuscular once PRN agitation

## 2021-01-08 NOTE — BH SOCIAL WORK INITIAL PSYCHOSOCIAL EVALUATION - NSCMSPTSTRENGTHS_PSY_ALL_CORE
Intact family/Positive attitude/Supportive family Intact family/Intelligence/Positive attitude/Supportive family

## 2021-01-08 NOTE — ECT CONSULT NOTE - NS ED BHA SUICIDALITY PRESENT CURRENT PASSIVE IDEATION
This pleasant 79-year-old gentleman returns today for cystoscopy.  We recall, he had a fracture of the right hip in October 2017.  He had problems with urinary retention after that although this step.  Appeared to resolve by January of this year when he was able to void.  However he then developed retention again and has had a catheter in since.  We will also concerned about the possibility of stones in the bladder.  A KUB today, although not yet reported, does show multiple phleboliths in the pelvis and what appears also to be calcification of the prostate.     Procedure.  Cystoscopy.   Surgeon.    Erica  Anesthesia.  Local anesthesia.  Description.  With the patient in the supine position, with the genital area prepped and draped in the customary fashion, with local anesthetic and the urethra, the flexible cystoscope was Inserted.  Penile urethra is normal.  External sphincter intact.  3 cm prostatic urethra with only mild lateral lobe hyperplasia.  There is a high median bar.  There is very marked trabeculation in the bladder with saccule formation.  There is no evidence of neoplasm or stone in the bladder.  There are no other remarkable features.    Impression.  Compared to cystoscopy 3 years ago, the changes now are such that at that time there was only minimal trabeculation and now it seems very marked with saccule formation.  Although the prostate gland is quite small, in my opinion it seems to be the most likely cause of his problems with attention.  I discussed therefore that we should plan to do a Quanta laser TUR of the prostate to open up the channel and try and improve chances of him voiding spontaneously.  I did not see any stones in the bladder but we may encounter stones in the prostate through the course of this procedure.  I discussed the entire situation carefully the detail today.  I answered all the questions    Plan.  Quanta laser TUR prostate    Time.  10 minutes were spent in addition to  "the cystoscopy in order to discuss the findings, to review the KUB, to talk about about alternatives and to discuss the planned procedure as noted above.    \"This dictation was performed with voice recognition software and may contain errors,  omissions and inadvertent word substitution.\"    " No
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
## 2021-01-08 NOTE — BH INPATIENT PSYCHIATRY PROGRESS NOTE - NSBHCONSBHPROVDETAILS_PSY_A_CORE  FT
[employer] student Jono Hassan spoke with Dr. Hugh Cline ([phone]) about patient's previous treatments, suicidal attempts, and recommendations for him. 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 Hugh Cline ([phone]) about patient's previous treatments, suicidal attempts, and recommendations for him. While not discussed on phone call, Dr. Cline will no longer be handling this patient's outpatient care given suicidal attempts.

## 2021-01-08 NOTE — BH INPATIENT PSYCHIATRY PROGRESS NOTE - NSCGIIMPROVESX_PSY_ALL_CORE
5 = Minimally worse - slightly worse but may not be clinically meaningful; may represent very little change in basic clinical status or functional capacity 4 = No change - symptoms remain essentially unchanged

## 2021-01-08 NOTE — BH INPATIENT PSYCHIATRY PROGRESS NOTE - NSBHCHARTREVIEWVS_PSY_A_CORE FT
Vital Signs Last 24 Hrs  T(C): 36.6 (08 Jan 2021 08:26), Max: 36.6 (07 Jan 2021 20:56)  T(F): 97.8 (08 Jan 2021 08:26), Max: 97.9 (07 Jan 2021 20:56)  HR: 93  BP: 141/87

## 2021-01-08 NOTE — PHYSICAL THERAPY INITIAL EVALUATION ADULT - GENERAL OBSERVATIONS, REHAB EVAL
Patient received in bed in no apparent distress. Patient observed with mild tremors/twitching off L UE

## 2021-01-08 NOTE — ECT CONSULT NOTE - NSECTASSESSRECOMM_PSY_ALL_CORE
A course of ECT is a reasonable choice based on the patient's past positive response to ECT and current intractable, suicidal major depressive disorder.    The bulk of the consult time was spent in counselling and/or coordination of care, including but not limited to discussing with patient and family risk and benefits of ECT, the usual trajectory of response with acute course of ECT, obtaining informed consent for ECT, and reviewing ECT fasting guidelines.    Referring psychiatrist was contacted.  A course of ECT is a reasonable choice based on the patient's past positive response to ECT and current intractable, suicidal major depressive disorder.    The bulk of the consult time was spent in counselling and/or coordination of care, including but not limited to discussing with patient risk and benefits of ECT, the usual trajectory of response with acute course of ECT, obtaining informed consent for ECT, and reviewing ECT fasting guidelines.

## 2021-01-08 NOTE — ECT CONSULT NOTE - NSECTPASTMEDTRIALOTHER_PSY_ALL_CORE
approximately 5 treatment as per patient, unknown electrode placement, as per patient it was effective
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
## 2021-01-08 NOTE — BH INPATIENT PSYCHIATRY PROGRESS NOTE - MODIFICATIONS
Patient interviewed and evaluated with medical student present to follow up on symptoms and the case has been reviewed with the treatment team this morning.   I was physically present during the service to the patient and personally examined the patient and was directly involved in the management and recommendations of the care provided to the patient.
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
## 2021-01-08 NOTE — BH INPATIENT PSYCHIATRY PROGRESS NOTE - NSBHFUPINTERVALHXFT_PSY_A_CORE
Patient seen for severe MDD following suicide attempt. Chart reviewed and case discussed with interdisciplinary staff. Patient reports feeling very depressed today and less hopeful about future.  Patient continues to endorse passive SI, but says he would not hurt himself in the hospital. When asked whether he thought his previous suicidal attempt (trying to decapitate himself) was inordinate and inconsistent with his typical behavior, he agreed that it was. He still cannot ambulate by himself due to weakness and requires staff member to help him walk. Patient believes that ECT has been the most helpful and he wants the treatments. He will be evaluated today and should begin treatment Monday. Also discussed doing ECT as an outpatient and patient agreed that it would be helpful. Patient has been compliant with medications thus far. Patient will be on CO for total care.  Patient seen for severe MDD following suicide attempt. Chart reviewed and case discussed with interdisciplinary staff. Patient reports feeling very depressed today and less hopeful about future.  Patient continues to endorse passive SI, but says he would not hurt himself in the hospital. When asked whether he thought his previous suicidal attempt (trying to decapitate himself) was inordinate and inconsistent with his typical behavior, he agreed that it was. He still cannot ambulate by himself due to weakness and requires staff member to help him walk. Patient believes that ECT has been the most helpful and he wants the treatments. He will be evaluated today and should begin treatment Monday. Also discussed doing ECT as an outpatient and patient agreed that it would be helpful. Patient has been compliant with medications thus far. Patient will be on CO for total care and falls risk.

## 2021-01-09 LAB
ANION GAP SERPL CALC-SCNC: 15 MMOL/L — HIGH (ref 7–14)
BUN SERPL-MCNC: 8 MG/DL — SIGNIFICANT CHANGE UP (ref 7–23)
CALCIUM SERPL-MCNC: 9.9 MG/DL — SIGNIFICANT CHANGE UP (ref 8.4–10.5)
CHLORIDE SERPL-SCNC: 104 MMOL/L — SIGNIFICANT CHANGE UP (ref 98–107)
CO2 SERPL-SCNC: 21 MMOL/L — LOW (ref 22–31)
CREAT SERPL-MCNC: 0.61 MG/DL — SIGNIFICANT CHANGE UP (ref 0.5–1.3)
GLUCOSE SERPL-MCNC: 103 MG/DL — HIGH (ref 70–99)
POTASSIUM SERPL-MCNC: 3.9 MMOL/L — SIGNIFICANT CHANGE UP (ref 3.5–5.3)
POTASSIUM SERPL-SCNC: 3.9 MMOL/L — SIGNIFICANT CHANGE UP (ref 3.5–5.3)
SODIUM SERPL-SCNC: 140 MMOL/L — SIGNIFICANT CHANGE UP (ref 135–145)

## 2021-01-09 PROCEDURE — 99232 SBSQ HOSP IP/OBS MODERATE 35: CPT

## 2021-01-09 RX ORDER — QUETIAPINE FUMARATE 200 MG/1
100 TABLET, FILM COATED ORAL AT BEDTIME
Refills: 0 | Status: DISCONTINUED | OUTPATIENT
Start: 2021-01-09 | End: 2021-01-20

## 2021-01-09 RX ORDER — VENLAFAXINE HCL 75 MG
75 CAPSULE, EXT RELEASE 24 HR ORAL DAILY
Refills: 0 | Status: DISCONTINUED | OUTPATIENT
Start: 2021-01-10 | End: 2021-01-11

## 2021-01-09 RX ADMIN — Medication 6 MILLIGRAM(S): at 20:45

## 2021-01-09 RX ADMIN — Medication 37.5 MILLIGRAM(S): at 08:37

## 2021-01-09 RX ADMIN — QUETIAPINE FUMARATE 100 MILLIGRAM(S): 200 TABLET, FILM COATED ORAL at 20:45

## 2021-01-09 RX ADMIN — Medication 1 MILLIGRAM(S): at 08:37

## 2021-01-09 RX ADMIN — Medication 1 MILLIGRAM(S): at 20:45

## 2021-01-09 NOTE — BH INPATIENT PSYCHIATRY PROGRESS NOTE - NSBHFUPINTERVALHXFT_PSY_A_CORE
Patient seen for severe MDD following suicide attempt.   patient remains weak from disuse and is a severe falls risk  Remains helpless and hopeless but denies active SI
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
## 2021-01-09 NOTE — BH INPATIENT PSYCHIATRY PROGRESS NOTE - NSBHCHARTREVIEWVS_PSY_A_CORE FT
Vital Signs Last 24 Hrs  T(C): 36.9 (09 Jan 2021 17:49), Max: 36.9 (09 Jan 2021 17:49)  T(F): 98.5 (09 Jan 2021 17:49), Max: 98.5 (09 Jan 2021 17:49)  HR: --  BP: --  BP(mean): --  RR: --  SpO2: --

## 2021-01-09 NOTE — BH INPATIENT PSYCHIATRY PROGRESS NOTE - NSBHCONSBHPROVDETAILS_PSY_A_CORE  FT
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 Hugh Cline ([phone]) about patient's previous treatments, suicidal attempts, and recommendations for him. While not discussed on phone call, Dr. Cline will no longer be handling this patient's outpatient care given suicidal attempts.

## 2021-01-09 NOTE — BH INPATIENT PSYCHIATRY PROGRESS NOTE - MSE UNSTRUCTURED FT
On exam today the patient is depressed but generally cooperative with fair eye contact.    Speech is clear and of normal rate.  Thought process: with no evidence of a disorder of thought process.    Thought content: with no evidence of psychotic beliefs.   Perception: Denies hallucinations.  Mood: Describes as "still depressed".  Affect: constricted.    Patient with helplessness and hopelessness but denies active suicidal ideation, intent and plan.    Patient denies active aggressive/homicidal ideation, intent or plan.   Patient is Alert and oriented in all spheres. Patient is cognitively grossly intact.   Insight and judgment are limited. Impulse control is intact at this time.

## 2021-01-10 PROCEDURE — 99231 SBSQ HOSP IP/OBS SF/LOW 25: CPT

## 2021-01-10 RX ADMIN — Medication 1 MILLIGRAM(S): at 08:56

## 2021-01-10 RX ADMIN — Medication 75 MILLIGRAM(S): at 08:56

## 2021-01-10 RX ADMIN — QUETIAPINE FUMARATE 100 MILLIGRAM(S): 200 TABLET, FILM COATED ORAL at 20:20

## 2021-01-10 RX ADMIN — Medication 6 MILLIGRAM(S): at 20:20

## 2021-01-10 RX ADMIN — Medication 1 MILLIGRAM(S): at 20:20
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
## 2021-01-10 NOTE — BH INPATIENT PSYCHIATRY PROGRESS NOTE - NSBHFUPINTERVALHXFT_PSY_A_CORE
Patient seen for severe MDD following severe suicide attempt.   patient remains weak from disuse and is a severe falls risk but used walker to bathroom today  Remains helpless and hopeless but denies active SI  Consented to ECT

## 2021-01-10 NOTE — BH INPATIENT PSYCHIATRY PROGRESS NOTE - NSBHCHARTREVIEWVS_PSY_A_CORE FT
Vital Signs Last 24 Hrs  T(C): 37.1 (10 Kevin 2021 08:17), Max: 37.1 (10 Kevin 2021 08:17)  T(F): 98.8 (10 Kevin 2021 08:17), Max: 98.8 (10 Kevin 2021 08:17)  HR: --  BP: --  BP(mean): --  RR: --  SpO2: --

## 2021-01-10 NOTE — BH INPATIENT PSYCHIATRY PROGRESS NOTE - MSE UNSTRUCTURED FT
On exam today the patient is depressed but more cooperative with fair eye contact.    Speech is clear and of normal rate.  Thought process: with no evidence of a disorder of thought process.    Thought content: with no evidence of psychotic beliefs.   Perception: Denies hallucinations.  Mood: Describes as "very depressed".  Affect: constricted.    Patient with helplessness and hopelessness but denies active suicidal ideation, intent and plan.    Patient denies active aggressive/homicidal ideation, intent or plan.   Patient is Alert and oriented in all spheres. Patient is cognitively grossly intact.   Insight and judgment are limited. Impulse control is intact at this time.

## 2021-01-10 NOTE — BH INPATIENT PSYCHIATRY PROGRESS NOTE - NSBHCONSBHPROVDETAILS_PSY_A_CORE  FT
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 Hugh Cline ([phone]) about patient's previous treatments, suicidal attempts, and recommendations for him. While not discussed on phone call, Dr. Cline will no longer be handling this patient's outpatient care given suicidal attempts.

## 2021-01-11 LAB — SARS-COV-2 RNA SPEC QL NAA+PROBE: SIGNIFICANT CHANGE UP

## 2021-01-11 PROCEDURE — 99231 SBSQ HOSP IP/OBS SF/LOW 25: CPT

## 2021-01-11 RX ADMIN — Medication 75 MILLIGRAM(S): at 08:23

## 2021-01-11 RX ADMIN — Medication 6 MILLIGRAM(S): at 19:43

## 2021-01-11 RX ADMIN — Medication 1 MILLIGRAM(S): at 19:43

## 2021-01-11 RX ADMIN — Medication 1 MILLIGRAM(S): at 08:23

## 2021-01-11 RX ADMIN — QUETIAPINE FUMARATE 100 MILLIGRAM(S): 200 TABLET, FILM COATED ORAL at 19:43

## 2021-01-11 NOTE — BH INPATIENT PSYCHIATRY PROGRESS NOTE - NSBHCONSBHPROVDETAILS_PSY_A_CORE  FT
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 Hugh Cline ([phone]) about patient's previous treatments, suicidal attempts, and recommendations for him. While not discussed on phone call, Dr. Cline will no longer be handling this patient's outpatient care given suicidal attempts.

## 2021-01-11 NOTE — BH INPATIENT PSYCHIATRY PROGRESS NOTE - MODIFICATIONS
Patient interviewed and evaluated with resident present to follow up on symptoms and the case has been reviewed with the treatment team this morning.   I was physically present during the service to the patient and personally examined the patient and was directly involved in the management and recommendations of the care provided to the patient.  I have reviewed the resident's progress note and the mental status examination and I agree with its contents and made changes as indicated
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
## 2021-01-11 NOTE — BH INPATIENT PSYCHIATRY PROGRESS NOTE - NSBHCHARTREVIEWVS_PSY_A_CORE FT
Vital Signs Last 24 Hrs  T(C): 36.8 (11 Jan 2021 08:20), Max: 36.8 (11 Jan 2021 08:20)  T(F): 98.2 (11 Jan 2021 08:20), Max: 98.2 (11 Jan 2021 08:20)  HR: --  BP: --  BP(mean): --  RR: --  SpO2: --

## 2021-01-11 NOTE — BH INPATIENT PSYCHIATRY PROGRESS NOTE - CASE SUMMARY
Patient remains acutely ill with symptoms as noted above and requires acute inpatient care for acute treatment of severe depression with SI.   Patient requires constant observation for falls risk and total care at this time . Was unable to have ECT # 1 today due to need for repeat COVID test which has been done and is pending. Will continue with treatment plan as described above and will follow for response.

## 2021-01-11 NOTE — BH INPATIENT PSYCHIATRY PROGRESS NOTE - NSBHFUPINTERVALHXFT_PSY_A_CORE
The patient was seen for follow up of depression. Chart reviewed and case discussed with multidisciplinary team. No acute overnight events. The patient is adherent with all standing medications. No PRN's required.  On exam, the patient states he feels tired and has not been sleeping well. He is aware that he will have his first ECT treatment today and will need to be NPO until after the treatment.  The patient was seen for follow up of depression. Chart reviewed and case discussed with multidisciplinary team. No acute overnight events. The patient is adherent with all standing medications. No PRN's required.  On exam, the patient states he continues to feel very depressed. He denies passive or active suicidal ideation, plan, or intent at this time. He is also tired and reports that he has not been sleeping well. He continues to be amenable to starting ECT.  ID [id number]  The patient was seen for follow up of depression. Chart reviewed and case discussed with multidisciplinary team. No acute overnight events. The patient is adherent with all standing medications. No PRN's required.  On exam, the patient states he continues to feel very depressed. He denies passive or active suicidal ideation, plan, or intent at this time. Last experienced SI in October 2020. He is also tired and reports that he has not been sleeping well. Continues to endorse weakness and difficulty walking. Also reports poor appetite. FInds that he is unable to remember the details of his SA and hospitalizations over the past few months. Denies HI, AVH. He continues to be amenable to starting ECT.

## 2021-01-11 NOTE — BH INPATIENT PSYCHIATRY PROGRESS NOTE - MSE UNSTRUCTURED FT
On exam today the patient appears his stated age with fair grooming and hygiene. He is cooperative and maintains fair eye contact. No abnormal movements, no PMR/PMA. Speech is clear, fluent, normal rate and volume. Mood is "." Affect is constricted. Thought process is linear and goal directed. Though content significant for hopelessness, without passive or active SI, plan, or intent. Denies AVH. Patient is cognitively grossly intact. Insight and judgment are limited. Impulse control is intact at this time.     On exam today the patient appears his stated age with fair grooming and hygiene. He is cooperative and maintains fair eye contact. No abnormal movements, no PMR/PMA. Speech is clear, fluent, normal rate and volume. Mood is "fine." Affect is constricted. Thought process is linear and goal directed. Though content significant for hopelessness, without passive or active SI, plan, or intent. Denies AVH. Patient is cognitively grossly intact. Insight and judgment are limited. Impulse control is intact at this time.

## 2021-01-12 PROCEDURE — 99232 SBSQ HOSP IP/OBS MODERATE 35: CPT

## 2021-01-12 RX ADMIN — Medication 6 MILLIGRAM(S): at 19:52

## 2021-01-12 RX ADMIN — Medication 1 MILLIGRAM(S): at 19:53

## 2021-01-12 RX ADMIN — Medication 1 MILLIGRAM(S): at 08:32

## 2021-01-12 RX ADMIN — QUETIAPINE FUMARATE 100 MILLIGRAM(S): 200 TABLET, FILM COATED ORAL at 19:52

## 2021-01-12 NOTE — BH INPATIENT PSYCHIATRY PROGRESS NOTE - NSBHFUPINTERVALHXFT_PSY_A_CORE
The patient was seen for follow up of depression. Chart reviewed and case discussed with multidisciplinary team. No acute overnight events. The patient is adherent with all standing medications. No PRN's required.  On exam, the patient states he slept very poorly overnight. His hands have started to shake over the past few days and it has made it difficult to eat. He continues to feel depressed with only minimal improvement in mood since admission. He denies passive or active suicidal ideation, plan, or intent at this time. Denies HI, AVH. He continues to be amenable to starting ECT.

## 2021-01-12 NOTE — BH INPATIENT PSYCHIATRY PROGRESS NOTE - MSE UNSTRUCTURED FT
On exam today the patient appears his stated age with fair grooming and hygiene. He is cooperative and maintains fair eye contact. Bilateral hand tremor, moderate amplitude, worse on the right, minimal at rest, increases with finger-to-nose testing. Speech is clear, fluent, normal rate and volume. Mood is "terrible." Affect is constricted. Thought process is linear and goal directed. Though content significant for hopelessness, without passive or active SI, plan, or intent. Denies AVH. Patient is cognitively grossly intact. Insight and judgment are limited. Impulse control is intact at this time.

## 2021-01-12 NOTE — BH INPATIENT PSYCHIATRY PROGRESS NOTE - NSBHCHARTREVIEWVS_PSY_A_CORE FT
Vital Signs Last 24 Hrs  T(C): 36.4 (12 Jan 2021 07:28), Max: 36.4 (11 Jan 2021 17:35)  T(F): 97.5 (12 Jan 2021 07:28), Max: 97.5 (11 Jan 2021 17:35)  HR: --  BP: --  BP(mean): --  RR: --  SpO2: --
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
## 2021-01-12 NOTE — BH INPATIENT PSYCHIATRY PROGRESS NOTE - CASE SUMMARY
Patient remains acutely ill with symptoms as noted above and requires acute inpatient care for acute treatment of severe depression with SI.   Patient requires constant observation for falls risk and total care at this time . Was unable to have ECT # 1 today due to need for repeat COVID test which has been done and is pending. Will continue with treatment plan as described above and will follow for response. Patient remains acutely ill with symptoms as noted above and requires acute inpatient care for acute treatment of severe depression with SI.   Patient requires constant observation for falls risk and total care at this time . Was unable to have ECT # 1 today due to need for repeat COVID test which has been done and is negative and now cleared for ECT. As noted new onset tremor and have stopped venlafaxine and will add propranolol and will follow for response.

## 2021-01-13 PROCEDURE — 99232 SBSQ HOSP IP/OBS MODERATE 35: CPT | Mod: 25

## 2021-01-13 PROCEDURE — 90870 ELECTROCONVULSIVE THERAPY: CPT

## 2021-01-13 RX ORDER — FLUMAZENIL 0.1 MG/ML
0.2 VIAL (ML) INTRAVENOUS ONCE
Refills: 0 | Status: COMPLETED | OUTPATIENT
Start: 2021-01-13 | End: 2021-01-13

## 2021-01-13 RX ORDER — MIDAZOLAM HYDROCHLORIDE 1 MG/ML
2 INJECTION, SOLUTION INTRAMUSCULAR; INTRAVENOUS ONCE
Refills: 0 | Status: DISCONTINUED | OUTPATIENT
Start: 2021-01-13 | End: 2021-01-13

## 2021-01-13 RX ORDER — FLUMAZENIL 0.1 MG/ML
2 VIAL (ML) INTRAVENOUS ONCE
Refills: 0 | Status: DISCONTINUED | OUTPATIENT
Start: 2021-01-13 | End: 2021-01-13

## 2021-01-13 RX ADMIN — QUETIAPINE FUMARATE 100 MILLIGRAM(S): 200 TABLET, FILM COATED ORAL at 21:08

## 2021-01-13 RX ADMIN — Medication 1 MILLIGRAM(S): at 08:05

## 2021-01-13 RX ADMIN — Medication 6 MILLIGRAM(S): at 21:06

## 2021-01-13 RX ADMIN — Medication 1 MILLIGRAM(S): at 21:06

## 2021-01-13 RX ADMIN — Medication 0.2 MILLIGRAM(S): at 11:59

## 2021-01-13 NOTE — BH TREATMENT PLAN - NSTXDEPRESGOALOTHER_PSY_ALL_CORE
Patient will be less depressed and stable for discharge with a CGI of 2
Patient will be less depressed and stable for discharge with a CGI of 2

## 2021-01-13 NOTE — BH INPATIENT PSYCHIATRY PROGRESS NOTE - NSBHCONSBHPROVDETAILS_PSY_A_CORE  FT
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 Hugh Cline ([phone]) about patient's previous treatments, suicidal attempts, and recommendations for him. While not discussed on phone call, Dr. Cline will no longer be handling this patient's outpatient care given suicidal attempts.

## 2021-01-13 NOTE — BH INPATIENT PSYCHIATRY PROGRESS NOTE - CASE SUMMARY
Patient remains acutely ill with symptoms as noted above and requires acute inpatient care for acute treatment of severe depression with SI.   Patient requires constant observation for falls risk and total care at this time . Was unable to have ECT # 1 today due to need for repeat COVID test which has been done and is negative and now cleared for ECT. As noted new onset tremor and have stopped venlafaxine and will add propranolol and will follow for response. Patient remains acutely ill with symptoms as noted above and requires acute inpatient care for acute treatment of severe depression with SI.   Patient requires constant observation for falls risk and total care at this time .  ECT # 1 today . As noted new onset tremor has improved a little today with propranolol

## 2021-01-13 NOTE — BH INPATIENT PSYCHIATRY PROGRESS NOTE - MSE UNSTRUCTURED FT
On exam today the patient appears his stated age with fair grooming and hygiene. He is cooperative and maintains fair eye contact. Bilateral hand tremor, moderate amplitude, worse on the right, minimal at rest, increases with finger-to-nose testing. Speech is clear, fluent, normal rate and volume. Mood is "terrible." Affect is constricted. Thought process is linear and goal directed. Though content significant for hopelessness, without passive or active SI, plan, or intent. Denies AVH. Patient is cognitively grossly intact. Insight and judgment are limited. Impulse control is intact at this time.     On exam today the patient appears his stated age with fair grooming and hygiene. He is cooperative and maintains fair eye contact. Bilateral action tremor, significantly improved from yesterday's exam. Speech is clear, fluent, normal rate and volume. Mood is "fine." Affect is constricted. Thought process is linear and goal directed. Denies passive or active SI, plan, or intent. Denies AVH. Patient is cognitively grossly intact. Insight and judgment are limited. Impulse control is intact at this time.

## 2021-01-13 NOTE — BH INPATIENT PSYCHIATRY PROGRESS NOTE - NSBHFUPINTERVALHXFT_PSY_A_CORE
The patient was seen for follow up of depression. Chart reviewed and case discussed with multidisciplinary team. No acute overnight events. The patient is adherent with all standing medications. No PRN's required.  On exam, the patient states  He denies passive or active suicidal ideation, plan, or intent at this time. Denies HI, AVH. He continues to be amenable to starting ECT. The patient was seen for follow up of depression. Chart reviewed and case discussed with multidisciplinary team. No acute overnight events. The patient is adherent with all standing medications. No PRN's required.  On exam, the patient states he feels he needs more medications, because he still is not sleeping well. He also states he was still having shaking of his hands when he tried to eat meals yesterday. Pt states he was able to walk up and down the unit hallway yesterday, and he feels his mobility is improving, which is helping him to feel more hopeful for the future.  He denies passive or active suicidal ideation, plan, or intent at this time. Denies HI, AVH. He continues to be amenable to starting ECT.

## 2021-01-13 NOTE — ECT TREATMENT NOTE - NSECTFOCPEVITALS_PSY_ALL_CORE
Vital Signs Last 24 Hrs  T(C): 37 (13 Jan 2021 07:58), Max: 37 (13 Jan 2021 07:58)  T(F): 98.6 (13 Jan 2021 07:58), Max: 98.6 (13 Jan 2021 07:58)  HR: 76 (13 Jan 2021 05:26) (76 - 76)  BP: 135/90 (13 Jan 2021 05:26) (135/90 - 135/90)  BP(mean): --  RR: 18 (13 Jan 2021 05:26) (18 - 18)  SpO2: 100% (13 Jan 2021 05:26) (100% - 100%)

## 2021-01-13 NOTE — BH INPATIENT PSYCHIATRY PROGRESS NOTE - NSBHCHARTREVIEWVS_PSY_A_CORE FT
Vital Signs Last 24 Hrs  T(C): 37 (13 Jan 2021 07:58), Max: 37 (13 Jan 2021 07:58)  T(F): 98.6 (13 Jan 2021 07:58), Max: 98.6 (13 Jan 2021 07:58)  HR: --  BP: --  BP(mean): --  RR: --  SpO2: --

## 2021-01-13 NOTE — ECT PRE-PROCEDURE CHECKLIST - NSECTCONSENT_PSY_ALL_CORE
yes both ECT and Anesthesia obtained today 1/13/21/yes ECT 1/8/22    Anesthesia obtained today 1/13/21/yes

## 2021-01-13 NOTE — ECT TREATMENT NOTE - NSECTPOSTTXSUMMARY_PSY_ALL_CORE
The patient had a well modified grand mal seizure under general anesthesia and muscle relaxant. The patient is alert, responsive, in no acute distress.  Recovery uneventful.
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
## 2021-01-13 NOTE — BH INPATIENT PSYCHIATRY PROGRESS NOTE - NSTXDEPRESGOALOTHER_PSY_ALL_CORE
Patient will be less depressed and stable for discharge with a CGI of 2

## 2021-01-14 PROCEDURE — 99232 SBSQ HOSP IP/OBS MODERATE 35: CPT

## 2021-01-14 PROCEDURE — 93010 ELECTROCARDIOGRAM REPORT: CPT | Mod: 76

## 2021-01-14 RX ORDER — MIRTAZAPINE 45 MG/1
15 TABLET, ORALLY DISINTEGRATING ORAL AT BEDTIME
Refills: 0 | Status: DISCONTINUED | OUTPATIENT
Start: 2021-01-14 | End: 2021-01-16

## 2021-01-14 RX ADMIN — Medication 6 MILLIGRAM(S): at 20:39

## 2021-01-14 RX ADMIN — QUETIAPINE FUMARATE 100 MILLIGRAM(S): 200 TABLET, FILM COATED ORAL at 20:39

## 2021-01-14 RX ADMIN — MIRTAZAPINE 15 MILLIGRAM(S): 45 TABLET, ORALLY DISINTEGRATING ORAL at 20:39

## 2021-01-14 RX ADMIN — Medication 1 MILLIGRAM(S): at 20:39

## 2021-01-14 RX ADMIN — Medication 1 MILLIGRAM(S): at 08:52

## 2021-01-14 NOTE — BH INPATIENT PSYCHIATRY PROGRESS NOTE - CASE SUMMARY
Patient remains acutely ill with symptoms as noted above and requires acute inpatient care for acute treatment of severe depression with SI.   Patient requires constant observation for falls risk and total care at this time .  ECT # 1 today . As noted new onset tremor has improved a little today with propranolol

## 2021-01-14 NOTE — BH INPATIENT PSYCHIATRY PROGRESS NOTE - NSBHCHARTREVIEWVS_PSY_A_CORE FT
Vital Signs Last 24 Hrs  T(C): 36.8 (14 Jan 2021 08:43), Max: 36.8 (13 Jan 2021 12:45)  T(F): 98.2 (14 Jan 2021 08:43), Max: 98.3 (13 Jan 2021 12:45)  HR: 91 (13 Jan 2021 13:25) (84 - 96)  BP: 132/89 (13 Jan 2021 13:25) (132/89 - 150/98)  BP(mean): --  RR: 18 (13 Jan 2021 12:45) (18 - 26)  SpO2: 99% (13 Jan 2021 13:25) (98% - 100%)
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
## 2021-01-14 NOTE — BH INPATIENT PSYCHIATRY PROGRESS NOTE - NSBHCONSBHPROVDETAILS_PSY_A_CORE  FT
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 Hugh Cline ([phone]) about patient's previous treatments, suicidal attempts, and recommendations for him. While not discussed on phone call, Dr. Cline will no longer be handling this patient's outpatient care given suicidal attempts.

## 2021-01-14 NOTE — BH INPATIENT PSYCHIATRY PROGRESS NOTE - MSE UNSTRUCTURED FT
On exam today the patient appears his stated age with fair grooming and hygiene. He is cooperative and maintains fair eye contact. Bilateral action tremor is no longer present. Speech is clear, fluent, normal rate and volume. Mood is "fine." Affect is constricted. Thought process is linear and goal directed. Denies passive or active SI, plan, or intent. Denies AVH. Patient is cognitively grossly intact. Insight and judgment are limited. Impulse control is intact at this time.

## 2021-01-14 NOTE — BH INPATIENT PSYCHIATRY PROGRESS NOTE - NSBHFUPINTERVALHXFT_PSY_A_CORE
The patient was seen for follow up of depression. Chart reviewed and case discussed with multidisciplinary team. No acute overnight events.  The patient went for ECT yesterday and says it went well. On exam patient states he slept very poorly and is tired. Patient is on CO and they report that he slept through night but patient reports only sleeping around one hour. He asked whether a new medication could help him sleep. Patient reports that the shaking of his hands has almost resolved since starting Propanolol. He was walking up and down unit and feels his mobility is improving. He denies passive or active suicidal ideation, plan, or intent at this time. Denies HI, AVROSALINDA. Will go for next ECT treatment tomorrow.

## 2021-01-15 PROCEDURE — 90870 ELECTROCONVULSIVE THERAPY: CPT

## 2021-01-15 PROCEDURE — 99232 SBSQ HOSP IP/OBS MODERATE 35: CPT | Mod: 25

## 2021-01-15 RX ORDER — FLUMAZENIL 0.1 MG/ML
0.2 VIAL (ML) INTRAVENOUS ONCE
Refills: 0 | Status: COMPLETED | OUTPATIENT
Start: 2021-01-15 | End: 2021-01-15

## 2021-01-15 RX ADMIN — Medication 0.2 MILLIGRAM(S): at 10:43

## 2021-01-15 RX ADMIN — Medication 6 MILLIGRAM(S): at 21:06

## 2021-01-15 RX ADMIN — MIRTAZAPINE 15 MILLIGRAM(S): 45 TABLET, ORALLY DISINTEGRATING ORAL at 21:07

## 2021-01-15 RX ADMIN — Medication 1 MILLIGRAM(S): at 09:22

## 2021-01-15 RX ADMIN — QUETIAPINE FUMARATE 100 MILLIGRAM(S): 200 TABLET, FILM COATED ORAL at 21:06

## 2021-01-15 RX ADMIN — Medication 1 MILLIGRAM(S): at 21:06

## 2021-01-15 NOTE — BH INPATIENT PSYCHIATRY PROGRESS NOTE - NSBHCONSBHPROVDETAILS_PSY_A_CORE  FT
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 Hugh Cline ([phone]) about patient's previous treatments, suicidal attempts, and recommendations for him. While not discussed on phone call, Dr. Cline will no longer be handling this patient's outpatient care given suicidal attempts.

## 2021-01-15 NOTE — ECT AMBULATORY DISCHARGE PLAN - NSPOSTECTRESTRIC_PSY_ALL_CORE
Drive a car, operate power tools or machinery./Drink alcohol, beer, or wine./Make important personal and business decisions./If you have had any type of sedation, you may experience lightheadedness, dizziness, or sleepiness following your procedure.  A responsible adult should stay with you for at least 24 hours following your procedure.

## 2021-01-15 NOTE — BH INPATIENT PSYCHIATRY PROGRESS NOTE - NSBHCHARTREVIEWVS_PSY_A_CORE FT
Vital Signs Last 24 Hrs  T(C): 36.4 (15 Kevin 2021 08:43), Max: 36.4 (15 Kevin 2021 08:43)  T(F): 97.5 (15 Kevin 2021 08:43), Max: 97.5 (15 Kevin 2021 08:43)  HR: --  BP: --  BP(mean): --  RR: --  SpO2: --

## 2021-01-15 NOTE — ECT AMBULATORY DISCHARGE PLAN - PATIENT PORTAL LINK FT
You can access the FollowMyHealth Patient Portal offered by [facility] by registering at the following website: [email or web address]. By joining [facility]’s FollowMyHealth portal, you will also be able to view your health information using other applications (apps) compatible with our system.

## 2021-01-15 NOTE — BH INPATIENT PSYCHIATRY PROGRESS NOTE - CASE SUMMARY
Patient remains acutely ill with symptoms as noted above and requires acute inpatient care for acute treatment of severe depression with SI.   Patient requires constant observation for falls risk and total care at this time .  ECT # 1 today . As noted new onset tremor has improved a little today with propranolol  Patient remains acutely ill with symptoms of severe depression but denies SI now; tremor decreased.  He tolerates ECT well. Patient requires acute inpatient care for acute treatment of severe depression with SI.   Patient requires constant observation for falls risk and total care at this time .  ECT # 2 today . Plan ECT 3# on 1/19/21. As noted new onset tremor has improved a little today with propranolol
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
## 2021-01-15 NOTE — ECT AMBULATORY DISCHARGE PLAN - NSPOSTECTSYMPTOMS_PSY_ALL_CORE
Excessive Diarrhea/Fever/Inability to tolerate liquids or foods/Increased irritability or sluggishness/Nausea and vomiting that does not stop/Pain not relieved by medications/Unable to urinate

## 2021-01-15 NOTE — ECT AMBULATORY DISCHARGE PLAN - NSPOSTECTSMOKING_PSY_ALL_CORE
If you are a smoker, it is important for your health to stop smoking.  Please be aware that second hand smoke is also harmful.

## 2021-01-15 NOTE — BH INPATIENT PSYCHIATRY PROGRESS NOTE - NSBHMETABOLICLABS_PSY_ALL_CORE
Labs within last 12 months

## 2021-01-15 NOTE — BH INPATIENT PSYCHIATRY PROGRESS NOTE - NSBHFUPINTERVALHXFT_PSY_A_CORE
The patient was seen for follow up of depression. Chart reviewed and case discussed with multidisciplinary team. No acute overnight events. Per the sleep log, patient slept through the night.  On exam, the patient states his mood is "so-so." He feels his mood has improved since admission, but he still feels depressed. He denies passive or active suicidal ideation, plan, or intent since admission. Patient insists he needs additional medication to help him sleep, as he is not sleeping at all. He continues to report significant improvement in tremor, though still with some difficulty when using utensils to eat. He worked with PT yesterday, and reports continued improvement in mobility. He does endorse some soreness in his legs when walking. Denies MAYA ANDREW.

## 2021-01-15 NOTE — ECT AMBULATORY DISCHARGE PLAN - NSPOSTECTPOSSCOMP_PSY_ALL_CORE
Headache, nausea, general body aches are common experiences after this treatment.  These may be treated with over-the-counter pain medication.

## 2021-01-15 NOTE — BH INPATIENT PSYCHIATRY PROGRESS NOTE - NS ED [facility] REVIEW OF ED CHART AVAILABLE INVESTIGATIONS REVIEWED
None available
Yes

## 2021-01-15 NOTE — ECT TREATMENT NOTE - NSECTFOCPEVITALS_PSY_ALL_CORE
Vital Signs Last 24 Hrs  T(C): 36.7 (15 Kevin 2021 10:33), Max: 36.7 (15 Kevin 2021 10:33)  T(F): 98 (15 Kevin 2021 10:33), Max: 98 (15 Kevin 2021 10:33)  HR: 72 (15 Kevin 2021 10:33) (72 - 72)  BP: 128/91 (15 Kevin 2021 10:33) (128/91 - 128/91)  BP(mean): --  RR: 16 (15 Kevin 2021 10:33) (16 - 16)  SpO2: 100% (15 Kevin 2021 10:33) (100% - 100%)

## 2021-01-15 NOTE — ECT AMBULATORY DISCHARGE PLAN - NSPOSTECTWORSEPSYCHSX_PSY_ALL_CORE
If you are experiencing heightened or worsening psychological symptoms please contact your private psychiatrist.

## 2021-01-15 NOTE — BH INPATIENT PSYCHIATRY PROGRESS NOTE - MSE UNSTRUCTURED FT
On exam today the patient appears his stated age with fair grooming and hygiene. He is cooperative and maintains fair eye contact. Slight action tremor, significantly improved. Speech is clear, fluent, normal rate and volume. Mood is "so-so." Affect is constricted. Thought process is linear and goal directed. Denies passive or active SI, plan, or intent. Denies AVH. Patient is cognitively grossly intact. Insight and judgment are fair. Impulse control is intact at this time.

## 2021-01-16 PROCEDURE — 99232 SBSQ HOSP IP/OBS MODERATE 35: CPT

## 2021-01-16 RX ORDER — MIRTAZAPINE 45 MG/1
22.5 TABLET, ORALLY DISINTEGRATING ORAL AT BEDTIME
Refills: 0 | Status: DISCONTINUED | OUTPATIENT
Start: 2021-01-16 | End: 2021-01-17

## 2021-01-16 RX ADMIN — Medication 6 MILLIGRAM(S): at 21:31

## 2021-01-16 RX ADMIN — Medication 1 MILLIGRAM(S): at 21:30

## 2021-01-16 RX ADMIN — QUETIAPINE FUMARATE 100 MILLIGRAM(S): 200 TABLET, FILM COATED ORAL at 21:31

## 2021-01-16 RX ADMIN — Medication 1 MILLIGRAM(S): at 08:33

## 2021-01-16 RX ADMIN — MIRTAZAPINE 22.5 MILLIGRAM(S): 45 TABLET, ORALLY DISINTEGRATING ORAL at 21:30

## 2021-01-16 NOTE — BH INPATIENT PSYCHIATRY PROGRESS NOTE - MSE UNSTRUCTURED FT
Patient is calm, cooperative, appropriately behaved, well-related, poor EC. +PMR, no abnormal movements. Speech is nsoft and slow with some latency. Mood "depresed" and affect is blunted, sad. TP linear. +SI, denies HI/AVH/PI. Cognition grossly intact. I&J poor

## 2021-01-16 NOTE — BH INPATIENT PSYCHIATRY PROGRESS NOTE - NSBHCHARTREVIEWVS_PSY_A_CORE FT
Vital Signs Last 24 Hrs  T(C): 36.5 (16 Jan 2021 08:44), Max: 36.6 (15 Kevin 2021 19:21)  T(F): 97.7 (16 Jan 2021 08:44), Max: 97.8 (15 Kevin 2021 19:21)  HR: 84 (15 Kevin 2021 11:30) (74 - 86)  BP: 128/86 (15 Kevin 2021 11:30) (127/88 - 144/94)  BP(mean): --  RR: 16 (15 Kevin 2021 11:30) (16 - 16)  SpO2: 100% (15 Kevin 2021 11:30) (96% - 100%)

## 2021-01-16 NOTE — BH INPATIENT PSYCHIATRY PROGRESS NOTE - NSBHFUPINTERVALHXFT_PSY_A_CORE
Patient seen for follow up of severe depression and SI.  Case reviewed with comprehensive treatment team.  No acute events overnight.   Patient is compliant with medications and ECT with some mild dizziness but no other reported or observed side effects.  Patient is eating and sleeping well.  Patient is isolating.  States he feels depressed, hopeless, and wants to die.

## 2021-01-16 NOTE — BH INPATIENT PSYCHIATRY PROGRESS NOTE - NSBHCONSBHPROVDETAILS_PSY_A_CORE  FT
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 Hugh Cline ([phone]) about patient's previous treatments, suicidal attempts, and recommendations for him. While not discussed on phone call, Dr. Cline will no longer be handling this patient's outpatient care given suicidal attempts.

## 2021-01-17 PROCEDURE — 99232 SBSQ HOSP IP/OBS MODERATE 35: CPT

## 2021-01-17 RX ORDER — MIRTAZAPINE 45 MG/1
30 TABLET, ORALLY DISINTEGRATING ORAL AT BEDTIME
Refills: 0 | Status: DISCONTINUED | OUTPATIENT
Start: 2021-01-17 | End: 2021-02-02

## 2021-01-17 RX ADMIN — Medication 1 MILLIGRAM(S): at 20:14

## 2021-01-17 RX ADMIN — MIRTAZAPINE 30 MILLIGRAM(S): 45 TABLET, ORALLY DISINTEGRATING ORAL at 20:15

## 2021-01-17 RX ADMIN — Medication 6 MILLIGRAM(S): at 20:14

## 2021-01-17 RX ADMIN — Medication 1 MILLIGRAM(S): at 18:25

## 2021-01-17 RX ADMIN — QUETIAPINE FUMARATE 100 MILLIGRAM(S): 200 TABLET, FILM COATED ORAL at 20:14

## 2021-01-17 RX ADMIN — Medication 1 MILLIGRAM(S): at 08:30

## 2021-01-17 NOTE — BH INPATIENT PSYCHIATRY PROGRESS NOTE - NSBHCHARTREVIEWVS_PSY_A_CORE FT
Vital Signs Last 24 Hrs  T(C): 36.8 (17 Jan 2021 08:11), Max: 36.8 (17 Jan 2021 08:11)  T(F): 98.3 (17 Jan 2021 08:11), Max: 98.3 (17 Jan 2021 08:11)  HR: --  BP: --  BP(mean): --  RR: --  SpO2: --

## 2021-01-17 NOTE — BH INPATIENT PSYCHIATRY PROGRESS NOTE - NSBHCONSBHPROVDETAILS_PSY_A_CORE  FT
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 Hugh Cline ([phone]) about patient's previous treatments, suicidal attempts, and recommendations for him. While not discussed on phone call, Dr. Cline will no longer be handling this patient's outpatient care given suicidal attempts.

## 2021-01-17 NOTE — BH INPATIENT PSYCHIATRY PROGRESS NOTE - NSBHFUPINTERVALHXFT_PSY_A_CORE
Patient seen for follow up of severe depression and SI.  Case reviewed with comprehensive treatment team.  No acute events overnight.   Patient is compliant with medications and ECT with no reported or observed side effects today.  Patient is eating well.  Sleep is poor.  Patient is isolating.  States he feels depressed, hopeless, and wants to die. States "I just feel terrible."

## 2021-01-18 PROCEDURE — 99232 SBSQ HOSP IP/OBS MODERATE 35: CPT

## 2021-01-18 RX ADMIN — Medication 1 MILLIGRAM(S): at 17:12

## 2021-01-18 RX ADMIN — Medication 1 MILLIGRAM(S): at 08:48

## 2021-01-18 RX ADMIN — Medication 6 MILLIGRAM(S): at 20:59

## 2021-01-18 RX ADMIN — QUETIAPINE FUMARATE 100 MILLIGRAM(S): 200 TABLET, FILM COATED ORAL at 20:59

## 2021-01-18 RX ADMIN — MIRTAZAPINE 30 MILLIGRAM(S): 45 TABLET, ORALLY DISINTEGRATING ORAL at 20:59

## 2021-01-18 RX ADMIN — Medication 1 MILLIGRAM(S): at 20:59

## 2021-01-18 NOTE — BH INPATIENT PSYCHIATRY PROGRESS NOTE - NSBHCHARTREVIEWVS_PSY_A_CORE FT
Vital Signs Last 24 Hrs  T(C): 36.9 (17 Jan 2021 17:18), Max: 36.9 (17 Jan 2021 17:18)  T(F): 98.5 (17 Jan 2021 17:18), Max: 98.5 (17 Jan 2021 17:18)  HR: --  BP: --  BP(mean): --  RR: --  SpO2: --

## 2021-01-18 NOTE — BH INPATIENT PSYCHIATRY PROGRESS NOTE - NSBHFUPINTERVALHXFT_PSY_A_CORE
Patient seen for follow up of severe depression and SI.  Case reviewed with comprehensive treatment team.  No acute events overnight.   Patient is compliant with medications including increased dose of mirtazapine with no reported or observed side effects today.  Patient is eating well.  Sleep is poor.  Patient is isolating.  States he feels depressed, hopeless, and wants to die.  States "I feel horrible."

## 2021-01-18 NOTE — BH INPATIENT PSYCHIATRY PROGRESS NOTE - MSE UNSTRUCTURED FT
Patient is laying in bed but arousable on approach, calm, cooperative, appropriately behaved, well-related, poor EC. +PMR, no abnormal movements. Speech is soft and slow with some latency. Mood "horrible" and affect is blunted, sad. TP linear. +SI, denies HI/AVH/PI. Cognition grossly intact. I&J poor
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
## 2021-01-19 LAB — GLUCOSE BLDC GLUCOMTR-MCNC: 114 MG/DL — HIGH (ref 70–99)

## 2021-01-19 PROCEDURE — 99232 SBSQ HOSP IP/OBS MODERATE 35: CPT | Mod: 25

## 2021-01-19 RX ORDER — FLUMAZENIL 0.1 MG/ML
0.2 VIAL (ML) INTRAVENOUS ONCE
Refills: 0 | Status: COMPLETED | OUTPATIENT
Start: 2021-01-19 | End: 2021-01-19

## 2021-01-19 RX ADMIN — Medication 1 MILLIGRAM(S): at 08:10

## 2021-01-19 RX ADMIN — Medication 0.2 MILLIGRAM(S): at 11:32

## 2021-01-19 RX ADMIN — Medication 6 MILLIGRAM(S): at 21:03

## 2021-01-19 RX ADMIN — MIRTAZAPINE 30 MILLIGRAM(S): 45 TABLET, ORALLY DISINTEGRATING ORAL at 21:03

## 2021-01-19 RX ADMIN — QUETIAPINE FUMARATE 100 MILLIGRAM(S): 200 TABLET, FILM COATED ORAL at 21:04

## 2021-01-19 NOTE — BH INPATIENT PSYCHIATRY PROGRESS NOTE - NSBHFUPINTERVALHXFT_PSY_A_CORE
On exam, the patient immediately states he feels badly and needs more medication. He denies passive or active suicidal ideation, plan, or intent since admission. Patient states he did not sleep at all the entire weekend; he says that he lays in bed with his eyes closed but never falls asleep. He reports his tremor has resolved. He continues to report improvement in his mobility with physical therapy. Denies HI, AVROSALINDA.

## 2021-01-19 NOTE — BH INPATIENT PSYCHIATRY PROGRESS NOTE - NSBHFUPINTERVALCCFT_PSY_A_CORE
The patient was seen for follow up of depression. Chart reviewed and case discussed with multidisciplinary team. No acute overnight events. Per the sleep log, patient slept through the night.

## 2021-01-19 NOTE — BH INPATIENT PSYCHIATRY PROGRESS NOTE - NSBHCHARTREVIEWVS_PSY_A_CORE FT
Vital Signs Last 24 Hrs  T(C): 36.7 (19 Jan 2021 12:26), Max: 36.7 (19 Jan 2021 12:26)  T(F): 98.1 (19 Jan 2021 12:26), Max: 98.1 (19 Jan 2021 12:26)  HR: 80 (19 Jan 2021 12:26) (79 - 98)  BP: 133/90 (19 Jan 2021 12:26) (130/92 - 158/96)  BP(mean): --  RR: 19 (19 Jan 2021 12:26) (16 - 19)  SpO2: 100% (19 Jan 2021 12:26) (98% - 100%)

## 2021-01-19 NOTE — BH INPATIENT PSYCHIATRY PROGRESS NOTE - MSE UNSTRUCTURED FT
On exam today the patient appears his stated age with fair grooming and hygiene. He is cooperative and maintains fair eye contact. Tremor resolved. Speech is clear, fluent, normal rate and volume. Mood is "bad." Affect is constricted. Thought process is linear and goal directed. Denies passive or active SI, plan, or intent. Denies AVH. Patient is cognitively grossly intact. Insight and judgment are fair. Impulse control is intact at this time.
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
## 2021-01-19 NOTE — ECT TREATMENT NOTE - NSECTFOCPEVITALS_PSY_ALL_CORE
Vital Signs Last 24 Hrs  T(C): 36.1 (19 Jan 2021 11:13), Max: 36.5 (18 Jan 2021 17:50)  T(F): 97 (19 Jan 2021 11:13), Max: 97.7 (18 Jan 2021 17:50)  HR: 79 (19 Jan 2021 11:13) (79 - 79)  BP: 136/86 (19 Jan 2021 11:13) (136/86 - 136/86)  BP(mean): --  RR: 16 (19 Jan 2021 11:13) (16 - 16)  SpO2: 100% (19 Jan 2021 11:13) (100% - 100%)

## 2021-01-19 NOTE — BH INPATIENT PSYCHIATRY PROGRESS NOTE - NSBHCONSBHPROVDETAILS_PSY_A_CORE  FT
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 Hugh Cline ([phone]) about patient's previous treatments, suicidal attempts, and recommendations for him. While not discussed on phone call, Dr. Cline will no longer be handling this patient's outpatient care given suicidal attempts.

## 2021-01-20 LAB
CULTURE RESULTS: SIGNIFICANT CHANGE UP
SPECIMEN SOURCE: SIGNIFICANT CHANGE UP

## 2021-01-20 PROCEDURE — 99232 SBSQ HOSP IP/OBS MODERATE 35: CPT

## 2021-01-20 RX ORDER — QUETIAPINE FUMARATE 200 MG/1
150 TABLET, FILM COATED ORAL AT BEDTIME
Refills: 0 | Status: DISCONTINUED | OUTPATIENT
Start: 2021-01-20 | End: 2021-01-25

## 2021-01-20 RX ADMIN — MIRTAZAPINE 30 MILLIGRAM(S): 45 TABLET, ORALLY DISINTEGRATING ORAL at 21:25

## 2021-01-20 RX ADMIN — Medication 1 MILLIGRAM(S): at 16:10

## 2021-01-20 RX ADMIN — Medication 1 MILLIGRAM(S): at 21:25

## 2021-01-20 RX ADMIN — Medication 1 MILLIGRAM(S): at 08:43

## 2021-01-20 RX ADMIN — Medication 6 MILLIGRAM(S): at 21:24

## 2021-01-20 RX ADMIN — QUETIAPINE FUMARATE 150 MILLIGRAM(S): 200 TABLET, FILM COATED ORAL at 21:25
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
## 2021-01-20 NOTE — BH INPATIENT PSYCHIATRY PROGRESS NOTE - NSBHFUPINTERVALCCFT_PSY_A_CORE
Hpi Title: Evaluation of Skin Lesions The patient was seen for follow up of depression. Chart reviewed and case discussed with multidisciplinary team. No acute overnight events. Per the sleep log, patient slept through the night.

## 2021-01-20 NOTE — BH INPATIENT PSYCHIATRY PROGRESS NOTE - NSBHFUPINTERVALHXFT_PSY_A_CORE
On exam, the patient immediately insists on being prescribed more medication. He wants more medication during the day to help him feel calmer and to better tolerate the noise on the unit. He says that his sleep and mood have been very bad. He insists he did not sleep at all overnight. He denies passive or active suicidal ideation, plan, or intent. Denies HI, AVH. Denies dizziness upon standing, endorses feeling more stable on his feet due to PT. Instructed patient to notify staff if he is not sleeping at night.

## 2021-01-20 NOTE — BH INPATIENT PSYCHIATRY PROGRESS NOTE - NSBHCHARTREVIEWVS_PSY_A_CORE FT
Vital Signs Last 24 Hrs  T(C): 36.3 (20 Jan 2021 08:45), Max: 36.7 (19 Jan 2021 12:26)  T(F): 97.4 (20 Jan 2021 08:45), Max: 98.1 (19 Jan 2021 12:26)  HR: 82 (20 Jan 2021 08:45) (80 - 98)  BP: 132/80 (20 Jan 2021 08:45) (130/92 - 158/96)  BP(mean): --  RR: 19 (19 Jan 2021 12:26) (16 - 19)  SpO2: 100% (19 Jan 2021 12:26) (98% - 100%)

## 2021-01-20 NOTE — BH INPATIENT PSYCHIATRY PROGRESS NOTE - MSE UNSTRUCTURED FT
On exam today the patient appears his stated age with fair grooming and hygiene. He is cooperative and maintains fair eye contact. No abnormal movements. Speech is clear, fluent, normal rate and volume. Mood is "bad." Affect is constricted. Thought process is linear and goal directed. Denies passive or active SI, plan, or intent. Denies AVH. Patient is cognitively grossly intact. Insight and judgment are limited. Impulse control is intact at this time.

## 2021-01-20 NOTE — BH INPATIENT PSYCHIATRY PROGRESS NOTE - NSBHCONSBHPROVDETAILS_PSY_A_CORE  FT
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 Hugh Cline ([phone]) about patient's previous treatments, suicidal attempts, and recommendations for him. While not discussed on phone call, Dr. Cline will no longer be handling this patient's outpatient care given suicidal attempts.

## 2021-01-21 LAB
B HENSELAE DNA SPEC QL NAA+PROBE: SIGNIFICANT CHANGE UP
B QUINTANA DNA SPEC QL NAA+PROBE: NEGATIVE — SIGNIFICANT CHANGE UP
BARTONELLA PCR SPECIMEN SOURCE: SIGNIFICANT CHANGE UP

## 2021-01-21 PROCEDURE — 99232 SBSQ HOSP IP/OBS MODERATE 35: CPT

## 2021-01-21 RX ADMIN — Medication 1 MILLIGRAM(S): at 09:09

## 2021-01-21 RX ADMIN — MIRTAZAPINE 30 MILLIGRAM(S): 45 TABLET, ORALLY DISINTEGRATING ORAL at 20:40

## 2021-01-21 RX ADMIN — Medication 6 MILLIGRAM(S): at 20:40

## 2021-01-21 RX ADMIN — QUETIAPINE FUMARATE 150 MILLIGRAM(S): 200 TABLET, FILM COATED ORAL at 20:39

## 2021-01-21 RX ADMIN — Medication 1 MILLIGRAM(S): at 16:35

## 2021-01-21 RX ADMIN — Medication 1 MILLIGRAM(S): at 20:39

## 2021-01-21 NOTE — BH INPATIENT PSYCHIATRY PROGRESS NOTE - NSBHCHARTREVIEWVS_PSY_A_CORE FT
Vital Signs Last 24 Hrs  T(C): 36.4 (21 Jan 2021 07:49), Max: 36.6 (20 Jan 2021 17:20)  T(F): 97.6 (21 Jan 2021 07:49), Max: 97.8 (20 Jan 2021 17:20)  HR: --  BP: --  BP(mean): --  RR: --  SpO2: --

## 2021-01-21 NOTE — BH INPATIENT PSYCHIATRY PROGRESS NOTE - NSCGISEVERILLNESS_PSY_ALL_CORE
7 = Among the most extremely ill patients – pathology drastically interferes in many life functions; may be hospitalized
5 = Markedly ill - intrusive symptoms that distinctly impair social/occupational function or cause intrusive levels of distress
7 = Among the most extremely ill patients – pathology drastically interferes in many life functions; may be hospitalized
7 = Among the most extremely ill patients – pathology drastically interferes in many life functions; may be hospitalized

## 2021-01-21 NOTE — BH INPATIENT PSYCHIATRY PROGRESS NOTE - NSCGIIMPROVESX_PSY_ALL_CORE
3 = Minimally improved - slightly better with little or no clinically meaningful reduction of symptoms.  Represents very little change in basic clinical status, level of care, or functional capacity.
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
## 2021-01-21 NOTE — BH INPATIENT PSYCHIATRY PROGRESS NOTE - NSBHCONSBHPROVDETAILS_PSY_A_CORE  FT
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 Hugh Cline ([phone]) about patient's previous treatments, suicidal attempts, and recommendations for him. While not discussed on phone call, Dr. Cline will no longer be handling this patient's outpatient care given suicidal attempts.

## 2021-01-21 NOTE — BH INPATIENT PSYCHIATRY PROGRESS NOTE - NSBHFUPINTERVALHXFT_PSY_A_CORE
On exam, pt continues to report poor sleep and mood. He denies passive or active suicidal ideation, plan, or intent. Pt is unsure whether ECT is helping his mood but he is willing to continue. Denies HI, AVH. Patient ate breakfast, ECT rescheduled to Friday. Spoke to wife yesterday who stated that he sounds better on the phone.

## 2021-01-21 NOTE — BH INPATIENT PSYCHIATRY PROGRESS NOTE - NSBHCONSDANGERSELF_PSY_A_CORE
suicidal behavior
unable to care for self
suicidal behavior

## 2021-01-21 NOTE — BH INPATIENT PSYCHIATRY PROGRESS NOTE - NSBHFUPINTERVALCCFT_PSY_A_CORE
The patient was seen for follow up of depression. Chart reviewed and case discussed with multidisciplinary team. No acute overnight events. Pt required Ativan 1 mg PRN x1.

## 2021-01-22 PROCEDURE — 99232 SBSQ HOSP IP/OBS MODERATE 35: CPT | Mod: 25

## 2021-01-22 PROCEDURE — 90870 ELECTROCONVULSIVE THERAPY: CPT

## 2021-01-22 RX ORDER — FLUMAZENIL 0.1 MG/ML
0.2 VIAL (ML) INTRAVENOUS ONCE
Refills: 0 | Status: COMPLETED | OUTPATIENT
Start: 2021-01-22 | End: 2021-01-22

## 2021-01-22 RX ADMIN — QUETIAPINE FUMARATE 150 MILLIGRAM(S): 200 TABLET, FILM COATED ORAL at 22:13

## 2021-01-22 RX ADMIN — Medication 6 MILLIGRAM(S): at 22:13

## 2021-01-22 RX ADMIN — Medication 1 MILLIGRAM(S): at 09:32

## 2021-01-22 RX ADMIN — Medication 1 MILLIGRAM(S): at 20:42

## 2021-01-22 RX ADMIN — Medication 0.2 MILLIGRAM(S): at 13:48

## 2021-01-22 RX ADMIN — MIRTAZAPINE 30 MILLIGRAM(S): 45 TABLET, ORALLY DISINTEGRATING ORAL at 20:42

## 2021-01-22 NOTE — ECT TREATMENT NOTE - NSECTFOCPEVITALS_PSY_ALL_CORE
Vital Signs Last 24 Hrs  T(C): 37 (22 Jan 2021 11:27), Max: 37 (22 Jan 2021 11:27)  T(F): 98.6 (22 Jan 2021 11:27), Max: 98.6 (22 Jan 2021 11:27)  HR: 84 (22 Jan 2021 11:27) (84 - 84)  BP: 132/96 (22 Jan 2021 11:27) (132/96 - 132/96)  BP(mean): --  RR: 13 (22 Jan 2021 11:27) (13 - 13)  SpO2: 100% (22 Jan 2021 11:27) (100% - 100%)

## 2021-01-22 NOTE — BH INPATIENT PSYCHIATRY PROGRESS NOTE - MSE UNSTRUCTURED FT
On exam today the patient appears his stated age with fair grooming and hygiene. He is cooperative and maintains fair eye contact. No abnormal movements. Speech is clear, fluent, normal rate and volume. Mood is "okay." Affect is constricted, but with broader range than on previous exams. Thought process is linear and goal directed. Denies passive or active SI, plan, or intent. Denies AVH. Patient is cognitively grossly intact. Insight and judgment are limited. Impulse control is intact at this time.

## 2021-01-22 NOTE — BH INPATIENT PSYCHIATRY PROGRESS NOTE - NSBHCHARTREVIEWVS_PSY_A_CORE FT
Vital Signs Last 24 Hrs  T(C): 36.3 (22 Jan 2021 08:41), Max: 36.6 (21 Jan 2021 19:28)  T(F): 97.3 (22 Jan 2021 08:41), Max: 97.8 (21 Jan 2021 19:28)  HR: --  BP: --  BP(mean): --  RR: --  SpO2: --
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
## 2021-01-22 NOTE — BH INPATIENT PSYCHIATRY PROGRESS NOTE - NSBHCONSBHPROVDETAILS_PSY_A_CORE  FT
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 Hugh Cline ([phone]) about patient's previous treatments, suicidal attempts, and recommendations for him. While not discussed on phone call, Dr. Cline will no longer be handling this patient's outpatient care given suicidal attempts.

## 2021-01-22 NOTE — BH INPATIENT PSYCHIATRY PROGRESS NOTE - NSBHFUPINTERVALHXFT_PSY_A_CORE
On exam, pt immediately requests more medication. He continues to report poor sleep, but endorses improvement in mood. He endorses feeling hopeful that ECT will help him. He reports feeling some anxiety during the day yesterday, and feels that he should take more Ativan, despite already taking Ativan 3 times per day. He denies passive or active suicidal ideation, plan, or intent. Denies HI, AVH.

## 2021-01-23 PROCEDURE — 99231 SBSQ HOSP IP/OBS SF/LOW 25: CPT

## 2021-01-23 RX ADMIN — MIRTAZAPINE 30 MILLIGRAM(S): 45 TABLET, ORALLY DISINTEGRATING ORAL at 21:15

## 2021-01-23 RX ADMIN — Medication 1 MILLIGRAM(S): at 20:34

## 2021-01-23 RX ADMIN — Medication 6 MILLIGRAM(S): at 21:15

## 2021-01-23 RX ADMIN — QUETIAPINE FUMARATE 150 MILLIGRAM(S): 200 TABLET, FILM COATED ORAL at 21:15

## 2021-01-23 RX ADMIN — Medication 1 MILLIGRAM(S): at 10:05

## 2021-01-23 NOTE — BH INPATIENT PSYCHIATRY PROGRESS NOTE - NSBHFUPINTERVALHXFT_PSY_A_CORE
No overnight events.  Pt states he is sleeping and eating adequately.  No other complaints this AM.

## 2021-01-23 NOTE — BH INPATIENT PSYCHIATRY PROGRESS NOTE - NSBHCHARTREVIEWVS_PSY_A_CORE FT
Vital Signs Last 24 Hrs  T(C): 36.4 (23 Jan 2021 08:37), Max: 36.6 (22 Jan 2021 19:24)  T(F): 97.5 (23 Jan 2021 08:37), Max: 97.8 (22 Jan 2021 19:24)  HR: 73 (23 Jan 2021 08:37) (73 - 73)  BP: 115/86 (23 Jan 2021 08:37) (115/86 - 115/86)  BP(mean): --  RR: --  SpO2: --

## 2021-01-23 NOTE — BH INPATIENT PSYCHIATRY PROGRESS NOTE - NSBHCONSBHPROVDETAILS_PSY_A_CORE  FT
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 Hugh Cline ([phone]) about patient's previous treatments, suicidal attempts, and recommendations for him. While not discussed on phone call, Dr. Cline will no longer be handling this patient's outpatient care given suicidal attempts.

## 2021-01-23 NOTE — BH INPATIENT PSYCHIATRY PROGRESS NOTE - MSE UNSTRUCTURED FT
Dressed appropriately.  Fairly related, good eye contact.  Speech laconic, soft volume.  Mood is "ok," affect neutral.  [place] TP, limited associations.  TC: Poverty of content.  Denies SIIP or HIIP.  Insight limited, judgment fair on interview, language fluent, gait/station: seated.

## 2021-01-24 PROCEDURE — 99231 SBSQ HOSP IP/OBS SF/LOW 25: CPT

## 2021-01-24 RX ADMIN — MIRTAZAPINE 30 MILLIGRAM(S): 45 TABLET, ORALLY DISINTEGRATING ORAL at 20:51

## 2021-01-24 RX ADMIN — Medication 1 MILLIGRAM(S): at 08:34

## 2021-01-24 RX ADMIN — Medication 6 MILLIGRAM(S): at 20:50

## 2021-01-24 RX ADMIN — Medication 1 MILLIGRAM(S): at 20:50

## 2021-01-24 RX ADMIN — QUETIAPINE FUMARATE 150 MILLIGRAM(S): 200 TABLET, FILM COATED ORAL at 20:50

## 2021-01-24 NOTE — BH INPATIENT PSYCHIATRY PROGRESS NOTE - NSBHCONSBHPROVDETAILS_PSY_A_CORE  FT
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 Hugh Cline ([phone]) about patient's previous treatments, suicidal attempts, and recommendations for him. While not discussed on phone call, Dr. Cline will no longer be handling this patient's outpatient care given suicidal attempts.

## 2021-01-24 NOTE — BH INPATIENT PSYCHIATRY PROGRESS NOTE - MSE UNSTRUCTURED FT
Dressed appropriately.  Poorly related today, limited eye contact.  Speech laconic, soft volume.  Mood is "not great," affect depressed.  [place] TP, limited associations.  TC: Poverty of content.  Denies SIIP or HIIP.  Insight limited, judgment fair on interview, language fluent, gait/station: seated.

## 2021-01-24 NOTE — BH INPATIENT PSYCHIATRY PROGRESS NOTE - NSBHCHARTREVIEWVS_PSY_A_CORE FT
Vital Signs Last 24 Hrs  T(C): 36.3 (24 Jan 2021 08:28), Max: 36.3 (24 Jan 2021 08:28)  T(F): 97.3 (24 Jan 2021 08:28), Max: 97.3 (24 Jan 2021 08:28)  HR: --  BP: --  BP(mean): --  RR: --  SpO2: --

## 2021-01-24 NOTE — BH INPATIENT PSYCHIATRY PROGRESS NOTE - NSBHFUPINTERVALCCFT_PSY_A_CORE
The patient was seen for follow up of depression. Chart reviewed and case discussed with multidisciplinary team.

## 2021-01-25 PROCEDURE — 90870 ELECTROCONVULSIVE THERAPY: CPT

## 2021-01-25 PROCEDURE — 99232 SBSQ HOSP IP/OBS MODERATE 35: CPT | Mod: 25

## 2021-01-25 RX ORDER — FLUMAZENIL 0.1 MG/ML
0.2 VIAL (ML) INTRAVENOUS ONCE
Refills: 0 | Status: COMPLETED | OUTPATIENT
Start: 2021-01-25 | End: 2021-01-25

## 2021-01-25 RX ORDER — QUETIAPINE FUMARATE 200 MG/1
200 TABLET, FILM COATED ORAL AT BEDTIME
Refills: 0 | Status: DISCONTINUED | OUTPATIENT
Start: 2021-01-25 | End: 2021-01-27

## 2021-01-25 RX ADMIN — Medication 6 MILLIGRAM(S): at 20:35

## 2021-01-25 RX ADMIN — Medication 1 MILLIGRAM(S): at 08:05

## 2021-01-25 RX ADMIN — Medication 1 MILLIGRAM(S): at 20:36

## 2021-01-25 RX ADMIN — Medication 0.2 MILLIGRAM(S): at 12:53

## 2021-01-25 RX ADMIN — QUETIAPINE FUMARATE 200 MILLIGRAM(S): 200 TABLET, FILM COATED ORAL at 20:36

## 2021-01-25 RX ADMIN — MIRTAZAPINE 30 MILLIGRAM(S): 45 TABLET, ORALLY DISINTEGRATING ORAL at 20:36

## 2021-01-25 NOTE — BH INPATIENT PSYCHIATRY PROGRESS NOTE - MSE UNSTRUCTURED FT
On exam today the patient appears his stated age with fair grooming and hygiene. He is cooperative and maintains fair eye contact. No abnormal movements. Speech is clear, fluent, normal rate and volume. Mood is "so-so." Affect is constricted to dysphoric. Thought process is linear and goal directed. Denies passive or active SI, plan, or intent. Denies AVH. Patient is cognitively grossly intact. Insight and judgment are limited. Impulse control is intact at this time.
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
## 2021-01-25 NOTE — BH INPATIENT PSYCHIATRY PROGRESS NOTE - NSBHCONSBHPROVDETAILS_PSY_A_CORE  FT
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 Hugh Cline ([phone]) about patient's previous treatments, suicidal attempts, and recommendations for him. While not discussed on phone call, Dr. Cline will no longer be handling this patient's outpatient care given suicidal attempts.

## 2021-01-25 NOTE — BH INPATIENT PSYCHIATRY PROGRESS NOTE - NSBHFUPINTERVALHXFT_PSY_A_CORE
On exam, pt states his mood is "so-so." He continues to report poor sleep, but endorses sleeping for one hour overnight. He reports some soreness in his muscles, particularly after physical therapy, but feels that his strength and stability are improving. He also endorses some disorientation after PT sessions. He denies passive or active suicidal ideation, plan, or intent. Denies HI, AVH.  The patient was seen for follow up of depression. Chart reviewed and case discussed with multidisciplinary team. No acute overnight events. No PRN's required.  On exam, pt states his mood is "so-so." He continues to report poor sleep, but endorses sleeping for one hour overnight. He reports some soreness in his muscles, particularly after physical therapy, but feels that his strength and stability are improving. He also endorses some disorientation after PT sessions. He denies passive or active suicidal ideation, plan, or intent. Denies HI, AVH.

## 2021-01-25 NOTE — BH CHART NOTE - NSEVENTNOTEFT_PSY_ALL_CORE
Discussed obs status with RN staff, team would like to have pt on ES for 7A-11P.  CO discontinued and ES ordered.  Staff aware and will continue to monitor.

## 2021-01-25 NOTE — ECT TREATMENT NOTE - NSECTPOSTTXSUMMARY_PSY_ALL_CORE
The patient had a well modified grand mal seizure under general anesthesia and muscle relaxant. The patient is alert, responsive, in no acute distress.  Recovery uneventful. The patient had a well modified grand mal seizure under general anesthesia and muscle relaxant. The patient is alert, responsive, in no acute distress.  Recovery uneventful.    Cognition score: 8

## 2021-01-25 NOTE — BH INPATIENT PSYCHIATRY PROGRESS NOTE - NSBHFUPINTERVALCCFT_PSY_A_CORE
The patient was seen for follow up of depression. Chart reviewed and case discussed with multidisciplinary team. No acute overnight events. No PRN's required. "I'm not sleeping"

## 2021-01-25 NOTE — BH INPATIENT PSYCHIATRY PROGRESS NOTE - NSBHCHARTREVIEWVS_PSY_A_CORE FT
Vital Signs Last 24 Hrs  T(C): 36.3 (25 Jan 2021 08:12), Max: 36.3 (24 Jan 2021 17:43)  T(F): 97.4 (25 Jan 2021 08:12), Max: 97.4 (25 Jan 2021 08:12)  HR: --  BP: --  BP(mean): --  RR: --  SpO2: --
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
## 2021-01-25 NOTE — BH INPATIENT PSYCHIATRY PROGRESS NOTE - MODIFICATIONS
No Patient interviewed and evaluated with resident present to follow up on symptoms and the case has been reviewed with the treatment team this morning.   I was physically present during the service to the patient and personally examined the patient and was directly involved in the management and recommendations of the care provided to the patient.  I have reviewed the resident's progress note and the mental status examination and I agree with its contents and made changes as indicated

## 2021-01-25 NOTE — ECT TREATMENT NOTE - NSECTFOCPEVITALS_PSY_ALL_CORE
Vital Signs Last 24 Hrs  T(C): 36.3 (25 Jan 2021 08:12), Max: 36.3 (24 Jan 2021 17:43)  T(F): 97.4 (25 Jan 2021 08:12), Max: 97.4 (25 Jan 2021 08:12)  HR: --  BP: --  BP(mean): --  RR: --  SpO2: -- Vital Signs Last 24 Hrs    T(F): 97.1 (25 Jan 2021 08:12), Max: 97.4 (25 Jan 2021 08:12)  HR: --78  BP: 124/91    RR: --10  SpO2: 99%

## 2021-01-26 PROCEDURE — 99232 SBSQ HOSP IP/OBS MODERATE 35: CPT

## 2021-01-26 RX ADMIN — Medication 1 MILLIGRAM(S): at 08:17

## 2021-01-26 RX ADMIN — MIRTAZAPINE 30 MILLIGRAM(S): 45 TABLET, ORALLY DISINTEGRATING ORAL at 20:24

## 2021-01-26 RX ADMIN — QUETIAPINE FUMARATE 200 MILLIGRAM(S): 200 TABLET, FILM COATED ORAL at 20:23

## 2021-01-26 RX ADMIN — Medication 1 MILLIGRAM(S): at 20:24

## 2021-01-26 RX ADMIN — Medication 6 MILLIGRAM(S): at 20:24

## 2021-01-26 NOTE — BH INPATIENT PSYCHIATRY PROGRESS NOTE - NSBHCHARTREVIEWVS_PSY_A_CORE FT
Vital Signs Last 24 Hrs  T(C): 36.8 (26 Jan 2021 08:29), Max: 36.8 (26 Jan 2021 08:29)  T(F): 98.3 (26 Jan 2021 08:29), Max: 98.3 (26 Jan 2021 08:29)  HR: 91 (25 Jan 2021 13:40) (78 - 98)  BP: 113/74 (25 Jan 2021 13:40) (113/74 - 132/-)  BP(mean): --  RR: 16 (25 Jan 2021 13:40) (10 - 25)  SpO2: 100% (25 Jan 2021 13:40) (96% - 100%)
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
## 2021-01-26 NOTE — BH INPATIENT PSYCHIATRY PROGRESS NOTE - NSBHFUPINTERVALHXFT_PSY_A_CORE
The patient was seen for follow up of depression. Chart reviewed and case discussed with multidisciplinary team. No acute overnight events. No PRN's required.  On exam, pt states his mood is "so-so." He continues to report poor sleep, but endorses sleeping for one hour overnight. He reports some soreness in his muscles, particularly after physical therapy, but feels that his strength and stability are improving. He also endorses some disorientation after PT sessions. He denies passive or active suicidal ideation, plan, or intent. Denies HI, AVH.  The patient was seen for follow up of depression. Chart reviewed and case discussed with multidisciplinary team. No acute overnight events. No PRN's required.  On exam, pt states his mood is "okay." He continues to report poor sleep. Endorses sleeping for 1-2 hours overnight. Discussed alcohol use at home as a likely contributer to poor sleep; pt states he only drinks one shot of vodka at bedtime. He denies passive or active suicidal ideation, plan, or intent. Denies HI, AVH.
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
## 2021-01-26 NOTE — BH INPATIENT PSYCHIATRY PROGRESS NOTE - MSE UNSTRUCTURED FT
On exam today the patient appears his stated age with fair grooming and hygiene. He is cooperative and maintains fair eye contact. No abnormal movements. Speech is clear, fluent, normal rate and volume. Mood is "so-so." Affect is constricted to dysphoric. Thought process is linear and goal directed. Denies passive or active SI, plan, or intent. Denies AVH. Patient is cognitively grossly intact. Insight and judgment are limited. Impulse control is intact at this time. On exam today the patient appears his stated age with fair grooming and hygiene. He is cooperative and maintains fair eye contact. No abnormal movements. Speech is clear, fluent, normal rate and volume. Mood is "okay." Affect is constricted to dysphoric. Thought process is linear and goal directed. Denies passive or active SI, plan, or intent. Denies AVH. Patient is cognitively grossly intact. Insight and judgment are limited. Impulse control is intact at this time.

## 2021-01-26 NOTE — BH INPATIENT PSYCHIATRY PROGRESS NOTE - NSBHCONSBHPROVDETAILS_PSY_A_CORE  FT
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 Hugh Cline ([phone]) about patient's previous treatments, suicidal attempts, and recommendations for him. While not discussed on phone call, Dr. Cline will no longer be handling this patient's outpatient care given suicidal attempts.

## 2021-01-27 PROCEDURE — 90870 ELECTROCONVULSIVE THERAPY: CPT

## 2021-01-27 PROCEDURE — 99232 SBSQ HOSP IP/OBS MODERATE 35: CPT | Mod: 25

## 2021-01-27 RX ORDER — QUETIAPINE FUMARATE 200 MG/1
250 TABLET, FILM COATED ORAL AT BEDTIME
Refills: 0 | Status: DISCONTINUED | OUTPATIENT
Start: 2021-01-27 | End: 2021-02-01

## 2021-01-27 RX ORDER — FLUMAZENIL 0.1 MG/ML
0.2 VIAL (ML) INTRAVENOUS ONCE
Refills: 0 | Status: COMPLETED | OUTPATIENT
Start: 2021-01-27 | End: 2021-01-27

## 2021-01-27 RX ADMIN — QUETIAPINE FUMARATE 250 MILLIGRAM(S): 200 TABLET, FILM COATED ORAL at 21:07

## 2021-01-27 RX ADMIN — MIRTAZAPINE 30 MILLIGRAM(S): 45 TABLET, ORALLY DISINTEGRATING ORAL at 21:07

## 2021-01-27 RX ADMIN — Medication 0.2 MILLIGRAM(S): at 11:22

## 2021-01-27 RX ADMIN — Medication 6 MILLIGRAM(S): at 21:07

## 2021-01-27 RX ADMIN — Medication 975 MILLIGRAM(S): at 18:31

## 2021-01-27 RX ADMIN — Medication 1 MILLIGRAM(S): at 09:28

## 2021-01-27 RX ADMIN — Medication 1 MILLIGRAM(S): at 21:07

## 2021-01-27 NOTE — BH TREATMENT PLAN - NSTXDEPRESGOAL_PSY_ALL_CORE
Other...
Other...
Will identify 2 coping skills that assist in improving mood
Will identify 2 coping skills that assist in improving mood

## 2021-01-27 NOTE — BH TREATMENT PLAN - NSTXECTGOAL_PSY_ALL_CORE
Maintain NPO status as indicated prior to procedure

## 2021-01-27 NOTE — BH TREATMENT PLAN - NSTXECTINTERRN_PSY_ALL_CORE
will maintain NPO protocol for ECT
will follow NPO protocol prior to and after ECT
Reinforce the importance of letting staff know of any side effects

## 2021-01-27 NOTE — BH TREATMENT PLAN - NSDCCRITERIA_PSY_ALL_CORE
Improvement of depressive symptoms and be safe for discharge

## 2021-01-27 NOTE — BH TREATMENT PLAN - NSTXDEPRESINTERPR_PSY_ALL_CORE
Patient's rehabilitation goals are to identify 2-3 effective coping skills to manage symptoms of depression within seven days.
Patient's rehabilitation goals are to identify 2-3 effective coping skills to manage symptoms of depression within seven days.
Psychiatric rehabilitation recommends patient to attend groups, engage in individual sessions, and participate in activities in the milieu to work towards goal of identify 2-3 coping skills for sxs management.
Pt has demonstrated limited improvement towards his psych rehab goal. Pt was unable to identify any coping skills to manage with his sxs.  Psychiatric rehabilitation recommends patient to attend groups, engage in individual sessions, and participate in activities in the milieu to work towards goal of identify 2-3 coping skills for sxs management.

## 2021-01-27 NOTE — ECT TREATMENT NOTE - NSECTFOCPEVITALS_PSY_ALL_CORE
Vital Signs Last 24 Hrs  T(C): 36.9 (27 Jan 2021 10:05), Max: 37 (27 Jan 2021 08:18)  T(F): 98.5 (27 Jan 2021 10:05), Max: 98.6 (27 Jan 2021 08:18)  HR: 82 (27 Jan 2021 10:05) (82 - 93)  BP: 117/83 (27 Jan 2021 10:05) (117/83 - 121/80)  BP(mean): --  RR: 18 (27 Jan 2021 10:05) (18 - 18)  SpO2: 100% (27 Jan 2021 10:05) (100% - 100%)

## 2021-01-27 NOTE — BH TREATMENT PLAN - NSBHPRIMARYDX_PSY_ALL_CORE
MDD (major depressive disorder), recurrent episode    

## 2021-01-27 NOTE — BH INPATIENT PSYCHIATRY PROGRESS NOTE - MSE UNSTRUCTURED FT
On exam today the patient appears his stated age with fair grooming and hygiene. He is cooperative and maintains fair eye contact. No abnormal movements. Speech is clear, fluent, normal rate and volume. Mood is "okay." Affect is constricted to dysphoric. Thought process is linear and goal directed. Denies passive or active SI, plan, or intent. Denies AVH. Patient is cognitively grossly intact. Insight and judgment are limited. Impulse control is intact at this time. On exam today the patient appears his stated age with fair grooming and hygiene. He is cooperative and maintains fair eye contact. No abnormal movements. Speech is clear, fluent, normal rate and volume. Mood is "so-so." Affect is constricted to dysphoric, but improved from prior exam. Thought process is linear and goal directed. Denies passive or active SI, plan, or intent. Denies AVH. Patient is cognitively grossly intact. Insight and judgment are limited. Impulse control is intact at this time.

## 2021-01-27 NOTE — BH TREATMENT PLAN - NSTXPLANTHERAPYSESSIONSFT_PSY_ALL_CORE
01-14-21  Type of therapy: Coping skills,Daily living skills,Symptom management  Type of session: Individual  Level of patient participation: Participated with encouragement,Quiet  Duration of participation: Less than 15 minutes  Therapy conducted by: Psych rehab  Therapy Summary: Writer met with patient for an individual session in order to review progress towards psychiatric rehabilitation goals. Pt demonstrates limited improvement towards his specific goal. Pt was unable to identify any coping skills for his depressive sxs despite writer’s prompting. Pt denied SI/HI. Pt remains depressed mood and poor sleep. Pt stated that he needs more medications. Pt is isolative in his room. Writer has been encouraging pt to attend groups on the unit. Pt reported that he is not ready for group. Writer offered pt with self-leisure package but pt refused it. Writer will continue to meet patient daily and encouraged pt to attend groups to promote progress towards goal.  
  01-21-21  Type of therapy: Coping skills  Type of session: Individual  Level of patient participation: Quiet,Resistance to participation  Duration of participation: Less than 15 minutes  Therapy conducted by: Psych rehab  Therapy Summary: Writer met with patient for an individual session in order to review progress towards psychiatric rehabilitation goals. Pt reported that he needs more medications for poor sleep and continued depressed mood. Pt denied SI/HI. Pt was unable to tolerate the duration of meeting and reported that he needed to sleep and he did not sleep last night. However, as per treatment, pt slept throughout the night. Overall, pt is isolative in his room, minimally interacting with staff and peers. Writer has been encouraging pt to attend groups on the unit. Pt refuses it all the time and stating “I need to sleep”. Writer will continue to meet patient daily and encouraged pt to attend groups to promote progress towards goal.

## 2021-01-27 NOTE — BH TREATMENT PLAN - NSTXSUICIDINTERRN_PSY_ALL_CORE
will not hurt self whilst in hospital
Maintain patient on CO 1:1 per MD orders.  Assess patient for SI/I/P and/or desire to self-injure.  Provide emotional support.   Ensure room is free of potentially harmful objects.  Administer medications as per HCP orders to treat/manage psychiatric symptoms.  Encourage patient to share thoughts, feelings, and/or concerns with staff.
Maintain patient on CO 1:1 per MD orders.  Assess patient for SI/I/P and/or desire to self-injure.  Provide emotional support.   Ensure room is free of potentially harmful objects.  Administer medications as per HCP orders to treat/manage psychiatric symptoms.  Encourage patient to share thoughts, feelings, and/or concerns with staff.
will not hurt self x 7 days

## 2021-01-27 NOTE — BH INPATIENT PSYCHIATRY PROGRESS NOTE - NSBHCHARTREVIEWVS_PSY_A_CORE FT
Vital Signs Last 24 Hrs  T(C): 36.3 (26 Jan 2021 17:18), Max: 36.8 (26 Jan 2021 08:29)  T(F): 97.3 (26 Jan 2021 17:18), Max: 98.3 (26 Jan 2021 08:29)  HR: --  BP: --  BP(mean): --  RR: --  SpO2: --
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
## 2021-01-27 NOTE — BH TREATMENT PLAN - NSTXDEPRESINTERMD_PSY_ALL_CORE
Potential ECT Trial and Effexor / Remeron trial
ECT Trial and seroquel
ECT Trial, seroquel, remeron
ECT Trial, seroquel, remeron

## 2021-01-27 NOTE — BH TREATMENT PLAN - NSTXSUICIDGOALOTHER_PSY_ALL_CORE
Patient will be less depressed and stable for discharge with a CGI of 2

## 2021-01-27 NOTE — BH TREATMENT PLAN - NSTXCAREGIVERPARTICIPATE_PSY_P_CORE
Family/Caregiver participated in identification of needs/problems/goals for treatment
Family/Caregiver participated in identification of needs/problems/goals for treatment/Family/Caregiver participated in defining interventions
Family/Caregiver participated in identification of needs/problems/goals for treatment/Family/Caregiver participated in defining interventions/Family/Caregiver participated in development of after care plan
Family/Caregiver participated in identification of needs/problems/goals for treatment/Family/Caregiver participated in defining interventions

## 2021-01-27 NOTE — BH TREATMENT PLAN - NSTXPROBSUICID_PSY_ALL_CORE
SUICIDE/SELF-INJURIOUS BEHAVIOR

## 2021-01-27 NOTE — BH TREATMENT PLAN - NSCMSPTSTRENGTHS_PSY_ALL_CORE
Compliance to treatment/Intact employment/Intact family/Motivated/Supportive family
Intact family/Positive attitude/Supportive family
Compliance to treatment/Intact family/Intelligence/Positive attitude/Supportive family
Compliance to treatment/Intact family/Intelligence/Positive attitude/Supportive family

## 2021-01-27 NOTE — BH INPATIENT PSYCHIATRY PROGRESS NOTE - NSBHCONSBHPROVDETAILS_PSY_A_CORE  FT
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 Hugh Cline ([phone]) about patient's previous treatments, suicidal attempts, and recommendations for him. While not discussed on phone call, Dr. Cline will no longer be handling this patient's outpatient care given suicidal attempts.

## 2021-01-27 NOTE — BH INPATIENT PSYCHIATRY PROGRESS NOTE - NSBHFUPINTERVALHXFT_PSY_A_CORE
The patient was seen for follow up of depression. Chart reviewed and case discussed with multidisciplinary team. No acute overnight events. No PRN's required.  On exam, pt states his mood is "okay." He continues to report poor sleep. He denies passive or active suicidal ideation, plan, or intent. Denies HI, KEIKOH.  The patient was seen for follow up of depression. Chart reviewed and case discussed with multidisciplinary team. No acute overnight events. No PRN's required.  On exam, pt states his mood is "so-so." Pt states his sleep was improved overnight. Pt expressed interest in returning to work and asked if treatment team could provide a note clearing him for work. He denies passive or active suicidal ideation, plan, or intent. Denies HI, AVH.

## 2021-01-27 NOTE — BH TREATMENT PLAN - NSTXDEPRESINTERRN_PSY_ALL_CORE
Assess mental status, mood, thoughts, and behavior.  Provide reassurance, support.  Provide teaching on effective coping skills, stress management, relaxation techniques.
Assess mental status, mood, thoughts, and behavior.  Provide reassurance, support.  Provide teaching on effective coping skills, stress management, relaxation techniques.
will have improved mood
will have improved mood
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
## 2021-01-27 NOTE — BH TREATMENT PLAN - NSTXDCOPLKINTERSW_PSY_ALL_CORE
SW will meet with pt to discuss goals for d/c when appropriate
SW continues to provide support to pt as needed, explored options for follow-up care once stabilzed and ready for d/c
SW will work with pt and tx team to determine appropriate follow-up as pt's is planned to not to return to his outpt provider, will explore options with pt and spouse

## 2021-01-27 NOTE — BH TREATMENT PLAN - NSTXSUICIDINTERMD_PSY_ALL_CORE
Potential ECT Trial and Effexor / Remeron trial
ECT Trial and seroquel

## 2021-01-27 NOTE — BH TREATMENT PLAN - NSTXPATIENTPARTICIPATE_PSY_ALL_CORE
Patient participated in identification of needs/problems/goals for treatment/Patient participated in defining interventions Patient participated in identification of needs/problems/goals for treatment/Patient participated in defining interventions/Patient participated in development of after care plan

## 2021-01-28 PROCEDURE — 99231 SBSQ HOSP IP/OBS SF/LOW 25: CPT

## 2021-01-28 RX ADMIN — MIRTAZAPINE 30 MILLIGRAM(S): 45 TABLET, ORALLY DISINTEGRATING ORAL at 21:24

## 2021-01-28 RX ADMIN — Medication 6 MILLIGRAM(S): at 21:24

## 2021-01-28 RX ADMIN — Medication 1 MILLIGRAM(S): at 21:24

## 2021-01-28 RX ADMIN — Medication 1 MILLIGRAM(S): at 08:43

## 2021-01-28 RX ADMIN — QUETIAPINE FUMARATE 250 MILLIGRAM(S): 200 TABLET, FILM COATED ORAL at 21:24

## 2021-01-28 NOTE — BH INPATIENT PSYCHIATRY PROGRESS NOTE - NSBHFUPINTERVALHXFT_PSY_A_CORE
The patient was seen for follow up of depression. Chart reviewed and case discussed with multidisciplinary team. No acute overnight events. No PRN's required.  On exam, pt states his mood is "good." Pt states he slept several hours overnight. He feels that his ECT treatment yesterday went well, and that he noticed some improvement in his mood afterward. He denies physical pain or soreness. He denies passive or active suicidal ideation, plan, or intent. Denies HI, AVH.
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
## 2021-01-28 NOTE — BH INPATIENT PSYCHIATRY PROGRESS NOTE - NSBHCHARTREVIEWVS_PSY_A_CORE FT
Vital Signs Last 24 Hrs  T(C): 36.3 (28 Jan 2021 08:26), Max: 36.9 (27 Jan 2021 10:05)  T(F): 97.4 (28 Jan 2021 08:26), Max: 98.5 (27 Jan 2021 10:05)  HR: 89 (27 Jan 2021 12:52) (74 - 89)  BP: 132/81 (27 Jan 2021 12:52) (117/83 - 134/86)  BP(mean): --  RR: 18 (27 Jan 2021 12:10) (16 - 18)  SpO2: 100% (27 Jan 2021 12:10) (97% - 100%)
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
## 2021-01-28 NOTE — BH INPATIENT PSYCHIATRY PROGRESS NOTE - NSBHCONSBHPROVDETAILS_PSY_A_CORE  FT
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 Hugh Cline ([phone]) about patient's previous treatments, suicidal attempts, and recommendations for him. While not discussed on phone call, Dr. Cline will no longer be handling this patient's outpatient care given suicidal attempts.

## 2021-01-28 NOTE — BH INPATIENT PSYCHIATRY PROGRESS NOTE - MSE UNSTRUCTURED FT
On exam today the patient appears his stated age with appropriate grooming and hygiene. He is cooperative and maintains fair eye contact. No abnormal movements. Speech is clear, fluent, normal rate and volume. Mood is "good." Affect is neutral. Thought process is linear and goal directed. Denies passive or active SI, plan, or intent. Denies AVH. Patient is cognitively grossly intact. Insight and judgment are fair. Impulse control is intact at this time.

## 2021-01-29 PROCEDURE — 90870 ELECTROCONVULSIVE THERAPY: CPT

## 2021-01-29 PROCEDURE — 99231 SBSQ HOSP IP/OBS SF/LOW 25: CPT | Mod: 25

## 2021-01-29 RX ORDER — FLUMAZENIL 0.1 MG/ML
0.2 VIAL (ML) INTRAVENOUS ONCE
Refills: 0 | Status: COMPLETED | OUTPATIENT
Start: 2021-01-29 | End: 2021-01-29

## 2021-01-29 RX ADMIN — Medication 1 MILLIGRAM(S): at 21:03

## 2021-01-29 RX ADMIN — Medication 6 MILLIGRAM(S): at 21:03

## 2021-01-29 RX ADMIN — QUETIAPINE FUMARATE 250 MILLIGRAM(S): 200 TABLET, FILM COATED ORAL at 21:02

## 2021-01-29 RX ADMIN — Medication 0.2 MILLIGRAM(S): at 12:07

## 2021-01-29 RX ADMIN — MIRTAZAPINE 30 MILLIGRAM(S): 45 TABLET, ORALLY DISINTEGRATING ORAL at 21:03

## 2021-01-29 NOTE — BH INPATIENT PSYCHIATRY PROGRESS NOTE - MSE UNSTRUCTURED FT
On exam today the patient appears his stated age with appropriate grooming and hygiene. He is cooperative and maintains fair eye contact. No abnormal movements. Speech is clear, fluent, normal rate and volume. Mood is "." Affect is neutral. Thought process is linear and goal directed. Denies passive or active SI, plan, or intent. Denies AVH. Patient is cognitively grossly intact. Insight and judgment are fair. Impulse control is intact at this time. On exam today the patient appears his stated age with appropriate grooming and hygiene. He is cooperative and maintains fair eye contact. No abnormal movements. Speech is clear, fluent, normal rate and volume. Mood is "okay." Affect is neutral. Thought process is linear and goal directed. Denies passive or active SI, plan, or intent. Denies AVH. Patient is cognitively grossly intact. Insight and judgment are fair. Impulse control is intact at this time.
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
## 2021-01-29 NOTE — BH INPATIENT PSYCHIATRY PROGRESS NOTE - NSBHFUPINTERVALHXFT_PSY_A_CORE
The patient was seen for follow up of depression. Chart reviewed and case discussed with multidisciplinary team. No acute overnight events. No PRN's required.  On exam, pt states his mood is "."   He denies physical pain or soreness. He denies passive or active suicidal ideation, plan, or intent. Denies HI, AVH.  The patient was seen for follow up of depression. Chart reviewed and case discussed with multidisciplinary team. No acute overnight events. No PRN's required.  On exam, pt states his mood is "okay." He feels his sleep is improving and ECT is helping his mood. He is optimistic about the benefits of continuing ECT as an outpatient. He denies physical pain or soreness. He denies passive or active suicidal ideation, plan, or intent. Denies HI, AVH.

## 2021-01-29 NOTE — BH INPATIENT PSYCHIATRY PROGRESS NOTE - NSBHCHARTREVIEWVS_PSY_A_CORE FT
Vital Signs Last 24 Hrs  T(C): 36.6 (28 Jan 2021 18:11), Max: 36.6 (28 Jan 2021 18:11)  T(F): 97.8 (28 Jan 2021 18:11), Max: 97.8 (28 Jan 2021 18:11)  HR: --  BP: --  BP(mean): --  RR: --  SpO2: -- Vital Signs Last 24 Hrs  T(C): 36.5 (29 Jan 2021 08:20), Max: 36.6 (28 Jan 2021 18:11)  T(F): 97.7 (29 Jan 2021 08:20), Max: 97.8 (28 Jan 2021 18:11)  HR: 98 (29 Jan 2021 08:20) (98 - 98)  BP: 110/70 (29 Jan 2021 08:20) (110/70 - 110/70)  BP(mean): --  RR: --  SpO2: --

## 2021-01-29 NOTE — BH INPATIENT PSYCHIATRY PROGRESS NOTE - NSBHCONSBHPROVDETAILS_PSY_A_CORE  FT
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 Hugh Cline ([phone]) about patient's previous treatments, suicidal attempts, and recommendations for him. While not discussed on phone call, Dr. Cline will no longer be handling this patient's outpatient care given suicidal attempts.
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
## 2021-01-29 NOTE — ECT TREATMENT NOTE - NSECTFOCPEVITALS_PSY_ALL_CORE
Vital Signs Last 24 Hrs  T(C): 36.5 (29 Jan 2021 11:38), Max: 36.6 (28 Jan 2021 18:11)  T(F): 97.7 (29 Jan 2021 11:38), Max: 97.8 (28 Jan 2021 18:11)  HR: 72 (29 Jan 2021 11:38) (72 - 98)  BP: 129/78 (29 Jan 2021 11:38) (110/70 - 129/78)  BP(mean): --  RR: 18 (29 Jan 2021 11:38) (18 - 18)  SpO2: 99% (29 Jan 2021 11:38) (99% - 99%)

## 2021-01-30 PROCEDURE — 99231 SBSQ HOSP IP/OBS SF/LOW 25: CPT

## 2021-01-30 RX ADMIN — QUETIAPINE FUMARATE 250 MILLIGRAM(S): 200 TABLET, FILM COATED ORAL at 20:16

## 2021-01-30 RX ADMIN — MIRTAZAPINE 30 MILLIGRAM(S): 45 TABLET, ORALLY DISINTEGRATING ORAL at 20:16

## 2021-01-30 RX ADMIN — Medication 6 MILLIGRAM(S): at 20:15

## 2021-01-30 RX ADMIN — Medication 1 MILLIGRAM(S): at 20:16
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
## 2021-01-30 NOTE — BH INPATIENT PSYCHIATRY PROGRESS NOTE - NSBHCONSBHPROVDETAILS_PSY_A_CORE  FT
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 Hugh Cline ([phone]) about patient's previous treatments, suicidal attempts, and recommendations for him. While not discussed on phone call, Dr. Cline will no longer be handling this patient's outpatient care given suicidal attempts.

## 2021-01-30 NOTE — BH INPATIENT PSYCHIATRY PROGRESS NOTE - NSBHCHARTREVIEWVS_PSY_A_CORE FT
Vital Signs Last 24 Hrs  T(C): 36.2 (30 Jan 2021 07:00), Max: 36.3 (29 Jan 2021 17:53)  T(F): 97.1 (30 Jan 2021 07:00), Max: 97.4 (29 Jan 2021 17:53)  HR: --  BP: --  BP(mean): --  RR: --  SpO2: --

## 2021-01-30 NOTE — BH INPATIENT PSYCHIATRY PROGRESS NOTE - NSBHFUPINTERVALHXFT_PSY_A_CORE
The patient was seen for follow up of depression.  No acute overnight events. No PRN's required.  On exam, pt states his mood is "getting better." He feels his sleep is improving and ECT is helping his mood. He denies passive or active suicidal ideation, plan, or intent. Denies HI, AVH.

## 2021-01-30 NOTE — BH INPATIENT PSYCHIATRY PROGRESS NOTE - MSE UNSTRUCTURED FT
On exam today the patient appears his stated age with appropriate grooming and hygiene. He is cooperative and maintains fair eye contact.    Speech is clear, fluent, normal rate and volume.   Mood is "okay." Affect is neutral.   Thought process is linear and goal directed.   Thought Content: No evidence of delusionsDenies passive or active SI, plan, or intent. Denies AVH.   Patient is cognitively grossly intact.  AAO X 3  Insight and judgment are fair. Impulse control is intact at this time.

## 2021-01-31 RX ADMIN — Medication 6 MILLIGRAM(S): at 21:26

## 2021-01-31 RX ADMIN — MIRTAZAPINE 30 MILLIGRAM(S): 45 TABLET, ORALLY DISINTEGRATING ORAL at 21:26

## 2021-01-31 RX ADMIN — Medication 1 MILLIGRAM(S): at 21:26

## 2021-01-31 RX ADMIN — QUETIAPINE FUMARATE 250 MILLIGRAM(S): 200 TABLET, FILM COATED ORAL at 21:26

## 2021-02-01 PROCEDURE — 99232 SBSQ HOSP IP/OBS MODERATE 35: CPT | Mod: 25

## 2021-02-01 PROCEDURE — 90853 GROUP PSYCHOTHERAPY: CPT

## 2021-02-01 RX ORDER — FLUMAZENIL 0.1 MG/ML
0.2 VIAL (ML) INTRAVENOUS ONCE
Refills: 0 | Status: COMPLETED | OUTPATIENT
Start: 2021-02-01 | End: 2021-02-01

## 2021-02-01 RX ORDER — MIRTAZAPINE 45 MG/1
1 TABLET, ORALLY DISINTEGRATING ORAL
Qty: 14 | Refills: 0
Start: 2021-02-01 | End: 2021-02-14

## 2021-02-01 RX ORDER — QUETIAPINE FUMARATE 200 MG/1
1 TABLET, FILM COATED ORAL
Qty: 14 | Refills: 0
Start: 2021-02-01 | End: 2021-02-14

## 2021-02-01 RX ORDER — TAMSULOSIN HYDROCHLORIDE 0.4 MG/1
1 CAPSULE ORAL
Qty: 14 | Refills: 0
Start: 2021-02-01 | End: 2021-02-14

## 2021-02-01 RX ORDER — PROPRANOLOL HCL 160 MG
1 CAPSULE, EXTENDED RELEASE 24HR ORAL
Qty: 28 | Refills: 0
Start: 2021-02-01 | End: 2021-02-14

## 2021-02-01 RX ORDER — QUETIAPINE FUMARATE 200 MG/1
300 TABLET, FILM COATED ORAL AT BEDTIME
Refills: 0 | Status: DISCONTINUED | OUTPATIENT
Start: 2021-02-01 | End: 2021-02-02

## 2021-02-01 RX ADMIN — MIRTAZAPINE 30 MILLIGRAM(S): 45 TABLET, ORALLY DISINTEGRATING ORAL at 20:49

## 2021-02-01 RX ADMIN — Medication 0.2 MILLIGRAM(S): at 09:32

## 2021-02-01 RX ADMIN — Medication 6 MILLIGRAM(S): at 20:48

## 2021-02-01 RX ADMIN — QUETIAPINE FUMARATE 300 MILLIGRAM(S): 200 TABLET, FILM COATED ORAL at 21:42

## 2021-02-01 NOTE — ECT PRE-PROCEDURE CHECKLIST - NSBENZOLAST24HRS_PSY_ALL_CORE
yes (notify psychiatrist)

## 2021-02-01 NOTE — BH PSYCHOLOGY - GROUP THERAPY NOTE - NSPSYCHOLGRPCOGPROB_PSY_A_CORE
high reactivity to psychosocial stressor/impaired problem solving skills/isolation/lack of behaviors to reduce symptoms

## 2021-02-01 NOTE — ECT PRE-PROCEDURE CHECKLIST - NSPROEDALEARNPREFOTH_GEN_A_NUR
individual instruction

## 2021-02-01 NOTE — BH INPATIENT PSYCHIATRY DISCHARGE NOTE - NSDCMRMEDTOKEN_GEN_ALL_CORE_FT
melatonin 1 mg oral tablet: 6 tab(s) orally once a day (at bedtime)  mirtazapine 30 mg oral tablet: 1 tab(s) orally once a day (at bedtime)  propranolol 10 mg oral tablet: 1 tab(s) orally 2 times a day  QUEtiapine 300 mg oral tablet: 1 tab(s) orally once a day (at bedtime)

## 2021-02-01 NOTE — BH DISCHARGE NOTE NURSING/SOCIAL WORK/PSYCH REHAB - NSBHDCADDR1FT_A_CORE
Please note, this is a telehealth remote appointment, the provider will contact you on the day and time indicated*
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
## 2021-02-01 NOTE — BH INPATIENT PSYCHIATRY DISCHARGE NOTE - NSDCCPCAREPLAN_GEN_ALL_CORE_FT
PRINCIPAL DISCHARGE DIAGNOSIS  Diagnosis: MDD (major depressive disorder), recurrent episode  Assessment and Plan of Treatment: Please continue to take your medications as prescribed.       PRINCIPAL DISCHARGE DIAGNOSIS  Diagnosis: MDD (major depressive disorder), recurrent episode  Assessment and Plan of Treatment: Please continue to take your medications as prescribed.      SECONDARY DISCHARGE DIAGNOSES  Diagnosis: Moderate alcohol use disorder  Assessment and Plan of Treatment:

## 2021-02-01 NOTE — ECT PRE-PROCEDURE CHECKLIST - INV PERIPH IV LOCATION
metacarpal vein
Right:/median cubital vein
Right:/basilic vein
Right:
Right:/metacarpal vein
Right:
Right:

## 2021-02-01 NOTE — ECT PRE-PROCEDURE CHECKLIST - CAREGIVER ADDRESS
[address] [facility] [id number]
[address] [facility] [id number]
74 [facility] [id number]
74 [facility] [id number]
74 [facility] [address]
74 [facility] [id number]
74 [facility] [address]
74 [facility] [address]
74 [facility] [address]
74 [facility] [address]
74 [facility] [address]
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
## 2021-02-01 NOTE — BH PSYCHOLOGY - GROUP THERAPY NOTE - NSPSYCHOLGRPCOGINT_PSY_A_CORE
group members provided support/group members suggested positive behaviors/acceptance skills taught/explore reducing vulnerability to stress

## 2021-02-01 NOTE — BH DISCHARGE NOTE NURSING/SOCIAL WORK/PSYCH REHAB - NSCDUDCCRISIS_PSY_A_CORE
[facility]  1 (800) [facility] (393-6703)  Text "WELL" to 94910  Website: www.[employer]/.[employer] 1 (479) 701-XTHI (2116) Website: www.safehorizon.org/.National Suicide Prevention Lifeline 6 (558) 949-2750/.  [employer]  1 (476) LIFENET (818-6483)/.  [facility]  [address] [place] [address] [phone]   Hours:  Monday through Friday from 9 AM to 3 PM/.  [employer] - [facility] Crisis Line  5 [phone], Option 1

## 2021-02-01 NOTE — BH DISCHARGE NOTE NURSING/SOCIAL WORK/PSYCH REHAB - PATIENT PORTAL LINK FT
You can access the FollowMyHealth Patient Portal offered by [facility] by registering at the following website: [email or web address]. By joining [employer]’s FollowMyHealth portal, you will also be able to view your health information using other applications (apps) compatible with our system.
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
## 2021-02-01 NOTE — ECT TREATMENT NOTE - NSICDXBHPRIMARYDX_PSY_ALL_CORE
MDD (major depressive disorder), recurrent episode   F33.9  

## 2021-02-01 NOTE — ECT PRE-PROCEDURE CHECKLIST - TO WHOM
Procedure room nurse 
procedure rn 
karine morales
procedure rn 
procedure rn 
self
Procedure room nurse 
self

## 2021-02-01 NOTE — ECT TREATMENT NOTE - NSECTFOCPECOMPLET_PSY_ALL_CORE
Focused Physical Exam Completed

## 2021-02-01 NOTE — ECT PRE-PROCEDURE CHECKLIST - NSPROEDAREADYLEARN_GEN_A_NUR
depression

## 2021-02-01 NOTE — ECT PRE-PROCEDURE CHECKLIST - NSECTNPODT_PSY_ALL_CORE
13-Jan-2021 00:29
19-Jan-2021 00:00
24-Jan-2021 20:38
29-Jan-2021 00:00
14-Jan-2021 17:00
01-Feb-2021 00:00
22-Jan-2021 13:40
27-Jan-2021 10:55
25-Jan-2021 00:00

## 2021-02-01 NOTE — ECT TREATMENT NOTE - NSECTPTEVAL_PSY_ALL_CORE
Patient evaluated and History and Physical reviewed prior to ECT. There are no significant changes to the patient's condition unless specified.
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
## 2021-02-01 NOTE — BH DISCHARGE NOTE NURSING/SOCIAL WORK/PSYCH REHAB - NSDCPRGOAL_PSY_ALL_CORE
Pt has demonstrated improvement during this hospitalization. Pt’s psychiatric rehabilitation goal was partially addressed. Pt’s psych rehab goal was identifying coping skills for better sxs management. Pt was able to identify reading books and watching good movies as his coping strategies with writer’s assistance. Pt reported that ECT treatment helps with his mood. Pt reported his mood as “getting better”. Pt denies SI/HI. Pt is more future oriented. Pt stated that he is planning to go back to work. Pt also reported improvement in his sleep. Overall, pt is in good behavioral control on the unit. Pt is more visible and attending some of psych rehab groups. Pt is able to meaningfully participate in groups with leaders’ prompt. Pt is able to maintain ADLs appropriately.

## 2021-02-01 NOTE — BH INPATIENT PSYCHIATRY DISCHARGE NOTE - NSBHDCSIGEVENTSFT_PSY_A_CORE
There were no behavioral problems on the unit.  Patient did not become agitated and did not require emergent intramuscular medications or seclusion/restraints.  Patient did not self-harm on the unit.  Patient remained actively engaged in treatment.  Patient participated in group and milieu therapy.  Patient got along appropriately with staff and peers.

## 2021-02-01 NOTE — ECT TREATMENT NOTE - NSECTFLUMAZ_PSY_ALL_CORE
0.2 mg IV Push

## 2021-02-01 NOTE — BH DISCHARGE NOTE NURSING/SOCIAL WORK/PSYCH REHAB - NSDCPRRECOMMEND_PSY_ALL_CORE
Rehabilitation staff recommends patient continue to demonstrate medication/treatment compliance and use coping skills for symptom management.

## 2021-02-01 NOTE — ECT PRE-PROCEDURE CHECKLIST - CAREGIVER PHONE NUMBER
[phone]
[phone]
Bifascicular block
[phone]
[phone]
[phone]
Bifascicular block
[phone]
[phone]
[phone]
[phone]
[phone]
[phone]

## 2021-02-01 NOTE — ECT TREATMENT NOTE - NSECTCPTCODE_PSY_ALL_CORE
71374 (ECT-Electroconvulsive Therapy)
80902 (ECT-Electroconvulsive Therapy)
06935 (ECT-Electroconvulsive Therapy)
42248 (ECT-Electroconvulsive Therapy)
73588 (ECT-Electroconvulsive Therapy)
09201 (ECT-Electroconvulsive Therapy)
83619 (ECT-Electroconvulsive Therapy)

## 2021-02-01 NOTE — BH INPATIENT PSYCHIATRY DISCHARGE NOTE - MODIFICATIONS
Patient seen individually for discharge day management. I was physically present during the service to the patient and personally examined the patient and I was directly involved in the management plan and recommendations of the care provided to the patient.   I have reviewed the resident's progress note and agree with its contents and I have made changes as indicated

## 2021-02-01 NOTE — ECT TREATMENT NOTE - NSECTPOSTTXCOMMENTS_PSY_ALL_CORE
To consider increasing energy next time
Weak amplitude/short seizure, suggest INCREASE ENERGY AT NEXT TX
To consider delayed stimulus next time

## 2021-02-01 NOTE — ECT PRE-PROCEDURE CHECKLIST - PATIENT PROBLEMS/NEEDS
Patient expressed no known problems or needs

## 2021-02-01 NOTE — BH INPATIENT PSYCHIATRY PROGRESS NOTE - NSBHFUPINTERVALHXFT_PSY_A_CORE
The patient was seen for follow up of depression.  No acute overnight events. No PRN's required.  On exam, pt states his mood is "improving." He feels his sleep is improving and ECT is helping his mood. He even asks about a letter to be able to go back to work

## 2021-02-01 NOTE — BH INPATIENT PSYCHIATRY PROGRESS NOTE - NSBHCHARTREVIEWVS_PSY_A_CORE FT
Vital Signs Last 24 Hrs  T(C): 37.1 (01 Feb 2021 09:15), Max: 37.1 (01 Feb 2021 09:15)  T(F): 98.7 (01 Feb 2021 09:15), Max: 98.7 (01 Feb 2021 09:15)  HR: 76 (01 Feb 2021 09:15) (76 - 76)  BP: 110/84 (01 Feb 2021 09:15) (110/84 - 110/84)  BP(mean): --  RR: 13 (01 Feb 2021 09:15) (13 - 13)  SpO2: 100% (01 Feb 2021 09:15) (100% - 100%)

## 2021-02-01 NOTE — BH INPATIENT PSYCHIATRY DISCHARGE NOTE - NSBHMETABOLIC_PSY_ALL_CORE_FT
BMI: BMI (kg/m2): 31.2 (01-25-21 @ 12:32)  HbA1c: A1C with Estimated Average Glucose Result: 5.3 % (01-07-21 @ 10:28)    Glucose: POCT Blood Glucose.: 114 mg/dL (01-15-21 @ 16:19)    BP: 112/78 (02-01-21 @ 14:49) (110/84 - 140/86)  Lipid Panel: Date/Time: 01-08-21 @ 09:41  Cholesterol, Serum: 140  Direct LDL: --  HDL Cholesterol, Serum: 43  Total Cholesterol/HDL Ration Measurement: --  Triglycerides, Serum: 102

## 2021-02-01 NOTE — ECT PRE-PROCEDURE CHECKLIST - ALLERGIES
Allergies:-  Drug Allergies Not Recorded  Seafood      
Allergies:-  Drug Allergies Not Recorded  Seafood      
Allergies:-  No Known Allergies      
Allergies:-  No Known Allergies      
Allergies:-  Drug Allergies Not Recorded  Seafood      
Allergies:-  No Known Allergies      
Allergies:-  Drug Allergies Not Recorded  Seafood      
Allergies:-  Drug Allergies Not Recorded  Seafood
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
## 2021-02-01 NOTE — BH PSYCHOLOGY - GROUP THERAPY NOTE - NSPSYCHOLGRPCOGGOAL_PSY_A_CORE
reduce reaction to psychosocial stressors/develop improved problem solving skills/prevent relapse of symptoms

## 2021-02-01 NOTE — ECT TREATMENT NOTE - NSECTROSNEGAT_PSY_ALL_CORE
Review of Systems negative/unchanged from previous exam except as noted below

## 2021-02-01 NOTE — ECT TREATMENT NOTE - NSECTIMPPLAN_PSY_ALL_CORE
Assessment today offers no contraindications to continue plan of treatment with ECT.

## 2021-02-01 NOTE — ECT TREATMENT NOTE - NSECTRECTXSCHED_PSY_ALL_CORE
Acute Course 3X per week

## 2021-02-01 NOTE — ECT PRE-PROCEDURE CHECKLIST - CAREGIVER NAME
Noah Barth
oNah Barth
Noah Barth

## 2021-02-01 NOTE — ECT PRE-PROCEDURE CHECKLIST - NSPTSENTTO_PSY_ALL_CORE
procedural room

## 2021-02-01 NOTE — BH INPATIENT PSYCHIATRY PROGRESS NOTE - NSBHCONSBHPROVDETAILS_PSY_A_CORE  FT
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 Hugh Cline ([phone]) about patient's previous treatments, suicidal attempts, and recommendations for him. While not discussed on phone call, Dr. Cline will no longer be handling this patient's outpatient care given suicidal attempts.

## 2021-02-01 NOTE — ECT PRE-PROCEDURE CHECKLIST - AS BP NONINV SITE
left upper arm
left upper arm
left lower arm
left lower arm
left upper arm

## 2021-02-01 NOTE — BH INPATIENT PSYCHIATRY DISCHARGE NOTE - NSBHDCRISKMITIGATE_PSY_ALL_CORE
Family/Other social support involvement/Maintenance ECT/Medications targeting suicidality/non-suicidal self injurious behavior/Other

## 2021-02-01 NOTE — BH INPATIENT PSYCHIATRY PROGRESS NOTE - MSE UNSTRUCTURED FT
On exam today the patient is making fair eye contact and is cooperative.    Speech is clear and of normal rate.  Thought process: with no evidence of a disorder of thought process.    Thought content: with no evidence of psychotic beliefs.   Perception: Denies hallucinations.  Mood: Describes as "better".  Affect: flat.    Patient denies active suicidal ideation, intent and plan.    Patient denies active aggressive/homicidal ideation, intent or plan.   Patient is Alert and oriented in all spheres. Patient is cognitively grossly intact. Fund of knowledge is fair. Memory is intact  Insight and judgment are impaired. Impulse control is intact at this time.    Vital signs are stable. Gait and station WNL

## 2021-02-01 NOTE — BH PSYCHOLOGY - GROUP THERAPY NOTE - NSBHPSYCHOLPARTICIPCOMMENT_PSY_A_CORE FT
Pt left session early due to being removed by staff. He presented as calm, stated mood was “so so,” with constricted affect. Pt was minimally verbal, with speech WNL. Thought process was linear; thought content WNL. Pt reported he is feeling better after receiving a course of ECT. No SI or HI elicited.

## 2021-02-01 NOTE — ECT PRE-PROCEDURE CHECKLIST - NSMEDSDOSETAKEN_PSY_ALL_CORE
see emar 
Ativan 1 mg PO and Inderal 10 mg
see emar 

## 2021-02-01 NOTE — ECT TREATMENT NOTE - NSECTREVSYS_PSY_ALL_CORE
Abdominal/Metabolic/Other

## 2021-02-01 NOTE — BH INPATIENT PSYCHIATRY DISCHARGE NOTE - CASE SUMMARY
Patient’s admission history and course of treatment as above.    Suicide and risk assessment performed prior to discharge. The patient has a low acute risk and low chronic risk of self-harm and aggression towards others. Protective factors include denying SI, no SIB, denying HI, good social supports in their family,  no current mood symptoms, no hopelessness, future-oriented in returning to home, no access to firearms.  Risk factors include presenting illness and substance misuse. Immediate risk was minimized by inpatient admission to a safe environment with appropriate supervision and limited access to lethal means. Future risk was minimized before discharge by treatment of acute episode, maximizing outpatient support, providing relevant patient education, discussing emergency procedures, and ensuring close follow-up. The patient remains at a low risk of self-harm, and such risk cannot be further ameliorated by continued inpatient treatment and the patient is therefore appropriate for discharge.       There were no behavioral problems on the unit.  Patient did not become agitated and did not require emergent intramuscular medications or seclusion / restraints.  Patient did not self-harm on the unit.  Patient remained actively engaged in treatment.  Patient participated in individual, group, and milieu therapy.  Patient got along appropriately with staff and peers.   Patient did not have any medical problems during this hospitalization.  There were no medical consultations.    A full discussion of the factors that predict treatment success and relapse was held including safety planning.  A discussion of the risks and benefits of patient’s medication was held including a discussion of the metabolic risks and risk of EPS and TD was done       The patient has improved significantly and no longer requires inpatient treatment and care. Patient denies all suicidal and aggressive ideation, intent and plan. Patient denies anxiety symptoms and panic attacks. Patient is not judged to be an acute danger to self or others at this time. Patient will be discharged today to home and outpatient follow up including DE[facility]RS and Outpatient ECT.

## 2021-02-01 NOTE — ECT TREATMENT NOTE - NSECTTXPERFDATETIME_PSY_ALL_CORE
01-Feb-2021 09:39
22-Jan-2021 13:58
19-Jan-2021 11:39
15-Kevin-2021 10:48
13-Jan-2021 12:50
27-Jan-2021 11:21
25-Jan-2021 12:59
29-Jan-2021 12:14

## 2021-02-01 NOTE — ECT PRE-PROCEDURE CHECKLIST - ALLERGY BAND ON
no known allergies
no known allergies
done
done
no known allergies

## 2021-02-01 NOTE — BH PSYCHOLOGY - GROUP THERAPY NOTE - NSPSYCHOLGRPCOGPT_PSY_A_CORE FT
The 45-minute group therapy session focused on resilience. After a check-in, group members learned about the concept of resilience and reflected on what it means to them. Group members shared and processed personal examples, discussing challenges they faced and how they coped with change or adversity. Handouts were distributed. Group rules, including COVID-19 safety precautions (i.e., mask-wearing, social distancing), were reviewed.

## 2021-02-01 NOTE — BH INPATIENT PSYCHIATRY DISCHARGE NOTE - NSBHDCMEDICALFT_PSY_A_CORE
Patient did not have any medical problems during this hospitalization. There were no medical consultations.

## 2021-02-02 VITALS — TEMPERATURE: 98 F

## 2021-02-02 DIAGNOSIS — F10.20 ALCOHOL DEPENDENCE, UNCOMPLICATED: ICD-10-CM

## 2021-02-02 LAB — SARS-COV-2 RNA SPEC QL NAA+PROBE: SIGNIFICANT CHANGE UP

## 2021-02-02 PROCEDURE — 99232 SBSQ HOSP IP/OBS MODERATE 35: CPT

## 2021-02-02 NOTE — BH INPATIENT PSYCHIATRY PROGRESS NOTE - NSTXSUICIDPROGRES_PSY_ALL_CORE
Met - goal discontinued
Improving
Met - goal discontinued
Improving
No Change
Improving
No Change
No Change
Improving
Met - goal discontinued
Improving
No Change
Met - goal discontinued
Improving
No Change
Improving
Met - goal discontinued
No Change
Met - goal discontinued
Improving
No Change

## 2021-02-02 NOTE — BH INPATIENT PSYCHIATRY PROGRESS NOTE - NSTXECTDATEEST_PSY_ALL_CORE
13-Jan-2021

## 2021-02-02 NOTE — BH INPATIENT PSYCHIATRY PROGRESS NOTE - NSTXECTDATETRGT_PSY_ALL_CORE
27-Jan-2021

## 2021-02-02 NOTE — BH INPATIENT PSYCHIATRY PROGRESS NOTE - NSBHCHARTREVIEWVS_PSY_A_CORE FT
Vital Signs Last 24 Hrs  T(C): 36.3 (02 Feb 2021 06:29), Max: 37.1 (01 Feb 2021 09:15)  T(F): 97.4 (02 Feb 2021 06:29), Max: 98.7 (01 Feb 2021 09:15)  HR: 81 (01 Feb 2021 14:49) (76 - 89)  BP: 112/78 (01 Feb 2021 14:49) (110/84 - 140/86)  BP(mean): --  RR: 15 (01 Feb 2021 10:15) (13 - 20)  SpO2: 100% (01 Feb 2021 10:15) (95% - 100%)
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
## 2021-02-02 NOTE — BH INPATIENT PSYCHIATRY PROGRESS NOTE - NSBHCONSBHPROVDETAILS_PSY_A_CORE  FT
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 Hugh Cline ([phone]) about patient's previous treatments, suicidal attempts, and recommendations for him. While not discussed on phone call, Dr. Cline will no longer be handling this patient's outpatient care given suicidal attempts.

## 2021-02-02 NOTE — BH INPATIENT PSYCHIATRY PROGRESS NOTE - NSTXECTPROGRES_PSY_ALL_CORE
No Change
Improving
No Change
No Change
Improving
No Change
Improving
Improving

## 2021-02-02 NOTE — BH INPATIENT PSYCHIATRY PROGRESS NOTE - NSTXDEPRESPROGRES_PSY_ALL_CORE
No Change
Improving
No Change
Improving
No Change
No Change
Improving
No Change
Improving
No Change
No Change
Improving
No Change
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
## 2021-02-02 NOTE — BH INPATIENT PSYCHIATRY PROGRESS NOTE - NSTXDCOPLKPROGRES_PSY_ALL_CORE
No Change

## 2021-02-02 NOTE — BH INPATIENT PSYCHIATRY PROGRESS NOTE - NSTXDEPRESDATEEST_PSY_ALL_CORE
07-Jan-2021
06-Jan-2021

## 2021-02-02 NOTE — BH INPATIENT PSYCHIATRY PROGRESS NOTE - NSTXPROBDEPRES_PSY_ALL_CORE
DEPRESSIVE SYMPTOMS

## 2021-02-02 NOTE — BH INPATIENT PSYCHIATRY PROGRESS NOTE - MODIFICATIONS
Patient interviewed and evaluated with resident present to follow up on symptoms and the case has been reviewed with the treatment team this morning.   I was physically present during the service to the patient and personally examined the patient and was directly involved in the management and recommendations of the care provided to the patient.  I have reviewed the resident's progress note and the mental status examination and I agree with its contents and made changes as indicated Patient seen individually for discharge day management. Greater than 30 minutes was spent with patient, staff and charting as part of discharge day management. I was physically present during the service to the patient and personally examined the patient and I was directly involved in the management plan and recommendations of the care provided to the patient.   I have reviewed the resident's progress note and agree with its contents and I have made changes as indicated

## 2021-02-02 NOTE — BH INPATIENT PSYCHIATRY PROGRESS NOTE - NSTXPROBSUICID_PSY_ALL_CORE
SUICIDE/SELF-INJURIOUS BEHAVIOR

## 2021-02-02 NOTE — BH INPATIENT PSYCHIATRY PROGRESS NOTE - NSTXDEPRESINTERMD_PSY_ALL_CORE
ECT Trial, seroquel, remeron
Potential ECT Trial and Effexor / Remeron trial
Potential ECT Trial and Effexor / Remeron trial
ECT Trial and seroquel
Potential ECT Trial and Effexor / Remeron trial
ECT Trial and seroquel
ECT Trial, seroquel, remeron
ECT Trial, seroquel, remeron
Potential ECT Trial and Effexor / Remeron trial
ECT Trial, seroquel, remeron
ECT Trial and seroquel
ECT Trial, seroquel, remeron
ECT Trial, seroquel, remeron
Potential ECT Trial and Effexor / Remeron trial

## 2021-02-02 NOTE — BH INPATIENT PSYCHIATRY PROGRESS NOTE - MSE UNSTRUCTURED FT
On exam today the patient is making fair eye contact and is cooperative.    Speech is clear and of normal rate.  Thought process: with no evidence of a disorder of thought process.    Thought content: with no evidence of psychotic beliefs.   Perception: Denies hallucinations.  Mood: Describes as "better".  Affect: flat.    Patient denies active suicidal ideation, intent and plan.    Patient denies active aggressive/homicidal ideation, intent or plan.   Patient is Alert and oriented in all spheres. Patient is cognitively grossly intact. Fund of knowledge is fair. Memory is intact  Insight and judgment are impaired. Impulse control is intact at this time.    Vital signs are stable. Gait and station WNL   On exam today the patient appears his stated age with appropriate grooming and hygiene. He is cooperative and maintains fair eye contact. No abnormal movements. Speech is clear, fluent, normal rate and volume. Mood is "okay." Affect is neutral. Thought process is linear and goal directed. Denies passive or active SI, plan, or intent. Denies AVH. Patient is cognitively grossly intact. Insight and judgment are fair. Impulse control is intact at this time.

## 2021-02-02 NOTE — BH INPATIENT PSYCHIATRY PROGRESS NOTE - NSTXDCOPLKGOAL_PSY_ALL_CORE
Will agree to consider an appropriate level of outpatient care

## 2021-02-02 NOTE — BH INPATIENT PSYCHIATRY PROGRESS NOTE - NSBHFUPINTERVALHXFT_PSY_A_CORE
The patient was seen for follow up of depression. Chart reviewed and case discussed with multidisciplinary team. No acute overnight events. No PRN's required.  On exam, pt states his mood is "."   He denies passive or active suicidal ideation, plan, or intent. Denies HI, AVH.  The patient was seen for follow up of depression. Chart reviewed and case discussed with multidisciplinary team. No acute overnight events. No PRN's required.  On exam, pt states his mood is "okay." He is looking forward to going home and seeing his wife and daughter. He also looks forward to returning to his job. Discussed plan for outpatient med management and ECT. Pt is confident he will be able to attend appointments. He denies passive or active suicidal ideation, plan, or intent. Denies HI, AVH.

## 2021-02-02 NOTE — BH INPATIENT PSYCHIATRY PROGRESS NOTE - NSBHANTIPSYCHOTIC_PSY_ALL_CORE
Yes...
No

## 2021-02-02 NOTE — BH INPATIENT PSYCHIATRY PROGRESS NOTE - NSBHCONSULTIPREASON_PSY_A_CORE
danger to self; mental illness expected to respond to inpatient care

## 2021-02-02 NOTE — BH INPATIENT PSYCHIATRY PROGRESS NOTE - NSTXSUICIDGOALOTHER_PSY_ALL_CORE
Patient will be less depressed and stable for discharge with a CGI of 2

## 2021-02-02 NOTE — BH INPATIENT PSYCHIATRY PROGRESS NOTE - NSTXDEPRESDATENEW_PSY_ALL_CORE
28-Jan-2021

## 2021-02-02 NOTE — BH INPATIENT PSYCHIATRY PROGRESS NOTE - NSTXSUICIDINTERMD_PSY_ALL_CORE
ECT Trial and seroquel
Potential ECT Trial and Effexor / Remeron trial
ECT Trial and seroquel
Potential ECT Trial and Effexor / Remeron trial
ECT Trial and seroquel
Potential ECT Trial and Effexor / Remeron trial
ECT Trial and seroquel
Potential ECT Trial and Effexor / Remeron trial
ECT Trial and seroquel
Potential ECT Trial and Effexor / Remeron trial
ECT Trial and seroquel
Potential ECT Trial and Effexor / Remeron trial
ECT Trial and seroquel
Potential ECT Trial and Effexor / Remeron trial

## 2021-02-02 NOTE — BH INPATIENT PSYCHIATRY PROGRESS NOTE - NSTREATMENTCERTY_PSY_ALL_CORE

## 2021-02-02 NOTE — BH INPATIENT PSYCHIATRY PROGRESS NOTE - NSTXDCOPLKDATEEST_PSY_ALL_CORE
08-Jan-2021

## 2021-02-02 NOTE — BH INPATIENT PSYCHIATRY PROGRESS NOTE - NSBHMETABOLIC_PSY_ALL_CORE_FT
BMI: BMI (kg/m2): 31.3 (01-06-21 @ 16:50)  HbA1c: A1C with Estimated Average Glucose Result: 5.3 % (01-07-21 @ 10:28)    Glucose: POCT Blood Glucose.: 160 mg/dL (12-11-20 @ 23:37)    BP: 138/82 (01-06-21 @ 18:15) (138/82 - 138/82)  Lipid Panel: Date/Time: 01-07-21 @ 10:28  Cholesterol, Serum: 150  Direct LDL: --  HDL Cholesterol, Serum: 43  Total Cholesterol/HDL Ration Measurement: --  Triglycerides, Serum: 95  
BMI: BMI (kg/m2): 31.3 (01-06-21 @ 16:50)  HbA1c: A1C with Estimated Average Glucose Result: 5.3 % (01-07-21 @ 10:28)    Glucose: POCT Blood Glucose.: 160 mg/dL (12-11-20 @ 23:37)    BP: --  Lipid Panel: Date/Time: 01-08-21 @ 09:41  Cholesterol, Serum: 140  Direct LDL: --  HDL Cholesterol, Serum: 43  Total Cholesterol/HDL Ration Measurement: --  Triglycerides, Serum: 102  
BMI: BMI (kg/m2): 31.2 (01-25-21 @ 12:32)  HbA1c: A1C with Estimated Average Glucose Result: 5.3 % (01-07-21 @ 10:28)    Glucose: POCT Blood Glucose.: 114 mg/dL (01-15-21 @ 16:19)    BP: 113/74 (01-25-21 @ 13:40) (113/74 - 132/-)  Lipid Panel: Date/Time: 01-08-21 @ 09:41  Cholesterol, Serum: 140  Direct LDL: --  HDL Cholesterol, Serum: 43  Total Cholesterol/HDL Ration Measurement: --  Triglycerides, Serum: 102  
BMI: BMI (kg/m2): 31.3 (01-06-21 @ 16:50)  HbA1c: A1C with Estimated Average Glucose Result: 5.3 % (01-07-21 @ 10:28)    Glucose: POCT Blood Glucose.: 160 mg/dL (12-11-20 @ 23:37)    BP: 132/89 (01-13-21 @ 13:25) (132/89 - 150/98)  Lipid Panel: Date/Time: 01-08-21 @ 09:41  Cholesterol, Serum: 140  Direct LDL: --  HDL Cholesterol, Serum: 43  Total Cholesterol/HDL Ration Measurement: --  Triglycerides, Serum: 102  
BMI: BMI (kg/m2): 31.2 (01-25-21 @ 12:32)  HbA1c: A1C with Estimated Average Glucose Result: 5.3 % (01-07-21 @ 10:28)    Glucose: POCT Blood Glucose.: 114 mg/dL (01-15-21 @ 16:19)    BP: 119/86 (01-29-21 @ 12:50) (110/70 - 146/90)  Lipid Panel: Date/Time: 01-08-21 @ 09:41  Cholesterol, Serum: 140  Direct LDL: --  HDL Cholesterol, Serum: 43  Total Cholesterol/HDL Ration Measurement: --  Triglycerides, Serum: 102  
BMI: BMI (kg/m2): 31.3 (01-06-21 @ 16:50)  HbA1c: A1C with Estimated Average Glucose Result: 5.3 % (01-07-21 @ 10:28)    Glucose: POCT Blood Glucose.: 114 mg/dL (01-15-21 @ 16:19)    BP: 133/90 (01-19-21 @ 12:26) (130/92 - 158/96)  Lipid Panel: Date/Time: 01-08-21 @ 09:41  Cholesterol, Serum: 140  Direct LDL: --  HDL Cholesterol, Serum: 43  Total Cholesterol/HDL Ration Measurement: --  Triglycerides, Serum: 102  
BMI: BMI (kg/m2): 31.3 (01-06-21 @ 16:50)  HbA1c: A1C with Estimated Average Glucose Result: 5.3 % (01-07-21 @ 10:28)    Glucose: POCT Blood Glucose.: 114 mg/dL (01-15-21 @ 16:19)    BP: 115/86 (01-23-21 @ 08:37) (113/89 - 149/96)  Lipid Panel: Date/Time: 01-08-21 @ 09:41  Cholesterol, Serum: 140  Direct LDL: --  HDL Cholesterol, Serum: 43  Total Cholesterol/HDL Ration Measurement: --  Triglycerides, Serum: 102  
BMI: BMI (kg/m2): 31.3 (01-06-21 @ 16:50)  HbA1c: A1C with Estimated Average Glucose Result: 5.3 % (01-07-21 @ 10:28)    Glucose: POCT Blood Glucose.: 160 mg/dL (12-11-20 @ 23:37)    BP: --  Lipid Panel: Date/Time: 01-08-21 @ 09:41  Cholesterol, Serum: 140  Direct LDL: --  HDL Cholesterol, Serum: 43  Total Cholesterol/HDL Ration Measurement: --  Triglycerides, Serum: 102  
BMI: BMI (kg/m2): 31.3 (01-06-21 @ 16:50)  HbA1c: A1C with Estimated Average Glucose Result: 5.3 % (01-07-21 @ 10:28)    Glucose: POCT Blood Glucose.: 160 mg/dL (12-11-20 @ 23:37)    BP: --  Lipid Panel: Date/Time: 01-08-21 @ 09:41  Cholesterol, Serum: 140  Direct LDL: --  HDL Cholesterol, Serum: 43  Total Cholesterol/HDL Ration Measurement: --  Triglycerides, Serum: 102  
BMI: BMI (kg/m2): 31.2 (01-25-21 @ 12:32)  HbA1c: A1C with Estimated Average Glucose Result: 5.3 % (01-07-21 @ 10:28)    Glucose: POCT Blood Glucose.: 114 mg/dL (01-15-21 @ 16:19)    BP: 132/81 (01-27-21 @ 12:52) (117/83 - 134/86)  Lipid Panel: Date/Time: 01-08-21 @ 09:41  Cholesterol, Serum: 140  Direct LDL: --  HDL Cholesterol, Serum: 43  Total Cholesterol/HDL Ration Measurement: --  Triglycerides, Serum: 102  
BMI: BMI (kg/m2): 31.3 (01-06-21 @ 16:50)  HbA1c: A1C with Estimated Average Glucose Result: 5.3 % (01-07-21 @ 10:28)    Glucose: POCT Blood Glucose.: 160 mg/dL (12-11-20 @ 23:37)    BP: 138/82 (01-06-21 @ 18:15) (138/82 - 138/82)  Lipid Panel: Date/Time: 01-04-21 @ 09:56  Cholesterol, Serum: 156  Direct LDL: --  HDL Cholesterol, Serum: 42  Total Cholesterol/HDL Ration Measurement: --  Triglycerides, Serum: 110  
BMI: BMI (kg/m2): 31.3 (01-06-21 @ 16:50)  HbA1c: A1C with Estimated Average Glucose Result: 5.3 % (01-07-21 @ 10:28)    Glucose: POCT Blood Glucose.: 160 mg/dL (12-11-20 @ 23:37)    BP: 132/89 (01-13-21 @ 13:25) (132/89 - 150/98)  Lipid Panel: Date/Time: 01-08-21 @ 09:41  Cholesterol, Serum: 140  Direct LDL: --  HDL Cholesterol, Serum: 43  Total Cholesterol/HDL Ration Measurement: --  Triglycerides, Serum: 102  
BMI: BMI (kg/m2): 31.3 (01-06-21 @ 16:50)  HbA1c: A1C with Estimated Average Glucose Result: 5.3 % (01-07-21 @ 10:28)    Glucose: POCT Blood Glucose.: 160 mg/dL (12-11-20 @ 23:37)    BP: --  Lipid Panel: Date/Time: 01-08-21 @ 09:41  Cholesterol, Serum: 140  Direct LDL: --  HDL Cholesterol, Serum: 43  Total Cholesterol/HDL Ration Measurement: --  Triglycerides, Serum: 102  
BMI: BMI (kg/m2): 31.3 (01-06-21 @ 16:50)  HbA1c: A1C with Estimated Average Glucose Result: 5.3 % (01-07-21 @ 10:28)    Glucose: POCT Blood Glucose.: 160 mg/dL (12-11-20 @ 23:37)    BP: 128/86 (01-15-21 @ 11:30) (127/88 - 144/94)  Lipid Panel: Date/Time: 01-08-21 @ 09:41  Cholesterol, Serum: 140  Direct LDL: --  HDL Cholesterol, Serum: 43  Total Cholesterol/HDL Ration Measurement: --  Triglycerides, Serum: 102  
BMI: BMI (kg/m2): 31.3 (01-06-21 @ 16:50)  HbA1c: A1C with Estimated Average Glucose Result: 5.3 % (01-07-21 @ 10:28)    Glucose: POCT Blood Glucose.: 114 mg/dL (01-15-21 @ 16:19)    BP: 115/86 (01-23-21 @ 08:37) (113/89 - 149/96)  Lipid Panel: Date/Time: 01-08-21 @ 09:41  Cholesterol, Serum: 140  Direct LDL: --  HDL Cholesterol, Serum: 43  Total Cholesterol/HDL Ration Measurement: --  Triglycerides, Serum: 102  
BMI: BMI (kg/m2): 31.3 (01-06-21 @ 16:50)  HbA1c: A1C with Estimated Average Glucose Result: 5.3 % (01-07-21 @ 10:28)    Glucose: POCT Blood Glucose.: 114 mg/dL (01-15-21 @ 16:19)    BP: 132/80 (01-20-21 @ 08:45) (130/92 - 158/96)  Lipid Panel: Date/Time: 01-08-21 @ 09:41  Cholesterol, Serum: 140  Direct LDL: --  HDL Cholesterol, Serum: 43  Total Cholesterol/HDL Ration Measurement: --  Triglycerides, Serum: 102  
BMI: BMI (kg/m2): 31.2 (01-25-21 @ 12:32)  HbA1c: A1C with Estimated Average Glucose Result: 5.3 % (01-07-21 @ 10:28)    Glucose: POCT Blood Glucose.: 114 mg/dL (01-15-21 @ 16:19)    BP: 132/81 (01-27-21 @ 12:52) (113/74 - 134/86)  Lipid Panel: Date/Time: 01-08-21 @ 09:41  Cholesterol, Serum: 140  Direct LDL: --  HDL Cholesterol, Serum: 43  Total Cholesterol/HDL Ration Measurement: --  Triglycerides, Serum: 102  
BMI: BMI (kg/m2): 31.3 (01-06-21 @ 16:50)  HbA1c: A1C with Estimated Average Glucose Result: 5.3 % (01-07-21 @ 10:28)    Glucose: POCT Blood Glucose.: 114 mg/dL (01-15-21 @ 16:19)    BP: 115/86 (01-23-21 @ 08:37) (113/89 - 149/96)  Lipid Panel: Date/Time: 01-08-21 @ 09:41  Cholesterol, Serum: 140  Direct LDL: --  HDL Cholesterol, Serum: 43  Total Cholesterol/HDL Ration Measurement: --  Triglycerides, Serum: 102  
BMI: BMI (kg/m2): 31.3 (01-06-21 @ 16:50)  HbA1c: A1C with Estimated Average Glucose Result: 5.3 % (01-07-21 @ 10:28)    Glucose: POCT Blood Glucose.: 160 mg/dL (12-11-20 @ 23:37)    BP: --  Lipid Panel: Date/Time: 01-08-21 @ 09:41  Cholesterol, Serum: 140  Direct LDL: --  HDL Cholesterol, Serum: 43  Total Cholesterol/HDL Ration Measurement: --  Triglycerides, Serum: 102  
BMI: BMI (kg/m2): 31.2 (01-25-21 @ 12:32)  HbA1c: A1C with Estimated Average Glucose Result: 5.3 % (01-07-21 @ 10:28)    Glucose: POCT Blood Glucose.: 114 mg/dL (01-15-21 @ 16:19)    BP: 113/74 (01-25-21 @ 13:40) (113/74 - 132/-)  Lipid Panel: Date/Time: 01-08-21 @ 09:41  Cholesterol, Serum: 140  Direct LDL: --  HDL Cholesterol, Serum: 43  Total Cholesterol/HDL Ration Measurement: --  Triglycerides, Serum: 102  
BMI: BMI (kg/m2): 31.3 (01-06-21 @ 16:50)  HbA1c: A1C with Estimated Average Glucose Result: 5.3 % (01-07-21 @ 10:28)    Glucose: POCT Blood Glucose.: 114 mg/dL (01-15-21 @ 16:19)    BP: 132/80 (01-20-21 @ 08:45) (130/92 - 158/96)  Lipid Panel: Date/Time: 01-08-21 @ 09:41  Cholesterol, Serum: 140  Direct LDL: --  HDL Cholesterol, Serum: 43  Total Cholesterol/HDL Ration Measurement: --  Triglycerides, Serum: 102  
BMI: BMI (kg/m2): 31.3 (01-06-21 @ 16:50)  HbA1c: A1C with Estimated Average Glucose Result: 5.3 % (01-07-21 @ 10:28)    Glucose: POCT Blood Glucose.: 114 mg/dL (01-15-21 @ 16:19)    BP: 132/80 (01-20-21 @ 08:45) (130/92 - 158/96)  Lipid Panel: Date/Time: 01-08-21 @ 09:41  Cholesterol, Serum: 140  Direct LDL: --  HDL Cholesterol, Serum: 43  Total Cholesterol/HDL Ration Measurement: --  Triglycerides, Serum: 102  
BMI: BMI (kg/m2): 31.3 (01-06-21 @ 16:50)  HbA1c: A1C with Estimated Average Glucose Result: 5.3 % (01-07-21 @ 10:28)    Glucose: POCT Blood Glucose.: 160 mg/dL (12-11-20 @ 23:37)    BP: 128/86 (01-15-21 @ 11:30) (127/88 - 150/98)  Lipid Panel: Date/Time: 01-08-21 @ 09:41  Cholesterol, Serum: 140  Direct LDL: --  HDL Cholesterol, Serum: 43  Total Cholesterol/HDL Ration Measurement: --  Triglycerides, Serum: 102  
BMI: BMI (kg/m2): 31.2 (01-25-21 @ 12:32)  HbA1c: A1C with Estimated Average Glucose Result: 5.3 % (01-07-21 @ 10:28)    Glucose: POCT Blood Glucose.: 114 mg/dL (01-15-21 @ 16:19)    BP: 110/84 (02-01-21 @ 09:15) (110/84 - 146/90)  Lipid Panel: Date/Time: 01-08-21 @ 09:41  Cholesterol, Serum: 140  Direct LDL: --  HDL Cholesterol, Serum: 43  Total Cholesterol/HDL Ration Measurement: --  Triglycerides, Serum: 102  
BMI: BMI (kg/m2): 31.2 (01-25-21 @ 12:32)  HbA1c: A1C with Estimated Average Glucose Result: 5.3 % (01-07-21 @ 10:28)    Glucose: POCT Blood Glucose.: 114 mg/dL (01-15-21 @ 16:19)    BP: 112/78 (02-01-21 @ 14:49) (110/84 - 140/86)  Lipid Panel: Date/Time: 01-08-21 @ 09:41  Cholesterol, Serum: 140  Direct LDL: --  HDL Cholesterol, Serum: 43  Total Cholesterol/HDL Ration Measurement: --  Triglycerides, Serum: 102  
BMI: BMI (kg/m2): 31.3 (01-06-21 @ 16:50)  HbA1c: A1C with Estimated Average Glucose Result: 5.3 % (01-07-21 @ 10:28)    Glucose: POCT Blood Glucose.: 160 mg/dL (12-11-20 @ 23:37)    BP: 128/86 (01-15-21 @ 11:30) (127/88 - 144/94)  Lipid Panel: Date/Time: 01-08-21 @ 09:41  Cholesterol, Serum: 140  Direct LDL: --  HDL Cholesterol, Serum: 43  Total Cholesterol/HDL Ration Measurement: --  Triglycerides, Serum: 102

## 2021-02-02 NOTE — BH INPATIENT PSYCHIATRY PROGRESS NOTE - NSTXSUICIDDATEEST_PSY_ALL_CORE
13-Jan-2021

## 2021-02-02 NOTE — BH INPATIENT PSYCHIATRY PROGRESS NOTE - CURRENT MEDICATION
MEDICATIONS  (STANDING):  LORazepam     Tablet 1 milliGRAM(s) Oral at bedtime  melatonin. 6 milliGRAM(s) Oral at bedtime  mirtazapine 30 milliGRAM(s) Oral at bedtime  propranolol 10 milliGRAM(s) Oral two times a day  QUEtiapine 250 milliGRAM(s) Oral at bedtime    MEDICATIONS  (PRN):  acetaminophen   Tablet .. 975 milliGRAM(s) Oral every 6 hours PRN Mild Pain (1 - 3), Moderate Pain (4 - 6), Severe Pain (7 - 10)  LORazepam     Tablet 1 milliGRAM(s) Oral every 4 hours PRN anxiety  LORazepam   Injectable 2 milliGRAM(s) IntraMuscular once PRN agitation  
MEDICATIONS  (STANDING):  LORazepam     Tablet 1 milliGRAM(s) Oral two times a day  melatonin. 6 milliGRAM(s) Oral at bedtime  QUEtiapine 50 milliGRAM(s) Oral at bedtime  venlafaxine XR 37.5 milliGRAM(s) Oral daily    MEDICATIONS  (PRN):  acetaminophen   Tablet .. 975 milliGRAM(s) Oral every 6 hours PRN Mild Pain (1 - 3), Moderate Pain (4 - 6), Severe Pain (7 - 10)  LORazepam     Tablet 1 milliGRAM(s) Oral every 4 hours PRN anxiety  LORazepam   Injectable 2 milliGRAM(s) IntraMuscular once PRN agitation  
MEDICATIONS  (STANDING):  LORazepam     Tablet 1 milliGRAM(s) Oral two times a day  melatonin. 6 milliGRAM(s) Oral at bedtime  mirtazapine 22.5 milliGRAM(s) Oral at bedtime  propranolol 10 milliGRAM(s) Oral two times a day  QUEtiapine 100 milliGRAM(s) Oral at bedtime    MEDICATIONS  (PRN):  acetaminophen   Tablet .. 975 milliGRAM(s) Oral every 6 hours PRN Mild Pain (1 - 3), Moderate Pain (4 - 6), Severe Pain (7 - 10)  LORazepam     Tablet 1 milliGRAM(s) Oral every 4 hours PRN anxiety  LORazepam   Injectable 2 milliGRAM(s) IntraMuscular once PRN agitation  
MEDICATIONS  (STANDING):  LORazepam     Tablet 1 milliGRAM(s) Oral two times a day  melatonin. 6 milliGRAM(s) Oral at bedtime  mirtazapine 30 milliGRAM(s) Oral at bedtime  propranolol 10 milliGRAM(s) Oral two times a day  QUEtiapine 100 milliGRAM(s) Oral at bedtime    MEDICATIONS  (PRN):  acetaminophen   Tablet .. 975 milliGRAM(s) Oral every 6 hours PRN Mild Pain (1 - 3), Moderate Pain (4 - 6), Severe Pain (7 - 10)  LORazepam     Tablet 1 milliGRAM(s) Oral every 4 hours PRN anxiety  LORazepam   Injectable 2 milliGRAM(s) IntraMuscular once PRN agitation  
MEDICATIONS  (STANDING):  LORazepam     Tablet 1 milliGRAM(s) Oral two times a day  melatonin. 6 milliGRAM(s) Oral at bedtime  mirtazapine 30 milliGRAM(s) Oral at bedtime  propranolol 10 milliGRAM(s) Oral two times a day  QUEtiapine 150 milliGRAM(s) Oral at bedtime    MEDICATIONS  (PRN):  acetaminophen   Tablet .. 975 milliGRAM(s) Oral every 6 hours PRN Mild Pain (1 - 3), Moderate Pain (4 - 6), Severe Pain (7 - 10)  LORazepam     Tablet 1 milliGRAM(s) Oral every 4 hours PRN anxiety  LORazepam   Injectable 2 milliGRAM(s) IntraMuscular once PRN agitation  
MEDICATIONS  (STANDING):  LORazepam     Tablet 1 milliGRAM(s) Oral two times a day  melatonin. 6 milliGRAM(s) Oral at bedtime  propranolol 10 milliGRAM(s) Oral two times a day  QUEtiapine 100 milliGRAM(s) Oral at bedtime    MEDICATIONS  (PRN):  acetaminophen   Tablet .. 975 milliGRAM(s) Oral every 6 hours PRN Mild Pain (1 - 3), Moderate Pain (4 - 6), Severe Pain (7 - 10)  LORazepam     Tablet 1 milliGRAM(s) Oral every 4 hours PRN anxiety  LORazepam   Injectable 2 milliGRAM(s) IntraMuscular once PRN agitation  
MEDICATIONS  (STANDING):  melatonin. 6 milliGRAM(s) Oral at bedtime  mirtazapine 30 milliGRAM(s) Oral at bedtime  propranolol 10 milliGRAM(s) Oral two times a day  QUEtiapine 300 milliGRAM(s) Oral at bedtime    MEDICATIONS  (PRN):  acetaminophen   Tablet .. 975 milliGRAM(s) Oral every 6 hours PRN Mild Pain (1 - 3), Moderate Pain (4 - 6), Severe Pain (7 - 10)  LORazepam     Tablet 1 milliGRAM(s) Oral every 4 hours PRN anxiety  LORazepam   Injectable 2 milliGRAM(s) IntraMuscular once PRN agitation  
MEDICATIONS  (STANDING):  LORazepam     Tablet 1 milliGRAM(s) Oral two times a day  melatonin. 6 milliGRAM(s) Oral at bedtime  mirtazapine 30 milliGRAM(s) Oral at bedtime  propranolol 10 milliGRAM(s) Oral two times a day  QUEtiapine 200 milliGRAM(s) Oral at bedtime    MEDICATIONS  (PRN):  acetaminophen   Tablet .. 975 milliGRAM(s) Oral every 6 hours PRN Mild Pain (1 - 3), Moderate Pain (4 - 6), Severe Pain (7 - 10)  LORazepam     Tablet 1 milliGRAM(s) Oral every 4 hours PRN anxiety  LORazepam   Injectable 2 milliGRAM(s) IntraMuscular once PRN agitation  
MEDICATIONS  (STANDING):  LORazepam     Tablet 1 milliGRAM(s) Oral two times a day  melatonin. 6 milliGRAM(s) Oral at bedtime  propranolol 10 milliGRAM(s) Oral two times a day  QUEtiapine 100 milliGRAM(s) Oral at bedtime    MEDICATIONS  (PRN):  acetaminophen   Tablet .. 975 milliGRAM(s) Oral every 6 hours PRN Mild Pain (1 - 3), Moderate Pain (4 - 6), Severe Pain (7 - 10)  LORazepam     Tablet 1 milliGRAM(s) Oral every 4 hours PRN anxiety  LORazepam   Injectable 2 milliGRAM(s) IntraMuscular once PRN agitation  
MEDICATIONS  (STANDING):  LORazepam     Tablet 1 milliGRAM(s) Oral two times a day  melatonin. 6 milliGRAM(s) Oral at bedtime  mirtazapine 30 milliGRAM(s) Oral at bedtime  propranolol 10 milliGRAM(s) Oral two times a day  QUEtiapine 150 milliGRAM(s) Oral at bedtime    MEDICATIONS  (PRN):  acetaminophen   Tablet .. 975 milliGRAM(s) Oral every 6 hours PRN Mild Pain (1 - 3), Moderate Pain (4 - 6), Severe Pain (7 - 10)  LORazepam     Tablet 1 milliGRAM(s) Oral every 4 hours PRN anxiety  LORazepam   Injectable 2 milliGRAM(s) IntraMuscular once PRN agitation  
MEDICATIONS  (STANDING):  LORazepam     Tablet 1 milliGRAM(s) Oral two times a day  melatonin. 6 milliGRAM(s) Oral at bedtime  mirtazapine 30 milliGRAM(s) Oral at bedtime  propranolol 10 milliGRAM(s) Oral two times a day  QUEtiapine 250 milliGRAM(s) Oral at bedtime    MEDICATIONS  (PRN):  acetaminophen   Tablet .. 975 milliGRAM(s) Oral every 6 hours PRN Mild Pain (1 - 3), Moderate Pain (4 - 6), Severe Pain (7 - 10)  LORazepam     Tablet 1 milliGRAM(s) Oral every 4 hours PRN anxiety  LORazepam   Injectable 2 milliGRAM(s) IntraMuscular once PRN agitation  
MEDICATIONS  (STANDING):  LORazepam     Tablet 1 milliGRAM(s) Oral two times a day  melatonin. 6 milliGRAM(s) Oral at bedtime  mirtazapine 30 milliGRAM(s) Oral at bedtime  propranolol 10 milliGRAM(s) Oral two times a day  QUEtiapine 150 milliGRAM(s) Oral at bedtime    MEDICATIONS  (PRN):  acetaminophen   Tablet .. 975 milliGRAM(s) Oral every 6 hours PRN Mild Pain (1 - 3), Moderate Pain (4 - 6), Severe Pain (7 - 10)  LORazepam     Tablet 1 milliGRAM(s) Oral every 4 hours PRN anxiety  LORazepam   Injectable 2 milliGRAM(s) IntraMuscular once PRN agitation  
MEDICATIONS  (STANDING):  LORazepam     Tablet 1 milliGRAM(s) Oral two times a day  melatonin. 6 milliGRAM(s) Oral at bedtime  mirtazapine 22.5 milliGRAM(s) Oral at bedtime  propranolol 10 milliGRAM(s) Oral two times a day  QUEtiapine 100 milliGRAM(s) Oral at bedtime    MEDICATIONS  (PRN):  acetaminophen   Tablet .. 975 milliGRAM(s) Oral every 6 hours PRN Mild Pain (1 - 3), Moderate Pain (4 - 6), Severe Pain (7 - 10)  LORazepam     Tablet 1 milliGRAM(s) Oral every 4 hours PRN anxiety  LORazepam   Injectable 2 milliGRAM(s) IntraMuscular once PRN agitation  
MEDICATIONS  (STANDING):  LORazepam     Tablet 1 milliGRAM(s) Oral two times a day  melatonin. 6 milliGRAM(s) Oral at bedtime  QUEtiapine 100 milliGRAM(s) Oral at bedtime  venlafaxine XR 75 milliGRAM(s) Oral daily    MEDICATIONS  (PRN):  acetaminophen   Tablet .. 975 milliGRAM(s) Oral every 6 hours PRN Mild Pain (1 - 3), Moderate Pain (4 - 6), Severe Pain (7 - 10)  LORazepam     Tablet 1 milliGRAM(s) Oral every 4 hours PRN anxiety  LORazepam   Injectable 2 milliGRAM(s) IntraMuscular once PRN agitation  
MEDICATIONS  (STANDING):  LORazepam     Tablet 1 milliGRAM(s) Oral two times a day  melatonin. 6 milliGRAM(s) Oral at bedtime  mirtazapine 15 milliGRAM(s) Oral at bedtime  propranolol 10 milliGRAM(s) Oral two times a day  QUEtiapine 100 milliGRAM(s) Oral at bedtime    MEDICATIONS  (PRN):  acetaminophen   Tablet .. 975 milliGRAM(s) Oral every 6 hours PRN Mild Pain (1 - 3), Moderate Pain (4 - 6), Severe Pain (7 - 10)  LORazepam     Tablet 1 milliGRAM(s) Oral every 4 hours PRN anxiety  LORazepam   Injectable 2 milliGRAM(s) IntraMuscular once PRN agitation  
MEDICATIONS  (STANDING):  LORazepam     Tablet 1 milliGRAM(s) Oral two times a day  melatonin. 6 milliGRAM(s) Oral at bedtime  mirtazapine 30 milliGRAM(s) Oral at bedtime  propranolol 10 milliGRAM(s) Oral two times a day  QUEtiapine 100 milliGRAM(s) Oral at bedtime    MEDICATIONS  (PRN):  acetaminophen   Tablet .. 975 milliGRAM(s) Oral every 6 hours PRN Mild Pain (1 - 3), Moderate Pain (4 - 6), Severe Pain (7 - 10)  LORazepam     Tablet 1 milliGRAM(s) Oral every 4 hours PRN anxiety  LORazepam   Injectable 2 milliGRAM(s) IntraMuscular once PRN agitation  
MEDICATIONS  (STANDING):  LORazepam     Tablet 1 milliGRAM(s) Oral two times a day  melatonin. 6 milliGRAM(s) Oral at bedtime  mirtazapine 30 milliGRAM(s) Oral at bedtime  propranolol 10 milliGRAM(s) Oral two times a day  QUEtiapine 200 milliGRAM(s) Oral at bedtime    MEDICATIONS  (PRN):  acetaminophen   Tablet .. 975 milliGRAM(s) Oral every 6 hours PRN Mild Pain (1 - 3), Moderate Pain (4 - 6), Severe Pain (7 - 10)  LORazepam     Tablet 1 milliGRAM(s) Oral every 4 hours PRN anxiety  LORazepam   Injectable 2 milliGRAM(s) IntraMuscular once PRN agitation  
MEDICATIONS  (STANDING):  LORazepam     Tablet 1 milliGRAM(s) Oral two times a day  melatonin. 6 milliGRAM(s) Oral at bedtime  propranolol 10 milliGRAM(s) Oral two times a day  QUEtiapine 100 milliGRAM(s) Oral at bedtime    MEDICATIONS  (PRN):  acetaminophen   Tablet .. 975 milliGRAM(s) Oral every 6 hours PRN Mild Pain (1 - 3), Moderate Pain (4 - 6), Severe Pain (7 - 10)  LORazepam     Tablet 1 milliGRAM(s) Oral every 4 hours PRN anxiety  LORazepam   Injectable 2 milliGRAM(s) IntraMuscular once PRN agitation  
MEDICATIONS  (STANDING):  LORazepam     Tablet 1 milliGRAM(s) Oral at bedtime  melatonin. 6 milliGRAM(s) Oral at bedtime  mirtazapine 30 milliGRAM(s) Oral at bedtime  propranolol 10 milliGRAM(s) Oral two times a day  QUEtiapine 250 milliGRAM(s) Oral at bedtime    MEDICATIONS  (PRN):  acetaminophen   Tablet .. 975 milliGRAM(s) Oral every 6 hours PRN Mild Pain (1 - 3), Moderate Pain (4 - 6), Severe Pain (7 - 10)  LORazepam     Tablet 1 milliGRAM(s) Oral every 4 hours PRN anxiety  LORazepam   Injectable 2 milliGRAM(s) IntraMuscular once PRN agitation  
MEDICATIONS  (STANDING):  LORazepam     Tablet 1 milliGRAM(s) Oral two times a day  melatonin. 6 milliGRAM(s) Oral at bedtime  mirtazapine 7.5 milliGRAM(s) Oral at bedtime    MEDICATIONS  (PRN):  acetaminophen   Tablet .. 975 milliGRAM(s) Oral every 6 hours PRN Mild Pain (1 - 3), Moderate Pain (4 - 6), Severe Pain (7 - 10)  
MEDICATIONS  (STANDING):  LORazepam     Tablet 1 milliGRAM(s) Oral two times a day  melatonin. 6 milliGRAM(s) Oral at bedtime  mirtazapine 30 milliGRAM(s) Oral at bedtime  propranolol 10 milliGRAM(s) Oral two times a day  QUEtiapine 150 milliGRAM(s) Oral at bedtime    MEDICATIONS  (PRN):  acetaminophen   Tablet .. 975 milliGRAM(s) Oral every 6 hours PRN Mild Pain (1 - 3), Moderate Pain (4 - 6), Severe Pain (7 - 10)  LORazepam     Tablet 1 milliGRAM(s) Oral every 4 hours PRN anxiety  LORazepam   Injectable 2 milliGRAM(s) IntraMuscular once PRN agitation  
MEDICATIONS  (STANDING):  LORazepam     Tablet 1 milliGRAM(s) Oral two times a day  melatonin. 6 milliGRAM(s) Oral at bedtime  QUEtiapine 100 milliGRAM(s) Oral at bedtime    MEDICATIONS  (PRN):  acetaminophen   Tablet .. 975 milliGRAM(s) Oral every 6 hours PRN Mild Pain (1 - 3), Moderate Pain (4 - 6), Severe Pain (7 - 10)  LORazepam     Tablet 1 milliGRAM(s) Oral every 4 hours PRN anxiety  LORazepam   Injectable 2 milliGRAM(s) IntraMuscular once PRN agitation  
MEDICATIONS  (STANDING):  flumazenil Injectable 0.2 milliGRAM(s) IV Push once  LORazepam     Tablet 1 milliGRAM(s) Oral at bedtime  melatonin. 6 milliGRAM(s) Oral at bedtime  mirtazapine 30 milliGRAM(s) Oral at bedtime  propranolol 10 milliGRAM(s) Oral two times a day  QUEtiapine 250 milliGRAM(s) Oral at bedtime    MEDICATIONS  (PRN):  acetaminophen   Tablet .. 975 milliGRAM(s) Oral every 6 hours PRN Mild Pain (1 - 3), Moderate Pain (4 - 6), Severe Pain (7 - 10)  LORazepam     Tablet 1 milliGRAM(s) Oral every 4 hours PRN anxiety  LORazepam   Injectable 2 milliGRAM(s) IntraMuscular once PRN agitation  
MEDICATIONS  (STANDING):  LORazepam     Tablet 1 milliGRAM(s) Oral two times a day  melatonin. 6 milliGRAM(s) Oral at bedtime  QUEtiapine 100 milliGRAM(s) Oral at bedtime  venlafaxine XR 75 milliGRAM(s) Oral daily    MEDICATIONS  (PRN):  acetaminophen   Tablet .. 975 milliGRAM(s) Oral every 6 hours PRN Mild Pain (1 - 3), Moderate Pain (4 - 6), Severe Pain (7 - 10)  LORazepam     Tablet 1 milliGRAM(s) Oral every 4 hours PRN anxiety  LORazepam   Injectable 2 milliGRAM(s) IntraMuscular once PRN agitation

## 2021-02-02 NOTE — [facility] INPATIENT PSYCHIATRY PROGRESS NOTE - [id number]_PSY_ALL_CORE
29-Jan-2021

## 2021-02-02 NOTE — BH INPATIENT PSYCHIATRY PROGRESS NOTE - NSTXSUICIDDATETRGT_PSY_ALL_CORE
06-Jan-2021
06-Jan-2021
21-Jan-2021
06-Jan-2021
21-Jan-2021
06-Jan-2021
21-Jan-2021
06-Jan-2021
06-Jan-2021
21-Jan-2021
21-Jan-2021
06-Jan-2021
21-Jan-2021

## 2021-02-02 NOTE — BH INPATIENT PSYCHIATRY PROGRESS NOTE - NSTXPROBECT_PSY_ALL_CORE
ECT, PATIENT RECEIVING

## 2021-02-02 NOTE — BH INPATIENT PSYCHIATRY PROGRESS NOTE - NSTXECTGOAL_PSY_ALL_CORE
Maintain NPO status as indicated prior to procedure

## 2021-02-02 NOTE — BH INPATIENT PSYCHIATRY PROGRESS NOTE - NSTXSUICIDDATENEW_PSY_ALL_CORE
21-Jan-2021

## 2021-02-03 ENCOUNTER — OUTPATIENT (OUTPATIENT)
Dept: OUTPATIENT SERVICES | Facility: HOSPITAL | Age: 56
LOS: 1 days | Discharge: HOME | End: 2021-02-03
Payer: COMMERCIAL

## 2021-02-03 DIAGNOSIS — Z98.890 OTHER SPECIFIED POSTPROCEDURAL STATES: Chronic | ICD-10-CM

## 2021-02-04 VITALS
OXYGEN SATURATION: 99 % | HEART RATE: 89 BPM | SYSTOLIC BLOOD PRESSURE: 190 MMHG | TEMPERATURE: 98 F | RESPIRATION RATE: 18 BRPM | DIASTOLIC BLOOD PRESSURE: 92 MMHG

## 2021-02-04 PROCEDURE — 90870 ELECTROCONVULSIVE THERAPY: CPT

## 2021-02-04 NOTE — ECT TREATMENT NOTE - NSCGIIMPROVESX_PSY_ALL_CORE
7 = Very much worse - severe exacerbation of symptoms and loss of functioning 3 = Minimally improved - slightly better with little or no clinically meaningful reduction of symptoms.  Represents very little change in basic clinical status, level of care, or functional capacity.

## 2021-02-04 NOTE — ECT TREATMENT NOTE - NSECTCOMMENTS_PSY_ALL_CORE
1st in to out.  Patient states he is doing well but agrees he is not at baseline.  Still with poor sleep and low energy but "good appetite".  No SI.  Agrees with plan set up by Dr. Al to continue acute ECT till plateau/resolution of depression.   Will have pt come in 2x/week schedule.   Is getting new outpt provider in [facility]. 1st in to out.  Patient states he is doing well but agrees he is not at baseline.  Still with poor sleep and low energy but "good appetite".  No SI.  Agrees with plan set up by Dr. Al to continue acute ECT till plateau/resolution of depression.   Will have pt come in 2x/week schedule.   Is getting new outpt provider in [facility].    Delay technique used.

## 2021-02-05 ENCOUNTER — APPOINTMENT (OUTPATIENT)
Dept: ORTHOPEDIC SURGERY | Facility: CLINIC | Age: 56
End: 2021-02-05
Payer: COMMERCIAL

## 2021-02-05 PROCEDURE — 99213 OFFICE O/P EST LOW 20 MIN: CPT | Mod: 25

## 2021-02-05 PROCEDURE — 99072 ADDL SUPL MATRL&STAF TM PHE: CPT

## 2021-02-05 PROCEDURE — 20610 DRAIN/INJ JOINT/BURSA W/O US: CPT | Mod: LT

## 2021-02-05 NOTE — DISCUSSION/SUMMARY

## 2021-02-05 NOTE — PHYSICAL EXAM
[Normal RLE] : Right Lower Extremity: No scars, rashes, lesions, ulcers, skin intact [Normal LLE] : Left Lower Extremity: No scars, rashes, lesions, ulcers, skin intact [Normal Touch] : sensation intact for touch [Normal] : No swelling, no edema, normal pedal pulses and normal temperature [Obese] : obese [Poor Appearance] : well-appearing [Acute Distress] : not in acute distress [de-identified] : Left Lower Extremity \par o Knee :\par ¦ Inspection/Palpation : no tenderness along the joint lines, no swelling, no effusion, varus alignment\par ¦ Range of Motion : 0 - 120 degrees\par ¦ Stability : no valgus or varus instability present on provocative testing\par ¦ Strength : quadriceps strength 5/5\par o Muscle Bulk : normal muscle bulk present \par o Skin : no erythema or ecchymosis present \par o Sensation : sensation to pin intact\par o Vascular Exam : no edema, no cyanosis, dorsalis pedis artery pulse 2+, posterior tibial artery pulse 2+

## 2021-02-05 NOTE — PROCEDURE
[de-identified] : At this point I recommended a therapeutic injection and under sterile precautions an injection of 5 cc 1% lidocaine with 0.5 cc of Kenalog and 0.5 cc of Dexamethasone - was placed into the joint of the Left knee without complication, and after several minutes, the patient felt significant relief.\par \par
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
## 2021-02-08 VITALS
OXYGEN SATURATION: 100 % | SYSTOLIC BLOOD PRESSURE: 132 MMHG | RESPIRATION RATE: 19 BRPM | DIASTOLIC BLOOD PRESSURE: 84 MMHG | HEART RATE: 69 BPM

## 2021-02-08 LAB — SARS-COV-2 RNA SPEC QL NAA+PROBE: SIGNIFICANT CHANGE UP

## 2021-02-08 PROCEDURE — 90870 ELECTROCONVULSIVE THERAPY: CPT

## 2021-02-08 NOTE — ECT AMBULATORY DISCHARGE PLAN - NSPOSTECTCALLBEFORE_PSY_ALL_CORE
[facility] ([facility]) scheduling office at [phone]
[facility] ([facility]) scheduling office at [phone]

## 2021-02-08 NOTE — ECT AMBULATORY DISCHARGE PLAN - PATIENT PORTAL LINK FT
You can access the FollowMyHealth Patient Portal offered by [facility] by registering at the following website: [email or web address]. By joining [facility]’s FollowMyHealth portal, you will also be able to view your health information using other applications (apps) compatible with our system.
You can access the FollowMyHealth Patient Portal offered by [facility] by registering at the following website: [email or web address]. By joining [employer]’s FollowMyHealth portal, you will also be able to view your health information using other applications (apps) compatible with our system.

## 2021-02-08 NOTE — ECT AMBULATORY DISCHARGE PLAN - NSPOSTECTWORSEPSYCHSX_PSY_ALL_CORE
If you are experiencing heightened or worsening psychological symptoms please contact your private psychiatrist.
If you are experiencing heightened or worsening psychological symptoms please contact your private psychiatrist.

## 2021-02-08 NOTE — ECT AMBULATORY DISCHARGE PLAN - NSPOSTECTPOSSCOMP_PSY_ALL_CORE
Headache, nausea, general body aches are common experiences after this treatment.  These may be treated with over-the-counter pain medication.
Headache, nausea, general body aches are common experiences after this treatment.  These may be treated with over-the-counter pain medication.

## 2021-02-08 NOTE — ECT AMBULATORY DISCHARGE PLAN - NSPOSTECTCONTPROV_PSY_ALL_CORE
[facility] ([facility]) ECT Suite at [phone]
[facility] ([facility]) ECT Suite at [phone]

## 2021-02-08 NOTE — ECT TREATMENT NOTE - NSCGISEVERILLNESS_PSY_ALL_CORE
4 = Moderately ill – overt symptoms causing noticeable, but modest, functional impairment or distress; symptom level may warrant medication
4 = Moderately ill – overt symptoms causing noticeable, but modest, functional impairment or distress; symptom level may warrant medication

## 2021-02-08 NOTE — ECT AMBULATORY DISCHARGE PLAN - NSPOSTECTSMOKING_PSY_ALL_CORE
If you are a smoker, it is important for your health to stop smoking.  Please be aware that second hand smoke is also harmful.
If you are a smoker, it is important for your health to stop smoking.  Please be aware that second hand smoke is also harmful.

## 2021-02-08 NOTE — ECT AMBULATORY DISCHARGE PLAN - NSPOSTECTCALLZHH_PSY_ALL_CORE
Call the [facility] ECT suite at [phone]
Call the [facility] ECT suite at [phone]

## 2021-02-08 NOTE — ECT PRE-PROCEDURE CHECKLIST - ALLERGIES
Allergies:-  Drug Allergies Not Recorded  Seafood      
Allergies:-  Drug Allergies Not Recorded  Seafood

## 2021-02-08 NOTE — ECT AMBULATORY DISCHARGE PLAN - NSDCPNDISPN_GEN_ALL_CORE
Education provided on the pain management plan of care
Education provided on the pain management plan of care

## 2021-02-08 NOTE — ECT AMBULATORY DISCHARGE PLAN - NSPOSTECTSYMPTOMS_PSY_ALL_CORE
Excessive Diarrhea/Fever/Inability to tolerate liquids or foods/Increased irritability or sluggishness/Nausea and vomiting that does not stop/Pain not relieved by medications/Unable to urinate
Excessive Diarrhea/Fever/Inability to tolerate liquids or foods/Increased irritability or sluggishness/Nausea and vomiting that does not stop/Pain not relieved by medications/Unable to urinate

## 2021-02-08 NOTE — ECT TREATMENT NOTE - NSECTFOCPEVITALS_PSY_ALL_CORE
Vital Signs Last 24 Hrs  T(C): 36.2 (08 Feb 2021 08:42), Max: 36.2 (08 Feb 2021 08:42)  T(F): 97.2 (08 Feb 2021 08:42), Max: 97.2 (08 Feb 2021 08:42)  HR: 92 (08 Feb 2021 08:42) (92 - 92)  BP: 131/92 (08 Feb 2021 08:42) (131/92 - 131/92)  BP(mean): --  RR: 16 (08 Feb 2021 08:42) (16 - 16)  SpO2: 100% (08 Feb 2021 08:42) (100% - 100%)
Vital Signs Last 24 Hrs  T(C): 36.6 (04 Feb 2021 08:55), Max: 36.6 (04 Feb 2021 08:55)  T(F): 97.9 (04 Feb 2021 08:55), Max: 97.9 (04 Feb 2021 08:55)  HR: 89 (04 Feb 2021 08:55) (89 - 89)  BP: 190/92 (04 Feb 2021 08:55) (190/92 - 190/92)  BP(mean): --  RR: 18 (04 Feb 2021 08:55) (18 - 18)  SpO2: 99% (04 Feb 2021 08:55) (99% - 99%)

## 2021-02-08 NOTE — ECT TREATMENT NOTE - NSECTROSNEGAT_PSY_ALL_CORE
Review of Systems negative/unchanged from previous exam except as noted below
Review of Systems negative/unchanged from previous exam except as noted below

## 2021-02-08 NOTE — ECT AMBULATORY DISCHARGE PLAN - NSPOSTECTFUPHCALL_PSY_ALL_CORE
You will receive a follow-up phone call from a nurse by the next business day after your treatment to ask how you are feeling.
You will receive a follow-up phone call from a nurse by the next business day after your treatment to ask how you are feeling.

## 2021-02-08 NOTE — ECT AMBULATORY DISCHARGE PLAN - NSPOSTECTRESTRIC_PSY_ALL_CORE
Drive a car, operate power tools or machinery./Drink alcohol, beer, or wine./Make important personal and business decisions./If you have had any type of sedation, you may experience lightheadedness, dizziness, or sleepiness following your procedure.  A responsible adult should stay with you for at least 24 hours following your procedure.
Drive a car, operate power tools or machinery./Drink alcohol, beer, or wine./Make important personal and business decisions./If you have had any type of sedation, you may experience lightheadedness, dizziness, or sleepiness following your procedure.  A responsible adult should stay with you for at least 24 hours following your procedure.

## 2021-02-08 NOTE — ECT AMBULATORY DISCHARGE PLAN - NSPOSTECTCASEEMER_PSY_ALL_CORE
In case of any emergency, please go to the nearest emergency room
In case of any emergency, please go to the nearest emergency room

## 2021-02-08 NOTE — ECT TREATMENT NOTE - NSECTCOMMENTS_PSY_ALL_CORE
Patient reported slight improvement since last visit; still not at baseline; denied cognitive side effects.     Delay technique used.

## 2021-02-08 NOTE — ECT TREATMENT NOTE - NSICDXBHPRIMARYDX_PSY_ALL_CORE
MDD (major depressive disorder), recurrent episode   F33.9  
MDD (major depressive disorder), recurrent episode   F33.9

## 2021-02-08 NOTE — ECT TREATMENT NOTE - NSECTIMPPLAN_PSY_ALL_CORE
Assessment today offers no contraindications to continue plan of treatment with ECT.
Assessment today offers no contraindications to continue plan of treatment with ECT.
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
## 2021-02-08 NOTE — ECT TREATMENT NOTE - NSECTPTEVAL_PSY_ALL_CORE
Patient evaluated and History and Physical reviewed prior to ECT. There are no significant changes to the patient's condition unless specified.
Patient evaluated and History and Physical reviewed prior to ECT. There are no significant changes to the patient's condition unless specified.

## 2021-02-08 NOTE — ECT TREATMENT NOTE - NSECTPOSTTXSUMMARY_PSY_ALL_CORE
The patient had a well modified grand mal seizure under general anesthesia and muscle relaxant. The patient is alert, responsive, in no acute distress.  Recovery uneventful.
The patient had a well modified grand mal seizure under general anesthesia and muscle relaxant. The patient is alert, responsive, in no acute distress.  Recovery uneventful.

## 2021-02-08 NOTE — ECT AMBULATORY DISCHARGE PLAN - NSPOSTECTDIET_PSY_ALL_CORE
Gradually resume your regular diet/Increase fluids
Gradually resume your regular diet/Increase fluids

## 2021-02-09 NOTE — SOCIAL WORK POST DISCHARGE FOLLOW UP NOTE - NSBHSWFOLLOWUP_PSY_ALL_CORE_FT
SW was informed pt will be following up with another outpt provider for behavioral health svcs. Pt  seen 2x for outpt ECT tx. Case closed.

## 2021-02-22 ENCOUNTER — APPOINTMENT (OUTPATIENT)
Dept: PSYCHIATRY | Facility: CLINIC | Age: 56
End: 2021-02-22

## 2021-03-01 NOTE — DIETITIAN INITIAL EVALUATION ADULT. - BODY MASS INDEX
Note from Dr. Gabbie oates [facility]: Tolerating anastrozole and will plan to continue for 5 years.  Osteopenia found on DEXA scan.  Encouraged vitamin D and calcium  
36.5

## 2021-03-09 NOTE — BH INPATIENT PSYCHIATRY PROGRESS NOTE - NSBHCONSBHPROVDETAILS_PSY_A_CORE  FT
Detail Level: Detailed Detail Level: Simple MS3 student Joon Sonya spoke with Dr. Hugh Cline ([phone]) about patient's previous treatments, suicidal attempts, and recommendations for him. While not discussed on phone call, Dr. Cline will no longer be handling this patient's outpatient care given suicidal attempts.

## 2021-04-13 NOTE — BH CONSULTATION LIAISON PROGRESS NOTE - NSCDBILL_PSY_A_CORE
Gastroenterology Progress Note:     Patient Name:  Marjorie Cha   MRN: [id number]  [id number]  YOB: 1944  Admit Date: 4/10/2021  7:11 AM  Primary Care Physician: PHIL Izquierdo - CNP   [id number]     Patient seen and examined. 24 hours events and chart reviewed. Subjective: Patient resting in bed. Denies abdominal pain, nausea, & vomiting. She had a small, black stool this morning. Hgb 8.9 She is tolerating a clear liquid diet. She is wanting regular food. EGD results discussed. Objective:  /75   Pulse 80   Temp 98.4 °F (36.9 °C) (Oral)   Resp 16   Ht 5' 5\" (1.651 m)   Wt 119 lb 8 oz (54.2 kg)   SpO2 94%   BMI 19.89 kg/m²     Physical Exam:    General:  Nourished in no distress  HEENT: Atraumatic, normocephalic. Moist oral mucous membranes. Neck: Supple without adenopathy, JVD, thyromegaly or masses. Trachea midline. CV: Heart RRR, no murmurs, rubs, gallops. Resp: Even, easy without cough or accessory use. Lungs clear to ascultation bilaterally. Abd: Round, soft, nontender. No hepatosplenomegaly or mass present. Active bowel sounds heard. No distention noted. Ext:  Without cyanosis, clubbing, edema. Skin: Pale, warm, dry  Neuro:  Alert, oriented x 3 with no obvious deficits.        Rectal: deferred    Labs:   CBC:   Lab Results   Component Value Date    WBC 5.1 04/11/2021    HGB 8.9 04/13/2021    HCT 29.1 04/13/2021    MCV 92.1 04/11/2021     04/11/2021     BMP:   Lab Results   Component Value Date     04/11/2021    K 4.4 04/11/2021    K 5.4 04/10/2021     04/11/2021    CO2 18 04/11/2021    PHOS 4.6 06/05/2019    BUN 27 04/11/2021    CREATININE 0.8 04/11/2021    CALCIUM 7.8 04/11/2021     PT/INR:   Lab Results   Component Value Date    INR 1.05 02/15/2021     Lipids:   Lab Results   Component Value Date    ALKPHOS 124 04/10/2021    ALT 9 04/10/2021    AST 15 04/10/2021    BILITOT <0.2 04/10/2021    BILIDIR <0.2 04/10/2021    LABALBU 3.8 04/10/2021 LIPASE 38.2 04/10/2021     Significant Diagnostic Studies:   EGD 04/12/21 by Dr. Gaurav Laws: 2 cm hiatal hernia, LA grade A esophagitis, mild antral gastritis, 1 cm ulcer in the duodenum with noted bleeding which was injected with Epinephrine    Current Meds:  Scheduled Meds:   atenolol  25 mg Oral Daily    atorvastatin  40 mg Oral Daily    citalopram  20 mg Oral Daily    folic acid  1 mg Oral Daily    midodrine  2.5 mg Oral TID WC    Vitamin D  1,000 Units Oral Daily    pantoprazole  40 mg Intravenous BID    sodium chloride flush  5-40 mL Intravenous 2 times per day     Continuous Infusions:   sodium chloride      sodium chloride      sodium chloride 75 mL/hr at 04/13/21 2700       Assessment:  75 yo F admitted 04/10/21 for n/v/d/abd pain, and melena. Recent total hip arthroplasty for right hip fracture 03/14/21. H/O CAD s/p PCI & CABG on aspirin & Plavix. EGD 04/12/21 demonstrated 2 cm hiatal hernia, LA grade A esophagitis, mild antral gastritis, 1 cm ulcer in the duodenum with noted bleeding which was injected with Epinephrine. 1. Melena  2. Acute on chronic anemia- s/p 2 units PRBC  3. NSAID use  4. S/P total hip arthroplasty 03/14/21  5. CAD s/p PCI & CABG- on aspirin & Plavix  6. Afib- not on [address] [place] [address] Road  7.  Pacemaker    Plan:     Monitor H & H, transfuse prn   Nursing to monitor stool output & document   Continue IVP PPI BID, transition to PO at discharge for 8 weeks   Continue to hold Plavix for 7 days   Advance to full liquid diet, if tolerates may have soft diet for breakfast   Case discussed at length with primary Tori Boyd PA   Will need repeat EGD in 8 weeks   Supportive care per primary team  Will follow    Case reviewed and impression/plan reviewed in collaboration with Dr. Bandar Lopez  Electronically signed by PHIL Salgado CNP on 4/13/2021 at 10:37 AM    [facility] [id number] - Inpatient, high complexity

## 2021-04-15 DIAGNOSIS — F32.9 MAJOR DEPRESSIVE DISORDER, SINGLE EPISODE, UNSPECIFIED: ICD-10-CM

## 2021-04-20 NOTE — DISCHARGE NOTE BEHAVIORAL HEALTH - NSBHDCADVDIR2_PSY_A_CORE
Bedside and Verbal shift change report given to [address] [place] [address]  (oncoming nurse) by Rafael ADEN, RN  (offgoing nurse). Report included the following information SBAR, Kardex and MAR. SHIFT UPDATES:  Patient presented with cellulitis of left hip surgical wound after having hip replacement according to nightshift RN staff. Patient presented with 3+ pitting edema in his bilateral extremities and received scheduled IV Lasix 40 mg every 12 hours and last received a dose around 1609 Pm. Patient also received scheduled IV Albumin 25% 100 cc every 6 hours to assist with alleviating swelling of bilateral lower extremities and last dose as given at 1609 pm.  Patient ambulated to chair with one person stand by assistance and front wheel rolling walker. Also presented with mild headache pain earlier during shift and was given Tylenol 650 mg by mouth at 934 am. Patient can receive  Tylenol 650 mg by mouth every 6 hours PRN for pain. Patient was getting scheduled Cardizem 30 mg by mouth three times daily before meals for paroxysmal atrial fibrillation and last received a dose around 1201 pm prior to dose being discontinued. Patient maintains oxygenation levels of 94% or greater on 2 liters oxygen nasal cannula. Patient did not present with telemetry monitoring. ABNORMAL LAB:   Results for Vearl Noss (MRN [id number]) as of 4/20/2021 15:29   Ref. Range 4/19/2021 19:10 4/20/2021 04:10   CALLED TO: Unknown 1 UNIT READY NOTIFIED Frankey Countess RN 3S AT 2155 ON 04/19/21 BY SHERRI.     WBC Latest Ref Range: 4.6 - 13.2 K/uL  10.1   RBC Latest Ref Range: 4.35 - 5.65 M/uL  2.41 (L)   HGB Latest Ref Range: 13.0 - 16.0 g/dL  7.2 (L)   HCT Latest Ref Range: 36.0 - 48.0 %  22.2 (L)   MCV Latest Ref Range: 74.0 - 97.0 FL  92.1   MCH Latest Ref Range: 24.0 - 34.0 PG  29.9   MCHC Latest Ref Range: 31.0 - 37.0 g/dL  32.4   RDW Latest Ref Range: 11.6 - 14.5 %  14.8 (H)   PLATELET Latest Ref Range: 135 - 420 K/uL  224   MPV Latest Ref Range: 9.2 - 11.8 FL  9.5   NEUTROPHILS Latest Ref Range: 40 - 73 %  92 (H)   LYMPHOCYTES Latest Ref Range: 21 - 52 %  3 (L)   MONOCYTES Latest Ref Range: 3 - 10 %  3   EOSINOPHILS Latest Ref Range: 0 - 5 %  0   BASOPHILS Latest Ref Range: 0 - 2 %  0   DF Latest Units:    AUTOMATED   ABS. NEUTROPHILS Latest Ref Range: 1.8 - 8.0 K/UL  9.3 (H)   ABS. LYMPHOCYTES Latest Ref Range: 0.9 - 3.6 K/UL  0.3 (L)   ABS. MONOCYTES Latest Ref Range: 0.05 - 1.2 K/UL  0.3   ABS. EOSINOPHILS Latest Ref Range: 0.0 - 0.4 K/UL  0.0   ABS. BASOPHILS Latest Ref Range: 0.0 - 0.1 K/UL  0.0   Sodium Latest Ref Range: 136 - 145 mmol/L  138   Potassium Latest Ref Range: 3.5 - 5.5 mmol/L  4.0   Chloride Latest Ref Range: 100 - 111 mmol/L  101   CO2 Latest Ref Range: 21 - 32 mmol/L  30   Anion gap Latest Ref Range: 3.0 - 18 mmol/L  7   Glucose Latest Ref Range: 74 - 99 mg/dL  162 (H)   BUN Latest Ref Range: 7.0 - 18 MG/DL  38 (H)   Creatinine Latest Ref Range: 0.6 - 1.3 MG/DL  1.19   BUN/Creatinine ratio Latest Ref Range: 12 - 20    32 (H)   Calcium Latest Ref Range: 8.5 - 10.1 MG/DL  6.7 (L)   Magnesium Latest Ref Range: 1.6 - 2.6 mg/dL  2.1   GFR est non-AA Latest Ref Range: >60 ml/min/1.73m2  59 (L)   GFR est AA Latest Ref Range: >60 ml/min/1.73m2  >60   Bilirubin, total Latest Ref Range: 0.2 - 1.0 MG/DL  0.9   Protein, total Latest Ref Range: 6.4 - 8.2 g/dL  4.9 (L)   Albumin Latest Ref Range: 3.4 - 5.0 g/dL  2.4 (L)   Globulin Latest Ref Range: 2.0 - 4.0 g/dL  2.5   A-G Ratio Latest Ref Range: 0.8 - 1.7    1.0   ALT Latest Ref Range: 16 - 61 U/L  22   AST Latest Ref Range: 10 - 38 U/L  14   Alk. phosphatase Latest Ref Range: 45 - 117 U/L  62   Procalcitonin Latest Units: ng/mL  <0.05   Crossmatch Expiration Unknown 04/22/2021,2350    Unit number Unknown [id number]    Unit division Unknown 00    Status of unit Unknown TRANSFUSED    Crossmatch result Unknown Compatible    TYPE & SCREEN Unknown Rpt      Wounds?   Left Hip surgical wound (Mepilex reinforced & area around wound cleansed with dermal wound cleanser around 1713 pm. Managed by surgery team) & Skin tear (Mepilex dressing intact). Central Lines? None. Last BM:  04/20/2021       Pending Labs for AM Draw: CBC with diff, BMP & CMP levels at 4 am on 4/21/2021. Discharge plan: According to case management on 4/19/2021 at 940 am states that patient will discharge to skilled nursing facility once medically stable. Nightshift RN staff will be notified to inform Dayshift RN staff on 4/21/2021 to inquire from case management staff patient's specific skilled nursing facility for discharge.      Ron Castellon  4/20/2021  6:57 PM no...

## 2021-05-18 NOTE — PROGRESS NOTE BEHAVIORAL HEALTH - ORIENTED TO SITUATION
Left message for patient that referral for diabetic shoes and diabetic slippers faxed over to [employer] and [employer]. Diabetic shoes are approved through [facility].
Yes

## 2021-07-10 NOTE — BH CONSULTATION LIAISON PROGRESS NOTE - NSBHFUPINTERVALHXFT_PSY_A_CORE
no Chart reviewed. Pt extubated on 12/11, now on medical floor and on 5L NC saturating at 95-97%, with spikes of elevated temp, 100.2 at ~1AM and  101.7 at ~10AM, with ongoing tachycardia.     On exam, pt awake and alert, responds to name, however with ongoing minimal verbal output. Pt is able to follow simple commands such as "squeeze my fingers" and "push against my hand." Unable to respond to questions of pain. However, per RN at bedside, pt complained of b/l LE pain. Notes pt is more responsive when using Fijian .  On exam, no asymmetry noted b/w b/l LEs and no TTP.           ------------  Standing [facility] medications prior to current [facility] admission: bacitracin ointment, Wellbutrin XL 150mg, Klonopin 0.25mg BID, clozaril 275mg, lisinopril 10mg, metformin 250mg BID, naltrexone 50mg, ramelteon 8mg, senna 2mg, tamsulosin 0.4mg, thiamine 100mg. PRNs: Ativan 1mg q4hrs, Seroquel 25mg q6hrs

## 2021-07-18 NOTE — BH TREATMENT PLAN - NSTXPATIENTPARTICIPATE_PSY_ALL_CORE
Patient participated in identification of needs/problems/goals for treatment/Patient participated in defining interventions/Patient participated in development of after care plan There are no Wet Read(s) to document.

## 2021-08-11 NOTE — PROGRESS NOTE BEHAVIORAL HEALTH - NS ED BHA MSE SPEECH VOLUME
HPI/Subjective:   Patient ID: Janeen Reyes is a 11year old male. Pt presents with some redness of the 3rd finger of the left hand. Pt states he was biting the finger nail and notice redness. No pus or drainage. Some mild swelling. No fevers.  No in Other/Soft

## 2021-08-17 ENCOUNTER — APPOINTMENT (OUTPATIENT)
Dept: ORTHOPEDIC SURGERY | Facility: CLINIC | Age: 56
End: 2021-08-17

## 2021-08-17 ENCOUNTER — OUTPATIENT (OUTPATIENT)
Dept: OUTPATIENT SERVICES | Facility: HOSPITAL | Age: 56
LOS: 1 days | End: 2021-08-17
Payer: COMMERCIAL

## 2021-08-17 ENCOUNTER — APPOINTMENT (OUTPATIENT)
Dept: RADIOLOGY | Facility: HOSPITAL | Age: 56
End: 2021-08-17
Payer: COMMERCIAL

## 2021-08-17 DIAGNOSIS — M54.5 LOW BACK PAIN: ICD-10-CM

## 2021-08-17 DIAGNOSIS — Z98.890 OTHER SPECIFIED POSTPROCEDURAL STATES: Chronic | ICD-10-CM

## 2021-08-17 PROCEDURE — 72110 X-RAY EXAM L-2 SPINE 4/>VWS: CPT | Mod: 26

## 2021-08-17 PROCEDURE — 72110 X-RAY EXAM L-2 SPINE 4/>VWS: CPT

## 2021-10-01 NOTE — ECT OUTPATIENT PROGRAM DISCHARGE SUMMARY - NSECTTREATMENTSUMMARY_PSY_ALL_CORE
Patient underwent two acute ECT tx as outpatient with partial relief of sx. He did not return for treatment or call to reschedule. Condition at last visit was stable.

## 2021-11-04 ENCOUNTER — APPOINTMENT (OUTPATIENT)
Dept: ORTHOPEDIC SURGERY | Facility: CLINIC | Age: 56
End: 2021-11-04
Payer: COMMERCIAL

## 2021-11-04 VITALS — BODY MASS INDEX: 37.8 KG/M2 | HEIGHT: 71 IN | WEIGHT: 270 LBS

## 2021-11-04 DIAGNOSIS — M17.12 UNILATERAL PRIMARY OSTEOARTHRITIS, LEFT KNEE: ICD-10-CM

## 2021-11-04 PROCEDURE — 20610 DRAIN/INJ JOINT/BURSA W/O US: CPT | Mod: LT

## 2021-11-04 PROCEDURE — 99213 OFFICE O/P EST LOW 20 MIN: CPT | Mod: 25

## 2021-11-05 PROBLEM — M17.12 PRIMARY OSTEOARTHRITIS OF LEFT KNEE: Status: ACTIVE | Noted: 2018-07-23

## 2021-11-05 NOTE — HISTORY OF PRESENT ILLNESS
[de-identified] : 55 year old male presents for an evaluation of chronic left knee pain s/p left knee arthroscopy on 1/20/2016 and has since been diagnosed with advanced medial compartment osteoarthritis of her left knee. The patient reports that in November 2019 he was in a coma for a month, noting exacerbation of his since awaking.  He had a Monovisc injection of his left knee on 12/19/2019 noting great relief in symptoms for over 6 months.  On 09/14/2020 patient received a Monovisc injection of his left knee. At his last visit in September 2020 patient was scheduled for surgical intervention. Patient did not have surgery at this time due to a recent suicide attempt. Patient has a PMHx of mental illness and suicide attempt and is under the care of a psychiatrist.  At his last visit on 02/15/2021 patient received a corticosteroid injection of the left knee noting good resolution of symptoms. His pain has since returned and the patient has been experiencing an aching pain located along the medial aspect of his left knee and that is intermittent in nature. His symptoms are exacerbated with extended periods of walking, deep bending, and with climbing stairs.He is interested in a corticosteroid injection of the left knee today.  Patient denies any other complaints at this time.

## 2021-11-05 NOTE — PROCEDURE
[de-identified] : At this point I recommended a therapeutic injection and under sterile precautions an injection of 5 cc 1% lidocaine with 0.5 cc of Kenalog and 0.5 cc of Dexamethasone - was placed into the joint of the Left knee without complication, and after several minutes, the patient felt significant relief.\par \par

## 2021-11-05 NOTE — DISCUSSION/SUMMARY
[de-identified] : The underlying pathophysiology was reviewed in great detail with the patient as well as the various treatment options, including ice, analgesics, NSAIDs, Physical therapy, steroid injections, hyaluronic acid injections, TKR\par \par Patient received a corticosteroid injection of the left knee today.\par \par Continue home exercise program. \par \par FU 12 weeks or prn \par \par All questions were answered, all alternatives discussed and the patient is in complete agreement with that plan. Follow-up appointment as instructed. Any issues and the patient will call or come in sooner.

## 2021-11-05 NOTE — PHYSICAL EXAM
[Normal RLE] : Right Lower Extremity: No scars, rashes, lesions, ulcers, skin intact [Normal LLE] : Left Lower Extremity: No scars, rashes, lesions, ulcers, skin intact [Normal Touch] : sensation intact for touch [Normal] : No swelling, no edema, normal pedal pulses and normal temperature [Obese] : obese [Poor Appearance] : well-appearing [Acute Distress] : not in acute distress [de-identified] : Left Lower Extremity \par o Knee :\par ¦ Inspection/Palpation : moderate medial tenderness, no swelling, no effusion, varus alignment\par ¦ Range of Motion : 0 - 120 degrees\par ¦ Stability : no valgus or varus instability present on provocative testing\par ¦ Strength : quadriceps strength 5/5\par o Muscle Bulk : normal muscle bulk present \par o Skin : no erythema or ecchymosis present \par o Sensation : sensation to pin intact\par o Vascular Exam : no edema, no cyanosis, dorsalis pedis artery pulse 2+, posterior tibial artery pulse 2+

## 2021-11-18 NOTE — PROGRESS NOTE BEHAVIORAL HEALTH - NSBHFUPREASONCONS_PSY_A_CORE
Prep Survey      Responses   [facility] patient discharged from? [place]   Is LACE score < 7 ? No   Emergency Room discharge w/ pulse ox? No   Eligibility [facility]   Date of Admission 11/15/21   Date of Discharge 11/18/21   Discharge Disposition Home or Self Care   Discharge diagnosis IJEOMA on CKD stage III,   ESBL E. Coli UTI,   Atrial fibrillation   Does the patient have one of the following disease processes/diagnoses(primary or secondary)? Other   Does the patient have Home health ordered? No   Is there a DME ordered? No   Prep survey completed? Yes          Wendie Gordon RN         suicidality/depression/anxiety/med management

## 2021-12-13 NOTE — BH INPATIENT PSYCHIATRY ASSESSMENT NOTE - NSBHINFOSOURCE_PSY_ALL_CORE
Patient Elidel Counseling: Patient may experience a mild burning sensation during topical application. Elidel is not approved in children less than 2 years of age. There have been case reports of hematologic and skin malignancies in patients using topical calcineurin inhibitors although causality is questionable.

## 2021-12-30 NOTE — OCCUPATIONAL THERAPY INITIAL EVALUATION ADULT - EATING, PREVIOUS LEVEL OF FUNCTION, OT EVAL
----- Message from [place] sent at 12/29/2021 12:18 PM EST -----  Subject: Appointment Request    Reason for Call: Urgent Fever    QUESTIONS  Type of Appointment? Established Patient  Reason for appointment request? Available appointments did not meet   patient need  Additional Information for Provider? Pts mom has covid and now Junious Puls has a   fever. Would like a call back ASAP.  ---------------------------------------------------------------------------  --------------  CALL BACK INFO  What is the best way for the office to contact you? OK to leave message on   voicemail  Preferred Call Back Phone Number? [phone]  ---------------------------------------------------------------------------  --------------  SCRIPT ANSWERS  Relationship to Patient? Parent  Representative Name? Sofia  Additional information verified (besides Name and Date of Birth)? Phone   Number  Is the child 1 months old or younger? No  Is the child difficult to awaken? No  Has the child/patient recently (1 week) seen a provider for this issue? No  Have you been diagnosed with, awaiting test results for, or told that you   are suspected of having COVID-19 (Coronavirus)? (If patient has tested   negative or was tested as a requirement for work, school, or travel and   not based on symptoms, answer no)? No  Within the past two weeks have you developed any of the following symptoms   (answer no if symptoms have been present longer than 2 weeks or began   more than 2 weeks ago)? Fever or Chills, Cough, Shortness of breath or   difficulty breathing, Loss of taste or smell, Sore throat, Nasal   congestion, Sneezing or runny nose, Fatigue or generalized body aches   (answer no if pain is specific to a body part e.g. back pain), Diarrhea,   Headache?  Yes independent

## 2022-02-02 NOTE — BEHAVIORAL HEALTH ASSESSMENT NOTE - NSHPLANGLIMITEDENGLISH_GEN_A_CORE
Patient left voicemail stating she tested + for COVID. Was put on a steroid. Asking if there will be any interaction between that and her thyroid medications. I tried to call her back but got her voicemail.   Yes

## 2022-04-23 NOTE — BH INPATIENT PSYCHIATRY PROGRESS NOTE - NSTXPROBDCOPLK_PSY_ALL_CORE
DISCHARGE ISSUE - LACK OF APPROPRIATE OUTPATIENT SERVICES
Resident
DISCHARGE ISSUE - LACK OF APPROPRIATE OUTPATIENT SERVICES

## 2022-05-16 NOTE — ED PROVIDER NOTE - PRO INTERPRETER NEED 2
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     Patient presented 5/15 with difficulty swallowing and gait instability which she noticed while eating dinner at 2030.  She noted fullness and tingling/burning of her mouth, lips, throat.  Additionally, she noted difficulty swallowing.  She later found that she had a wide-based gait and felt like she was leaning more to 1 side.  She reports a holocephalic pressure headache which occurred earlier 5/15 which was not her typical migraine.  She notes a history of menorrhagia, epistaxis but denied recent.  She also notes history of miscarriage x1 with other viable pregnancies.    5/16:  She notes dizziness with some element of room spinning especially on position change and exasperated by changing of head position.  She also notes continued decrease sensation in the right hemibody and left hemiface.    Of note patient reports a upper respiratory infection at the end of April 2022    Stroke Evaluation Summarized    MRI/Head CT Results for orders placed or performed during the hospital encounter of 05/15/22   CT Head w/o Contrast    Impression    IMPRESSION:  1.  Normal head CT.    Findings phoned to referring provider at 5/15/2022 9:09 PM   CTA Head Neck with Contrast    Impression    IMPRESSION:     HEAD CTA:   1.  Normal CTA [place].    NECK CTA:  1.  Normal neck CTA.    2.  Findings phoned to referring provider at 9:16 PM.   MR Brain w/o Contrast    Impression    IMPRESSION:  1.  Normal head MRI.           Echocardiogram Pending   EKG/Telemetry pending   Other Testing Not Applicable     LDL  5/15/2022: 123 mg/dL   A1C  No lab value available in  "past 30 days   Troponin No lab value available in past 48 hrs       Impression  Patient with continued symptoms despite negative MRI.  This may represent a type of vestibular neuritis given her upper respiratory symptoms 2 weeks ago; however, would not expect hemibody decreased pinprick sensation with contralateral face- this would be more symptomatic of the posterior circulation stroke.  We will repeat MRI tomorrow morning with thin cuts through the brainstem as well as coronal DWI and coronal FLAIR to try to locate if stroke occurred.  Further consideration is an MRI negative stroke or a TNK aborted stroke.    Recommendations  - Neuro Check q4h  - Ok for patient to move to floor  - Goal BP <180 / 105  - Hold all antithrombotic and anticoagulant medications for 24 hrs post-thrombolytic  - Hold pharmacologic VTE prophylaxis for 24 hrs post-thrombolytic  - Statin:  Atorvastatin 80mg  - MRI Brain with and without contrast, repeat tomorrow morning    - with thin cuts through the brainstem as well as coronal DWI and coronal FLAIR   - Telemetry, EKG  - Bedside Glucose Monitoring  - A1c, Troponin x 3  - PT/OT/SLP  - Stroke Education  - Euthermia, Euglycemia    Patient Follow-up    - final recommendation pending work-up    Thank you for this consult.     The Stroke Staff is Dr. RUBINA Hayes MD  Vascular Neurology Fellow  To page me or covering stroke neurology team member, click here: AMCOM   Choose \"On Call\" tab at top, then search dropdown box for \"Neurology Adult\", select location, press Enter, then look for stroke/neuro ICU/telestroke.    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 Authorizing Provider   albuterol (PROVENTIL HFA) 108 (90 Base) MCG/ACT inhaler Inhale 2 puffs into the lungs every 6 hours 12/4/19  Yes Mehrdad Faustin, RENAN   azelastine (ASTELIN) 0.1 % nasal spray Spray 1 spray into both nostrils as needed for rhinitis   Yes Unknown, Entered By History   fexofenadine (ALLEGRA) 180 MG tablet Take 180 mg by mouth daily   Yes Unknown, Entered By History   melatonin 5 MG tablet Take 5 mg by mouth nightly as needed for sleep   Yes Unknown, Entered By History   metoprolol succinate ER (TOPROL XL) 50 MG 24 hr tablet Take 50 mg by mouth daily   Yes Unknown, Entered By History   multivitamin w/minerals (THERA-VIT-M) tablet Take 1 tablet by mouth daily   Yes Reported, Patient   rizatriptan (MAXALT) 10 MG tablet TAKE 1 TAB BY MOUTH AT ONSET. MAY REPEAT 2 HRS LATER. MAX 2 TABS/24 HRS. 7/2/20   Reported, Patient       Scheduled Meds    atorvastatin  80 mg Oral QPM     diphenhydrAMINE  50 mg Intravenous Once     sodium chloride (PF)  3 mL Intracatheter Q8H       Infusion Meds    - MEDICATION INSTRUCTIONS -       niCARdipine       sodium chloride Stopped (05/16/22 0014)       PRN Meds  acetaminophen **OR** acetaminophen, labetalol **OR** hydrALAZINE, lidocaine 4%, lidocaine (buffered or not buffered), - MEDICATION INSTRUCTIONS -, niCARdipine, sodium chloride (PF), sodium chloride    Allergies   Allergies   Allergen Reactions     Codeine Hives     Eggs [Chicken-Derived Products (Egg)] Other (See Comments)     Flu-like symptoms     Latex Rash     Family History   Family History   Problem Relation Age of Onset      Alzheimer Disease Paternal Grandmother      Hemophilia Paternal Grandmother      Myocardial Infarction Paternal Grandfather      Hemophilia Father      Hypertension Maternal Grandmother      Pancreatic Cancer Maternal Grandmother      Cerebrovascular Disease Maternal Grandfather      Breast Cancer Maternal Aunt      Bone Cancer Maternal Aunt      Diabetes No family hx of      Coronary Artery Disease Early Onset No family hx of      Colon Cancer No family hx of      Ovarian Cancer No family hx of      Social History   Social History     Tobacco Use     Smoking status: Former Smoker     Packs/day: 0.50     Years: 3.00     Pack years: 1.50     Quit date: 2000     Years since quittin.3     Smokeless tobacco: Never Used   Substance Use Topics     Alcohol use: Yes     Drug use: No       Review of Systems   The 10 point Review of Systems is negative other than noted in the HPI or here.        PHYSICAL EXAMINATION   Temp:  [98  F (36.7  C)-98.2  F (36.8  C)] 98.2  F (36.8  C)  Pulse:  [68-90] 70  Resp:  [11-19] 19  BP: (103-144)/() 142/85  SpO2:  [95 %-100 %] 96 %    General Exam  General:  patient lying in bed without any acute distress    HEENT:  normocephalic/atraumatic  Cardio:  RRR  Pulmonary:  no respiratory distress  Abdomen:  non-distended  Extremities:  no edema  Skin:  intact     Neuro Exam  Mental Status:  alert, oriented x 3, follows commands, speech clear and fluent, naming and repetition normal  Cranial Nerves:  visual fields intact, PERRL, EOMI with normal smooth pursuit, facial sensation intact and decreased pp in Left face, facial movements symmetric, hearing not formally tested but intact to conversation, palate elevation symmetric and uvula midline, no dysarthria, shoulder shrug strong bilaterally, tongue protrusion midline  Motor:  normal muscle tone and bulk, no abnormal movements, able to move all limbs spontaneously, strength 5/5 throughout upper and lower extremities, no pronator  drift  Reflexes:  toes down-going  Sensory:  light touch sensation intact and symmetric throughout upper and lower extremities, no extinction on double simultaneous stimulation , Decreased PP in R hemibody  Coordination:  Mild ataxia in RUE  Station/Gait:  deferred    Dysphagia Screen  Per Nursing    Stroke Scales    NIHSS  1a. Level of Consciousness 0-->Alert, keenly responsive   1b. LOC Questions 0-->Answers both questions correctly   1c. LOC Commands 0-->Performs both tasks correctly   2.   Best Gaze 0-->Normal   3.   Visual 0-->No visual loss   4.   Facial Palsy 0-->Normal symmetrical movements   5a. Motor Arm, Left 0-->No drift, limb holds 90 (or 45) degrees for full 10 secs   5b. Motor Arm, Right 0-->No drift, limb holds 90 (or 45) degrees for full 10 secs   6a. Motor Leg, Left 0-->No drift, leg holds 30 degree position for full 5 secs   6b. Motor Leg, right 0-->No drift, leg holds 30 degree position for full 5 secs   7.   Limb Ataxia 1-->Present in one limb (RUE)   8.   Sensory 1-->Mild-to-moderate sensory loss, patient feels pinprick is less sharp or is dull on the affected side, or there is a loss of superficial pain with pinprick, but patient is aware of being touched   9.   Best Language 0-->No aphasia, normal   10. Dysarthria 0-->Normal   11. Extinction and Inattention  0-->No abnormality   Total 2 (05/16/22 0900)       Modified Unicoi Score (Pre-morbid)  0-No deficits    Imaging  I personally reviewed all imaging; relevant findings per HPI.    Labs Data   CBC  Recent Labs   Lab 05/16/22  0541 05/15/22  2122   WBC 6.5 9.4   RBC 4.60 4.62   HGB 13.4 13.5   HCT 40.7 40.9    286     Basic Metabolic Panel   Recent Labs   Lab 05/16/22 0541 05/15/22  2327 05/15/22  2122     --  140   POTASSIUM 4.0  --  3.6   CHLORIDE 107  --  108   CO2 27  --  27   BUN 5*  --  6*   CR 0.70  --  0.73   * 98 93   NYASIA 8.3*  --  8.4*     Liver Panel  Recent Labs   Lab 05/15/22  2122   PROTTOTAL 6.9   ALBUMIN  3.5   BILITOTAL 0.7   ALKPHOS 99   AST 18   ALT 32     INR    Recent Labs   Lab Test 05/16/22  0541 05/15/22  2122   INR 1.03 0.98      Lipid Profile    Recent Labs   Lab Test 05/15/22  2122 04/08/22  0817 06/17/21  0950   CHOL 186 189 204*   HDL 40* 37* 38*   * 129* 141*   TRIG 114 114 124     A1C    Recent Labs   Lab Test 04/08/22  0817 04/04/17  1642   A1C 5.1 4.8     Troponin I    Recent Labs   Lab 05/15/22  2122   TROPONINIS 3          Stroke Consult Data Data   This was a non-emergent, non-telestroke consult.     English

## 2022-06-13 NOTE — BH PATIENT PROFILE - NS ED BHA SUICIDALITY PRESENT CURRENT PASSIVE IDEATION
[facility] Delay of Care was received. It was reviewed by CB and sent to scan.
No

## 2022-06-20 ENCOUNTER — APPOINTMENT (OUTPATIENT)
Dept: SURGERY | Facility: CLINIC | Age: 57
End: 2022-06-20
Payer: COMMERCIAL

## 2022-06-20 VITALS — WEIGHT: 280 LBS | HEIGHT: 71 IN | TEMPERATURE: 97.4 F | BODY MASS INDEX: 39.2 KG/M2

## 2022-06-20 DIAGNOSIS — Z12.11 ENCOUNTER FOR SCREENING FOR MALIGNANT NEOPLASM OF COLON: ICD-10-CM

## 2022-06-20 DIAGNOSIS — E66.2 MORBID (SEVERE) OBESITY WITH ALVEOLAR HYPOVENTILATION: ICD-10-CM

## 2022-06-20 DIAGNOSIS — J80 ACUTE RESPIRATORY DISTRESS SYNDROME: ICD-10-CM

## 2022-06-20 DIAGNOSIS — G47.33 OBSTRUCTIVE SLEEP APNEA (ADULT) (PEDIATRIC): ICD-10-CM

## 2022-06-20 DIAGNOSIS — Z83.3 FAMILY HISTORY OF DIABETES MELLITUS: ICD-10-CM

## 2022-06-20 DIAGNOSIS — Z92.81 PERSONAL HISTORY OF EXTRACORPOREAL MEMBRANE OXYGENATION (ECMO): ICD-10-CM

## 2022-06-20 DIAGNOSIS — Z12.12 ENCOUNTER FOR SCREENING FOR MALIGNANT NEOPLASM OF COLON: ICD-10-CM

## 2022-06-20 PROCEDURE — 99203 OFFICE O/P NEW LOW 30 MIN: CPT

## 2022-06-20 PROCEDURE — 99213 OFFICE O/P EST LOW 20 MIN: CPT

## 2022-06-20 RX ORDER — MIRTAZAPINE 15 MG/1
15 TABLET, FILM COATED ORAL
Qty: 30 | Refills: 0 | Status: ACTIVE | COMMUNITY
Start: 2022-02-10

## 2022-06-20 RX ORDER — MIRTAZAPINE 45 MG/1
45 TABLET, FILM COATED ORAL
Qty: 30 | Refills: 0 | Status: ACTIVE | COMMUNITY
Start: 2022-01-10

## 2022-06-20 RX ORDER — ATORVASTATIN CALCIUM 20 MG/1
20 TABLET, FILM COATED ORAL
Qty: 90 | Refills: 0 | Status: ACTIVE | COMMUNITY
Start: 2022-05-17

## 2022-06-20 RX ORDER — VENLAFAXINE HYDROCHLORIDE 150 MG/1
150 TABLET, EXTENDED RELEASE ORAL
Qty: 60 | Refills: 0 | Status: ACTIVE | COMMUNITY
Start: 2022-05-05

## 2022-06-20 RX ORDER — SODIUM PICOSULFATE, MAGNESIUM OXIDE, AND ANHYDROUS CITRIC ACID 10; 3.5; 12 MG/160ML; G/160ML; G/160ML
10-3.5-12 MG-GM LIQUID ORAL
Qty: 1 | Refills: 0 | Status: ACTIVE | COMMUNITY
Start: 2022-06-20 | End: 1900-01-01

## 2022-06-20 RX ORDER — METOPROLOL SUCCINATE 100 MG/1
100 TABLET, EXTENDED RELEASE ORAL
Qty: 90 | Refills: 0 | Status: ACTIVE | COMMUNITY
Start: 2022-04-15

## 2022-06-20 RX ORDER — FLURAZEPAM HYDROCHLORIDE 30 MG/1
30 CAPSULE ORAL
Qty: 30 | Refills: 0 | Status: ACTIVE | COMMUNITY
Start: 2022-06-17

## 2022-06-20 NOTE — HISTORY OF PRESENT ILLNESS
[de-identified] : This 57 year old man has never undergone a colonoscopy. There is no FH of colon CA. The patient is asymptomatic regarding his GI tract

## 2022-06-20 NOTE — PHYSICAL EXAM
[Normal Breath Sounds] : Normal breath sounds [Normal Heart Sounds] : normal heart sounds [Normal Rate and Rhythm] : normal rate and rhythm [Abdominal Masses] : No abdominal masses [Abdomen Tenderness] : ~T ~M No abdominal tenderness [No Rash or Lesion] : No rash or lesion [de-identified] : nl [de-identified] : nl [de-identified] : nl

## 2022-06-20 NOTE — ASSESSMENT
[FreeTextEntry1] : Long discussion regarding all options and risks\par Bowel prep written and explained\par Scheduled for colonoscopy on 7/5/22\par All lab values and imaging studies reviewed\par Discussed with Medicine

## 2022-06-28 NOTE — DIETITIAN INITIAL EVALUATION ADULT. - CALCULATED TO (CAL/KG)
[] [facility]) [facility] &  Therapy  [address]  [phone]  F: [phone] [] [address]  [facility] [address]   [address]  P: [phone]  F: [phone] [x] [address]  [address]  P: [phone]  F: [phone] [] [address]  P: [phone]  F: [phone] [] [address]  [facility]   [address]   [place]: [phone]  F: [phone]      Physical Therapy Daily Treatment Note    Date:  2022  Patient Name:  Soheila Cazares    :  1993  MRN: [id number]  Physician: [address] [place] [address] 21: Kiet [facility]              Eligibility Status: Cuco Ramesh  DOS:   # of visits allowed/remainin  Source: Website  Spoke To:  Cojoin  Reference: [id number]  Auth: NPRe  Fabiano yr; visit limit  remain; $15 copay; QQP$3908-$7131. 07remain; OPP$6350-$6255.53remain; No Coinsurance  Medical Diagnosis: L DeQuervain's Tenosynovitis                Rehab Codes: M65.4  Onset Date: 3/22   Visit# / total visits:      Cancels/No Shows: 0/0    Subjective:   Pain:  [] Yes  [x] No Location: L wrist/hand  Pain Rating: (0-10 scale) 0/10   Pain altered Tx:  [x] No  [] Yes  Action:  Comments: Pt notices cont improvement in sx, only feels discomfort during stretches and popping during ex. No soreness after last tx, no further irritation from IP. Pt notes her pain is 1/10 at worst. Feels  strength continues to improve. Objective:       Todays Treatment:  Treatment Location  Left      Right                          Position    [x]          []  [] Supine    [] Prone   [] Side lying  [] Sitting          Treatment Modality    Hot Pack:    [] Large   [] Medium    [] Cervical     _____ min   2 Cold Pack   [x] Large   [] Medium    [] Cervical     ___15__ min - freeze sleeve    Vasocompression    __° temp    ____pressure     _____ min    Electrical Stim:    [] IFC     [] MFAC      [] HVPC                          Protocol:_______  _____X_____min                          #Channels:  [] 1        [] 2       [] Other:    Ultrasound: ______W/cm2 x  ______min              [] 1MHz   [] 3Mhz                      Duty Factor: [] 100%  [] 50%   [] 20%                  Add: [] Lidex   [] Stim    [] Gel pad       Massage:    [] Soft Tissue   [] Cross Friction                      [] Ice ____min   2 Iontophoresis:  IOGEL:  [] 1.5ml   [x] 2.5ml   [] 3.5ml                                            [] 1.0ml   [] 1.3ml                 Dosage:  [] 24 mA/min   [x] 40 mA/min  []  80 mA/min                Channels:   [x] 1    [] 2                [x] 4mg/ml Dexamethasone Sodium Phosphate Dosage 24-80 mAmin                [] Acetic Acid       [] Other     [x] Drug allergies reviewed    ___GF____Initials           ___6/01____Date    Traction:   [] Prone   [] Supine   X _______min               [] Lumbar x ____lbs      [] Cervical x _____lbs               [] Continuous     [] Intermittent  -   On:_____x Off:_____   1 Other: ex, man              6/28/2022  Visit #7     Exercise Reps/ Time Weight/ Level Comments   UBE 10 min           Radial wrist, thumb stretch 10x10s       Stretch wrist flex, ext 10x10s       Thumb ext stretch 10x10s       Thumb abd stretch 10x10s       Thumb opp stretch 10x10s       Wrist maze 5x     Opposition 2x10  Tan Lummi board   Tband thumb ext 3x10 blue     Tband thumb abd 3x10 blue    Foam thumb press  3x10 Black clip    Wrist flexion, ext 3x10 3#    Wrist rad dev 3x10 3#    Pronator  3x10 2#     squeezes  3x10 Light blue  squeezer x15, red digiflex x15   Wrist roll with weight 3x      manual: STR 1st CMC joint area      Specific Instructions for next treatment:        Treatment Charges: Mins Units   [x]  Modalities: CP 10 Nc    [x]  Ther Exercise 30 2   []  Manual Therapy 5 nc   []  Ther Activities     []  Aquatics     []  Vasocompression     [x]  Other: IP 10 1   Total Treatment time 45 3       Assessment: [x] Progressing toward goals. Continued to progress strengthening as noted above. Good tolerance to all progressions, continued to conclude with IP and freeze sleeve to reduce soreness. Plan for re-eval with primary therapist to further review goals next week. [] No change. [x] Other:    [x] Patient would continue to benefit from skilled physical therapy services in order to: restore ROM and progress L hand/wrist strength for improved function. SHORT TERM GOALS ( 8 visits)  Wrist, thumb pain = 0  Wrist, thumb ROM = WNL  Wrist, thumb strength = 4+/5  Wrist, thumb function: , lift, twist w/o pain     LONG TERM GOALS ( 12 visits)  Independent Home Exercise program  Return to normal activity     PATIENT GOAL  Full motion, no pain    Pt. Education:  [x] Yes  [] No  [x] Reviewed Prior HEP/Ed  Method of Education: [x] Verbal  [] Demo  [] Written  Comprehension of Education:  [x] Verbalizes understanding. [x] Demonstrates understanding. [] Needs review. [] Demonstrates/verbalizes HEP/Ed previously given. Plan: [x] Continue current frequency toward long and short term goals.     [x] Specific Instructions for subsequent treatments: cont per POC      Time In: 5:00 pm           Time Out: 6:05 pm    Electronically signed by:  Dax Klein PTA [id number]

## 2022-06-30 ENCOUNTER — OUTPATIENT (OUTPATIENT)
Dept: OUTPATIENT SERVICES | Facility: HOSPITAL | Age: 57
LOS: 1 days | End: 2022-06-30
Payer: COMMERCIAL

## 2022-06-30 DIAGNOSIS — Z20.828 CONTACT WITH AND (SUSPECTED) EXPOSURE TO OTHER VIRAL COMMUNICABLE DISEASES: ICD-10-CM

## 2022-06-30 DIAGNOSIS — Z98.890 OTHER SPECIFIED POSTPROCEDURAL STATES: Chronic | ICD-10-CM

## 2022-06-30 LAB — SARS-COV-2 RNA SPEC QL NAA+PROBE: SIGNIFICANT CHANGE UP

## 2022-06-30 PROCEDURE — U0005: CPT

## 2022-06-30 PROCEDURE — U0003: CPT

## 2022-07-04 ENCOUNTER — TRANSCRIPTION ENCOUNTER (OUTPATIENT)
Age: 57
End: 2022-07-04

## 2022-07-05 ENCOUNTER — APPOINTMENT (OUTPATIENT)
Dept: SURGERY | Facility: HOSPITAL | Age: 57
End: 2022-07-05

## 2022-07-05 ENCOUNTER — OUTPATIENT (OUTPATIENT)
Dept: OUTPATIENT SERVICES | Facility: HOSPITAL | Age: 57
LOS: 1 days | End: 2022-07-05
Payer: COMMERCIAL

## 2022-07-05 DIAGNOSIS — Z12.11 ENCOUNTER FOR SCREENING FOR MALIGNANT NEOPLASM OF COLON: ICD-10-CM

## 2022-07-05 DIAGNOSIS — Z98.890 OTHER SPECIFIED POSTPROCEDURAL STATES: Chronic | ICD-10-CM

## 2022-07-05 PROCEDURE — G0121: CPT

## 2022-07-05 PROCEDURE — 45378 DIAGNOSTIC COLONOSCOPY: CPT

## 2022-07-05 RX ORDER — SODIUM CHLORIDE 9 MG/ML
500 INJECTION INTRAMUSCULAR; INTRAVENOUS; SUBCUTANEOUS
Refills: 0 | Status: COMPLETED | OUTPATIENT
Start: 2022-07-05 | End: 2022-07-05

## 2022-07-05 RX ADMIN — SODIUM CHLORIDE 75 MILLILITER(S): 9 INJECTION INTRAMUSCULAR; INTRAVENOUS; SUBCUTANEOUS at 10:08

## 2022-07-05 NOTE — SPEAKING VALVE EVALUATION - RR DURING VALVE USE
When a strep negative, communication sent       Latest Reference Range & Units 07/05/22 10:39   GRP A STREP Negative  Negative        29

## 2022-08-21 NOTE — ED ADULT TRIAGE NOTE - HEIGHT IN CM
Problem: Safety Risk - Non-Violent Restraints  Goal: Patient will remain free from self-harm  Description: INTERVENTIONS:  - Apply the least restrictive restraint to prevent harm  - Notify patient and family of reasons restraints applied  - Assess for any contributing factors to confusion (electrolyte disturbances, delirium, medications)  - Discontinue any unnecessary medical devices as soon as possible  - Assess the patient's physical comfort, circulation, skin condition, hydration, nutrition and elimination needs   - Reorient and redirection as needed  - Assess for the need to continue restraints  Outcome: Progressing 180.34

## 2022-10-28 NOTE — PROGRESS NOTE BEHAVIORAL HEALTH - NS ED [facility] MED ROS CONSTITUTIONAL SYMPTOMS
Cut the Toujeo 65 units nightly  Increase the trulcity to 3 units every week     Start the farxiga 5mg once a day No complaints

## 2022-11-17 NOTE — BH INPATIENT PSYCHIATRY ASSESSMENT NOTE - CURRENT MEDICATION
[Follow-Up Visit] : a follow-up [Spouse] : spouse [FreeTextEntry2] : Lung Cancer MEDICATIONS  (STANDING):  LORazepam     Tablet 0.5 milliGRAM(s) Oral two times a day  melatonin. 6 milliGRAM(s) Oral at bedtime    MEDICATIONS  (PRN):  acetaminophen   Tablet .. 975 milliGRAM(s) Oral every 6 hours PRN Mild Pain (1 - 3), Moderate Pain (4 - 6), Severe Pain (7 - 10)

## 2022-11-29 NOTE — PROVIDER CONTACT NOTE (OTHER) - BACKGROUND
Detail Level: Detailed Quality 130: Documentation Of Current Medications In The Medical Record: Current Medications Documented Quality 431: Preventive Care And Screening: Unhealthy Alcohol Use - Screening: Patient not identified as an unhealthy alcohol user when screened for unhealthy alcohol use using a systematic screening method Quality 226: Preventive Care And Screening: Tobacco Use: Screening And Cessation Intervention: Patient screened for tobacco use and is an ex/non-smoker H/O PNA

## 2022-12-02 NOTE — PROGRESS NOTE BEHAVIORAL HEALTH - NS ED [facility] MED ROS GENITOURINARY
Unable to assess Nsaids Counseling: NSAID Counseling: I discussed with the patient that NSAIDs should be taken with food. Prolonged use of NSAIDs can result in the development of stomach ulcers.  Patient advised to stop taking NSAIDs if abdominal pain occurs.  The patient verbalized understanding of the proper use and possible adverse effects of NSAIDs.  All of the patient's questions and concerns were addressed.

## 2022-12-02 NOTE — PROGRESS NOTE ADULT - PROBLEM SELECTOR PLAN 2
- febrile this am to 101  - previous blood culture coag negative staph contaminant, and BAL with MSSA  - s/p full course of vanco/zosyn   - DVT study negative   - most recent sputum with enterobacter, pan sensitive  - will check another bcx today  - febrile overnight cultures sent - vanco started again   - ID note appreciated, cont cefepime for now- ? sec to lung injury   - Loose stools - c-diff negative PCR GI pending   - hypothermia blanket PRN English - febrile this am to 101  - previous blood culture coag negative staph contaminant, and BAL with MSSA  - s/p full course of vanco/zosyn   - DVT study negative   - most recent sputum with enterobacter, pan sensitive short course of abx now dc   - bcx neg   - follow pt off abx - ID agreed  - Loose stools - c-diff negative PCR GI negative   - hypothermia blanket PRN

## 2023-01-04 NOTE — BH INPATIENT PSYCHIATRY ASSESSMENT NOTE - NSCDADMBILLING_PSY_A_CORE
1. \"Have you been to the ER, urgent care clinic since your last visit? Hospitalized since your last visit? \" No    2. \"Have you seen or consulted any other health care providers outside of the [address] [place] [address] since your last visit? \" No     3. For patients aged 39-70: Has the patient had a colonoscopy / FIT/ Cologuard? Yes - no Care Gap present    If the patient is female:    4. For patients aged 41-77: Has the patient had a mammogram within the past 2 years? NA - based on age or sex    11. For patients aged 21-65: Has the patient had a pap smear? NA - based on age or sex     Patient is accompanied by self I have received verbal consent from Dianna Sherman to discuss any/all medical information while they are present in the room.   Reviewed record in preparation for visit and have necessary documentation  Goals that were addressed and/or need to be completed during or after this appointment include     Health Maintenance Due   Topic Date Due    COVID-19 Vaccine (1) Never done    Pneumococcal 65+ years (1 - PCV) Never done    Low dose CT lung screening  Never done    AAA Screening 73-67 YO Male Smoking Patients  Never done    Flu Vaccine (1) Never done    Medicare Yearly Exam  12/23/2022 51680 - Initial hospital care - moderate complexity

## 2023-05-09 NOTE — PROVIDER CONTACT NOTE (OTHER) - ASSESSMENT
No acute distress noted at this time.
Pt shows no signs of acute distress
Temp 101 oral
Temp 101.4 and 
pt warm to touch diaphoretic
 ; /101 . pt resting in bed shaking but afebrile. no acute distress noted.
temp 100.2, /110, . pt resting. no acute distress noted. pt has been running fevers.
Patient heart rate: 113; temperature: 102.8 F. No acute distress noted.
Pt rigoring and sweating
pt warm to touch diaphoretic
. pt resting. no acute distress noted.
Patient heart rate: 118. No acute distress noted.
. pt resting. no acute distress noted.
Pt is not speaking, pt skin feels hot
Pt with left nostril bleeding
100.7 axillary
Controlled with current regime
No acute distress noted
No acute distress noted. Patient resting comfortably in bed
No acute distress noted. Patient resting comfortably in bed. /93.
Patient heart rate: 111; BP: 161/115. No other acute distress noted. Patient afebrile.
Patient heart rate: 120.
Pt shows no signs of acute distress
Pt shows no signs of acute distress
Temp 100.2 axillary All other VS stable. No acute distress noted.
Temp 100.3 axillary, . All other VS stable. No acute distress noted.
Temp 100.7 rectally, , /99. No acute distress noted.
Temp 100.8  axillary All other VS stable. No acute distress noted.
Temp 100.8  axillary All other VS stable. No acute distress noted.
Temp 101.7 rectally, , /103. Patient satting 97 on 5L. No acute distress noted.
Temp 101.7 rectally, , /99; MEWS score triggered. MEWS score triggered. No distress noted at this time.
Temp 101.9 rectally, , /100. Patient satting 96 on 4L NC. No acute distress noted.
Temp 102 rectally, , /105; MEWS score triggered. No distress noted at this time.
Temperature 100.9 rectally, RR 22 on 5L NC, . Nonverbal s/s of distress noted.
temp 100.4. All other VS stable. patient diaphoretic
temp 100.8 axillary. All other VS stable. Patient resting comfortably in bed.
vital signs
Temp 101.2 and 
Temp 101.5 and 
Temperature: 101.2 F

## 2023-08-10 NOTE — OCCUPATIONAL THERAPY INITIAL EVALUATION ADULT - PERTINENT HX OF CURRENT PROBLEM, REHAB EVAL
FOLLOW UP ORTHOPAEDIC NOTE    The patient follows up today for reevaluation of right knee pain. The patient states continued right knee pain 2/10 pain however she did get some mild relief from the corticosteroid injection provided roughly 5 weeks ago. She states continued knee pain enough to the point where she wishes to pursue surgery. She has been pleased in the past with her left total knee arthroplasty. PE:  AAOx3  RR  Unlabored breathing  Skin warm and moist  Focused physical examination of the right knee  5/115/0 AROM  Unchanged clinical examination for stability or NV       Diagnosis Orders   1. Primary osteoarthritis of right knee        2. Arthropathy, chondrocalcinosis, dicalcium phosphate, knee, right          Assessment and plan: 74 female with continued subjective symptoms of right knee pain with known, correlating diagnosis of right knee osteoarthritis. -Time of 16 minutes was spent coordinating and discussing the clinical findings and diagnostic imaging results as they pertain to the patient's presenting subjective symptoms.  -I had a pleasant discussion with the patient today and reviewed with her treatment options to continued conservative care treatment measures versus consideration for total knee arthroplasty. She states that she has had some further medical workups which have now done well and she wishes to pursue more so definitive management  -I reviewed with her the risk and benefits of a total knee arthroplasty and understand the risk and benefits she wishes to proceed  -Currently the patient wishes to proceed with surgery given right knee symptoms from right knee osteoarthritis as assessed by clinical exam and diagnostic imaging, which correlates to subjective symptoms. Understanding the risks and benefits of nonoperative versus operative treatments, she wishes to pursue surgery.     Benefits of decreased pain and improved function were discussed with the patient as well as potential 54M with severe depression who presented to  10/28 with lethargy and hypoxemic/hypercapnic respiratory failure possibly related to overdose +/- pneumonia extubated but required reintubation 10/30 but with persistent hypoxemia refractory to conventional management and ultimately cannulated for VV ECMO 10/30/2019 for hypoxemic respiratory failure with ARDS and transferred to [facility] for further management

## 2023-10-07 NOTE — PROGRESS NOTE ADULT - PROBLEM SELECTOR PROBLEM 5
Department of Anesthesiology  Postprocedure Note    Patient: Etienne Freire  MRN: [id number]  YOB: 1996    Procedure Summary     Date: 10/04/23 Room / Location: [facility] MAIN OR 02 / [facility] MAIN OR    Anesthesia Start: 4825 Anesthesia Stop: 5908    Procedure: RIGHT EXTERNAL EAR LESION EXCISION, length 2.5 cm, INTRALESIONAL KELOID INJECTION (Right: Ear) Diagnosis:       Lesion of right external ear      Autism      (Lesion of right external ear [H61.91])      (Autism [F84.0])    Surgeons: Luigi Macias MD Responsible Provider: Philip Russell MD    Anesthesia Type: MAC ASA Status: 3          Anesthesia Type: MAC    Yan Phase I: Yan Score: 10    Yan Phase II: Yan Score: 10      Anesthesia Post Evaluation    Patient location during evaluation: PACU  Patient participation: complete - patient cannot participate  Level of consciousness: awake  Pain score: 0  Airway patency: patent  Nausea & Vomiting: no nausea and no vomiting  Complications: no  Cardiovascular status: hemodynamically stable  Respiratory status: acceptable  Hydration status: stable  Multimodal analgesia pain management approach JESSIE (acute kidney injury) Agitation

## 2023-10-25 NOTE — PROGRESS NOTE BEHAVIORAL HEALTH - NS ED [facility] REVIEW OF ED CHART AVAILABLE LABS REVIEWED
Yes Aklief Pregnancy And Lactation Text: It is unknown if this medication is safe to use during pregnancy.  It is unknown if this medication is excreted in breast milk.  Breastfeeding women should use the topical cream on the smallest area of the skin for the shortest time needed while breastfeeding.  Do not apply to nipple and areola.

## 2023-11-27 NOTE — PROGRESS NOTE BEHAVIORAL HEALTH - NSBHCHARTREVIEWVS_PSY_A_CORE FT
ICU Vital Signs Last 24 Hrs  T(C): 36.7 (10 Nov 2020 09:43), Max: 37 (09 Nov 2020 14:30)  T(F): 98 (10 Nov 2020 09:43), Max: 98.6 (09 Nov 2020 14:30)  HR: 112 (10 Nov 2020 09:43) (90 - 112)  BP: 144/84 (10 Nov 2020 09:43) (135/84 - 155/86)  BP(mean): --  ABP: --  ABP(mean): --  RR: 18 (10 Nov 2020 09:43) (18 - 18)  SpO2: 98% (10 Nov 2020 09:43) (96% - 98%) No assistance needed

## 2023-12-30 NOTE — PROGRESS NOTE ADULT - PROBLEM SELECTOR PLAN 4
Well , 11-14 Years Old  Well-child exams are visits with a health care provider to track your child's growth and development at certain ages. The following information tells you what to expect during this visit and gives you some helpful tips about caring for your child.  What immunizations does my child need?  Human papillomavirus (HPV) vaccine.  Influenza vaccine, also called a flu shot. A yearly (annual) flu shot is recommended.  Meningococcal conjugate vaccine.  Tetanus and diphtheria toxoids and acellular pertussis (Tdap) vaccine.  Other vaccines may be suggested to catch up on any missed vaccines or if your child has certain high-risk conditions.  For more information about vaccines, talk to your child's health care provider or go to the Centers for Disease Control and Prevention website for immunization schedules: www.cdc.gov/vaccines/schedules  What tests does my child need?  Physical exam  Your child's health care provider may speak privately with your child without a caregiver for at least part of the exam. This can help your child feel more comfortable discussing:  Sexual behavior.  Substance use.  Risky behaviors.  Depression.  If any of these areas raises a concern, the health care provider may do more tests to make a diagnosis.  Vision  Have your child's vision checked every 2 years if he or she does not have symptoms of vision problems. Finding and treating eye problems early is important for your child's learning and development.  If an eye problem is found, your child may need to have an eye exam every year instead of every 2 years. Your child may also:  Be prescribed glasses.  Have more tests done.  Need to visit an eye specialist.  If your child is sexually active:  Your child may be screened for:  Chlamydia.  Gonorrhea and pregnancy, for females.  HIV.  Other sexually transmitted infections (STIs).  If your child is female:  PT as tolerated  - supportive care  - PM&R recs acute rehab

## 2024-01-31 NOTE — PROGRESS NOTE BEHAVIORAL HEALTH - BEHAVIOR
Cooperative/Other Cooperative

## 2024-03-02 ENCOUNTER — OUTPATIENT (OUTPATIENT)
Dept: OUTPATIENT SERVICES | Facility: HOSPITAL | Age: 59
LOS: 1 days | End: 2024-03-02
Payer: COMMERCIAL

## 2024-03-02 ENCOUNTER — APPOINTMENT (OUTPATIENT)
Dept: RADIOLOGY | Facility: HOSPITAL | Age: 59
End: 2024-03-02
Payer: COMMERCIAL

## 2024-03-02 DIAGNOSIS — Z98.890 OTHER SPECIFIED POSTPROCEDURAL STATES: Chronic | ICD-10-CM

## 2024-03-02 DIAGNOSIS — Z00.8 ENCOUNTER FOR OTHER GENERAL EXAMINATION: ICD-10-CM

## 2024-03-02 PROCEDURE — 73030 X-RAY EXAM OF SHOULDER: CPT | Mod: 26,LT

## 2024-03-02 PROCEDURE — 73030 X-RAY EXAM OF SHOULDER: CPT

## 2024-03-03 NOTE — PROVIDER CONTACT NOTE (OTHER) - NAME OF MD/NP/PA/DO NOTIFIED:
Odette Clarke [de-identified] : croup [FreeTextEntry6] : Child was in the ER 5 days ago due to a barky cough and sob. Given dexamethasone and improved and sent home. Child still has a slight bark to her cough but is sleeping well at night. No fever No sob

## 2024-04-14 NOTE — PROGRESS NOTE ADULT - PROBLEM SELECTOR PLAN 3
HbA1c up to 8.5%. We discussed medication options. Using shared decision making, we decided to continue at this time with lifestyle modifications. No changes in medication. Dietary modifications and recommendation for 150 minutes of moderate-intensity exercise per week reviewed. Will repeat HbA1c prior to next eval.   Resolved, Na at 144 this AM.  Likely d/t insensible fluid loss from persistent fever, poor PO intake. DVT PPX: Lovenox 40mg  Diet: Mech soft with nectar consistency

## 2024-05-10 NOTE — PROGRESS NOTE ADULT - SUBJECTIVE AND OBJECTIVE BOX
INTERVAL HPI/OVERNIGHT EVENTS:    Patient remains sedated and intubated. Patient decannulated. Wife at bedside.     Code Status: Full code  Allergies    No Known Allergies    Intolerances    MEDICATIONS  (STANDING):  chlorhexidine 0.12% Liquid 15 milliLiter(s) Oral Mucosa every 12 hours  dexMEDEtomidine Infusion 0.5 MICROgram(s)/kG/Hr (16.875 mL/Hr) IV Continuous <Continuous>  dextrose 5%. 1000 milliLiter(s) (50 mL/Hr) IV Continuous <Continuous>  fentaNYL   Infusion. 0.5 MICROgram(s)/kG/Hr (6.75 mL/Hr) IV Continuous <Continuous>  heparin  Infusion.  Unit(s)/Hr (24 mL/Hr) IV Continuous <Continuous>  insulin lispro (HumaLOG) corrective regimen sliding scale   SubCutaneous every 6 hours  levETIRAcetam  IVPB 250 milliGRAM(s) IV Intermittent every 12 hours  metoclopramide Injectable 10 milliGRAM(s) IV Push every 8 hours  midazolam Infusion 0.02 mG/kG/Hr (2.7 mL/Hr) IV Continuous <Continuous>  petrolatum Ophthalmic Ointment 1 Application(s) Both EYES two times a day  piperacillin/tazobactam IVPB.. 4.5 Gram(s) IV Intermittent every 8 hours  polyethylene glycol 3350 17 Gram(s) Oral two times a day  propofol Infusion 10 MICROgram(s)/kG/Min (8.1 mL/Hr) IV Continuous <Continuous>  senna Syrup 15 milliLiter(s) Oral two times a day  vancomycin  IVPB 1500 milliGRAM(s) IV Intermittent every 8 hours  vancomycin  IVPB        MEDICATIONS  (PRN):      PRESENT SYMPTOMS: [ x]Unable to obtain due to poor mentation   Source if other than patient:  [ ]Family   [ ]Team     Pain (Impact on QOL):    Location:  Severity:  Minimal acceptable level (0-10 scale):       Quality:       Onset:  Duration:  Aggravating factors:  Relieving Factors  Radiation:    Dyspnea:  Yes [ ] No [ ] - [ ]Mild [ ]Moderate [ ]Severe  Anxiety:    Yes [ ] No [ ] - [ ]Mild [ ]Moderate [ ]Severe  Fatigue:    Yes [ ] No [ ] - [ ]Mild [ ]Moderate [ ]Severe  Nausea:    Yes [ ] No [ ] - [ ]Mild [ ]Moderate [ ]Severe                         Loss of appetite: Yes [ ] No [ ] - [ ]Mild [ ]Moderate [ ]Severe             Constipation:  Yes [ ] No [ ] - [ ]Mild [ ]Moderate [ ]Severe  Grief:  Yes [ ] No [ ]     PAIN AD Score:	  [email or web address]/[email or web address] (Ctrl + left click to view)    Other Symptoms:  [ ]All other review of systems negative     Karnofsky Performance Score/Palliative Performance Status Version 2:         10 %    http://palliative.info/resource_material/[email or web address].pdf    PHYSICAL EXAM:  Vital Signs Last 24 Hrs  T(C): 37.8 (04 Nov 2019 13:00), Max: 37.9 (03 Nov 2019 16:00)  T(F): 100 (04 Nov 2019 13:00), Max: 100.2 (03 Nov 2019 16:00)  HR: 79 (04 Nov 2019 15:00) (75 - 110)  BP: 103/60 (04 Nov 2019 14:00) (103/60 - 155/80)  BP(mean): 70 (04 Nov 2019 14:00) (67 - 105)  RR: 25 (04 Nov 2019 15:00) (18 - 29)  SpO2: 96% (04 Nov 2019 15:00) (93% - 100%) I&O's Summary    03 Nov 2019 07:01  -  04 Nov 2019 07:00  --------------------------------------------------------  IN: 4729.5 mL / OUT: 3800 mL / NET: 929.5 mL    04 Nov 2019 07:01  -  04 Nov 2019 15:53  --------------------------------------------------------  IN: 1154.2 mL / OUT: 1285 mL / NET: -130.8 mL     GENERAL:  [ ]Alert  [ ]Oriented x   [ x]Lethargic  [ ]Cachexia  [ ]Unarousable  [ ]Verbal  [ ]Non-Verbal  Behavioral:   [ ] Anxiety  [ ] Delirium [ ] Agitation [ x] Other  HEENT:  [ ]Normal   [ ]Dry mouth   [x ]ET Tube/Trach  [ ]Oral lesions  PULMONARY:   [x ]Clear anteriorly [ ]Tachypnea  [ ]Audible excessive secretions   [ ]Rhonchi        [ ]Right [ ]Left [ ]Bilateral  [ ]Crackles        [ ]Right [ ]Left [ ]Bilateral  [ ]Wheezing     [ ]Right [ ]Left [ ]Bilateral  CARDIOVASCULAR:    [ x]Regular [ ]Irregular [ ]Tachy  [ ]Luis Fernando [ ]Murmur [ ]Other  GASTROINTESTINAL:  [ x]Soft  [ ]Distended   [ ]+BS  [ x]Non tender [ ]Tender  [ ]PEG [ ]OGT/ NGT   Last BM:      GENITOURINARY:  [ ]Normal [ ] Incontinent   [ ]Oliguria/Anuria   [ x]Meredith  MUSCULOSKELETAL:   [ ]Normal   [ ]Weakness  [ x]Bed/Wheelchair bound [ ]Edema  NEUROLOGIC:   [ ]No focal deficits  [ ] Cognitive impairment  [ ] Dysphagia [ ]Dysarthria [ ] Paresis [ x]Other - sedated   SKIN:   [x ]Normal   [ ]Pressure ulcer(s)  [ ]Rash    CRITICAL CARE:  [ ] Shock Present  [ ]Septic [ ]Cardiogenic [ ]Neurologic [ ]Hypovolemic  [ ]  Vasopressors [ ]  Inotropes   [ ] Respiratory failure present  [ ] Acute  [ ] Chronic [ ] Hypoxic  [ ] Hypercarbic [ ] Other  [ ] Other organ failure     LABS:                        8.6    6.73  )-----------( 148      ( 04 Nov 2019 14:20 )             26.3   11-04    142  |  107  |  9   ----------------------------<  138<H>  4.3   |  23  |  0.83    Ca    9.0      04 Nov 2019 14:20  Phos  4.5     11-04  Mg     2.3     11-04    TPro  6.1  /  Alb  3.0<L>  /  TBili  0.4  /  DBili  x   /  AST  30  /  ALT  40  /  AlkPhos  45  11-04  PTT - ( 04 Nov 2019 02:25 )  PTT:76.7 SEC      RADIOLOGY & ADDITIONAL STUDIES:    Protein Calorie Malnutrition Present: [ ] yes [ x] no  [ ] PPSV2 < or = 30%  [ ] significant weight loss [ ] poor nutritional intake [ ] anasarca [ ] catabolic state Albumin, Serum: 3.0 g/dL (11-04-19 @ 14:20)      REFERRALS:   [ ]Chaplaincy  [ ] Hospice  [ ]Child Life  [ ]Social Work  [ ]Case management [ ]Holistic Therapy   Goals of Care Document: INTERVAL HPI/OVERNIGHT EVENTS:    Patient remains sedated and intubated. Patient decannulated. Wife at bedside.     Code Status: Full code  Allergies    No Known Allergies    Intolerances    MEDICATIONS  (STANDING):  chlorhexidine 0.12% Liquid 15 milliLiter(s) Oral Mucosa every 12 hours  dexMEDEtomidine Infusion 0.5 MICROgram(s)/kG/Hr (16.875 mL/Hr) IV Continuous <Continuous>  dextrose 5%. 1000 milliLiter(s) (50 mL/Hr) IV Continuous <Continuous>  fentaNYL   Infusion. 0.5 MICROgram(s)/kG/Hr (6.75 mL/Hr) IV Continuous <Continuous>  heparin  Infusion.  Unit(s)/Hr (24 mL/Hr) IV Continuous <Continuous>  insulin lispro (HumaLOG) corrective regimen sliding scale   SubCutaneous every 6 hours  levETIRAcetam  IVPB 250 milliGRAM(s) IV Intermittent every 12 hours  metoclopramide Injectable 10 milliGRAM(s) IV Push every 8 hours  midazolam Infusion 0.02 mG/kG/Hr (2.7 mL/Hr) IV Continuous <Continuous>  petrolatum Ophthalmic Ointment 1 Application(s) Both EYES two times a day  piperacillin/tazobactam IVPB.. 4.5 Gram(s) IV Intermittent every 8 hours  polyethylene glycol 3350 17 Gram(s) Oral two times a day  propofol Infusion 10 MICROgram(s)/kG/Min (8.1 mL/Hr) IV Continuous <Continuous>  senna Syrup 15 milliLiter(s) Oral two times a day  vancomycin  IVPB 1500 milliGRAM(s) IV Intermittent every 8 hours  vancomycin  IVPB        MEDICATIONS  (PRN):      PRESENT SYMPTOMS: [ x]Unable to obtain due to poor mentation   Source if other than patient:  [ ]Family   [ ]Team     Pain (Impact on QOL):    Location:  Severity:  Minimal acceptable level (0-10 scale):       Quality:       Onset:  Duration:  Aggravating factors:  Relieving Factors  Radiation:    Dyspnea:  Yes [ ] No [ ] - [ ]Mild [ ]Moderate [ ]Severe  Anxiety:    Yes [ ] No [ ] - [ ]Mild [ ]Moderate [ ]Severe  Fatigue:    Yes [ ] No [ ] - [ ]Mild [ ]Moderate [ ]Severe  Nausea:    Yes [ ] No [ ] - [ ]Mild [ ]Moderate [ ]Severe                         Loss of appetite: Yes [ ] No [ ] - [ ]Mild [ ]Moderate [ ]Severe             Constipation:  Yes [ ] No [ ] - [ ]Mild [ ]Moderate [ ]Severe  Grief:  Yes [ ] No [ ]     PAIN AD Score:	0  [email or web address]/[email or web address] (Ctrl + left click to view)    Other Symptoms:  [ ]All other review of systems negative     Karnofsky Performance Score/Palliative Performance Status Version 2:         10 %    http://palliative.info/resource_material/[email or web address].pdf    PHYSICAL EXAM:  Vital Signs Last 24 Hrs  T(C): 37.8 (04 Nov 2019 13:00), Max: 37.9 (03 Nov 2019 16:00)  T(F): 100 (04 Nov 2019 13:00), Max: 100.2 (03 Nov 2019 16:00)  HR: 79 (04 Nov 2019 15:00) (75 - 110)  BP: 103/60 (04 Nov 2019 14:00) (103/60 - 155/80)  BP(mean): 70 (04 Nov 2019 14:00) (67 - 105)  RR: 25 (04 Nov 2019 15:00) (18 - 29)  SpO2: 96% (04 Nov 2019 15:00) (93% - 100%) I&O's Summary    03 Nov 2019 07:01  -  04 Nov 2019 07:00  --------------------------------------------------------  IN: 4729.5 mL / OUT: 3800 mL / NET: 929.5 mL    04 Nov 2019 07:01  -  04 Nov 2019 15:53  --------------------------------------------------------  IN: 1154.2 mL / OUT: 1285 mL / NET: -130.8 mL     GENERAL:  [ ]Alert  [ ]Oriented x   [ x]Lethargic  [ ]Cachexia  [ ]Unarousable  [ ]Verbal  [ ]Non-Verbal  Behavioral:   [ ] Anxiety  [ ] Delirium [ ] Agitation [ x] Other  HEENT:  [ ]Normal   [ ]Dry mouth   [x ]ET Tube/Trach  [ ]Oral lesions  PULMONARY:   [x ]Clear anteriorly [ ]Tachypnea  [ ]Audible excessive secretions   [ ]Rhonchi        [ ]Right [ ]Left [ ]Bilateral  [ ]Crackles        [ ]Right [ ]Left [ ]Bilateral  [ ]Wheezing     [ ]Right [ ]Left [ ]Bilateral  CARDIOVASCULAR:    [ x]Regular [ ]Irregular [ ]Tachy  [ ]Luis Fernando [ ]Murmur [ ]Other  GASTROINTESTINAL:  [ x]Soft  [ ]Distended   [ ]+BS  [ x]Non tender [ ]Tender  [ ]PEG [ ]OGT/ NGT   Last BM: 11/04     GENITOURINARY:  [ ]Normal [ ] Incontinent   [ ]Oliguria/Anuria   [ x]Meredith  MUSCULOSKELETAL:   [ ]Normal   [ ]Weakness  [ x]Bed/Wheelchair bound [ ]Edema  NEUROLOGIC:   [ ]No focal deficits  [ ] Cognitive impairment  [ ] Dysphagia [ ]Dysarthria [ ] Paresis [ x]Other - sedated   SKIN:   [x ]Normal   [ ]Pressure ulcer(s)  [ ]Rash    CRITICAL CARE:  [ ] Shock Present  [ ]Septic [ ]Cardiogenic [ ]Neurologic [ ]Hypovolemic  [ ]  Vasopressors [ ]  Inotropes   [ ] Respiratory failure present  [ ] Acute  [ ] Chronic [ ] Hypoxic  [ ] Hypercarbic [ ] Other  [ ] Other organ failure     LABS:                        8.6    6.73  )-----------( 148      ( 04 Nov 2019 14:20 )             26.3   11-04    142  |  107  |  9   ----------------------------<  138<H>  4.3   |  23  |  0.83    Ca    9.0      04 Nov 2019 14:20  Phos  4.5     11-04  Mg     2.3     11-04    TPro  6.1  /  Alb  3.0<L>  /  TBili  0.4  /  DBili  x   /  AST  30  /  ALT  40  /  AlkPhos  45  11-04  PTT - ( 04 Nov 2019 02:25 )  PTT:76.7 SEC    RADIOLOGY & ADDITIONAL STUDIES: reviewed.     Protein Calorie Malnutrition Present: [ ] yes [ x] no  x[ ] PPSV2 < or = 30%  [ ] significant weight loss [ ] poor nutritional intake [ ] anasarca [ ] catabolic state Albumin, Serum: 3.0 g/dL (11-04-19 @ 14:20)    REFERRALS:   [ ]Chaplaincy  [ ] Hospice  [ ]Child Life  [ ]Social Work  [ ]Case management [ ]Holistic Therapy   Goals of Care Document: no

## 2024-10-10 NOTE — PROGRESS NOTE BEHAVIORAL HEALTH - NSBHCHARTREVIEWVS_PSY_A_CORE FT
Goal Outcome Evaluation:  Plan of Care Reviewed With: (P) patient           Outcome Evaluation: (P) Patient presents to PT evaluation with decreased strength in LE and decreased ambulation ability. Patient transferred from bed to chair with Clem x2 but required modA x2 for STS from EOB. Patient had high pain in his right lower extremity that felt like a burning pain. Nursing was notified abou the pain. Patient will benefit from further skilled PT in order to improve activity tolerance and ambulation. Recommended discharge is IRF.      Anticipated Discharge Disposition (PT): (P) inpatient rehabilitation facility                         Vital Signs Last 24 Hrs  T(C): 37.7 (06 Dec 2020 12:00), Max: 38.4 (05 Dec 2020 18:00)  T(F): 99.8 (06 Dec 2020 12:00), Max: 101.2 (05 Dec 2020 18:00)  HR: 86 (06 Dec 2020 12:00) (84 - 135)  BP: 102/65 (06 Dec 2020 12:00) (84/72 - 139/79)  BP(mean): 76 (06 Dec 2020 12:00) (72 - 109)  RR: 18 (06 Dec 2020 12:00) (16 - 44)  SpO2: 99% (06 Dec 2020 12:00) (91% - 100%)

## 2024-10-24 ENCOUNTER — APPOINTMENT (OUTPATIENT)
Dept: ORTHOPEDIC SURGERY | Facility: CLINIC | Age: 59
End: 2024-10-24
Payer: COMMERCIAL

## 2024-10-24 VITALS — WEIGHT: 280 LBS | HEIGHT: 71 IN | BODY MASS INDEX: 39.2 KG/M2

## 2024-10-24 DIAGNOSIS — M17.0 BILATERAL PRIMARY OSTEOARTHRITIS OF KNEE: ICD-10-CM

## 2024-10-24 DIAGNOSIS — M25.561 PAIN IN RIGHT KNEE: ICD-10-CM

## 2024-10-24 PROCEDURE — 73564 X-RAY EXAM KNEE 4 OR MORE: CPT | Mod: 50

## 2024-10-24 PROCEDURE — 99213 OFFICE O/P EST LOW 20 MIN: CPT

## 2024-10-24 RX ORDER — GABAPENTIN 100 MG/1
100 CAPSULE ORAL
Refills: 0 | Status: ACTIVE | COMMUNITY

## 2024-10-24 RX ORDER — DICLOFENAC 35 MG/1
CAPSULE ORAL
Refills: 0 | Status: ACTIVE | COMMUNITY

## 2024-10-24 RX ORDER — MELOXICAM 15 MG/1
15 TABLET ORAL
Refills: 0 | Status: ACTIVE | COMMUNITY

## 2024-11-05 NOTE — BH INPATIENT PSYCHIATRY PROGRESS NOTE - NSICDXBHPRIMARYDX_PSY_ALL_CORE
MDD (major depressive disorder), recurrent episode   F33.9  
DISPLAY PLAN FREE TEXT
MDD (major depressive disorder), recurrent episode   F33.9  

## 2024-11-06 ENCOUNTER — APPOINTMENT (OUTPATIENT)
Dept: RHEUMATOLOGY | Facility: CLINIC | Age: 59
End: 2024-11-06

## 2024-11-10 ENCOUNTER — OUTPATIENT (OUTPATIENT)
Dept: OUTPATIENT SERVICES | Facility: HOSPITAL | Age: 59
LOS: 1 days | End: 2024-11-10
Payer: COMMERCIAL

## 2024-11-10 DIAGNOSIS — Z98.890 OTHER SPECIFIED POSTPROCEDURAL STATES: Chronic | ICD-10-CM

## 2024-11-10 DIAGNOSIS — Z00.8 ENCOUNTER FOR OTHER GENERAL EXAMINATION: ICD-10-CM

## 2024-11-10 PROCEDURE — 72141 MRI NECK SPINE W/O DYE: CPT

## 2024-11-13 ENCOUNTER — APPOINTMENT (OUTPATIENT)
Dept: ORTHOPEDIC SURGERY | Facility: CLINIC | Age: 59
End: 2024-11-13
Payer: COMMERCIAL

## 2024-11-13 VITALS — HEIGHT: 71 IN | WEIGHT: 280 LBS | BODY MASS INDEX: 39.2 KG/M2

## 2024-11-13 DIAGNOSIS — G95.9 DISEASE OF SPINAL CORD, UNSPECIFIED: ICD-10-CM

## 2024-11-13 DIAGNOSIS — M47.10 OTHER SPONDYLOSIS WITH MYELOPATHY, SITE UNSPECIFIED: ICD-10-CM

## 2024-11-13 DIAGNOSIS — M48.02 SPINAL STENOSIS, CERVICAL REGION: ICD-10-CM

## 2024-11-13 PROCEDURE — 99214 OFFICE O/P EST MOD 30 MIN: CPT

## 2024-11-13 PROCEDURE — 99204 OFFICE O/P NEW MOD 45 MIN: CPT

## 2024-11-23 ENCOUNTER — INPATIENT (INPATIENT)
Facility: HOSPITAL | Age: 59
LOS: 6 days | Discharge: HOME | End: 2024-11-30
Attending: STUDENT IN AN ORGANIZED HEALTH CARE EDUCATION/TRAINING PROGRAM | Admitting: STUDENT IN AN ORGANIZED HEALTH CARE EDUCATION/TRAINING PROGRAM
Payer: COMMERCIAL

## 2024-11-23 ENCOUNTER — NON-APPOINTMENT (OUTPATIENT)
Age: 59
End: 2024-11-23

## 2024-11-23 VITALS
DIASTOLIC BLOOD PRESSURE: 80 MMHG | OXYGEN SATURATION: 99 % | HEART RATE: 72 BPM | RESPIRATION RATE: 18 BRPM | SYSTOLIC BLOOD PRESSURE: 138 MMHG | TEMPERATURE: 98 F

## 2024-11-23 DIAGNOSIS — Z98.890 OTHER SPECIFIED POSTPROCEDURAL STATES: Chronic | ICD-10-CM

## 2024-11-23 DIAGNOSIS — M47.812 SPONDYLOSIS WITHOUT MYELOPATHY OR RADICULOPATHY, CERVICAL REGION: ICD-10-CM

## 2024-11-23 LAB
ALBUMIN SERPL ELPH-MCNC: 4.2 G/DL — SIGNIFICANT CHANGE UP (ref 3.3–5)
ALP SERPL-CCNC: 74 U/L — SIGNIFICANT CHANGE UP (ref 40–120)
ALT FLD-CCNC: 38 U/L — SIGNIFICANT CHANGE UP (ref 4–41)
ANION GAP SERPL CALC-SCNC: 15 MMOL/L — HIGH (ref 7–14)
APTT BLD: 29.1 SEC — SIGNIFICANT CHANGE UP (ref 24.5–35.6)
AST SERPL-CCNC: 24 U/L — SIGNIFICANT CHANGE UP (ref 4–40)
BASOPHILS # BLD AUTO: 0.03 K/UL — SIGNIFICANT CHANGE UP (ref 0–0.2)
BASOPHILS NFR BLD AUTO: 0.4 % — SIGNIFICANT CHANGE UP (ref 0–2)
BILIRUB SERPL-MCNC: 0.3 MG/DL — SIGNIFICANT CHANGE UP (ref 0.2–1.2)
BLD GP AB SCN SERPL QL: NEGATIVE — SIGNIFICANT CHANGE UP
BUN SERPL-MCNC: 19 MG/DL — SIGNIFICANT CHANGE UP (ref 7–23)
CALCIUM SERPL-MCNC: 9.5 MG/DL — SIGNIFICANT CHANGE UP (ref 8.4–10.5)
CHLORIDE SERPL-SCNC: 109 MMOL/L — HIGH (ref 98–107)
CO2 SERPL-SCNC: 18 MMOL/L — LOW (ref 22–31)
CREAT SERPL-MCNC: 1.03 MG/DL — SIGNIFICANT CHANGE UP (ref 0.5–1.3)
EGFR: 83 ML/MIN/1.73M2 — SIGNIFICANT CHANGE UP
EGFR: 83 ML/MIN/1.73M2 — SIGNIFICANT CHANGE UP
EOSINOPHIL # BLD AUTO: 0.18 K/UL — SIGNIFICANT CHANGE UP (ref 0–0.5)
EOSINOPHIL NFR BLD AUTO: 2.2 % — SIGNIFICANT CHANGE UP (ref 0–6)
GLUCOSE SERPL-MCNC: 96 MG/DL — SIGNIFICANT CHANGE UP (ref 70–99)
HCT VFR BLD CALC: 37 % — LOW (ref 39–50)
HGB BLD-MCNC: 12.1 G/DL — LOW (ref 13–17)
IANC: 6.47 K/UL — SIGNIFICANT CHANGE UP (ref 1.8–7.4)
IMM GRANULOCYTES NFR BLD AUTO: 0.7 % — SIGNIFICANT CHANGE UP (ref 0–0.9)
INR BLD: 1.01 RATIO — SIGNIFICANT CHANGE UP (ref 0.85–1.16)
LYMPHOCYTES # BLD AUTO: 0.81 K/UL — LOW (ref 1–3.3)
LYMPHOCYTES # BLD AUTO: 10 % — LOW (ref 13–44)
MCHC RBC-ENTMCNC: 31.9 PG — SIGNIFICANT CHANGE UP (ref 27–34)
MCHC RBC-ENTMCNC: 32.7 G/DL — SIGNIFICANT CHANGE UP (ref 32–36)
MCV RBC AUTO: 97.6 FL — SIGNIFICANT CHANGE UP (ref 80–100)
MONOCYTES # BLD AUTO: 0.51 K/UL — SIGNIFICANT CHANGE UP (ref 0–0.9)
MONOCYTES NFR BLD AUTO: 6.3 % — SIGNIFICANT CHANGE UP (ref 2–14)
NEUTROPHILS # BLD AUTO: 6.47 K/UL — SIGNIFICANT CHANGE UP (ref 1.8–7.4)
NEUTROPHILS NFR BLD AUTO: 80.4 % — HIGH (ref 43–77)
NRBC # BLD AUTO: 0 K/UL — SIGNIFICANT CHANGE UP (ref 0–0)
NRBC # BLD: 0 /100 WBCS — SIGNIFICANT CHANGE UP (ref 0–0)
NRBC # FLD: 0 K/UL — SIGNIFICANT CHANGE UP (ref 0–0)
NRBC BLD-RTO: 0 /100 WBCS — SIGNIFICANT CHANGE UP (ref 0–0)
PLATELET # BLD AUTO: 189 K/UL — SIGNIFICANT CHANGE UP (ref 150–400)
POTASSIUM SERPL-MCNC: 4.3 MMOL/L — SIGNIFICANT CHANGE UP (ref 3.5–5.3)
POTASSIUM SERPL-SCNC: 4.3 MMOL/L — SIGNIFICANT CHANGE UP (ref 3.5–5.3)
PROT SERPL-MCNC: 7.2 G/DL — SIGNIFICANT CHANGE UP (ref 6–8.3)
PROTHROM AB SERPL-ACNC: 11.7 SEC — SIGNIFICANT CHANGE UP (ref 9.9–13.4)
RBC # BLD: 3.79 M/UL — LOW (ref 4.2–5.8)
RBC # FLD: 13.8 % — SIGNIFICANT CHANGE UP (ref 10.3–14.5)
RH IG SCN BLD-IMP: POSITIVE — SIGNIFICANT CHANGE UP
SODIUM SERPL-SCNC: 142 MMOL/L — SIGNIFICANT CHANGE UP (ref 135–145)
WBC # BLD: 8.06 K/UL — SIGNIFICANT CHANGE UP (ref 3.8–10.5)
WBC # FLD AUTO: 8.06 K/UL — SIGNIFICANT CHANGE UP (ref 3.8–10.5)

## 2024-11-23 PROCEDURE — 71046 X-RAY EXAM CHEST 2 VIEWS: CPT | Mod: 26

## 2024-11-23 PROCEDURE — 99285 EMERGENCY DEPT VISIT HI MDM: CPT

## 2024-11-23 RX ORDER — VENLAFAXINE HYDROCHLORIDE 37.5 MG/1
150 CAPSULE, EXTENDED RELEASE ORAL ONCE
Refills: 0 | Status: COMPLETED | OUTPATIENT
Start: 2024-11-23 | End: 2024-11-24

## 2024-11-23 RX ORDER — TEMAZEPAM 15 MG/1
22.5 CAPSULE ORAL AT BEDTIME
Refills: 0 | Status: DISCONTINUED | OUTPATIENT
Start: 2024-11-23 | End: 2024-11-24

## 2024-11-23 RX ORDER — QUETIAPINE FUMARATE 25 MG/1
300 TABLET ORAL
Refills: 0 | Status: DISCONTINUED | OUTPATIENT
Start: 2024-11-23 | End: 2024-11-30

## 2024-11-23 RX ORDER — LOSARTAN POTASSIUM 100 MG/1
50 TABLET, FILM COATED ORAL DAILY
Refills: 0 | Status: DISCONTINUED | OUTPATIENT
Start: 2024-11-23 | End: 2024-11-23

## 2024-11-23 RX ORDER — LEVOTHYROXINE SODIUM 300 MCG
50 TABLET ORAL DAILY
Refills: 0 | Status: DISCONTINUED | OUTPATIENT
Start: 2024-11-23 | End: 2024-11-23

## 2024-11-23 RX ORDER — OXYCODONE HYDROCHLORIDE 30 MG/1
2.5 TABLET ORAL EVERY 4 HOURS
Refills: 0 | Status: DISCONTINUED | OUTPATIENT
Start: 2024-11-23 | End: 2024-11-26

## 2024-11-23 RX ORDER — OXYCODONE HYDROCHLORIDE 30 MG/1
5 TABLET ORAL EVERY 4 HOURS
Refills: 0 | Status: DISCONTINUED | OUTPATIENT
Start: 2024-11-23 | End: 2024-11-26

## 2024-11-23 RX ORDER — MELATONIN 5 MG
3 TABLET ORAL AT BEDTIME
Refills: 0 | Status: DISCONTINUED | OUTPATIENT
Start: 2024-11-23 | End: 2024-11-30

## 2024-11-23 RX ORDER — METOPROLOL SUCCINATE 50 MG/1
100 TABLET, EXTENDED RELEASE ORAL DAILY
Refills: 0 | Status: DISCONTINUED | OUTPATIENT
Start: 2024-11-23 | End: 2024-11-23

## 2024-11-23 RX ORDER — ONDANSETRON HCL/PF 4 MG/2 ML
4 VIAL (ML) INJECTION EVERY 6 HOURS
Refills: 0 | Status: DISCONTINUED | OUTPATIENT
Start: 2024-11-23 | End: 2024-11-26

## 2024-11-23 RX ORDER — ACETAMINOPHEN 500 MG/5ML
975 LIQUID (ML) ORAL EVERY 8 HOURS
Refills: 0 | Status: DISCONTINUED | OUTPATIENT
Start: 2024-11-23 | End: 2024-11-30

## 2024-11-23 RX ORDER — VENLAFAXINE HYDROCHLORIDE 37.5 MG/1
150 CAPSULE, EXTENDED RELEASE ORAL ONCE
Refills: 0 | Status: DISCONTINUED | OUTPATIENT
Start: 2024-11-23 | End: 2024-11-23

## 2024-11-23 RX ORDER — SENNA 187 MG
2 TABLET ORAL AT BEDTIME
Refills: 0 | Status: DISCONTINUED | OUTPATIENT
Start: 2024-11-23 | End: 2024-11-30

## 2024-11-23 RX ORDER — LOSARTAN POTASSIUM 100 MG/1
50 TABLET, FILM COATED ORAL DAILY
Refills: 0 | Status: DISCONTINUED | OUTPATIENT
Start: 2024-11-23 | End: 2024-11-30

## 2024-11-23 RX ORDER — ATORVASTATIN CALCIUM 80 MG/1
20 TABLET, FILM COATED ORAL AT BEDTIME
Refills: 0 | Status: DISCONTINUED | OUTPATIENT
Start: 2024-11-23 | End: 2024-11-30

## 2024-11-23 RX ORDER — MIRTAZAPINE 30 MG/1
15 TABLET, FILM COATED ORAL AT BEDTIME
Refills: 0 | Status: DISCONTINUED | OUTPATIENT
Start: 2024-11-23 | End: 2024-11-23

## 2024-11-23 RX ORDER — QUETIAPINE FUMARATE 25 MG/1
300 TABLET ORAL
Refills: 0 | Status: DISCONTINUED | OUTPATIENT
Start: 2024-11-23 | End: 2024-11-23

## 2024-11-23 RX ORDER — POLYETHYLENE GLYCOL 3350 17 G/17G
17 POWDER, FOR SOLUTION ORAL DAILY
Refills: 0 | Status: DISCONTINUED | OUTPATIENT
Start: 2024-11-23 | End: 2024-11-30

## 2024-11-23 RX ORDER — MAGNESIUM HYDROXIDE 400 MG/5ML
30 SUSPENSION ORAL DAILY
Refills: 0 | Status: DISCONTINUED | OUTPATIENT
Start: 2024-11-23 | End: 2024-11-30

## 2024-11-23 RX ORDER — MIRTAZAPINE 30 MG/1
15 TABLET, FILM COATED ORAL AT BEDTIME
Refills: 0 | Status: DISCONTINUED | OUTPATIENT
Start: 2024-11-23 | End: 2024-11-30

## 2024-11-23 RX ORDER — LEVOTHYROXINE SODIUM 300 MCG
50 TABLET ORAL DAILY
Refills: 0 | Status: DISCONTINUED | OUTPATIENT
Start: 2024-11-23 | End: 2024-11-30

## 2024-11-23 RX ORDER — METOPROLOL SUCCINATE 50 MG/1
100 TABLET, EXTENDED RELEASE ORAL DAILY
Refills: 0 | Status: DISCONTINUED | OUTPATIENT
Start: 2024-11-23 | End: 2024-11-30

## 2024-11-23 RX ADMIN — MIRTAZAPINE 15 MILLIGRAM(S): 30 TABLET, FILM COATED ORAL at 21:38

## 2024-11-23 RX ADMIN — ATORVASTATIN CALCIUM 20 MILLIGRAM(S): 80 TABLET, FILM COATED ORAL at 21:38

## 2024-11-23 RX ADMIN — Medication 975 MILLIGRAM(S): at 22:38

## 2024-11-23 RX ADMIN — Medication 2 TABLET(S): at 21:39

## 2024-11-23 RX ADMIN — Medication 975 MILLIGRAM(S): at 21:38

## 2024-11-24 DIAGNOSIS — I10 ESSENTIAL (PRIMARY) HYPERTENSION: ICD-10-CM

## 2024-11-24 DIAGNOSIS — E78.5 HYPERLIPIDEMIA, UNSPECIFIED: ICD-10-CM

## 2024-11-24 DIAGNOSIS — Z29.9 ENCOUNTER FOR PROPHYLACTIC MEASURES, UNSPECIFIED: ICD-10-CM

## 2024-11-24 DIAGNOSIS — Z01.818 ENCOUNTER FOR OTHER PREPROCEDURAL EXAMINATION: ICD-10-CM

## 2024-11-24 DIAGNOSIS — F99 MENTAL DISORDER, NOT OTHERWISE SPECIFIED: ICD-10-CM

## 2024-11-24 DIAGNOSIS — E03.9 HYPOTHYROIDISM, UNSPECIFIED: ICD-10-CM

## 2024-11-24 LAB
ANION GAP SERPL CALC-SCNC: 11 MMOL/L — SIGNIFICANT CHANGE UP (ref 7–14)
BASOPHILS # BLD AUTO: 0.01 K/UL — SIGNIFICANT CHANGE UP (ref 0–0.2)
BASOPHILS NFR BLD AUTO: 0.2 % — SIGNIFICANT CHANGE UP (ref 0–2)
BUN SERPL-MCNC: 17 MG/DL — SIGNIFICANT CHANGE UP (ref 7–23)
CALCIUM SERPL-MCNC: 9.3 MG/DL — SIGNIFICANT CHANGE UP (ref 8.4–10.5)
CHLORIDE SERPL-SCNC: 108 MMOL/L — HIGH (ref 98–107)
CO2 SERPL-SCNC: 21 MMOL/L — LOW (ref 22–31)
CREAT SERPL-MCNC: 0.99 MG/DL — SIGNIFICANT CHANGE UP (ref 0.5–1.3)
EGFR: 87 ML/MIN/1.73M2 — SIGNIFICANT CHANGE UP
EGFR: 87 ML/MIN/1.73M2 — SIGNIFICANT CHANGE UP
EOSINOPHIL # BLD AUTO: 0.19 K/UL — SIGNIFICANT CHANGE UP (ref 0–0.5)
EOSINOPHIL NFR BLD AUTO: 3.1 % — SIGNIFICANT CHANGE UP (ref 0–6)
GLUCOSE SERPL-MCNC: 91 MG/DL — SIGNIFICANT CHANGE UP (ref 70–99)
HCT VFR BLD CALC: 35 % — LOW (ref 39–50)
HGB BLD-MCNC: 11.4 G/DL — LOW (ref 13–17)
IANC: 4.36 K/UL — SIGNIFICANT CHANGE UP (ref 1.8–7.4)
IMM GRANULOCYTES NFR BLD AUTO: 1 % — HIGH (ref 0–0.9)
LYMPHOCYTES # BLD AUTO: 0.97 K/UL — LOW (ref 1–3.3)
LYMPHOCYTES # BLD AUTO: 16.1 % — SIGNIFICANT CHANGE UP (ref 13–44)
MCHC RBC-ENTMCNC: 32 PG — SIGNIFICANT CHANGE UP (ref 27–34)
MCHC RBC-ENTMCNC: 32.6 G/DL — SIGNIFICANT CHANGE UP (ref 32–36)
MCV RBC AUTO: 98.3 FL — SIGNIFICANT CHANGE UP (ref 80–100)
MONOCYTES # BLD AUTO: 0.45 K/UL — SIGNIFICANT CHANGE UP (ref 0–0.9)
MONOCYTES NFR BLD AUTO: 7.5 % — SIGNIFICANT CHANGE UP (ref 2–14)
NEUTROPHILS # BLD AUTO: 4.36 K/UL — SIGNIFICANT CHANGE UP (ref 1.8–7.4)
NEUTROPHILS NFR BLD AUTO: 72.1 % — SIGNIFICANT CHANGE UP (ref 43–77)
NRBC # BLD AUTO: 0 K/UL — SIGNIFICANT CHANGE UP (ref 0–0)
NRBC # BLD: 0 /100 WBCS — SIGNIFICANT CHANGE UP (ref 0–0)
NRBC # FLD: 0 K/UL — SIGNIFICANT CHANGE UP (ref 0–0)
NRBC BLD-RTO: 0 /100 WBCS — SIGNIFICANT CHANGE UP (ref 0–0)
PLATELET # BLD AUTO: 165 K/UL — SIGNIFICANT CHANGE UP (ref 150–400)
POTASSIUM SERPL-MCNC: 4.1 MMOL/L — SIGNIFICANT CHANGE UP (ref 3.5–5.3)
POTASSIUM SERPL-SCNC: 4.1 MMOL/L — SIGNIFICANT CHANGE UP (ref 3.5–5.3)
RBC # BLD: 3.56 M/UL — LOW (ref 4.2–5.8)
RBC # FLD: 13.8 % — SIGNIFICANT CHANGE UP (ref 10.3–14.5)
SODIUM SERPL-SCNC: 140 MMOL/L — SIGNIFICANT CHANGE UP (ref 135–145)
WBC # BLD: 6.04 K/UL — SIGNIFICANT CHANGE UP (ref 3.8–10.5)
WBC # FLD AUTO: 6.04 K/UL — SIGNIFICANT CHANGE UP (ref 3.8–10.5)

## 2024-11-24 PROCEDURE — 99223 1ST HOSP IP/OBS HIGH 75: CPT

## 2024-11-24 RX ORDER — TEMAZEPAM 15 MG/1
15 CAPSULE ORAL AT BEDTIME
Refills: 0 | Status: DISCONTINUED | OUTPATIENT
Start: 2024-11-24 | End: 2024-11-30

## 2024-11-24 RX ADMIN — Medication 975 MILLIGRAM(S): at 05:10

## 2024-11-24 RX ADMIN — TEMAZEPAM 15 MILLIGRAM(S): 15 CAPSULE ORAL at 22:26

## 2024-11-24 RX ADMIN — ATORVASTATIN CALCIUM 20 MILLIGRAM(S): 80 TABLET, FILM COATED ORAL at 21:19

## 2024-11-24 RX ADMIN — LOSARTAN POTASSIUM 50 MILLIGRAM(S): 100 TABLET, FILM COATED ORAL at 05:10

## 2024-11-24 RX ADMIN — VENLAFAXINE HYDROCHLORIDE 150 MILLIGRAM(S): 37.5 CAPSULE, EXTENDED RELEASE ORAL at 05:09

## 2024-11-24 RX ADMIN — Medication 975 MILLIGRAM(S): at 14:22

## 2024-11-24 RX ADMIN — METOPROLOL SUCCINATE 100 MILLIGRAM(S): 50 TABLET, EXTENDED RELEASE ORAL at 05:10

## 2024-11-24 RX ADMIN — Medication 975 MILLIGRAM(S): at 13:22

## 2024-11-24 RX ADMIN — Medication 975 MILLIGRAM(S): at 22:01

## 2024-11-24 RX ADMIN — Medication 50 MICROGRAM(S): at 05:10

## 2024-11-24 RX ADMIN — Medication 3 MILLIGRAM(S): at 21:19

## 2024-11-24 RX ADMIN — QUETIAPINE FUMARATE 300 MILLIGRAM(S): 25 TABLET ORAL at 05:09

## 2024-11-24 RX ADMIN — Medication 975 MILLIGRAM(S): at 21:19

## 2024-11-24 RX ADMIN — MIRTAZAPINE 15 MILLIGRAM(S): 30 TABLET, FILM COATED ORAL at 21:19

## 2024-11-24 RX ADMIN — QUETIAPINE FUMARATE 300 MILLIGRAM(S): 25 TABLET ORAL at 17:03

## 2024-11-24 RX ADMIN — Medication 975 MILLIGRAM(S): at 06:10

## 2024-11-25 ENCOUNTER — APPOINTMENT (OUTPATIENT)
Dept: NEUROLOGY | Facility: CLINIC | Age: 59
End: 2024-11-25

## 2024-11-25 LAB
ANION GAP SERPL CALC-SCNC: 13 MMOL/L — SIGNIFICANT CHANGE UP (ref 7–14)
BUN SERPL-MCNC: 16 MG/DL — SIGNIFICANT CHANGE UP (ref 7–23)
CALCIUM SERPL-MCNC: 9.1 MG/DL — SIGNIFICANT CHANGE UP (ref 8.4–10.5)
CHLORIDE SERPL-SCNC: 108 MMOL/L — HIGH (ref 98–107)
CO2 SERPL-SCNC: 19 MMOL/L — LOW (ref 22–31)
CREAT SERPL-MCNC: 0.96 MG/DL — SIGNIFICANT CHANGE UP (ref 0.5–1.3)
EGFR: 90 ML/MIN/1.73M2 — SIGNIFICANT CHANGE UP
EGFR: 90 ML/MIN/1.73M2 — SIGNIFICANT CHANGE UP
GLUCOSE SERPL-MCNC: 101 MG/DL — HIGH (ref 70–99)
HCT VFR BLD CALC: 34.2 % — LOW (ref 39–50)
HGB BLD-MCNC: 11.3 G/DL — LOW (ref 13–17)
MCHC RBC-ENTMCNC: 32.4 PG — SIGNIFICANT CHANGE UP (ref 27–34)
MCHC RBC-ENTMCNC: 33 G/DL — SIGNIFICANT CHANGE UP (ref 32–36)
MCV RBC AUTO: 98 FL — SIGNIFICANT CHANGE UP (ref 80–100)
NRBC # BLD AUTO: 0 K/UL — SIGNIFICANT CHANGE UP (ref 0–0)
NRBC # BLD: 0 /100 WBCS — SIGNIFICANT CHANGE UP (ref 0–0)
NRBC # FLD: 0 K/UL — SIGNIFICANT CHANGE UP (ref 0–0)
NRBC BLD-RTO: 0 /100 WBCS — SIGNIFICANT CHANGE UP (ref 0–0)
PLATELET # BLD AUTO: 147 K/UL — LOW (ref 150–400)
POTASSIUM SERPL-MCNC: 4.7 MMOL/L — SIGNIFICANT CHANGE UP (ref 3.5–5.3)
POTASSIUM SERPL-SCNC: 4.7 MMOL/L — SIGNIFICANT CHANGE UP (ref 3.5–5.3)
RBC # BLD: 3.49 M/UL — LOW (ref 4.2–5.8)
RBC # FLD: 13.6 % — SIGNIFICANT CHANGE UP (ref 10.3–14.5)
SODIUM SERPL-SCNC: 140 MMOL/L — SIGNIFICANT CHANGE UP (ref 135–145)
WBC # BLD: 3.89 K/UL — SIGNIFICANT CHANGE UP (ref 3.8–10.5)
WBC # FLD AUTO: 3.89 K/UL — SIGNIFICANT CHANGE UP (ref 3.8–10.5)

## 2024-11-25 PROCEDURE — 99232 SBSQ HOSP IP/OBS MODERATE 35: CPT

## 2024-11-25 RX ORDER — POVIDONE-IODINE 7.5 %
1 SOLUTION, NON-ORAL TOPICAL ONCE
Refills: 0 | Status: COMPLETED | OUTPATIENT
Start: 2024-11-25 | End: 2024-11-26

## 2024-11-25 RX ADMIN — TEMAZEPAM 15 MILLIGRAM(S): 15 CAPSULE ORAL at 21:12

## 2024-11-25 RX ADMIN — MIRTAZAPINE 15 MILLIGRAM(S): 30 TABLET, FILM COATED ORAL at 21:17

## 2024-11-25 RX ADMIN — Medication 975 MILLIGRAM(S): at 05:49

## 2024-11-25 RX ADMIN — QUETIAPINE FUMARATE 300 MILLIGRAM(S): 25 TABLET ORAL at 18:48

## 2024-11-25 RX ADMIN — Medication 2 TABLET(S): at 21:12

## 2024-11-25 RX ADMIN — POLYETHYLENE GLYCOL 3350 17 GRAM(S): 17 POWDER, FOR SOLUTION ORAL at 14:02

## 2024-11-25 RX ADMIN — Medication 975 MILLIGRAM(S): at 21:13

## 2024-11-25 RX ADMIN — Medication 50 MICROGRAM(S): at 05:09

## 2024-11-25 RX ADMIN — Medication 975 MILLIGRAM(S): at 13:57

## 2024-11-25 RX ADMIN — LOSARTAN POTASSIUM 50 MILLIGRAM(S): 100 TABLET, FILM COATED ORAL at 05:08

## 2024-11-25 RX ADMIN — METOPROLOL SUCCINATE 100 MILLIGRAM(S): 50 TABLET, EXTENDED RELEASE ORAL at 05:08

## 2024-11-25 RX ADMIN — Medication 975 MILLIGRAM(S): at 05:08

## 2024-11-25 RX ADMIN — ATORVASTATIN CALCIUM 20 MILLIGRAM(S): 80 TABLET, FILM COATED ORAL at 21:13

## 2024-11-25 RX ADMIN — Medication 975 MILLIGRAM(S): at 22:13

## 2024-11-25 RX ADMIN — Medication 975 MILLIGRAM(S): at 14:41

## 2024-11-25 RX ADMIN — QUETIAPINE FUMARATE 300 MILLIGRAM(S): 25 TABLET ORAL at 05:10

## 2024-11-26 ENCOUNTER — TRANSCRIPTION ENCOUNTER (OUTPATIENT)
Age: 59
End: 2024-11-26

## 2024-11-26 ENCOUNTER — APPOINTMENT (OUTPATIENT)
Dept: ORTHOPEDIC SURGERY | Facility: HOSPITAL | Age: 59
End: 2024-11-26
Payer: COMMERCIAL

## 2024-11-26 ENCOUNTER — APPOINTMENT (OUTPATIENT)
Dept: ORTHOPEDIC SURGERY | Facility: HOSPITAL | Age: 59
End: 2024-11-26

## 2024-11-26 LAB
ALBUMIN SERPL ELPH-MCNC: 3.6 G/DL — SIGNIFICANT CHANGE UP (ref 3.3–5)
ALP SERPL-CCNC: 54 U/L — SIGNIFICANT CHANGE UP (ref 40–120)
ALT FLD-CCNC: 37 U/L — SIGNIFICANT CHANGE UP (ref 4–41)
ANION GAP SERPL CALC-SCNC: 11 MMOL/L — SIGNIFICANT CHANGE UP (ref 7–14)
ANION GAP SERPL CALC-SCNC: 13 MMOL/L — SIGNIFICANT CHANGE UP (ref 7–14)
APTT BLD: 29.9 SEC — SIGNIFICANT CHANGE UP (ref 24.5–35.6)
AST SERPL-CCNC: 27 U/L — SIGNIFICANT CHANGE UP (ref 4–40)
BASOPHILS # BLD AUTO: 0.01 K/UL — SIGNIFICANT CHANGE UP (ref 0–0.2)
BASOPHILS NFR BLD AUTO: 0.2 % — SIGNIFICANT CHANGE UP (ref 0–2)
BILIRUB SERPL-MCNC: 0.2 MG/DL — SIGNIFICANT CHANGE UP (ref 0.2–1.2)
BLD GP AB SCN SERPL QL: NEGATIVE — SIGNIFICANT CHANGE UP
BUN SERPL-MCNC: 14 MG/DL — SIGNIFICANT CHANGE UP (ref 7–23)
BUN SERPL-MCNC: 16 MG/DL — SIGNIFICANT CHANGE UP (ref 7–23)
CALCIUM SERPL-MCNC: 8.6 MG/DL — SIGNIFICANT CHANGE UP (ref 8.4–10.5)
CALCIUM SERPL-MCNC: 9.4 MG/DL — SIGNIFICANT CHANGE UP (ref 8.4–10.5)
CHLORIDE SERPL-SCNC: 108 MMOL/L — HIGH (ref 98–107)
CHLORIDE SERPL-SCNC: 111 MMOL/L — HIGH (ref 98–107)
CO2 SERPL-SCNC: 18 MMOL/L — LOW (ref 22–31)
CO2 SERPL-SCNC: 20 MMOL/L — LOW (ref 22–31)
CREAT SERPL-MCNC: 0.93 MG/DL — SIGNIFICANT CHANGE UP (ref 0.5–1.3)
CREAT SERPL-MCNC: 0.95 MG/DL — SIGNIFICANT CHANGE UP (ref 0.5–1.3)
EGFR: 91 ML/MIN/1.73M2 — SIGNIFICANT CHANGE UP
EGFR: 91 ML/MIN/1.73M2 — SIGNIFICANT CHANGE UP
EGFR: 93 ML/MIN/1.73M2 — SIGNIFICANT CHANGE UP
EGFR: 93 ML/MIN/1.73M2 — SIGNIFICANT CHANGE UP
EOSINOPHIL # BLD AUTO: 0.08 K/UL — SIGNIFICANT CHANGE UP (ref 0–0.5)
EOSINOPHIL NFR BLD AUTO: 1.6 % — SIGNIFICANT CHANGE UP (ref 0–6)
GAS PNL BLDA: SIGNIFICANT CHANGE UP
GAS PNL BLDA: SIGNIFICANT CHANGE UP
GLUCOSE BLDC GLUCOMTR-MCNC: 98 MG/DL — SIGNIFICANT CHANGE UP (ref 70–99)
GLUCOSE SERPL-MCNC: 103 MG/DL — HIGH (ref 70–99)
GLUCOSE SERPL-MCNC: 119 MG/DL — HIGH (ref 70–99)
HCT VFR BLD CALC: 32.2 % — LOW (ref 39–50)
HCT VFR BLD CALC: 34.4 % — LOW (ref 39–50)
HGB BLD-MCNC: 11 G/DL — LOW (ref 13–17)
HGB BLD-MCNC: 11.4 G/DL — LOW (ref 13–17)
IANC: 4.15 K/UL — SIGNIFICANT CHANGE UP (ref 1.8–7.4)
IMM GRANULOCYTES NFR BLD AUTO: 0.8 % — SIGNIFICANT CHANGE UP (ref 0–0.9)
INR BLD: 1 RATIO — SIGNIFICANT CHANGE UP (ref 0.85–1.16)
LYMPHOCYTES # BLD AUTO: 0.52 K/UL — LOW (ref 1–3.3)
LYMPHOCYTES # BLD AUTO: 10.3 % — LOW (ref 13–44)
MAGNESIUM SERPL-MCNC: 1.8 MG/DL — SIGNIFICANT CHANGE UP (ref 1.6–2.6)
MCHC RBC-ENTMCNC: 32.4 PG — SIGNIFICANT CHANGE UP (ref 27–34)
MCHC RBC-ENTMCNC: 33.1 G/DL — SIGNIFICANT CHANGE UP (ref 32–36)
MCHC RBC-ENTMCNC: 33.7 PG — SIGNIFICANT CHANGE UP (ref 27–34)
MCHC RBC-ENTMCNC: 34.2 G/DL — SIGNIFICANT CHANGE UP (ref 32–36)
MCV RBC AUTO: 97.7 FL — SIGNIFICANT CHANGE UP (ref 80–100)
MCV RBC AUTO: 98.8 FL — SIGNIFICANT CHANGE UP (ref 80–100)
MONOCYTES # BLD AUTO: 0.24 K/UL — SIGNIFICANT CHANGE UP (ref 0–0.9)
MONOCYTES NFR BLD AUTO: 4.8 % — SIGNIFICANT CHANGE UP (ref 2–14)
NEUTROPHILS # BLD AUTO: 4.15 K/UL — SIGNIFICANT CHANGE UP (ref 1.8–7.4)
NEUTROPHILS NFR BLD AUTO: 82.3 % — HIGH (ref 43–77)
NRBC # BLD AUTO: 0 K/UL — SIGNIFICANT CHANGE UP (ref 0–0)
NRBC # BLD AUTO: 0 K/UL — SIGNIFICANT CHANGE UP (ref 0–0)
NRBC # BLD: 0 /100 WBCS — SIGNIFICANT CHANGE UP (ref 0–0)
NRBC # BLD: 0 /100 WBCS — SIGNIFICANT CHANGE UP (ref 0–0)
NRBC # FLD: 0 K/UL — SIGNIFICANT CHANGE UP (ref 0–0)
NRBC # FLD: 0 K/UL — SIGNIFICANT CHANGE UP (ref 0–0)
NRBC BLD-RTO: 0 /100 WBCS — SIGNIFICANT CHANGE UP (ref 0–0)
NRBC BLD-RTO: 0 /100 WBCS — SIGNIFICANT CHANGE UP (ref 0–0)
PHOSPHATE SERPL-MCNC: 4.6 MG/DL — HIGH (ref 2.5–4.5)
PLATELET # BLD AUTO: 140 K/UL — LOW (ref 150–400)
PLATELET # BLD AUTO: 153 K/UL — SIGNIFICANT CHANGE UP (ref 150–400)
POTASSIUM SERPL-MCNC: 3.8 MMOL/L — SIGNIFICANT CHANGE UP (ref 3.5–5.3)
POTASSIUM SERPL-MCNC: 4.5 MMOL/L — SIGNIFICANT CHANGE UP (ref 3.5–5.3)
POTASSIUM SERPL-SCNC: 3.8 MMOL/L — SIGNIFICANT CHANGE UP (ref 3.5–5.3)
POTASSIUM SERPL-SCNC: 4.5 MMOL/L — SIGNIFICANT CHANGE UP (ref 3.5–5.3)
PROT SERPL-MCNC: 6.1 G/DL — SIGNIFICANT CHANGE UP (ref 6–8.3)
PROTHROM AB SERPL-ACNC: 11.9 SEC — SIGNIFICANT CHANGE UP (ref 9.9–13.4)
RBC # BLD: 3.26 M/UL — LOW (ref 4.2–5.8)
RBC # BLD: 3.52 M/UL — LOW (ref 4.2–5.8)
RBC # FLD: 13.7 % — SIGNIFICANT CHANGE UP (ref 10.3–14.5)
RBC # FLD: 13.8 % — SIGNIFICANT CHANGE UP (ref 10.3–14.5)
RH IG SCN BLD-IMP: POSITIVE — SIGNIFICANT CHANGE UP
SODIUM SERPL-SCNC: 140 MMOL/L — SIGNIFICANT CHANGE UP (ref 135–145)
SODIUM SERPL-SCNC: 141 MMOL/L — SIGNIFICANT CHANGE UP (ref 135–145)
WBC # BLD: 3.92 K/UL — SIGNIFICANT CHANGE UP (ref 3.8–10.5)
WBC # BLD: 5.04 K/UL — SIGNIFICANT CHANGE UP (ref 3.8–10.5)
WBC # FLD AUTO: 3.92 K/UL — SIGNIFICANT CHANGE UP (ref 3.8–10.5)
WBC # FLD AUTO: 5.04 K/UL — SIGNIFICANT CHANGE UP (ref 3.8–10.5)

## 2024-11-26 PROCEDURE — 22842 INSERT SPINE FIXATION DEVICE: CPT

## 2024-11-26 PROCEDURE — 22600 ARTHRD PST TQ 1NTRSPC CRV: CPT | Mod: 82

## 2024-11-26 PROCEDURE — 63045 LAM FACETEC & FORAMOT CRV: CPT | Mod: 82

## 2024-11-26 PROCEDURE — 63048 LAM FACETEC &FORAMOT EA ADDL: CPT | Mod: 22

## 2024-11-26 PROCEDURE — 22600 ARTHRD PST TQ 1NTRSPC CRV: CPT | Mod: 22

## 2024-11-26 PROCEDURE — 63048 LAM FACETEC &FORAMOT EA ADDL: CPT | Mod: 82

## 2024-11-26 PROCEDURE — 22614 ARTHRD PST TQ 1NTRSPC EA ADD: CPT | Mod: 22

## 2024-11-26 PROCEDURE — 22614 ARTHRD PST TQ 1NTRSPC EA ADD: CPT | Mod: 82

## 2024-11-26 PROCEDURE — 22842 INSERT SPINE FIXATION DEVICE: CPT | Mod: 82

## 2024-11-26 PROCEDURE — 63045 LAM FACETEC & FORAMOT CRV: CPT | Mod: 22

## 2024-11-26 RX ORDER — NALOXONE HYDROCHLORIDE 0.4 MG/ML
0.1 INJECTION, SOLUTION INTRAMUSCULAR; INTRAVENOUS; SUBCUTANEOUS
Refills: 0 | Status: DISCONTINUED | OUTPATIENT
Start: 2024-11-26 | End: 2024-11-30

## 2024-11-26 RX ORDER — VENLAFAXINE HYDROCHLORIDE 37.5 MG/1
1 CAPSULE, EXTENDED RELEASE ORAL
Refills: 0 | DISCHARGE

## 2024-11-26 RX ORDER — ATORVASTATIN CALCIUM 80 MG/1
1 TABLET, FILM COATED ORAL
Refills: 0 | DISCHARGE

## 2024-11-26 RX ORDER — OXYCODONE HYDROCHLORIDE 30 MG/1
10 TABLET ORAL EVERY 6 HOURS
Refills: 0 | Status: DISCONTINUED | OUTPATIENT
Start: 2024-11-26 | End: 2024-11-26

## 2024-11-26 RX ORDER — METOPROLOL SUCCINATE 50 MG/1
1 TABLET, EXTENDED RELEASE ORAL
Refills: 0 | DISCHARGE

## 2024-11-26 RX ORDER — ONDANSETRON HCL/PF 4 MG/2 ML
4 VIAL (ML) INJECTION EVERY 6 HOURS
Refills: 0 | Status: DISCONTINUED | OUTPATIENT
Start: 2024-11-26 | End: 2024-11-30

## 2024-11-26 RX ORDER — LEVOTHYROXINE SODIUM 300 MCG
1 TABLET ORAL
Refills: 0 | DISCHARGE

## 2024-11-26 RX ORDER — OXYCODONE HYDROCHLORIDE 30 MG/1
5 TABLET ORAL EVERY 6 HOURS
Refills: 0 | Status: DISCONTINUED | OUTPATIENT
Start: 2024-11-26 | End: 2024-11-26

## 2024-11-26 RX ORDER — HYDROMORPHONE/SOD CHLOR,ISO/PF 2 MG/10 ML
0.5 SYRINGE (ML) INJECTION
Refills: 0 | Status: DISCONTINUED | OUTPATIENT
Start: 2024-11-26 | End: 2024-11-26

## 2024-11-26 RX ORDER — ONDANSETRON HCL/PF 4 MG/2 ML
4 VIAL (ML) INJECTION EVERY 6 HOURS
Refills: 0 | Status: DISCONTINUED | OUTPATIENT
Start: 2024-11-26 | End: 2024-11-26

## 2024-11-26 RX ORDER — TEMAZEPAM 15 MG/1
1 CAPSULE ORAL
Refills: 0 | DISCHARGE

## 2024-11-26 RX ORDER — CYCLOBENZAPRINE HYDROCHLORIDE 15 MG/1
5 CAPSULE, EXTENDED RELEASE ORAL EVERY 8 HOURS
Refills: 0 | Status: DISCONTINUED | OUTPATIENT
Start: 2024-11-26 | End: 2024-11-30

## 2024-11-26 RX ORDER — HYDROMORPHONE/SOD CHLOR,ISO/PF 2 MG/10 ML
0.5 SYRINGE (ML) INJECTION
Refills: 0 | Status: DISCONTINUED | OUTPATIENT
Start: 2024-11-26 | End: 2024-11-27

## 2024-11-26 RX ORDER — MAGNESIUM SULFATE 500 MG/ML
2 SYRINGE (ML) INJECTION ONCE
Refills: 0 | Status: COMPLETED | OUTPATIENT
Start: 2024-11-26 | End: 2024-11-26

## 2024-11-26 RX ORDER — OLMESARTAN MEDOXOMIL 5 MG/1
1 TABLET, FILM COATED ORAL
Refills: 0 | DISCHARGE

## 2024-11-26 RX ORDER — HYDROMORPHONE/SOD CHLOR,ISO/PF 2 MG/10 ML
30 SYRINGE (ML) INJECTION
Refills: 0 | Status: DISCONTINUED | OUTPATIENT
Start: 2024-11-26 | End: 2024-11-27

## 2024-11-26 RX ORDER — ACETAMINOPHEN 500 MG/5ML
975 LIQUID (ML) ORAL EVERY 8 HOURS
Refills: 0 | Status: DISCONTINUED | OUTPATIENT
Start: 2024-11-26 | End: 2024-11-26

## 2024-11-26 RX ORDER — DIPHENHYDRAMINE HCL 12.5MG/5ML
25 ELIXIR ORAL EVERY 4 HOURS
Refills: 0 | Status: DISCONTINUED | OUTPATIENT
Start: 2024-11-26 | End: 2024-11-30

## 2024-11-26 RX ORDER — CEFAZOLIN SODIUM IN 0.9 % NACL 3 G/100 ML
2000 INTRAVENOUS SOLUTION, PIGGYBACK (ML) INTRAVENOUS EVERY 8 HOURS
Refills: 0 | Status: COMPLETED | OUTPATIENT
Start: 2024-11-26 | End: 2024-11-27

## 2024-11-26 RX ADMIN — QUETIAPINE FUMARATE 300 MILLIGRAM(S): 25 TABLET ORAL at 17:47

## 2024-11-26 RX ADMIN — Medication 100 MILLIGRAM(S): at 17:48

## 2024-11-26 RX ADMIN — Medication 30 MILLILITER(S): at 20:16

## 2024-11-26 RX ADMIN — METOPROLOL SUCCINATE 100 MILLIGRAM(S): 50 TABLET, EXTENDED RELEASE ORAL at 05:35

## 2024-11-26 RX ADMIN — Medication 30 MILLILITER(S): at 14:14

## 2024-11-26 RX ADMIN — Medication 975 MILLIGRAM(S): at 21:42

## 2024-11-26 RX ADMIN — Medication 1 APPLICATION(S): at 05:33

## 2024-11-26 RX ADMIN — MIRTAZAPINE 15 MILLIGRAM(S): 30 TABLET, FILM COATED ORAL at 21:45

## 2024-11-26 RX ADMIN — Medication 2 TABLET(S): at 21:42

## 2024-11-26 RX ADMIN — QUETIAPINE FUMARATE 300 MILLIGRAM(S): 25 TABLET ORAL at 05:17

## 2024-11-26 RX ADMIN — ATORVASTATIN CALCIUM 20 MILLIGRAM(S): 80 TABLET, FILM COATED ORAL at 21:42

## 2024-11-26 RX ADMIN — LOSARTAN POTASSIUM 50 MILLIGRAM(S): 100 TABLET, FILM COATED ORAL at 05:35

## 2024-11-26 RX ADMIN — Medication 50 GRAM(S): at 15:09

## 2024-11-26 RX ADMIN — Medication 125 MILLILITER(S): at 00:08

## 2024-11-26 RX ADMIN — Medication 975 MILLIGRAM(S): at 22:42

## 2024-11-26 RX ADMIN — Medication 30 MILLILITER(S): at 16:30

## 2024-11-26 RX ADMIN — Medication 50 MICROGRAM(S): at 05:18

## 2024-11-26 RX ADMIN — Medication 975 MILLIGRAM(S): at 05:18

## 2024-11-26 RX ADMIN — Medication 1 APPLICATION(S): at 05:16

## 2024-11-26 RX ADMIN — Medication 30 MILLILITER(S): at 15:53

## 2024-11-26 RX ADMIN — TEMAZEPAM 15 MILLIGRAM(S): 15 CAPSULE ORAL at 21:42

## 2024-11-27 ENCOUNTER — TRANSCRIPTION ENCOUNTER (OUTPATIENT)
Age: 59
End: 2024-11-27

## 2024-11-27 DIAGNOSIS — G95.9 DISEASE OF SPINAL CORD, UNSPECIFIED: ICD-10-CM

## 2024-11-27 LAB
ANION GAP SERPL CALC-SCNC: 11 MMOL/L — SIGNIFICANT CHANGE UP (ref 7–14)
BASOPHILS # BLD AUTO: 0.01 K/UL — SIGNIFICANT CHANGE UP (ref 0–0.2)
BASOPHILS NFR BLD AUTO: 0.1 % — SIGNIFICANT CHANGE UP (ref 0–2)
BUN SERPL-MCNC: 11 MG/DL — SIGNIFICANT CHANGE UP (ref 7–23)
CALCIUM SERPL-MCNC: 8.8 MG/DL — SIGNIFICANT CHANGE UP (ref 8.4–10.5)
CHLORIDE SERPL-SCNC: 108 MMOL/L — HIGH (ref 98–107)
CO2 SERPL-SCNC: 19 MMOL/L — LOW (ref 22–31)
CREAT SERPL-MCNC: 0.83 MG/DL — SIGNIFICANT CHANGE UP (ref 0.5–1.3)
EGFR: 100 ML/MIN/1.73M2 — SIGNIFICANT CHANGE UP
EGFR: 100 ML/MIN/1.73M2 — SIGNIFICANT CHANGE UP
EOSINOPHIL # BLD AUTO: 0.02 K/UL — SIGNIFICANT CHANGE UP (ref 0–0.5)
EOSINOPHIL NFR BLD AUTO: 0.3 % — SIGNIFICANT CHANGE UP (ref 0–6)
GLUCOSE SERPL-MCNC: 107 MG/DL — HIGH (ref 70–99)
HCT VFR BLD CALC: 32.8 % — LOW (ref 39–50)
HGB BLD-MCNC: 10.9 G/DL — LOW (ref 13–17)
IANC: 6.73 K/UL — SIGNIFICANT CHANGE UP (ref 1.8–7.4)
IMM GRANULOCYTES NFR BLD AUTO: 0.5 % — SIGNIFICANT CHANGE UP (ref 0–0.9)
LYMPHOCYTES # BLD AUTO: 0.55 K/UL — LOW (ref 1–3.3)
LYMPHOCYTES # BLD AUTO: 7 % — LOW (ref 13–44)
MCHC RBC-ENTMCNC: 32.2 PG — SIGNIFICANT CHANGE UP (ref 27–34)
MCHC RBC-ENTMCNC: 33.2 G/DL — SIGNIFICANT CHANGE UP (ref 32–36)
MCV RBC AUTO: 97 FL — SIGNIFICANT CHANGE UP (ref 80–100)
MONOCYTES # BLD AUTO: 0.52 K/UL — SIGNIFICANT CHANGE UP (ref 0–0.9)
MONOCYTES NFR BLD AUTO: 6.6 % — SIGNIFICANT CHANGE UP (ref 2–14)
NEUTROPHILS # BLD AUTO: 6.73 K/UL — SIGNIFICANT CHANGE UP (ref 1.8–7.4)
NEUTROPHILS NFR BLD AUTO: 85.5 % — HIGH (ref 43–77)
NRBC # BLD AUTO: 0 K/UL — SIGNIFICANT CHANGE UP (ref 0–0)
NRBC # BLD: 0 /100 WBCS — SIGNIFICANT CHANGE UP (ref 0–0)
NRBC # FLD: 0 K/UL — SIGNIFICANT CHANGE UP (ref 0–0)
NRBC BLD-RTO: 0 /100 WBCS — SIGNIFICANT CHANGE UP (ref 0–0)
PLATELET # BLD AUTO: 164 K/UL — SIGNIFICANT CHANGE UP (ref 150–400)
POTASSIUM SERPL-MCNC: 4.1 MMOL/L — SIGNIFICANT CHANGE UP (ref 3.5–5.3)
POTASSIUM SERPL-SCNC: 4.1 MMOL/L — SIGNIFICANT CHANGE UP (ref 3.5–5.3)
RBC # BLD: 3.38 M/UL — LOW (ref 4.2–5.8)
RBC # FLD: 13.5 % — SIGNIFICANT CHANGE UP (ref 10.3–14.5)
SODIUM SERPL-SCNC: 138 MMOL/L — SIGNIFICANT CHANGE UP (ref 135–145)
WBC # BLD: 7.87 K/UL — SIGNIFICANT CHANGE UP (ref 3.8–10.5)
WBC # FLD AUTO: 7.87 K/UL — SIGNIFICANT CHANGE UP (ref 3.8–10.5)

## 2024-11-27 PROCEDURE — 99233 SBSQ HOSP IP/OBS HIGH 50: CPT

## 2024-11-27 RX ORDER — OXYCODONE HYDROCHLORIDE 30 MG/1
5 TABLET ORAL
Refills: 0 | Status: DISCONTINUED | OUTPATIENT
Start: 2024-11-27 | End: 2024-11-30

## 2024-11-27 RX ADMIN — Medication 40 MILLIGRAM(S): at 05:03

## 2024-11-27 RX ADMIN — Medication 50 MICROGRAM(S): at 05:03

## 2024-11-27 RX ADMIN — Medication 2 TABLET(S): at 21:05

## 2024-11-27 RX ADMIN — OXYCODONE HYDROCHLORIDE 5 MILLIGRAM(S): 30 TABLET ORAL at 15:45

## 2024-11-27 RX ADMIN — TEMAZEPAM 15 MILLIGRAM(S): 15 CAPSULE ORAL at 21:20

## 2024-11-27 RX ADMIN — Medication 100 MILLIGRAM(S): at 01:01

## 2024-11-27 RX ADMIN — ATORVASTATIN CALCIUM 20 MILLIGRAM(S): 80 TABLET, FILM COATED ORAL at 21:07

## 2024-11-27 RX ADMIN — QUETIAPINE FUMARATE 300 MILLIGRAM(S): 25 TABLET ORAL at 17:21

## 2024-11-27 RX ADMIN — Medication 975 MILLIGRAM(S): at 21:05

## 2024-11-27 RX ADMIN — Medication 975 MILLIGRAM(S): at 06:03

## 2024-11-27 RX ADMIN — Medication 975 MILLIGRAM(S): at 05:03

## 2024-11-27 RX ADMIN — OXYCODONE HYDROCHLORIDE 5 MILLIGRAM(S): 30 TABLET ORAL at 14:45

## 2024-11-27 RX ADMIN — QUETIAPINE FUMARATE 300 MILLIGRAM(S): 25 TABLET ORAL at 05:02

## 2024-11-27 RX ADMIN — MIRTAZAPINE 15 MILLIGRAM(S): 30 TABLET, FILM COATED ORAL at 21:07

## 2024-11-27 RX ADMIN — LOSARTAN POTASSIUM 50 MILLIGRAM(S): 100 TABLET, FILM COATED ORAL at 05:03

## 2024-11-27 RX ADMIN — METOPROLOL SUCCINATE 100 MILLIGRAM(S): 50 TABLET, EXTENDED RELEASE ORAL at 05:03

## 2024-11-27 RX ADMIN — Medication 975 MILLIGRAM(S): at 13:36

## 2024-11-27 RX ADMIN — Medication 125 MILLILITER(S): at 01:00

## 2024-11-27 RX ADMIN — Medication 30 MILLILITER(S): at 08:18

## 2024-11-27 RX ADMIN — Medication 975 MILLIGRAM(S): at 22:05

## 2024-11-27 RX ADMIN — Medication 975 MILLIGRAM(S): at 14:36

## 2024-11-28 LAB
ANION GAP SERPL CALC-SCNC: 13 MMOL/L — SIGNIFICANT CHANGE UP (ref 7–14)
BUN SERPL-MCNC: 12 MG/DL — SIGNIFICANT CHANGE UP (ref 7–23)
CALCIUM SERPL-MCNC: 9.2 MG/DL — SIGNIFICANT CHANGE UP (ref 8.4–10.5)
CHLORIDE SERPL-SCNC: 106 MMOL/L — SIGNIFICANT CHANGE UP (ref 98–107)
CO2 SERPL-SCNC: 17 MMOL/L — LOW (ref 22–31)
CREAT SERPL-MCNC: 0.81 MG/DL — SIGNIFICANT CHANGE UP (ref 0.5–1.3)
EGFR: 100 ML/MIN/1.73M2 — SIGNIFICANT CHANGE UP
EGFR: 100 ML/MIN/1.73M2 — SIGNIFICANT CHANGE UP
GLUCOSE SERPL-MCNC: 94 MG/DL — SIGNIFICANT CHANGE UP (ref 70–99)
HCT VFR BLD CALC: 32.5 % — LOW (ref 39–50)
HGB BLD-MCNC: 10.4 G/DL — LOW (ref 13–17)
MCHC RBC-ENTMCNC: 32 G/DL — SIGNIFICANT CHANGE UP (ref 32–36)
MCHC RBC-ENTMCNC: 32 PG — SIGNIFICANT CHANGE UP (ref 27–34)
MCV RBC AUTO: 100 FL — SIGNIFICANT CHANGE UP (ref 80–100)
NRBC # BLD AUTO: 0 K/UL — SIGNIFICANT CHANGE UP (ref 0–0)
NRBC # BLD: 0 /100 WBCS — SIGNIFICANT CHANGE UP (ref 0–0)
NRBC # FLD: 0 K/UL — SIGNIFICANT CHANGE UP (ref 0–0)
NRBC BLD-RTO: 0 /100 WBCS — SIGNIFICANT CHANGE UP (ref 0–0)
PLATELET # BLD AUTO: 142 K/UL — LOW (ref 150–400)
POTASSIUM SERPL-MCNC: 5.1 MMOL/L — SIGNIFICANT CHANGE UP (ref 3.5–5.3)
POTASSIUM SERPL-SCNC: 5.1 MMOL/L — SIGNIFICANT CHANGE UP (ref 3.5–5.3)
RBC # BLD: 3.25 M/UL — LOW (ref 4.2–5.8)
RBC # FLD: 14 % — SIGNIFICANT CHANGE UP (ref 10.3–14.5)
SODIUM SERPL-SCNC: 136 MMOL/L — SIGNIFICANT CHANGE UP (ref 135–145)
WBC # BLD: 5.73 K/UL — SIGNIFICANT CHANGE UP (ref 3.8–10.5)
WBC # FLD AUTO: 5.73 K/UL — SIGNIFICANT CHANGE UP (ref 3.8–10.5)

## 2024-11-28 PROCEDURE — 99232 SBSQ HOSP IP/OBS MODERATE 35: CPT

## 2024-11-28 RX ADMIN — MIRTAZAPINE 15 MILLIGRAM(S): 30 TABLET, FILM COATED ORAL at 21:23

## 2024-11-28 RX ADMIN — Medication 975 MILLIGRAM(S): at 06:19

## 2024-11-28 RX ADMIN — Medication 975 MILLIGRAM(S): at 22:30

## 2024-11-28 RX ADMIN — ATORVASTATIN CALCIUM 20 MILLIGRAM(S): 80 TABLET, FILM COATED ORAL at 21:22

## 2024-11-28 RX ADMIN — METOPROLOL SUCCINATE 100 MILLIGRAM(S): 50 TABLET, EXTENDED RELEASE ORAL at 05:33

## 2024-11-28 RX ADMIN — Medication 50 MICROGRAM(S): at 05:05

## 2024-11-28 RX ADMIN — QUETIAPINE FUMARATE 300 MILLIGRAM(S): 25 TABLET ORAL at 17:46

## 2024-11-28 RX ADMIN — OXYCODONE HYDROCHLORIDE 5 MILLIGRAM(S): 30 TABLET ORAL at 12:33

## 2024-11-28 RX ADMIN — Medication 40 MILLIGRAM(S): at 05:34

## 2024-11-28 RX ADMIN — QUETIAPINE FUMARATE 300 MILLIGRAM(S): 25 TABLET ORAL at 05:33

## 2024-11-28 RX ADMIN — Medication 975 MILLIGRAM(S): at 13:38

## 2024-11-28 RX ADMIN — TEMAZEPAM 15 MILLIGRAM(S): 15 CAPSULE ORAL at 21:21

## 2024-11-28 RX ADMIN — OXYCODONE HYDROCHLORIDE 5 MILLIGRAM(S): 30 TABLET ORAL at 11:33

## 2024-11-28 RX ADMIN — OXYCODONE HYDROCHLORIDE 5 MILLIGRAM(S): 30 TABLET ORAL at 18:46

## 2024-11-28 RX ADMIN — Medication 975 MILLIGRAM(S): at 05:32

## 2024-11-28 RX ADMIN — LOSARTAN POTASSIUM 50 MILLIGRAM(S): 100 TABLET, FILM COATED ORAL at 05:34

## 2024-11-28 RX ADMIN — OXYCODONE HYDROCHLORIDE 5 MILLIGRAM(S): 30 TABLET ORAL at 17:46

## 2024-11-28 RX ADMIN — Medication 2 TABLET(S): at 21:22

## 2024-11-28 RX ADMIN — POLYETHYLENE GLYCOL 3350 17 GRAM(S): 17 POWDER, FOR SOLUTION ORAL at 11:33

## 2024-11-28 RX ADMIN — Medication 975 MILLIGRAM(S): at 14:38

## 2024-11-28 RX ADMIN — Medication 975 MILLIGRAM(S): at 21:22

## 2024-11-29 ENCOUNTER — TRANSCRIPTION ENCOUNTER (OUTPATIENT)
Age: 59
End: 2024-11-29

## 2024-11-29 LAB
ANION GAP SERPL CALC-SCNC: 10 MMOL/L — SIGNIFICANT CHANGE UP (ref 7–14)
BUN SERPL-MCNC: 18 MG/DL — SIGNIFICANT CHANGE UP (ref 7–23)
CALCIUM SERPL-MCNC: 9.2 MG/DL — SIGNIFICANT CHANGE UP (ref 8.4–10.5)
CHLORIDE SERPL-SCNC: 108 MMOL/L — HIGH (ref 98–107)
CO2 SERPL-SCNC: 23 MMOL/L — SIGNIFICANT CHANGE UP (ref 22–31)
CREAT SERPL-MCNC: 0.96 MG/DL — SIGNIFICANT CHANGE UP (ref 0.5–1.3)
EGFR: 90 ML/MIN/1.73M2 — SIGNIFICANT CHANGE UP
EGFR: 90 ML/MIN/1.73M2 — SIGNIFICANT CHANGE UP
GLUCOSE SERPL-MCNC: 90 MG/DL — SIGNIFICANT CHANGE UP (ref 70–99)
HCT VFR BLD CALC: 30.4 % — LOW (ref 39–50)
HGB BLD-MCNC: 10 G/DL — LOW (ref 13–17)
MCHC RBC-ENTMCNC: 32.7 PG — SIGNIFICANT CHANGE UP (ref 27–34)
MCHC RBC-ENTMCNC: 32.9 G/DL — SIGNIFICANT CHANGE UP (ref 32–36)
MCV RBC AUTO: 99.3 FL — SIGNIFICANT CHANGE UP (ref 80–100)
NRBC # BLD AUTO: 0 K/UL — SIGNIFICANT CHANGE UP (ref 0–0)
NRBC # BLD: 0 /100 WBCS — SIGNIFICANT CHANGE UP (ref 0–0)
NRBC # FLD: 0 K/UL — SIGNIFICANT CHANGE UP (ref 0–0)
NRBC BLD-RTO: 0 /100 WBCS — SIGNIFICANT CHANGE UP (ref 0–0)
PLATELET # BLD AUTO: 150 K/UL — SIGNIFICANT CHANGE UP (ref 150–400)
POTASSIUM SERPL-MCNC: 4.2 MMOL/L — SIGNIFICANT CHANGE UP (ref 3.5–5.3)
POTASSIUM SERPL-SCNC: 4.2 MMOL/L — SIGNIFICANT CHANGE UP (ref 3.5–5.3)
RBC # BLD: 3.06 M/UL — LOW (ref 4.2–5.8)
RBC # FLD: 13.8 % — SIGNIFICANT CHANGE UP (ref 10.3–14.5)
SODIUM SERPL-SCNC: 141 MMOL/L — SIGNIFICANT CHANGE UP (ref 135–145)
WBC # BLD: 4.96 K/UL — SIGNIFICANT CHANGE UP (ref 3.8–10.5)
WBC # FLD AUTO: 4.96 K/UL — SIGNIFICANT CHANGE UP (ref 3.8–10.5)

## 2024-11-29 PROCEDURE — 99232 SBSQ HOSP IP/OBS MODERATE 35: CPT

## 2024-11-29 RX ORDER — MELATONIN 5 MG
1 TABLET ORAL
Qty: 0 | Refills: 0 | DISCHARGE
Start: 2024-11-29

## 2024-11-29 RX ORDER — POLYETHYLENE GLYCOL 3350 17 G/17G
17 POWDER, FOR SOLUTION ORAL
Qty: 119 | Refills: 0
Start: 2024-11-29 | End: 2024-12-05

## 2024-11-29 RX ORDER — TEMAZEPAM 15 MG/1
1 CAPSULE ORAL
Qty: 0 | Refills: 0 | DISCHARGE
Start: 2024-11-29

## 2024-11-29 RX ORDER — OXYCODONE HYDROCHLORIDE 30 MG/1
1 TABLET ORAL
Qty: 42 | Refills: 0
Start: 2024-11-29 | End: 2024-12-05

## 2024-11-29 RX ORDER — VENLAFAXINE HYDROCHLORIDE 37.5 MG/1
1 CAPSULE, EXTENDED RELEASE ORAL
Qty: 0 | Refills: 0 | DISCHARGE
Start: 2024-11-29

## 2024-11-29 RX ORDER — ATORVASTATIN CALCIUM 80 MG/1
1 TABLET, FILM COATED ORAL
Qty: 0 | Refills: 0 | DISCHARGE
Start: 2024-11-29

## 2024-11-29 RX ORDER — NALOXONE HYDROCHLORIDE 0.4 MG/ML
1 INJECTION, SOLUTION INTRAMUSCULAR; INTRAVENOUS; SUBCUTANEOUS
Qty: 1 | Refills: 0
Start: 2024-11-29

## 2024-11-29 RX ORDER — CYCLOBENZAPRINE HYDROCHLORIDE 15 MG/1
1 CAPSULE, EXTENDED RELEASE ORAL
Qty: 42 | Refills: 0
Start: 2024-11-29 | End: 2024-12-12

## 2024-11-29 RX ORDER — LOSARTAN POTASSIUM 100 MG/1
1 TABLET, FILM COATED ORAL
Qty: 0 | Refills: 0 | DISCHARGE
Start: 2024-11-29

## 2024-11-29 RX ORDER — LEVOTHYROXINE SODIUM 300 MCG
1 TABLET ORAL
Qty: 0 | Refills: 0 | DISCHARGE
Start: 2024-11-29

## 2024-11-29 RX ORDER — METOPROLOL SUCCINATE 50 MG/1
1 TABLET, EXTENDED RELEASE ORAL
Qty: 0 | Refills: 0 | DISCHARGE
Start: 2024-11-29

## 2024-11-29 RX ORDER — OLMESARTAN MEDOXOMIL 5 MG/1
1 TABLET, FILM COATED ORAL
Qty: 0 | Refills: 0 | DISCHARGE

## 2024-11-29 RX ORDER — ACETAMINOPHEN 500 MG/5ML
3 LIQUID (ML) ORAL
Qty: 126 | Refills: 0
Start: 2024-11-29 | End: 2024-12-12

## 2024-11-29 RX ORDER — MIRTAZAPINE 30 MG/1
1 TABLET, FILM COATED ORAL
Qty: 0 | Refills: 0 | DISCHARGE
Start: 2024-11-29

## 2024-11-29 RX ORDER — QUETIAPINE FUMARATE 25 MG/1
1 TABLET ORAL
Qty: 0 | Refills: 0 | DISCHARGE
Start: 2024-11-29

## 2024-11-29 RX ORDER — VENLAFAXINE HYDROCHLORIDE 37.5 MG/1
150 CAPSULE, EXTENDED RELEASE ORAL
Refills: 0 | Status: DISCONTINUED | OUTPATIENT
Start: 2024-11-29 | End: 2024-11-30

## 2024-11-29 RX ADMIN — QUETIAPINE FUMARATE 300 MILLIGRAM(S): 25 TABLET ORAL at 17:17

## 2024-11-29 RX ADMIN — Medication 50 MICROGRAM(S): at 05:07

## 2024-11-29 RX ADMIN — Medication 40 MILLIGRAM(S): at 05:07

## 2024-11-29 RX ADMIN — Medication 975 MILLIGRAM(S): at 15:04

## 2024-11-29 RX ADMIN — QUETIAPINE FUMARATE 300 MILLIGRAM(S): 25 TABLET ORAL at 05:08

## 2024-11-29 RX ADMIN — ATORVASTATIN CALCIUM 20 MILLIGRAM(S): 80 TABLET, FILM COATED ORAL at 21:46

## 2024-11-29 RX ADMIN — Medication 975 MILLIGRAM(S): at 05:07

## 2024-11-29 RX ADMIN — OXYCODONE HYDROCHLORIDE 5 MILLIGRAM(S): 30 TABLET ORAL at 09:58

## 2024-11-29 RX ADMIN — OXYCODONE HYDROCHLORIDE 5 MILLIGRAM(S): 30 TABLET ORAL at 06:06

## 2024-11-29 RX ADMIN — Medication 975 MILLIGRAM(S): at 21:44

## 2024-11-29 RX ADMIN — METOPROLOL SUCCINATE 100 MILLIGRAM(S): 50 TABLET, EXTENDED RELEASE ORAL at 05:08

## 2024-11-29 RX ADMIN — OXYCODONE HYDROCHLORIDE 5 MILLIGRAM(S): 30 TABLET ORAL at 18:17

## 2024-11-29 RX ADMIN — OXYCODONE HYDROCHLORIDE 5 MILLIGRAM(S): 30 TABLET ORAL at 05:06

## 2024-11-29 RX ADMIN — OXYCODONE HYDROCHLORIDE 5 MILLIGRAM(S): 30 TABLET ORAL at 10:58

## 2024-11-29 RX ADMIN — Medication 2 TABLET(S): at 21:44

## 2024-11-29 RX ADMIN — Medication 975 MILLIGRAM(S): at 14:04

## 2024-11-29 RX ADMIN — Medication 975 MILLIGRAM(S): at 22:44

## 2024-11-29 RX ADMIN — OXYCODONE HYDROCHLORIDE 5 MILLIGRAM(S): 30 TABLET ORAL at 17:17

## 2024-11-29 RX ADMIN — MIRTAZAPINE 15 MILLIGRAM(S): 30 TABLET, FILM COATED ORAL at 21:45

## 2024-11-29 RX ADMIN — TEMAZEPAM 15 MILLIGRAM(S): 15 CAPSULE ORAL at 21:44

## 2024-11-29 RX ADMIN — POLYETHYLENE GLYCOL 3350 17 GRAM(S): 17 POWDER, FOR SOLUTION ORAL at 14:04

## 2024-11-29 RX ADMIN — LOSARTAN POTASSIUM 50 MILLIGRAM(S): 100 TABLET, FILM COATED ORAL at 05:07

## 2024-11-29 RX ADMIN — Medication 975 MILLIGRAM(S): at 06:07

## 2024-11-30 VITALS
OXYGEN SATURATION: 100 % | RESPIRATION RATE: 16 BRPM | TEMPERATURE: 97 F | SYSTOLIC BLOOD PRESSURE: 123 MMHG | HEART RATE: 79 BPM | DIASTOLIC BLOOD PRESSURE: 65 MMHG

## 2024-11-30 PROCEDURE — 99232 SBSQ HOSP IP/OBS MODERATE 35: CPT

## 2024-11-30 RX ADMIN — METOPROLOL SUCCINATE 100 MILLIGRAM(S): 50 TABLET, EXTENDED RELEASE ORAL at 05:09

## 2024-11-30 RX ADMIN — Medication 975 MILLIGRAM(S): at 06:08

## 2024-11-30 RX ADMIN — LOSARTAN POTASSIUM 50 MILLIGRAM(S): 100 TABLET, FILM COATED ORAL at 05:09

## 2024-11-30 RX ADMIN — QUETIAPINE FUMARATE 300 MILLIGRAM(S): 25 TABLET ORAL at 05:08

## 2024-11-30 RX ADMIN — Medication 40 MILLIGRAM(S): at 05:10

## 2024-11-30 RX ADMIN — VENLAFAXINE HYDROCHLORIDE 150 MILLIGRAM(S): 37.5 CAPSULE, EXTENDED RELEASE ORAL at 05:09

## 2024-11-30 RX ADMIN — Medication 50 MICROGRAM(S): at 04:04

## 2024-11-30 RX ADMIN — Medication 975 MILLIGRAM(S): at 05:08

## 2024-12-02 ENCOUNTER — APPOINTMENT (OUTPATIENT)
Dept: ORTHOPEDIC SURGERY | Facility: CLINIC | Age: 59
End: 2024-12-02

## 2024-12-09 ENCOUNTER — APPOINTMENT (OUTPATIENT)
Dept: ORTHOPEDIC SURGERY | Facility: CLINIC | Age: 59
End: 2024-12-09

## 2024-12-09 VITALS — HEIGHT: 71 IN | WEIGHT: 295 LBS | BODY MASS INDEX: 41.3 KG/M2

## 2024-12-09 DIAGNOSIS — M48.02 SPINAL STENOSIS, CERVICAL REGION: ICD-10-CM

## 2024-12-09 PROCEDURE — 99024 POSTOP FOLLOW-UP VISIT: CPT

## 2024-12-09 PROCEDURE — 72040 X-RAY EXAM NECK SPINE 2-3 VW: CPT

## 2024-12-11 ENCOUNTER — APPOINTMENT (OUTPATIENT)
Dept: PLASTIC SURGERY | Facility: CLINIC | Age: 59
End: 2024-12-11
Payer: COMMERCIAL

## 2024-12-11 VITALS
HEIGHT: 71 IN | OXYGEN SATURATION: 99 % | HEART RATE: 119 BPM | TEMPERATURE: 97.3 F | WEIGHT: 295 LBS | DIASTOLIC BLOOD PRESSURE: 87 MMHG | BODY MASS INDEX: 41.3 KG/M2 | SYSTOLIC BLOOD PRESSURE: 142 MMHG

## 2024-12-11 DIAGNOSIS — L03.90 CELLULITIS, UNSPECIFIED: ICD-10-CM

## 2024-12-11 PROCEDURE — 99024 POSTOP FOLLOW-UP VISIT: CPT

## 2024-12-12 PROBLEM — L03.90 CELLULITIS: Status: ACTIVE | Noted: 2024-12-12

## 2024-12-12 RX ORDER — CEFADROXIL 500 MG/1
500 CAPSULE ORAL TWICE DAILY
Qty: 14 | Refills: 0 | Status: ACTIVE | COMMUNITY
Start: 2024-12-12 | End: 1900-01-01

## 2025-01-06 ENCOUNTER — APPOINTMENT (OUTPATIENT)
Dept: ORTHOPEDIC SURGERY | Facility: CLINIC | Age: 60
End: 2025-01-06
Payer: COMMERCIAL

## 2025-01-06 VITALS — WEIGHT: 295 LBS | HEIGHT: 71 IN | BODY MASS INDEX: 41.3 KG/M2

## 2025-01-06 DIAGNOSIS — G95.9 DISEASE OF SPINAL CORD, UNSPECIFIED: ICD-10-CM

## 2025-01-06 PROCEDURE — 99024 POSTOP FOLLOW-UP VISIT: CPT

## 2025-01-06 PROCEDURE — 72040 X-RAY EXAM NECK SPINE 2-3 VW: CPT

## 2025-01-08 ENCOUNTER — APPOINTMENT (OUTPATIENT)
Dept: PLASTIC SURGERY | Facility: CLINIC | Age: 60
End: 2025-01-08

## 2025-02-03 ENCOUNTER — APPOINTMENT (OUTPATIENT)
Dept: ORTHOPEDIC SURGERY | Facility: CLINIC | Age: 60
End: 2025-02-03
Payer: COMMERCIAL

## 2025-02-03 VITALS — BODY MASS INDEX: 41.3 KG/M2 | HEIGHT: 71 IN | WEIGHT: 295 LBS

## 2025-02-03 DIAGNOSIS — G95.9 DISEASE OF SPINAL CORD, UNSPECIFIED: ICD-10-CM

## 2025-02-03 PROCEDURE — 99024 POSTOP FOLLOW-UP VISIT: CPT

## 2025-02-03 PROCEDURE — 72040 X-RAY EXAM NECK SPINE 2-3 VW: CPT

## 2025-03-17 ENCOUNTER — APPOINTMENT (OUTPATIENT)
Dept: ORTHOPEDIC SURGERY | Facility: CLINIC | Age: 60
End: 2025-03-17

## 2025-07-30 NOTE — BH CONSULTATION LIAISON PROGRESS NOTE - NSBHADMITIPOBS_PSY_A_CORE
no murmurs,  regular rate and rhythm
Constant observation

## 2025-09-17 ENCOUNTER — APPOINTMENT (OUTPATIENT)
Dept: ORTHOPEDIC SURGERY | Facility: CLINIC | Age: 60
End: 2025-09-17
Payer: COMMERCIAL

## 2025-09-17 VITALS — WEIGHT: 306 LBS | HEIGHT: 71 IN | BODY MASS INDEX: 42.84 KG/M2

## 2025-09-17 DIAGNOSIS — M48.02 SPINAL STENOSIS, CERVICAL REGION: ICD-10-CM

## 2025-09-17 PROCEDURE — 72040 X-RAY EXAM NECK SPINE 2-3 VW: CPT

## 2025-09-17 PROCEDURE — 99214 OFFICE O/P EST MOD 30 MIN: CPT
